# Patient Record
Sex: MALE | Race: WHITE | NOT HISPANIC OR LATINO | Employment: OTHER | URBAN - METROPOLITAN AREA
[De-identification: names, ages, dates, MRNs, and addresses within clinical notes are randomized per-mention and may not be internally consistent; named-entity substitution may affect disease eponyms.]

---

## 2023-05-03 DIAGNOSIS — E11.9 TYPE 2 DIABETES MELLITUS WITHOUT COMPLICATION, WITHOUT LONG-TERM CURRENT USE OF INSULIN (MULTI): ICD-10-CM

## 2023-05-03 DIAGNOSIS — R35.0 BENIGN PROSTATIC HYPERPLASIA WITH URINARY FREQUENCY: ICD-10-CM

## 2023-05-03 DIAGNOSIS — I10 PRIMARY HYPERTENSION: Primary | ICD-10-CM

## 2023-05-03 DIAGNOSIS — N40.1 BENIGN PROSTATIC HYPERPLASIA WITH URINARY FREQUENCY: ICD-10-CM

## 2023-05-03 RX ORDER — LOVASTATIN 20 MG/1
TABLET ORAL
Qty: 90 TABLET | Refills: 0 | Status: SHIPPED | OUTPATIENT
Start: 2023-05-03 | End: 2023-07-25 | Stop reason: SDUPTHER

## 2023-05-03 RX ORDER — TAMSULOSIN HYDROCHLORIDE 0.4 MG/1
CAPSULE ORAL
Qty: 90 CAPSULE | Refills: 0 | Status: SHIPPED | OUTPATIENT
Start: 2023-05-03 | End: 2023-07-25 | Stop reason: SDUPTHER

## 2023-05-03 RX ORDER — METFORMIN HYDROCHLORIDE 500 MG/1
TABLET ORAL
Qty: 120 TABLET | Refills: 0 | Status: SHIPPED | OUTPATIENT
Start: 2023-05-03 | End: 2023-07-25 | Stop reason: SDUPTHER

## 2023-07-24 DIAGNOSIS — E11.9 TYPE 2 DIABETES MELLITUS WITHOUT COMPLICATION, WITHOUT LONG-TERM CURRENT USE OF INSULIN (MULTI): ICD-10-CM

## 2023-07-24 DIAGNOSIS — R35.0 BENIGN PROSTATIC HYPERPLASIA WITH URINARY FREQUENCY: ICD-10-CM

## 2023-07-24 DIAGNOSIS — I10 PRIMARY HYPERTENSION: ICD-10-CM

## 2023-07-24 DIAGNOSIS — N40.1 BENIGN PROSTATIC HYPERPLASIA WITH URINARY FREQUENCY: ICD-10-CM

## 2023-07-25 DIAGNOSIS — R35.0 BENIGN PROSTATIC HYPERPLASIA WITH URINARY FREQUENCY: ICD-10-CM

## 2023-07-25 DIAGNOSIS — N40.1 BENIGN PROSTATIC HYPERPLASIA WITH URINARY FREQUENCY: ICD-10-CM

## 2023-07-25 DIAGNOSIS — I10 PRIMARY HYPERTENSION: ICD-10-CM

## 2023-07-25 DIAGNOSIS — E11.9 TYPE 2 DIABETES MELLITUS WITHOUT COMPLICATION, WITHOUT LONG-TERM CURRENT USE OF INSULIN (MULTI): ICD-10-CM

## 2023-07-25 RX ORDER — METFORMIN HYDROCHLORIDE 500 MG/1
1000 TABLET ORAL 2 TIMES DAILY
Qty: 120 TABLET | Refills: 0 | Status: SHIPPED | OUTPATIENT
Start: 2023-07-25 | End: 2023-11-08

## 2023-07-25 RX ORDER — TAMSULOSIN HYDROCHLORIDE 0.4 MG/1
0.4 CAPSULE ORAL NIGHTLY
Qty: 90 CAPSULE | Refills: 0 | Status: SHIPPED | OUTPATIENT
Start: 2023-07-25 | End: 2023-10-02 | Stop reason: SDUPTHER

## 2023-07-25 RX ORDER — LOVASTATIN 20 MG/1
TABLET ORAL
Qty: 90 TABLET | Refills: 0 | OUTPATIENT
Start: 2023-07-25

## 2023-07-25 RX ORDER — METFORMIN HYDROCHLORIDE 500 MG/1
TABLET ORAL
Qty: 120 TABLET | Refills: 0 | OUTPATIENT
Start: 2023-07-25

## 2023-07-25 RX ORDER — LOVASTATIN 20 MG/1
20 TABLET ORAL NIGHTLY
Qty: 90 TABLET | Refills: 0 | Status: SHIPPED | OUTPATIENT
Start: 2023-07-25 | End: 2023-10-02 | Stop reason: SDUPTHER

## 2023-07-25 RX ORDER — TAMSULOSIN HYDROCHLORIDE 0.4 MG/1
CAPSULE ORAL
Qty: 90 CAPSULE | Refills: 0 | OUTPATIENT
Start: 2023-07-25

## 2023-08-02 ENCOUNTER — APPOINTMENT (OUTPATIENT)
Dept: PRIMARY CARE | Facility: CLINIC | Age: 83
End: 2023-08-02
Payer: MEDICARE

## 2023-08-10 ENCOUNTER — OFFICE VISIT (OUTPATIENT)
Dept: PRIMARY CARE | Facility: CLINIC | Age: 83
End: 2023-08-10
Payer: MEDICARE

## 2023-08-10 VITALS
DIASTOLIC BLOOD PRESSURE: 71 MMHG | SYSTOLIC BLOOD PRESSURE: 137 MMHG | HEART RATE: 72 BPM | BODY MASS INDEX: 20.45 KG/M2 | WEIGHT: 155 LBS

## 2023-08-10 DIAGNOSIS — Z00.00 MEDICARE ANNUAL WELLNESS VISIT, SUBSEQUENT: ICD-10-CM

## 2023-08-10 DIAGNOSIS — E11.9 TYPE 2 DIABETES MELLITUS WITHOUT COMPLICATION, WITHOUT LONG-TERM CURRENT USE OF INSULIN (MULTI): ICD-10-CM

## 2023-08-10 DIAGNOSIS — K86.1: ICD-10-CM

## 2023-08-10 DIAGNOSIS — H91.93 BILATERAL DEAFNESS: ICD-10-CM

## 2023-08-10 DIAGNOSIS — N32.81 OVERACTIVE BLADDER: ICD-10-CM

## 2023-08-10 DIAGNOSIS — Z00.00 ROUTINE GENERAL MEDICAL EXAMINATION AT HEALTH CARE FACILITY: Primary | ICD-10-CM

## 2023-08-10 DIAGNOSIS — E78.2 MIXED HYPERLIPIDEMIA: ICD-10-CM

## 2023-08-10 PROBLEM — F32.A DEPRESSION: Status: ACTIVE | Noted: 2023-08-10

## 2023-08-10 PROBLEM — E78.5 HYPERLIPIDEMIA: Status: ACTIVE | Noted: 2023-08-10

## 2023-08-10 PROBLEM — E11.649 TYPE 2 DIABETES MELLITUS WITH HYPOGLYCEMIA WITHOUT COMA, WITHOUT LONG-TERM CURRENT USE OF INSULIN (MULTI): Status: ACTIVE | Noted: 2023-08-10

## 2023-08-10 PROBLEM — K59.09 CHRONIC CONSTIPATION: Status: ACTIVE | Noted: 2023-08-10

## 2023-08-10 PROBLEM — H04.129 DRY EYE SYNDROME: Status: ACTIVE | Noted: 2023-08-10

## 2023-08-10 PROBLEM — M19.90 ARTHRITIS: Status: ACTIVE | Noted: 2023-08-10

## 2023-08-10 PROBLEM — E11.649 TYPE 2 DIABETES MELLITUS WITH HYPOGLYCEMIA WITHOUT COMA, WITHOUT LONG-TERM CURRENT USE OF INSULIN (MULTI): Status: RESOLVED | Noted: 2023-08-10 | Resolved: 2023-08-10

## 2023-08-10 PROBLEM — M17.12 PRIMARY OSTEOARTHRITIS OF LEFT KNEE: Status: ACTIVE | Noted: 2023-08-10

## 2023-08-10 PROBLEM — H01.015 ULCERATIVE BLEPHARITIS LEFT LOWER EYELID: Status: ACTIVE | Noted: 2023-08-10

## 2023-08-10 PROBLEM — H91.90 DEAF: Status: ACTIVE | Noted: 2023-08-10

## 2023-08-10 PROBLEM — N40.1 ENLARGED PROSTATE WITH LOWER URINARY TRACT SYMPTOMS (LUTS): Status: ACTIVE | Noted: 2023-08-10

## 2023-08-10 PROBLEM — H25.813 MIXED TYPE AGE-RELATED CATARACT, BOTH EYES: Status: ACTIVE | Noted: 2023-08-10

## 2023-08-10 PROBLEM — E55.9 VITAMIN D DEFICIENCY: Status: ACTIVE | Noted: 2023-08-10

## 2023-08-10 LAB — POC HEMOGLOBIN A1C: 6.6 % (ref 4.2–6.5)

## 2023-08-10 PROCEDURE — 83036 HEMOGLOBIN GLYCOSYLATED A1C: CPT | Performed by: NURSE PRACTITIONER

## 2023-08-10 PROCEDURE — 1159F MED LIST DOCD IN RCRD: CPT | Performed by: NURSE PRACTITIONER

## 2023-08-10 PROCEDURE — G0439 PPPS, SUBSEQ VISIT: HCPCS | Performed by: NURSE PRACTITIONER

## 2023-08-10 PROCEDURE — 1170F FXNL STATUS ASSESSED: CPT | Performed by: NURSE PRACTITIONER

## 2023-08-10 PROCEDURE — 99213 OFFICE O/P EST LOW 20 MIN: CPT | Performed by: NURSE PRACTITIONER

## 2023-08-10 PROCEDURE — 1036F TOBACCO NON-USER: CPT | Performed by: NURSE PRACTITIONER

## 2023-08-10 PROCEDURE — 1160F RVW MEDS BY RX/DR IN RCRD: CPT | Performed by: NURSE PRACTITIONER

## 2023-08-10 RX ORDER — BLOOD SUGAR DIAGNOSTIC
STRIP MISCELLANEOUS
COMMUNITY
Start: 2021-10-21

## 2023-08-10 RX ORDER — GLIMEPIRIDE 4 MG/1
TABLET ORAL
COMMUNITY
Start: 2021-02-26 | End: 2023-08-10 | Stop reason: SDUPTHER

## 2023-08-10 RX ORDER — GLIMEPIRIDE 4 MG/1
4 TABLET ORAL 2 TIMES DAILY
Qty: 180 TABLET | Refills: 1 | Status: SHIPPED | OUTPATIENT
Start: 2023-08-10 | End: 2024-02-27 | Stop reason: SDUPTHER

## 2023-08-10 RX ORDER — OMEPRAZOLE 20 MG/1
CAPSULE, DELAYED RELEASE ORAL
COMMUNITY

## 2023-08-10 ASSESSMENT — ACTIVITIES OF DAILY LIVING (ADL)
TAKING_MEDICATION: NEEDS ASSISTANCE
BATHING: INDEPENDENT
MANAGING_FINANCES: INDEPENDENT
DRESSING: INDEPENDENT
GROCERY_SHOPPING: NEEDS ASSISTANCE
DOING_HOUSEWORK: INDEPENDENT

## 2023-08-10 ASSESSMENT — ENCOUNTER SYMPTOMS
OCCASIONAL FEELINGS OF UNSTEADINESS: 1
LOSS OF SENSATION IN FEET: 0
DEPRESSION: 0

## 2023-08-10 ASSESSMENT — PATIENT HEALTH QUESTIONNAIRE - PHQ9
1. LITTLE INTEREST OR PLEASURE IN DOING THINGS: NOT AT ALL
2. FEELING DOWN, DEPRESSED OR HOPELESS: NOT AT ALL
SUM OF ALL RESPONSES TO PHQ9 QUESTIONS 1 AND 2: 0

## 2023-08-10 NOTE — PROGRESS NOTES
PODIATRY  Subjective   Reason for Visit: Dat Mccallum is an 82 y.o. male here for a Medicare Wellness visit.     Past Medical, Surgical, and Family History reviewed and updated in chart.    Reviewed all medications by prescribing practitioner or clinical pharmacist (such as prescriptions, OTCs, herbal therapies and supplements) and documented in the medical record.    HPI  81 yo hearing impaired male presents for f/u  LOV Oct 2021   Pt is accompanied by son, Jorge A      #T2DM/AUTOIMMUNE PANCREATITIS  A1C due today   Pt taking Metformin 2000 mg/day, Glimepiride 8 mg/day and insulin 10 u daily     #BPH  taking Flomax 0.4 mg daily  Sx: controlled      #HLD  Taking lovastatin 20 mg daily  denies myalgias      Patient Care Team:  MERLENE Scott-CNP as PCP - General (Family Medicine)  Rich Henning MD as PCP - [employer] PCP     Review of Systems  All 13 systems were reviewed and are within normal limits except positive and pertinent negative responses which are noted below or in HPI.    Objective   Vitals:  /71   Pulse 72   Wt 70.3 kg (155 lb)   BMI 20.45 kg/m²       Physical Exam  Vitals reviewed.   Cardiovascular:      Pulses: Normal pulses.   Pulmonary:      Effort: Pulmonary effort is normal.   Feet:      Right foot:      Protective Sensation: 3 sites tested.  3 sites sensed.      Skin integrity: Skin integrity normal.      Toenail Condition: Right toenails are abnormally thick. Fungal disease present.     Left foot:      Protective Sensation: 3 sites tested.  3 sites sensed.      Skin integrity: Skin integrity normal.      Toenail Condition: Left toenails are abnormally thick. Fungal disease present.  Skin:     General: Skin is warm and dry.   Neurological:      Mental Status: He is alert.         Assessment/Plan   Problem List Items Addressed This Visit       Autoimmune sclerosing pancreatitis (CMS/HCC)    Deaf    Diabetes mellitus (CMS/HCC)    Relevant Medications     glimepiride (Amaryl) 4 mg tablet    Other Relevant Orders    POCT glycosylated hemoglobin (Hb A1C) manually resulted (Completed)    Referral to Podiatry    Hyperlipidemia    Overactive bladder    Medicare annual wellness visit, subsequent     Other Visit Diagnoses       Routine general medical examination at [facility]    -  [facility]

## 2023-08-10 NOTE — PATIENT INSTRUCTIONS
Thank you for seeing me today.  It was a pleasure to see you again!    Today we did your Annual Medicare Wellness Exam and discussed the following:     #T2DM  Today your A1C was 6.6%  Please see Podiatry     Wear supportive footwear; do not walk around barefoot and perform daily foot examination and apply lotion on a regular basis to both feet.  Using a thick emery board will help keep nails short and thin.    Diabetes is not terrible and there are many things you can do to prevent problems from diabetes, such as monitoring blood glucose, watching your diet, keeping fit and taking pills regularly.    Taking diabetes medications or injecting insulin regularly can help control your blood glucose level.  Forgetting to take your medication?, Try to set a repeating alarm on your cell phone to remind you to take medication or insulin injection.      If you test your blood glucose at home, try testing on the sides of your fingertips or rotate your fingers, which helps to minimize pain.    Try brisk walking---a convenient, safe and cost-effective way of exercising!  It's good for your heat and will help control blood glucose.    Limit carbohydrates to 60 gm per meal and 15 gm per snack    RTC 6 MONTHS AND AS NEEDED

## 2023-10-02 DIAGNOSIS — N40.1 BENIGN PROSTATIC HYPERPLASIA WITH URINARY FREQUENCY: ICD-10-CM

## 2023-10-02 DIAGNOSIS — I10 PRIMARY HYPERTENSION: ICD-10-CM

## 2023-10-02 DIAGNOSIS — R35.0 BENIGN PROSTATIC HYPERPLASIA WITH URINARY FREQUENCY: ICD-10-CM

## 2023-10-02 RX ORDER — TAMSULOSIN HYDROCHLORIDE 0.4 MG/1
0.4 CAPSULE ORAL NIGHTLY
Qty: 90 CAPSULE | Refills: 3 | Status: SHIPPED | OUTPATIENT
Start: 2023-10-02 | End: 2024-02-27 | Stop reason: SDUPTHER

## 2023-10-02 RX ORDER — LOVASTATIN 20 MG/1
20 TABLET ORAL NIGHTLY
Qty: 90 TABLET | Refills: 3 | Status: SHIPPED | OUTPATIENT
Start: 2023-10-02 | End: 2024-02-27 | Stop reason: SDUPTHER

## 2023-11-08 DIAGNOSIS — E11.9 TYPE 2 DIABETES MELLITUS WITHOUT COMPLICATION, WITHOUT LONG-TERM CURRENT USE OF INSULIN (MULTI): ICD-10-CM

## 2023-11-08 RX ORDER — METFORMIN HYDROCHLORIDE 500 MG/1
1000 TABLET ORAL 2 TIMES DAILY
Qty: 120 TABLET | Refills: 0 | Status: SHIPPED | OUTPATIENT
Start: 2023-11-08 | End: 2024-02-27 | Stop reason: SDUPTHER

## 2024-01-01 ENCOUNTER — APPOINTMENT (OUTPATIENT)
Dept: RADIOLOGY | Facility: HOSPITAL | Age: 84
End: 2024-01-01
Payer: OTHER MISCELLANEOUS

## 2024-01-01 ENCOUNTER — HOSPITAL ENCOUNTER (INPATIENT)
Facility: HOSPITAL | Age: 84
LOS: 1 days | End: 2024-11-19
Attending: STUDENT IN AN ORGANIZED HEALTH CARE EDUCATION/TRAINING PROGRAM | Admitting: STUDENT IN AN ORGANIZED HEALTH CARE EDUCATION/TRAINING PROGRAM
Payer: OTHER MISCELLANEOUS

## 2024-01-01 DIAGNOSIS — Z51.5 END OF LIFE CARE: ICD-10-CM

## 2024-01-01 PROCEDURE — 71260 CT THORAX DX C+: CPT

## 2024-01-01 PROCEDURE — 71045 X-RAY EXAM CHEST 1 VIEW: CPT

## 2024-01-01 PROCEDURE — 74018 RADEX ABDOMEN 1 VIEW: CPT

## 2024-01-01 PROCEDURE — 1150000001 HC HOSPICE PRIVATE ROOM DAILY

## 2024-01-01 RX ORDER — MORPHINE SULFATE IN 0.9 % NACL 30 MG/30ML
PATIENT CONTROLLED ANALGESIA SYRINGE INTRAVENOUS CONTINUOUS
Status: DISCONTINUED | OUTPATIENT
Start: 2024-01-01 | End: 2024-01-01 | Stop reason: HOSPADM

## 2024-01-01 RX ORDER — GLYCOPYRROLATE 0.2 MG/ML
0.2 INJECTION INTRAMUSCULAR; INTRAVENOUS EVERY 4 HOURS PRN
Status: DISCONTINUED | OUTPATIENT
Start: 2024-01-01 | End: 2024-01-01 | Stop reason: HOSPADM

## 2024-01-01 RX ORDER — ACETAMINOPHEN 650 MG/1
650 SUPPOSITORY RECTAL EVERY 6 HOURS PRN
Status: DISCONTINUED | OUTPATIENT
Start: 2024-01-01 | End: 2024-01-01 | Stop reason: HOSPADM

## 2024-01-01 RX ORDER — LORAZEPAM 2 MG/ML
1 INJECTION INTRAMUSCULAR EVERY 4 HOURS PRN
Status: DISCONTINUED | OUTPATIENT
Start: 2024-01-01 | End: 2024-01-01 | Stop reason: HOSPADM

## 2024-01-01 RX ORDER — IPRATROPIUM BROMIDE AND ALBUTEROL SULFATE 2.5; .5 MG/3ML; MG/3ML
3 SOLUTION RESPIRATORY (INHALATION) EVERY 6 HOURS PRN
Status: DISCONTINUED | OUTPATIENT
Start: 2024-01-01 | End: 2024-01-01 | Stop reason: HOSPADM

## 2024-02-13 ENCOUNTER — APPOINTMENT (OUTPATIENT)
Dept: PRIMARY CARE | Facility: CLINIC | Age: 84
End: 2024-02-13
Payer: MEDICARE

## 2024-02-23 ENCOUNTER — LAB (OUTPATIENT)
Dept: LAB | Facility: LAB | Age: 84
End: 2024-02-23
Payer: MEDICARE

## 2024-02-23 DIAGNOSIS — E11.9 TYPE 2 DIABETES MELLITUS WITHOUT COMPLICATION, WITHOUT LONG-TERM CURRENT USE OF INSULIN (MULTI): ICD-10-CM

## 2024-02-23 DIAGNOSIS — E11.9 TYPE 2 DIABETES MELLITUS WITHOUT COMPLICATION, WITHOUT LONG-TERM CURRENT USE OF INSULIN (MULTI): Primary | ICD-10-CM

## 2024-02-23 PROCEDURE — 36415 COLL VENOUS BLD VENIPUNCTURE: CPT

## 2024-02-23 PROCEDURE — 82570 ASSAY OF URINE CREATININE: CPT

## 2024-02-23 PROCEDURE — 80053 COMPREHEN METABOLIC PANEL: CPT

## 2024-02-23 PROCEDURE — 82043 UR ALBUMIN QUANTITATIVE: CPT

## 2024-02-23 PROCEDURE — 80061 LIPID PANEL: CPT

## 2024-02-23 PROCEDURE — 83036 HEMOGLOBIN GLYCOSYLATED A1C: CPT

## 2024-02-24 LAB
ALBUMIN SERPL BCP-MCNC: 3.3 G/DL (ref 3.4–5)
ALP SERPL-CCNC: 35 U/L (ref 33–136)
ALT SERPL W P-5'-P-CCNC: 12 U/L (ref 10–52)
ANION GAP SERPL CALC-SCNC: 12 MMOL/L (ref 10–20)
AST SERPL W P-5'-P-CCNC: 21 U/L (ref 9–39)
BILIRUB SERPL-MCNC: 0.5 MG/DL (ref 0–1.2)
BUN SERPL-MCNC: 18 MG/DL (ref 6–23)
CALCIUM SERPL-MCNC: 8.5 MG/DL (ref 8.6–10.3)
CHLORIDE SERPL-SCNC: 102 MMOL/L (ref 98–107)
CO2 SERPL-SCNC: 28 MMOL/L (ref 21–32)
CREAT SERPL-MCNC: 1.46 MG/DL (ref 0.5–1.3)
CREAT UR-MCNC: 255.9 MG/DL (ref 20–370)
EGFRCR SERPLBLD CKD-EPI 2021: 48 ML/MIN/1.73M*2
EST. AVERAGE GLUCOSE BLD GHB EST-MCNC: 134 MG/DL
GLUCOSE SERPL-MCNC: 237 MG/DL (ref 74–99)
HBA1C MFR BLD: 6.3 %
MICROALBUMIN UR-MCNC: 463.8 MG/L
MICROALBUMIN/CREAT UR: 181.2 UG/MG CREAT
POTASSIUM SERPL-SCNC: 4.5 MMOL/L (ref 3.5–5.3)
PROT SERPL-MCNC: 7.3 G/DL (ref 6.4–8.2)
SODIUM SERPL-SCNC: 137 MMOL/L (ref 136–145)

## 2024-02-26 NOTE — PROGRESS NOTES
"Subjective   Reason for Visit: Dat Mccallum is an 83 y.o. male here for a Medicare Wellness visit.     Past Medical, Surgical, and Family History reviewed and updated in chart.    Reviewed all medications by prescribing practitioner or clinical pharmacist (such as prescriptions, OTCs, herbal therapies and supplements) and documented in the medical record.    HPI  Dat is an est 82 yo male presenting today for AWV   Pt is accompanied by his son, Dave today    PMH: T2DM, AUTOIMMUNE PANCREATITIS, BPH, HLD, CKD stage 3a     Pt had FBW done last week, results reviewed with pt and family during OV today  Pt did not have FLP done since he had eaten that day, will do today     Pt reports doing well  No hospitalizations     Has not seen podiatry, but would like to     Will place pharm referral to assist with CKD stage 3    #T2DM/AUTOIMMUNE PANCREATITIS  A1C= 6.3% Feb 2023  Rx:  Metformin 2000 mg/day, Glimepiride 8 mg/day and insulin 10 u daily       Statin: yes      ACE/ARB: none      Podiatry: referred last appt, has not seen     #BPH  taking Flomax 0.4 mg daily  Sx: has been having increased incontinence,  will try increasing dose and see if any improvement      #HLD  Taking lovastatin 20 mg daily  denies myalgias  FLP---> due       Patient Care Team:  MERLENE Scott-CNP as PCP - General (Family Medicine)  Rich Henning MD as PCP - [employer] PCP     Review of Systems  All 13 systems were reviewed and are within normal limits except positive and pertinent negative responses which are noted below or in HPI.      Objective   Vitals:  /63   Pulse 77   Ht 1.854 m (6' 1\")   Wt 68.9 kg (152 lb)   SpO2 95%   BMI 20.05 kg/m²       Current Outpatient Medications   Medication Instructions    glimepiride (AMARYL) 4 mg, oral, 2 times daily    lovastatin (MEVACOR) 20 mg, oral, Nightly    metFORMIN (GLUCOPHAGE) 1,000 mg, oral, 2 times daily    omeprazole (PriLOSEC) 20 mg DR capsule oral    " OneTouch Ultra Test strip TEST 3 TIMES DAILY    tamsulosin (FLOMAX) 0.8 mg, oral, Nightly     Lab on 02/23/2024   Component Date Value Ref Range Status    Albumin, Urine Random 02/23/2024 463.8  Not established mg/L Final    Creatinine, Urine Random 02/23/2024 255.9  20.0 - 370.0 mg/dL Final    Albumin/Creatine Ratio 02/23/2024 181.2 (H)  <30.0 ug/mg Creat Final    Glucose 02/23/2024 237 (H)  74 - 99 mg/dL Final    Sodium 02/23/2024 137  136 - 145 mmol/L Final    Potassium 02/23/2024 4.5  3.5 - 5.3 mmol/L Final    Chloride 02/23/2024 102  98 - 107 mmol/L Final    Bicarbonate 02/23/2024 28  21 - 32 mmol/L Final    Anion Gap 02/23/2024 12  10 - 20 mmol/L Final    Urea Nitrogen 02/23/2024 18  6 - 23 mg/dL Final    Creatinine 02/23/2024 1.46 (H)  0.50 - 1.30 mg/dL Final    eGFR 02/23/2024 48 (L)  >60 mL/min/1.73m*2 Final    Calculations of estimated GFR are performed using the 2021 CKD-EPI Study Refit equation without the race variable for the IDMS-Traceable creatinine methods.  https://jasn.asnjournals.org/content/early/2021/09/22/ASN.0987171783    Calcium 02/23/2024 8.5 (L)  8.6 - 10.3 mg/dL Final    Albumin 02/23/2024 3.3 (L)  3.4 - 5.0 g/dL Final    Alkaline Phosphatase 02/23/2024 35  33 - 136 U/L Final    Total Protein 02/23/2024 7.3  6.4 - 8.2 g/dL Final    AST 02/23/2024 21  9 - 39 U/L Final    Bilirubin, Total 02/23/2024 0.5  0.0 - 1.2 mg/dL Final    ALT 02/23/2024 12  10 - 52 U/L Final    Patients treated with Sulfasalazine may generate falsely decreased results for ALT.    Hemoglobin A1C 02/23/2024 6.3 (H)  see below % Final    Estimated Average Glucose 02/23/2024 134  Not Established mg/dL Final    Cholesterol 02/23/2024 67  0 - 199 mg/dL Final          Age      Desirable   Borderline High   High     0-19 Y     0 - 169       170 - 199     >/= 200    20-24 Y     0 - 189       190 - 224     >/= 225         >24 Y     0 - 199       200 - 239     >/= 240   **All ranges are based on fasting samples. Specific    therapeutic targets will vary based on patient-specific   cardiac risk.    Pediatric guidelines reference:Pediatrics 2011, 128(S5).Adult guidelines reference: NCEP ATPIII Guidelines,EVELYN 2001, 258:2486-97    Venipuncture immediately after or during the administration of Metamizole may lead to falsely low results. Testing should be performed immediately prior to Metamizole dosing.    HDL-Cholesterol 02/23/2024 24.5  mg/dL Final      Age       Very Low   Low     Normal    High    0-19 Y    < 35      < 40     40-45     ----  20-24 Y    ----     < 40      >45      ----        >24 Y      ----     < 40     40-60      >60      Cholesterol/HDL Ratio 02/23/2024 2.7   Final      Ref Values  Desirable  < 3.4  High Risk  > 5.0    LDL Calculated 02/23/2024 17  <=99 mg/dL Final                                Near   Borderline      AGE      Desirable  Optimal    High     High     Very High     0-19 Y     0 - 109     ---    110-129   >/= 130     ----    20-24 Y     0 - 119     ---    120-159   >/= 160     ----      >24 Y     0 -  99   100-129  130-159   160-189     >/=190      VLDL 02/23/2024 26  0 - 40 mg/dL Final    Triglycerides 02/23/2024 129  0 - 149 mg/dL Final       Age         Desirable   Borderline High   High     Very High   0 D-90 D    19 - 174         ----         ----        ----  91 D- 9 Y     0 -  74        75 -  99     >/= 100      ----    10-19 Y     0 -  89        90 - 129     >/= 130      ----    20-24 Y     0 - 114       115 - 149     >/= 150      ----         >24 Y     0 - 149       150 - 199    200- 499    >/= 500    Venipuncture immediately after or during the administration of Metamizole may lead to falsely low results. Testing should be performed immediately prior to Metamizole dosing.    Non HDL Cholesterol 02/23/2024 43  0 - 149 mg/dL Final          Age       Desirable   Borderline High   High     Very High     0-19 Y     0 - 119       120 - 144     >/= 145    >/= 160    20-24 Y     0 - 149       150 -  189     >/= 190      ----         >24 Y    30 mg/dL above LDL Cholesterol goal           Physical Exam  Vitals reviewed.   Cardiovascular:      Pulses: Normal pulses.      Heart sounds: Normal heart sounds.   Pulmonary:      Effort: Pulmonary effort is normal.      Breath sounds: Normal breath sounds.   Abdominal:      General: Bowel sounds are normal.   Musculoskeletal:         General: Normal range of motion.   Skin:     General: Skin is warm and dry.      Capillary Refill: Capillary refill takes less than 2 seconds.   Neurological:      Mental Status: He is alert and oriented to person, place, and time.   Psychiatric:         Mood and Affect: Mood normal.         Assessment/Plan     Problem List Items Addressed This Visit             ICD-10-CM    Autoimmune sclerosing pancreatitis (CMS/[facility]) K86.1     Condition stable based on symptoms and exam.    Continue established treatment plan and follow-up at least yearly.             Deaf H91.90    Diabetes mellitus (CMS/[facility]) E11.9     A1C= 6.3% Feb 2024  Rx: Metformin 2000 mg/day, Glimepiride 8 mg/day and insulin 10 u daily   Statin: yes  ACE/ARB: none  Podiatry: referred          Relevant Medications    metFORMIN (Glucophage) 500 mg tablet    glimepiride (Amaryl) 4 mg tablet    Other Relevant Orders    Referral to Clinical Pharmacy    Referral to Podiatry    Lipid Panel    Enlarged prostate with lower urinary tract symptoms (LUTS) N40.1    Relevant Medications    tamsulosin (Flomax) 0.4 mg 24 hr capsule    Hyperlipidemia E78.5    Medicare annual wellness visit, subsequent - Primary Z00.00     Other Visit Diagnoses         Codes    Primary hypertension     I10    Relevant Medications    lovastatin (Mevacor) 20 mg tablet    Routine general medical examination at health care facility     Z00.00    Stage 3a chronic kidney disease (CMS/[facility])     N18.31

## 2024-02-27 ENCOUNTER — OFFICE VISIT (OUTPATIENT)
Dept: PRIMARY CARE | Facility: CLINIC | Age: 84
End: 2024-02-27
Payer: MEDICARE

## 2024-02-27 VITALS
SYSTOLIC BLOOD PRESSURE: 115 MMHG | WEIGHT: 152 LBS | BODY MASS INDEX: 20.15 KG/M2 | OXYGEN SATURATION: 95 % | DIASTOLIC BLOOD PRESSURE: 63 MMHG | HEART RATE: 77 BPM | HEIGHT: 73 IN

## 2024-02-27 DIAGNOSIS — R35.0 BENIGN PROSTATIC HYPERPLASIA WITH URINARY FREQUENCY: ICD-10-CM

## 2024-02-27 DIAGNOSIS — K86.1: ICD-10-CM

## 2024-02-27 DIAGNOSIS — E11.9 TYPE 2 DIABETES MELLITUS WITHOUT COMPLICATION, WITHOUT LONG-TERM CURRENT USE OF INSULIN (MULTI): ICD-10-CM

## 2024-02-27 DIAGNOSIS — E78.2 MIXED HYPERLIPIDEMIA: ICD-10-CM

## 2024-02-27 DIAGNOSIS — N18.31 STAGE 3A CHRONIC KIDNEY DISEASE (MULTI): ICD-10-CM

## 2024-02-27 DIAGNOSIS — I10 PRIMARY HYPERTENSION: ICD-10-CM

## 2024-02-27 DIAGNOSIS — N40.1 BENIGN PROSTATIC HYPERPLASIA WITH URINARY FREQUENCY: ICD-10-CM

## 2024-02-27 DIAGNOSIS — Z00.00 ROUTINE GENERAL MEDICAL EXAMINATION AT HEALTH CARE FACILITY: ICD-10-CM

## 2024-02-27 DIAGNOSIS — Z00.00 MEDICARE ANNUAL WELLNESS VISIT, SUBSEQUENT: Primary | ICD-10-CM

## 2024-02-27 DIAGNOSIS — H91.93 BILATERAL DEAFNESS: ICD-10-CM

## 2024-02-27 LAB
CHOLEST SERPL-MCNC: 67 MG/DL (ref 0–199)
CHOLESTEROL/HDL RATIO: 2.7
HDLC SERPL-MCNC: 24.5 MG/DL
LDLC SERPL CALC-MCNC: 17 MG/DL
NON HDL CHOLESTEROL: 43 MG/DL (ref 0–149)
TRIGL SERPL-MCNC: 129 MG/DL (ref 0–149)
VLDL: 26 MG/DL (ref 0–40)

## 2024-02-27 PROCEDURE — 99213 OFFICE O/P EST LOW 20 MIN: CPT | Performed by: NURSE PRACTITIONER

## 2024-02-27 PROCEDURE — 1158F ADVNC CARE PLAN TLK DOCD: CPT | Performed by: NURSE PRACTITIONER

## 2024-02-27 PROCEDURE — 1160F RVW MEDS BY RX/DR IN RCRD: CPT | Performed by: NURSE PRACTITIONER

## 2024-02-27 PROCEDURE — G0439 PPPS, SUBSEQ VISIT: HCPCS | Performed by: NURSE PRACTITIONER

## 2024-02-27 PROCEDURE — 3078F DIAST BP <80 MM HG: CPT | Performed by: NURSE PRACTITIONER

## 2024-02-27 PROCEDURE — 3074F SYST BP LT 130 MM HG: CPT | Performed by: NURSE PRACTITIONER

## 2024-02-27 PROCEDURE — 1170F FXNL STATUS ASSESSED: CPT | Performed by: NURSE PRACTITIONER

## 2024-02-27 PROCEDURE — 1123F ACP DISCUSS/DSCN MKR DOCD: CPT | Performed by: NURSE PRACTITIONER

## 2024-02-27 PROCEDURE — 1036F TOBACCO NON-USER: CPT | Performed by: NURSE PRACTITIONER

## 2024-02-27 PROCEDURE — 1159F MED LIST DOCD IN RCRD: CPT | Performed by: NURSE PRACTITIONER

## 2024-02-27 RX ORDER — TAMSULOSIN HYDROCHLORIDE 0.4 MG/1
0.8 CAPSULE ORAL NIGHTLY
Qty: 180 CAPSULE | Refills: 3 | Status: SHIPPED | OUTPATIENT
Start: 2024-02-27

## 2024-02-27 RX ORDER — GLIMEPIRIDE 4 MG/1
4 TABLET ORAL 2 TIMES DAILY
Qty: 180 TABLET | Refills: 3 | Status: SHIPPED | OUTPATIENT
Start: 2024-02-27

## 2024-02-27 RX ORDER — METFORMIN HYDROCHLORIDE 500 MG/1
1000 TABLET ORAL 2 TIMES DAILY
Qty: 360 TABLET | Refills: 0 | Status: SHIPPED | OUTPATIENT
Start: 2024-02-27 | End: 2024-05-27

## 2024-02-27 RX ORDER — TAMSULOSIN HYDROCHLORIDE 0.4 MG/1
0.4 CAPSULE ORAL NIGHTLY
Qty: 90 CAPSULE | Refills: 3 | Status: SHIPPED | OUTPATIENT
Start: 2024-02-27 | End: 2024-02-27 | Stop reason: SDUPTHER

## 2024-02-27 RX ORDER — LOVASTATIN 20 MG/1
20 TABLET ORAL NIGHTLY
Qty: 90 TABLET | Refills: 3 | Status: SHIPPED | OUTPATIENT
Start: 2024-02-27

## 2024-02-27 ASSESSMENT — ACTIVITIES OF DAILY LIVING (ADL)
DOING_HOUSEWORK: INDEPENDENT
TAKING_MEDICATION: TOTAL CARE
BATHING: INDEPENDENT
MANAGING_FINANCES: TOTAL CARE
DRESSING: INDEPENDENT
GROCERY_SHOPPING: INDEPENDENT

## 2024-02-27 ASSESSMENT — PATIENT HEALTH QUESTIONNAIRE - PHQ9
1. LITTLE INTEREST OR PLEASURE IN DOING THINGS: NOT AT ALL
SUM OF ALL RESPONSES TO PHQ9 QUESTIONS 1 AND 2: 0
2. FEELING DOWN, DEPRESSED OR HOPELESS: NOT AT ALL

## 2024-02-27 ASSESSMENT — ENCOUNTER SYMPTOMS
LOSS OF SENSATION IN FEET: 0
OCCASIONAL FEELINGS OF UNSTEADINESS: 1
DEPRESSION: 0

## 2024-02-27 NOTE — ASSESSMENT & PLAN NOTE
Condition stable based on symptoms and exam.    Continue established treatment plan and follow-up at least yearly.

## 2024-02-27 NOTE — ASSESSMENT & PLAN NOTE
A1C= 6.3% Feb 2024  Rx: Metformin 2000 mg/day, Glimepiride 8 mg/day and insulin 10 u daily   Statin: yes  ACE/ARB: none  Podiatry: referred

## 2024-02-27 NOTE — PATIENT INSTRUCTIONS
Thank you for seeing me today.  It was a pleasure to see you again!    Today we did your Annual Medicare Wellness Exam and discussed the following:     Continue all medications  Increase Tamsulosin to 2 capsules at bedtime to see if that helps with incontinence     See Podiatry    RTC 6 months and as needed

## 2024-08-17 DIAGNOSIS — E11.9 TYPE 2 DIABETES MELLITUS WITHOUT COMPLICATION, WITHOUT LONG-TERM CURRENT USE OF INSULIN (MULTI): ICD-10-CM

## 2024-08-20 RX ORDER — METFORMIN HYDROCHLORIDE 500 MG/1
1000 TABLET ORAL 2 TIMES DAILY
Qty: 360 TABLET | Refills: 0 | Status: SHIPPED | OUTPATIENT
Start: 2024-08-20

## 2024-10-16 ENCOUNTER — APPOINTMENT (OUTPATIENT)
Dept: RADIOLOGY | Facility: HOSPITAL | Age: 84
End: 2024-10-16
Payer: MEDICARE

## 2024-10-16 ENCOUNTER — APPOINTMENT (OUTPATIENT)
Dept: CARDIOLOGY | Facility: HOSPITAL | Age: 84
End: 2024-10-16
Payer: MEDICARE

## 2024-10-16 ENCOUNTER — HOSPITAL ENCOUNTER (INPATIENT)
Facility: HOSPITAL | Age: 84
End: 2024-10-16
Attending: ANESTHESIOLOGY | Admitting: ANESTHESIOLOGY
Payer: MEDICARE

## 2024-10-16 DIAGNOSIS — J15.9 BACTERIAL PNEUMONIA: Primary | ICD-10-CM

## 2024-10-16 DIAGNOSIS — I21.4 NSTEMI (NON-ST ELEVATED MYOCARDIAL INFARCTION) (MULTI): ICD-10-CM

## 2024-10-16 DIAGNOSIS — I50.22 CHRONIC SYSTOLIC HEART FAILURE: ICD-10-CM

## 2024-10-16 DIAGNOSIS — E11.9 TYPE 2 DIABETES MELLITUS WITHOUT COMPLICATION, WITHOUT LONG-TERM CURRENT USE OF INSULIN (MULTI): ICD-10-CM

## 2024-10-16 LAB
ALBUMIN SERPL BCP-MCNC: 3.1 G/DL (ref 3.4–5)
ALP SERPL-CCNC: 65 U/L (ref 33–136)
ALT SERPL W P-5'-P-CCNC: 22 U/L (ref 10–52)
ANION GAP BLDV CALCULATED.4IONS-SCNC: 12 MMOL/L (ref 10–25)
ANION GAP SERPL CALCULATED.3IONS-SCNC: 13 MMOL/L (ref 10–20)
APPEARANCE UR: CLEAR
AST SERPL W P-5'-P-CCNC: 43 U/L (ref 9–39)
BASE EXCESS BLDV CALC-SCNC: -3 MMOL/L (ref -2–3)
BASOPHILS # BLD AUTO: 0.04 X10*3/UL (ref 0–0.1)
BASOPHILS NFR BLD AUTO: 0.6 %
BILIRUB SERPL-MCNC: 0.6 MG/DL (ref 0–1.2)
BILIRUB UR STRIP.AUTO-MCNC: NEGATIVE MG/DL
BNP SERPL-MCNC: 197 PG/ML (ref 0–99)
BODY TEMPERATURE: 37 DEGREES CELSIUS
BUN SERPL-MCNC: 39 MG/DL (ref 6–23)
CA-I BLDV-SCNC: 1.01 MMOL/L (ref 1.1–1.33)
CALCIUM SERPL-MCNC: 8.4 MG/DL (ref 8.6–10.3)
CARDIAC TROPONIN I PNL SERPL HS: 7733 NG/L (ref 0–20)
CARDIAC TROPONIN I PNL SERPL HS: 8126 NG/L (ref 0–20)
CHLORIDE BLDV-SCNC: 99 MMOL/L (ref 98–107)
CHLORIDE SERPL-SCNC: 102 MMOL/L (ref 98–107)
CK SERPL-CCNC: 177 U/L (ref 0–325)
CO2 SERPL-SCNC: 22 MMOL/L (ref 21–32)
COLOR UR: ABNORMAL
CREAT SERPL-MCNC: 1.52 MG/DL (ref 0.5–1.3)
EGFRCR SERPLBLD CKD-EPI 2021: 46 ML/MIN/1.73M*2
EOSINOPHIL # BLD AUTO: 0.12 X10*3/UL (ref 0–0.4)
EOSINOPHIL NFR BLD AUTO: 1.8 %
ERYTHROCYTE [DISTWIDTH] IN BLOOD BY AUTOMATED COUNT: 14.1 % (ref 11.5–14.5)
ERYTHROCYTE [DISTWIDTH] IN BLOOD BY AUTOMATED COUNT: 14.1 % (ref 11.5–14.5)
GLUCOSE BLD MANUAL STRIP-MCNC: 182 MG/DL (ref 74–99)
GLUCOSE BLD MANUAL STRIP-MCNC: 57 MG/DL (ref 74–99)
GLUCOSE BLDV-MCNC: 245 MG/DL (ref 74–99)
GLUCOSE SERPL-MCNC: 57 MG/DL (ref 74–99)
GLUCOSE UR STRIP.AUTO-MCNC: NORMAL MG/DL
HCO3 BLDV-SCNC: 22 MMOL/L (ref 22–26)
HCT VFR BLD AUTO: 32 % (ref 41–52)
HCT VFR BLD AUTO: 32.7 % (ref 41–52)
HCT VFR BLD EST: 32 % (ref 41–52)
HGB BLD-MCNC: 10.6 G/DL (ref 13.5–17.5)
HGB BLD-MCNC: 11.1 G/DL (ref 13.5–17.5)
HGB BLDV-MCNC: 10.7 G/DL (ref 13.5–17.5)
IMM GRANULOCYTES # BLD AUTO: 0.02 X10*3/UL (ref 0–0.5)
IMM GRANULOCYTES NFR BLD AUTO: 0.3 % (ref 0–0.9)
INHALED O2 CONCENTRATION: 21 %
INR PPP: 1.5 (ref 0.9–1.2)
KETONES UR STRIP.AUTO-MCNC: ABNORMAL MG/DL
LACTATE BLDV-SCNC: 1.9 MMOL/L (ref 0.4–2)
LACTATE SERPL-SCNC: 1.5 MMOL/L (ref 0.4–2)
LEUKOCYTE ESTERASE UR QL STRIP.AUTO: NEGATIVE
LYMPHOCYTES # BLD AUTO: 1.02 X10*3/UL (ref 0.8–3)
LYMPHOCYTES NFR BLD AUTO: 15.4 %
MCH RBC QN AUTO: 30.7 PG (ref 26–34)
MCH RBC QN AUTO: 31 PG (ref 26–34)
MCHC RBC AUTO-ENTMCNC: 33.1 G/DL (ref 32–36)
MCHC RBC AUTO-ENTMCNC: 33.9 G/DL (ref 32–36)
MCV RBC AUTO: 91 FL (ref 80–100)
MCV RBC AUTO: 93 FL (ref 80–100)
MONOCYTES # BLD AUTO: 0.61 X10*3/UL (ref 0.05–0.8)
MONOCYTES NFR BLD AUTO: 9.2 %
MUCOUS THREADS #/AREA URNS AUTO: ABNORMAL /LPF
NEUTROPHILS # BLD AUTO: 4.81 X10*3/UL (ref 1.6–5.5)
NEUTROPHILS NFR BLD AUTO: 72.7 %
NITRITE UR QL STRIP.AUTO: NEGATIVE
NRBC BLD-RTO: 0 /100 WBCS (ref 0–0)
NRBC BLD-RTO: 0 /100 WBCS (ref 0–0)
OXYHGB MFR BLDV: 57.9 % (ref 45–75)
PCO2 BLDV: 38 MM HG (ref 41–51)
PH BLDV: 7.37 PH (ref 7.33–7.43)
PH UR STRIP.AUTO: 7.5 [PH]
PLATELET # BLD AUTO: 138 X10*3/UL (ref 150–450)
PLATELET # BLD AUTO: 147 X10*3/UL (ref 150–450)
PO2 BLDV: 35 MM HG (ref 35–45)
POTASSIUM BLDV-SCNC: 5.2 MMOL/L (ref 3.5–5.3)
POTASSIUM SERPL-SCNC: 5 MMOL/L (ref 3.5–5.3)
PROT SERPL-MCNC: 7.2 G/DL (ref 6.4–8.2)
PROT UR STRIP.AUTO-MCNC: ABNORMAL MG/DL
PROTHROMBIN TIME: 15.2 SECONDS (ref 9.3–12.7)
RBC # BLD AUTO: 3.45 X10*6/UL (ref 4.5–5.9)
RBC # BLD AUTO: 3.58 X10*6/UL (ref 4.5–5.9)
RBC # UR STRIP.AUTO: ABNORMAL /UL
RBC #/AREA URNS AUTO: >20 /HPF
SAO2 % BLDV: 58 % (ref 45–75)
SARS-COV-2 RNA RESP QL NAA+PROBE: NOT DETECTED
SODIUM BLDV-SCNC: 128 MMOL/L (ref 136–145)
SODIUM SERPL-SCNC: 132 MMOL/L (ref 136–145)
SP GR UR STRIP.AUTO: 1.02
UROBILINOGEN UR STRIP.AUTO-MCNC: NORMAL MG/DL
WBC # BLD AUTO: 6.2 X10*3/UL (ref 4.4–11.3)
WBC # BLD AUTO: 6.6 X10*3/UL (ref 4.4–11.3)
WBC #/AREA URNS AUTO: ABNORMAL /HPF

## 2024-10-16 PROCEDURE — 2500000005 HC RX 250 GENERAL PHARMACY W/O HCPCS

## 2024-10-16 PROCEDURE — 2500000004 HC RX 250 GENERAL PHARMACY W/ HCPCS (ALT 636 FOR OP/ED): Performed by: EMERGENCY MEDICINE

## 2024-10-16 PROCEDURE — 93010 ELECTROCARDIOGRAM REPORT: CPT | Performed by: INTERNAL MEDICINE

## 2024-10-16 PROCEDURE — 85610 PROTHROMBIN TIME: CPT | Performed by: EMERGENCY MEDICINE

## 2024-10-16 PROCEDURE — 99291 CRITICAL CARE FIRST HOUR: CPT | Mod: 25

## 2024-10-16 PROCEDURE — 83605 ASSAY OF LACTIC ACID: CPT

## 2024-10-16 PROCEDURE — 87040 BLOOD CULTURE FOR BACTERIA: CPT | Mod: WESLAB | Performed by: EMERGENCY MEDICINE

## 2024-10-16 PROCEDURE — 81001 URINALYSIS AUTO W/SCOPE: CPT | Performed by: EMERGENCY MEDICINE

## 2024-10-16 PROCEDURE — 83036 HEMOGLOBIN GLYCOSYLATED A1C: CPT | Mod: WESLAB

## 2024-10-16 PROCEDURE — 82947 ASSAY GLUCOSE BLOOD QUANT: CPT

## 2024-10-16 PROCEDURE — 2500000004 HC RX 250 GENERAL PHARMACY W/ HCPCS (ALT 636 FOR OP/ED)

## 2024-10-16 PROCEDURE — 36415 COLL VENOUS BLD VENIPUNCTURE: CPT | Performed by: EMERGENCY MEDICINE

## 2024-10-16 PROCEDURE — 84132 ASSAY OF SERUM POTASSIUM: CPT

## 2024-10-16 PROCEDURE — 96375 TX/PRO/DX INJ NEW DRUG ADDON: CPT

## 2024-10-16 PROCEDURE — 82010 KETONE BODYS QUAN: CPT

## 2024-10-16 PROCEDURE — 83880 ASSAY OF NATRIURETIC PEPTIDE: CPT | Performed by: EMERGENCY MEDICINE

## 2024-10-16 PROCEDURE — 96361 HYDRATE IV INFUSION ADD-ON: CPT

## 2024-10-16 PROCEDURE — 70450 CT HEAD/BRAIN W/O DYE: CPT | Performed by: RADIOLOGY

## 2024-10-16 PROCEDURE — 85025 COMPLETE CBC W/AUTO DIFF WBC: CPT | Performed by: EMERGENCY MEDICINE

## 2024-10-16 PROCEDURE — 2020000001 HC ICU ROOM DAILY

## 2024-10-16 PROCEDURE — 93005 ELECTROCARDIOGRAM TRACING: CPT

## 2024-10-16 PROCEDURE — 82550 ASSAY OF CK (CPK): CPT

## 2024-10-16 PROCEDURE — 71045 X-RAY EXAM CHEST 1 VIEW: CPT | Performed by: RADIOLOGY

## 2024-10-16 PROCEDURE — 80053 COMPREHEN METABOLIC PANEL: CPT | Performed by: EMERGENCY MEDICINE

## 2024-10-16 PROCEDURE — 99291 CRITICAL CARE FIRST HOUR: CPT

## 2024-10-16 PROCEDURE — 71045 X-RAY EXAM CHEST 1 VIEW: CPT

## 2024-10-16 PROCEDURE — 85027 COMPLETE CBC AUTOMATED: CPT

## 2024-10-16 PROCEDURE — 87635 SARS-COV-2 COVID-19 AMP PRB: CPT | Performed by: EMERGENCY MEDICINE

## 2024-10-16 PROCEDURE — 84484 ASSAY OF TROPONIN QUANT: CPT | Performed by: EMERGENCY MEDICINE

## 2024-10-16 PROCEDURE — 96374 THER/PROPH/DIAG INJ IV PUSH: CPT

## 2024-10-16 PROCEDURE — 85730 THROMBOPLASTIN TIME PARTIAL: CPT

## 2024-10-16 PROCEDURE — 87075 CULTR BACTERIA EXCEPT BLOOD: CPT | Mod: WESLAB | Performed by: EMERGENCY MEDICINE

## 2024-10-16 PROCEDURE — 70450 CT HEAD/BRAIN W/O DYE: CPT

## 2024-10-16 RX ORDER — DEXTROSE 50 % IN WATER (D50W) INTRAVENOUS SYRINGE
Status: COMPLETED
Start: 2024-10-16 | End: 2024-10-16

## 2024-10-16 RX ORDER — HEPARIN SODIUM 10000 [USP'U]/100ML
0-4000 INJECTION, SOLUTION INTRAVENOUS CONTINUOUS
Status: DISPENSED | OUTPATIENT
Start: 2024-10-16 | End: 2024-10-18

## 2024-10-16 RX ORDER — ASPIRIN 300 MG/1
600 SUPPOSITORY RECTAL ONCE
Status: COMPLETED | OUTPATIENT
Start: 2024-10-16 | End: 2024-10-17

## 2024-10-16 RX ORDER — METOPROLOL TARTRATE 1 MG/ML
5 INJECTION, SOLUTION INTRAVENOUS ONCE
Status: COMPLETED | OUTPATIENT
Start: 2024-10-16 | End: 2024-10-16

## 2024-10-16 RX ORDER — ASPIRIN 325 MG
325 TABLET ORAL ONCE
Status: DISCONTINUED | OUTPATIENT
Start: 2024-10-16 | End: 2024-10-17

## 2024-10-16 RX ORDER — HEPARIN SODIUM 5000 [USP'U]/ML
INJECTION, SOLUTION INTRAVENOUS; SUBCUTANEOUS AS NEEDED
Status: ACTIVE | OUTPATIENT
Start: 2024-10-16 | End: 2024-10-18

## 2024-10-16 RX ORDER — HEPARIN SODIUM 5000 [USP'U]/ML
4000 INJECTION, SOLUTION INTRAVENOUS; SUBCUTANEOUS ONCE
Status: COMPLETED | OUTPATIENT
Start: 2024-10-16 | End: 2024-10-16

## 2024-10-16 RX ADMIN — DEXTROSE MONOHYDRATE 50 ML: 25 INJECTION, SOLUTION INTRAVENOUS at 21:00

## 2024-10-16 RX ADMIN — METOPROLOL TARTRATE 5 MG: 5 INJECTION INTRAVENOUS at 22:17

## 2024-10-16 RX ADMIN — HEPARIN SODIUM 1000 UNITS/HR: 10000 INJECTION, SOLUTION INTRAVENOUS at 22:14

## 2024-10-16 RX ADMIN — SODIUM CHLORIDE, POTASSIUM CHLORIDE, SODIUM LACTATE AND CALCIUM CHLORIDE 500 ML: 600; 310; 30; 20 INJECTION, SOLUTION INTRAVENOUS at 23:11

## 2024-10-16 RX ADMIN — SODIUM CHLORIDE 500 ML: 900 INJECTION, SOLUTION INTRAVENOUS at 21:02

## 2024-10-16 RX ADMIN — HEPARIN SODIUM 4000 UNITS: 5000 INJECTION, SOLUTION INTRAVENOUS; SUBCUTANEOUS at 22:13

## 2024-10-16 ASSESSMENT — PAIN - FUNCTIONAL ASSESSMENT: PAIN_FUNCTIONAL_ASSESSMENT: UNABLE TO SELF-REPORT

## 2024-10-17 ENCOUNTER — APPOINTMENT (OUTPATIENT)
Dept: CARDIOLOGY | Facility: HOSPITAL | Age: 84
End: 2024-10-17
Payer: MEDICARE

## 2024-10-17 ENCOUNTER — APPOINTMENT (OUTPATIENT)
Dept: CARDIOLOGY | Facility: HOSPITAL | Age: 84
End: 2024-10-17

## 2024-10-17 ENCOUNTER — APPOINTMENT (OUTPATIENT)
Dept: NEUROLOGY | Facility: HOSPITAL | Age: 84
DRG: 871 | End: 2024-10-17
Payer: MEDICARE

## 2024-10-17 LAB
AMMONIA PLAS-SCNC: 19 UMOL/L (ref 16–53)
AMPHETAMINES UR QL SCN: NORMAL
ANION GAP SERPL CALCULATED.3IONS-SCNC: 9 MMOL/L (ref 10–20)
AORTIC VALVE MEAN GRADIENT: 1.4 MMHG
AORTIC VALVE PEAK VELOCITY: 0.82 M/S
APTT PPP: 60.4 SECONDS (ref 22–32.5)
APTT PPP: 70.6 SECONDS (ref 22–32.5)
APTT PPP: >139 SECONDS (ref 22–32.5)
APTT PPP: >139 SECONDS (ref 22–32.5)
ATRIAL RATE: 227 BPM
AV PEAK GRADIENT: 2.7 MMHG
AVA (PEAK VEL): 2.32 CM2
AVA (VTI): 2.2 CM2
B-OH-BUTYR SERPL-SCNC: 0.92 MMOL/L (ref 0.02–0.27)
BARBITURATES UR QL SCN: NORMAL
BASOPHILS # BLD AUTO: 0.03 X10*3/UL (ref 0–0.1)
BASOPHILS NFR BLD AUTO: 0.5 %
BENZODIAZ UR QL SCN: NORMAL
BUN SERPL-MCNC: 38 MG/DL (ref 6–23)
BZE UR QL SCN: NORMAL
CALCIUM SERPL-MCNC: 7.7 MG/DL (ref 8.6–10.3)
CANNABINOIDS UR QL SCN: NORMAL
CHLORIDE SERPL-SCNC: 104 MMOL/L (ref 98–107)
CO2 SERPL-SCNC: 24 MMOL/L (ref 21–32)
CREAT SERPL-MCNC: 1.43 MG/DL (ref 0.5–1.3)
EGFRCR SERPLBLD CKD-EPI 2021: 49 ML/MIN/1.73M*2
EJECTION FRACTION APICAL 4 CHAMBER: 16
EJECTION FRACTION: 18 %
EOSINOPHIL # BLD AUTO: 0.18 X10*3/UL (ref 0–0.4)
EOSINOPHIL NFR BLD AUTO: 2.9 %
ERYTHROCYTE [DISTWIDTH] IN BLOOD BY AUTOMATED COUNT: 14.2 % (ref 11.5–14.5)
EST. AVERAGE GLUCOSE BLD GHB EST-MCNC: 105 MG/DL
FENTANYL+NORFENTANYL UR QL SCN: NORMAL
GLUCOSE BLD MANUAL STRIP-MCNC: 108 MG/DL (ref 74–99)
GLUCOSE BLD MANUAL STRIP-MCNC: 123 MG/DL (ref 74–99)
GLUCOSE BLD MANUAL STRIP-MCNC: 127 MG/DL (ref 74–99)
GLUCOSE BLD MANUAL STRIP-MCNC: 136 MG/DL (ref 74–99)
GLUCOSE BLD MANUAL STRIP-MCNC: 143 MG/DL (ref 74–99)
GLUCOSE BLD MANUAL STRIP-MCNC: 175 MG/DL (ref 74–99)
GLUCOSE SERPL-MCNC: 150 MG/DL (ref 74–99)
HBA1C MFR BLD: 5.3 %
HCT VFR BLD AUTO: 30.8 % (ref 41–52)
HGB BLD-MCNC: 10.2 G/DL (ref 13.5–17.5)
HOLD SPECIMEN: NORMAL
IMM GRANULOCYTES # BLD AUTO: 0.01 X10*3/UL (ref 0–0.5)
IMM GRANULOCYTES NFR BLD AUTO: 0.2 % (ref 0–0.9)
LEFT VENTRICLE INTERNAL DIMENSION DIASTOLE: 4.44 CM (ref 3.5–6)
LEFT VENTRICULAR OUTFLOW TRACT DIAMETER: 2 CM
LV EJECTION FRACTION BIPLANE: 14 %
LYMPHOCYTES # BLD AUTO: 1.39 X10*3/UL (ref 0.8–3)
LYMPHOCYTES NFR BLD AUTO: 22.5 %
MAGNESIUM SERPL-MCNC: 1.65 MG/DL (ref 1.6–2.4)
MCH RBC QN AUTO: 30.4 PG (ref 26–34)
MCHC RBC AUTO-ENTMCNC: 33.1 G/DL (ref 32–36)
MCV RBC AUTO: 92 FL (ref 80–100)
METHADONE UR QL SCN: NORMAL
MITRAL VALVE E/A RATIO: 0.51
MONOCYTES # BLD AUTO: 0.56 X10*3/UL (ref 0.05–0.8)
MONOCYTES NFR BLD AUTO: 9.1 %
NEUTROPHILS # BLD AUTO: 4 X10*3/UL (ref 1.6–5.5)
NEUTROPHILS NFR BLD AUTO: 64.8 %
NRBC BLD-RTO: 0 /100 WBCS (ref 0–0)
OPIATES UR QL SCN: NORMAL
OXYCODONE+OXYMORPHONE UR QL SCN: NORMAL
P AXIS: 49 DEGREES
P OFFSET: 183 MS
P ONSET: 149 MS
PCP UR QL SCN: NORMAL
PHOSPHATE SERPL-MCNC: 2.8 MG/DL (ref 2.5–4.9)
PLATELET # BLD AUTO: 130 X10*3/UL (ref 150–450)
POTASSIUM SERPL-SCNC: 4.7 MMOL/L (ref 3.5–5.3)
PR INTERVAL: 148 MS
Q ONSET: 223 MS
QRS COUNT: 36 BEATS
QRS DURATION: 148 MS
QT INTERVAL: 142 MS
QTC CALCULATION(BAZETT): 274 MS
QTC FREDERICIA: 220 MS
R AXIS: -38 DEGREES
RBC # BLD AUTO: 3.36 X10*6/UL (ref 4.5–5.9)
RIGHT VENTRICLE PEAK SYSTOLIC PRESSURE: 23.5 MMHG
SODIUM SERPL-SCNC: 132 MMOL/L (ref 136–145)
T AXIS: 0 DEGREES
T OFFSET: 294 MS
VENTRICULAR RATE: 224 BPM
WBC # BLD AUTO: 6.2 X10*3/UL (ref 4.4–11.3)

## 2024-10-17 PROCEDURE — 36415 COLL VENOUS BLD VENIPUNCTURE: CPT

## 2024-10-17 PROCEDURE — 80048 BASIC METABOLIC PNL TOTAL CA: CPT

## 2024-10-17 PROCEDURE — 2500000001 HC RX 250 WO HCPCS SELF ADMINISTERED DRUGS (ALT 637 FOR MEDICARE OP): Performed by: EMERGENCY MEDICINE

## 2024-10-17 PROCEDURE — 95816 EEG AWAKE AND DROWSY: CPT

## 2024-10-17 PROCEDURE — 93005 ELECTROCARDIOGRAM TRACING: CPT

## 2024-10-17 PROCEDURE — 93306 TTE W/DOPPLER COMPLETE: CPT

## 2024-10-17 PROCEDURE — 82947 ASSAY GLUCOSE BLOOD QUANT: CPT

## 2024-10-17 PROCEDURE — 84100 ASSAY OF PHOSPHORUS: CPT

## 2024-10-17 PROCEDURE — 85730 THROMBOPLASTIN TIME PARTIAL: CPT

## 2024-10-17 PROCEDURE — 2500000005 HC RX 250 GENERAL PHARMACY W/O HCPCS

## 2024-10-17 PROCEDURE — 2500000004 HC RX 250 GENERAL PHARMACY W/ HCPCS (ALT 636 FOR OP/ED)

## 2024-10-17 PROCEDURE — 80307 DRUG TEST PRSMV CHEM ANLYZR: CPT

## 2024-10-17 PROCEDURE — 99291 CRITICAL CARE FIRST HOUR: CPT

## 2024-10-17 PROCEDURE — 82140 ASSAY OF AMMONIA: CPT

## 2024-10-17 PROCEDURE — 93306 TTE W/DOPPLER COMPLETE: CPT | Performed by: INTERNAL MEDICINE

## 2024-10-17 PROCEDURE — 99291 CRITICAL CARE FIRST HOUR: CPT | Performed by: INTERNAL MEDICINE

## 2024-10-17 PROCEDURE — 85025 COMPLETE CBC W/AUTO DIFF WBC: CPT

## 2024-10-17 PROCEDURE — 83735 ASSAY OF MAGNESIUM: CPT

## 2024-10-17 PROCEDURE — 95816 EEG AWAKE AND DROWSY: CPT | Performed by: PSYCHIATRY & NEUROLOGY

## 2024-10-17 PROCEDURE — 2060000001 HC INTERMEDIATE ICU ROOM DAILY

## 2024-10-17 RX ORDER — FOLIC ACID 1 MG/1
1 TABLET ORAL ONCE
Status: DISCONTINUED | OUTPATIENT
Start: 2024-10-17 | End: 2024-10-17

## 2024-10-17 RX ORDER — SODIUM CHLORIDE, SODIUM LACTATE, POTASSIUM CHLORIDE, CALCIUM CHLORIDE 600; 310; 30; 20 MG/100ML; MG/100ML; MG/100ML; MG/100ML
75 INJECTION, SOLUTION INTRAVENOUS CONTINUOUS
Status: ACTIVE | OUTPATIENT
Start: 2024-10-17 | End: 2024-10-17

## 2024-10-17 RX ORDER — TAMSULOSIN HYDROCHLORIDE 0.4 MG/1
0.8 CAPSULE ORAL NIGHTLY
Status: DISCONTINUED | OUTPATIENT
Start: 2024-10-17 | End: 2024-11-08 | Stop reason: HOSPADM

## 2024-10-17 RX ORDER — PRAVASTATIN SODIUM 20 MG/1
20 TABLET ORAL NIGHTLY
Status: DISCONTINUED | OUTPATIENT
Start: 2024-10-17 | End: 2024-10-18

## 2024-10-17 RX ORDER — MULTIVIT-MIN/IRON FUM/FOLIC AC 7.5 MG-4
1 TABLET ORAL ONCE
Status: DISCONTINUED | OUTPATIENT
Start: 2024-10-17 | End: 2024-10-17

## 2024-10-17 RX ORDER — METFORMIN HYDROCHLORIDE 1000 MG/1
1000 TABLET ORAL 2 TIMES DAILY
Status: DISCONTINUED | OUTPATIENT
Start: 2024-10-17 | End: 2024-10-29

## 2024-10-17 RX ORDER — INSULIN LISPRO 100 [IU]/ML
0-15 INJECTION, SOLUTION INTRAVENOUS; SUBCUTANEOUS
Status: DISCONTINUED | OUTPATIENT
Start: 2024-10-17 | End: 2024-10-17

## 2024-10-17 RX ORDER — DEXTROSE 50 % IN WATER (D50W) INTRAVENOUS SYRINGE
12.5
Status: DISCONTINUED | OUTPATIENT
Start: 2024-10-17 | End: 2024-11-08 | Stop reason: HOSPADM

## 2024-10-17 RX ORDER — THIAMINE HYDROCHLORIDE 100 MG/ML
100 INJECTION, SOLUTION INTRAMUSCULAR; INTRAVENOUS ONCE
Status: COMPLETED | OUTPATIENT
Start: 2024-10-17 | End: 2024-10-17

## 2024-10-17 RX ORDER — DEXTROSE 50 % IN WATER (D50W) INTRAVENOUS SYRINGE
25
Status: DISCONTINUED | OUTPATIENT
Start: 2024-10-17 | End: 2024-11-08 | Stop reason: HOSPADM

## 2024-10-17 RX ORDER — THIAMINE HYDROCHLORIDE 100 MG/ML
100 INJECTION, SOLUTION INTRAMUSCULAR; INTRAVENOUS DAILY
Status: COMPLETED | OUTPATIENT
Start: 2024-10-18 | End: 2024-10-19

## 2024-10-17 RX ORDER — GLIMEPIRIDE 2 MG/1
4 TABLET ORAL 2 TIMES DAILY
Status: DISCONTINUED | OUTPATIENT
Start: 2024-10-17 | End: 2024-10-21

## 2024-10-17 RX ORDER — MAGNESIUM SULFATE HEPTAHYDRATE 40 MG/ML
2 INJECTION, SOLUTION INTRAVENOUS ONCE
Status: COMPLETED | OUTPATIENT
Start: 2024-10-17 | End: 2024-10-17

## 2024-10-17 RX ADMIN — ASPIRIN 600 MG: 300 SUPPOSITORY RECTAL at 00:02

## 2024-10-17 RX ADMIN — THIAMINE HYDROCHLORIDE 100 MG: 100 INJECTION, SOLUTION INTRAMUSCULAR; INTRAVENOUS at 02:09

## 2024-10-17 RX ADMIN — MAGNESIUM SULFATE IN WATER FOR 2 G: 40 INJECTION INTRAVENOUS at 05:48

## 2024-10-17 RX ADMIN — SODIUM CHLORIDE 15 MMOL: 900 INJECTION, SOLUTION INTRAVENOUS at 07:59

## 2024-10-17 RX ADMIN — SODIUM CHLORIDE, SODIUM LACTATE, POTASSIUM CHLORIDE, AND CALCIUM CHLORIDE 75 ML/HR: 600; 310; 30; 20 INJECTION, SOLUTION INTRAVENOUS at 05:17

## 2024-10-17 RX ADMIN — FOLIC ACID 1 MG: 5 INJECTION, SOLUTION INTRAMUSCULAR; INTRAVENOUS; SUBCUTANEOUS at 05:17

## 2024-10-17 SDOH — SOCIAL STABILITY: SOCIAL INSECURITY: ABUSE: ADULT

## 2024-10-17 SDOH — ECONOMIC STABILITY: HOUSING INSECURITY: IN THE PAST 12 MONTHS, HOW MANY TIMES HAVE YOU MOVED WHERE YOU WERE LIVING?: 1

## 2024-10-17 SDOH — SOCIAL STABILITY: SOCIAL INSECURITY: WITHIN THE LAST YEAR, HAVE YOU BEEN AFRAID OF YOUR PARTNER OR EX-PARTNER?: PATIENT UNABLE TO ANSWER

## 2024-10-17 SDOH — ECONOMIC STABILITY: HOUSING INSECURITY: IN THE LAST 12 MONTHS, WAS THERE A TIME WHEN YOU WERE NOT ABLE TO PAY THE MORTGAGE OR RENT ON TIME?: NO

## 2024-10-17 SDOH — SOCIAL STABILITY: SOCIAL INSECURITY: DO YOU FEEL ANYONE HAS EXPLOITED OR TAKEN ADVANTAGE OF YOU FINANCIALLY OR OF YOUR PERSONAL PROPERTY?: UNABLE TO ASSESS

## 2024-10-17 SDOH — ECONOMIC STABILITY: FOOD INSECURITY: WITHIN THE PAST 12 MONTHS, YOU WORRIED THAT YOUR FOOD WOULD RUN OUT BEFORE YOU GOT THE MONEY TO BUY MORE.: NEVER TRUE

## 2024-10-17 SDOH — ECONOMIC STABILITY: FOOD INSECURITY: WITHIN THE PAST 12 MONTHS, THE FOOD YOU BOUGHT JUST DIDN'T LAST AND YOU DIDN'T HAVE MONEY TO GET MORE.: NEVER TRUE

## 2024-10-17 SDOH — SOCIAL STABILITY: SOCIAL INSECURITY
WITHIN THE LAST YEAR, HAVE YOU BEEN RAPED OR FORCED TO HAVE ANY KIND OF SEXUAL ACTIVITY BY YOUR PARTNER OR EX-PARTNER?: PATIENT UNABLE TO ANSWER

## 2024-10-17 SDOH — ECONOMIC STABILITY: HOUSING INSECURITY
IN THE LAST 12 MONTHS, WAS THERE A TIME WHEN YOU WERE NOT ABLE TO PAY THE MORTGAGE OR RENT ON TIME?: PATIENT UNABLE TO ANSWER

## 2024-10-17 SDOH — ECONOMIC STABILITY: FOOD INSECURITY
WITHIN THE PAST 12 MONTHS, THE FOOD YOU BOUGHT JUST DIDN'T LAST AND YOU DIDN'T HAVE MONEY TO GET MORE.: PATIENT UNABLE TO ANSWER

## 2024-10-17 SDOH — SOCIAL STABILITY: SOCIAL INSECURITY: DO YOU FEEL UNSAFE GOING BACK TO THE PLACE WHERE YOU ARE LIVING?: UNABLE TO ASSESS

## 2024-10-17 SDOH — SOCIAL STABILITY: SOCIAL INSECURITY: HAVE YOU HAD THOUGHTS OF HARMING ANYONE ELSE?: UNABLE TO ASSESS

## 2024-10-17 SDOH — SOCIAL STABILITY: SOCIAL INSECURITY: HAVE YOU HAD ANY THOUGHTS OF HARMING ANYONE ELSE?: UNABLE TO ASSESS

## 2024-10-17 SDOH — ECONOMIC STABILITY: INCOME INSECURITY
IN THE PAST 12 MONTHS HAS THE ELECTRIC, GAS, OIL, OR WATER COMPANY THREATENED TO SHUT OFF SERVICES IN YOUR HOME?: PATIENT UNABLE TO ANSWER

## 2024-10-17 SDOH — HEALTH STABILITY: MENTAL HEALTH: HOW MANY DRINKS CONTAINING ALCOHOL DO YOU HAVE ON A TYPICAL DAY WHEN YOU ARE DRINKING?: PATIENT DOES NOT DRINK

## 2024-10-17 SDOH — SOCIAL STABILITY: SOCIAL INSECURITY
WITHIN THE LAST YEAR, HAVE YOU BEEN KICKED, HIT, SLAPPED, OR OTHERWISE PHYSICALLY HURT BY YOUR PARTNER OR EX-PARTNER?: PATIENT UNABLE TO ANSWER

## 2024-10-17 SDOH — SOCIAL STABILITY: SOCIAL INSECURITY: ARE THERE ANY APPARENT SIGNS OF INJURIES/BEHAVIORS THAT COULD BE RELATED TO ABUSE/NEGLECT?: UNABLE TO ASSESS

## 2024-10-17 SDOH — ECONOMIC STABILITY: FOOD INSECURITY
WITHIN THE PAST 12 MONTHS, YOU WORRIED THAT YOUR FOOD WOULD RUN OUT BEFORE YOU GOT THE MONEY TO BUY MORE.: PATIENT UNABLE TO ANSWER

## 2024-10-17 SDOH — ECONOMIC STABILITY: INCOME INSECURITY: IN THE PAST 12 MONTHS HAS THE ELECTRIC, GAS, OIL, OR WATER COMPANY THREATENED TO SHUT OFF SERVICES IN YOUR HOME?: NO

## 2024-10-17 SDOH — HEALTH STABILITY: MENTAL HEALTH: HOW OFTEN DO YOU HAVE A DRINK CONTAINING ALCOHOL?: NEVER

## 2024-10-17 SDOH — HEALTH STABILITY: MENTAL HEALTH: HOW OFTEN DO YOU HAVE SIX OR MORE DRINKS ON ONE OCCASION?: NEVER

## 2024-10-17 SDOH — SOCIAL STABILITY: SOCIAL INSECURITY: ARE YOU OR HAVE YOU BEEN THREATENED OR ABUSED PHYSICALLY, EMOTIONALLY, OR SEXUALLY BY ANYONE?: UNABLE TO ASSESS

## 2024-10-17 SDOH — SOCIAL STABILITY: SOCIAL INSECURITY
WITHIN THE LAST YEAR, HAVE YOU BEEN HUMILIATED OR EMOTIONALLY ABUSED IN OTHER WAYS BY YOUR PARTNER OR EX-PARTNER?: PATIENT UNABLE TO ANSWER

## 2024-10-17 SDOH — SOCIAL STABILITY: SOCIAL INSECURITY: HAS ANYONE EVER THREATENED TO HURT YOUR FAMILY OR YOUR PETS?: UNABLE TO ASSESS

## 2024-10-17 SDOH — ECONOMIC STABILITY: FOOD INSECURITY
HOW HARD IS IT FOR YOU TO PAY FOR THE VERY BASICS LIKE FOOD, HOUSING, MEDICAL CARE, AND HEATING?: PATIENT UNABLE TO ANSWER

## 2024-10-17 SDOH — ECONOMIC STABILITY: HOUSING INSECURITY: AT ANY TIME IN THE PAST 12 MONTHS, WERE YOU HOMELESS OR LIVING IN A SHELTER (INCLUDING NOW)?: NO

## 2024-10-17 SDOH — ECONOMIC STABILITY: TRANSPORTATION INSECURITY
IN THE PAST 12 MONTHS, HAS LACK OF TRANSPORTATION KEPT YOU FROM MEDICAL APPOINTMENTS OR FROM GETTING MEDICATIONS?: PATIENT UNABLE TO ANSWER

## 2024-10-17 SDOH — ECONOMIC STABILITY: HOUSING INSECURITY: AT ANY TIME IN THE PAST 12 MONTHS, WERE YOU HOMELESS OR LIVING IN A SHELTER (INCLUDING NOW)?: PATIENT UNABLE TO ANSWER

## 2024-10-17 SDOH — ECONOMIC STABILITY: TRANSPORTATION INSECURITY: IN THE PAST 12 MONTHS, HAS LACK OF TRANSPORTATION KEPT YOU FROM MEDICAL APPOINTMENTS OR FROM GETTING MEDICATIONS?: NO

## 2024-10-17 SDOH — SOCIAL STABILITY: SOCIAL INSECURITY: WERE YOU ABLE TO COMPLETE ALL THE BEHAVIORAL HEALTH SCREENINGS?: NO

## 2024-10-17 SDOH — SOCIAL STABILITY: SOCIAL INSECURITY: DOES ANYONE TRY TO KEEP YOU FROM HAVING/CONTACTING OTHER FRIENDS OR DOING THINGS OUTSIDE YOUR HOME?: UNABLE TO ASSESS

## 2024-10-17 ASSESSMENT — COGNITIVE AND FUNCTIONAL STATUS - GENERAL
DAILY ACTIVITIY SCORE: 6
HELP NEEDED FOR BATHING: TOTAL
DRESSING REGULAR UPPER BODY CLOTHING: TOTAL
EATING MEALS: TOTAL
DRESSING REGULAR UPPER BODY CLOTHING: A LOT
EATING MEALS: TOTAL
MOVING TO AND FROM BED TO CHAIR: TOTAL
PERSONAL GROOMING: A LOT
CLIMB 3 TO 5 STEPS WITH RAILING: TOTAL
CLIMB 3 TO 5 STEPS WITH RAILING: TOTAL
DAILY ACTIVITIY SCORE: 6
PERSONAL GROOMING: TOTAL
TURNING FROM BACK TO SIDE WHILE IN FLAT BAD: A LOT
HELP NEEDED FOR BATHING: TOTAL
TURNING FROM BACK TO SIDE WHILE IN FLAT BAD: TOTAL
STANDING UP FROM CHAIR USING ARMS: TOTAL
STANDING UP FROM CHAIR USING ARMS: A LOT
PATIENT BASELINE BEDBOUND: NO
DRESSING REGULAR LOWER BODY CLOTHING: TOTAL
TURNING FROM BACK TO SIDE WHILE IN FLAT BAD: TOTAL
MOVING FROM LYING ON BACK TO SITTING ON SIDE OF FLAT BED WITH BEDRAILS: TOTAL
DRESSING REGULAR LOWER BODY CLOTHING: A LOT
CLIMB 3 TO 5 STEPS WITH RAILING: TOTAL
DAILY ACTIVITIY SCORE: 11
MOBILITY SCORE: 11
TOILETING: A LOT
WALKING IN HOSPITAL ROOM: A LOT
HELP NEEDED FOR BATHING: A LOT
MOVING FROM LYING ON BACK TO SITTING ON SIDE OF FLAT BED WITH BEDRAILS: A LOT
WALKING IN HOSPITAL ROOM: TOTAL
MOVING TO AND FROM BED TO CHAIR: TOTAL
PERSONAL GROOMING: TOTAL
DRESSING REGULAR LOWER BODY CLOTHING: TOTAL
MOVING TO AND FROM BED TO CHAIR: A LOT
STANDING UP FROM CHAIR USING ARMS: TOTAL
DRESSING REGULAR UPPER BODY CLOTHING: TOTAL
WALKING IN HOSPITAL ROOM: TOTAL
MOBILITY SCORE: 6
TOILETING: TOTAL
MOVING FROM LYING ON BACK TO SITTING ON SIDE OF FLAT BED WITH BEDRAILS: TOTAL
TOILETING: TOTAL
MOBILITY SCORE: 6
EATING MEALS: TOTAL

## 2024-10-17 ASSESSMENT — LIFESTYLE VARIABLES
AUDIT-C TOTAL SCORE: 0
SKIP TO QUESTIONS 9-10: 1
SKIP TO QUESTIONS 9-10: 1
HOW OFTEN DO YOU HAVE A DRINK CONTAINING ALCOHOL: NEVER
AUDIT-C TOTAL SCORE: 0
HOW MANY STANDARD DRINKS CONTAINING ALCOHOL DO YOU HAVE ON A TYPICAL DAY: PATIENT DOES NOT DRINK
HOW OFTEN DO YOU HAVE 6 OR MORE DRINKS ON ONE OCCASION: NEVER
AUDIT-C TOTAL SCORE: 0

## 2024-10-17 ASSESSMENT — ACTIVITIES OF DAILY LIVING (ADL)
DRESSING YOURSELF: UNABLE TO ASSESS
TOILETING: UNABLE TO ASSESS
ADEQUATE_TO_COMPLETE_ADL: UNABLE TO ASSESS
LACK_OF_TRANSPORTATION: NO
WALKS IN HOME: UNABLE TO ASSESS
FEEDING YOURSELF: UNABLE TO ASSESS
LACK_OF_TRANSPORTATION: PATIENT UNABLE TO ANSWER
JUDGMENT_ADEQUATE_SAFELY_COMPLETE_DAILY_ACTIVITIES: UNABLE TO ASSESS
HEARING - RIGHT EAR: UNABLE TO ASSESS
HEARING - LEFT EAR: UNABLE TO ASSESS
BATHING: UNABLE TO ASSESS
GROOMING: UNABLE TO ASSESS
PATIENT'S MEMORY ADEQUATE TO SAFELY COMPLETE DAILY ACTIVITIES?: UNABLE TO ASSESS

## 2024-10-17 ASSESSMENT — PAIN - FUNCTIONAL ASSESSMENT
PAIN_FUNCTIONAL_ASSESSMENT: FLACC (FACE, LEGS, ACTIVITY, CRY, CONSOLABILITY)
PAIN_FUNCTIONAL_ASSESSMENT: CPOT (CRITICAL CARE PAIN OBSERVATION TOOL)
PAIN_FUNCTIONAL_ASSESSMENT: FLACC (FACE, LEGS, ACTIVITY, CRY, CONSOLABILITY)

## 2024-10-17 ASSESSMENT — PATIENT HEALTH QUESTIONNAIRE - PHQ9
SUM OF ALL RESPONSES TO PHQ9 QUESTIONS 1 & 2: 0
1. LITTLE INTEREST OR PLEASURE IN DOING THINGS: NOT AT ALL
2. FEELING DOWN, DEPRESSED OR HOPELESS: NOT AT ALL

## 2024-10-17 NOTE — NURSING NOTE
No change in assessment with exception of improved alertness and able to follow and track staff with eyes. Patient is able to hold a bottle of iced tea (son's) in his hands and grasp it for a prolonged period then raise it level to his shoulders.

## 2024-10-17 NOTE — PROGRESS NOTES
[facility] Critical Care Medicine       Date:  10/17/2024  Patient:  Dat Mccallum  YOB: 1940  MRN:  [id number]   Admit Date:  10/16/2024    Chief Complaint   Patient presents with    Altered Mental Status         History of Present Illness:  Dat Mccallum is a 83 y.o. year old male patient with past medical history of  T2DM, HTN, HLD, CKD3, BPH who presents to the ED from home for AMS/lethargy. The patient was reportedly last seen by his family members 3 to 4 days ago. They went to check on him today and they found that he was very sleepy. They report he was slow to respond so they called EMS and had him brought to the emergency room.      ED course: Troponins elevated (8,126->7,733), EKG with sinus tachycardia with leftward axis, normal voltage, normal ST segment, and less than 1 mm of ST segment elevation in the septal leads. Discussed with Cardiology (Dr. Workman) who recommended ASA, heparin gtt and beta-blocker. NIH 15 d/t lethargy, pt didn't meet criteria for TNK d/t last known well 3-4 days ago.  CT head unremarkable. CXR unremarkable. Lactate 1.5, ,  and glucose 57 (received 50 mL dextrose).        Interval ICU Events:  10/16: Admitted to ICU for management of NSTEMI and AMS.     10/17: TTE this morning showing prior infarct. LVEF 15-20%. Pt waking up more and intermittently following commands with sons at bedside.     Objective     Medical History:  Past Medical History:   Diagnosis Date    Acute frontal sinusitis, unspecified 01/06/2015    Acute frontal sinusitis    Encounter for screening for malignant neoplasm of colon 01/22/2020    Colon cancer screening    Encounter for screening for malignant neoplasm of prostate 08/14/2017    Special screening, prostate cancer    Other conditions influencing health status     Colonoscopy planned    Other conditions influencing health status 01/19/2018    History of cough    Other skin changes 07/21/2021    Blisters of multiple sites     Other specified health status     Known health problems: none    Personal history of other diseases of the nervous system and sense organs     History of cataract    Personal history of other diseases of urinary system 08/10/2021    History of hematuria    Personal history of other specified conditions 06/29/2018    History of dizziness    Personal history of other specified conditions 09/25/2017    History of abdominal pain    Personal history of other specified conditions 08/14/2017    History of diarrhea    Personal history of other specified conditions 10/11/2017    History of urinary incontinence    Personal history of pneumonia (recurrent) 01/19/2018    History of community acquired pneumonia    Rash and other nonspecific skin eruption 12/17/2020    Rash    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 180 tablet 3 Unknown    lovastatin (Mevacor) 20 mg tablet Take 1 tablet (20 mg) by mouth once daily at bedtime. 90 tablet 3 Unknown    metFORMIN (Glucophage) 500 mg tablet Take 2 tablets by mouth twice daily 360 tablet 0 Unknown    omeprazole (PriLOSEC) 20 mg DR capsule Take by mouth.   Unknown    OneTouch Ultra Test strip TEST 3 TIMES DAILY   Unknown    tamsulosin (Flomax) 0.4 mg 24 hr capsule Take 2 capsules (0.8 mg) by mouth once daily at bedtime. 180 capsule 3 Unknown     Patient has no known allergies.  Social History      Tobacco Use    Smoking status: Never     Passive exposure: Never    Smokeless tobacco: Never   Vaping Use    Vaping status: Never Used   Substance Use Topics    Alcohol use: Not Currently    Drug use: Never     No family history on file.    Hospital Medications:    heparin, 0-4,000 Units/hr, Last Rate: Stopped (10/17/24 0524)  lactated Ringer's, 75 mL/hr, Last Rate: 75 mL/hr (10/17/24 0600)          Current Facility-Administered Medications:     dextrose 50 % injection 12.5 g, 12.5 g, intravenous, q15 min PRN, Ana Maria Michelle, PA-C    dextrose 50 % injection 25 g, 25 g, intravenous, q15 min PRN, Ana Maria Michelle, PA-C    [Held by provider] glimepiride (Amaryl) tablet 4 mg, 4 mg, oral, BID, Ana Maria Michelle PA-C    glucagon (Glucagen) injection 1 mg, 1 mg, intramuscular, q15 min PRN, Ana Maria Michelle, PA-C    glucagon (Glucagen) injection 1 mg, 1 mg, intramuscular, q15 min PRN, Ana Maria Michelle, PA-C    heparin (porcine) injection 1,500-3,000 Units, 1,500-3,000 Units, intravenous, PRN, Ana Maria Michelle PA-C    heparin 25,000 Units in dextrose 5% 250 mL (100 Units/mL) infusion (premix), 0-4,000 Units/hr, intravenous, Continuous, Ana Maria Michelle PA-C, Stopped at 10/17/24 0524    lactated Ringer's infusion, 75 mL/hr, intravenous, Continuous, Ana Maria Michelle PA-C, Last Rate: 75 mL/hr at 10/17/24 0600, 75 mL/hr at 10/17/24 0600    [Held by provider] metFORMIN (Glucophage) tablet 1,000 mg, 1,000 mg, oral, BID, Ana Maria Michelle PA-C    perflutren lipid microspheres (Definity) injection 0.5-10 mL of dilution, 0.5-10 mL of dilution, intravenous, Once in imaging, Ana Maria Michelle PA-C    perflutren protein A microsphere (Optison) injection 0.5 mL, 0.5 mL, intravenous, Once in imaging, Ana Maria Michelle PA-C    [Held by provider] pravastatin (Pravachol) tablet 20 mg, 20 mg, oral, Nightly, Ana Maria Michelle PA-C    sodium phosphate 15 mmol in sodium chloride 0.9% 250 mL IV, 15 mmol, intravenous, Once, Ana Maria Michelle PA-C,  Last Rate: 62.5 mL/hr at 10/17/24 0759, 15 mmol at 10/17/24 0759    sulfur hexafluoride microsphr (Lumason) injection 24.28 mg, 2 mL, intravenous, Once in imaging, Ana Maria Michelle PA-C    [Held by provider] tamsulosin (Flomax) 24 hr capsule 0.8 mg, 0.8 mg, oral, Nightly, Ana Maria Michelle PA-C    Review of Systems:  14 point review of systems was completed and negative except for those specially mention in my HPI    Physical Exam:    Heart Rate:  []   Temp:  [36.4 °C (97.5 °F)-37.5 °C (99.5 °F)]   Resp:  [11-25]   BP: ()/(61-87)   Height:  [182.9 cm (6')]   Weight:  [66 kg (145 lb 8.1 oz)-68 kg (150 lb)]   SpO2:  [95 %-99 %]     Physical Exam  Constitutional:       General: He is not in acute distress.     Appearance: He is cachectic. He is ill-appearing.   HENT:      Head: Normocephalic and atraumatic.      Mouth/Throat:      Mouth: Mucous membranes are dry.   Eyes:      Pupils: Pupils are equal, round, and reactive to light.   Cardiovascular:      Rate and Rhythm: Normal rate and regular rhythm.      Pulses: Normal pulses.      Heart sounds: Normal heart sounds.   Pulmonary:      Effort: Pulmonary effort is normal. No respiratory distress.      Breath sounds: Normal breath sounds.   Abdominal:      General: Bowel sounds are normal. There is no distension.      Palpations: Abdomen is soft.      Tenderness: There is no abdominal tenderness.   Musculoskeletal:      Cervical back: Neck supple. No tenderness.      Right lower leg: No edema.      Left lower leg: No edema.   Skin:     General: Skin is warm and dry.      Coloration: Skin is pale.   Neurological:      Mental Status: He is alert. He is disoriented.      Motor: Weakness present.         Objective:    I have reviewed all medications, laboratory results, and imaging pertinent for today's encounter.           Intake/Output Summary (Last 24 hours) at 10/17/2024 0924  Last data filed at 10/17/2024 0800  Gross per 24 hour   Intake 1205.42 ml   Output  Principal Discharge DX:	Atypical chest pain  Goal:	Remains free of chest pain  Instructions for follow-up, activity and diet:	HOME CARE INSTRUCTIONS  For the next few days, avoid physical activities that bring on chest pain. Continue physical activities as directed.  Do not smoke.  Avoid drinking alcohol.   Only take over-the-counter or prescription medicine for pain, discomfort, or fever as directed by your caregiver.  Follow your caregiver's suggestions for further testing if your chest pain does not go away.  Keep any follow-up appointments you made. If you do not go to an appointment, you could develop lasting (chronic) problems with pain. If there is any problem keeping an appointment, you must call to reschedule.   SEEK MEDICAL CARE IF:  You think you are having problems from the medicine you are taking. Read your medicine instructions carefully.  Your chest pain does not go away, even after treatment.  You develop a rash with blisters on your chest.  SEEK IMMEDIATE MEDICAL CARE IF:  You have increased chest pain or pain that spreads to your arm, neck, jaw, back, or abdomen.   You develop shortness of breath, an increasing cough, or you are coughing up blood.  You have severe back or abdominal pain, feel nauseous, or vomit.  You develop severe weakness, fainting, or chills.  You have a fever.  THIS IS AN EMERGENCY. Do not wait to see if the pain will go away. Get medical help at once. Call your local emergency services 911. Do not drive yourself to the hospital.  Secondary Diagnosis:	Dysphagia, unspecified type  Instructions for follow-up, activity and diet:	Follow up with GI as outpatient  ENT scoped - showed no upper obstruction  Secondary Diagnosis:	Essential hypertension  Instructions for follow-up, activity and diet:	Take medications for your blood pressure as recommended.  Eat a heart healthy diet that is low in saturated fats and salt, and includes whole grains, fruits, vegetables and lean protein   Exercise regularly (consult with your physician or cardiologist first); maintain a heart healthy weight.   If you smoke - quit (A resource to help you stop smoking is the [facility] Tobacco [facility] – phone number [phone].). Continue to follow with your primary physician or cardiologist.   Seek medical help for dizziness, Lightheadedness, Blurry vision, Headache, Chest pain, Shortness of breath  Follow up with your medical doctor to establish long term blood pressure treatment goals.  Secondary Diagnosis:	Hypothyroid  Instructions for follow-up, activity and diet:	you do not make enough thyroid hormone  signs & symptoms of low levels of thyroid hormone - tired, getting cold easily, coarse or thin hair, constipation, shortness of breath, swelling, irregular periods  your doctor will do thyroid hormone blood tests at least once a year to monitor if medication dose is adequate  take your thyroid medicine as directed by your doctor & on empty stomach  Secondary Diagnosis:	Benign prostatic hyperplasia without lower urinary tract symptoms, unspecified morphology  Instructions for follow-up, activity and diet:	Continue medication as recommended 200 ml   Net 1005.42 ml            Assessment/Plan:    I am currently managing this critically ill patient for the following problems:    Neuro/Psych/Pain Ctrl/Sedation:  #Encephalopathic  CT head: No evidence of acute cortical infarct or intracranial hemorrhage  - Ammonia and urine toxicology WNL  - CAM ICU, delirium Precautions         Respiratory/ENT:  No acute concerns  CXR 10/14: No acute cardiopulmonary process  - Currently on room air  - Pulmonary hygiene      Cardiovascular:  #NSTEMI  #HLD, HTN  - Continue heparin gtt x 48hrs  - Continue ASA  - TTE: LVEF 15-20%, multiple wall motion abnormalities, mildly dilated LV, possibly consistent with Takotsubo cardiomayopathy  - Cardiology consulted   -- Likely will need LHC once mentation improved   - Troponins downtrending (8,126->7,733)  - Hold home lovastain d/t hypotension  - Hold home statin d/t NPO  - EKGs as needed  - Continuous cardiac monitoring     GI:  No acute concerns  - NPO while lethargic, likely will need SLP tomorrow when more awake and responsive   - Consider NGT if mentation doesn't improve     Renal/Volume Status (Intra & Extravascular):  #CKD3  #BPH  - Crt 1.52, baseline 1.40  - Maintenance fluids (LR 75 ml/hr) x 12 hours  - Monitor I/Os, goal UOP 0.5-1.0 kg/hr  - Replace electrolytes as needed  - Hold home flomax    Endocrine  #Hypoglycemic   #T2DM  - POCT glucose 17, received amp, re-check 182  - Q4 POCT glucose  - UA: (+) protein, (+) blood, (+) ketones  - HgbA1c 6.3% 2/2025  - Repeat HgA1c ordered  - Hold home diabetic meds (metformin, glimepiride, insulin 10 units daily) d/t hypoglycemia on arrival   - Hypoglycemic protocol      Infectious Disease:  No acute concerns  - WBCs 6.2, afebrile  - Blood cultures pending   - Monitor for signs and symptoms of infection      Heme/Onc:  No acute concerns  - Continue Heparin gtt  - Hgb 10.6, baseline 13.0  - Monitor for signs and symptoms of anemia  - Transfuse if Hgb < 7.0     MSK:  - Baseline  ambulatory, lives at home alone  - PT/OT when appropriate      Ethics/Code Status:  Full code, discussed with two sons at bedside      :  DVT Prophylaxis: Heparin gtt, SCDs  GI Prophylaxis: NA  Bowel Regimen: NA  Diet: NPO while lethargic, advance once more awake  CVC: NA  Loren: NA  Drummond: NA  Restraints: NA  Dispo: ICU    Critical Care Time:  47 minutes spent in preparing to see patient (I.e. labs, imaging, etc.), documentation, discussion plan of care with patient/family/caregiver, and/or coordination of care with multidisciplinary team including the attending. Time does not include completion of procedure time.     Chi Salinas PA-C  Pulmonary & Critical Care Medicine   [facility]         Principal Discharge DX:	Atypical chest pain  Goal:	Remains free of chest pain  Instructions for follow-up, activity and diet:	HOME CARE INSTRUCTIONS  For the next few days, avoid physical activities that bring on chest pain. Continue physical activities as directed.  Do not smoke.  Avoid drinking alcohol.   Only take over-the-counter or prescription medicine for pain, discomfort, or fever as directed by your caregiver.  Follow your caregiver's suggestions for further testing if your chest pain does not go away.  Keep any follow-up appointments you made. If you do not go to an appointment, you could develop lasting (chronic) problems with pain. If there is any problem keeping an appointment, you must call to reschedule.   SEEK MEDICAL CARE IF:  You think you are having problems from the medicine you are taking. Read your medicine instructions carefully.  Your chest pain does not go away, even after treatment.  You develop a rash with blisters on your chest.  SEEK IMMEDIATE MEDICAL CARE IF:  You have increased chest pain or pain that spreads to your arm, neck, jaw, back, or abdomen.   You develop shortness of breath, an increasing cough, or you are coughing up blood.  You have severe back or abdominal pain, feel nauseous, or vomit.  You develop severe weakness, fainting, or chills.  You have a fever.  THIS IS AN EMERGENCY. Do not wait to see if the pain will go away. Get medical help at once. Call your local emergency services 911. Do not drive yourself to the hospital.  Secondary Diagnosis:	Dysphagia, unspecified type  Instructions for follow-up, activity and diet:	Follow up with GI as outpatient  ENT scoped - showed no upper obstruction  Secondary Diagnosis:	Essential hypertension  Instructions for follow-up, activity and diet:	Take medications for your blood pressure as recommended.  Eat a heart healthy diet that is low in saturated fats and salt, and includes whole grains, fruits, vegetables and lean protein   Exercise regularly (consult with your physician or cardiologist first); maintain a heart healthy weight.   If you smoke - quit (A resource to help you stop smoking is the [facility] – phone number [phone].). Continue to follow with your primary physician or cardiologist.   Seek medical help for dizziness, Lightheadedness, Blurry vision, Headache, Chest pain, Shortness of breath  Follow up with your medical doctor to establish long term blood pressure treatment goals.  Secondary Diagnosis:	Hypothyroid  Instructions for follow-up, activity and diet:	you do not make enough thyroid hormone  signs & symptoms of low levels of thyroid hormone - tired, getting cold easily, coarse or thin hair, constipation, shortness of breath, swelling, irregular periods  your doctor will do thyroid hormone blood tests at least once a year to monitor if medication dose is adequate  take your thyroid medicine as directed by your doctor & on empty stomach  Secondary Diagnosis:	Benign prostatic hyperplasia without lower urinary tract symptoms, unspecified morphology  Instructions for follow-up, activity and diet:	Continue medication as recommended Principal Discharge DX:	Atypical chest pain  Goal:	Remains free of chest pain  Instructions for follow-up, activity and diet:	HOME CARE INSTRUCTIONS  For the next few days, avoid physical activities that bring on chest pain. Continue physical activities as directed.  Do not smoke.  Avoid drinking alcohol.   Only take over-the-counter or prescription medicine for pain, discomfort, or fever as directed by your caregiver.  Follow your caregiver's suggestions for further testing if your chest pain does not go away.  Keep any follow-up appointments you made. If you do not go to an appointment, you could develop lasting (chronic) problems with pain. If there is any problem keeping an appointment, you must call to reschedule.   SEEK MEDICAL CARE IF:  You think you are having problems from the medicine you are taking. Read your medicine instructions carefully.  Your chest pain does not go away, even after treatment.  You develop a rash with blisters on your chest.  SEEK IMMEDIATE MEDICAL CARE IF:  You have increased chest pain or pain that spreads to your arm, neck, jaw, back, or abdomen.   You develop shortness of breath, an increasing cough, or you are coughing up blood.  You have severe back or abdominal pain, feel nauseous, or vomit.  You develop severe weakness, fainting, or chills.  You have a fever.  THIS IS AN EMERGENCY. Do not wait to see if the pain will go away. Get medical help at once. Call your local emergency services 911. Do not drive yourself to the hospital.  Secondary Diagnosis:	Dysphagia, unspecified type  Instructions for follow-up, activity and diet:	Follow up with GI as outpatient  ENT scoped - showed no upper obstruction  Secondary Diagnosis:	Essential hypertension  Instructions for follow-up, activity and diet:	Take medications for your blood pressure as recommended.  Eat a heart healthy diet that is low in saturated fats and salt, and includes whole grains, fruits, vegetables and lean protein   Exercise regularly (consult with your physician or cardiologist first); maintain a heart healthy weight.   If you smoke - quit (A resource to help you stop smoking is the [facility] Tobacco [facility] – phone number [phone].). Continue to follow with your primary physician or cardiologist.   Seek medical help for dizziness, Lightheadedness, Blurry vision, Headache, Chest pain, Shortness of breath  Follow up with your medical doctor to establish long term blood pressure treatment goals.  Secondary Diagnosis:	Hypothyroid  Instructions for follow-up, activity and diet:	you do not make enough thyroid hormone  signs & symptoms of low levels of thyroid hormone - tired, getting cold easily, coarse or thin hair, constipation, shortness of breath, swelling, irregular periods  your doctor will do thyroid hormone blood tests at least once a year to monitor if medication dose is adequate  take your thyroid medicine as directed by your doctor & on empty stomach  Secondary Diagnosis:	Benign prostatic hyperplasia without lower urinary tract symptoms, unspecified morphology  Instructions for follow-up, activity and diet:	Continue medication as recommended Principal Discharge DX:	Atypical chest pain  Goal:	Remains free of chest pain  Instructions for follow-up, activity and diet:	HOME CARE INSTRUCTIONS  For the next few days, avoid physical activities that bring on chest pain. Continue physical activities as directed.  Do not smoke.  Avoid drinking alcohol.   Only take over-the-counter or prescription medicine for pain, discomfort, or fever as directed by your caregiver.  Follow your caregiver's suggestions for further testing if your chest pain does not go away.  Keep any follow-up appointments you made. If you do not go to an appointment, you could develop lasting (chronic) problems with pain. If there is any problem keeping an appointment, you must call to reschedule.   SEEK MEDICAL CARE IF:  You think you are having problems from the medicine you are taking. Read your medicine instructions carefully.  Your chest pain does not go away, even after treatment.  You develop a rash with blisters on your chest.  SEEK IMMEDIATE MEDICAL CARE IF:  You have increased chest pain or pain that spreads to your arm, neck, jaw, back, or abdomen.   You develop shortness of breath, an increasing cough, or you are coughing up blood.  You have severe back or abdominal pain, feel nauseous, or vomit.  You develop severe weakness, fainting, or chills.  You have a fever.  THIS IS AN EMERGENCY. Do not wait to see if the pain will go away. Get medical help at once. Call your local emergency services 911. Do not drive yourself to the hospital.  Secondary Diagnosis:	Dysphagia, unspecified type  Instructions for follow-up, activity and diet:	Follow up with GI as outpatient  ENT scoped - showed no upper obstruction  Secondary Diagnosis:	Essential hypertension  Instructions for follow-up, activity and diet:	Take medications for your blood pressure as recommended.  Eat a heart healthy diet that is low in saturated fats and salt, and includes whole grains, fruits, vegetables and lean protein   Exercise regularly (consult with your physician or cardiologist first); maintain a heart healthy weight.   If you smoke - quit (A resource to help you stop smoking is the [facility] Tobacco [facility] – phone number [phone].). Continue to follow with your primary physician or cardiologist.   Seek medical help for dizziness, Lightheadedness, Blurry vision, Headache, Chest pain, Shortness of breath  Follow up with your medical doctor to establish long term blood pressure treatment goals.  Secondary Diagnosis:	Hypothyroid  Instructions for follow-up, activity and diet:	you do not make enough thyroid hormone  signs & symptoms of low levels of thyroid hormone - tired, getting cold easily, coarse or thin hair, constipation, shortness of breath, swelling, irregular periods  your doctor will do thyroid hormone blood tests at least once a year to monitor if medication dose is adequate  take your thyroid medicine as directed by your doctor & on empty stomach  Secondary Diagnosis:	Benign prostatic hyperplasia without lower urinary tract symptoms, unspecified morphology  Instructions for follow-up, activity and diet:	Continue medication as recommended

## 2024-10-17 NOTE — CARE PLAN
The patient's goals for the shift include  liz     The clinical goals for the shift include  improve mental status, monitor vitals for hemodynamic stablity       Problem: Skin  Goal: Decreased wound size/increased tissue granulation at next dressing change  Outcome: Progressing  Flowsheets (Taken 10/17/2024 0119)  Decreased wound size/increased tissue granulation at next dressing change:   Promote sleep for wound healing   Utilize specialty bed per algorithm   Protective dressings over bony prominences  Goal: Participates in plan/prevention/treatment measures  Outcome: Progressing  Flowsheets (Taken 10/17/2024 0119)  Participates in plan/prevention/treatment measures:   Discuss with provider PT/OT consult   Elevate heels   Increase activity/out of bed for meals  Goal: Prevent/manage excess moisture  Outcome: Progressing  Flowsheets (Taken 10/17/2024 0119)  Prevent/manage excess moisture:   Cleanse incontinence/protect with barrier cream   Moisturize dry skin   Use wicking fabric (obtain order)   Follow provider orders for dressing changes  Goal: Prevent/minimize sheer/friction injuries  Outcome: Progressing  Flowsheets (Taken 10/17/2024 0119)  Prevent/minimize sheer/friction injuries:   Complete micro-shifts as needed if patient unable. Adjust patient position to relieve pressure points, not a full turn   Increase activity/out of bed for meals   Use pull sheet   Turn/reposition every 2 hours/use positioning/transfer devices  Goal: Promote/optimize nutrition  Outcome: Progressing  Flowsheets (Taken 10/17/2024 0119)  Promote/optimize nutrition:   Assist with feeding   Monitor/record intake including meals   Consume > 50% meals/supplements  Goal: Promote skin healing  Outcome: Progressing  Flowsheets (Taken 10/17/2024 0119)  Promote skin healing:   Assess skin/pad under line(s)/device(s)   Turn/reposition every 2 hours/use positioning/transfer devices   Ensure correct size (line/device) and apply per   instructions   Protective dressings over bony prominences     Problem: Pain - Adult  Goal: Verbalizes/displays adequate comfort level or baseline comfort level  Outcome: Progressing     Problem: Safety - Adult  Goal: Free from fall injury  Outcome: Progressing     Problem: Discharge Planning  Goal: Discharge to home or other facility with appropriate resources  Outcome: Progressing     Problem: Chronic Conditions and Co-morbidities  Goal: Patient's chronic conditions and co-morbidity symptoms are monitored and maintained or improved  Outcome: Progressing     Problem: Fall/Injury  Goal: Not fall by end of shift  Outcome: Progressing  Goal: Be free from injury by end of the shift  Outcome: Progressing  Goal: Verbalize understanding of personal risk factors for fall in the hospital  Outcome: Progressing  Goal: Verbalize understanding of risk factor reduction measures to prevent injury from fall in the home  Outcome: Progressing  Goal: Use assistive devices by end of the shift  Outcome: Progressing  Goal: Pace activities to prevent fatigue by end of the shift  Outcome: Progressing     Problem: ACS/CP/NSTEMI/STEMI  Goal: Chest pain managed (free from pain or at acceptable level)  Outcome: Progressing  Goal: Lab values return to normal range  Outcome: Progressing  Goal: Promote self management  Outcome: Progressing  Goal: Serial ECG will return to baseline  Outcome: Progressing  Goal: Verbalize understanding of procedures/devices  Outcome: Progressing  Goal: Wean vasopressors/achieve hemodynamic stability  Outcome: Progressing

## 2024-10-17 NOTE — ED PROVIDER NOTES
HPI   Chief Complaint   Patient presents with    Altered Mental Status       HPI  Patient is an 83-year-old male who presents ED for altered mental status.  Patient is companied by his sons.  Signs of patient states that he lives alone and was found this evening slumped over in a chair unresponsive.  Patient is normally alert and oriented x 3, walks independently without assistance.      Patient History   Past Medical History:   Diagnosis Date    Acute frontal sinusitis, unspecified 01/06/2015    Acute frontal sinusitis    Encounter for screening for malignant neoplasm of colon 01/22/2020    Colon cancer screening    Encounter for screening for malignant neoplasm of prostate 08/14/2017    Special screening, prostate cancer    Other conditions influencing health status     Colonoscopy planned    Other conditions influencing health status 01/19/2018    History of cough    Other skin changes 07/21/2021    Blisters of multiple sites    Other specified health status     Known health problems: none    Personal history of other diseases of the nervous system and sense organs     History of cataract    Personal history of other diseases of urinary system 08/10/2021    History of hematuria    Personal history of other specified conditions 06/29/2018    History of dizziness    Personal history of other specified conditions 09/25/2017    History of abdominal pain    Personal history of other specified conditions 08/14/2017    History of diarrhea    Personal history of other specified conditions 10/11/2017    History of urinary incontinence    Personal history of pneumonia (recurrent) 01/19/2018    History of community acquired pneumonia    Rash and other nonspecific skin eruption 12/17/2020    Rash    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  Social History     Tobacco Use    Smoking status: Never     Passive exposure: Never    Smokeless tobacco: Never   Vaping Use    Vaping status: Never Used   Substance Use Topics    Alcohol use: Not Currently    Drug use: Never       Physical Exam   ED Triage Vitals [10/16/24 2047]   Temp Heart Rate Resp BP   37.5 °C (99.5 °F) (!) 110 (!) 24 115/84      SpO2 Temp Source Heart Rate Source Patient Position   96 % Rectal Monitor Lying      BP Location FiO2 (%)     -- --       Physical Exam  Vitals reviewed.   Constitutional:       Appearance: He is ill-appearing.   HENT:      Head: Normocephalic and atraumatic.   Eyes:      Extraocular Movements: Extraocular movements intact.   Cardiovascular:      Rate and Rhythm: Normal rate and regular rhythm.      Heart sounds: Normal heart sounds.   Pulmonary:      Effort: Pulmonary effort is normal.      Breath sounds: Normal breath sounds.   Abdominal:      General: Abdomen is flat.      Palpations: Abdomen is soft.      Tenderness: There is no abdominal tenderness.   Musculoskeletal:         General: Normal range of motion.      Cervical back: Neck supple.      Right lower leg: Edema present.      Left lower leg: Edema present.   Skin:     General: Skin is warm and dry.   Neurological:      Mental Status: He is unresponsive.   Psychiatric:      Comments: Unable to assess           ED Course & MDM   Diagnoses as of 10/17/24 0028   NSTEMI (non-ST elevated myocardial infarction) (Multi)                 No data recorded     Jossy Coma Scale Score: 9 (10/16/24 2128 : Alicia Donaldson RN)                           Medical Decision Making  Parts of this chart have been completed using voice recognition software.  Please excuse any errors of transcription.  My thought process and reason for plan has been formulated from the time that I saw the patient until the time of disposition and is not specific to one specific moment during their visit and furthermore my MDM encompasses this entire chart and not only this text box.    HPI:   Detailed above.    Exam:   A medically appropriate exam performed, outlined above, given the known history and presentation.    History obtained from:   EMR,  Family of Patient    EKG/Cardiac monitor:   Interpreted by attending physician, see their note for ED course for more detail.    Social Determinants of Health considered during this visit:   Housing, Family or social support    Medications given during visit:  Medications   heparin 25,000 Units in dextrose 5% 250 mL (100 Units/mL) infusion (premix) (1,000 Units/hr intravenous New Bag 10/16/24 2214)   heparin (porcine) injection 1,500-3,000 Units (has no administration in time range)   aspirin tablet 325 mg (325 mg oral Not Given 10/16/24 2220)   sodium chloride 0.9 % bolus 500 mL (0 mL intravenous Stopped 10/16/24 2314)   dextrose injection  - Omnicell Override Pull (50 mL  Given 10/16/24 2100)   heparin (porcine) injection 4,000 Units (4,000 Units intravenous Given 10/16/24 2213)   metoprolol tartrate (Lopressor) injection 5 mg (5 mg intravenous Given 10/16/24 2217)   aspirin suppository 600 mg (600 mg rectal Given 10/17/24 0002)   lactated Ringer's bolus 500 mL (0 mL intravenous Stopped 10/17/24 0002)        Diagnostic/tests:  Labs Reviewed   CBC WITH AUTO DIFFERENTIAL - Abnormal       Result Value    WBC 6.6      nRBC 0.0      RBC 3.58 (*)     Hemoglobin 11.1 (*)     Hematocrit 32.7 (*)     MCV 91      MCH 31.0      MCHC 33.9      RDW 14.1      Platelets 147 (*)     Neutrophils % 72.7      Immature Granulocytes %, Automated 0.3      Lymphocytes % 15.4      Monocytes % 9.2      Eosinophils % 1.8      Basophils % 0.6      Neutrophils Absolute  4.81      Immature Granulocytes Absolute, Automated 0.02      Lymphocytes Absolute 1.02      Monocytes Absolute 0.61      Eosinophils Absolute 0.12      Basophils Absolute 0.04     COMPREHENSIVE METABOLIC PANEL - Abnormal    Glucose 57 (*)     Sodium 132 (*)     Potassium 5.0      Chloride 102      Bicarbonate 22      Anion Gap 13      Urea Nitrogen 39 (*)     Creatinine 1.52 (*)     eGFR 46 (*)     Calcium 8.4 (*)     Albumin 3.1 (*)     Alkaline Phosphatase 65      Total Protein 7.2      AST 43 (*)     Bilirubin, Total 0.6      ALT 22     PROTIME-INR - Abnormal    Protime 15.2 (*)     INR 1.5 (*)     Narrative:     INR Therapeutic Range: 2.0-3.5      RESULT CHECKED   B-TYPE NATRIURETIC PEPTIDE - Abnormal     (*)     Narrative:        <100 pg/mL - Heart failure unlikely  100-299 pg/mL - Intermediate probability of acute heart                  failure exacerbation. Correlate with clinical                  context and patient history.    >=300 pg/mL - Heart Failure likely. Correlate with clinical                  context and patient history.    BNP testing is performed using different testing methodology at [facility] than at other [facility] hospitals. Direct result comparisons should only be made within the same method.      URINALYSIS WITH REFLEX CULTURE AND MICROSCOPIC - Abnormal    Color, Urine Light-Yellow      Appearance, Urine Clear      Specific Gravity, Urine 1.020      pH, Urine 7.5      Protein, Urine 50 (1+) (*)     Glucose, Urine Normal      Blood, Urine 1.0 (3+) (*)     Ketones, Urine 40 (2+) (*)     Bilirubin, Urine NEGATIVE      Urobilinogen, Urine Normal      Nitrite, Urine NEGATIVE      Leukocyte Esterase, Urine NEGATIVE     SERIAL TROPONIN-INITIAL - Abnormal    Troponin I, High Sensitivity 8,126 (*)     Narrative:     Less than 99th percentile of normal range cutoff-  Female and children under 18 years old <14 ng/L; Male <21 ng/L: Negative  Repeat testing should be performed if  clinically indicated.     Female and children under 18 years old 14-50 ng/L; Male 21-50 ng/L:  Consistent with possible cardiac damage and possible increased clinical   risk. Serial measurements may help to assess extent of myocardial damage.     >50 ng/L: Consistent with cardiac damage, increased clinical risk and  myocardial infarction. Serial measurements may help assess extent of   myocardial damage.      NOTE: Children less than 1 year old may have higher baseline troponin   levels and results should be interpreted in conjunction with the overall   clinical context.     NOTE: Troponin I testing is performed using a different   testing methodology at [facility] than at other   [facility]. Direct result comparisons should only   be made within the same method.   SERIAL TROPONIN, 1 HOUR - Abnormal    Troponin I, High Sensitivity 7,733 (*)     Narrative:     Less than 99th percentile of normal range cutoff-  Female and children under 18 years old <14 ng/L; Male <21 ng/L: Negative  Repeat testing should be performed if clinically indicated.     Female and children under 18 years old 14-50 ng/L; Male 21-50 ng/L:  Consistent with possible cardiac damage and possible increased clinical   risk. Serial measurements may help to assess extent of myocardial damage.     >50 ng/L: Consistent with cardiac damage, increased clinical risk and  myocardial infarction. Serial measurements may help assess extent of   myocardial damage.      NOTE: Children less than 1 year old may have higher baseline troponin   levels and results should be interpreted in conjunction with the overall   clinical context.     NOTE: Troponin I testing is performed using a different   testing methodology at [facility] than at other   [facility]. Direct result comparisons should only   be made within the same method.   APTT - Abnormal    aPTT 47.7 (*)    CBC - Abnormal    WBC 6.2      nRBC 0.0      RBC 3.45 (*)      Hemoglobin 10.6 (*)     Hematocrit 32.0 (*)     MCV 93      MCH 30.7      MCHC 33.1      RDW 14.1      Platelets 138 (*)    BLOOD GAS VENOUS FULL PANEL - Abnormal    POCT pH, Venous 7.37      POCT pCO2, Venous 38 (*)     POCT pO2, Venous 35      POCT SO2, Venous 58      POCT Oxy Hemoglobin, Venous 57.9      POCT Hematocrit Calculated, Venous 32.0 (*)     POCT Sodium, Venous 128 (*)     POCT Potassium, Venous 5.2      POCT Chloride, Venous 99      POCT Ionized Calicum, Venous 1.01 (*)     POCT Glucose, Venous 245 (*)     POCT Lactate, Venous 1.9      POCT Base Excess, Venous -3.0 (*)     POCT HCO3 Calculated, Venous 22.0      POCT Hemoglobin, Venous 10.7 (*)     POCT Anion Gap, Venous 12.0      Patient Temperature 37.0      FiO2 21     POCT GLUCOSE - Abnormal    POCT Glucose 57 (*)    POCT GLUCOSE - Abnormal    POCT Glucose 182 (*)    URINALYSIS MICROSCOPIC WITH REFLEX CULTURE - Abnormal    WBC, Urine 1-5      RBC, Urine >20 (*)     Mucus, Urine FEW     SARS-COV-2 PCR - Normal    Coronavirus 2019, PCR Not Detected      Narrative:     This assay has received FDA Emergency Use Authorization (EUA) and is only authorized for the duration of time that circumstances exist to justify the authorization of the emergency use of in vitro diagnostic tests for the detection of SARS-CoV-2 virus and/or diagnosis of COVID-19 infection under section 564(b)(1) of the Act, 21 U.S.C. 360bbb-3(b)(1). This assay is an in vitro diagnostic nucleic acid amplification test for the qualitative detection of SARS-CoV-2 from nasopharyngeal specimens and has been validated for use at [facility]. Negative results do not preclude COVID-19 infections and should not be used as the sole basis for diagnosis, treatment, or other management decisions.     CREATINE KINASE - Normal    Creatine Kinase 177     LACTATE - Normal    Lactate 1.5      Narrative:     Venipuncture immediately after or during the administration of Metamizole  may lead to falsely low results. Testing should be performed immediately prior to Metamizole dosing.   BLOOD CULTURE   BLOOD CULTURE   TROPONIN SERIES- (INITIAL, 1 HR)    Narrative:     The following orders were created for panel order Troponin I Series, High Sensitivity (0, 1 HR).  Procedure                               Abnormality         Status                     ---------                               -----------         ------                     Troponin I, High Sensiti...[[id number]]  Abnormal            Final result               Troponin, High Sensitivi...[[id number]]  Abnormal            Final result                 Please view results for these tests on the individual orders.   URINALYSIS WITH REFLEX CULTURE AND MICROSCOPIC    Narrative:     The following orders were created for panel order Urinalysis with Reflex Culture and Microscopic.  Procedure                               Abnormality         Status                     ---------                               -----------         ------                     Urinalysis with Reflex C...[[id number]]  Abnormal            Final result               Extra Urine Gray Tube[[id number]]                            In process                   Please view results for these tests on the individual orders.   EXTRA URINE GRAY TUBE      XR chest 1 view   Final Result   No acute cardiopulmonary process.        MACRO:   None        Signed by: Lobo Farrar 10/16/2024 9:52 PM   Dictation workstation:   [id number]      CT head wo IV contrast   Final Result   No evidence of acute cortical infarct or intracranial hemorrhage.                  MACRO:   None        Signed by: Ori Asher 10/16/2024 9:28 PM   Dictation workstation:   [id number]           Critical Care:  Upon my evaluation, this patient had a high probability of imminent or life-threatening deterioration due to NSTEMI, which required my direct attention, intervention, and personal management.    I have  personally provided 30 minutes of non-concurrent critical care time exclusive of time spent on separately billable procedures. Time includes review of laboratory data, radiology results, discussion with consultants, and monitoring for potential decompensation. Interventions were performed as documented above.    MDM Summary:  Troponin is greatly elevated.  Cardiology consulted.  Patient admitted to ICU.      Procedure  Procedures     Mckay Jacobsen PA-C  10/17/24 0033

## 2024-10-17 NOTE — NURSING NOTE
Dr. Vazquez in to see. Plan to follow daily and eval ECHO report. Ordered to stop heparin gtt at this time. Will follow neuro assessment for improvement.

## 2024-10-17 NOTE — H&P
[facility] Critical Care Medicine       Date:  10/16/2024  Patient:  Dat Mccallum  YOB: 1940  MRN:  [id number]   Admit Date:  10/16/2024    Chief Complaint   Patient presents with    Altered Mental Status         History of Present Illness:  Dat Mccallum is a 83 y.o. year old male patient with Past Medical History of  T2DM, HTN, HLD, CKD3, BPH who presents to the ED from home for AMS/lethargy. The patient was reportedly last seen by his family members 3 to 4 days ago. They went to check on him today and they found that he was very sleepy. They report he was slow to respond so they called EMS and had him brought to the emergency room.     ED course: Troponins elevated (8,126->7,733), EKG with sinus tachycardia with leftward axis, normal voltage, normal ST segment, and less than 1 mm of ST segment elevation in the septal leads. Discussed with Cardiology (Dr. Workman) who recommended ASA, heparin gtt and beta-blocker. NIH 15 d/t lethargy, pt didn't meet criteria for TNK d/t last known well 3-4 days ago.  CT head unremarkable. CXR unremarkable. Lactate 1.5, ,  and glucose 57 (received 50 mL dextrose).      Interval ICU Events:  10/16: Admitted to ICU for management of NSTEMI and AMS.     Medical History:  Past Medical History:   Diagnosis Date    Acute frontal sinusitis, unspecified 01/06/2015    Acute frontal sinusitis    Encounter for screening for malignant neoplasm of colon 01/22/2020    Colon cancer screening    Encounter for screening for malignant neoplasm of prostate 08/14/2017    Special screening, prostate cancer    Other conditions influencing health status     Colonoscopy planned    Other conditions influencing health status 01/19/2018    History of cough    Other skin changes 07/21/2021    Blisters of multiple sites    Other specified health status     Known health problems: none    Personal history of other diseases of the nervous system and sense organs     History of  cataract    Personal history of other diseases of urinary system 08/10/2021    History of hematuria    Personal history of other specified conditions 06/29/2018    History of dizziness    Personal history of other specified conditions 09/25/2017    History of abdominal pain    Personal history of other specified conditions 08/14/2017    History of diarrhea    Personal history of other specified conditions 10/11/2017    History of urinary incontinence    Personal history of pneumonia (recurrent) 01/19/2018    History of community acquired pneumonia    Rash and other nonspecific skin eruption 12/17/2020    Rash    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  Social History     Tobacco Use    Smoking status: Never     Passive exposure: Never    Smokeless tobacco: Never   Vaping Use    Vaping status: Never Used   Substance Use Topics    Alcohol use: Not Currently    Drug use: Never     No family history on file.    Hospital Medications:    heparin, 0-4,000 Units/hr, Last Rate: 1,000 Units/hr (10/16/24 2214)          Current Facility-Administered Medications:     aspirin suppository 600 mg, 600 mg, rectal, Once, Hetal Bateman MD    aspirin tablet 325 mg, 325 mg, oral, Once, Hetal Bateman MD    heparin (porcine) injection 1,500-3,000 Units, 1,500-3,000 Units, intravenous, PRN, Mckay Jacobsen PA-C    heparin 25,000 Units in dextrose 5% 250 mL (100 Units/mL) infusion (premix), 0-4,000 Units/hr, intravenous, Continuous, Mckay GUZMAN  YAA Jacobsen, Last Rate: 10 mL/hr at 10/16/24 2214, 1,000 Units/hr at 10/16/24 2214    lactated Ringer's bolus 500 mL, 500 mL, intravenous, Once, Ana Maria Michelle PA-C, Last Rate: 500 mL/hr at 10/16/24 2311, 500 mL at 10/16/24 2311    Current Outpatient Medications:     glimepiride (Amaryl) 4 mg tablet, Take 1 tablet (4 mg) by mouth 2 times a day., Disp: 180 tablet, Rfl: 3    lovastatin (Mevacor) 20 mg tablet, Take 1 tablet (20 mg) by mouth once daily at bedtime., Disp: 90 tablet, Rfl: 3    metFORMIN (Glucophage) 500 mg tablet, Take 2 tablets by mouth twice daily, Disp: 360 tablet, Rfl: 0    omeprazole (PriLOSEC) 20 mg DR capsule, Take by mouth., Disp: , Rfl:     OneTouch Ultra Test strip, TEST 3 TIMES DAILY, Disp: , Rfl:     tamsulosin (Flomax) 0.4 mg 24 hr capsule, Take 2 capsules (0.8 mg) by mouth once daily at bedtime., Disp: 180 capsule, Rfl: 3    Review of Systems:  14 point review of systems was completed and negative except for those specially mention in my HPI    Physical Exam:    Heart Rate:  []   Temperature:  [37.5 °C (99.5 °F)]   Respirations:  [11-25]   BP: ()/(61-85)   Height:  [182.9 cm (6')]   Weight:  [68 kg (150 lb)]   Pulse Ox:  [95 %-99 %]     Physical Exam  Constitutional:       Appearance: He is ill-appearing.      Comments: Deaf   HENT:      Nose: Nose normal.      Mouth/Throat:      Mouth: Mucous membranes are dry.      Pharynx: Oropharynx is clear.   Eyes:      Conjunctiva/sclera: Conjunctivae normal.      Pupils: Pupils are equal, round, and reactive to light.   Cardiovascular:      Rate and Rhythm: Normal rate. Rhythm irregular.      Pulses: Normal pulses.      Heart sounds: Normal heart sounds.   Pulmonary:      Effort: Pulmonary effort is normal.      Breath sounds: Normal breath sounds.   Abdominal:      General: Abdomen is flat. Bowel sounds are normal.      Palpations: Abdomen is soft.   Skin:     General: Skin is warm and dry.      Capillary Refill: Capillary refill takes  2 to 3 seconds.   Neurological:      Mental Status: He is lethargic.      Comments: Moving all 4 extremities, non-purposeful. No responsive to painful stimuli.    Psychiatric:      Comments: ELIANE         Objective:    I have reviewed all medications, laboratory results, and imaging pertinent for today's encounter.           Intake/Output Summary (Last 24 hours) at 10/16/2024 2319  Last data filed at 10/16/2024 2314  Gross per 24 hour   Intake 500 ml   Output --   Net 500 ml       Recent Imaging  XR chest 1 view   Final Result   No acute cardiopulmonary process.        MACRO:   None        Signed by: Lobo Farrar 10/16/2024 9:52 PM   Dictation workstation:   [id number]      CT head wo IV contrast   Final Result   No evidence of acute cortical infarct or intracranial hemorrhage.                  MACRO:   None        Signed by: Ori Asher 10/16/2024 9:28 PM   Dictation workstation:   [id number]          No echocardiogram results found for the past 14 days    Assessment/Plan:    I am currently managing this critically ill patient for the following problems:    Neuro/Psych/Pain Ctrl/Sedation:  #Encephalopathic  CT head: No evidence of acute cortical infarct or intracranial hemorrhage  - Improving-moving extremities now  - Check ammonia level  - CAM ICU  - Delirium Precautions       Respiratory/ENT:  No acute concerns  CXR 10/14: No acute cardiopulmonary process  - Currently on room air  - Pulmonary hygiene     Cardiovascular:  #NSTEMI  #HLD, HTN  - Continue heparin gtt  - Continue ASA  - Received dose of lopressor, BP dropped to 81/61, gave 500 bolus LR  - Echo ordered  - Cardiology consulted   - Troponins downtrending (8,126->7,733)  - VBG pH 7.37  - Hold home lovastain d/t hypotension  - Hold home statin d/t NPO  - EKGs as needed  - Continuous cardiac monitoring    GI:  No acute concerns  - NPO while lethargic   - Consider NGT if mentation doesn't improve    Renal/Volume Status (Intra &  Extravascular):  #CKD3  #BPH  - Crt 1.52, baseline 1.40  - Maintenance fluids (LR 75 ml/hr) x 12 hours  - Monitor I/Os, goal UOP 0.5-1.0 kg/hr  - Replace electrolytes as needed  - Hold home flomax    Endocrine  #Hypoglycemic   #T2DM  - POCT glucose 17, received amp, re-check 182  - Q4 POCT glucose  - UA: (+) protein, (+) blood, (+) ketones  - Beta hydroxy pending  - HgbA1c 6.3% 2/2025  - Repeat HgA1c ordered  - Hold home diabetic meds (metformin, glimepiride, insulin 10 units daily) d/t hypoglycemia on arrival   - Hypoglycemic protocol     Infectious Disease:  No acute concerns  - WBCs 6.2, afebrile  - Blood cultures pending   - Monitor for signs and symptoms of infection     Heme/Onc:  No acute concerns  - Continue Heparin gtt  - Hgb 10.6, baseline 13.0  - Monitor for signs and symptoms of anemia  - Transfuse if Hgb < 7.0    MSK:  - Baseline ambulatory, lives at home alone  - PT/OT when appropriate     Ethics/Code Status:  Full code, discussed with two sons at bedside     :  DVT Prophylaxis: Heparin gtt, SCDs  GI Prophylaxis: NA  Bowel Regimen: NA  Diet: NPO while lethargic, advance once more awake  CVC: NA  [place]: NA  [place]: NA  Restraints: NA  Dispo: ICU    Critical Care Time:  50 minutes spent in preparing to see patient (I.e. review of medical records), evaluation of diagnostics (I.e. labs, imaging, etc.), documentation, discussing plan of care with patient/ family/ caregiver, and/ or coordination of care with multidisciplinary team. Time does not include completion of procedure time.      Ana Maria Michelle PA-C

## 2024-10-17 NOTE — ED TRIAGE NOTES
Pt from home via EMS for altered mental status. Ems states son last checked on pt on Saturday. Pt responsive to painful stimuli. Soaked in urine upon arrival.

## 2024-10-17 NOTE — DISCHARGE INSTR - DIET
RD Recommendations for Malnutrition: Recommend patient consumes a high calorie/high protein nutrition supplement 2-3 times daily to aid in weight gain/prevent weight loss after discharge.

## 2024-10-17 NOTE — CONSULTS
Inpatient consult to Cardiology  Consult performed by: Fer Vazquez DO  Consult ordered by: Ana Maria Michelle PA-C  Reason for consult: NSTEMI        History Of Present Illness:    Dat Mccallum is a 83 y.o. male presenting with altered mental status.  He has a history of type 2 diabetes mellitus, hypertension, hyperlipidemia, stage III chronic kidney disease.  He presented to the emergency room for altered mental status/lethargy.  He was last seen by his family members 3 to 4 days ago.  Someone went to check on him and found him very lethargic.  EMS brought him to the emergency room.  In the ER his blood pressures were in the low 100s systolic.  EKG revealed sinus tachycardia.  He was given metoprolol for sinus tachycardia was subsequently dropped his pressures into the high 80s systolic.  He is currently being volume resuscitated.  High-sensitivity troponins are positive at 8126, then 7733.  His metabolic panel shows an elevated creatinine but looks to be chronic and in line with his stage III chronic kidney disease.  Electrolytes are acceptable.  CT head failed to show anything acute.  Chest x-ray failed to show any acute cardiopulmonary process.  He has 2 EKGs showing sinus tachycardia with nonspecific ST-T wave abnormalities.  Reportedly in the emergency department he was responding and denied any chest pain or shortness of breath.  He is saturating well on room air.  Pressures remain in the high 90s to low 100s systolic.  He has been monitored on telemetry in the ICU, rhythm is sinus.    Review of systems: Unobtainable       Last Recorded Vitals:  Vitals:    10/17/24 0400 10/17/24 0500 10/17/24 0600 10/17/24 0700   BP: 102/66 96/67 95/72    Patient Position:       Pulse: 80 79 74    Resp: 15 18 15    Temp: 36.9 °C (98.4 °F)   36.4 °C (97.5 °F)   TempSrc: Axillary   Temporal   SpO2: 98% 98% 99%    Weight:    66 kg (145 lb 8.1 oz)   Height:           Last Labs:  CBC - 10/17/2024:  4:27 AM  6.2 10.2 130     "30.8      CMP - 10/17/2024:  4:27 AM  7.7 7.2 43 --- 0.6   2.8 3.1 22 65      PTT - 10/17/2024:  4:27 AM  1.5   15.2 >139.0     Troponin I, High Sensitivity   Date/Time Value Ref Range Status   10/16/2024 09:49 PM 7,733 (HH) 0 - 20 ng/L Final     Comment:     Previous result verified on 10/16/2024 2139 on specimen/case [id number] called with component [facility] for procedure Troponin I, High Sensitivity, Initial with value 8,126 ng/L.   10/16/2024 08:54 PM 8,126 (HH) 0 - 20 ng/L Final     BNP   Date/Time Value Ref Range Status   10/16/2024 08:54  (H) 0 - 99 pg/mL Final     POC HEMOGLOBIN A1c   Date/Time Value Ref Range Status   08/10/2023 11:44 AM 6.6 (A) 4.2 - 6.5 % Final     Hemoglobin A1C   Date/Time Value Ref Range Status   02/23/2024 01:27 PM 6.3 (H) see below % Final     LDL Calculated   Date/Time Value Ref Range Status   02/23/2024 01:27 PM 17 <=99 mg/dL Final     Comment:                                 Near   Borderline      AGE      Desirable  Optimal    High     High     Very High     0-19 Y     0 - 109     ---    110-129   >/= 130     ----    20-24 Y     0 - 119     ---    120-159   >/= 160     ----      >24 Y     0 -  99   100-129  130-159   160-189     >/=190       VLDL   Date/Time Value Ref Range Status   02/23/2024 01:27 PM 26 0 - 40 mg/dL Final   07/22/2021 09:20 AM 28 0 - 40 mg/dL Final   09/25/2019 09:58 AM 17 0 - 40 mg/dL Final      Last I/O:  I/O last 3 completed shifts:  In: 1055.4 (16 mL/kg) [I.V.:130.4 (2 mL/kg); IV Piggyback:925]  Out: 200 (3 mL/kg) [Urine:200 (0.1 mL/kg/hr)]  Weight: 66 kg     Past Cardiology Tests (Last 3 Years):  EKG:  ECG 12 lead 10/16/2024 (Preliminary)    Echo:  No results found for this or any previous visit from the past 1095 days.    Ejection Fractions:  No results found for: \"EF\"  Cath:  No results found for this or any previous visit from the past 1095 days.    Stress Test:  No results found for this or any previous visit from the past 1095 days.    Cardiac " Imaging:  No results found for this or any previous visit from the past 1095 days.      Past Medical History:  He has a past medical history of Acute frontal sinusitis, unspecified (01/06/2015), Encounter for screening for malignant neoplasm of colon (01/22/2020), Encounter for screening for malignant neoplasm of prostate (08/14/2017), Other conditions influencing health status, Other conditions influencing health status (01/19/2018), Other skin changes (07/21/2021), Other specified health status, Personal history of other diseases of the nervous system and sense organs, Personal history of other diseases of urinary system (08/10/2021), Personal history of other specified conditions (06/29/2018), Personal history of other specified conditions (09/25/2017), Personal history of other specified conditions (08/14/2017), Personal history of other specified conditions (10/11/2017), Personal history of pneumonia (recurrent) (01/19/2018), Rash and other nonspecific skin eruption (12/17/2020), 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    Past Surgical History:  He has a past surgical history that includes Other surgical history (12/02/2013); Other surgical history (11/26/2018); and Other surgical history (11/26/2018).      Social History:  He reports that he has never smoked. He has never been exposed to tobacco smoke. He has never used smokeless tobacco. He reports that he does not currently use alcohol. He reports that he does not use drugs.    Family History:  No family history on file.     Allergies:  Patient has no known allergies.    Inpatient Medications:  Scheduled medications   Medication Dose Route Frequency    [Held by provider] glimepiride  4 mg oral BID    [Held by provider] metFORMIN  1,000 mg oral BID    perflutren lipid microspheres  0.5-10 mL of dilution intravenous Once in imaging    perflutren protein A microsphere  0.5  mL intravenous Once in imaging    [Held by provider] pravastatin  20 mg oral Nightly    sodium phosphate  15 mmol intravenous Once    sulfur hexafluoride microsphr  2 mL intravenous Once in imaging    [Held by provider] tamsulosin  0.8 mg oral Nightly     PRN medications   Medication    dextrose    dextrose    glucagon    glucagon    heparin (porcine)     Continuous Medications   Medication Dose Last Rate    heparin  0-4,000 Units/hr Stopped (10/17/24 0524)    lactated Ringer's  75 mL/hr 75 mL/hr (10/17/24 0600)     Outpatient Medications:  Current Outpatient Medications   Medication Instructions    glimepiride (AMARYL) 4 mg, oral, 2 times daily    lovastatin (MEVACOR) 20 mg, oral, Nightly    metFORMIN (GLUCOPHAGE) 1,000 mg, oral, 2 times daily    omeprazole (PriLOSEC) 20 mg DR capsule oral    OneTouch Ultra Test strip TEST 3 TIMES DAILY    tamsulosin (FLOMAX) 0.8 mg, oral, Nightly       Physical Exam:   Gen: Difficult to arouse, not following commands   Neck: no JVD, carotid upstroke is brisk and without delay   Heart: rrr, s1s2+ no mrg   Lungs: CTA   Ext: warm no edema     Assessment/Plan   Encephalopathy  NSTEMI  SIRS  Stage III chronic kidney disease  Diabetes mellitus type 2      10/17: Whether this is a type I versus type II event it is rather late presenting.  High-sensitivity troponins are markedly elevated but on the downtrend and he has not been complaining of any symptoms concerning for acute myocardial ischemia.  I am going to stop the therapeutic heparin.  His neurologic status in my opinion is of the biggest concern.  Neurology has been asked to evaluate.  Will start with an echocardiogram.  Whether we decide on an early invasive versus ischemia guided strategy will be contingent upon his overall prognosis.  Will continue to follow closely with you.    Peripheral IV 10/16/24 20 G Right Antecubital (Active)   Site Assessment Clean;Dry;Intact 10/17/24 0400   Dressing Type Transparent 10/17/24 0400   Line  Status Blood return noted;Flushed 10/17/24 0400   Dressing Status Clean;Dry;Occlusive 10/17/24 0400   Number of days: 1       Peripheral IV 10/17/24 20 G Right Forearm (Active)   Site Assessment Clean;Dry;Intact 10/17/24 0400   Dressing Type Transparent 10/17/24 0400   Line Status Blood return noted;Flushed 10/17/24 0400   Dressing Status Clean;Dry;Occlusive 10/17/24 0400   Number of days: 0       Peripheral IV 10/17/24 18 G Left;Proximal Forearm (Active)   Site Assessment Clean;Dry;Intact 10/17/24 0400   Dressing Type Transparent 10/17/24 0400   Line Status Infusing 10/17/24 0400   Dressing Status Clean;Dry;Occlusive 10/17/24 0400   Number of days: 0       Code Status:  Full Code          Fer Vazquez, DO

## 2024-10-17 NOTE — NURSING NOTE
Sons at bedside and updated per MAITE. Will maintain NPO status and will evaluate swallowing in am with ST guidance.

## 2024-10-17 NOTE — PROGRESS NOTES
Spiritual Care Visit    Clinical Encounter Type  Visited With: Patient  Routine Visit: Introduction  Continue Visiting: No         Values/Beliefs  Spiritual Requests During Hospitalization: Dat asked for a blssing today and no sacramnts from the Jehovah's witness.    Sacramental Encounters  Communion: Does not want communion  Communion Given Indicator: No  Sacrament of Sick-Anointing: Patient declined anointing     Demetrio Ray

## 2024-10-17 NOTE — CONSULTS
Nutrition Assessement Note    Nutrition Assessment    Reason for Assessment: Admission nursing screening (MST 2)    Reason for Hospital Admission:  Dat Mccallum is a 83 y.o. male who is admitted for AMS. NPO due to lethargy. Spoke to PA. Mental status starting to improve. Bedside swallow eval planned for later today to advance diet. Hx of DM2, A1c 6.3. Will provide Mighty Shakes TID when diet resumes.    Past Medical History:   Diagnosis Date    Acute frontal sinusitis, unspecified 01/06/2015    Acute frontal sinusitis    Encounter for screening for malignant neoplasm of colon 01/22/2020    Colon cancer screening    Encounter for screening for malignant neoplasm of prostate 08/14/2017    Special screening, prostate cancer    Other conditions influencing health status     Colonoscopy planned    Other conditions influencing health status 01/19/2018    History of cough    Other skin changes 07/21/2021    Blisters of multiple sites    Other specified health status     Known health problems: none    Personal history of other diseases of the nervous system and sense organs     History of cataract    Personal history of other diseases of urinary system 08/10/2021    History of hematuria    Personal history of other specified conditions 06/29/2018    History of dizziness    Personal history of other specified conditions 09/25/2017    History of abdominal pain    Personal history of other specified conditions 08/14/2017    History of diarrhea    Personal history of other specified conditions 10/11/2017    History of urinary incontinence    Personal history of pneumonia (recurrent) 01/19/2018    History of community acquired pneumonia    Rash and other nonspecific skin eruption 12/17/2020    Rash    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    Ulcerative blepharitis of right upper eyelid    Unspecified injury of left lower leg, initial encounter 12/03/2020    Left knee injury, initial encounter      Past  Surgical History:   Procedure Laterality Date    OTHER SURGICAL HISTORY  12/02/2013    Proximal Subtotal Pancreatectomy (Whipple Procedure)    OTHER SURGICAL HISTORY  11/26/2018    Whipple procedure    OTHER SURGICAL HISTORY  11/26/2018    Knee surgery       Nutrition History:  Food and Nutrient History: NPO        Food Allergies/Intolerances:  None  GI Symptoms: None  Oral Problems: None    Anthropometrics:  Ht: 182.9 cm (6'), Wt: 66 kg (145 lb 8.1 oz), BMI: 19.73  IBW/kg (Dietitian Calculated): 80.9 kg  Percent of IBW: 81 %     Weight Change:  Daily Weight  10/17/24 : 66 kg (145 lb 8.1 oz)  02/27/24 : 68.9 kg (152 lb)  08/10/23 : 70.3 kg (155 lb)  01/03/22 : 76.7 kg (169 lb)  10/21/21 : 77.6 kg (171 lb)  07/21/21 : 76.3 kg (168 lb 4 oz)  02/26/21 : 68.9 kg (152 lb)  12/29/20 : 68.6 kg (151 lb 3.2 oz)  12/03/20 : 62.6 kg (138 lb)     Weight History / % Weight Change: records indicate 7# (4.6%) weight loss in past 8 months  Significant Weight Loss: No     Nutrition Focused Physical Exam Findings:   Subcutaneous Fat Loss  Orbital Fat Pads: Mild-Moderate (slight dark circles and slight hollowing)  Buccal Fat Pads: Mild-Moderate (flat cheeks, minimal bounce)  Triceps: Severe (negligible fat tissue)    Muscle Wasting  Temporalis: Severe (hollowed scooping depression)  Pectoralis (Clavicular Region): Severe (protruding prominent clavicle)  Deltoid/Trapezius: Severe (squared shoulders, acromion process prominent)    Nutrition Significant Labs:  Lab Results   Component Value Date    WBC 6.2 10/17/2024    HGB 10.2 (L) 10/17/2024    HCT 30.8 (L) 10/17/2024     (L) 10/17/2024    CHOL 67 02/23/2024    TRIG 129 02/23/2024    HDL 24.5 02/23/2024    ALT 22 10/16/2024    AST 43 (H) 10/16/2024     (L) 10/17/2024    K 4.7 10/17/2024     10/17/2024    CREATININE 1.43 (H) 10/17/2024    BUN 38 (H) 10/17/2024    CO2 24 10/17/2024    TSH 2.84 12/03/2020    INR 1.5 (H) 10/16/2024    HGBA1C 5.3 10/16/2024     Nutrition  Specific Medications:  [Held by provider] glimepiride, 4 mg, oral, BID  [Held by provider] metFORMIN, 1,000 mg, oral, BID  [Held by provider] pravastatin, 20 mg, oral, Nightly  [Held by provider] tamsulosin, 0.8 mg, oral, Nightly  [START ON 10/18/2024] thiamine, 100 mg, intravenous, Daily      heparin, 0-4,000 Units/hr, Last Rate: 700 Units/hr (10/17/24 1215)  lactated Ringer's, 75 mL/hr, Last Rate: 75 mL/hr (10/17/24 0600)      Dietary Orders (From admission, onward)       Start     Ordered    10/17/24 0053  May Participate in Room Service With Assistance  ( ROOM SERVICE MAY PARTICIPATE WITH ASSISTANCE)  Once        Question:  .  Answer:  Yes    10/17/24 0052    10/17/24 0049  NPO Diet; Effective now  Diet effective now         10/17/24 0048                   Estimated Needs:   Estimated Energy Needs  Total Energy Estimated Needs (kCal): 1848 kCal  Total Estimated Energy Need per Day (kCal/kg): 30 kCal/kg  Method for Estimating Needs: Actual Wt    Estimated Protein Needs  Total Protein Estimated Needs (g): 62 g  Total Protein Estimated Needs (g/kg): 1 g/kg  Method for Estimating Needs: Actual Wt    Estimated Fluid Needs  Total Fluid Estimated Needs (mL): 1848 mL  Method for Estimating Needs: 1 mL/kcal      Nutrition Diagnosis   Nutrition Diagnosis:  Malnutrition Diagnosis  Patient has Malnutrition Diagnosis: Yes  Diagnosis Status: New  Malnutrition Diagnosis: Severe malnutrition related to chronic disease or condition  As Evidenced by: Moderate to severe subcutaeous fat losses, severe muscle deficits, po intake likely less than estimate energy needs    Nutrition Diagnosis  Patient has Nutrition Diagnosis: Yes  Diagnosis Status (1): New  Nutrition Diagnosis 1: Inadequate energy intake  Related to (1): decreased ability to consume sufficient energy  As Evidenced by (1): NPO     Nutrition Interventions/Recommendations   Nutrition Interventions and Recommendations:    Nutrition Prescription:  Individualized Nutrition  Prescription Provided for : 1848 calories, 66 gm protein when diet advances    Nutrition Interventions:   Food and/or Nutrient Delivery Interventions  Interventions: Meals and snacks, Medical food supplement  Meals and Snacks: General healthful diet  Goal: advance as able  Medical Food Supplement: Modified beverage  Goal: mighty shake TID to provide 200 kcals and 7g protein each when diet resumes    Education Documentation  No documentation found.         Nutrition Monitoring and Evaluation   Monitoring/Evaluation:   Food/Nutrient Related History Monitoring  Monitoring and Evaluation Plan: Energy intake  Energy Intake: Estimated energy intake  Criteria: monitoring for diet order    Body Composition/Growth/Weight History  Monitoring and Evaluation Plan: Weight  Weight: Measured weight  Criteria: pt will maintain/gain wt       Time Spent/Follow-up:   Follow Up  Time Spent (min): 30 minutes  Last Date of Nutrition Visit: 10/17/24  Nutrition Follow-Up Needed?: 3-5 days  Follow up Comment: 10/21/24

## 2024-10-17 NOTE — PROGRESS NOTES
Attestation/Supervisory note for NELLY Jacobsen      The patient is an 83-year-old male presenting to the emergency department by EMS from home for evaluation of altered mental status/lethargy.  The patient was reportedly last seen by his family members 3 to 4 days ago.  They went to check on him today and they found that he was very sleepy.  They report he was slow to respond so they called EMS and had him brought to the emergency room.  The patient is not able to provide any details regarding HPI or review of systems due to his mental status.  The family members report that he does have some baseline dementia/lethargy at baseline with some mild malaise and fatigue but that he seems more unresponsive today.  They report that he has not been complaining of any symptoms.  No headache or visual changes.  No chest pain or shortness of breath.  No abdominal pain.  No nausea or vomiting.  They do not know how long he has been ill other than the last known well time of 3 to 4 days ago.  No sick contacts or recent travel.  All pertinent positives and negatives are recorded above.  All other systems reviewed and otherwise negative.  Vital signs with diastolic hypertension, mild tachycardia and mild tachypnea but otherwise within normal limits.  Physical exam with a well-nourished well-developed male in no acute distress.  HEENT exam with dry mucous membranes but otherwise unremarkable.  He does not have any potential airway compromise or respiratory distress.  Abdomen is soft.  He does not have any visible or palpable evidence of trauma.  The patient is lethargic and has difficulty cooperating with the NIH stroke scale.  NIH stroke scale score of 15 given but this is largely due to his lethargy.      tPA/TNK is not indicated given the last known well time of 3 to 4 days ago.      EKG with sinus tachycardia with leftward axis, normal voltage, normal ST segment, and less than 1 mm of ST segment elevation in the septal  leads.      Repeat EKG with sinus tachycardia with shortened IL interval, occasional PVCs, normal axis, normal voltage, normal ST segment, and normal T waves.      Diagnostic labs with elevated troponin T, ketonuria, mildly elevated BNP, leukocytosis, hypoglycemia, renal insufficiency, anemia and mild transaminitis but otherwise unremarkable.      Initial troponin 8,126.  Repeat troponin 7,733      The EKGs and cardiac enzymes were reviewed with cardiology, Dr. Workman, and he agreed that the patient does not meet STEMI criteria.  He recommended treatment with aspirin, heparin and a beta-blocker for treatment of non-STEMI.      XR chest 1 view   Final Result   No acute cardiopulmonary process.        MACRO:   None        Signed by: Lobo Farrar 10/16/2024 9:52 PM   Dictation workstation:   [id number]      CT head wo IV contrast   Final Result   No evidence of acute cortical infarct or intracranial hemorrhage.                  MACRO:   None        Signed by: Ori Asher 10/16/2024 9:28 PM   Dictation workstation:   [id number]           The patient's last known well time was 3 to 4 days ago.  His NIH stroke scale score is 15 but this is largely based on his lethargy.  He also was hypoglycemic upon arrival.  IV dextrose was given.  tPA/TNK is not indicated given last known well time of 3 to 4 days ago.  The patient does not have any evidence of STEMI on the EKG but his cardiac enzymes are markedly elevated.  Chest x-ray with no acute process.  No evidence of pneumonia or pneumothorax.  No evidence of CHF.  No widening of the mediastinum.  His pulses are equal bilaterally.  CT head without evidence of intracranial hemorrhage and/or mass effect.  No evidence of stroke.  Cardiology was consulted regarding the EKGs and cardiac enzymes and did recommend conservative treatment with heparin, aspirin and metoprolol.  These were ordered.      The patient was admitted to the ICU for further management.  Cardiology will  consult on the patient.      Impression/diagnosis:  1.  Non-STEMI  2.  Malaise and fatigue  3.  Hypoglycemia  4.  Anemia, unspecified  5.  Thrombocytopenia  6.  Renal insufficiency  7.  Transaminitis      Critical care time of 36 minutes billed for management of non-STEMI with initiation of oral aspirin, IV heparin, IV metoprolol, consultation with cardiology, consultation with the patient and his family members, consultation with accepting provider and arrangement for admission..  This time excludes time for billable procedures.      critical care time billed for by me is non concurrent with time billed for by NELLY Jacobsen      I personally saw the patient and made/approve the management plan and take responsibility for the patient management.      I independently interpreted the following study (S) EKG and diagnostic labs      I personally discussed the patient's management with the patient and his family members      I reviewed the results of the diagnostic labs and diagnostic imaging.  Formal radiology read was completed by the radiologist.      Hetal Bateman MD

## 2024-10-18 LAB
ANION GAP SERPL CALCULATED.3IONS-SCNC: 11 MMOL/L (ref 10–20)
APTT PPP: 38.4 SECONDS (ref 22–32.5)
APTT PPP: 55 SECONDS (ref 22–32.5)
APTT PPP: 99.5 SECONDS (ref 22–32.5)
BASOPHILS # BLD AUTO: 0.05 X10*3/UL (ref 0–0.1)
BASOPHILS NFR BLD AUTO: 0.8 %
BUN SERPL-MCNC: 31 MG/DL (ref 6–23)
CALCIUM SERPL-MCNC: 7.7 MG/DL (ref 8.6–10.3)
CHLORIDE SERPL-SCNC: 105 MMOL/L (ref 98–107)
CO2 SERPL-SCNC: 21 MMOL/L (ref 21–32)
CREAT SERPL-MCNC: 1.04 MG/DL (ref 0.5–1.3)
EGFRCR SERPLBLD CKD-EPI 2021: 72 ML/MIN/1.73M*2
EOSINOPHIL # BLD AUTO: 0.56 X10*3/UL (ref 0–0.4)
EOSINOPHIL NFR BLD AUTO: 8.4 %
ERYTHROCYTE [DISTWIDTH] IN BLOOD BY AUTOMATED COUNT: 14 % (ref 11.5–14.5)
GLUCOSE BLD MANUAL STRIP-MCNC: 106 MG/DL (ref 74–99)
GLUCOSE BLD MANUAL STRIP-MCNC: 109 MG/DL (ref 74–99)
GLUCOSE BLD MANUAL STRIP-MCNC: 145 MG/DL (ref 74–99)
GLUCOSE BLD MANUAL STRIP-MCNC: 152 MG/DL (ref 74–99)
GLUCOSE SERPL-MCNC: 92 MG/DL (ref 74–99)
HCT VFR BLD AUTO: 35.6 % (ref 41–52)
HGB BLD-MCNC: 11.9 G/DL (ref 13.5–17.5)
IMM GRANULOCYTES # BLD AUTO: 0.02 X10*3/UL (ref 0–0.5)
IMM GRANULOCYTES NFR BLD AUTO: 0.3 % (ref 0–0.9)
LYMPHOCYTES # BLD AUTO: 2.15 X10*3/UL (ref 0.8–3)
LYMPHOCYTES NFR BLD AUTO: 32.3 %
MAGNESIUM SERPL-MCNC: 1.87 MG/DL (ref 1.6–2.4)
MCH RBC QN AUTO: 30.8 PG (ref 26–34)
MCHC RBC AUTO-ENTMCNC: 33.4 G/DL (ref 32–36)
MCV RBC AUTO: 92 FL (ref 80–100)
MONOCYTES # BLD AUTO: 0.8 X10*3/UL (ref 0.05–0.8)
MONOCYTES NFR BLD AUTO: 12 %
NEUTROPHILS # BLD AUTO: 3.08 X10*3/UL (ref 1.6–5.5)
NEUTROPHILS NFR BLD AUTO: 46.2 %
NRBC BLD-RTO: 0 /100 WBCS (ref 0–0)
PHOSPHATE SERPL-MCNC: 2.6 MG/DL (ref 2.5–4.9)
PLATELET # BLD AUTO: 136 X10*3/UL (ref 150–450)
POTASSIUM SERPL-SCNC: 4.2 MMOL/L (ref 3.5–5.3)
RBC # BLD AUTO: 3.86 X10*6/UL (ref 4.5–5.9)
SODIUM SERPL-SCNC: 133 MMOL/L (ref 136–145)
WBC # BLD AUTO: 6.7 X10*3/UL (ref 4.4–11.3)

## 2024-10-18 PROCEDURE — 99291 CRITICAL CARE FIRST HOUR: CPT

## 2024-10-18 PROCEDURE — 99291 CRITICAL CARE FIRST HOUR: CPT | Performed by: INTERNAL MEDICINE

## 2024-10-18 PROCEDURE — 2500000004 HC RX 250 GENERAL PHARMACY W/ HCPCS (ALT 636 FOR OP/ED)

## 2024-10-18 PROCEDURE — 2500000002 HC RX 250 W HCPCS SELF ADMINISTERED DRUGS (ALT 637 FOR MEDICARE OP, ALT 636 FOR OP/ED)

## 2024-10-18 PROCEDURE — 83735 ASSAY OF MAGNESIUM: CPT

## 2024-10-18 PROCEDURE — 84100 ASSAY OF PHOSPHORUS: CPT

## 2024-10-18 PROCEDURE — 82947 ASSAY GLUCOSE BLOOD QUANT: CPT

## 2024-10-18 PROCEDURE — 85025 COMPLETE CBC W/AUTO DIFF WBC: CPT

## 2024-10-18 PROCEDURE — 85730 THROMBOPLASTIN TIME PARTIAL: CPT

## 2024-10-18 PROCEDURE — 36415 COLL VENOUS BLD VENIPUNCTURE: CPT

## 2024-10-18 PROCEDURE — 80048 BASIC METABOLIC PNL TOTAL CA: CPT

## 2024-10-18 PROCEDURE — 2500000005 HC RX 250 GENERAL PHARMACY W/O HCPCS

## 2024-10-18 PROCEDURE — 92523 SPEECH SOUND LANG COMPREHEN: CPT | Mod: GN | Performed by: SPEECH-LANGUAGE PATHOLOGIST

## 2024-10-18 PROCEDURE — 2500000001 HC RX 250 WO HCPCS SELF ADMINISTERED DRUGS (ALT 637 FOR MEDICARE OP)

## 2024-10-18 PROCEDURE — 92610 EVALUATE SWALLOWING FUNCTION: CPT | Mod: GN | Performed by: SPEECH-LANGUAGE PATHOLOGIST

## 2024-10-18 PROCEDURE — 2020000001 HC ICU ROOM DAILY

## 2024-10-18 RX ORDER — PRAVASTATIN SODIUM 20 MG/1
20 TABLET ORAL NIGHTLY
Status: DISCONTINUED | OUTPATIENT
Start: 2024-10-18 | End: 2024-10-24

## 2024-10-18 RX ORDER — ENOXAPARIN SODIUM 100 MG/ML
40 INJECTION SUBCUTANEOUS DAILY
Status: DISCONTINUED | OUTPATIENT
Start: 2024-10-19 | End: 2024-11-07

## 2024-10-18 RX ORDER — MAGNESIUM SULFATE HEPTAHYDRATE 40 MG/ML
2 INJECTION, SOLUTION INTRAVENOUS ONCE
Status: COMPLETED | OUTPATIENT
Start: 2024-10-18 | End: 2024-10-18

## 2024-10-18 RX ADMIN — HEPARIN SODIUM 700 UNITS/HR: 10000 INJECTION, SOLUTION INTRAVENOUS at 10:58

## 2024-10-18 RX ADMIN — TAMSULOSIN HYDROCHLORIDE 0.8 MG: 0.4 CAPSULE ORAL at 20:35

## 2024-10-18 RX ADMIN — PRAVASTATIN SODIUM 20 MG: 20 TABLET ORAL at 20:35

## 2024-10-18 RX ADMIN — THIAMINE HYDROCHLORIDE 100 MG: 100 INJECTION, SOLUTION INTRAMUSCULAR; INTRAVENOUS at 09:49

## 2024-10-18 RX ADMIN — DEXTROSE MONOHYDRATE 15 MMOL: 50 INJECTION, SOLUTION INTRAVENOUS at 10:52

## 2024-10-18 RX ADMIN — MAGNESIUM SULFATE HEPTAHYDRATE 2 G: 40 INJECTION, SOLUTION INTRAVENOUS at 06:28

## 2024-10-18 RX ADMIN — SODIUM CHLORIDE 500 ML: 900 INJECTION, SOLUTION INTRAVENOUS at 09:49

## 2024-10-18 SDOH — ECONOMIC STABILITY: HOUSING INSECURITY: IN THE PAST 12 MONTHS, HOW MANY TIMES HAVE YOU MOVED WHERE YOU WERE LIVING?: 0

## 2024-10-18 SDOH — HEALTH STABILITY: MENTAL HEALTH
DO YOU FEEL STRESS - TENSE, RESTLESS, NERVOUS, OR ANXIOUS, OR UNABLE TO SLEEP AT NIGHT BECAUSE YOUR MIND IS TROUBLED ALL THE TIME - THESE DAYS?: NOT AT ALL

## 2024-10-18 SDOH — SOCIAL STABILITY: SOCIAL NETWORK
DO YOU BELONG TO ANY CLUBS OR ORGANIZATIONS SUCH AS CHURCH GROUPS, UNIONS, FRATERNAL OR ATHLETIC GROUPS, OR SCHOOL GROUPS?: NO

## 2024-10-18 SDOH — HEALTH STABILITY: MENTAL HEALTH: HOW OFTEN DO YOU HAVE A DRINK CONTAINING ALCOHOL?: NEVER

## 2024-10-18 SDOH — HEALTH STABILITY: PHYSICAL HEALTH
HOW OFTEN DO YOU NEED TO HAVE SOMEONE HELP YOU WHEN YOU READ INSTRUCTIONS, PAMPHLETS, OR OTHER WRITTEN MATERIAL FROM YOUR DOCTOR OR PHARMACY?: SOMETIMES

## 2024-10-18 SDOH — HEALTH STABILITY: MENTAL HEALTH: HOW MANY DRINKS CONTAINING ALCOHOL DO YOU HAVE ON A TYPICAL DAY WHEN YOU ARE DRINKING?: PATIENT DOES NOT DRINK

## 2024-10-18 SDOH — HEALTH STABILITY: MENTAL HEALTH: HOW OFTEN DO YOU HAVE SIX OR MORE DRINKS ON ONE OCCASION?: NEVER

## 2024-10-18 SDOH — ECONOMIC STABILITY: FOOD INSECURITY: HOW HARD IS IT FOR YOU TO PAY FOR THE VERY BASICS LIKE FOOD, HOUSING, MEDICAL CARE, AND HEATING?: NOT VERY HARD

## 2024-10-18 SDOH — SOCIAL STABILITY: SOCIAL NETWORK: HOW OFTEN DO YOU ATTEND CHURCH OR RELIGIOUS SERVICES?: MORE THAN 4 TIMES PER YEAR

## 2024-10-18 SDOH — ECONOMIC STABILITY: TRANSPORTATION INSECURITY: IN THE PAST 12 MONTHS, HAS LACK OF TRANSPORTATION KEPT YOU FROM MEDICAL APPOINTMENTS OR FROM GETTING MEDICATIONS?: NO

## 2024-10-18 SDOH — ECONOMIC STABILITY: HOUSING INSECURITY: AT ANY TIME IN THE PAST 12 MONTHS, WERE YOU HOMELESS OR LIVING IN A SHELTER (INCLUDING NOW)?: NO

## 2024-10-18 SDOH — ECONOMIC STABILITY: INCOME INSECURITY: IN THE PAST 12 MONTHS HAS THE ELECTRIC, GAS, OIL, OR WATER COMPANY THREATENED TO SHUT OFF SERVICES IN YOUR HOME?: NO

## 2024-10-18 SDOH — SOCIAL STABILITY: SOCIAL INSECURITY: WITHIN THE LAST YEAR, HAVE YOU BEEN AFRAID OF YOUR PARTNER OR EX-PARTNER?: PATIENT UNABLE TO ANSWER

## 2024-10-18 SDOH — HEALTH STABILITY: PHYSICAL HEALTH: ON AVERAGE, HOW MANY MINUTES DO YOU ENGAGE IN EXERCISE AT THIS LEVEL?: 0 MIN

## 2024-10-18 SDOH — SOCIAL STABILITY: SOCIAL NETWORK: HOW OFTEN DO YOU ATTEND MEETINGS OF THE CLUBS OR ORGANIZATIONS YOU BELONG TO?: NEVER

## 2024-10-18 SDOH — ECONOMIC STABILITY: FOOD INSECURITY: WITHIN THE PAST 12 MONTHS, YOU WORRIED THAT YOUR FOOD WOULD RUN OUT BEFORE YOU GOT THE MONEY TO BUY MORE.: NEVER TRUE

## 2024-10-18 SDOH — ECONOMIC STABILITY: FOOD INSECURITY: WITHIN THE PAST 12 MONTHS, THE FOOD YOU BOUGHT JUST DIDN'T LAST AND YOU DIDN'T HAVE MONEY TO GET MORE.: NEVER TRUE

## 2024-10-18 SDOH — SOCIAL STABILITY: SOCIAL NETWORK: HOW OFTEN DO YOU GET TOGETHER WITH FRIENDS OR RELATIVES?: ONCE A WEEK

## 2024-10-18 SDOH — ECONOMIC STABILITY: HOUSING INSECURITY: IN THE LAST 12 MONTHS, WAS THERE A TIME WHEN YOU WERE NOT ABLE TO PAY THE MORTGAGE OR RENT ON TIME?: NO

## 2024-10-18 SDOH — HEALTH STABILITY: PHYSICAL HEALTH: ON AVERAGE, HOW MANY DAYS PER WEEK DO YOU ENGAGE IN MODERATE TO STRENUOUS EXERCISE (LIKE A BRISK WALK)?: 0 DAYS

## 2024-10-18 SDOH — SOCIAL STABILITY: SOCIAL INSECURITY: ARE YOU MARRIED, WIDOWED, DIVORCED, SEPARATED, NEVER MARRIED, OR LIVING WITH A PARTNER?: MARRIED

## 2024-10-18 SDOH — SOCIAL STABILITY: SOCIAL NETWORK
IN A TYPICAL WEEK, HOW MANY TIMES DO YOU TALK ON THE PHONE WITH FAMILY, FRIENDS, OR NEIGHBORS?: MORE THAN THREE TIMES A WEEK

## 2024-10-18 ASSESSMENT — PAIN - FUNCTIONAL ASSESSMENT
PAIN_FUNCTIONAL_ASSESSMENT: 0-10
PAIN_FUNCTIONAL_ASSESSMENT: 0-10
PAIN_FUNCTIONAL_ASSESSMENT: FLACC (FACE, LEGS, ACTIVITY, CRY, CONSOLABILITY)

## 2024-10-18 ASSESSMENT — PAIN SCALES - GENERAL
PAINLEVEL_OUTOF10: 0 - NO PAIN

## 2024-10-18 ASSESSMENT — ACTIVITIES OF DAILY LIVING (ADL)
LACK_OF_TRANSPORTATION: NO
LACK_OF_TRANSPORTATION: NO

## 2024-10-18 ASSESSMENT — LIFESTYLE VARIABLES
AUDIT-C TOTAL SCORE: 0
SKIP TO QUESTIONS 9-10: 1

## 2024-10-18 NOTE — CARE PLAN
Pt. Being turned Q 2 hours blister on buttock opened area prepped with skin prep prior to placing a new dressing on.  Heals elevated off bed.   Problem: Skin  Goal: Participates in plan/prevention/treatment measures  Flowsheets (Taken 10/18/2024 0518)  Participates in plan/prevention/treatment measures: Elevate heels     Problem: Skin  Goal: Decreased wound size/increased tissue granulation at next dressing change  Outcome: Progressing  Flowsheets (Taken 10/18/2024 0518)  Decreased wound size/increased tissue granulation at next dressing change:   Promote sleep for wound healing   Protective dressings over bony prominences

## 2024-10-18 NOTE — PROGRESS NOTES
Dat Mccallum is a 83 y.o. male on day 2 of admission presenting with NSTEMI (non-ST elevated myocardial infarction) (Multi).    Subjective   Resting comfortably on RA, sinus rhythm on tele       Objective     Physical Exam   Gen: NAD   Neck: no JVD, carotid upstroke is brisk and without delay   Heart: rrr, s1s2+ no mrg   Lungs: CTA   Ext: warm no edema    Last Recorded Vitals  Blood pressure 96/72, pulse 69, temperature 37 °C (98.6 °F), temperature source Axillary, resp. rate 16, height 1.829 m (6'), weight 65.5 kg (144 lb 6.4 oz), SpO2 97%.  Intake/Output last 3 Shifts:  I/O last 3 completed shifts:  In: 2603.7 (39.8 mL/kg) [I.V.:1428.7 (21.8 mL/kg); IV Piggyback:1175]  Out: 1300 (19.8 mL/kg) [Urine:1300 (0.6 mL/kg/hr)]  Weight: 65.5 kg       Assessment/Plan   Assessment & Plan  NSTEMI (non-ST elevated myocardial infarction) (Multi)    Encephalopathy  NSTEMI  SIRS  Stage III chronic kidney disease  Diabetes mellitus type 2        10/17: Whether this is a type I versus type II event it is rather late presenting.  High-sensitivity troponins are markedly elevated but on the downtrend and he has not been complaining of any symptoms concerning for acute myocardial ischemia.  I am going to stop the therapeutic heparin.  His neurologic status in my opinion is of the biggest concern.  Neurology has been asked to evaluate.  Will start with an echocardiogram.  Whether we decide on an early invasive versus ischemia guided strategy will be contingent upon his overall prognosis.  Will continue to follow closely with you.    10/18: His echocardiogram reveals an EF 15-20%, and wall motion abnormalities consistent with an LAD infarct. I had a discussion with Jorge A, his son and POA about early invasive versus Conservative strategy, the risks of the procedure, re-infarction etc. He wishes to talk things over wit his other brother first before making a decision. All things considered he is rather stable. Will add GDMT as BP allows  once he is able to swallow. Will await this and a swallow eval prior to getting him on the cath schedule. Will follow.             Fer Vazquez, DO

## 2024-10-18 NOTE — PROGRESS NOTES
[facility] Critical Care Medicine       Date:  10/18/2024  Patient:  Dat Mccallum  YOB: 1940  MRN:  [id number]   Admit Date:  10/16/2024    Chief Complaint   Patient presents with    Altered Mental Status         History of Present Illness:  Dat Mccallum is a 83 y.o. year old male patient with past medical history of  T2DM, HTN, HLD, CKD3, BPH who presents to the ED from home for AMS/lethargy. The patient was reportedly last seen by his family members 3 to 4 days ago. They went to check on him today and they found that he was very sleepy. They report he was slow to respond so they called EMS and had him brought to the emergency room.      ED course: Troponins elevated (8,126->7,733), EKG with sinus tachycardia with leftward axis, normal voltage, normal ST segment, and less than 1 mm of ST segment elevation in the septal leads. Discussed with Cardiology (Dr. Workman) who recommended ASA, heparin gtt and beta-blocker. NIH 15 d/t lethargy, pt didn't meet criteria for TNK d/t last known well 3-4 days ago.  CT head unremarkable. CXR unremarkable. Lactate 1.5, ,  and glucose 57 (received 50 mL dextrose).        Interval ICU Events:  10/16: Admitted to ICU for management of NSTEMI and AMS.     10/17: TTE this morning showing prior infarct. LVEF 15-20%. Pt waking up more and intermittently following commands with sons at bedside.     10/18: No acute events overnight. Pt more awake and moving more. Remains aphasic, this is near his baseline according to son, but pt appears slower than normal. Pending SLP eval for meds and diet. Possible downgrade later.     Objective     Medical History:  Past Medical History:   Diagnosis Date    Acute frontal sinusitis, unspecified 01/06/2015    Acute frontal sinusitis    Encounter for screening for malignant neoplasm of colon 01/22/2020    Colon cancer screening    Encounter for screening for malignant neoplasm of prostate 08/14/2017    Special screening,  prostate cancer    Other conditions influencing health status     Colonoscopy planned    Other conditions influencing health status 01/19/2018    History of cough    Other skin changes 07/21/2021    Blisters of multiple sites    Other specified health status     Known health problems: none    Personal history of other diseases of the nervous system and sense organs     History of cataract    Personal history of other diseases of urinary system 08/10/2021    History of hematuria    Personal history of other specified conditions 06/29/2018    History of dizziness    Personal history of other specified conditions 09/25/2017    History of abdominal pain    Personal history of other specified conditions 08/14/2017    History of diarrhea    Personal history of other specified conditions 10/11/2017    History of urinary incontinence    Personal history of pneumonia (recurrent) 01/19/2018    History of community acquired pneumonia    Rash and other nonspecific skin eruption 12/17/2020    Rash    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 180 tablet 3 Unknown    lovastatin (Mevacor) 20 mg tablet Take 1 tablet (20 mg) by mouth once daily at bedtime. 90 tablet 3 Unknown    metFORMIN (Glucophage) 500 mg tablet Take 2 tablets by mouth twice daily 360 tablet 0 Unknown    omeprazole (PriLOSEC) 20 mg DR capsule Take by mouth.   Unknown    OneTouch  Ultra Test strip TEST 3 TIMES DAILY   Unknown    tamsulosin (Flomax) 0.4 mg 24 hr capsule Take 2 capsules (0.8 mg) by mouth once daily at bedtime. 180 capsule 3 Unknown     Patient has no known allergies.  Social History     Tobacco Use    Smoking status: Never     Passive exposure: Never    Smokeless tobacco: Never   Vaping Use    Vaping status: Never Used   Substance Use Topics    Alcohol use: Not Currently    Drug use: Never     No family history on file.    Hospital Medications:    heparin, 0-4,000 Units/hr, Last Rate: 700 Units/hr (10/18/24 0711)          Current Facility-Administered Medications:     dextrose 50 % injection 12.5 g, 12.5 g, intravenous, q15 min PRN, NLELY Coleman-KEENAN    dextrose 50 % injection 25 g, 25 g, intravenous, q15 min PRN, Ana Maria Michelle PA-C    [Held by provider] glimepiride (Amaryl) tablet 4 mg, 4 mg, oral, BID, Ana Maria Michelle PA-C    glucagon (Glucagen) injection 1 mg, 1 mg, intramuscular, q15 min PRN, NELLY Coleman-KEENAN    glucagon (Glucagen) injection 1 mg, 1 mg, intramuscular, q15 min PRN, NELLY Coleman-KEENAN    heparin (porcine) injection 1,500-3,000 Units, 1,500-3,000 Units, intravenous, PRN, NELLY Coleman-KEENAN    heparin 25,000 Units in dextrose 5% 250 mL (100 Units/mL) infusion (premix), 0-4,000 Units/hr, intravenous, Continuous, Ana Maria Michelle PA-C, Last Rate: 7 mL/hr at 10/18/24 0711, 700 Units/hr at 10/18/24 0711    magnesium sulfate 2 g in sterile water for injection 50 mL, 2 g, intravenous, Once, Ana Maria Michelle PA-C, Last Rate: 25 mL/hr at 10/18/24 0628, 2 g at 10/18/24 0628    [Held by provider] metFORMIN (Glucophage) tablet 1,000 mg, 1,000 mg, oral, BID, Ana Maria Michelle PA-C    potassium phosphates 15 mmol in dextrose 5% 250 mL IV, 15 mmol, intravenous, Once, Ana Maria Michelle PA-C    [Held by provider] pravastatin (Pravachol) tablet 20 mg, 20 mg, oral, Nightly, Ana Maria Michelle PA-C    [Held by provider] tamsulosin (Flomax) 24 hr capsule 0.8 mg, 0.8  mg, oral, Nightly, Ana Maria Michelle PA-C    thiamine (Vitamin B1) injection 100 mg, 100 mg, intravenous, Daily, Chi Salinas PA-C    Review of Systems:  14 point review of systems was completed and negative except for those specially mention in my HPI    Physical Exam:    Heart Rate:  [62-87]   Temp:  [36.4 °C (97.5 °F)-37 °C (98.6 °F)]   Resp:  [11-19]   BP: ()/(63-86)   Weight:  [65.5 kg (144 lb 6.4 oz)]   SpO2:  [97 %-100 %]     Physical Exam  Constitutional:       General: He is not in acute distress.     Appearance: He is cachectic. He is ill-appearing.   HENT:      Head: Normocephalic and atraumatic.      Mouth/Throat:      Mouth: Mucous membranes are dry.   Eyes:      Pupils: Pupils are equal, round, and reactive to light.   Cardiovascular:      Rate and Rhythm: Normal rate and regular rhythm.      Pulses: Normal pulses.      Heart sounds: Normal heart sounds.   Pulmonary:      Effort: Pulmonary effort is normal. No respiratory distress.      Breath sounds: Normal breath sounds.   Abdominal:      General: Bowel sounds are normal. There is no distension.      Palpations: Abdomen is soft.      Tenderness: There is no abdominal tenderness.   Musculoskeletal:      Cervical back: Neck supple. No tenderness.      Right lower leg: No edema.      Left lower leg: No edema.   Skin:     General: Skin is warm and dry.      Coloration: Skin is pale.   Neurological:      Mental Status: He is alert. He is disoriented.      Motor: Weakness present.         Objective:    I have reviewed all medications, laboratory results, and imaging pertinent for today's encounter.           Intake/Output Summary (Last 24 hours) at 10/18/2024 0803  Last data filed at 10/18/2024 0628  Gross per 24 hour   Intake 1391.44 ml   Output 1100 ml   Net 291.44 ml            Assessment/Plan:    I am currently managing this critically ill patient for the following problems:    Neuro/Psych/Pain Ctrl/Sedation:  Acute encephalopathy  CT head: No  evidence of acute cortical infarct or intracranial hemorrhage  - Ammonia and urine toxicology WNL  - Will order MRI if no improvement in aphasia tomorrow   - EEG: moderate diffuse encephalopathy   - CAM ICU, delirium Precautions         Respiratory/ENT:  No acute concerns  CXR 10/14: No acute cardiopulmonary process  - Currently on room air  - Pulmonary hygiene      Cardiovascular:  NSTEMI  Hx hypertension, hyperlipidemia   Cardiomyopathy likely ischemic   - Continue heparin gtt x 48hrs  - TTE: LVEF 15-20%, multiple wall motion abnormalities, mildly dilated LV, possibly consistent with Takotsubo cardiomayopathy  - Cardiology consulted   -- [facility] pending discussion with sons   -- GDMT once pt is able to tolerate PO meds    - Troponins downtrending (8,126->7,733)  - Hold home pravastatin while NPO  - EKGs as needed  - Continuous cardiac monitoring     GI:  Severe malnutrition related to chronic disease or condition   - Diet: regular, pureed, crushed meds per SLP  - Consider NGT unable to take in adequate PO  - GI proph: not indicated   - Agree with dietitian's diagnosis of malnutrition as evidenced by moderate to severe subcutaeous fat losses, severe muscle deficits, po intake likely less than estimate energy needs   - BR: none while NPO      Renal/Volume Status (Intra & Extravascular):  CKD3  BPH  - Monitor I/Os, goal UOP 0.5-1.0 kg/hr  - Replace electrolytes as needed  - Hold home flomax while NPO     Endocrine  Hypoglycemic   T2DM  - POCT glucose 57, received amp, re-check 182  - Q4 POCT glucose, change to ACHS when tolerating adequate diet   - UA: (+) protein, (+) blood, (+) ketones -> BHB negative   - A1c 2/24: 6.3%  - Hold home diabetic meds (metformin, glimepiride, insulin 10 units daily) d/t hypoglycemia on arrival   - Hypoglycemic protocol      Infectious Disease:  No acute concerns  - WBCs 6.9, afebrile  - Blood cultures: no growth day 2  - Monitor for signs and symptoms of infection      Heme/Onc:  No acute  concerns  - Continue heparin gtt x48 hrs   -- Can transition to SQH once off heparin gtt   - Baseline Hgb 13.0  - Monitor for signs and symptoms of anemia  - Transfuse if Hgb < 7.0     MSK:  - Baseline ambulatory, lives at home alone  - PT/OT when appropriate      Ethics/Code Status:  Full code, discussed with two sons at bedside      :  DVT Prophylaxis: Heparin gtt, SCDs  GI Prophylaxis: none  Bowel Regimen: none  Diet: regular   CVC: none  [place]: none  [place]: none  Restraints: none  Dispo: ICU vs stepdown     Critical Care Time:  63 minutes spent in preparing to see patient (I.e. labs, imaging, etc.), documentation, discussion plan of care with patient/family/caregiver, and/or coordination of care with multidisciplinary team including the attending. Time does not include completion of procedure time.     Chi Salinas PA-C  Pulmonary & Critical Care Medicine   [facility]

## 2024-10-18 NOTE — PROGRESS NOTES
Speech-Language Pathology    Speech-Language Pathology Speech/Language/Cognition Evaluation    Patient Name: Dat Mccallum  MRN: [id number]  : 1940  Today's Date: 10/18/24  Start time: Start Time: 0940  Stop time: Stop Time: 1020  Time calculation (min) : Time Calculation (min): 40 min      ASSESSMENT  Impressions:  Pt presents with severe receptive/expressive aphasia, cogntive impairment. Pt is deaf uses ASL and some lip reading per family, to communicate. Pt  would benefit from skilled ST to improve functional communication skills for identifying wants/needs and maximize speech intelligibility  Prognosis: Good    PLAN  POC:  Frequency: 3x/week  Duration: 2 weeks  Discharge recommendation: Recommend MODERATE intensity ST upon DC in order to ensure safety with least restrictive diet.     Strengths: Family/caregiver support  Barriers to participation in tx: Cognition and Hearing impairment, pt is deaf    Recommended communication strategies:   Write simple 1 step instructions on white board, augment with visual cues  ask questions in yes/no format using white board    Goals (start date 10/18/24. Anticipated time frame for goal attainment: 2 weeks)  Pt will complete low level expressive language tasks with 50% acc given max cues.  Pt will complete low level receptive language tasks with 50% acc given max cues.  Pt will use multimodal communication to communicate basic wants/needs in 50% of opportunities given max assist.  Pt will imitate consonants/vowels/single words with 50 acc given max cues.  Pt will demonstrate orientation x4 given max cues.   Status: Goal initiated this date   Progress this date: n/a    SUBJECTIVE  PMHx relevant to rehab: Principal Problem:    NSTEMI (non-ST elevated myocardial infarction) (Multi)        Chief complaint: Pt was admitted on 10/17/24 due to       Chief Complaint   Patient presents with    Altered Mental Status   . He was found to have NSTEMI (non-ST elevated myocardial  "infarction) (Multi).      Relevant imaging results:  CT scan 10/16/24  IMPRESSION:  No evidence of acute cortical infarct or intracranial hemorrhage.    General Visit Information:  Patient Class: Inpatient  Living Environment: Home  RN cleared pt to participate in session and reported  reported pt deaf and communicates with ASL, son @ bedside, nursing has been writing on white board to communicate, he is not speaking and has difficulty following commands    Pt son reported pt has had a change in speech and cognition from his baseline. Pt is deaf and PTA he communicated verbally with \"muffled speech\" family was able to understand him, he also uses ASL and able to lip read though inconsistenly    Pain Assessment  Pain Assessment: 0-10  0-10 (Numeric) Pain Score: 0 - No pain       Orientation: Oriented to self  Ability to follow functional commands: Does not consistently follow commands despite cues  Patient positioning: Upright in bed      OBJECTIVE  The following informal assessment tasks were completed:    Oral motor: Pt unable to initiate oral tasks with max cues  Lingual:   Labial:   Palatal:     Motor speech:  Intelligibility: non verbal  Typical utterance length: non verbal  Motor speech characteristics: non verbal, some vocalizaitons  Perceptual voice characteristics: Weak  MPT: DNT  DDKs: DNT  Single-word repetition: unable to imitate vowel sounds with max cues  Sentence repetition: DNT    Receptive language:  Following 1-step commands: with max visual cues 2/5  Following 2-step commands: DNT  Object identification in room: DNT  Simple yes/no questions: 3/5 with use of ASL  Complex yes/no questions: DNT    Expressive language:  Seriated automatics: unable desptie max cues  Confrontation naming: DNT  Phrase completion: DNT  Responsive naming: DNT  Generative naming: DNT  Sentence generation: DNT  Expressive language characteristics: non verbal    Cognition: DNT  Recall of demographic info:   Immediate recall: "   Delayed recall:   Attention:   Safety awareness:     The following formal assessments were completed: n/a      Treatment/Education:  Results and recommendations were relayed to: Patient, Family, and Bedside nurse  Education provided: Yes   Learner: Patient   Barriers to learning: Cognitive limitations barrier, Communication limitations barrier, and Hearing impairment barrier   Method of teaching: Verbal, Written, and Demonstration   Topic: role of ST, results of assessment, recommended communication strategies, and recommendation for speech/language/cognition therapy   Outcome of teaching: Caregiver demonstrated good understanding and Unable to demonstrate understanding at this time  Treatment provided: No  Next Treatment Priority: diet tolerance, bolus trials, compensatory strategies , communication start, ongoing assessment

## 2024-10-18 NOTE — PROGRESS NOTES
Speech-Language Pathology    Speech-Language Pathology Clinical Swallow Evaluation    Patient Name: Dat Mccallum  MRN: [id number]  : 1940  Today's Date: 10/18/24  Start Time: 0940  Stop Time: 1020  Time Calculation (min): 40 min      ASSESSMENT  Impressions:  Mild oral phase and no suspected pharyngeal phase dysphagia based on clinical swallow evaluation. Pt would benefit from skilled ST to minimize aspiration risk and ensure ongoing safety with the least restrictive diet.  Prognosis: Good    PLAN  Recommendations:  Is MBSS recommended? SLP will continue to monitor to determine if MBSS is clinically warranted.  Solid consistency: Pureed (IDDSI level 4)  Liquid consistency: Thin (IDDSI 0)  Medication administration: Crushed, in puree  Compensatory swallow strategies:  - Upright positioning for all PO intake  - Slow rate of intake  - Small bites  - Single sips  - Total assist for feeding    Recommended frequency/duration:  Skilled SLP services recommended: Yes  Frequency: 3x/week  Duration: 2 weeks  Discharge recommendation: Recommend MODERATE intensity ST upon DC in order to ensure safety with least restrictive diet.  Treatment/Interventions: Patient/family education, Bolus trials, Assess diet tolerance  Strengths: Family/caregiver support  Barriers to participation in tx: Cognition and Hearing impairment (pt is deaf, uses ASL to communicate    Goals:  In 2 weeks...  Pt will consume prescribed diet (current diet is [puree and thin liquids) without overt s/sx aspiration/penetration in 95% of observed trials.  Pt will consume trials of upgraded textures without overt s/sx aspiration in order for consideration of diet texture upgrade.  Pt will demonstrate follow-through of trained compensatory strategies during a meal/snack with 90% acc with max cues.   GOAL START: 10/18/2024   GOAL END: 2024   Status: Goal initiated this date   Progress this date: CSE completed      SUBJECTIVE    PMHx relevant to rehab:  Principal Problem:    NSTEMI (non-ST elevated myocardial infarction) (Multi)      Chief complaint: Pt was admitted on 10/17/24 due to   Chief Complaint   Patient presents with    Altered Mental Status   . He was found to have NSTEMI (non-ST elevated myocardial infarction) (Multi).     Relevant imaging results:  CT scan 10/16/24  IMPRESSION:  No evidence of acute cortical infarct or intracranial hemorrhage.  CXR 10/16/24  IMPRESSION:  No acute cardiopulmonary process.    General Visit Information:     Patient Class: Inpatient  Living Environment: Home     Reason for Referral: assess swallow d/t MAS  Prior to Session Communication: Bedside nurse    RN cleared pt to participate in session and reported pt deaf communicates with ASL, son @ bedside, pt currently NPO d/t with change in cogntion and speech,     Pts son reported no difficulty with swallowing, pt has had change in cognition and speech, requested speech/language evaluation. Per son pt is deaf, uses ASL to communicate, can read instructions (nursing has been writing on white board to communicate with pt), also can read lips though inconsistent. Speech PTA was muffled but understandable by family    Date of Onset: 10/17/24  Date of Order: 10/17/24  [employer] Diet: regular and thin liquids  Current Diet : NPO    Status at time of evaluation:  Pain Assessment  Pain Assessment: 0-10  0-10 (Numeric) Pain Score: 0 - No pain    Pt was alert and pleasant for session.  Orientation: Oriented to self  Ability to follow functional commands: Does not consistently follow commands despite cues  Nutritional status: Signs of possible malnutrition    Respiratory status: Room air  Baseline Vocal Quality: Weak, Other (comment) (aphasic,)  Volitional Cough: Weak  Volitional Swallow: Delayed  Patient positioning: Upright in bed      OBJECTIVE  Clinical swallow evaluation completed and consisted of interview (family assisted), limited oral motor assessment, and PO trials (4 oz thin  liquids via cup and straw, pt took single and consecutive sips, 2 oz applesauce. Able to hold cup, but unable to manipulate spoon wit right hand despite max cues, SLP fed pt).  ORAL PHASE: Pt edentulous, whitish coating present on lingual surface and what appeared to be bruiting on right tongue tip, RN also reported, oral care completed this morning. Pt unable to follow oral commands with max cues, (son used ASL, SLP gave visual model and wrote on white board, per son pt is able to read instructions), suspect oral apraxia  impaired comprehension. No facial assymetry observed @ rest or when pt smiled and non reported by son. Labial seal was adequate with liquids and puree. Mastication of regular solids was delayed/Slow prolonged A/P transit with adequate oral clearance were, given ample time. Solids were not assessed d/t safety reasons (edentulous, cognitive impairment, oral apraxia)  PHARYNGEAL PHASE: Laryngeal elevation was visualized or palpated with all trials, however adequacy of hyolaryngeal elevation/excursion cannot be determined at bedside. No immediate or delayed s/sx aspiration/penetration were observed with any consistencies.    Was 3oz challenge administered: No, deferred due to safety concerns.      Treatment/Education:  Results and recommendations were relayed to: Patient, Family, and Bedside nurse  Education provided: Yes   Learner: Patient and Family   Barriers to learning: Cognitive limitations barrier, Communication limitations barrier, and Hearing impairment barrier   Method of teaching: Verbal, Written, and Demonstration   Topic: role of ST, results of assessment, recommended diet modifications, recommended safe swallow strategies, and recommendation for dysphagia follow-up   Outcome of teaching: Caregiver demonstrated good understanding and Unable to demonstrate understanding at this time  Treatment provided: No  Next Treatment Priority: diet tolerance, bolus trials, compensatory strategies

## 2024-10-18 NOTE — PROGRESS NOTES
10/18/24 1302   Physical Activity   On average, how many days per week do you engage in moderate to strenuous exercise (like a brisk walk)? 0 days   On average, how many minutes do you engage in exercise at this level? 0 min   Financial Resource Strain   How hard is it for you to pay for the very basics like food, housing, medical care, and heating? Not very   Housing Stability   In the last 12 months, was there a time when you were not able to pay the mortgage or rent on time? N   In the past 12 months, how many times have you moved where you were living? 0   At any time in the past 12 months, were you homeless or living in a shelter (including now)? N   Transportation Needs   In the past 12 months, has lack of transportation kept you from medical appointments or from getting medications? no   In the past 12 months, has lack of transportation kept you from meetings, work, or from getting things needed for daily living? No   Food Insecurity   Within the past 12 months, you worried that your food would run out before you got the money to buy more. Never true   Within the past 12 months, the food you bought just didn't last and you didn't have money to get more. Never true   Stress   Do you feel stress - tense, restless, nervous, or anxious, or unable to sleep at night because your mind is troubled all the time - these days? Not at all   Social Connections   In a typical week, how many times do you talk on the phone with family, friends, or neighbors? More than 3   How often do you get together with friends or relatives? Once   How often do you attend Methodist or Restorationism services? More than 4   Do you belong to any clubs or organizations such as Methodist groups, unions, fraternal or athletic groups, or school groups? No   How often do you attend meetings of the clubs or organizations you belong to? Never   Are you , , , , never , or living with a partner?    Intimate  Partner Violence   Within the last year, have you been afraid of your partner or ex-partner? Pt Unable   Within the last year, have you been humiliated or emotionally abused in other ways by your partner or ex-partner? Pt Unable   Within the last year, have you been kicked, hit, slapped, or otherwise physically hurt by your partner or ex-partner? Pt Unable   Within the last year, have you been raped or forced to have any kind of sexual activity by your partner or ex-partner? Pt Unable   Alcohol Use   Q1: How often do you have a drink containing alcohol? Never   Q2: How many drinks containing alcohol do you have on a typical day when you are drinking? None   Q3: How often do you have six or more drinks on one occasion? Never   Utilities   In the past 12 months has the electric, gas, oil, or water company threatened to shut off services in your home? No   Health Literacy   How often do you need to have someone help you when you read instructions, pamphlets, or other written material from your doctor or pharmacy? Sometimes

## 2024-10-18 NOTE — PROGRESS NOTES
TCC met with patient and his son, Jorge A. Assessment complete. Patient lives alone in a house. Patient is  but his spouse lives with the children as she needs care. Patient is typically independent. Patient had a fall leading up to this admission. Patient does not drive, his sons transport and assist with shopping. Patient with depression. Patient follows Anjelica Pacheco CNP. Patient fills prescriptions at [facility] in [place]. At this time there is not a safe discharge plan in place. Will follow.      10/18/24 2418   Discharge Planning   Living Arrangements Alone   Support Systems Children   Type of Residence Private residence   Home or Post Acute Services Other (Comment)  (TBD)   Expected Discharge Disposition [place]  (TBD)   Does the patient need discharge transport arranged? No   Financial Resource Strain   How hard is it for you to pay for the very basics like food, housing, medical care, and heating? Not very   Housing Stability   In the last 12 months, was there a time when you were not able to pay the mortgage or rent on time? N   In the past 12 months, how many times have you moved where you were living? 0   At any time in the past 12 months, were you homeless or living in a shelter (including now)? N   Transportation Needs   In the past 12 months, has lack of transportation kept you from medical appointments or from getting medications? no   In the past 12 months, has lack of transportation kept you from meetings, work, or from getting things needed for daily living? No

## 2024-10-19 ENCOUNTER — APPOINTMENT (OUTPATIENT)
Dept: RADIOLOGY | Facility: HOSPITAL | Age: 84
End: 2024-10-19
Payer: MEDICARE

## 2024-10-19 LAB
ALBUMIN SERPL BCP-MCNC: 2.5 G/DL (ref 3.4–5)
ANION GAP SERPL CALCULATED.3IONS-SCNC: 12 MMOL/L (ref 10–20)
BASOPHILS # BLD AUTO: 0.03 X10*3/UL (ref 0–0.1)
BASOPHILS NFR BLD AUTO: 0.7 %
BUN SERPL-MCNC: 25 MG/DL (ref 6–23)
CALCIUM SERPL-MCNC: 7.8 MG/DL (ref 8.6–10.3)
CHLORIDE SERPL-SCNC: 105 MMOL/L (ref 98–107)
CO2 SERPL-SCNC: 21 MMOL/L (ref 21–32)
CREAT SERPL-MCNC: 1.06 MG/DL (ref 0.5–1.3)
EGFRCR SERPLBLD CKD-EPI 2021: 71 ML/MIN/1.73M*2
EOSINOPHIL # BLD AUTO: 0.43 X10*3/UL (ref 0–0.4)
EOSINOPHIL NFR BLD AUTO: 9.8 %
ERYTHROCYTE [DISTWIDTH] IN BLOOD BY AUTOMATED COUNT: 13.9 % (ref 11.5–14.5)
GLUCOSE BLD MANUAL STRIP-MCNC: 118 MG/DL (ref 74–99)
GLUCOSE BLD MANUAL STRIP-MCNC: 137 MG/DL (ref 74–99)
GLUCOSE BLD MANUAL STRIP-MCNC: 147 MG/DL (ref 74–99)
GLUCOSE BLD MANUAL STRIP-MCNC: 178 MG/DL (ref 74–99)
GLUCOSE SERPL-MCNC: 107 MG/DL (ref 74–99)
HCT VFR BLD AUTO: 31.9 % (ref 41–52)
HGB BLD-MCNC: 10.8 G/DL (ref 13.5–17.5)
IMM GRANULOCYTES # BLD AUTO: 0.01 X10*3/UL (ref 0–0.5)
IMM GRANULOCYTES NFR BLD AUTO: 0.2 % (ref 0–0.9)
LYMPHOCYTES # BLD AUTO: 1.42 X10*3/UL (ref 0.8–3)
LYMPHOCYTES NFR BLD AUTO: 32.4 %
MAGNESIUM SERPL-MCNC: 1.93 MG/DL (ref 1.6–2.4)
MCH RBC QN AUTO: 30.9 PG (ref 26–34)
MCHC RBC AUTO-ENTMCNC: 33.9 G/DL (ref 32–36)
MCV RBC AUTO: 91 FL (ref 80–100)
MONOCYTES # BLD AUTO: 0.33 X10*3/UL (ref 0.05–0.8)
MONOCYTES NFR BLD AUTO: 7.5 %
NEUTROPHILS # BLD AUTO: 2.16 X10*3/UL (ref 1.6–5.5)
NEUTROPHILS NFR BLD AUTO: 49.4 %
NRBC BLD-RTO: 0 /100 WBCS (ref 0–0)
PHOSPHATE SERPL-MCNC: 3.2 MG/DL (ref 2.5–4.9)
PLATELET # BLD AUTO: 130 X10*3/UL (ref 150–450)
POTASSIUM SERPL-SCNC: 4 MMOL/L (ref 3.5–5.3)
RBC # BLD AUTO: 3.5 X10*6/UL (ref 4.5–5.9)
SODIUM SERPL-SCNC: 134 MMOL/L (ref 136–145)
WBC # BLD AUTO: 4.4 X10*3/UL (ref 4.4–11.3)

## 2024-10-19 PROCEDURE — 2500000002 HC RX 250 W HCPCS SELF ADMINISTERED DRUGS (ALT 637 FOR MEDICARE OP, ALT 636 FOR OP/ED)

## 2024-10-19 PROCEDURE — 82947 ASSAY GLUCOSE BLOOD QUANT: CPT

## 2024-10-19 PROCEDURE — 99232 SBSQ HOSP IP/OBS MODERATE 35: CPT | Performed by: INTERNAL MEDICINE

## 2024-10-19 PROCEDURE — 2500000004 HC RX 250 GENERAL PHARMACY W/ HCPCS (ALT 636 FOR OP/ED)

## 2024-10-19 PROCEDURE — 2020000001 HC ICU ROOM DAILY

## 2024-10-19 PROCEDURE — 2500000001 HC RX 250 WO HCPCS SELF ADMINISTERED DRUGS (ALT 637 FOR MEDICARE OP): Performed by: NURSE PRACTITIONER

## 2024-10-19 PROCEDURE — 85025 COMPLETE CBC W/AUTO DIFF WBC: CPT

## 2024-10-19 PROCEDURE — 70551 MRI BRAIN STEM W/O DYE: CPT | Performed by: RADIOLOGY

## 2024-10-19 PROCEDURE — 70551 MRI BRAIN STEM W/O DYE: CPT

## 2024-10-19 PROCEDURE — 36415 COLL VENOUS BLD VENIPUNCTURE: CPT

## 2024-10-19 PROCEDURE — 83735 ASSAY OF MAGNESIUM: CPT

## 2024-10-19 PROCEDURE — 2500000001 HC RX 250 WO HCPCS SELF ADMINISTERED DRUGS (ALT 637 FOR MEDICARE OP)

## 2024-10-19 PROCEDURE — 2500000004 HC RX 250 GENERAL PHARMACY W/ HCPCS (ALT 636 FOR OP/ED): Performed by: NURSE PRACTITIONER

## 2024-10-19 PROCEDURE — 80069 RENAL FUNCTION PANEL: CPT

## 2024-10-19 PROCEDURE — 99291 CRITICAL CARE FIRST HOUR: CPT | Performed by: NURSE PRACTITIONER

## 2024-10-19 PROCEDURE — 2500000001 HC RX 250 WO HCPCS SELF ADMINISTERED DRUGS (ALT 637 FOR MEDICARE OP): Performed by: INTERNAL MEDICINE

## 2024-10-19 RX ORDER — NAPROXEN SODIUM 220 MG/1
81 TABLET, FILM COATED ORAL DAILY
Status: DISCONTINUED | OUTPATIENT
Start: 2024-10-19 | End: 2024-11-08 | Stop reason: HOSPADM

## 2024-10-19 RX ORDER — MIDODRINE HYDROCHLORIDE 2.5 MG/1
2.5 TABLET ORAL
Status: DISCONTINUED | OUTPATIENT
Start: 2024-10-19 | End: 2024-10-20

## 2024-10-19 RX ADMIN — THIAMINE HYDROCHLORIDE 100 MG: 100 INJECTION, SOLUTION INTRAMUSCULAR; INTRAVENOUS at 09:07

## 2024-10-19 RX ADMIN — MIDODRINE HYDROCHLORIDE 2.5 MG: 2.5 TABLET ORAL at 12:10

## 2024-10-19 RX ADMIN — MIDODRINE HYDROCHLORIDE 2.5 MG: 2.5 TABLET ORAL at 17:08

## 2024-10-19 RX ADMIN — TAMSULOSIN HYDROCHLORIDE 0.8 MG: 0.4 CAPSULE ORAL at 20:00

## 2024-10-19 RX ADMIN — ENOXAPARIN SODIUM 40 MG: 40 INJECTION SUBCUTANEOUS at 09:06

## 2024-10-19 RX ADMIN — PRAVASTATIN SODIUM 20 MG: 20 TABLET ORAL at 20:00

## 2024-10-19 RX ADMIN — SODIUM CHLORIDE, SODIUM LACTATE, POTASSIUM CHLORIDE, AND CALCIUM CHLORIDE 250 ML: 600; 310; 30; 20 INJECTION, SOLUTION INTRAVENOUS at 05:09

## 2024-10-19 RX ADMIN — SODIUM CHLORIDE, SODIUM LACTATE, POTASSIUM CHLORIDE, AND CALCIUM CHLORIDE 250 ML: 600; 310; 30; 20 INJECTION, SOLUTION INTRAVENOUS at 09:56

## 2024-10-19 RX ADMIN — ASPIRIN 81 MG CHEWABLE TABLET 81 MG: 81 TABLET CHEWABLE at 11:27

## 2024-10-19 ASSESSMENT — PAIN - FUNCTIONAL ASSESSMENT
PAIN_FUNCTIONAL_ASSESSMENT: FLACC (FACE, LEGS, ACTIVITY, CRY, CONSOLABILITY)
PAIN_FUNCTIONAL_ASSESSMENT: 0-10
PAIN_FUNCTIONAL_ASSESSMENT: FLACC (FACE, LEGS, ACTIVITY, CRY, CONSOLABILITY)

## 2024-10-19 ASSESSMENT — PAIN SCALES - GENERAL
PAINLEVEL_OUTOF10: 0 - NO PAIN

## 2024-10-19 NOTE — CARE PLAN
The patient's goals for the shift include      The clinical goals for the shift include pt. to remain free from injury throghout shift, hemodynamic stability.      Problem: Skin  Goal: Decreased wound size/increased tissue granulation at next dressing change  Outcome: Progressing  Flowsheets (Taken 10/19/2024 1017)  Decreased wound size/increased tissue granulation at next dressing change:   Promote sleep for wound healing   Protective dressings over bony prominences  Goal: Participates in plan/prevention/treatment measures  Outcome: Progressing  Flowsheets (Taken 10/19/2024 1017)  Participates in plan/prevention/treatment measures:   Discuss with provider PT/OT consult   Increase activity/out of bed for meals  Goal: Prevent/manage excess moisture  Outcome: Progressing  Flowsheets (Taken 10/19/2024 1017)  Prevent/manage excess moisture: Monitor for/manage infection if present  Goal: Prevent/minimize sheer/friction injuries  Outcome: Progressing  Flowsheets (Taken 10/19/2024 1017)  Prevent/minimize sheer/friction injuries:   Increase activity/out of bed for meals   Use pull sheet   HOB 30 degrees or less   Turn/reposition every 2 hours/use positioning/transfer devices  Goal: Promote/optimize nutrition  Outcome: Progressing  Flowsheets (Taken 10/19/2024 1017)  Promote/optimize nutrition:   Assist with feeding   Monitor/record intake including meals   Consume > 50% meals/supplements   Offer water/supplements/favorite foods  Goal: Promote skin healing  Outcome: Progressing  Flowsheets (Taken 10/19/2024 1017)  Promote skin healing: Turn/reposition every 2 hours/use positioning/transfer devices     Problem: Pain - Adult  Goal: Verbalizes/displays adequate comfort level or baseline comfort level  Outcome: Progressing     Problem: Safety - Adult  Goal: Free from fall injury  Outcome: Progressing     Problem: Discharge Planning  Goal: Discharge to home or other facility with appropriate resources  Outcome: Progressing      Problem: Chronic Conditions and Co-morbidities  Goal: Patient's chronic conditions and co-morbidity symptoms are monitored and maintained or improved  Outcome: Progressing     Problem: Fall/Injury  Goal: Not fall by end of shift  Outcome: Progressing  Goal: Be free from injury by end of the shift  Outcome: Progressing  Goal: Verbalize understanding of personal risk factors for fall in the hospital  Outcome: Progressing  Goal: Verbalize understanding of risk factor reduction measures to prevent injury from fall in the home  Outcome: Progressing  Goal: Use assistive devices by end of the shift  Outcome: Progressing  Goal: Pace activities to prevent fatigue by end of the shift  Outcome: Progressing     Problem: ACS/CP/NSTEMI/STEMI  Goal: Chest pain managed (free from pain or at acceptable level)  Outcome: Progressing  Goal: Lab values return to normal range  Outcome: Progressing  Goal: Promote self management  Outcome: Progressing  Goal: Serial ECG will return to baseline  Outcome: Progressing  Goal: Verbalize understanding of procedures/devices  Outcome: Progressing  Goal: Wean vasopressors/achieve hemodynamic stability  Outcome: Progressing     Problem: Nutrition  Goal: Less than 5 days NPO/clear liquids  Outcome: Progressing  Goal: Oral intake greater than 50%  Outcome: Progressing  Goal: Oral intake greater 75%  Outcome: Progressing  Goal: Consume prescribed supplement  Outcome: Progressing  Goal: Adequate PO fluid intake  Outcome: Progressing  Goal: Nutrition support goals are met within 48 hrs  Outcome: Progressing  Goal: Nutrition support is meeting 75% of nutrient needs  Outcome: Progressing  Goal: Tube feed tolerance  Outcome: Progressing  Goal: BG  mg/dL  Outcome: Progressing  Goal: Lab values WNL  Outcome: Progressing  Goal: Electrolytes WNL  Outcome: Progressing  Goal: Promote healing  Outcome: Progressing  Goal: Maintain stable weight  Outcome: Progressing  Goal: Reduce weight from  edema/fluid  Outcome: Progressing  Goal: Gradual weight gain  Outcome: Progressing  Goal: Improve ostomy output  Outcome: Progressing     Problem: Diabetes  Goal: Maintain glucose levels >70mg/dl to <250mg/dl throughout shift  Outcome: Progressing  Goal: No changes in neurological exam by end of shift  Outcome: Progressing  Goal: Vital signs within normal range for age by end of shift  Outcome: Progressing

## 2024-10-19 NOTE — PROGRESS NOTES
[facility] Critical Care Medicine       Date:  10/19/2024  Patient:  Dat Mccallum  YOB: 1940  MRN:  [id number]   Admit Date:  10/16/2024      Chief Complaint   Patient presents with    Altered Mental Status     History of Present Illness:  Dat Mccallum is a 83 y.o. year old male patient with past medical history of  T2DM, HTN, HLD, CKD3, BPH who presents to the ED from home for AMS/lethargy. The patient was reportedly last seen by his family members 3 to 4 days ago. They went to check on him today and they found that he was very sleepy. They report he was slow to respond so they called EMS and had him brought to the emergency room.      ED course: Troponins elevated (8,126->7,733), EKG with sinus tachycardia with leftward axis, normal voltage, normal ST segment, and less than 1 mm of ST segment elevation in the septal leads. Discussed with Cardiology (Dr. Workman) who recommended ASA, heparin gtt and beta-blocker. NIH 15 d/t lethargy, pt didn't meet criteria for TNK d/t last known well 3-4 days ago. CT head unremarkable. CXR unremarkable. Lactate 1.5, ,  and glucose 57 (received 50 mL dextrose).        Interval ICU Events:  10/16: Admitted to ICU for management of NSTEMI and AMS.      10/17: TTE this morning showing prior infarct. LVEF 15-20%. Pt waking up more and intermittently following commands with sons at bedside.      10/18: No acute events overnight. Pt more awake and moving more. Remains aphasic, this is near his baseline according to son, but pt appears slower than normal. Pending SLP eval for meds and diet. Possible downgrade later.     10/19: BP soft overnight and this am. Total 500 ml IVF boluses given with improvement. Alert today and answers simple yes no questions but still not back to baseline mental status. Denies pain or SOB. Will get MRI brain today. Possible transfer to SDU later if BP stable.      Medical History:  Past Medical History:   Diagnosis Date    Acute  frontal sinusitis, unspecified 01/06/2015    Acute frontal sinusitis    Encounter for screening for malignant neoplasm of colon 01/22/2020    Colon cancer screening    Encounter for screening for malignant neoplasm of prostate 08/14/2017    Special screening, prostate cancer    Other conditions influencing health status     Colonoscopy planned    Other conditions influencing health status 01/19/2018    History of cough    Other skin changes 07/21/2021    Blisters of multiple sites    Other specified health status     Known health problems: none    Personal history of other diseases of the nervous system and sense organs     History of cataract    Personal history of other diseases of urinary system 08/10/2021    History of hematuria    Personal history of other specified conditions 06/29/2018    History of dizziness    Personal history of other specified conditions 09/25/2017    History of abdominal pain    Personal history of other specified conditions 08/14/2017    History of diarrhea    Personal history of other specified conditions 10/11/2017    History of urinary incontinence    Personal history of pneumonia (recurrent) 01/19/2018    History of community acquired pneumonia    Rash and other nonspecific skin eruption 12/17/2020    Rash    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 180 tablet 3 Unknown    lovastatin (Mevacor) 20  mg tablet Take 1 tablet (20 mg) by mouth once daily at bedtime. 90 tablet 3 Unknown    metFORMIN (Glucophage) 500 mg tablet Take 2 tablets by mouth twice daily 360 tablet 0 Unknown    omeprazole (PriLOSEC) 20 mg DR capsule Take by mouth.   Unknown    OneTouch Ultra Test strip TEST 3 TIMES DAILY   Unknown    tamsulosin (Flomax) 0.4 mg 24 hr capsule Take 2 capsules (0.8 mg) by mouth once daily at bedtime. 180 capsule 3 Unknown     Patient has no known allergies.  Social History     Tobacco Use    Smoking status: Never     Passive exposure: Never    Smokeless tobacco: Never   Vaping Use    Vaping status: Never Used   Substance Use Topics    Alcohol use: Not Currently    Drug use: Never     No family history on file.    Hospital Medications:           Current Facility-Administered Medications:     dextrose 50 % injection 12.5 g, 12.5 g, intravenous, q15 min PRN, Ana Maria Michelle PA-C    dextrose 50 % injection 25 g, 25 g, intravenous, q15 min PRN, Ana Maria Michelle PA-C    enoxaparin (Lovenox) syringe 40 mg, 40 mg, subcutaneous, Daily, Chi Salinas PA-C    [Held by provider] glimepiride (Amaryl) tablet 4 mg, 4 mg, oral, BID, Ana Maria Michelle PA-C    glucagon (Glucagen) injection 1 mg, 1 mg, intramuscular, q15 min PRN, Ana Maria Michelle PA-C    glucagon (Glucagen) injection 1 mg, 1 mg, intramuscular, q15 min PRN, Ana Maria Michelle PA-C    [Held by provider] metFORMIN (Glucophage) tablet 1,000 mg, 1,000 mg, oral, BID, Ana Maria Michelle PA-C    pravastatin (Pravachol) tablet 20 mg, 20 mg, oral, Nightly, Chi Salinas PA-C, 20 mg at 10/18/24 2035    tamsulosin (Flomax) 24 hr capsule 0.8 mg, 0.8 mg, oral, Nightly, Chi Salinas PA-C, 0.8 mg at 10/18/24 2035    thiamine (Vitamin B1) injection 100 mg, 100 mg, intravenous, Daily, Chi Salinas PA-C, 100 mg at 10/18/24 0949    Review of Systems:  14 point review of systems was completed and negative except for those specially mention in my HPI    Physical Exam:    Heart  Rate:  []   Temp:  [35.9 °C (96.6 °F)-36.7 °C (98 °F)]   Resp:  [9-19]   BP: ()/(49-79)   Weight:  [66.8 kg (147 lb 4.3 oz)]   SpO2:  [96 %-100 %]     Physical Exam  Vitals and nursing note reviewed.   Constitutional:       General: He is not in acute distress.     Appearance: He is not toxic-appearing.   HENT:      Head: Normocephalic.      Mouth/Throat:      Mouth: Mucous membranes are moist.   Eyes:      Extraocular Movements: Extraocular movements intact.      Pupils: Pupils are equal, round, and reactive to light.   Cardiovascular:      Rate and Rhythm: Normal rate and regular rhythm.      Pulses:           Radial pulses are 2+ on the right side and 2+ on the left side.        Dorsalis pedis pulses are 2+ on the right side and 2+ on the left side.      Heart sounds: Normal heart sounds, S1 normal and S2 normal.   Pulmonary:      Effort: Pulmonary effort is normal. No tachypnea, accessory muscle usage or respiratory distress.      Breath sounds: Normal breath sounds. No stridor. No wheezing, rhonchi or rales.   Abdominal:      General: Abdomen is flat. Bowel sounds are normal.      Palpations: Abdomen is soft.      Tenderness: There is no abdominal tenderness.   Musculoskeletal:      Cervical back: Neck supple.      Right lower leg: No edema.      Left lower leg: No edema.      Comments: BEAUCHAMP equally   Skin:     General: Skin is warm and dry.      Capillary Refill: Capillary refill takes less than 2 seconds.      Coloration: Skin is not cyanotic, jaundiced or mottled.   Neurological:      General: No focal deficit present.      Mental Status: He is alert.      GCS: GCS eye subscore is 4. GCS verbal subscore is 4. GCS motor subscore is 6.      Sensory: Sensation is intact.      Motor: Motor function is intact.      Comments: Oriented to self only. Patient mostly deaf, communicated via writing. Answers simple yes/no questions and follows commands.   Psychiatric:         Attention and Perception:  Attention normal.         Mood and Affect: Mood normal.         Speech: Speech is delayed.         Behavior: Behavior is cooperative.       Objective:    I have reviewed all medications, laboratory results, and imaging pertinent for today's encounter.           Intake/Output Summary (Last 24 hours) at 10/19/2024 0830  Last data filed at 10/18/2024 2100  Gross per 24 hour   Intake 755 ml   Output 700 ml   Net 55 ml       Daily Labs:  CBC:   Results from last 7 days   Lab Units 10/19/24  0421   WBC AUTO x10*3/uL 4.4   HEMOGLOBIN g/dL 10.8*   HEMATOCRIT % 31.9*   MCV fL 91     BMP:    Results from last 7 days   Lab Units 10/19/24  0421   SODIUM mmol/L 134*   POTASSIUM mmol/L 4.0   CHLORIDE mmol/L 105   CO2 mmol/L 21   BUN mg/dL 25*   CREATININE mg/dL 1.06   CALCIUM mg/dL 7.8*   GLUCOSE mg/dL 107*   MAGNESIUM mg/dL 1.93       Assessment/Plan:    Dat Mccallum is a 83 y.o. year old male patient with past medical history of  T2DM, HTN, HLD, CKD3, BPH who presents to the ED from home for AMS/lethargy - found to have elvated troponins elevated (8,126->7,733). Admitted to ICU as NSTEMI, AMS.    I am currently managing this critically ill patient for the following problems:    Neuro/Psych/Pain Ctrl/Sedation:  Acute encephalopathy - improving but not to baseline per family  Deaf  CT head: No evidence of acute cortical infarct or intracranial hemorrhage  Ammonia and urine toxicology WNL  EEG 10/17: Moderate diffuse encephalopathy   - MRI of brain ordered for today  - CAM ICU, delirium Precautions      Respiratory/ENT:  No acute concerns  CXR 10/14: No acute cardiopulmonary process  - Currently stable on room air  - Pulmonary hygiene, aspiration precautions     Cardiovascular:  NSTEMI  - Troponins downtrending (8,126->7,733)  Hx hypertension, hyperlipidemia   Cardiomyopathy   Hypotension  TTE: LVEF 15-20%, multiple wall motion abnormalities, mildly dilated LV, possibly consistent with Takotsubo cardiomayopathy  Heparin gtt x  48 hrs completed  - Total 500 ml IVF given today for mild hypotension  - Midodrine added by cardiology  - Cardiology following, appreciate continued input  --- LHC possible in future, pending discussion with sons   --- GDMT once pt is able to tolerate   - Add ASA daily  - Resume home pravastatin   - EKGs as needed  - Continuous cardiac monitoring     GI:  Malnutrition - as evidenced by moderate subcutaeous fat losses, severe muscle deficits, po intake likely less than estimate energy needs  - Diet: Regular, pureed, crushed meds per SLP  - Will consider NGT unable to take in adequate PO  - GI proph: Not indicated   - Agree with dietitian's diagnosis of malnutrition    - BR: None     Renal/Volume Status (Intra & Extravascular):  CKD3  BPH  Cr improved from baseline today at 1.06  - Monitor I/Os, goal UOP 0.5-1.0 kg/hr  - Replace electrolytes as needed  - Resume home flomax     Endocrine  T2DM  Hypoglycemia - improved  Last A1c 2/24: 6.3%  - ACHS POCT glucose ok, will add SSI if needed  - Hold home antidiabetic meds (metformin, glimepiride) d/t recent hypoglycemia  - Hypoglycemic protocol      Infectious Disease:  No acute concerns  WBCs 4.4, afebrile  Blood cultures 10/17: no growth  - Monitor for signs and symptoms of infection      Heme/Onc:  Normocytic, normochromic anemia - likely 2/2 anemia of chronic disease  H/H stable at 10.8/31.9. No signs of bleeding  - Heparin gtt x 48 hrs now off  - On Lovenox for DVT PPX   - Monitor for signs and symptoms of bleeding  - Plan to transfuse if Hgb < 7.0     MSK:  No acute issues  - Baseline ambulatory, lives at home alone  - PT/OT on     Ethics/Code Status:  Full code, discussed with two sons at bedside      :  DVT Prophylaxis: SubQ Lovenox, SCDs  GI Prophylaxis: None  Bowel Regimen: None  Diet: Regular   CVC: None  Logan: None  [place]: None  Restraints: None  Dispo: ICU vs stepdown later today  I have reviewed all medications, laboratory results, and imaging  pertinent for today's encounter.  Plan Discussed with Dr. Galo, nursing, patient  Critical Care Time: 35 minutes  Critical Care [place] CNP

## 2024-10-19 NOTE — PROGRESS NOTES
Subjective Data:  None    Overnight Events:    None     Objective Data:  Last Recorded Vitals:  Vitals:    10/19/24 0800 10/19/24 0805 10/19/24 0900 10/19/24 1000   BP: 80/61  80/63    Pulse: 70  86 73   Resp: (!) 8  13 11   Temp:  35.9 °C (96.6 °F)     TempSrc:  Temporal     SpO2: 99%  99% 99%   Weight:       Height:           Last Labs:  CBC - 10/19/2024:  4:21 AM  4.4 10.8 130    31.9      CMP - 10/19/2024:  4:21 AM  7.8 7.2 43 --- 0.6   3.2 2.5 22 65      PTT - 10/18/2024:  9:30 PM  1.5   15.2 55.0     TROPHS   Date/Time Value Ref Range Status   10/16/2024 09:49 PM 7,733 0 - 20 ng/L Final     Comment:     Previous result verified on 10/16/2024 2139 on specimen/case [id number] called with component [facility] for procedure Troponin I, High Sensitivity, Initial with value 8,126 ng/L.   10/16/2024 08:54 PM 8,126 0 - 20 ng/L Final     BNP   Date/Time Value Ref Range Status   10/16/2024 08:54  0 - 99 pg/mL Final     HGBA1C   Date/Time Value Ref Range Status   10/16/2024 09:49 PM 5.3 See comment % Final   02/23/2024 01:27 PM 6.3 see below % Final   08/10/2023 11:44 AM 6.6 4.2 - 6.5 % Final     LDLCALC   Date/Time Value Ref Range Status   02/23/2024 01:27 PM 17 <=99 mg/dL Final     Comment:                                 Near   Borderline      AGE      Desirable  Optimal    High     High     Very High     0-19 Y     0 - 109     ---    110-129   >/= 130     ----    20-24 Y     0 - 119     ---    120-159   >/= 160     ----      >24 Y     0 -  99   100-129  130-159   160-189     >/=190       VLDL   Date/Time Value Ref Range Status   02/23/2024 01:27 PM 26 0 - 40 mg/dL Final   07/22/2021 09:20 AM 28 0 - 40 mg/dL Final   09/25/2019 09:58 AM 17 0 - 40 mg/dL Final      Last I/O:  I/O last 3 completed shifts:  In: 1076.4 (16.1 mL/kg) [I.V.:321.4 (4.8 mL/kg); IV Piggyback:755]  Out: 1300 (19.5 mL/kg) [Urine:1300 (0.5 mL/kg/hr)]  Weight: 66.8 kg     Past Cardiology Tests (Last 3 Years):  EKG:  ECG 12 lead  10/16/2024    Echo:  Transthoracic Echo (TTE) Complete 10/17/2024    Ejection Fractions:  EF   Date/Time Value Ref Range Status   10/17/2024 08:52 AM 18 %      Cath:  No results found for this or any previous visit from the past 1095 days.    Stress Test:  No results found for this or any previous visit from the past 1095 days.    Cardiac Imaging:  No results found for this or any previous visit from the past 1095 days.      Inpatient Medications:  Scheduled medications   Medication Dose Route Frequency    aspirin  81 mg oral Daily    enoxaparin  40 mg subcutaneous Daily    [Held by provider] glimepiride  4 mg oral BID    lactated Ringer's  250 mL intravenous Once    [Held by provider] metFORMIN  1,000 mg oral BID    pravastatin  20 mg oral Nightly    tamsulosin  0.8 mg oral Nightly     PRN medications   Medication    dextrose    dextrose    glucagon    glucagon     Continuous Medications   Medication Dose Last Rate       Physical Exam:  Physical Exam   Gen: NAD   Neck: no JVD, carotid upstroke is brisk and without delay   Heart: rrr, s1s2+ no mrg   Lungs: CTA   Ext: warm no edema     Assessment/Plan   NSTEMI (non-ST elevated myocardial infarction) (Multi)     Encephalopathy  NSTEMI  SIRS  Stage III chronic kidney disease  Diabetes mellitus type 2        10/17: Whether this is a type I versus type II event it is rather late presenting.  High-sensitivity troponins are markedly elevated but on the downtrend and he has not been complaining of any symptoms concerning for acute myocardial ischemia.  I am going to stop the therapeutic heparin.  His neurologic status in my opinion is of the biggest concern.  Neurology has been asked to evaluate.  Will start with an echocardiogram.  Whether we decide on an early invasive versus ischemia guided strategy will be contingent upon his overall prognosis.  Will continue to follow closely with you.     10/18: His echocardiogram reveals an EF 15-20%, and wall motion abnormalities  consistent with an LAD infarct. I had a discussion with Jorge A, his son and POA about early invasive versus Conservative strategy, the risks of the procedure, re-infarction etc. He wishes to talk things over wit his other brother first before making a decision. All things considered he is rather stable. Will add GDMT as BP allows once he is able to swallow. Will await this and a swallow eval prior to getting him on the cath schedule. Will follow.    10/19: Remains in sinus rhythm, with hypotension in the absence of any guideline directed medical therapy for his cardiomyopathy with blood pressures in the 80s systolic.  I will start a trial of low-dose midodrine to see if we can achieve blood pressures in the 100s range systolic that would allow us to begin guideline directed medical therapy for his cardiomyopathy.      Code Status:  Full Code        Haja Mckeon, DO

## 2024-10-20 VITALS
HEIGHT: 72 IN | BODY MASS INDEX: 19.77 KG/M2 | DIASTOLIC BLOOD PRESSURE: 70 MMHG | TEMPERATURE: 96.6 F | HEART RATE: 81 BPM | WEIGHT: 145.94 LBS | OXYGEN SATURATION: 98 % | SYSTOLIC BLOOD PRESSURE: 104 MMHG | RESPIRATION RATE: 16 BRPM

## 2024-10-20 LAB
ALBUMIN SERPL BCP-MCNC: 2.3 G/DL (ref 3.4–5)
ANION GAP SERPL CALCULATED.3IONS-SCNC: 8 MMOL/L (ref 10–20)
BACTERIA BLD CULT: NORMAL
BACTERIA BLD CULT: NORMAL
BASOPHILS # BLD AUTO: 0.01 X10*3/UL (ref 0–0.1)
BASOPHILS NFR BLD AUTO: 0.2 %
BUN SERPL-MCNC: 18 MG/DL (ref 6–23)
CALCIUM SERPL-MCNC: 7.9 MG/DL (ref 8.6–10.3)
CHLORIDE SERPL-SCNC: 108 MMOL/L (ref 98–107)
CO2 SERPL-SCNC: 23 MMOL/L (ref 21–32)
CREAT SERPL-MCNC: 0.96 MG/DL (ref 0.5–1.3)
EGFRCR SERPLBLD CKD-EPI 2021: 79 ML/MIN/1.73M*2
EOSINOPHIL # BLD AUTO: 0.33 X10*3/UL (ref 0–0.4)
EOSINOPHIL NFR BLD AUTO: 7.6 %
ERYTHROCYTE [DISTWIDTH] IN BLOOD BY AUTOMATED COUNT: 13.8 % (ref 11.5–14.5)
GLUCOSE BLD MANUAL STRIP-MCNC: 118 MG/DL (ref 74–99)
GLUCOSE BLD MANUAL STRIP-MCNC: 131 MG/DL (ref 74–99)
GLUCOSE BLD MANUAL STRIP-MCNC: 133 MG/DL (ref 74–99)
GLUCOSE BLD MANUAL STRIP-MCNC: 148 MG/DL (ref 74–99)
GLUCOSE SERPL-MCNC: 127 MG/DL (ref 74–99)
HCT VFR BLD AUTO: 30.4 % (ref 41–52)
HGB BLD-MCNC: 10.1 G/DL (ref 13.5–17.5)
IMM GRANULOCYTES # BLD AUTO: 0.01 X10*3/UL (ref 0–0.5)
IMM GRANULOCYTES NFR BLD AUTO: 0.2 % (ref 0–0.9)
LYMPHOCYTES # BLD AUTO: 0.81 X10*3/UL (ref 0.8–3)
LYMPHOCYTES NFR BLD AUTO: 18.7 %
MAGNESIUM SERPL-MCNC: 1.69 MG/DL (ref 1.6–2.4)
MCH RBC QN AUTO: 30.4 PG (ref 26–34)
MCHC RBC AUTO-ENTMCNC: 33.2 G/DL (ref 32–36)
MCV RBC AUTO: 92 FL (ref 80–100)
MONOCYTES # BLD AUTO: 0.38 X10*3/UL (ref 0.05–0.8)
MONOCYTES NFR BLD AUTO: 8.8 %
NEUTROPHILS # BLD AUTO: 2.8 X10*3/UL (ref 1.6–5.5)
NEUTROPHILS NFR BLD AUTO: 64.5 %
NRBC BLD-RTO: 0 /100 WBCS (ref 0–0)
PHOSPHATE SERPL-MCNC: 2.9 MG/DL (ref 2.5–4.9)
PLATELET # BLD AUTO: 125 X10*3/UL (ref 150–450)
POTASSIUM SERPL-SCNC: 3.9 MMOL/L (ref 3.5–5.3)
RBC # BLD AUTO: 3.32 X10*6/UL (ref 4.5–5.9)
SODIUM SERPL-SCNC: 135 MMOL/L (ref 136–145)
WBC # BLD AUTO: 4.3 X10*3/UL (ref 4.4–11.3)

## 2024-10-20 PROCEDURE — 85025 COMPLETE CBC W/AUTO DIFF WBC: CPT

## 2024-10-20 PROCEDURE — 2500000001 HC RX 250 WO HCPCS SELF ADMINISTERED DRUGS (ALT 637 FOR MEDICARE OP): Performed by: NURSE PRACTITIONER

## 2024-10-20 PROCEDURE — 97161 PT EVAL LOW COMPLEX 20 MIN: CPT | Mod: GP

## 2024-10-20 PROCEDURE — 2060000001 HC INTERMEDIATE ICU ROOM DAILY

## 2024-10-20 PROCEDURE — 82947 ASSAY GLUCOSE BLOOD QUANT: CPT

## 2024-10-20 PROCEDURE — 99232 SBSQ HOSP IP/OBS MODERATE 35: CPT | Performed by: INTERNAL MEDICINE

## 2024-10-20 PROCEDURE — 97530 THERAPEUTIC ACTIVITIES: CPT | Mod: GP

## 2024-10-20 PROCEDURE — 2500000002 HC RX 250 W HCPCS SELF ADMINISTERED DRUGS (ALT 637 FOR MEDICARE OP, ALT 636 FOR OP/ED): Performed by: NURSE PRACTITIONER

## 2024-10-20 PROCEDURE — 83735 ASSAY OF MAGNESIUM: CPT

## 2024-10-20 PROCEDURE — 99291 CRITICAL CARE FIRST HOUR: CPT | Performed by: NURSE PRACTITIONER

## 2024-10-20 PROCEDURE — 2500000001 HC RX 250 WO HCPCS SELF ADMINISTERED DRUGS (ALT 637 FOR MEDICARE OP): Performed by: INTERNAL MEDICINE

## 2024-10-20 PROCEDURE — 36415 COLL VENOUS BLD VENIPUNCTURE: CPT

## 2024-10-20 PROCEDURE — 2500000004 HC RX 250 GENERAL PHARMACY W/ HCPCS (ALT 636 FOR OP/ED)

## 2024-10-20 PROCEDURE — 80069 RENAL FUNCTION PANEL: CPT

## 2024-10-20 RX ORDER — TALC
3 POWDER (GRAM) TOPICAL NIGHTLY PRN
Status: DISCONTINUED | OUTPATIENT
Start: 2024-10-20 | End: 2024-11-08 | Stop reason: HOSPADM

## 2024-10-20 RX ORDER — MIDODRINE HYDROCHLORIDE 5 MG/1
5 TABLET ORAL
Status: DISCONTINUED | OUTPATIENT
Start: 2024-10-20 | End: 2024-11-06

## 2024-10-20 RX ADMIN — ASPIRIN 81 MG CHEWABLE TABLET 81 MG: 81 TABLET CHEWABLE at 08:31

## 2024-10-20 RX ADMIN — PRAVASTATIN SODIUM 20 MG: 20 TABLET ORAL at 20:37

## 2024-10-20 RX ADMIN — ENOXAPARIN SODIUM 40 MG: 40 INJECTION SUBCUTANEOUS at 08:31

## 2024-10-20 RX ADMIN — MIDODRINE HYDROCHLORIDE 5 MG: 5 TABLET ORAL at 16:25

## 2024-10-20 RX ADMIN — TAMSULOSIN HYDROCHLORIDE 0.8 MG: 0.4 CAPSULE ORAL at 20:37

## 2024-10-20 RX ADMIN — MIDODRINE HYDROCHLORIDE 5 MG: 5 TABLET ORAL at 13:00

## 2024-10-20 RX ADMIN — MIDODRINE HYDROCHLORIDE 2.5 MG: 2.5 TABLET ORAL at 08:31

## 2024-10-20 ASSESSMENT — PAIN - FUNCTIONAL ASSESSMENT
PAIN_FUNCTIONAL_ASSESSMENT: 0-10
PAIN_FUNCTIONAL_ASSESSMENT: FLACC (FACE, LEGS, ACTIVITY, CRY, CONSOLABILITY)
PAIN_FUNCTIONAL_ASSESSMENT: 0-10

## 2024-10-20 ASSESSMENT — COGNITIVE AND FUNCTIONAL STATUS - GENERAL
TOILETING: A LOT
MOBILITY SCORE: 11
STANDING UP FROM CHAIR USING ARMS: A LOT
MOVING TO AND FROM BED TO CHAIR: A LOT
HELP NEEDED FOR BATHING: A LOT
PERSONAL GROOMING: A LOT
CLIMB 3 TO 5 STEPS WITH RAILING: TOTAL
DRESSING REGULAR UPPER BODY CLOTHING: A LOT
EATING MEALS: TOTAL
DAILY ACTIVITIY SCORE: 11
MOVING FROM LYING ON BACK TO SITTING ON SIDE OF FLAT BED WITH BEDRAILS: A LOT
DRESSING REGULAR LOWER BODY CLOTHING: A LOT
WALKING IN HOSPITAL ROOM: A LOT
TURNING FROM BACK TO SIDE WHILE IN FLAT BAD: A LOT

## 2024-10-20 ASSESSMENT — PAIN SCALES - GENERAL
PAINLEVEL_OUTOF10: 0 - NO PAIN

## 2024-10-20 NOTE — PROGRESS NOTES
Subjective Data:  None    Overnight Events:    None     Objective Data:  Last Recorded Vitals:  Vitals:    10/20/24 0700 10/20/24 0800 10/20/24 0808 10/20/24 0900   BP: 80/55 97/62 75/54 105/74   BP Location:   Right arm    Pulse: 87 84  92   Resp: 14 16  13   Temp: 36.2 °C (97.2 °F)      TempSrc: Oral      SpO2: 98% 99%  98%   Weight:       Height:           Last Labs:  CBC - 10/20/2024:  4:30 AM  4.3 10.1 125    30.4      CMP - 10/20/2024:  4:30 AM  7.9 7.2 43 --- 0.6   2.9 2.3 22 65      PTT - 10/18/2024:  9:30 PM  1.5   15.2 55.0     TROPHS   Date/Time Value Ref Range Status   10/16/2024 09:49 PM 7,733 0 - 20 ng/L Final     Comment:     Previous result verified on 10/16/2024 2139 on specimen/case [id number] called with component [facility] for procedure Troponin I, High Sensitivity, Initial with value 8,126 ng/L.   10/16/2024 08:54 PM 8,126 0 - 20 ng/L Final     BNP   Date/Time Value Ref Range Status   10/16/2024 08:54  0 - 99 pg/mL Final     HGBA1C   Date/Time Value Ref Range Status   10/16/2024 09:49 PM 5.3 See comment % Final   02/23/2024 01:27 PM 6.3 see below % Final   08/10/2023 11:44 AM 6.6 4.2 - 6.5 % Final     LDLCALC   Date/Time Value Ref Range Status   02/23/2024 01:27 PM 17 <=99 mg/dL Final     Comment:                                 Near   Borderline      AGE      Desirable  Optimal    High     High     Very High     0-19 Y     0 - 109     ---    110-129   >/= 130     ----    20-24 Y     0 - 119     ---    120-159   >/= 160     ----      >24 Y     0 -  99   100-129  130-159   160-189     >/=190       VLDL   Date/Time Value Ref Range Status   02/23/2024 01:27 PM 26 0 - 40 mg/dL Final   07/22/2021 09:20 AM 28 0 - 40 mg/dL Final   09/25/2019 09:58 AM 17 0 - 40 mg/dL Final      Last I/O:  I/O last 3 completed shifts:  In: 1056.8 (16 mL/kg) [P.O.:440; I.V.:150.2 (2.3 mL/kg); IV Piggyback:466.7]  Out: 1050 (15.9 mL/kg) [Urine:1050 (0.4 mL/kg/hr)]  Weight: 66.2 kg     Past Cardiology Tests (Last 3  Years):  EKG:  ECG 12 lead 10/16/2024    Echo:  Transthoracic Echo (TTE) Complete 10/17/2024    Ejection Fractions:  EF   Date/Time Value Ref Range Status   10/17/2024 08:52 AM 18 %      Cath:  No results found for this or any previous visit from the past 1095 days.    Stress Test:  No results found for this or any previous visit from the past 1095 days.    Cardiac Imaging:  No results found for this or any previous visit from the past 1095 days.      Inpatient Medications:  Scheduled medications   Medication Dose Route Frequency    aspirin  81 mg oral Daily    enoxaparin  40 mg subcutaneous Daily    [Held by provider] glimepiride  4 mg oral BID    [Held by provider] metFORMIN  1,000 mg oral BID    midodrine  2.5 mg oral TID    pravastatin  20 mg oral Nightly    tamsulosin  0.8 mg oral Nightly     PRN medications   Medication    dextrose    dextrose    glucagon    glucagon    melatonin     Continuous Medications   Medication Dose Last Rate       Physical Exam:  Gen: NAD   Neck: no JVD, carotid upstroke is brisk and without delay   Heart: rrr, s1s2+ no mrg   Lungs: CTA   Ext: warm no edema        Assessment/Plan   NSTEMI (non-ST elevated myocardial infarction) (Multi)     Encephalopathy  NSTEMI  SIRS  Stage III chronic kidney disease  Diabetes mellitus type 2        10/17: Whether this is a type I versus type II event it is rather late presenting.  High-sensitivity troponins are markedly elevated but on the downtrend and he has not been complaining of any symptoms concerning for acute myocardial ischemia.  I am going to stop the therapeutic heparin.  His neurologic status in my opinion is of the biggest concern.  Neurology has been asked to evaluate.  Will start with an echocardiogram.  Whether we decide on an early invasive versus ischemia guided strategy will be contingent upon his overall prognosis.  Will continue to follow closely with you.     10/18: His echocardiogram reveals an EF 15-20%, and wall motion  abnormalities consistent with an LAD infarct. I had a discussion with Jorge A, his son and POA about early invasive versus Conservative strategy, the risks of the procedure, re-infarction etc. He wishes to talk things over wit his other brother first before making a decision. All things considered he is rather stable. Will add GDMT as BP allows once he is able to swallow. Will await this and a swallow eval prior to getting him on the cath schedule. Will follow.     10/19: Remains in sinus rhythm, with hypotension in the absence of any guideline directed medical therapy for his cardiomyopathy with blood pressures in the 80s systolic.  I will start a trial of low-dose midodrine to see if we can achieve blood pressures in the 100s range systolic that would allow us to begin guideline directed medical therapy for his cardiomyopathy.    10'20: Breathing easily without chest discomfort, and hypotension has improved to 105/74 this morning but still had intermittent episodes of systolic pressures below 90 earlier this morning.  Will increase his midodrine to 5 mg 3 times daily and reassess his hemodynamics tomorrow and hope to begin guideline directed medical therapy         Code Status:  Full Code            Haja Mckeon, DO

## 2024-10-20 NOTE — PROGRESS NOTES
Physical Therapy    Physical Therapy Evaluation & Treatment    Patient Name: Dat Mccallum  MRN: [id number]  Department: [address] ICU  Room: 18/18-A  Today's Date: 10/20/2024   Time Calculation  Start Time: 0808  Stop Time: 0830  Time Calculation (min): 22 min    Assessment/Plan   PT Assessment  PT Assessment Results: Decreased strength, Decreased endurance, Impaired balance, Decreased mobility, Decreased safety awareness, Decreased cognition  Rehab Prognosis: Good  Evaluation/Treatment Tolerance: Patient limited by fatigue (low BP)  End of Session Communication: Bedside nurse  Assessment Comment: 83 year old male presents with decline from baseline functional mobility, impaired balance, decreased tolerance to activity, and decreased safety awareness;  patient would benefit from skilled physical therapy services to safely maximize functional mobility to modified independent levels.  End of Session Patient Position: Bed, 3 rail up, Alarm on   IP OR SWING BED PT PLAN  Inpatient or Swing Bed: Inpatient  PT Plan  Treatment/Interventions: Bed mobility, Transfer training, Gait training, Balance training, Strengthening, Endurance training, Therapeutic exercise, Therapeutic activity  PT Plan: Ongoing PT  PT Frequency: 5 times per week  PT Discharge Recommendations: Moderate intensity level of continued care  PT Recommended Transfer Status: Assist x2  PT - OK to Discharge: Yes (with skilled physical therapy services at next level of care)      Subjective     General Visit Information:  General  Reason for Referral: Impaired mobility with NSTEMI  Past Medical History Relevant to Rehab: DM, HTN, HLD, CKD 3, BPH, pancreatectomy Whipple procedure, knee surgery  Prior to Session Communication: Bedside nurse  Patient Position Received: Bed, 3 rail up, Alarm on  General Comment: 83 year old male admit from home with AMS and lethargy.  Home Living:  Home Living  Lives With: Alone (Spouse stays with children due to requiring  care)  Home Living Comments: Unable to accurately assess home environment due to impaired cognition  Prior Level of Function:  Prior Function Per Pt/Caregiver Report  Ambulatory Assistance: Independent (?)  Prior Function Comments: Unable to accurately assess prior level of function due to impaired cognition  Precautions:  Precautions  Hearing/Visual Limitations: Profoundly hard of hearing;  use writting or lip reading to communicate  Medical Precautions: Fall precautions    Vital Signs (Past 2hrs)        Date/Time Vitals Session Patient Position Pulse Resp SpO2 BP MAP (mmHg)    10/20/24 0700 --  --  87  14  98 %  80/55  65     10/20/24 0808 --  Sitting  --  --  --  75/54  62                        Objective   Pain:  Pain Assessment  Pain Assessment: FLACC (Face, Legs, Activity, Cry, Consolability)  0-10 (Numeric) Pain Score: 0 - No pain  Cognition:  Cognition  Overall Cognitive Status:  (Difficult to accurately assess orientation;  appears cooperative;  increased time and intermittent assist required to follow commands;  patient nodded during written communication but did not verbalize or reciprocate with writing back)    General Assessments:  General Observation  General Observation: Limited verbalization               Activity Tolerance  Endurance: Decreased tolerance for upright activites  Activity Tolerance Comments: Fatigue;  low BP    Sensation  Sensation Comment: Unable to accurately assess    Strength  Strength Comments: Venkat LE strength assessed via function  Coordination  Movements are Fluid and Coordinated: No  Lower Body Coordination: Slower rate of movement venkat LE    Static Sitting Balance  Static Sitting-Balance Support: Bilateral upper extremity supported  Static Sitting-Level of Assistance: Minimum assistance  Static Sitting-Comment/Number of Minutes: Intermittent episodes of min assist while sitting on side of bed due to decreased balance posterior    Static Standing Balance  Static Standing-Balance  Support: Bilateral upper extremity supported  Static Standing-Level of Assistance: Moderate assistance  Static Standing-Comment/Number of Minutes: Assist with trunk support and balance during static standing with rolling walker  Functional Assessments:  Bed Mobility  Bed Mobility: Yes  Bed Mobility 1  Bed Mobility 1: Supine to sitting  Level of Assistance 1: Moderate verbal cues  Bed Mobility Comments 1: Assist with trunk-up and jenelle LE during supine to sit;  assist with scooting hips to edge of bed during supine to sit.  Bed Mobility 2  Bed Mobility  2: Sitting to supine  Level of Assistance 2: Moderate assistance  Bed Mobility Comments 2: Assist with trunk and jenelle LE    Transfers  Transfer: Yes  Transfer 1  Transfer From 1: Sit to  Transfer to 1: Stand  Technique 1: Sit to stand  Transfer Device 1: Walker  Transfer Level of Assistance 1: Moderate assistance  Trials/Comments 1: Assist with elevating buttocks from sitting surface and positioning COG over MARGOTH;  patient was allowed to pull up from stabilized walker during sit to stand  Transfers 2  Transfer From 2: Stand to  Transfer to 2: Sit  Technique 2: Stand to sit  Transfer Device 2: Walker  Transfer Level of Assistance 2: Moderate assistance  Trials/Comments 2: Assist with trunk support and balance;  tactile cues for hand placement    Ambulation/Gait Training  Ambulation/Gait Training Performed: No    Stairs  Stairs: No  Extremity/Trunk Assessments:  RLE   RLE : Exceptions to WFL  Strength RLE  R Hip Flexion: 3-/5  R Knee Extension: 3-/5  R Ankle Dorsiflexion: 3-/5  LLE   LLE : Exceptions to WFL  Strength LLE  L Hip Flexion: 2+/5  L Knee Extension: 3-/5  L Ankle Dorsiflexion: 2+/5  Treatments:       Therapeutic Activity  Therapeutic Activity Performed: Yes  Therapeutic Activity 1: 4 small sidesteps to head of bed with rolling walker and mod assist with trunk support and balance;  patient appeared to support body weight with jenelle LE in standing;  patient stands  with flexion at trunk-hips-knees;  patient required increased time and effort to weightshift and advance jenelle LE during sidestepping.         Bed Mobility  Bed Mobility: Yes  Bed Mobility 1  Bed Mobility 1: Supine to sitting  Level of Assistance 1: Moderate verbal cues  Bed Mobility Comments 1: Assist with trunk-up and jenelle LE during supine to sit;  assist with scooting hips to edge of bed during supine to sit.  Bed Mobility 2  Bed Mobility  2: Sitting to supine  Level of Assistance 2: Moderate assistance  Bed Mobility Comments 2: Assist with trunk and jenelle LE    Ambulation/Gait Training  Ambulation/Gait Training Performed: No  Transfers  Transfer: Yes  Transfer 1  Transfer From 1: Sit to  Transfer to 1: Stand  Technique 1: Sit to stand  Transfer Device 1: Walker  Transfer Level of Assistance 1: Moderate assistance  Trials/Comments 1: Assist with elevating buttocks from sitting surface and positioning COG over MARGOTH;  patient was allowed to pull up from stabilized walker during sit to stand  Transfers 2  Transfer From 2: Stand to  Transfer to 2: Sit  Technique 2: Stand to sit  Transfer Device 2: Walker  Transfer Level of Assistance 2: Moderate assistance  Trials/Comments 2: Assist with trunk support and balance;  tactile cues for hand placement    Stairs  Stairs: No       Outcome Measures:       Encounter Problems       Encounter Problems (Active)       Mobility       Bed mobility including supine to sit and sit to supine with supervision. (Progressing)       Start:  10/20/24    Expected End:  11/03/24            Transfers including sit to stand and stand to sit with supervision. (Progressing)       Start:  10/20/24    Expected End:  11/03/24            Ambulate 50 feet with rolling walker and min assist.       Start:  10/20/24    Expected End:  11/03/24               Pain - Adult              Education Documentation  Mobility Training, taught by Laurent Pacheco PT at 10/20/2024  8:52 AM.  Learner: Patient  Readiness:  Acceptance  Method: Explanation, Demonstration  Response: Needs Reinforcement  Comment: Communicate with writing or lip reading (profoundly hard of hearing)    Education Comments  No comments found.

## 2024-10-20 NOTE — PROGRESS NOTES
[facility] Critical Care Medicine       Date:  10/20/2024  Patient:  Dat Mccallum  YOB: 1940  MRN:  [id number]   Admit Date:  10/16/2024      Chief Complaint   Patient presents with    Altered Mental Status     History of Present Illness:  Dat Mccallum is a 83 y.o. year old male patient with past medical history of  [place] (mostly deaf), T2DM, HTN, HLD, CKD3, BPH who presents to the ED from home for AMS/lethargy. The patient was reportedly last seen by his family members 3 to 4 days ago. They went to check on him today and they found that he was very sleepy. They report he was slow to respond so they called EMS and had him brought to the emergency room.      ED course: Troponins elevated (8,126->7,733), EKG with sinus tachycardia with leftward axis, normal voltage, normal ST segment, and less than 1 mm of ST segment elevation in the septal leads. Discussed with Cardiology (Dr. Workman) who recommended ASA, heparin gtt and beta-blocker. NIH 15 d/t lethargy, pt didn't meet criteria for TNK d/t last known well 3-4 days ago. CT head unremarkable. CXR unremarkable. Lactate 1.5, ,  and glucose 57 (received 50 mL dextrose).        Interval ICU Events:  10/16: Admitted to ICU for management of NSTEMI and AMS.      10/17: TTE this morning showing prior infarct. LVEF 15-20%. Pt waking up more and intermittently following commands with sons at bedside.      10/18: No acute events overnight. Pt more awake and moving more. Remains aphasic, this is near his baseline according to son, but pt appears slower than normal. Pending SLP eval for meds and diet. Possible downgrade later.     10/19: BP soft overnight and this am. Total 500 ml IVF boluses given with improvement. Alert today and answers simple yes no questions but still not back to baseline mental status. Denies pain or SOB. Will get MRI brain today. Possible transfer to SDU later if BP stable.     10/20: Bps improved today. Was able to sit up  and work with PT today on side of bed. Denies any CP or SOB. MRI brain yesterday without acute abnormality.      Medical History:  Past Medical History:   Diagnosis Date    Acute frontal sinusitis, unspecified 01/06/2015    Acute frontal sinusitis    Encounter for screening for malignant neoplasm of colon 01/22/2020    Colon cancer screening    Encounter for screening for malignant neoplasm of prostate 08/14/2017    Special screening, prostate cancer    Other conditions influencing health status     Colonoscopy planned    Other conditions influencing health status 01/19/2018    History of cough    Other skin changes 07/21/2021    Blisters of multiple sites    Other specified health status     Known health problems: none    Personal history of other diseases of the nervous system and sense organs     History of cataract    Personal history of other diseases of urinary system 08/10/2021    History of hematuria    Personal history of other specified conditions 06/29/2018    History of dizziness    Personal history of other specified conditions 09/25/2017    History of abdominal pain    Personal history of other specified conditions 08/14/2017    History of diarrhea    Personal history of other specified conditions 10/11/2017    History of urinary incontinence    Personal history of pneumonia (recurrent) 01/19/2018    History of community acquired pneumonia    Rash and other nonspecific skin eruption 12/17/2020    Rash    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 180 tablet 3 Unknown    lovastatin (Mevacor) 20 mg tablet Take 1 tablet (20 mg) by mouth once daily at bedtime. 90 tablet 3 Unknown    metFORMIN (Glucophage) 500 mg tablet Take 2 tablets by mouth twice daily 360 tablet 0 Unknown    omeprazole (PriLOSEC) 20 mg DR capsule Take by mouth.   Unknown    OneTouch Ultra Test strip TEST 3 TIMES DAILY   Unknown    tamsulosin (Flomax) 0.4 mg 24 hr capsule Take 2 capsules (0.8 mg) by mouth once daily at bedtime. 180 capsule 3 Unknown     Patient has no known allergies.  Social History     Tobacco Use    Smoking status: Never     Passive exposure: Never    Smokeless tobacco: Never   Vaping Use    Vaping status: Never Used   Substance Use Topics    Alcohol use: Not Currently    Drug use: Never     No family history on file.    Hospital Medications:           Current Facility-Administered Medications:     aspirin chewable tablet 81 mg, 81 mg, oral, Daily, LARISSA Villafana, 81 mg at 10/19/24 1127    dextrose 50 % injection 12.5 g, 12.5 g, intravenous, q15 min PRN, Ana Maria Michelle PA-C    dextrose 50 % injection 25 g, 25 g, intravenous, q15 min PRN, Ana Maria Michelle PA-C    enoxaparin (Lovenox) syringe 40 mg, 40 mg, subcutaneous, Daily, Chi Salinas PA-C, 40 mg at 10/19/24 0906    [Held by provider] glimepiride (Amaryl) tablet 4 mg, 4 mg, oral, BID, Ana Maria Michelle PA-C    glucagon (Glucagen) injection 1 mg, 1 mg, intramuscular, q15 min PRN, Ana Maria Michelle PA-C    glucagon (Glucagen) injection 1 mg, 1 mg, intramuscular, q15 min PRN, Ana Maria Michelle PA-C    melatonin tablet 3 mg, 3 mg, oral, Nightly PRN, Chi Salinas PA-C    [Held by provider] metFORMIN (Glucophage) tablet 1,000 mg, 1,000 mg, oral, BID, Ana Maria Michelle PA-C    midodrine (Proamatine) tablet 2.5 mg, 2.5 mg, oral, TID, Haja Mckeon DO, 2.5 mg at 10/19/24 7806     pravastatin (Pravachol) tablet 20 mg, 20 mg, oral, Nightly, Chi Salinas PA-C, 20 mg at 10/19/24 2000    tamsulosin (Flomax) 24 hr capsule 0.8 mg, 0.8 mg, oral, Nightly, Chi GUZMAN YAA Salinas, 0.8 mg at 10/19/24 2000    Review of Systems:  14 point review of systems was completed and negative except for those specially mention in my HPI    Physical Exam:    Heart Rate:  [59-90]   Temp:  [35.8 °C (96.4 °F)-36.4 °C (97.5 °F)]   Resp:  [8-21]   BP: ()/(51-80)   Weight:  [66.2 kg (145 lb 15.1 oz)-66.8 kg (147 lb 4.3 oz)]   SpO2:  [94 %-100 %]     Physical Exam  Vitals and nursing note reviewed.   Constitutional:       General: He is not in acute distress.     Appearance: He is not toxic-appearing.   HENT:      Head: Normocephalic.      Mouth/Throat:      Mouth: Mucous membranes are moist.   Eyes:      Extraocular Movements: Extraocular movements intact.      Pupils: Pupils are equal, round, and reactive to light.   Cardiovascular:      Rate and Rhythm: Normal rate and regular rhythm.      Pulses:           Radial pulses are 2+ on the right side and 2+ on the left side.        Dorsalis pedis pulses are 2+ on the right side and 2+ on the left side.      Heart sounds: Normal heart sounds, S1 normal and S2 normal.   Pulmonary:      Effort: Pulmonary effort is normal. No tachypnea, accessory muscle usage or respiratory distress.      Breath sounds: Normal breath sounds. No stridor. No wheezing, rhonchi or rales.   Abdominal:      General: Abdomen is flat. Bowel sounds are normal.      Palpations: Abdomen is soft.      Tenderness: There is no abdominal tenderness.   Musculoskeletal:      Cervical back: Neck supple.      Right lower leg: No edema.      Left lower leg: No edema.      Comments: BEAUCHAMP equally   Skin:     General: Skin is warm and dry.      Capillary Refill: Capillary refill takes less than 2 seconds.      Coloration: Skin is not cyanotic, jaundiced or mottled.   Neurological:      General: No focal  deficit present.      Mental Status: He is alert.      GCS: GCS eye subscore is 4. GCS verbal subscore is 4. GCS motor subscore is 6.      Sensory: Sensation is intact.      Motor: Motor function is intact.      Comments: Oriented to self only. Patient mostly deaf, communicated via writing. Answers simple yes/no questions and follows commands.   Psychiatric:         Attention and Perception: Attention normal.         Mood and Affect: Mood normal.         Speech: Speech is delayed.         Behavior: Behavior is cooperative.       Objective:    I have reviewed all medications, laboratory results, and imaging pertinent for today's encounter.           Intake/Output Summary (Last 24 hours) at 10/20/2024 0735  Last data filed at 10/20/2024 0500  Gross per 24 hour   Intake 1056.82 ml   Output 850 ml   Net 206.82 ml       Daily Labs:  CBC:   Results from last 7 days   Lab Units 10/20/24  0430   WBC AUTO x10*3/uL 4.3*   HEMOGLOBIN g/dL 10.1*   HEMATOCRIT % 30.4*   MCV fL 92     BMP:    Results from last 7 days   Lab Units 10/20/24  0430   SODIUM mmol/L 135*   POTASSIUM mmol/L 3.9   CHLORIDE mmol/L 108*   CO2 mmol/L 23   BUN mg/dL 18   CREATININE mg/dL 0.96   CALCIUM mg/dL 7.9*   GLUCOSE mg/dL 127*   MAGNESIUM mg/dL 1.69       Assessment/Plan:    Dat Mccallum is a 83 y.o. year old male patient with past medical history of  T2DM, HTN, HLD, CKD3, BPH who presents to the ED from home for AMS/lethargy - found to have elvated troponins elevated (8,126->7,733). Admitted to ICU as NSTEMI, AMS.    I am currently managing this critically ill patient for the following problems:    Neuro/Psych/Pain Ctrl/Sedation:  Acute encephalopathy - improving but not to baseline per family  Deaf  CT head: No evidence of acute cortical infarct or intracranial hemorrhage  Ammonia and urine toxicology WNL  EEG 10/17: Moderate diffuse encephalopathy   MRI Brain 10/19: No MR evidence of acute intracranial infarct, hemorrhage, mass, or mass effect  -  [facility] ICU, delirium Precautions      Respiratory/ENT:  No acute concerns  CXR 10/14: No acute cardiopulmonary process  - Currently stable on room air  - Pulmonary hygiene, aspiration precautions     Cardiovascular:  NSTEMI  - Troponins downtrending (8,126->7,733)  Hx hypertension, hyperlipidemia   Cardiomyopathy   Hypotension - improving  TTE: LVEF 15-20%, multiple wall motion abnormalities, mildly dilated LV, possibly consistent with Takotsubo cardiomayopathy  Heparin gtt x 48 hrs completed  - Midodrine was added by cardiology yesterday  - Cardiology following, appreciate continued input  --- [facility] possible in future, pending discussion with sons   --- GDMT once pt is able to tolerate   - ASA daily  - Resume home pravastatin   - EKGs as needed  - Continuous cardiac monitoring     GI:  Malnutrition - as evidenced by moderate subcutaeous fat losses, severe muscle deficits, po intake likely less than estimate energy needs  - Diet: Regular, pureed, crushed meds per SLP. Encourage intake.  - Will consider NGT unable to take in adequate PO  - GI proph: Not indicated   - Agree with dietitian's diagnosis of malnutrition    - BR: None     Renal/Volume Status (Intra & Extravascular):  CKD3  BPH  Cr improved from baseline today at 0.96  - Monitor I/Os, goal UOP 0.5-1.0 kg/hr  - Replace electrolytes as needed  - Resume home flomax     Endocrine  T2DM  Hypoglycemia - improved  Last A1c 2/24: 6.3%  - ACHS POCT glucose ok, will add SSI if needed  - Hold home antidiabetic meds (metformin, glimepiride) d/t recent hypoglycemia  - Hypoglycemic protocol      Infectious Disease:  No acute concerns  WBCs 4.4, afebrile  Blood cultures 10/17: no growth  - Monitor for signs and symptoms of infection      Heme/Onc:  Normocytic, normochromic anemia - likely 2/2 anemia of chronic disease  H/H stable at 10.1/30.4. No signs of bleeding  - Heparin gtt x 48 hrs now off  - On Lovenox for DVT PPX   - Monitor for signs and symptoms of bleeding  - Plan  to transfuse if Hgb < 7.0     MSK:  No acute issues  - Baseline ambulatory, lives at home alone  - PT/OT on     Ethics/Code Status:  Full code, discussed with two sons at bedside      :  DVT Prophylaxis: SubQ Lovenox, SCDs  GI Prophylaxis: None  Bowel Regimen: None  Diet: Regular   CVC: None  Loren: None  [place]: None  Restraints: None  Dispo: Stable for stepdown later today  I have reviewed all medications, laboratory results, and imaging pertinent for today's encounter.  Plan Discussed with Dr. Galo, nursing, patient  Critical Care Time: 35 minutes  Critical Care [place] CNP

## 2024-10-20 NOTE — CARE PLAN
The patient's goals for the shift include      The clinical goals for the shift include Pt will remain free of injuries and will remain hds throughout shift. Pt will use dry erase board to communicate.      Problem: Skin  Goal: Decreased wound size/increased tissue granulation at next dressing change  Outcome: Progressing  Flowsheets (Taken 10/20/2024 0907)  Decreased wound size/increased tissue granulation at next dressing change:   Promote sleep for wound healing   Protective dressings over bony prominences   Utilize specialty bed per algorithm  Goal: Participates in plan/prevention/treatment measures  Outcome: Progressing  Flowsheets (Taken 10/20/2024 0907)  Participates in plan/prevention/treatment measures:   Discuss with provider PT/OT consult   Elevate heels   Increase activity/out of bed for meals  Goal: Prevent/manage excess moisture  Outcome: Progressing  Flowsheets (Taken 10/20/2024 0907)  Prevent/manage excess moisture:   Monitor for/manage infection if present   Cleanse incontinence/protect with barrier cream   Use wicking fabric (obtain order)   Moisturize dry skin   Follow provider orders for dressing changes  Goal: Prevent/minimize sheer/friction injuries  Outcome: Progressing  Flowsheets (Taken 10/20/2024 0907)  Prevent/minimize sheer/friction injuries:   Use pull sheet   Increase activity/out of bed for meals   Complete micro-shifts as needed if patient unable. Adjust patient position to relieve pressure points, not a full turn   Utilize specialty bed per algorithm   Turn/reposition every 2 hours/use positioning/transfer devices   HOB 30 degrees or less  Goal: Promote/optimize nutrition  Outcome: Progressing  Flowsheets (Taken 10/20/2024 0907)  Promote/optimize nutrition:   Monitor/record intake including meals   Assist with feeding   Offer water/supplements/favorite foods   Consume > 50% meals/supplements   Discuss with provider if NPO > 2 days  Goal: Promote skin healing  Outcome:  Progressing  Flowsheets (Taken 10/20/2024 0942)  Promote skin healing:   Turn/reposition every 2 hours/use positioning/transfer devices   Protective dressings over bony prominences   Rotate device position/do not position patient on device   Ensure correct size (line/device) and apply per  instructions   Assess skin/pad under line(s)/device(s)     Problem: Pain - Adult  Goal: Verbalizes/displays adequate comfort level or baseline comfort level  Outcome: Progressing     Problem: Safety - Adult  Goal: Free from fall injury  Outcome: Progressing     Problem: Discharge Planning  Goal: Discharge to home or other facility with appropriate resources  Outcome: Progressing     Problem: Chronic Conditions and Co-morbidities  Goal: Patient's chronic conditions and co-morbidity symptoms are monitored and maintained or improved  Outcome: Progressing     Problem: Fall/Injury  Goal: Not fall by end of shift  Outcome: Progressing  Goal: Be free from injury by end of the shift  Outcome: Progressing  Goal: Verbalize understanding of personal risk factors for fall in the hospital  Outcome: Progressing  Goal: Verbalize understanding of risk factor reduction measures to prevent injury from fall in the home  Outcome: Progressing  Goal: Use assistive devices by end of the shift  Outcome: Progressing  Goal: Pace activities to prevent fatigue by end of the shift  Outcome: Progressing     Problem: ACS/CP/NSTEMI/STEMI  Goal: Chest pain managed (free from pain or at acceptable level)  Outcome: Progressing  Goal: Lab values return to normal range  Outcome: Progressing  Goal: Promote self management  Outcome: Progressing  Goal: Serial ECG will return to baseline  Outcome: Progressing  Goal: Verbalize understanding of procedures/devices  Outcome: Progressing  Goal: Wean vasopressors/achieve hemodynamic stability  Outcome: Progressing     Problem: Nutrition  Goal: Less than 5 days NPO/clear liquids  Outcome: Progressing  Goal: Oral  intake greater than 50%  Outcome: Progressing  Goal: Oral intake greater 75%  Outcome: Progressing  Goal: Consume prescribed supplement  Outcome: Progressing  Goal: Adequate PO fluid intake  Outcome: Progressing  Goal: Nutrition support goals are met within 48 hrs  Outcome: Progressing  Goal: Nutrition support is meeting 75% of nutrient needs  Outcome: Progressing  Goal: Tube feed tolerance  Outcome: Progressing  Goal: BG  mg/dL  Outcome: Progressing  Goal: Lab values WNL  Outcome: Progressing  Goal: Electrolytes WNL  Outcome: Progressing  Goal: Promote healing  Outcome: Progressing  Goal: Maintain stable weight  Outcome: Progressing  Goal: Reduce weight from edema/fluid  Outcome: Progressing  Goal: Gradual weight gain  Outcome: Progressing  Goal: Improve ostomy output  Outcome: Progressing     Problem: Diabetes  Goal: Maintain glucose levels >70mg/dl to <250mg/dl throughout shift  Outcome: Progressing  Goal: No changes in neurological exam by end of shift  Outcome: Progressing  Goal: Vital signs within normal range for age by end of shift  Outcome: Progressing

## 2024-10-21 PROBLEM — N40.0 BPH (BENIGN PROSTATIC HYPERPLASIA): Status: ACTIVE | Noted: 2023-08-10

## 2024-10-21 PROBLEM — G93.41 METABOLIC ENCEPHALOPATHY: Status: ACTIVE | Noted: 2024-10-21

## 2024-10-21 PROBLEM — D63.8 ANEMIA OF CHRONIC DISEASE: Status: ACTIVE | Noted: 2024-10-21

## 2024-10-21 PROBLEM — E43 SEVERE PROTEIN-CALORIE MALNUTRITION (MULTI): Status: ACTIVE | Noted: 2024-10-21

## 2024-10-21 PROBLEM — E44.0 MODERATE PROTEIN-CALORIE MALNUTRITION (MULTI): Status: ACTIVE | Noted: 2024-10-21

## 2024-10-21 PROBLEM — I50.22 CHRONIC SYSTOLIC HEART FAILURE: Status: ACTIVE | Noted: 2024-10-21

## 2024-10-21 PROBLEM — I95.0 IDIOPATHIC HYPOTENSION: Status: ACTIVE | Noted: 2024-10-21

## 2024-10-21 LAB
ALBUMIN SERPL BCP-MCNC: 2.6 G/DL (ref 3.4–5)
ANION GAP SERPL CALCULATED.3IONS-SCNC: 11 MMOL/L (ref 10–20)
BACTERIA BLD CULT: NORMAL
BACTERIA BLD CULT: NORMAL
BASOPHILS # BLD AUTO: 0.03 X10*3/UL (ref 0–0.1)
BASOPHILS NFR BLD AUTO: 0.9 %
BUN SERPL-MCNC: 15 MG/DL (ref 6–23)
CALCIUM SERPL-MCNC: 8.2 MG/DL (ref 8.6–10.3)
CHLORIDE SERPL-SCNC: 107 MMOL/L (ref 98–107)
CO2 SERPL-SCNC: 23 MMOL/L (ref 21–32)
CREAT SERPL-MCNC: 0.95 MG/DL (ref 0.5–1.3)
EGFRCR SERPLBLD CKD-EPI 2021: 80 ML/MIN/1.73M*2
EOSINOPHIL # BLD AUTO: 0.29 X10*3/UL (ref 0–0.4)
EOSINOPHIL NFR BLD AUTO: 8.2 %
ERYTHROCYTE [DISTWIDTH] IN BLOOD BY AUTOMATED COUNT: 13.9 % (ref 11.5–14.5)
GLUCOSE BLD MANUAL STRIP-MCNC: 120 MG/DL (ref 74–99)
GLUCOSE BLD MANUAL STRIP-MCNC: 151 MG/DL (ref 74–99)
GLUCOSE BLD MANUAL STRIP-MCNC: 173 MG/DL (ref 74–99)
GLUCOSE SERPL-MCNC: 114 MG/DL (ref 74–99)
HCT VFR BLD AUTO: 30.1 % (ref 41–52)
HGB BLD-MCNC: 10.1 G/DL (ref 13.5–17.5)
IMM GRANULOCYTES # BLD AUTO: 0.01 X10*3/UL (ref 0–0.5)
IMM GRANULOCYTES NFR BLD AUTO: 0.3 % (ref 0–0.9)
LYMPHOCYTES # BLD AUTO: 0.92 X10*3/UL (ref 0.8–3)
LYMPHOCYTES NFR BLD AUTO: 26.1 %
MAGNESIUM SERPL-MCNC: 1.56 MG/DL (ref 1.6–2.4)
MCH RBC QN AUTO: 30.8 PG (ref 26–34)
MCHC RBC AUTO-ENTMCNC: 33.6 G/DL (ref 32–36)
MCV RBC AUTO: 92 FL (ref 80–100)
MONOCYTES # BLD AUTO: 0.3 X10*3/UL (ref 0.05–0.8)
MONOCYTES NFR BLD AUTO: 8.5 %
NEUTROPHILS # BLD AUTO: 1.97 X10*3/UL (ref 1.6–5.5)
NEUTROPHILS NFR BLD AUTO: 56 %
NRBC BLD-RTO: 0 /100 WBCS (ref 0–0)
PHOSPHATE SERPL-MCNC: 3.2 MG/DL (ref 2.5–4.9)
PLATELET # BLD AUTO: 128 X10*3/UL (ref 150–450)
POTASSIUM SERPL-SCNC: 4 MMOL/L (ref 3.5–5.3)
RBC # BLD AUTO: 3.28 X10*6/UL (ref 4.5–5.9)
SODIUM SERPL-SCNC: 137 MMOL/L (ref 136–145)
WBC # BLD AUTO: 3.5 X10*3/UL (ref 4.4–11.3)

## 2024-10-21 PROCEDURE — 2500000001 HC RX 250 WO HCPCS SELF ADMINISTERED DRUGS (ALT 637 FOR MEDICARE OP)

## 2024-10-21 PROCEDURE — 97110 THERAPEUTIC EXERCISES: CPT | Mod: GP,CQ

## 2024-10-21 PROCEDURE — 97166 OT EVAL MOD COMPLEX 45 MIN: CPT | Mod: GO

## 2024-10-21 PROCEDURE — 99233 SBSQ HOSP IP/OBS HIGH 50: CPT | Performed by: HOSPITALIST

## 2024-10-21 PROCEDURE — 82947 ASSAY GLUCOSE BLOOD QUANT: CPT

## 2024-10-21 PROCEDURE — 2500000001 HC RX 250 WO HCPCS SELF ADMINISTERED DRUGS (ALT 637 FOR MEDICARE OP): Performed by: NURSE PRACTITIONER

## 2024-10-21 PROCEDURE — 85025 COMPLETE CBC W/AUTO DIFF WBC: CPT | Performed by: NURSE PRACTITIONER

## 2024-10-21 PROCEDURE — 99232 SBSQ HOSP IP/OBS MODERATE 35: CPT

## 2024-10-21 PROCEDURE — 36415 COLL VENOUS BLD VENIPUNCTURE: CPT | Performed by: NURSE PRACTITIONER

## 2024-10-21 PROCEDURE — 80069 RENAL FUNCTION PANEL: CPT | Performed by: NURSE PRACTITIONER

## 2024-10-21 PROCEDURE — 97530 THERAPEUTIC ACTIVITIES: CPT | Mod: GP,CQ

## 2024-10-21 PROCEDURE — 2500000002 HC RX 250 W HCPCS SELF ADMINISTERED DRUGS (ALT 637 FOR MEDICARE OP, ALT 636 FOR OP/ED): Performed by: NURSE PRACTITIONER

## 2024-10-21 PROCEDURE — 2500000002 HC RX 250 W HCPCS SELF ADMINISTERED DRUGS (ALT 637 FOR MEDICARE OP, ALT 636 FOR OP/ED)

## 2024-10-21 PROCEDURE — 2060000001 HC INTERMEDIATE ICU ROOM DAILY

## 2024-10-21 PROCEDURE — 83735 ASSAY OF MAGNESIUM: CPT | Performed by: NURSE PRACTITIONER

## 2024-10-21 PROCEDURE — 2500000004 HC RX 250 GENERAL PHARMACY W/ HCPCS (ALT 636 FOR OP/ED): Performed by: NURSE PRACTITIONER

## 2024-10-21 RX ORDER — CARVEDILOL 3.12 MG/1
3.12 TABLET ORAL NIGHTLY
Status: DISCONTINUED | OUTPATIENT
Start: 2024-10-21 | End: 2024-10-22

## 2024-10-21 RX ORDER — SPIRONOLACTONE 25 MG/1
12.5 TABLET ORAL DAILY
Status: DISCONTINUED | OUTPATIENT
Start: 2024-10-21 | End: 2024-10-23

## 2024-10-21 RX ADMIN — CARVEDILOL 3.12 MG: 3.12 TABLET, FILM COATED ORAL at 20:34

## 2024-10-21 RX ADMIN — MIDODRINE HYDROCHLORIDE 5 MG: 5 TABLET ORAL at 12:01

## 2024-10-21 RX ADMIN — MIDODRINE HYDROCHLORIDE 5 MG: 5 TABLET ORAL at 08:03

## 2024-10-21 RX ADMIN — ENOXAPARIN SODIUM 40 MG: 40 INJECTION SUBCUTANEOUS at 10:02

## 2024-10-21 RX ADMIN — SPIRONOLACTONE 12.5 MG: 25 TABLET ORAL at 16:58

## 2024-10-21 RX ADMIN — ASPIRIN 81 MG CHEWABLE TABLET 81 MG: 81 TABLET CHEWABLE at 10:02

## 2024-10-21 RX ADMIN — PRAVASTATIN SODIUM 20 MG: 20 TABLET ORAL at 20:33

## 2024-10-21 RX ADMIN — Medication 3 MG: at 20:34

## 2024-10-21 RX ADMIN — MIDODRINE HYDROCHLORIDE 5 MG: 5 TABLET ORAL at 16:58

## 2024-10-21 RX ADMIN — TAMSULOSIN HYDROCHLORIDE 0.8 MG: 0.4 CAPSULE ORAL at 20:33

## 2024-10-21 ASSESSMENT — COGNITIVE AND FUNCTIONAL STATUS - GENERAL
MOVING TO AND FROM BED TO CHAIR: A LOT
CLIMB 3 TO 5 STEPS WITH RAILING: TOTAL
WALKING IN HOSPITAL ROOM: A LOT
MOVING FROM LYING ON BACK TO SITTING ON SIDE OF FLAT BED WITH BEDRAILS: A LOT
DRESSING REGULAR UPPER BODY CLOTHING: A LOT
MOVING FROM LYING ON BACK TO SITTING ON SIDE OF FLAT BED WITH BEDRAILS: A LITTLE
TOILETING: A LOT
EATING MEALS: A LITTLE
DAILY ACTIVITIY SCORE: 13
TURNING FROM BACK TO SIDE WHILE IN FLAT BAD: A LOT
DRESSING REGULAR LOWER BODY CLOTHING: A LOT
PERSONAL GROOMING: A LOT
WALKING IN HOSPITAL ROOM: TOTAL
MOBILITY SCORE: 11
TURNING FROM BACK TO SIDE WHILE IN FLAT BAD: A LITTLE
STANDING UP FROM CHAIR USING ARMS: A LOT
CLIMB 3 TO 5 STEPS WITH RAILING: TOTAL
STANDING UP FROM CHAIR USING ARMS: A LOT
MOVING TO AND FROM BED TO CHAIR: A LOT
MOBILITY SCORE: 12
HELP NEEDED FOR BATHING: A LOT

## 2024-10-21 ASSESSMENT — PAIN - FUNCTIONAL ASSESSMENT
PAIN_FUNCTIONAL_ASSESSMENT: 0-10

## 2024-10-21 ASSESSMENT — PAIN SCALES - GENERAL
PAINLEVEL_OUTOF10: 0 - NO PAIN

## 2024-10-21 ASSESSMENT — ACTIVITIES OF DAILY LIVING (ADL): BATHING_ASSISTANCE: MAXIMAL

## 2024-10-21 NOTE — ASSESSMENT & PLAN NOTE
Last A1c is 6.2. appears to be on metformin and glimeperide at home although I am not sure he needs this, especially given his decreased intake/malnutrition. Could consider stopping at least the glimeperide on discharge to try to prevent any hypoglycemic events.

## 2024-10-21 NOTE — ASSESSMENT & PLAN NOTE
New diagnosis of cardiomyopathy- EF 15-20%. Discussion about appropriate ischemic evaluation is still ongoing, see above  Not currently on GDMT due to hypotension- I remain worried he will not tolerate beta blocker or ACE/ARB. Cardiology following this closely.  HF navigator consult

## 2024-10-21 NOTE — PROGRESS NOTES
"Dat Mccallum is a 83 y.o. male on day 5 of admission presenting with NSTEMI (non-ST elevated myocardial infarction) (Multi).      Subjective   Patient new to me, transferred out of ICU yesterday afternoon. He initially presented with altered mental status, NSTEMI and admitted to ICU. He was maintained on heparin drip for 48 hr. He was found to have EF 15-20%. He had normal MRI. He remains confused. His PO intake is poor. Cardiology in discussion with son about further management including possible heart cath vs cardiac stress test.    Patient is deaf.  He will read questions pertinent unremarkable cord and response either by talking or writing answers.  For me today, patient denies any pain.  I asked him if he had any questions and he said \"no questions \".  Per the RN, he wrote that he was currently at \"home \". She said he answered most questions with the word \"home\" on the marker board.    BP's low to low normal since admission.        Objective     Last Recorded Vitals  BP 85/60 (BP Location: Right arm, Patient Position: Lying)   Pulse 81   Temp 36.2 °C (97.2 °F) (Temporal)   Resp 14   Wt 62.3 kg (137 lb 6.4 oz)   SpO2 97%   Intake/Output last 3 Shifts:    Intake/Output Summary (Last 24 hours) at 10/21/2024 1059  Last data filed at 10/21/2024 0914  Gross per 24 hour   Intake 752 ml   Output 900 ml   Net -148 ml       Admission Weight  Weight: 68 kg (150 lb) (10/16/24 2047)    Daily Weight  10/21/24 : 62.3 kg (137 lb 6.4 oz)    Image Results  Electrocardiogram, 12-lead PRN ACS symptoms  Sinus tachycardia with short KS with Premature supraventricular complexes and with frequent Premature ventricular complexes  Indeterminate axis  Pulmonary disease pattern  Nonspecific ST and T wave abnormality  Abnormal ECG  When compared with ECG of 16-OCT-2024 20:40, (unconfirmed)  Previous ECG has undetermined rhythm, needs review  Questionable change in QRS duration      Physical Exam  General: alert, no diaphoresis, " frail   Lungs: CTA BL   Heart: RRR, no LE edema BL   GI: abdomen soft, nontender, nondistended, BS present   MSK: no joint effusion or deformity   Skin: no rashes, erythema, or ecchymosis   Neuro: confused. [place]. Seems to answer yes/no questions appropriately.      Relevant Results               Assessment/Plan                  Assessment & Plan  NSTEMI (non-ST elevated myocardial infarction) (Multi)  Treated with heparin drip for 48 hr. Now off  Cardiology following- long discussion with Isamar Garcia CNP and patient's son Jorge A. Given newly diagnosed cardiomyopathy, there is discussion underway about appropriate ischemic evaluation. Son has given tentative permission for [facility] if recommended by cardiology team. Awaiting final determination.  Continues on aspirin, statin  Metabolic encephalopathy  Continues. Seemingly from acute MI. MRI with no acute findings. EEG shows moderate encephalopathy. Patient seems to have some slow improvement compared to the end of last week, but still not at baseline (which is interactive, alert and oriented, independent)  Chronic systolic heart failure  New diagnosis of cardiomyopathy- EF 15-20%. Discussion about appropriate ischemic evaluation is still ongoing, see above  Not currently on GDMT due to hypotension- I remain worried he will not tolerate beta blocker or ACE/ARB. Cardiology following this closely.  HF navigator consult  Idiopathic hypotension  Perhaps related to his cardiomyopathy  Has been started on midodrine though blood pressure remains low, but improved from prior  BPH (benign prostatic hyperplasia)    Severe protein-calorie malnutrition (Multi)  Nutrition following, see below  Anemia of chronic disease  Counts stable  Type 2 diabetes mellitus without complication, without long-term current use of insulin (Multi)  Last A1c is 6.2. appears to be on metformin and glimeperide at home although I am not sure he needs this, especially given his decreased intake/malnutrition.  Could consider stopping at least the glimeperide on discharge to try to prevent any hypoglycemic events.        Malnutrition Diagnosis Status: New  Malnutrition Diagnosis: Severe malnutrition related to chronic disease or condition  As Evidenced by: Moderate to severe subcutaeous fat losses, severe muscle deficits, po intake likely less than estimate energy needs  I agree with the dietitian's malnutrition diagnosis.           Discussed with patient's son, Jorge A Mccallum, who is his emergency contact (and I believe POA- we will confirm). I gave update of case so far, answered questions. Jorge A is interested in [facility] for his father, if recommended by cardio. Cardiology to touch base with patient sometime today. We also discussed code status. Jorge A wants to talk to his brother further and we will talk in person later today.     DVT ppx        Syl Haque DO      Addendum: 1513  Discussed with son/POA Jorge A bedside  At this time, will make patient DNR CCA, DNI.  Plan for stress test tomorrow. Dr. Mckeon discussed with son bedside.

## 2024-10-21 NOTE — PROGRESS NOTES
Occupational Therapy    Evaluation    Patient Name: Dat Mccallum  MRN: [id number]  Department: [address]  Room: 12/12-A  Today's Date: 10/21/2024  Time Calculation  Start Time: 1145  Stop Time: 1205  Time Calculation (min): 20 min        Assessment:  End of Session Communication: Bedside nurse  End of Session Patient Position: Bed, 3 rail up, Alarm on  OT Assessment Results: Decreased ADL status, Decreased upper extremity strength, Decreased safe judgment during ADL, Decreased cognition, Decreased endurance, Decreased fine motor control, Decreased functional mobility, Decreased gross motor control  Strengths: Premorbid level of function  Barriers to Participation: Ability to acquire knowledge, Comorbidities, Insight into problems, Support of Caregivers  Plan:  Treatment Interventions: ADL retraining, Functional transfer training, UE strengthening/ROM, Endurance training, Patient/family training, Cognitive reorientation  OT Frequency: 4 times per week  OT Discharge Recommendations: Moderate intensity level of continued care  OT Recommended Transfer Status: Assist of 2  OT - OK to Discharge: Yes  Treatment Interventions: ADL retraining, Functional transfer training, UE strengthening/ROM, Endurance training, Patient/family training, Cognitive reorientation    Subjective   Current Problem:  1. NSTEMI (non-ST elevated myocardial infarction) (Multi)  Transthoracic Echo (TTE) Complete    Transthoracic Echo (TTE) Complete    EEG        General:  General  Reason for Referral: OT Eval and treat  Referred By: Chi Salinas PA-C  Past Medical History Relevant to Rehab: DM, HTN, HLD, CKD 3, BPH, pancreatectomy Whipple procedure, knee surgery  Family/Caregiver Present: No  Prior to Session Communication: Bedside nurse  Patient Position Received: Bed, 3 rail up, Alarm on  Precautions:  Medical Precautions: Fall precautions  Precautions Comment: Difficulty understanding, pt has dry erase board for communication.    Pain:  Pain  Assessment  Pain Assessment: 0-10  0-10 (Numeric) Pain Score: 0 - No pain    Objective   Cognition:  Orientation Level: Disoriented to time, Disoriented to place  Insight: Severe      Palomares Agitation Sedation Scale  Palomares Agitation Sedation Scale (RASS): Alert and calm  Home Living:  Type of Home: Other (Comment)  Lives With: Alone (per medical record)  Home Living Comments:  (?per medical record Pt has children)  Prior Function:  Level of [place]:  (Pt unable to give plof information, repeatedly writes 'Home' on dry erase board)    ADL:  Eating Assistance: Stand by  Grooming Assistance: Moderate  Bathing Assistance: Maximal  UE Dressing Assistance: Moderate  LE Dressing Assistance: Maximal  Toileting Assistance with Device: Maximal  Activity Tolerance:  Endurance: Decreased tolerance for upright activites  Bed Mobility/Transfers: Bed Mobility  Bed Mobility:  (MIn A with HOB up and use of rail)     Sensation:  Light Touch: No apparent deficits  Strength:  Strength Comments:  (~ 3+/5 throughout, difficulty following mmt commands)  Perception:  Initiation: Appears intact  Coordination:  Movements are Fluid and Coordinated: No  Upper Body Coordination:  (slower rate of movement)   Hand Function:  Gross Grasp: Functional  Coordination: Impaired    Outcome Measures:[facility] Daily Activity  Putting on and taking off regular lower body clothing: A lot  Bathing (including washing, rinsing, drying): A lot  Putting on and taking off regular upper body clothing: A lot  Toileting, which includes using toilet, bedpan or urinal: A lot  Taking care of personal grooming such as brushing teeth: A lot  Eating Meals: A little  Daily Activity - Total Score: 13      Education Documentation  No documentation found.  Education Comments  No comments found.    OP EDUCATION:     Goals:  Encounter Problems       Encounter Problems (Active)       ADL       Goal 1 (Progressing)       Start:  10/21/24    Expected End:  11/01/24        Patient will demonstrate improved ADL skills:  Bathing with Min assist with adaptive equipment prn    Grooming with SBA assist       Feeding with supervision assist      UE Dressing with SBA assist           LE Dressing with Min assist with adaptive equipment prn     Toileting with Min assist with adaptive equipment prn

## 2024-10-21 NOTE — CARE PLAN
The patient's goals for the shift include      The clinical goals for the shift include hemodynamically stable    Problem: Skin  Goal: Decreased wound size/increased tissue granulation at next dressing change  Outcome: Progressing  Goal: Participates in plan/prevention/treatment measures  Outcome: Progressing  Goal: Prevent/manage excess moisture  Outcome: Progressing  Goal: Prevent/minimize sheer/friction injuries  Outcome: Progressing  Goal: Promote/optimize nutrition  Outcome: Progressing  Goal: Promote skin healing  Outcome: Progressing

## 2024-10-21 NOTE — PROGRESS NOTES
Spiritual Care Visit    Clinical Encounter Type  Visited With: Patient  Routine Visit: Introduction  Continue Visiting: Yes         Values/Beliefs  Spiritual Requests During Hospitalization: Dat asked for a blessing today, while he was having lunch.     Demetrio Ray

## 2024-10-21 NOTE — ASSESSMENT & PLAN NOTE
Continues. Seemingly from acute MI. MRI with no acute findings. EEG shows moderate encephalopathy. Patient seems to have some slow improvement compared to the end of last week, but still not at baseline (which is interactive, alert and oriented, independent)

## 2024-10-21 NOTE — HOSPITAL COURSE
This is an 84 yo man who presented to ER with altered mentation. In ER, found to have greatly elevated troponins consistent with NSTEMI. He was admitted to ICU on heparin drip. BP low-normal. Patient's mentation slowly improved. He is deaf which it makes it harder to communicate and assess his mentation. He was on heparin fro 48 hr and heparin drip stopped. He was started on midodrine. Echo showed EF 15-20% which is new. Transferred out of ICU. Plan for stress test tomorrow. At normal MRI brain and EEG consistent with encephalopathy.

## 2024-10-21 NOTE — NURSING NOTE
Assumed care of patient. Patient is A&Ox3 and is resting in bed. Call light in reach. Patient denies pain and is normal sinus on monitor @ 84bpm.

## 2024-10-21 NOTE — NURSING NOTE
Assumed care of patient. Patient is A&Ox2-3. Patient is deaf. Call light in reach. Patient is normal sinus on monitor @ 61bpm. Patient denies pain.

## 2024-10-21 NOTE — PROGRESS NOTES
10/21/24 1516   Discharge Planning   Expected Discharge Disposition SNF     TCC spoke to heike Jules, copy of POA paperwork scanned into system by SA   List of SNF given to review   Precert needed prior to discharge       ** do not discharge without speaking to care coordination**

## 2024-10-21 NOTE — ASSESSMENT & PLAN NOTE
Treated with heparin drip for 48 hr. Now off  Cardiology following- long discussion with Isamar Garcia CNP and patient's son Jorge A. Given newly diagnosed cardiomyopathy, there is discussion underway about appropriate ischemic evaluation. Son has given tentative permission for [facility] if recommended by cardiology team. Awaiting final determination.  Continues on aspirin, statin

## 2024-10-21 NOTE — PROGRESS NOTES
Nutrition Follow up Note    Nutrition Assessment      Spoke with RN. PO intake is minimal, patient refusing to eat. Will provide Mighty Shakes and Magic Cups TID to try and encourage nutrient intake.    Nutrition History:     Energy Intake: Poor < 50 %  Food Allergies/Intolerances:  None  GI Symptoms: None  Oral Problems: Swallowing difficulty and Oral aversion    Anthropometrics:  Ht: 182.9 cm (6'), Wt: 62.3 kg (137 lb 6.4 oz), BMI: 18.63  IBW/kg (Dietitian Calculated): 80.9 kg  Percent of IBW: 81 %     Weight Change:  Daily Weight  10/21/24 : 62.3 kg (137 lb 6.4 oz)  02/27/24 : 68.9 kg (152 lb)  08/10/23 : 70.3 kg (155 lb)  01/03/22 : 76.7 kg (169 lb)  10/21/21 : 77.6 kg (171 lb)  07/21/21 : 76.3 kg (168 lb 4 oz)  02/26/21 : 68.9 kg (152 lb)  12/29/20 : 68.6 kg (151 lb 3.2 oz)  12/03/20 : 62.6 kg (138 lb)     Nutrition Focused Physical Exam Findings:   Subcutaneous Fat Loss  Orbital Fat Pads: Mild-Moderate (slight dark circles and slight hollowing)  Buccal Fat Pads: Mild-Moderate (flat cheeks, minimal bounce)  Triceps: Severe (negligible fat tissue)    Muscle Wasting  Temporalis: Severe (hollowed scooping depression)  Pectoralis (Clavicular Region): Severe (protruding prominent clavicle)  Deltoid/Trapezius: Severe (squared shoulders, acromion process prominent)    Nutrition Significant Labs:  Lab Results   Component Value Date    WBC 3.5 (L) 10/21/2024    HGB 10.1 (L) 10/21/2024    HCT 30.1 (L) 10/21/2024     (L) 10/21/2024    CHOL 67 02/23/2024    TRIG 129 02/23/2024    HDL 24.5 02/23/2024    ALT 22 10/16/2024    AST 43 (H) 10/16/2024     10/21/2024    K 4.0 10/21/2024     10/21/2024    CREATININE 0.95 10/21/2024    BUN 15 10/21/2024    CO2 23 10/21/2024    TSH 2.84 12/03/2020    INR 1.5 (H) 10/16/2024    HGBA1C 5.3 10/16/2024     Nutrition Specific Medications:  aspirin, 81 mg, oral, Daily  enoxaparin, 40 mg, subcutaneous, Daily  [Held by provider] metFORMIN, 1,000 mg, oral, BID  midodrine, 5  mg, oral, TID  pravastatin, 20 mg, oral, Nightly  tamsulosin, 0.8 mg, oral, Nightly         Dietary Orders (From admission, onward)       Start     Ordered    10/21/24 1457  Oral nutritional supplements  Until discontinued        Comments: Chocolate   Question Answer Comment   Deliver with All meals    Select supplement: Sugar Free Mighty Shake        10/21/24 1456    10/21/24 1456  Oral nutritional supplements  Until discontinued        Comments: chocolate   Question Answer Comment   Deliver with All meals    Select supplement: Magic Cup        10/21/24 1456    10/18/24 1053  Adult diet Regular; Pureed 4; Thin 0; 1:1 Feeding  Diet effective now        Comments: Compensatory Swallowing Strategies:                                                                                                                                                                                                                                                           Upright 90 degrees as possible for all oral intake, single sips/bites, slow rate eating/feeding,  upright after meals   Question Answer Comment   Diet type Regular    Texture Pureed 4    Fluid consistency Thin 0    Select tray type: 1:1 Feeding        10/18/24 1054    10/17/24 0053  May Participate in Room Service With Assistance  ( ROOM SERVICE MAY PARTICIPATE WITH ASSISTANCE)  Once        Question:  .  Answer:  Yes    10/17/24 0052                   Estimated Needs:   Estimated Energy Needs  Total Energy Estimated Needs (kCal): 1848 kCal  Total Estimated Energy Need per Day (kCal/kg): 30 kCal/kg  Method for Estimating Needs: Actual Wt    Estimated Protein Needs  Total Protein Estimated Needs (g): 62 g  Total Protein Estimated Needs (g/kg): 1 g/kg  Method for Estimating Needs: Actual Wt    Estimated Fluid Needs  Total Fluid Estimated Needs (mL): 1848 mL  Method for Estimating Needs: 1 mL/kcal      Nutrition Diagnosis   Nutrition Diagnosis:  Malnutrition Diagnosis  Patient  has Malnutrition Diagnosis: Yes  Diagnosis Status: Ongoing  Malnutrition Diagnosis: Severe malnutrition related to chronic disease or condition  As Evidenced by: Moderate to severe subcutaeous fat losses, severe muscle deficits, po intake likely less than estimate energy needs    Nutrition Diagnosis  Patient has Nutrition Diagnosis: Yes  Diagnosis Status (1): Ongoing  Nutrition Diagnosis 1: Inadequate energy intake  Related to (1): decreased ability to consume sufficient energy  As Evidenced by (1): poor po intake     Nutrition Interventions/Recommendations   Nutrition Interventions and Recommendations:    Nutrition Prescription:  Individualized Nutrition Prescription Provided for : 1848 calories, 66 gm protein when diet advances    Nutrition Interventions:   Food and/or Nutrient Delivery Interventions  Interventions: Meals and snacks, Medical food supplement  Meals and Snacks: Texture-modified diet  Goal: provide per SLP recs  Medical Food Supplement: Modified beverage, Modified food  Goal: mighty shake TID to provide 200 kcals and 7g protein each; magic cup TID to provide 290 kcals and 9g protein each    Education Documentation  No documentation found.         Nutrition Monitoring and Evaluation   Monitoring/Evaluation:   Food/Nutrient Related History Monitoring  Monitoring and Evaluation Plan: Energy intake  Energy Intake: Estimated energy intake  Criteria: pt to consume >/= 75% estimated needs    Body Composition/Growth/Weight History  Monitoring and Evaluation Plan: Weight  Weight: Measured weight  Criteria: pt will maintain/gain wt       Time Spent/Follow-up:   Follow Up  Time Spent (min): 25 minutes  Last Date of Nutrition Visit: 10/21/24  Nutrition Follow-Up Needed?: 5-7 days  Follow up Comment: 10/25/24

## 2024-10-21 NOTE — ASSESSMENT & PLAN NOTE
Perhaps related to his cardiomyopathy  Has been started on midodrine though blood pressure remains low, but improved from prior

## 2024-10-21 NOTE — PROGRESS NOTES
Physical Therapy    Physical Therapy Treatment    Patient Name: Dat Mccallum  MRN: [id number]  Department: [address]  Room: 12/12A  Today's Date: 10/21/2024  Time Calculation  Start Time: 1002  Stop Time: 1025  Time Calculation (min): 23 min         Assessment/Plan   PT Assessment  End of Session Communication: Bedside nurse  End of Session Patient Position: Bed, 3 rail up, Alarm on  PT Plan  Inpatient/Swing Bed or Outpatient: Inpatient  PT Plan  Treatment/Interventions: Bed mobility, Transfer training, Gait training, Balance training, Strengthening, Endurance training, Therapeutic exercise, Therapeutic activity  PT Plan: Ongoing PT  PT Frequency: 5 times per week  PT Discharge Recommendations: Moderate intensity level of continued care  PT Recommended Transfer Status: Assist x2  PT - OK to Discharge: Yes (with skilled physical therapy services at next level of care)      General Visit Information:   PT  Visit  PT Received On: 10/21/24  General  Prior to Session Communication: Bedside nurse  Patient Position Received: Bed, 3 rail up, Alarm on  General Comment: Cleared by nursing to be seen for therapy, pt agreeable with tx, supine in bed upon arrival.    Subjective   Precautions:  Precautions  Medical Precautions: Fall precautions  Precautions Comment: Difficulty understanding, pt has dry erase board for communication.    Objective   Pain:  Pain Assessment  Pain Assessment: 0-10  0-10 (Numeric) Pain Score: 0 - No pain    Cognition:  Cognition  Overall Cognitive Status:  (Difficult to accurately assess orientation;  appears cooperative;  increased time and intermittent assist required to follow commands;  patient nodded during written communication but did not verbalize or reciprocate with writing back)     Postural Control:  Static Sitting Balance  Static Sitting-Balance Support: Bilateral upper extremity supported  Static Sitting-Level of Assistance: Contact guard  Static Sitting-Comment/Number of Minutes: Fair  seated static balance.  Static Standing Balance  Static Standing-Balance Support: Bilateral upper extremity supported  Static Standing-Level of Assistance: Moderate assistance  Static Standing-Comment/Number of Minutes: Poor static standing balance.    Treatments:  Therapeutic Exercise  Therapeutic Exercise Performed: Yes  Therapeutic Exercise Activity 1: Bilateral ankle pumps to tolerance  Therapeutic Exercise Activity 2: Bilateral knee extension to tolerance. (Poor ROM noted)  Therapeutic Exercise Activity 3: Bilateral hip flexion to tolerance (Poor ROM noted, requires visual/tactile cues to perform therex.)    Therapeutic Activity  Therapeutic Activity Performed: Yes  Therapeutic Activity 1: Seated EOB x5-6 minutes.    Bed Mobility  Bed Mobility: Yes  Bed Mobility 1  Bed Mobility 1: Supine to sitting  Level of Assistance 1: Minimum assistance  Bed Mobility Comments 1: Min assist to initiate LE's off EOB. Pt able to assist trunk up with use of bed rail. Increased time to scoot EOB with frequent cues.    Ambulation/Gait Training  Ambulation/Gait Training Performed: No (Attempted lateral steps to HOB, pt appears to have difficulty comprehending/advancing LE's to effectively take steps at this time.)    Transfers  Transfer: Yes  Transfer 1  Transfer From 1: Sit to  Transfer to 1: Stand  Technique 1: Sit to stand, Stand to sit  Transfer Device 1: Walker  Transfer Level of Assistance 1: Moderate assistance  Trials/Comments 1: Mod assist for trunk up during sit to stand, poor ability to achieve an erect posture, decreased eccentric control in sitting.    Outcome Measures:  [facility] Basic Mobility  Turning from your back to your side while in a flat bed without using bedrails: A little  Moving from lying on your back to sitting on the side of a flat bed without using bedrails: A little  Moving to and from bed to chair (including a wheelchair): A lot  Standing up from a chair using your arms (e.g. wheelchair or bedside  chair): A lot  To walk in hospital room: Total  Climbing 3-5 steps with railing: Total  Basic Mobility - Total Score: 12    Encounter Problems       Encounter Problems (Active)       Mobility       Bed mobility including supine to sit and sit to supine with supervision. (Progressing)       Start:  10/20/24    Expected End:  11/03/24            Transfers including sit to stand and stand to sit with supervision. (Progressing)       Start:  10/20/24    Expected End:  11/03/24            Ambulate 50 feet with rolling walker and min assist. (Not Progressing)       Start:  10/20/24    Expected End:  11/03/24

## 2024-10-21 NOTE — PROGRESS NOTES
Dat Mccallum is a 83 y.o. male on day 5 of admission presenting with NSTEMI (non-ST elevated myocardial infarction) (Multi).    Subjective   Patient resting comfortably in bed with family at bedside.       Objective     Physical Exam    Last Recorded Vitals  Blood pressure 85/60, pulse 81, temperature 36.2 °C (97.2 °F), temperature source Temporal, resp. rate 14, height 1.829 m (6'), weight 62.3 kg (137 lb 6.4 oz), SpO2 97%.  Intake/Output last 3 Shifts:  I/O last 3 completed shifts:  In: 832 (13.3 mL/kg) [P.O.:832]  Out: 1800 (28.9 mL/kg) [Urine:1800 (0.8 mL/kg/hr)]  Weight: 62.3 kg     Relevant Results  Results for orders placed or performed during the hospital encounter of 10/16/24 (from the past 24 hours)   POCT GLUCOSE   Result Value Ref Range    POCT Glucose 148 (H) 74 - 99 mg/dL   POCT GLUCOSE   Result Value Ref Range    POCT Glucose 118 (H) 74 - 99 mg/dL   POCT GLUCOSE   Result Value Ref Range    POCT Glucose 131 (H) 74 - 99 mg/dL   CBC and Auto Differential   Result Value Ref Range    WBC 3.5 (L) 4.4 - 11.3 x10*3/uL    nRBC 0.0 0.0 - 0.0 /100 WBCs    RBC 3.28 (L) 4.50 - 5.90 x10*6/uL    Hemoglobin 10.1 (L) 13.5 - 17.5 g/dL    Hematocrit 30.1 (L) 41.0 - 52.0 %    MCV 92 80 - 100 fL    MCH 30.8 26.0 - 34.0 pg    MCHC 33.6 32.0 - 36.0 g/dL    RDW 13.9 11.5 - 14.5 %    Platelets 128 (L) 150 - 450 x10*3/uL    Neutrophils % 56.0 40.0 - 80.0 %    Immature Granulocytes %, Automated 0.3 0.0 - 0.9 %    Lymphocytes % 26.1 13.0 - 44.0 %    Monocytes % 8.5 2.0 - 10.0 %    Eosinophils % 8.2 0.0 - 6.0 %    Basophils % 0.9 0.0 - 2.0 %    Neutrophils Absolute 1.97 1.60 - 5.50 x10*3/uL    Immature Granulocytes Absolute, Automated 0.01 0.00 - 0.50 x10*3/uL    Lymphocytes Absolute 0.92 0.80 - 3.00 x10*3/uL    Monocytes Absolute 0.30 0.05 - 0.80 x10*3/uL    Eosinophils Absolute 0.29 0.00 - 0.40 x10*3/uL    Basophils Absolute 0.03 0.00 - 0.10 x10*3/uL   Magnesium   Result Value Ref Range    Magnesium 1.56 (L) 1.60 - 2.40  mg/dL   Renal function panel   Result Value Ref Range    Glucose 114 (H) 74 - 99 mg/dL    Sodium 137 136 - 145 mmol/L    Potassium 4.0 3.5 - 5.3 mmol/L    Chloride 107 98 - 107 mmol/L    Bicarbonate 23 21 - 32 mmol/L    Anion Gap 11 10 - 20 mmol/L    Urea Nitrogen 15 6 - 23 mg/dL    Creatinine 0.95 0.50 - 1.30 mg/dL    eGFR 80 >60 mL/min/1.73m*2    Calcium 8.2 (L) 8.6 - 10.3 mg/dL    Phosphorus 3.2 2.5 - 4.9 mg/dL    Albumin 2.6 (L) 3.4 - 5.0 g/dL   POCT GLUCOSE   Result Value Ref Range    POCT Glucose 120 (H) 74 - 99 mg/dL          Assessment/Plan   Assessment & Plan  NSTEMI (non-ST elevated myocardial infarction) (Multi)    Encephalopathy  NSTEMI  SIRS  Stage III chronic kidney disease  Diabetes mellitus type 2        10/17: Whether this is a type I versus type II event it is rather late presenting.  High-sensitivity troponins are markedly elevated but on the downtrend and he has not been complaining of any symptoms concerning for acute myocardial ischemia.  I am going to stop the therapeutic heparin.  His neurologic status in my opinion is of the biggest concern.  Neurology has been asked to evaluate.  Will start with an echocardiogram.  Whether we decide on an early invasive versus ischemia guided strategy will be contingent upon his overall prognosis.  Will continue to follow closely with you.     10/18: His echocardiogram reveals an EF 15-20%, and wall motion abnormalities consistent with an LAD infarct. I had a discussion with Jorge A, his son and POA about early invasive versus Conservative strategy, the risks of the procedure, re-infarction etc. He wishes to talk things over wit his other brother first before making a decision. All things considered he is rather stable. Will add GDMT as BP allows once he is able to swallow. Will await this and a swallow eval prior to getting him on the cath schedule. Will follow.     10/19: Remains in sinus rhythm, with hypotension in the absence of any guideline directed  medical therapy for his cardiomyopathy with blood pressures in the 80s systolic.  I will start a trial of low-dose midodrine to see if we can achieve blood pressures in the 100s range systolic that would allow us to begin guideline directed medical therapy for his cardiomyopathy.     10/20: Breathing easily without chest discomfort, and hypotension has improved to 105/74 this morning but still had intermittent episodes of systolic pressures below 90 earlier this morning.  Will increase his midodrine to 5 mg 3 times daily and reassess his hemodynamics tomorrow and hope to begin guideline directed medical therapy    10/21: Patient remains somewhat confused from his baseline this morning. He is on room air with pulse oximetry of 98%. Patient with improved blood pressures overall, with last recorded at 115/70. Discussed with the patient's son Jorge A ischemic workup via nuclear stress test versus cardiac catheterization. At this time will begin simply with a nuclear stress test as a noninvasive strategy to evaluate for evidence of reversible ischemia. Regarding guideline directed medical therapy, the patient's blood pressures did improve with oral midodrine, so I am going to start him on very low dose carvedilol (3.125 mg daily) and spironolactone (12.5 mg daily) as recommended by my collaborator Dr. Mckeon. Additionally, discussed this patient with Inez Darby NP from electrophysiology services regarding LifeVest vs ICD for his newly diagnosed cardiomyopathy. Given that this is a new diagnosis and the patient is not having significant ectopy on telemetry, will begin with guideline directed therapy and a LifeVest. An echocardiogram can be repeated in three months to evaluate for improvement in his LV function. Will arrange for nuclear stress test to be completed tomorrow. Patient seen and discussed with Dr. Mckeon. We will follow with you.       Isamar Garcia, APRN-CNP

## 2024-10-22 ENCOUNTER — APPOINTMENT (OUTPATIENT)
Dept: RADIOLOGY | Facility: HOSPITAL | Age: 84
End: 2024-10-22
Payer: MEDICARE

## 2024-10-22 ENCOUNTER — DOCUMENTATION (OUTPATIENT)
Dept: CASE MANAGEMENT | Facility: HOSPITAL | Age: 84
End: 2024-10-22
Payer: MEDICARE

## 2024-10-22 ENCOUNTER — PREP FOR PROCEDURE (OUTPATIENT)
Dept: CARDIOLOGY | Facility: HOSPITAL | Age: 84
End: 2024-10-22

## 2024-10-22 ENCOUNTER — APPOINTMENT (OUTPATIENT)
Dept: CARDIOLOGY | Facility: HOSPITAL | Age: 84
End: 2024-10-22
Payer: MEDICARE

## 2024-10-22 LAB
ALBUMIN SERPL BCP-MCNC: 2.7 G/DL (ref 3.4–5)
ANION GAP SERPL CALCULATED.3IONS-SCNC: 8 MMOL/L (ref 10–20)
ATRIAL RATE: 201 BPM
BASOPHILS # BLD AUTO: 0.03 X10*3/UL (ref 0–0.1)
BASOPHILS NFR BLD AUTO: 0.6 %
BUN SERPL-MCNC: 11 MG/DL (ref 6–23)
CALCIUM SERPL-MCNC: 8.6 MG/DL (ref 8.6–10.3)
CHLORIDE SERPL-SCNC: 106 MMOL/L (ref 98–107)
CO2 SERPL-SCNC: 27 MMOL/L (ref 21–32)
CREAT SERPL-MCNC: 0.98 MG/DL (ref 0.5–1.3)
EGFRCR SERPLBLD CKD-EPI 2021: 77 ML/MIN/1.73M*2
EOSINOPHIL # BLD AUTO: 0.38 X10*3/UL (ref 0–0.4)
EOSINOPHIL NFR BLD AUTO: 8.1 %
ERYTHROCYTE [DISTWIDTH] IN BLOOD BY AUTOMATED COUNT: 13.5 % (ref 11.5–14.5)
GLUCOSE BLD MANUAL STRIP-MCNC: 127 MG/DL (ref 74–99)
GLUCOSE BLD MANUAL STRIP-MCNC: 134 MG/DL (ref 74–99)
GLUCOSE BLD MANUAL STRIP-MCNC: 146 MG/DL (ref 74–99)
GLUCOSE BLD MANUAL STRIP-MCNC: 237 MG/DL (ref 74–99)
GLUCOSE SERPL-MCNC: 131 MG/DL (ref 74–99)
HCT VFR BLD AUTO: 31.6 % (ref 41–52)
HGB BLD-MCNC: 11 G/DL (ref 13.5–17.5)
IMM GRANULOCYTES # BLD AUTO: 0.01 X10*3/UL (ref 0–0.5)
IMM GRANULOCYTES NFR BLD AUTO: 0.2 % (ref 0–0.9)
LYMPHOCYTES # BLD AUTO: 1.02 X10*3/UL (ref 0.8–3)
LYMPHOCYTES NFR BLD AUTO: 21.8 %
MAGNESIUM SERPL-MCNC: 1.44 MG/DL (ref 1.6–2.4)
MCH RBC QN AUTO: 31.1 PG (ref 26–34)
MCHC RBC AUTO-ENTMCNC: 34.8 G/DL (ref 32–36)
MCV RBC AUTO: 89 FL (ref 80–100)
MONOCYTES # BLD AUTO: 0.41 X10*3/UL (ref 0.05–0.8)
MONOCYTES NFR BLD AUTO: 8.8 %
NEUTROPHILS # BLD AUTO: 2.82 X10*3/UL (ref 1.6–5.5)
NEUTROPHILS NFR BLD AUTO: 60.5 %
NRBC BLD-RTO: 0 /100 WBCS (ref 0–0)
P OFFSET: 239 MS
P ONSET: 196 MS
PHOSPHATE SERPL-MCNC: 2.9 MG/DL (ref 2.5–4.9)
PLATELET # BLD AUTO: 156 X10*3/UL (ref 150–450)
POTASSIUM SERPL-SCNC: 4.5 MMOL/L (ref 3.5–5.3)
PR INTERVAL: 90 MS
Q ONSET: 241 MS
QRS COUNT: 32 BEATS
QRS DURATION: 16 MS
QT INTERVAL: 216 MS
QTC CALCULATION(BAZETT): 395 MS
QTC FREDERICIA: 323 MS
R AXIS: 0 DEGREES
RBC # BLD AUTO: 3.54 X10*6/UL (ref 4.5–5.9)
SODIUM SERPL-SCNC: 136 MMOL/L (ref 136–145)
T AXIS: 135 DEGREES
T OFFSET: 349 MS
VENTRICULAR RATE: 201 BPM
WBC # BLD AUTO: 4.7 X10*3/UL (ref 4.4–11.3)

## 2024-10-22 PROCEDURE — 2500000001 HC RX 250 WO HCPCS SELF ADMINISTERED DRUGS (ALT 637 FOR MEDICARE OP): Performed by: INTERNAL MEDICINE

## 2024-10-22 PROCEDURE — 2500000002 HC RX 250 W HCPCS SELF ADMINISTERED DRUGS (ALT 637 FOR MEDICARE OP, ALT 636 FOR OP/ED): Performed by: NURSE PRACTITIONER

## 2024-10-22 PROCEDURE — 36415 COLL VENOUS BLD VENIPUNCTURE: CPT | Performed by: NURSE PRACTITIONER

## 2024-10-22 PROCEDURE — A9502 TC99M TETROFOSMIN: HCPCS

## 2024-10-22 PROCEDURE — 2500000004 HC RX 250 GENERAL PHARMACY W/ HCPCS (ALT 636 FOR OP/ED): Performed by: NURSE PRACTITIONER

## 2024-10-22 PROCEDURE — 82947 ASSAY GLUCOSE BLOOD QUANT: CPT

## 2024-10-22 PROCEDURE — 83735 ASSAY OF MAGNESIUM: CPT | Performed by: NURSE PRACTITIONER

## 2024-10-22 PROCEDURE — 97530 THERAPEUTIC ACTIVITIES: CPT | Mod: GO,CO

## 2024-10-22 PROCEDURE — 2500000002 HC RX 250 W HCPCS SELF ADMINISTERED DRUGS (ALT 637 FOR MEDICARE OP, ALT 636 FOR OP/ED)

## 2024-10-22 PROCEDURE — 2500000004 HC RX 250 GENERAL PHARMACY W/ HCPCS (ALT 636 FOR OP/ED): Performed by: INTERNAL MEDICINE

## 2024-10-22 PROCEDURE — 93017 CV STRESS TEST TRACING ONLY: CPT

## 2024-10-22 PROCEDURE — 97110 THERAPEUTIC EXERCISES: CPT | Mod: GO,CO

## 2024-10-22 PROCEDURE — 78452 HT MUSCLE IMAGE SPECT MULT: CPT

## 2024-10-22 PROCEDURE — 2500000001 HC RX 250 WO HCPCS SELF ADMINISTERED DRUGS (ALT 637 FOR MEDICARE OP): Performed by: NURSE PRACTITIONER

## 2024-10-22 PROCEDURE — 85025 COMPLETE CBC W/AUTO DIFF WBC: CPT | Performed by: NURSE PRACTITIONER

## 2024-10-22 PROCEDURE — 97530 THERAPEUTIC ACTIVITIES: CPT | Mod: GP,CQ

## 2024-10-22 PROCEDURE — 78452 HT MUSCLE IMAGE SPECT MULT: CPT | Performed by: NUCLEAR MEDICINE

## 2024-10-22 PROCEDURE — 97110 THERAPEUTIC EXERCISES: CPT | Mod: GP,CQ

## 2024-10-22 PROCEDURE — 2060000001 HC INTERMEDIATE ICU ROOM DAILY

## 2024-10-22 PROCEDURE — 84100 ASSAY OF PHOSPHORUS: CPT | Performed by: NURSE PRACTITIONER

## 2024-10-22 PROCEDURE — 2500000004 HC RX 250 GENERAL PHARMACY W/ HCPCS (ALT 636 FOR OP/ED)

## 2024-10-22 PROCEDURE — 3430000001 HC RX 343 DIAGNOSTIC RADIOPHARMACEUTICALS

## 2024-10-22 RX ORDER — LANOLIN ALCOHOL/MO/W.PET/CERES
400 CREAM (GRAM) TOPICAL DAILY
Status: DISCONTINUED | OUTPATIENT
Start: 2024-10-22 | End: 2024-11-08 | Stop reason: HOSPADM

## 2024-10-22 RX ORDER — CARVEDILOL 3.12 MG/1
3.12 TABLET ORAL
Status: DISCONTINUED | OUTPATIENT
Start: 2024-10-22 | End: 2024-11-08 | Stop reason: HOSPADM

## 2024-10-22 RX ORDER — ONDANSETRON HYDROCHLORIDE 2 MG/ML
4 INJECTION, SOLUTION INTRAVENOUS EVERY 8 HOURS PRN
Status: DISCONTINUED | OUTPATIENT
Start: 2024-10-22 | End: 2024-10-28

## 2024-10-22 RX ORDER — REGADENOSON 0.08 MG/ML
0.4 INJECTION, SOLUTION INTRAVENOUS
Status: COMPLETED | OUTPATIENT
Start: 2024-10-22 | End: 2024-10-22

## 2024-10-22 RX ADMIN — ENOXAPARIN SODIUM 40 MG: 40 INJECTION SUBCUTANEOUS at 08:16

## 2024-10-22 RX ADMIN — PRAVASTATIN SODIUM 20 MG: 20 TABLET ORAL at 21:00

## 2024-10-22 RX ADMIN — TETROFOSMIN 25.1 MILLICURIE: 0.23 INJECTION, POWDER, LYOPHILIZED, FOR SOLUTION INTRAVENOUS at 11:40

## 2024-10-22 RX ADMIN — MIDODRINE HYDROCHLORIDE 5 MG: 5 TABLET ORAL at 13:13

## 2024-10-22 RX ADMIN — REGADENOSON 0.4 MG: 0.08 INJECTION, SOLUTION INTRAVENOUS at 11:41

## 2024-10-22 RX ADMIN — TAMSULOSIN HYDROCHLORIDE 0.8 MG: 0.4 CAPSULE ORAL at 21:00

## 2024-10-22 RX ADMIN — SPIRONOLACTONE 12.5 MG: 25 TABLET ORAL at 09:08

## 2024-10-22 RX ADMIN — TETROFOSMIN 8.8 MILLICURIE: 0.23 INJECTION, POWDER, LYOPHILIZED, FOR SOLUTION INTRAVENOUS at 09:08

## 2024-10-22 RX ADMIN — MIDODRINE HYDROCHLORIDE 5 MG: 5 TABLET ORAL at 17:29

## 2024-10-22 RX ADMIN — Medication 3 MG: at 21:00

## 2024-10-22 RX ADMIN — CARVEDILOL 3.12 MG: 3.12 TABLET, FILM COATED ORAL at 21:00

## 2024-10-22 RX ADMIN — ONDANSETRON 4 MG: 2 INJECTION INTRAMUSCULAR; INTRAVENOUS at 09:09

## 2024-10-22 RX ADMIN — Medication 400 MG: at 08:15

## 2024-10-22 RX ADMIN — ASPIRIN 81 MG CHEWABLE TABLET 81 MG: 81 TABLET CHEWABLE at 13:12

## 2024-10-22 RX ADMIN — MIDODRINE HYDROCHLORIDE 5 MG: 5 TABLET ORAL at 08:16

## 2024-10-22 ASSESSMENT — COGNITIVE AND FUNCTIONAL STATUS - GENERAL
HELP NEEDED FOR BATHING: A LOT
WALKING IN HOSPITAL ROOM: A LOT
WALKING IN HOSPITAL ROOM: A LOT
DAILY ACTIVITIY SCORE: 13
EATING MEALS: A LITTLE
PERSONAL GROOMING: A LOT
TOILETING: A LOT
DAILY ACTIVITIY SCORE: 13
MOVING FROM LYING ON BACK TO SITTING ON SIDE OF FLAT BED WITH BEDRAILS: A LITTLE
DAILY ACTIVITIY SCORE: 13
DRESSING REGULAR UPPER BODY CLOTHING: A LOT
MOBILITY SCORE: 11
MOBILITY SCORE: 13
PERSONAL GROOMING: A LOT
HELP NEEDED FOR BATHING: A LOT
TOILETING: TOTAL
STANDING UP FROM CHAIR USING ARMS: A LOT
MOVING TO AND FROM BED TO CHAIR: A LOT
DRESSING REGULAR UPPER BODY CLOTHING: A LOT
DRESSING REGULAR UPPER BODY CLOTHING: A LITTLE
MOVING FROM LYING ON BACK TO SITTING ON SIDE OF FLAT BED WITH BEDRAILS: A LOT
PERSONAL GROOMING: A LOT
STANDING UP FROM CHAIR USING ARMS: A LOT
MOBILITY SCORE: 13
EATING MEALS: A LITTLE
STANDING UP FROM CHAIR USING ARMS: A LOT
HELP NEEDED FOR BATHING: A LOT
MOVING FROM LYING ON BACK TO SITTING ON SIDE OF FLAT BED WITH BEDRAILS: A LITTLE
DRESSING REGULAR LOWER BODY CLOTHING: A LOT
WALKING IN HOSPITAL ROOM: A LOT
TOILETING: A LOT
MOVING TO AND FROM BED TO CHAIR: A LOT
TURNING FROM BACK TO SIDE WHILE IN FLAT BAD: A LOT
DRESSING REGULAR LOWER BODY CLOTHING: A LOT
CLIMB 3 TO 5 STEPS WITH RAILING: A LOT
CLIMB 3 TO 5 STEPS WITH RAILING: A LOT
CLIMB 3 TO 5 STEPS WITH RAILING: TOTAL
TURNING FROM BACK TO SIDE WHILE IN FLAT BAD: A LOT
TURNING FROM BACK TO SIDE WHILE IN FLAT BAD: A LOT
DRESSING REGULAR LOWER BODY CLOTHING: A LOT
EATING MEALS: A LITTLE
MOVING TO AND FROM BED TO CHAIR: A LOT

## 2024-10-22 ASSESSMENT — PAIN - FUNCTIONAL ASSESSMENT
PAIN_FUNCTIONAL_ASSESSMENT: 0-10

## 2024-10-22 ASSESSMENT — PAIN SCALES - GENERAL
PAINLEVEL_OUTOF10: 0 - NO PAIN

## 2024-10-22 NOTE — PROGRESS NOTES
Speech-Language Pathology                 Therapy Communication Note    Patient Name: Dat Mccallum  MRN: [id number]  Department: [facility]  Room: 12/12-A  Today's Date: 10/22/2024     Discipline: Speech Language Pathology    Missed Visit Reason:  Pt currently off floor for procedure     Missed Time: Attempt    Comment:

## 2024-10-22 NOTE — PROGRESS NOTES
Heart Failure Nurse Navigator    The role of the HF nurse navigator is to (1) characterize risk profiles of patients with heart failure transitioning from [id number] after hospitalization, (2) recommend interventions to improve care and reduce risks of worse post-hospitalization outcomes. Potential recommendations may touch base on patient/family education, additional consults that may bring value, and appointment scheduling.    Assessment    I met with Dat Mccallum at the bedside, and my impressions include: Pt is deaf and was sleeping at the time of my visit. Pt admitted for NSTEMI. Cards consulted. Pt ro DC to SNF when medically ready for DC. I called out to patients sonJorge A and reviewed HF education. I advised I left Living With HF book in patient room with my business card.     Patients Cardiologist(s): None prior to admit. Will see Dr. Vazquez, post hospital appt scheduled for 11/7/24 at 3:15pm at [facility].    Patients Primary Care Provider: Anjelica Pacheco NP    1. Medical Domain  What is the patient's most recent LVEF? 15-20%  HFrEF (LVEF <= 40%) Quadruple therapy recommended  HFmrEF (LVEF 41-49%) Quadruple therapy recommended  HFpEF (LVEF >= 50%) Minimum recommendations: SGLT2i and MRA  Is the patient on OP GDMT for their condition?   ARNI/ACEI/ARB: No  BB: No  MRA: No  SGLT2i: No  Could this patient have advanced heart failure (Stage D heart failure)?: No     2. Mind and Emotion  Does this patient have possible cognitive impairment?: Yes (The Does this patient have major depression?: No   3. Physical Function  Could this patient be frail?: Yes   Defined by presence of all of these: slowness, weakness, shrinking, inactivity, exhaustion  Is this patient at risk for falling?: Yes   Defined by having experienced a fall in the last 12 months.    4. Social Determinants of Health  Transportation deficits?: No   Lack of insurance?: No   Poor financial resources?: No   Living conditions  (homelessness, unstable home)?: No   Poor family/social support?: No   Poor personal care?: No   Food insecurity?: No   Lack of HCPOA?: No    Summary of Assessment  The following linked problems were found:   Deaf  Lives alone with children visiting every couple of days    Current IP Medications: Not of full GDMT d/t hypotension/on midodrine  Beta blocker: Carvedilol 3.125 mg BID  ACE inhibitor/ARB/ARNI: None  MRA: spironolactone 12.5 mg daily  SGLT2i:  None  Diuretic: None    Device LifeVest ordered.    Recommendations  Follow up with Cards after DC.  Take all meds as prescribed and eat low sodium diet.  Wear LifeVest as priscribed.    Heart Failure Education   - Living With Heart Failure book provided.  - CHF signs and symptoms, heart failure zones and when to call cardiologist.   - Controlling Heart Failure at Home: medication adherence, following up with cardiologist at least once yearly, staying healthy and active, limiting sodium and fluid intake as directed by cardiologist.  - Daily Weight Education        (HF nurse navigator name and contact info)

## 2024-10-22 NOTE — ASSESSMENT & PLAN NOTE
Last A1c is 6.2. appears to be on metformin and glimeperide at home although I am not sure he needs this, especially given his decreased intake/malnutrition. stopping  the glimeperide on discharge to try to prevent any hypoglycemic events.

## 2024-10-22 NOTE — ASSESSMENT & PLAN NOTE
Treated with heparin drip for 48 hr. Now off  Cardiology following- long discussion with Isamar Garcia CNP and patient's son Jorge A. Given newly diagnosed cardiomyopathy, there is discussion underway about appropriate ischemic evaluation. Son has given tentative permission for [facility] if recommended by cardiology team. Awaiting final determination.  Has had no recurrent anginal symptoms.  He is scheduled for cardiac catheterization tomorrow.  Continues on aspirin, statin

## 2024-10-22 NOTE — PROGRESS NOTES
Dat Mccallum is a 83 y.o. male on day 6 of admission presenting with NSTEMI (non-ST elevated myocardial infarction) (Multi).      Subjective   He denies chest pain or shortness of breath.  He has generalized weakness.       Objective     Last Recorded Vitals  BP 99/61   Pulse 70   Temp 36 °C (96.8 °F) (Temporal)   Resp 18   Wt 62.9 kg (138 lb 11.2 oz)   SpO2 95%   Intake/Output last 3 Shifts:    Intake/Output Summary (Last 24 hours) at 10/22/2024 1317  Last data filed at 10/22/2024 0400  Gross per 24 hour   Intake 120 ml   Output 1225 ml   Net -1105 ml       Admission Weight  Weight: 68 kg (150 lb) (10/16/24 2047)    Daily Weight  10/22/24 : 62.9 kg (138 lb 11.2 oz)    Image Results  Electrocardiogram, 12-lead PRN ACS symptoms  Poor data quality in current ECG precludes serial comparison  Sinus tachycardia with short NC with Premature supraventricular complexes and with frequent Premature ventricular complexes  Indeterminate axis  Pulmonary disease pattern  Nonspecific ST and T wave abnormality  Abnormal ECG  When compared with ECG of 16-OCT-2024 20:40, (unconfirmed)  Previous ECG has undetermined rhythm, needs review  Questionable change in QRS duration  Confirmed by Fer Vazquez ([id number]) on 10/22/2024 12:52:29 PM      Physical Exam  Constitutional:       Appearance: Normal appearance.   HENT:      Head: Normocephalic and atraumatic.      Nose: Nose normal.      Mouth/Throat:      Mouth: Mucous membranes are moist.      Pharynx: Oropharynx is clear.   Eyes:      Extraocular Movements: Extraocular movements intact.      Conjunctiva/sclera: Conjunctivae normal.      Pupils: Pupils are equal, round, and reactive to light.   Cardiovascular:      Rate and Rhythm: Normal rate and regular rhythm.      Pulses: Normal pulses.      Heart sounds: Normal heart sounds.   Pulmonary:      Effort: Pulmonary effort is normal.      Breath sounds: Normal breath sounds.   Abdominal:      General: Abdomen is flat. Bowel sounds  are normal.      Palpations: Abdomen is soft.      Tenderness: There is no abdominal tenderness.   Musculoskeletal:         General: Normal range of motion.      Cervical back: Normal range of motion and neck supple.   Skin:     General: Skin is warm and dry.   Neurological:      General: No focal deficit present.      Mental Status: He is alert and oriented to person, place, and time.   Psychiatric:         Mood and Affect: Mood normal.         Behavior: Behavior normal.         Thought Content: Thought content normal.         Relevant Results               Assessment/Plan                  Assessment & Plan  NSTEMI (non-ST elevated myocardial infarction) (Multi)  Treated with heparin drip for 48 hr. Now off  Cardiology following- long discussion with Isamar Garcia CNP and patient's son Jorge A. Given newly diagnosed cardiomyopathy, there is discussion underway about appropriate ischemic evaluation. Son has given tentative permission for [facility] if recommended by cardiology team. Awaiting final determination.  Has had no recurrent anginal symptoms.  He is scheduled for cardiac catheterization tomorrow.  Continues on aspirin, statin  Metabolic encephalopathy  Continues. Seemingly from acute MI. MRI with no acute findings. EEG shows moderate encephalopathy. Patient seems to have some slow improvement compared to the end of last week, but still not at baseline (which is interactive, alert and oriented, independent) may have some baseline dementia.  Mental status will be monitored.  Chronic systolic heart failure  New diagnosis of cardiomyopathy- EF 15-20%. Discussion about appropriate ischemic evaluation is still ongoing, see above  Not currently on GDMT due to hypotension-vital signs will be monitored and medications adjusted as indicated.  Idiopathic hypotension  Perhaps related to his cardiomyopathy  Has been started on midodrine though blood pressure remains low, but improved from prior  BPH (benign prostatic  hyperplasia)  He is having no urinary symptoms.  The present treatment is continued.  Severe protein-calorie malnutrition (Multi)  Nutrition following, see below  Anemia of chronic disease  Counts stable  Type 2 diabetes mellitus without complication, without long-term current use of insulin (Multi)  Last A1c is 6.2. appears to be on metformin and glimeperide at home although I am not sure he needs this, especially given his decreased intake/malnutrition. stopping  the glimeperide on discharge to try to prevent any hypoglycemic events.                Dylan Owens MD

## 2024-10-22 NOTE — PROGRESS NOTES
Spiritual Care Visit    Clinical Encounter Type  Visited With: Patient  Routine Visit: Follow-up  Continue Visiting: Yes         Values/Beliefs  Spiritual Requests During Hospitalization: Dat reecived a blessing, while he slept this afternoon.     Demetrio Ray

## 2024-10-22 NOTE — PROGRESS NOTES
Physical Therapy    Physical Therapy Treatment    Patient Name: Dat Mccallum  MRN: [id number]  Department: [facility] NONV1  Room: 12/12-A  Today's Date: 10/22/2024  Time Calculation  Start Time: 0820  Stop Time: 0845  Time Calculation (min): 25 min         Assessment/Plan   PT Assessment  End of Session Communication: Bedside nurse  End of Session Patient Position: Bed, 3 rail up, Alarm on  PT Plan  Inpatient/Swing Bed or Outpatient: Inpatient  PT Plan  Treatment/Interventions: Bed mobility, Transfer training, Gait training, Balance training, Strengthening, Endurance training, Therapeutic exercise, Therapeutic activity  PT Plan: Ongoing PT  PT Frequency: 5 times per week  PT Discharge Recommendations: Moderate intensity level of continued care  PT Recommended Transfer Status: Assist x2  PT - OK to Discharge: Yes (with skilled physical therapy services at next level of care)      General Visit Information:   PT  Visit  PT Received On: 10/22/24  General  Prior to Session Communication: Bedside nurse  Patient Position Received: Bed, 3 rail up, Alarm on  General Comment: Cleared by nursing to be seen for therapy, pt agreeable with tx, supine in bed upon arrival.    Subjective   Precautions:  Precautions  Medical Precautions: Fall precautions  Precautions Comment: Difficulty understanding, pt has dry erase board for communication.    Objective   Pain:  Pain Assessment  Pain Assessment: 0-10  0-10 (Numeric) Pain Score: 0 - No pain    Cognition:  Cognition  Orientation Level:  (Questionable orientation level, pt appeard to follow direction for assist with mobility, however poor ability to follow instructions/comprehend therex.)     Postural Control:  Static Sitting Balance  Static Sitting-Balance Support: Bilateral upper extremity supported  Static Sitting-Level of Assistance: Contact guard  Static Sitting-Comment/Number of Minutes: Intermittent episodes of min assist while sitting on side of bed due to decreased balance  posterior  Static Standing Balance  Static Standing-Balance Support: Bilateral upper extremity supported  Static Standing-Level of Assistance: Moderate assistance  Static Standing-Comment/Number of Minutes: Poor static standing balance requiring mod assist x2     Treatments:  Therapeutic Exercise  Therapeutic Exercise Performed: Yes  Therapeutic Exercise Activity 1: Bilateral ankle pumps x10  Therapeutic Exercise Activity 2: Assisted bilateral hip flexion x10  Therapeutic Exercise Activity 3: Assisted bilateral knee extension x15    Therapeutic Activity  Therapeutic Activity Performed: Yes  Therapeutic Activity 1: Seated EOB x7-8 minutes    Bed Mobility  Bed Mobility: Yes  Bed Mobility 1  Bed Mobility 1: Supine to sitting  Level of Assistance 1: Moderate assistance  Bed Mobility Comments 1: Mod assist for trunk/bilateral LE's during supine to sit, mod assist to scoot EOB with use of draw sheet.  Bed Mobility 2  Bed Mobility  2: Sitting to supine  Level of Assistance 2: Moderate assistance  Bed Mobility Comments 2: Mod assist for bilateral LE's during sit to supine. Dependent to boost HOB.    Ambulation/Gait Training  Ambulation/Gait Training Performed: Yes  Ambulation/Gait Training 1  Surface 1: Level tile  Device 1: No device  Assistance 1: Moderate assistance  Quality of Gait 1: Narrow base of support  Comments/Distance (ft) 1: 3-4 steps to head of bed with bilateral arm in arm mod assist x2. Patient required increased time and effort to weightshift and advance jenelle LE during sidestepping.    Transfers  Transfer: Yes  Transfer 1  Transfer From 1: Sit to  Transfer to 1: Stand  Technique 1: Sit to stand, Stand to sit  Transfer Level of Assistance 1: Moderate assistance, Arm in arm assistance, +2  Trials/Comments 1: Mod assist x2 for trunk up during sit to stand, poor static standing balance, difficulty achieving an erect posture, decreased eccentric control in sitting.    Outcome Measures:  [facility] Basic  Mobility  Turning from your back to your side while in a flat bed without using bedrails: A lot  Moving from lying on your back to sitting on the side of a flat bed without using bedrails: A lot  Moving to and from bed to chair (including a wheelchair): A lot  Standing up from a chair using your arms (e.g. wheelchair or bedside chair): A lot  To walk in hospital room: A lot  Climbing 3-5 steps with railing: Total  Basic Mobility - Total Score: 11      Encounter Problems       Encounter Problems (Active)       Mobility       Bed mobility including supine to sit and sit to supine with supervision. (Progressing)       Start:  10/20/24    Expected End:  11/03/24            Transfers including sit to stand and stand to sit with supervision. (Progressing)       Start:  10/20/24    Expected End:  11/03/24            Ambulate 50 feet with rolling walker and min assist. (Not Progressing)       Start:  10/20/24    Expected End:  11/03/24               Pain - Adult

## 2024-10-22 NOTE — NURSING NOTE
Send iv in the right for cath lab.... informed the patient.... family is aware of his heart cath tomorrow

## 2024-10-22 NOTE — ASSESSMENT & PLAN NOTE
Continues. Seemingly from acute MI. MRI with no acute findings. EEG shows moderate encephalopathy. Patient seems to have some slow improvement compared to the end of last week, but still not at baseline (which is interactive, alert and oriented, independent) may have some baseline dementia.  Mental status will be monitored.

## 2024-10-22 NOTE — PROGRESS NOTES
Occupational Therapy    OT Treatment    Patient Name: Dat Mccallum  MRN: [id number]  Department: [facility] NONV1  Room: 12/12-A  Today's Date: 10/22/2024  Time Calculation  Start Time: 0815  Stop Time: 0838  Time Calculation (min): 23 min        Assessment:  OT Assessment: Tolerated session fairly, demonstrating progression towards POC with increased cues required for task initiation + safety. Pt would benefit from continued skilled OT services to improve strength, balance, and functional tolerance to increase independence with ADL tasks  End of Session Communication: Bedside nurse  End of Session Patient Position: Bed, 3 rail up, Alarm on  OT Assessment Results: Decreased ADL status, Decreased upper extremity strength, Decreased safe judgment during ADL, Decreased cognition, Decreased endurance, Decreased fine motor control, Decreased functional mobility, Decreased gross motor control  Plan:  Treatment Interventions: ADL retraining, Functional transfer training, UE strengthening/ROM, Endurance training, Patient/family training, Cognitive reorientation  OT Frequency: 4 times per week  OT Discharge Recommendations: Moderate intensity level of continued care  OT Recommended Transfer Status: Assist of 2  OT - OK to Discharge: Yes  Treatment Interventions: ADL retraining, Functional transfer training, UE strengthening/ROM, Endurance training, Patient/family training, Cognitive reorientation    Subjective   Previous Visit Info:  OT Last Visit  OT Received On: 10/22/24  General:  General  Prior to Session Communication: Bedside nurse  Patient Position Received: Bed, 3 rail up, Alarm off, not on at start of session  General Comment: Cleared for therapy per RN. Pt supine in bed upon arrival and agreeable to tx  Precautions:  Hearing/Visual Limitations: Profoundly hard of hearing;  use writting or lip reading to communicate  Medical Precautions: Fall precautions    Pain:  Pain Assessment  Pain Assessment: 0-10  0-10 (Numeric)  Pain Score: 0 - No pain    Objective    Cognition:  Cognition  Overall Cognitive Status: Unable to assess (difficult to assess-pt is cooperative and intermittently requiring assist for command following)  Insight: Severe    Functional Standing Tolerance:  Time: 2 min  Functional Standing Tolerance Comments: tolerated stand at EOB ~2 min, having to return to seated d/t poor carryover of commands  Bed Mobility/Transfers: Bed Mobility  Bed Mobility: Yes  Bed Mobility 1  Bed Mobility 1: Supine to sitting  Level of Assistance 1: Moderate assistance  Bed Mobility Comments 1: assist with BLE advancement off EOB and for trunk elevation  Bed Mobility 2  Bed Mobility  2: Sitting to supine  Level of Assistance 2: Minimum assistance  Bed Mobility Comments 2: assist to lift BLE on bed and for trunk lowering    Transfers  Transfer: Yes  Transfer 1  Technique 1: Sit to stand, Stand to sit  Transfer Level of Assistance 1: Moderate assistance, Arm in arm assistance, +2  Trials/Comments 1: assist for trunk elevation and forward weightshifting, poor eccentric lowering to EOB    Functional Mobility:  Functional Mobility  Functional Mobility Performed: Yes  Functional Mobility 1  Assistance 1: Moderate assistance, Moderate verbal cues, Arm in arm assistance (x2)  Comments 1: tolerated taking minimal lateral steps at EOB with arm in arm assist with assist for LLE advancement, demonstrating fair-/poor balance  Sitting Balance:  Dynamic Sitting Balance  Dynamic Sitting-Level of Assistance: Contact guard, Close supervision  Dynamic Sitting-Balance: Forward lean, Reaching across midline  Dynamic Sitting-Comments: fair- balance during BUE exercises seated EOB    Therapy/Activity: Therapeutic Exercise  Therapeutic Exercise Performed: Yes  Therapeutic Exercise Activity 1: participated in AROM/AAROM BUE exercises 4x10 in all planes to improve strength and functional tolerance to increase independence with transfer tasks with cues provided for  proper muscle recruitement. Pt requiring repetitive cues to initiate + complete tasks    Therapeutic Activity  Therapeutic Activity Performed: Yes  Therapeutic Activity 1: tolerated sitting EOB ~12 min during BUE exercises with fair- sitting balance    Outcome Measures:[facility] Daily Activity  Putting on and taking off regular lower body clothing: A lot  Bathing (including washing, rinsing, drying): A lot  Putting on and taking off regular upper body clothing: A little  Toileting, which includes using toilet, bedpan or urinal: Total  Taking care of personal grooming such as brushing teeth: A lot  Eating Meals: A little  Daily Activity - Total Score: 13    Education Documentation  ADL Training, taught by HARMEET Mascorro at 10/22/2024 10:05 AM.  Learner: Patient  Readiness: Acceptance  Method: Explanation  Response: Needs Reinforcement, No Evidence of Learning    Education Comments  No comments found.      Problem: ADL  Goal: Goal 1  Description: Patient will demonstrate improved ADL skills:  Bathing with Min assist with adaptive equipment prn    Grooming with SBA assist       Feeding with supervision assist      UE Dressing with SBA assist           LE Dressing with Min assist with adaptive equipment prn     Toileting with Min assist with adaptive equipment prn      Outcome: Progressing

## 2024-10-22 NOTE — ASSESSMENT & PLAN NOTE
New diagnosis of cardiomyopathy- EF 15-20%. Discussion about appropriate ischemic evaluation is still ongoing, see above  Not currently on GDMT due to hypotension-vital signs will be monitored and medications adjusted as indicated.

## 2024-10-23 ENCOUNTER — PHARMACY VISIT (OUTPATIENT)
Dept: PHARMACY | Facility: CLINIC | Age: 84
End: 2024-10-23
Payer: COMMERCIAL

## 2024-10-23 LAB
ALBUMIN SERPL BCP-MCNC: 2.6 G/DL (ref 3.4–5)
ANION GAP SERPL CALCULATED.3IONS-SCNC: 7 MMOL/L (ref 10–20)
BASOPHILS # BLD AUTO: 0.02 X10*3/UL (ref 0–0.1)
BASOPHILS NFR BLD AUTO: 0.4 %
BUN SERPL-MCNC: 16 MG/DL (ref 6–23)
CALCIUM SERPL-MCNC: 8.4 MG/DL (ref 8.6–10.3)
CHLORIDE SERPL-SCNC: 107 MMOL/L (ref 98–107)
CO2 SERPL-SCNC: 27 MMOL/L (ref 21–32)
CREAT SERPL-MCNC: 1.11 MG/DL (ref 0.5–1.3)
EGFRCR SERPLBLD CKD-EPI 2021: 67 ML/MIN/1.73M*2
EOSINOPHIL # BLD AUTO: 0.39 X10*3/UL (ref 0–0.4)
EOSINOPHIL NFR BLD AUTO: 7.8 %
ERYTHROCYTE [DISTWIDTH] IN BLOOD BY AUTOMATED COUNT: 14.1 % (ref 11.5–14.5)
GLUCOSE BLD MANUAL STRIP-MCNC: 145 MG/DL (ref 74–99)
GLUCOSE BLD MANUAL STRIP-MCNC: 257 MG/DL (ref 74–99)
GLUCOSE BLD MANUAL STRIP-MCNC: 313 MG/DL (ref 74–99)
GLUCOSE SERPL-MCNC: 137 MG/DL (ref 74–99)
HCT VFR BLD AUTO: 28.7 % (ref 41–52)
HGB BLD-MCNC: 9.6 G/DL (ref 13.5–17.5)
IMM GRANULOCYTES # BLD AUTO: 0.01 X10*3/UL (ref 0–0.5)
IMM GRANULOCYTES NFR BLD AUTO: 0.2 % (ref 0–0.9)
LYMPHOCYTES # BLD AUTO: 1.12 X10*3/UL (ref 0.8–3)
LYMPHOCYTES NFR BLD AUTO: 22.5 %
MAGNESIUM SERPL-MCNC: 1.63 MG/DL (ref 1.6–2.4)
MCH RBC QN AUTO: 30.8 PG (ref 26–34)
MCHC RBC AUTO-ENTMCNC: 33.4 G/DL (ref 32–36)
MCV RBC AUTO: 92 FL (ref 80–100)
MONOCYTES # BLD AUTO: 0.48 X10*3/UL (ref 0.05–0.8)
MONOCYTES NFR BLD AUTO: 9.6 %
NEUTROPHILS # BLD AUTO: 2.96 X10*3/UL (ref 1.6–5.5)
NEUTROPHILS NFR BLD AUTO: 59.5 %
NRBC BLD-RTO: 0 /100 WBCS (ref 0–0)
PHOSPHATE SERPL-MCNC: 3.5 MG/DL (ref 2.5–4.9)
PLATELET # BLD AUTO: 166 X10*3/UL (ref 150–450)
POTASSIUM SERPL-SCNC: 4.3 MMOL/L (ref 3.5–5.3)
RBC # BLD AUTO: 3.12 X10*6/UL (ref 4.5–5.9)
SODIUM SERPL-SCNC: 137 MMOL/L (ref 136–145)
WBC # BLD AUTO: 5 X10*3/UL (ref 4.4–11.3)

## 2024-10-23 PROCEDURE — 2500000001 HC RX 250 WO HCPCS SELF ADMINISTERED DRUGS (ALT 637 FOR MEDICARE OP): Performed by: NURSE PRACTITIONER

## 2024-10-23 PROCEDURE — B2151ZZ FLUOROSCOPY OF LEFT HEART USING LOW OSMOLAR CONTRAST: ICD-10-PCS | Performed by: INTERNAL MEDICINE

## 2024-10-23 PROCEDURE — 93458 L HRT ARTERY/VENTRICLE ANGIO: CPT | Performed by: INTERNAL MEDICINE

## 2024-10-23 PROCEDURE — 93018 CV STRESS TEST I&R ONLY: CPT | Performed by: INTERNAL MEDICINE

## 2024-10-23 PROCEDURE — 2500000005 HC RX 250 GENERAL PHARMACY W/O HCPCS: Performed by: INTERNAL MEDICINE

## 2024-10-23 PROCEDURE — 2500000005 HC RX 250 GENERAL PHARMACY W/O HCPCS: Performed by: NURSE PRACTITIONER

## 2024-10-23 PROCEDURE — 2500000004 HC RX 250 GENERAL PHARMACY W/ HCPCS (ALT 636 FOR OP/ED): Performed by: INTERNAL MEDICINE

## 2024-10-23 PROCEDURE — 2550000001 HC RX 255 CONTRASTS: Performed by: INTERNAL MEDICINE

## 2024-10-23 PROCEDURE — 80069 RENAL FUNCTION PANEL: CPT | Performed by: NURSE PRACTITIONER

## 2024-10-23 PROCEDURE — B2111ZZ FLUOROSCOPY OF MULTIPLE CORONARY ARTERIES USING LOW OSMOLAR CONTRAST: ICD-10-PCS | Performed by: INTERNAL MEDICINE

## 2024-10-23 PROCEDURE — 2060000001 HC INTERMEDIATE ICU ROOM DAILY

## 2024-10-23 PROCEDURE — 2500000002 HC RX 250 W HCPCS SELF ADMINISTERED DRUGS (ALT 637 FOR MEDICARE OP, ALT 636 FOR OP/ED): Performed by: NURSE PRACTITIONER

## 2024-10-23 PROCEDURE — 99152 MOD SED SAME PHYS/QHP 5/>YRS: CPT | Performed by: INTERNAL MEDICINE

## 2024-10-23 PROCEDURE — C1894 INTRO/SHEATH, NON-LASER: HCPCS | Performed by: INTERNAL MEDICINE

## 2024-10-23 PROCEDURE — C1760 CLOSURE DEV, VASC: HCPCS | Performed by: INTERNAL MEDICINE

## 2024-10-23 PROCEDURE — 85025 COMPLETE CBC W/AUTO DIFF WBC: CPT | Performed by: NURSE PRACTITIONER

## 2024-10-23 PROCEDURE — 2500000001 HC RX 250 WO HCPCS SELF ADMINISTERED DRUGS (ALT 637 FOR MEDICARE OP): Performed by: INTERNAL MEDICINE

## 2024-10-23 PROCEDURE — 99153 MOD SED SAME PHYS/QHP EA: CPT | Performed by: INTERNAL MEDICINE

## 2024-10-23 PROCEDURE — 4A023N7 MEASUREMENT OF CARDIAC SAMPLING AND PRESSURE, LEFT HEART, PERCUTANEOUS APPROACH: ICD-10-PCS | Performed by: INTERNAL MEDICINE

## 2024-10-23 PROCEDURE — 83735 ASSAY OF MAGNESIUM: CPT | Performed by: NURSE PRACTITIONER

## 2024-10-23 PROCEDURE — 93016 CV STRESS TEST SUPVJ ONLY: CPT | Performed by: INTERNAL MEDICINE

## 2024-10-23 PROCEDURE — G0269 OCCLUSIVE DEVICE IN VEIN ART: HCPCS | Performed by: INTERNAL MEDICINE

## 2024-10-23 PROCEDURE — 82947 ASSAY GLUCOSE BLOOD QUANT: CPT

## 2024-10-23 PROCEDURE — 2500000002 HC RX 250 W HCPCS SELF ADMINISTERED DRUGS (ALT 637 FOR MEDICARE OP, ALT 636 FOR OP/ED): Performed by: INTERNAL MEDICINE

## 2024-10-23 PROCEDURE — 2780000003 HC OR 278 NO HCPCS: Performed by: INTERNAL MEDICINE

## 2024-10-23 PROCEDURE — 2720000007 HC OR 272 NO HCPCS: Performed by: INTERNAL MEDICINE

## 2024-10-23 PROCEDURE — 36415 COLL VENOUS BLD VENIPUNCTURE: CPT | Performed by: NURSE PRACTITIONER

## 2024-10-23 RX ORDER — PRAVASTATIN SODIUM 20 MG/1
20 TABLET ORAL NIGHTLY
Qty: 30 TABLET | Refills: 0 | Status: ON HOLD | OUTPATIENT
Start: 2024-10-23 | End: 2024-11-22

## 2024-10-23 RX ORDER — LOSARTAN POTASSIUM 25 MG/1
25 TABLET ORAL DAILY
Status: DISCONTINUED | OUTPATIENT
Start: 2024-10-23 | End: 2024-10-23

## 2024-10-23 RX ORDER — FENTANYL CITRATE 50 UG/ML
INJECTION, SOLUTION INTRAMUSCULAR; INTRAVENOUS AS NEEDED
Status: DISCONTINUED | OUTPATIENT
Start: 2024-10-23 | End: 2024-10-23 | Stop reason: HOSPADM

## 2024-10-23 RX ORDER — IODIXANOL 320 MG/ML
INJECTION, SOLUTION INTRAVASCULAR AS NEEDED
Status: DISCONTINUED | OUTPATIENT
Start: 2024-10-23 | End: 2024-10-23 | Stop reason: HOSPADM

## 2024-10-23 RX ORDER — LIDOCAINE HYDROCHLORIDE 10 MG/ML
INJECTION, SOLUTION EPIDURAL; INFILTRATION; INTRACAUDAL; PERINEURAL AS NEEDED
Status: DISCONTINUED | OUTPATIENT
Start: 2024-10-23 | End: 2024-10-23 | Stop reason: HOSPADM

## 2024-10-23 RX ORDER — INSULIN LISPRO 100 [IU]/ML
0-5 INJECTION, SOLUTION INTRAVENOUS; SUBCUTANEOUS
Qty: 15 ML | Refills: 0 | Status: SHIPPED | OUTPATIENT
Start: 2024-10-23 | End: 2024-10-31 | Stop reason: WASHOUT

## 2024-10-23 RX ORDER — NAPROXEN SODIUM 220 MG/1
81 TABLET, FILM COATED ORAL DAILY
Status: ON HOLD
Start: 2024-10-24 | End: 2024-11-23

## 2024-10-23 RX ORDER — MIDODRINE HYDROCHLORIDE 5 MG/1
5 TABLET ORAL
Qty: 90 TABLET | Refills: 0 | Status: ON HOLD | OUTPATIENT
Start: 2024-10-23 | End: 2024-11-22

## 2024-10-23 RX ORDER — INSULIN LISPRO 100 [IU]/ML
0-5 INJECTION, SOLUTION INTRAVENOUS; SUBCUTANEOUS
Status: DISCONTINUED | OUTPATIENT
Start: 2024-10-23 | End: 2024-10-31

## 2024-10-23 RX ORDER — AMINOPHYLLINE 25 MG/ML
125 INJECTION, SOLUTION INTRAVENOUS AS NEEDED
Status: DISCONTINUED | OUTPATIENT
Start: 2024-10-23 | End: 2024-11-08 | Stop reason: HOSPADM

## 2024-10-23 RX ORDER — MIDAZOLAM HYDROCHLORIDE 1 MG/ML
INJECTION, SOLUTION INTRAMUSCULAR; INTRAVENOUS AS NEEDED
Status: DISCONTINUED | OUTPATIENT
Start: 2024-10-23 | End: 2024-10-23 | Stop reason: HOSPADM

## 2024-10-23 RX ORDER — PEN NEEDLE, DIABETIC 30 GX3/16"
NEEDLE, DISPOSABLE MISCELLANEOUS
Qty: 100 EACH | Refills: 0 | OUTPATIENT
Start: 2024-10-23

## 2024-10-23 RX ORDER — CARVEDILOL 3.12 MG/1
3.12 TABLET ORAL
Qty: 60 TABLET | Refills: 0 | Status: ON HOLD | OUTPATIENT
Start: 2024-10-23 | End: 2024-11-22

## 2024-10-23 RX ADMIN — ASPIRIN 81 MG CHEWABLE TABLET 81 MG: 81 TABLET CHEWABLE at 13:38

## 2024-10-23 RX ADMIN — Medication 21 PERCENT: at 11:44

## 2024-10-23 RX ADMIN — Medication 400 MG: at 13:38

## 2024-10-23 RX ADMIN — TAMSULOSIN HYDROCHLORIDE 0.8 MG: 0.4 CAPSULE ORAL at 21:12

## 2024-10-23 RX ADMIN — MIDODRINE HYDROCHLORIDE 5 MG: 5 TABLET ORAL at 12:15

## 2024-10-23 RX ADMIN — MIDODRINE HYDROCHLORIDE 5 MG: 5 TABLET ORAL at 17:53

## 2024-10-23 RX ADMIN — INSULIN LISPRO 4 UNITS: 100 INJECTION, SOLUTION INTRAVENOUS; SUBCUTANEOUS at 13:15

## 2024-10-23 RX ADMIN — MIDODRINE HYDROCHLORIDE 5 MG: 5 TABLET ORAL at 13:56

## 2024-10-23 RX ADMIN — CARVEDILOL 3.12 MG: 3.12 TABLET, FILM COATED ORAL at 17:53

## 2024-10-23 RX ADMIN — PRAVASTATIN SODIUM 20 MG: 20 TABLET ORAL at 21:12

## 2024-10-23 ASSESSMENT — COGNITIVE AND FUNCTIONAL STATUS - GENERAL
DRESSING REGULAR LOWER BODY CLOTHING: A LOT
EATING MEALS: A LITTLE
WALKING IN HOSPITAL ROOM: A LOT
CLIMB 3 TO 5 STEPS WITH RAILING: A LOT
STANDING UP FROM CHAIR USING ARMS: A LOT
HELP NEEDED FOR BATHING: A LOT
MOVING FROM LYING ON BACK TO SITTING ON SIDE OF FLAT BED WITH BEDRAILS: A LITTLE
STANDING UP FROM CHAIR USING ARMS: A LOT
MOBILITY SCORE: 15
MOVING TO AND FROM BED TO CHAIR: A LITTLE
MOVING TO AND FROM BED TO CHAIR: A LITTLE
DRESSING REGULAR UPPER BODY CLOTHING: A LOT
TOILETING: A LOT
DRESSING REGULAR LOWER BODY CLOTHING: A LOT
PERSONAL GROOMING: A LOT
CLIMB 3 TO 5 STEPS WITH RAILING: A LOT
PERSONAL GROOMING: A LITTLE
DAILY ACTIVITIY SCORE: 13
EATING MEALS: A LITTLE
TURNING FROM BACK TO SIDE WHILE IN FLAT BAD: A LITTLE
TURNING FROM BACK TO SIDE WHILE IN FLAT BAD: A LITTLE
HELP NEEDED FOR BATHING: A LOT
MOVING FROM LYING ON BACK TO SITTING ON SIDE OF FLAT BED WITH BEDRAILS: A LITTLE
DRESSING REGULAR UPPER BODY CLOTHING: A LOT
DAILY ACTIVITIY SCORE: 14
TOILETING: A LOT
MOBILITY SCORE: 15
WALKING IN HOSPITAL ROOM: A LOT

## 2024-10-23 ASSESSMENT — PAIN - FUNCTIONAL ASSESSMENT
PAIN_FUNCTIONAL_ASSESSMENT: FLACC (FACE, LEGS, ACTIVITY, CRY, CONSOLABILITY)
PAIN_FUNCTIONAL_ASSESSMENT: 0-10
PAIN_FUNCTIONAL_ASSESSMENT: FLACC (FACE, LEGS, ACTIVITY, CRY, CONSOLABILITY)
PAIN_FUNCTIONAL_ASSESSMENT: 0-10
PAIN_FUNCTIONAL_ASSESSMENT: FLACC (FACE, LEGS, ACTIVITY, CRY, CONSOLABILITY)
PAIN_FUNCTIONAL_ASSESSMENT: FLACC (FACE, LEGS, ACTIVITY, CRY, CONSOLABILITY)
PAIN_FUNCTIONAL_ASSESSMENT: 0-10
PAIN_FUNCTIONAL_ASSESSMENT: FLACC (FACE, LEGS, ACTIVITY, CRY, CONSOLABILITY)
PAIN_FUNCTIONAL_ASSESSMENT: FLACC (FACE, LEGS, ACTIVITY, CRY, CONSOLABILITY)
PAIN_FUNCTIONAL_ASSESSMENT: 0-10
PAIN_FUNCTIONAL_ASSESSMENT: FLACC (FACE, LEGS, ACTIVITY, CRY, CONSOLABILITY)
PAIN_FUNCTIONAL_ASSESSMENT: 0-10

## 2024-10-23 ASSESSMENT — PAIN SCALES - GENERAL

## 2024-10-23 NOTE — PROGRESS NOTES
Nutrition Follow up Note    Nutrition Assessment      Patient off floor for cardiac cath at time of visit. PO intake remains minimal most meals. Nutritional supplements already provided TID. Will continue to follow and monitor nutrition needs.    Nutrition History:     Energy Intake: Poor < 50 %  Food Allergies/Intolerances:  None  GI Symptoms: None  Oral Problems: Oral aversion    Anthropometrics:  Ht: 182.9 cm (6'), Wt: 60.9 kg (134 lb 4.8 oz), BMI: 18.21  IBW/kg (Dietitian Calculated): 80.9 kg  Percent of IBW: 81 %     Weight Change:  Daily Weight  10/23/24 : 60.9 kg (134 lb 4.8 oz)  02/27/24 : 68.9 kg (152 lb)  08/10/23 : 70.3 kg (155 lb)  01/03/22 : 76.7 kg (169 lb)  10/21/21 : 77.6 kg (171 lb)  07/21/21 : 76.3 kg (168 lb 4 oz)  02/26/21 : 68.9 kg (152 lb)  12/29/20 : 68.6 kg (151 lb 3.2 oz)  12/03/20 : 62.6 kg (138 lb)     Nutrition Focused Physical Exam Findings:   Subcutaneous Fat Loss  Orbital Fat Pads: Mild-Moderate (slight dark circles and slight hollowing)  Buccal Fat Pads: Mild-Moderate (flat cheeks, minimal bounce)  Triceps: Severe (negligible fat tissue)    Muscle Wasting  Temporalis: Severe (hollowed scooping depression)  Pectoralis (Clavicular Region): Severe (protruding prominent clavicle)  Deltoid/Trapezius: Severe (squared shoulders, acromion process prominent)    Nutrition Significant Labs:  Lab Results   Component Value Date    WBC 5.0 10/23/2024    HGB 9.6 (L) 10/23/2024    HCT 28.7 (L) 10/23/2024     10/23/2024    CHOL 67 02/23/2024    TRIG 129 02/23/2024    HDL 24.5 02/23/2024    ALT 22 10/16/2024    AST 43 (H) 10/16/2024     10/23/2024    K 4.3 10/23/2024     10/23/2024    CREATININE 1.11 10/23/2024    BUN 16 10/23/2024    CO2 27 10/23/2024    TSH 2.84 12/03/2020    INR 1.5 (H) 10/16/2024    HGBA1C 5.3 10/16/2024     Nutrition Specific Medications:  aspirin, 81 mg, oral, Daily  carvedilol, 3.125 mg, oral, BID after meals  [Held by provider] enoxaparin, 40 mg,  subcutaneous, Daily  insulin lispro, 0-5 Units, subcutaneous, TID  losartan, 25 mg, oral, Daily  magnesium oxide, 400 mg, oral, Daily  [Held by provider] metFORMIN, 1,000 mg, oral, BID  midodrine, 5 mg, oral, TID  oxygen, , inhalation, Continuous - 02/gases  pravastatin, 20 mg, oral, Nightly  spironolactone, 12.5 mg, oral, Daily  tamsulosin, 0.8 mg, oral, Nightly         Dietary Orders (From admission, onward)       Start     Ordered    10/23/24 0931  Adult diet Cardiac; 70 gm fat; 2 - 3 grams Sodium  Diet effective now        Question Answer Comment   Diet type Cardiac    Fat restriction: 70 gm fat    Sodium restriction: 2 - 3 grams Sodium        10/23/24 0933    10/21/24 1457  Oral nutritional supplements  Until discontinued        Comments: Chocolate   Question Answer Comment   Deliver with All meals    Select supplement: Sugar Free Mighty Shake        10/21/24 1456    10/21/24 1456  Oral nutritional supplements  Until discontinued        Comments: chocolate   Question Answer Comment   Deliver with All meals    Select supplement: Magic Cup        10/21/24 1456    10/17/24 0053  May Participate in Room Service With Assistance  ( ROOM SERVICE MAY PARTICIPATE WITH ASSISTANCE)  Once        Question:  .  Answer:  Yes    10/17/24 0052                  Estimated Needs:   Estimated Energy Needs  Total Energy Estimated Needs (kCal): 1848 kCal  Total Estimated Energy Need per Day (kCal/kg): 30 kCal/kg  Method for Estimating Needs: Actual Wt    Estimated Protein Needs  Total Protein Estimated Needs (g): 62 g  Total Protein Estimated Needs (g/kg): 1 g/kg  Method for Estimating Needs: Actual Wt    Estimated Fluid Needs  Total Fluid Estimated Needs (mL): 1848 mL  Method for Estimating Needs: 1 mL/kcal      Nutrition Diagnosis   Nutrition Diagnosis:  Malnutrition Diagnosis  Patient has Malnutrition Diagnosis: Yes  Diagnosis Status: Ongoing  Malnutrition Diagnosis: Severe malnutrition related to chronic disease or  condition  As Evidenced by: Moderate to severe subcutaeous fat losses, severe muscle deficits, po intake likely less than estimate energy needs    Nutrition Diagnosis  Patient has Nutrition Diagnosis: Yes  Diagnosis Status (1): Ongoing  Nutrition Diagnosis 1: Inadequate energy intake  Related to (1): decreased ability to consume sufficient energy  As Evidenced by (1): poor po intake     Nutrition Interventions/Recommendations   Nutrition Interventions and Recommendations:    Nutrition Prescription:  Individualized Nutrition Prescription Provided for : 1848 calories, 66 gm protein when diet advances    Nutrition Interventions:   Food and/or Nutrient Delivery Interventions  Interventions: Meals and snacks, Medical food supplement  Meals and Snacks: Fat-modified diet, Mineral-modified diet  Goal: provide per SLP recs  Medical Food Supplement: Modified beverage, Modified food  Goal: mighty shake TID to provide 200 kcals and 7g protein each; magic cup TID to provide 290 kcals and 9g protein each    Education Documentation  No documentation found.         Nutrition Monitoring and Evaluation   Monitoring/Evaluation:   Food/Nutrient Related History Monitoring  Monitoring and Evaluation Plan: Energy intake  Energy Intake: Estimated energy intake  Criteria: pt to consume >/= 75% estimated needs    Body Composition/Growth/Weight History  Monitoring and Evaluation Plan: Weight  Weight: Measured weight  Criteria: pt will maintain/gain wt       Time Spent/Follow-up:   Follow Up  Time Spent (min): 25 minutes  Last Date of Nutrition Visit: 10/23/24  Nutrition Follow-Up Needed?: 5-7 days  Follow up Comment: 10/29/24

## 2024-10-23 NOTE — PROGRESS NOTES
Occupational Therapy                 Therapy Communication Note    Patient Name: Dat Mccallum  MRN: [id number]  Department: [address]  Room: 12/12-A  Today's Date: 10/23/2024     Discipline: Occupational Therapy    Missed Visit Reason: Missed Visit Reason: Other (Comment) (attempt x2-pt seated in bed eating lunch, RN expressing pt BP soft at this time(89/63), will hold)    Missed Time: Attempt    Comment:

## 2024-10-23 NOTE — PROGRESS NOTES
Spiritual Care Visit    Clinical Encounter Type  Visited With: Patient  Routine Visit: Follow-up  Continue Visiting: Yes         Values/Beliefs  Spiritual Requests During Hospitalization: Dat wagner anointed today    Sacramental Encounters  Sacrament of Sick-Anointing: Anointed                             Taxonomy  Intended Effects: Build relationship of care and support, Demonstrate caring and concern    Demetrio Ray

## 2024-10-23 NOTE — PROGRESS NOTES
10/23/24 1554   Discharge Planning   Expected Discharge Disposition SNF     PT recommendation is for moderate intensity rehab.  Spoke with son Jorge A via phone. Jorge A has a list of SNF choices but has not been able to review it for  choices yet.  Son will look at the  list this evening and provide choices in the morning.  Patient will require a precert.     DISCHARGE PLAN IS NOT SECURE.  NEEDS ACCEPTING SNF AND PRECERT.

## 2024-10-23 NOTE — PROGRESS NOTES
Physical Therapy                 Therapy Communication Note    Patient Name: Dat Mccallum  MRN: [id number]  Department: [facility]  Room: [facility] CATH *  Today's Date: 10/23/2024     Discipline: Physical Therapy    Missed Visit Reason: Missed Visit Reason: Other (Comment) (Pt off the floor for heart cath)    Missed Time: Attempt    Comment:

## 2024-10-23 NOTE — POST-PROCEDURE NOTE
Physician Transition of Care Summary  Invasive Cardiovascular Lab    Procedure Date: 10/23/2024  Attending:    * Haja Mckeon - Primary  Resident/Fellow/Other Assistant: Surgeons and Role:  * No surgeons found with a matching role *    Indications:   Pre-op Diagnosis      * NSTEMI (non-ST elevated myocardial infarction) (Multi) [I21.4]     * Chronic systolic heart failure [I50.22]    Post-procedure diagnosis:   Post-op Diagnosis     * NSTEMI (non-ST elevated myocardial infarction) (Multi) [I21.4]     * Chronic systolic heart failure [I50.22]    Procedure(s):   Left Heart Cath  57273 - FL L HRT CATH W/NJX L VENTRICULOGRAPHY IMG S&I        Procedure Findings:   Please see the dictated cardiac catheterization report    Description of the Procedure:   We obtained informed consent with the help of his son Jorge A because the patient is deaf and he was brought to the cardiac catheterization lab with the timeout verified between his computer medical record number and his arm wristband.  Groins were prepped and draped in a sterile fashion and the manage procedure performed or sterile technique 10 cc of 1% lidocaine used to the right groin for local anesthesia and Versed 2 mg and fentanyl 50 mcg for intravenous sedation.  Used single wall micropuncture technique to access the right common femoral artery and micropuncture sheath was inserted, then exchanged over guidewire for a 6 Welsh Burwell sheath.  6 Welsh JR4 JL 4 and pigtail catheters were used and catheters were advanced and withdrawn over guidewire without difficulty at the end of the procedure the sheath was removed and an Angio-Seal closure device was deployed with good hemostasis and he was returned to his telemetry bed in stable condition.    Coronary angiography:    1.  The left main was a large vessel and bifurcated into the LAD and circumflex and was normal    2 the LAD was a large vessel that extends to the apex and was widely patent with CHEYANNE grade III  flow.  The proximal LAD had an eccentric 40% irregular stenosis that was presumed to be the site of previous vessel occlusion with spontaneous recanalization.  The remaining mid and distal LAD and its branches had no significant obstructive disease    3.  The circumflex was a modest nondominant vessel that had no obstructive disease.    4.  The right coronary artery was a dominant vessel that had mild to moderate irregular 40% mid vessel stenosis    Left ventriculogram performed in the VALLES 25 projection showed persistent mild to modest hypokinesis of the mid anterior, anterolateral, apical segments with left ventricular ejection fraction estimated at 40 to 45%.      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  2.  Ischemic cardiomyopathy with residual anterior and anteroapical left ventricular hypokinesis with left ventricular ejection fraction 40 to 45%.    3.  Nuclear stress test 10/22/2024 suggests viability in the anterior and apical segments with left ventricular ejection fraction estimated at 44%.        Complications:   None    Stents/Implants:   Implants       No implant documentation for this case.        6 Croatian Angio-Seal closure device and deployed    Anticoagulation/Antiplatelet Plan:   Aspirin 81 mg daily    Estimated Blood Loss:   10 mL    Anesthesia: Moderate Sedation Anesthesia Staff: No anesthesia staff entered.    Any Specimen(s) Removed:   No specimens collected during this procedure.    Disposition:   Transfer back to his telemetry bed for medication titration for his ischemic cardiomyopathy.      Electronically signed by: Haja Mckeon DO, 10/23/2024 9:29 AM

## 2024-10-23 NOTE — PROGRESS NOTES
Speech-Language Pathology                 Therapy Communication Note    Patient Name: Dat Mccallum  MRN: [id number]  Department: [facility]  Room: 12/12-A  Today's Date: 10/23/2024     Discipline: Speech Language Pathology    Missed Visit Reason:  off floor for heart cath    Missed Time: Attempt    Comment:

## 2024-10-23 NOTE — PROGRESS NOTES
Occupational Therapy                 Therapy Communication Note    Patient Name: Dat Mccallum  MRN: [id number]  Department: [address]  Room: 12/12-A  Today's Date: 10/23/2024     Discipline: Occupational Therapy    Missed Visit Reason: Missed Visit Reason: Other (Comment) (RN requesting the pt rest this a.m. following heart cath. Re-evaluate in p.m.)    Missed Time: Cancel    Comment:

## 2024-10-23 NOTE — DISCHARGE SUMMARY
Discharge Diagnosis  NSTEMI (non-ST elevated myocardial infarction) (Multi)    Issues Requiring Follow-Up  Monitor volume status as outpatient per congestive heart failure protocol.    Discharge Meds     Medication List      START taking these medications     aspirin 81 mg chewable tablet; Chew 1 tablet (81 mg) once daily.; Start   taking on: October 24, 2024   carvedilol 3.125 mg tablet; Commonly known as: Coreg; Take 1 tablet   (3.125 mg) by mouth 2 times a day after meals.   insulin lispro 100 unit/mL injection; Inject 0-5 Units under the skin 3   times daily (morning, midday, late afternoon). Take as directed per   insulin instructions.   midodrine 5 mg tablet; Commonly known as: Proamatine; Take 1 tablet (5   mg) by mouth 3 times daily (morning, midday, late afternoon).   pravastatin 20 mg tablet; Commonly known as: Pravachol; Take 1 tablet   (20 mg) by mouth once daily at bedtime.     CONTINUE taking these medications     glimepiride 4 mg tablet; Commonly known as: Amaryl; Take 1 tablet (4 mg)   by mouth 2 times a day.   metFORMIN 500 mg tablet; Commonly known as: Glucophage; Take 2 tablets   by mouth twice daily   omeprazole 20 mg DR capsule; Commonly known as: PriLOSEC   OneTouch Ultra Test strip; Generic drug: blood sugar diagnostic   tamsulosin 0.4 mg 24 hr capsule; Commonly known as: Flomax; Take 2   capsules (0.8 mg) by mouth once daily at bedtime.     STOP taking these medications     lovastatin 20 mg tablet; Commonly known as: Mevacor       Test Results Pending At Discharge  Pending Labs       No current pending labs.            Hospital Course  This is an 82 yo man who presented to ER with altered mentation. In ER, found to have greatly elevated troponins consistent with NSTEMI. He was admitted to ICU on heparin drip. BP low-normal. Patient's mentation slowly improved. He is deaf which it makes it harder to communicate and assess his mentation. He was on heparin fro 48 hr and heparin drip stopped. He  was started on midodrine. Echo showed EF 15-20% which is new. Transferred out of ICU. Plan for stress test tomorrow. At normal MRI brain and EEG consistent with encephalopathy.  Congestive heart failure protocol initiated on admission with IV diuretic, beta-blocker and ARB.  Patient is euvolemic on discharge with compensated chronic systolic congestive heart failure.  He has had low normal and hypotensive blood pressures.  He cannot tolerate ARB, spironolactone or diuretics at this time.  He is prescribed beta-blocker with hold parameters.  Volume status and hemodynamic status will be monitored as an outpatient and medications adjusted as indicated.  Patient have left heart catheterization today with no intervention indicated and medical treatment recommended.  His present medications are continued on discharge.  The patient has severe debility from his multiple acute and chronic medical problems.  Physical therapy and Occupational Therapy are consulted.  It is recommended that patient be discharged to skilled nursing facility for short-term rehabilitation.  He will be discharged to skilled nursing facility when insurance precertification is completed.    Pertinent Physical Exam At Time of Discharge  Physical Exam  Constitutional:       Appearance: Normal appearance.   HENT:      Head: Normocephalic and atraumatic.      Nose: Nose normal.      Mouth/Throat:      Mouth: Mucous membranes are moist.      Pharynx: Oropharynx is clear.   Eyes:      Extraocular Movements: Extraocular movements intact.      Conjunctiva/sclera: Conjunctivae normal.      Pupils: Pupils are equal, round, and reactive to light.   Cardiovascular:      Rate and Rhythm: Normal rate and regular rhythm.      Pulses: Normal pulses.      Heart sounds: Normal heart sounds.   Pulmonary:      Effort: Pulmonary effort is normal.      Breath sounds: Normal breath sounds.   Abdominal:      General: Abdomen is flat. Bowel sounds are normal.      Palpations:  Abdomen is soft.      Tenderness: There is no abdominal tenderness.   Musculoskeletal:         General: Normal range of motion.      Cervical back: Normal range of motion and neck supple.   Skin:     General: Skin is warm and dry.   Neurological:      General: No focal deficit present.      Mental Status: He is alert and oriented to person, place, and time.   Psychiatric:         Mood and Affect: Mood normal.         Behavior: Behavior normal.         Thought Content: Thought content normal.         Outpatient Follow-Up  Future Appointments   Date Time Provider Department Center   11/7/2024  3:15 PM Fer Vazquez DO [id number]     Discharge time is 45 minutes    Dylan Owens MD

## 2024-10-23 NOTE — NURSING NOTE
Received report from Rg RN, assumed care of patient. Patient sitting up in bed eating dinner tray, may need ST due to some difficulty chewing some foods per day RN. No complaints of pain, right groin site C/D/I, no bleeding. All current needs met, continuing to monitor, call light in reach, bed alarm on.

## 2024-10-23 NOTE — NURSING NOTE
Spoke w/ care coordination. Plan was to discharge patient home, son was not agreeable states his dad needs rehab. Son plans to coordinate choice of skilled nursing facilities to hospital staff tomorrow. Pt will stay overnight for monitoring at this point.

## 2024-10-23 NOTE — NURSING NOTE
Assumed care of patient. Patient is A&Ox3 and is resting in bed. Call light in reach. Patient denies pain. Family is at the bedside. Patient is normal sinus on monitor @ 65bpm.

## 2024-10-23 NOTE — CARE PLAN
The patient's goals for the shift include  rest    The clinical goals for the shift include VSS, neurovascular checks WDL    Over the shift, the patient did not make progress toward the following goals. Barriers to progression include pt deaf/confused. Recommendations to address these barriers include none.

## 2024-10-23 NOTE — PROGRESS NOTES
Physical Therapy                 Therapy Communication Note    Patient Name: Dat Mccallum  MRN: [id number]  Department: [address]  Room: 12/12-A  Today's Date: 10/23/2024     Discipline: Physical Therapy    Missed Visit Reason: Missed Visit Reason: Other (Comment) (RN expressing soft BP at this time(89/63), will hold.)    Missed Time: Cancel    Comment:

## 2024-10-24 PROBLEM — I50.21 ACUTE SYSTOLIC (CONGESTIVE) HEART FAILURE: Status: ACTIVE | Noted: 2024-10-24

## 2024-10-24 LAB
ALBUMIN SERPL BCP-MCNC: 2.8 G/DL (ref 3.4–5)
ANION GAP SERPL CALCULATED.3IONS-SCNC: 8 MMOL/L (ref 10–20)
BASOPHILS # BLD AUTO: 0.03 X10*3/UL (ref 0–0.1)
BASOPHILS NFR BLD AUTO: 0.4 %
BUN SERPL-MCNC: 20 MG/DL (ref 6–23)
CALCIUM SERPL-MCNC: 8.5 MG/DL (ref 8.6–10.3)
CHLORIDE SERPL-SCNC: 104 MMOL/L (ref 98–107)
CO2 SERPL-SCNC: 25 MMOL/L (ref 21–32)
CREAT SERPL-MCNC: 1.08 MG/DL (ref 0.5–1.3)
EGFRCR SERPLBLD CKD-EPI 2021: 69 ML/MIN/1.73M*2
EOSINOPHIL # BLD AUTO: 0.49 X10*3/UL (ref 0–0.4)
EOSINOPHIL NFR BLD AUTO: 7.3 %
ERYTHROCYTE [DISTWIDTH] IN BLOOD BY AUTOMATED COUNT: 14 % (ref 11.5–14.5)
GLUCOSE BLD MANUAL STRIP-MCNC: 144 MG/DL (ref 74–99)
GLUCOSE BLD MANUAL STRIP-MCNC: 168 MG/DL (ref 74–99)
GLUCOSE BLD MANUAL STRIP-MCNC: 193 MG/DL (ref 74–99)
GLUCOSE BLD MANUAL STRIP-MCNC: 245 MG/DL (ref 74–99)
GLUCOSE SERPL-MCNC: 158 MG/DL (ref 74–99)
HCT VFR BLD AUTO: 29.6 % (ref 41–52)
HGB BLD-MCNC: 9.8 G/DL (ref 13.5–17.5)
IMM GRANULOCYTES # BLD AUTO: 0.01 X10*3/UL (ref 0–0.5)
IMM GRANULOCYTES NFR BLD AUTO: 0.1 % (ref 0–0.9)
LYMPHOCYTES # BLD AUTO: 1.41 X10*3/UL (ref 0.8–3)
LYMPHOCYTES NFR BLD AUTO: 21 %
MAGNESIUM SERPL-MCNC: 1.61 MG/DL (ref 1.6–2.4)
MCH RBC QN AUTO: 30.8 PG (ref 26–34)
MCHC RBC AUTO-ENTMCNC: 33.1 G/DL (ref 32–36)
MCV RBC AUTO: 93 FL (ref 80–100)
MONOCYTES # BLD AUTO: 0.56 X10*3/UL (ref 0.05–0.8)
MONOCYTES NFR BLD AUTO: 8.3 %
NEUTROPHILS # BLD AUTO: 4.21 X10*3/UL (ref 1.6–5.5)
NEUTROPHILS NFR BLD AUTO: 62.9 %
NRBC BLD-RTO: 0 /100 WBCS (ref 0–0)
PHOSPHATE SERPL-MCNC: 3.5 MG/DL (ref 2.5–4.9)
PLATELET # BLD AUTO: 181 X10*3/UL (ref 150–450)
POTASSIUM SERPL-SCNC: 4.3 MMOL/L (ref 3.5–5.3)
RBC # BLD AUTO: 3.18 X10*6/UL (ref 4.5–5.9)
SODIUM SERPL-SCNC: 133 MMOL/L (ref 136–145)
WBC # BLD AUTO: 6.7 X10*3/UL (ref 4.4–11.3)

## 2024-10-24 PROCEDURE — 2500000001 HC RX 250 WO HCPCS SELF ADMINISTERED DRUGS (ALT 637 FOR MEDICARE OP): Performed by: INTERNAL MEDICINE

## 2024-10-24 PROCEDURE — 97530 THERAPEUTIC ACTIVITIES: CPT | Mod: GO,CO

## 2024-10-24 PROCEDURE — 97535 SELF CARE MNGMENT TRAINING: CPT | Mod: GO,CO

## 2024-10-24 PROCEDURE — 2500000002 HC RX 250 W HCPCS SELF ADMINISTERED DRUGS (ALT 637 FOR MEDICARE OP, ALT 636 FOR OP/ED): Performed by: INTERNAL MEDICINE

## 2024-10-24 PROCEDURE — 82947 ASSAY GLUCOSE BLOOD QUANT: CPT

## 2024-10-24 PROCEDURE — 2500000001 HC RX 250 WO HCPCS SELF ADMINISTERED DRUGS (ALT 637 FOR MEDICARE OP): Performed by: NURSE PRACTITIONER

## 2024-10-24 PROCEDURE — 36415 COLL VENOUS BLD VENIPUNCTURE: CPT | Performed by: NURSE PRACTITIONER

## 2024-10-24 PROCEDURE — 85025 COMPLETE CBC W/AUTO DIFF WBC: CPT | Performed by: NURSE PRACTITIONER

## 2024-10-24 PROCEDURE — 92526 ORAL FUNCTION THERAPY: CPT | Mod: GN | Performed by: SPEECH-LANGUAGE PATHOLOGIST

## 2024-10-24 PROCEDURE — 2060000001 HC INTERMEDIATE ICU ROOM DAILY

## 2024-10-24 PROCEDURE — 2500000004 HC RX 250 GENERAL PHARMACY W/ HCPCS (ALT 636 FOR OP/ED): Performed by: INTERNAL MEDICINE

## 2024-10-24 PROCEDURE — 83735 ASSAY OF MAGNESIUM: CPT | Performed by: NURSE PRACTITIONER

## 2024-10-24 PROCEDURE — 97530 THERAPEUTIC ACTIVITIES: CPT | Mod: GP | Performed by: PHYSICAL THERAPIST

## 2024-10-24 PROCEDURE — 2500000002 HC RX 250 W HCPCS SELF ADMINISTERED DRUGS (ALT 637 FOR MEDICARE OP, ALT 636 FOR OP/ED): Performed by: NURSE PRACTITIONER

## 2024-10-24 PROCEDURE — 80069 RENAL FUNCTION PANEL: CPT | Performed by: NURSE PRACTITIONER

## 2024-10-24 PROCEDURE — 99232 SBSQ HOSP IP/OBS MODERATE 35: CPT | Performed by: NURSE PRACTITIONER

## 2024-10-24 PROCEDURE — 92507 TX SP LANG VOICE COMM INDIV: CPT | Mod: GN | Performed by: SPEECH-LANGUAGE PATHOLOGIST

## 2024-10-24 RX ORDER — PRAVASTATIN SODIUM 40 MG/1
40 TABLET ORAL NIGHTLY
Status: DISCONTINUED | OUTPATIENT
Start: 2024-10-24 | End: 2024-11-08 | Stop reason: HOSPADM

## 2024-10-24 RX ORDER — PRAVASTATIN SODIUM 40 MG/1
40 TABLET ORAL NIGHTLY
Qty: 30 TABLET | Refills: 2 | Status: ON HOLD | OUTPATIENT
Start: 2024-10-24

## 2024-10-24 RX ADMIN — METFORMIN HYDROCHLORIDE 1000 MG: 1000 TABLET ORAL at 09:19

## 2024-10-24 RX ADMIN — CARVEDILOL 3.12 MG: 3.12 TABLET, FILM COATED ORAL at 09:20

## 2024-10-24 RX ADMIN — MIDODRINE HYDROCHLORIDE 5 MG: 5 TABLET ORAL at 09:20

## 2024-10-24 RX ADMIN — ASPIRIN 81 MG CHEWABLE TABLET 81 MG: 81 TABLET CHEWABLE at 09:20

## 2024-10-24 RX ADMIN — MIDODRINE HYDROCHLORIDE 5 MG: 5 TABLET ORAL at 17:09

## 2024-10-24 RX ADMIN — INSULIN LISPRO 1 UNITS: 100 INJECTION, SOLUTION INTRAVENOUS; SUBCUTANEOUS at 09:19

## 2024-10-24 RX ADMIN — PRAVASTATIN SODIUM 40 MG: 40 TABLET ORAL at 21:57

## 2024-10-24 RX ADMIN — INSULIN LISPRO 2 UNITS: 100 INJECTION, SOLUTION INTRAVENOUS; SUBCUTANEOUS at 13:28

## 2024-10-24 RX ADMIN — TAMSULOSIN HYDROCHLORIDE 0.8 MG: 0.4 CAPSULE ORAL at 21:57

## 2024-10-24 RX ADMIN — ENOXAPARIN SODIUM 40 MG: 40 INJECTION SUBCUTANEOUS at 09:20

## 2024-10-24 RX ADMIN — MIDODRINE HYDROCHLORIDE 5 MG: 5 TABLET ORAL at 13:28

## 2024-10-24 RX ADMIN — METFORMIN HYDROCHLORIDE 1000 MG: 1000 TABLET ORAL at 21:57

## 2024-10-24 RX ADMIN — Medication 400 MG: at 09:20

## 2024-10-24 ASSESSMENT — COGNITIVE AND FUNCTIONAL STATUS - GENERAL
HELP NEEDED FOR BATHING: A LOT
STANDING UP FROM CHAIR USING ARMS: A LOT
MOVING TO AND FROM BED TO CHAIR: A LITTLE
EATING MEALS: A LITTLE
PERSONAL GROOMING: A LITTLE
DRESSING REGULAR UPPER BODY CLOTHING: A LOT
CLIMB 3 TO 5 STEPS WITH RAILING: TOTAL
DAILY ACTIVITIY SCORE: 14
CLIMB 3 TO 5 STEPS WITH RAILING: A LOT
TURNING FROM BACK TO SIDE WHILE IN FLAT BAD: A LITTLE
HELP NEEDED FOR BATHING: A LOT
DRESSING REGULAR LOWER BODY CLOTHING: A LOT
TURNING FROM BACK TO SIDE WHILE IN FLAT BAD: TOTAL
EATING MEALS: A LITTLE
WALKING IN HOSPITAL ROOM: A LOT
STANDING UP FROM CHAIR USING ARMS: A LOT
TOILETING: A LOT
MOVING FROM LYING ON BACK TO SITTING ON SIDE OF FLAT BED WITH BEDRAILS: A LOT
MOVING TO AND FROM BED TO CHAIR: A LOT
DRESSING REGULAR LOWER BODY CLOTHING: A LOT
PERSONAL GROOMING: A LITTLE
DAILY ACTIVITIY SCORE: 15
DRESSING REGULAR UPPER BODY CLOTHING: A LITTLE
MOBILITY SCORE: 15
MOBILITY SCORE: 9
WALKING IN HOSPITAL ROOM: TOTAL
MOVING FROM LYING ON BACK TO SITTING ON SIDE OF FLAT BED WITH BEDRAILS: A LITTLE
TOILETING: A LOT

## 2024-10-24 ASSESSMENT — PAIN SCALES - GENERAL
PAINLEVEL_OUTOF10: 0 - NO PAIN

## 2024-10-24 ASSESSMENT — PAIN - FUNCTIONAL ASSESSMENT
PAIN_FUNCTIONAL_ASSESSMENT: 0-10

## 2024-10-24 ASSESSMENT — ACTIVITIES OF DAILY LIVING (ADL): HOME_MANAGEMENT_TIME_ENTRY: 12

## 2024-10-24 NOTE — PROGRESS NOTES
Physical Therapy    Physical Therapy Treatment    Patient Name: Dat Mccallum  MRN: [id number]  Department: [address]  Room: 12/12-A  Today's Date: 10/24/2024  Time Calculation  Start Time: 1359  Stop Time: 1426  Time Calculation (min): 27 min         Assessment/Plan   PT Assessment  End of Session Communication: Bedside nurse  Assessment Comment: The pt made slow, steady progress toward his functional mobility goals. Pt required moderately increased assist during functional mobility compared to his reported baseline. Pt would benefit from continued skilled PT services for maximizing independence and safety prior to & after discharge (MODERATE intensity).  End of Session Patient Position: Up in chair, Alarm on (call light & tray table within reach)  PT Plan  Inpatient/Swing Bed or Outpatient: Inpatient  PT Plan  Treatment/Interventions: Bed mobility, Transfer training, Gait training, Balance training, Strengthening, Endurance training, Therapeutic exercise, Therapeutic activity  PT Plan: Ongoing PT  PT Frequency: 5 times per week  PT Discharge Recommendations: Moderate intensity level of continued care  PT Recommended Transfer Status: Assist x2  PT - OK to Discharge: Yes (with skilled physical therapy services at next level of care)      General Visit Information:   PT  Visit  PT Received On: 10/24/24  General  Family/Caregiver Present: No  Prior to Session Communication: Bedside nurse  Patient Position Received: Bed, 3 rail up, Alarm on  General Comment: LPN cleared pt for participation. He was received napping and aroused easily to gentle tactile stim.    Subjective   Precautions:  Hearing/Visual Limitations: Profoundly hard of hearing;  use writting or lip reading to communicate. Pt used basic ASL for yes, no & okay. Dry erase board used extensively throughout tx by PT. Pt did not write.    Medical Precautions: Fall precautions    Precautions Comment: citlalli Oneiletry.    Vital Signs (Past 2hrs)        Date/Time  Vitals Session Patient Position Pulse Resp SpO2 BP MAP (mmHg)    10/24/24 1429 Pre PT  --  69  --  --  --  --       Vital Signs Comment: Supine/HOB elevated.     Objective   Pain:  Pain Assessment  0-10 (Numeric) Pain Score: 0 - No pain    Cognition:  Overall Cognitive Status:  (Not formally assessed. Pt able to participate with non-verbal communication. Written instruction and multiople demos for fwd scooting seated in chair via trunk extension technique with poor execution.)     Postural Control:  Static Sitting Balance  Feet supported (close S, </=5 minutes)    Static Standing Balance  BUE supported on FWW at bedside chair (CGA. Tolerated ~90 sec with tactile cues for increased trunk & cervical extension.)    Dynamic Standing Balance  BUE supported on FWW (modA x1 during stand-pivot with pt releasing walker with LUE multiple times. Assist to LUE for positioning and trunk for steadying. No overt threat for LOB).     Activity Tolerance:  Endurance: Tolerated 10 - 20 min exercise/activity with multiple rests    Treatments:  Therapeutic Activity  -See balance above for out of bed/supine positioning to facilitate core strengthening & pulmonary hygiene. Total static & dynamic standing: </=2.5 minutes.  -See above & below for written instruction & demos of functional mobility, as well as cueing throughout tx.    Bed Mobility  Bed Mobility 1: Supine to sitting  Level of Assistance 1: Moderate assistance (greated assist to BLE. Also assisted at pelvis via draw sheet. Written cues for initaition with BLE and visual cues for use of bed rail.)  Bed Mobility Comments 1: HOB elevated </=40° & used L bed rail    Bed Mobility  2: Scooting (fwd while seated EOB)  Level of Assistance 2: Moderate assistance (via draw sheet. Written instruction prior to practice. Pt josh to unweigh pelvis withBUE/BLE supported but not translate fwd.)    Transfers  Technique 1: Sit to stand  Transfer Device 1: Walker (FWW; EOB or bilateral  "armrests)  Transfer Level of Assistance 1: Moderate assistance (for lifting torque and aligning COG with MARGOTH. Also required assist to attain BUE start position. At bedside chair, assist for increased bilat knee flex prior to initiation.)  Trials/Comments 1: x1 trial at each surface    Technique 2: Stand to sit  Transfer Device 2: Walker (FWW; unilateral or bilateral armrests)  Transfer Level of Assistance 2: Moderate assistance (for increased eccentric control and, during 2nd trial, LUE placement on armrest. First trial, pt prematurely released walker with LUE during stand-pivot transfer; assist to guide trunk to center of sittin surface.)  Trials/Comments 2: ; assist    Technique 3: Stand pivot, To left (EOB>bedside chair)  Transfer Device 3: Walker  Transfer Level of Assistance 3: Moderate assistance (for walker management and steadying at trunk. Pt with >/=3 attempts to keep LUE on armrest instead of walker. Maximal tactile cues provided but pt persisted. Visual cues attempted for increases bilateral step width during turning.)    Ambulation/Gait Training  Few steps during stand-pivot transfer. Narrower MARGOTH, significantly decreased bilateral step width(</=2\" translation), slow velocity. Unsteady but not overt threrat for LOB even with pt releasing LUE/hand from walker multiple times.    Outcome Measures:  [facility] Basic Mobility  Turning from your back to your side while in a flat bed without using bedrails: A lot  Moving from lying on your back to sitting on the side of a flat bed without using bedrails: Total  Moving to and from bed to chair (including a wheelchair): A lot  Standing up from a chair using your arms (e.g. wheelchair or bedside chair): A lot  To walk in hospital room: Total  Climbing 3-5 steps with railing: Total  Basic Mobility - Total Score: 9    Education Documentation  Mobility Training, taught by Julia Horton PT at 10/24/2024  2:53 PM.  Learner: Patient  Readiness: Acceptance  Method: " Explanation, Demonstration  Response: Needs Reinforcement  Comment: Pt demo'd some understanding.       Encounter Problems       Encounter Problems (Active)       Mobility       Bed mobility including supine to sit and sit to supine with supervision. (Progressing)       Start:  10/20/24    Expected End:  11/03/24            Transfers including sit to stand and stand to sit with supervision. (Progressing)       Start:  10/20/24    Expected End:  11/03/24            Ambulate 50 feet with rolling walker and min assist. (Not Progressing)       Start:  10/20/24    Expected End:  11/03/24               Pain - Adult

## 2024-10-24 NOTE — ASSESSMENT & PLAN NOTE
He is having no active anginal symptoms.  He is status post left heart catheterization with no intervention indicated and recommended to treat medically.  He will continue treatment with aspirin and beta-blocker.  Plan for further evaluation and treatment per cardiology.

## 2024-10-24 NOTE — PROGRESS NOTES
10/24/24 0946   Discharge Planning   Expected Discharge Disposition SNF     Patient has an active discharge order.  Still need SNF choices from heike Jules. Son is reviewing list.  Call made to son this morning.  No answer,  Message was left. Will require a precert.     UPDATE: 1341 Choices received from heike Jules:  1) [facility] 2) [facility] 3) [facility].  Referrals sent via [facility]. Will need precert once accepting  facility has been established.     DISCHARGE PLAN IS NOT SECURE

## 2024-10-24 NOTE — PROGRESS NOTES
Dat Mccallum is a 83 y.o. male on day 8 of admission presenting with NSTEMI (non-ST elevated myocardial infarction) (Multi).      Subjective   He denies chest pain or shortness of breath.       Objective     Last Recorded Vitals  BP 87/58 (BP Location: Left arm, Patient Position: Sitting)   Pulse 69   Temp 36.1 °C (97 °F) (Temporal)   Resp 12   Wt 62.2 kg (137 lb 2 oz)   SpO2 100%   Intake/Output last 3 Shifts:    Intake/Output Summary (Last 24 hours) at 10/24/2024 1525  Last data filed at 10/24/2024 0928  Gross per 24 hour   Intake 270 ml   Output 900 ml   Net -630 ml       Admission Weight  Weight: 68 kg (150 lb) (10/16/24 2047)    Daily Weight  10/24/24 : 62.2 kg (137 lb 2 oz)    Image Results  Cardiac Cathe[place] [address]             Phone [phone]    Cardiovascular Catheterization Report    Patient Name:     DAT MCCALLUM      Performing Physician:  Virginia Chavez DO  Study Date:       10/23/2024           Verifying Physician:   Virginia Chavez DO  MRN/PID:          [id number]             Cardiologist/Co-Scrub:  Accession#:       [id number]         Ordering Provider:     Virginia CHAVEZ  Date of           1940 / 83      Cardiologist:  Birth/Age:        years  Gender:           M                    Fellow:  Encounter#:       [id number]           Surgeon:       Study: Left Heart Cath       Indications:  DAT MCCALLUM is a 84 year old male who presents with dyslipidemia and an asymptomatic chest pain assessment. Cardiomyopathy, left ventricular dysfunction and NSTE - ACS.  Stress test performed: No. CTA performed: NoShey Calvillo accessed: No. LVEF  Assessed: No.  Cardiac arrest: No.  Cardiac surgical consult:  No.  Cardiovascular Instability: No  Frailty status of patient entering lab: 6 = Moderately frail.       Coronary Angiography:  The coronary circulation is right dominant.     Coronary Angiography Comments:  We obtained informed consent with the help of his son Jorge A because the patient is deaf, and he was brought to the cardiac catheterization lab with the timeout verified between his computer medical record number and his arm wristband. Both groins were prepped and draped in a sterile fashion and the remainder of the procedure performed under sterile technique. 10 cc of 1% lidocaine used to the right groin for local anesthesia and Versed 2 mg and fentanyl 50 mcg for intravenous sedation. Used single wall micropuncture technique to access the right common femoral artery and a micropuncture sheath was inserted, then exchanged over guidewire for a 6 Kinyarwanda Weston sheath. 6 Kinyarwanda JR4 JL 4 and pigtail catheters were used and catheters were advanced and withdrawn over the guidewire without difficulty. At the end of the procedure the sheath was removed and an Angio-Seal closure device was deployed with good hemostasis and he was returned to his telemetry bed in stable condition.    Coronary angiography:    1. The left main was a large vessel and bifurcated into the LAD and circumflex and was normal    2 the LAD was a large vessel that extends to the apex and was widely patent with CHEYANNE grade III flow. The proximal LAD had an eccentric 40-50% irregular stenosis that was presumed to be the site of previous infarct vessel occlusion with spontaneous recanalization. The remaining mid and distal LAD and its branches had mild diffuse nonobstructive disease    3. The circumflex was a modest nondominant vessel that gave rise to 3 modest PLV branches and had no obstructive disease.       4. The right coronary artery was a dominant vessel that had mild 30-40% ostial /proximal stenosis and an eccentric 50% mid vessel stenosis,  then gives rise to a modest sized small caliber PDA and small caliber distal RCA without obstruction.    Left ventriculogram performed in the VALLES 30 projection showed persistent modest hypokinesis of the mid anterior, anterolateral segments and severe hypokinesis of the LV apical segment with left ventricular ejection fraction estimated at 40 to 45%.    Impression:  1.40% proximal LAD stenosis suggesting previous vessel occlusion with spontaneous recanalization.    2. 50% mid RCA stenosis.    3. Ischemic cardiomyopathy with residual anterior and anteroapical left ventricular hypokinesis with left ventricular ejection fraction 40 to 45%.    3. Nuclear stress test 10/22/2024 suggests viability in the anterior and apical segments without ischemia and with left ventricular ejection fraction estimated at 44%.          Hemo Personnel:  +----------------+---------+  Name            Duty       +----------------+---------+  Haja Mckeon MD 1  +----------------+---------+       Hemodynamic Pressures:     +----+---------------+----------+-------------+--------------+-------+---------+  Site   Date Time   Phase NameSystolic mmHgDiastolic mmHgED mmHgMean mmHg  +----+---------------+----------+-------------+--------------+-------+---------+    AO     10/23/2024  AIR REST          116            57              81           8:56:33 AM                                                       +----+---------------+----------+-------------+--------------+-------+---------+    LV     10/23/2024  AIR REST          103             2      8                    9:07:45 AM                                                       +----+---------------+----------+-------------+--------------+-------+---------+    LV     10/23/2024  AIR REST          109            -2      7                    9:07:57 AM                                                        +----+---------------+----------+-------------+--------------+-------+---------+   LVp     10/23/2024  AIR REST          106             0     12                    9:08:06 AM                                                       +----+---------------+----------+-------------+--------------+-------+---------+   AOp     10/23/2024  AIR REST          120            52              76           9:08:16 AM                                                       +----+---------------+----------+-------------+--------------+-------+---------+    AO     10/23/2024  AIR REST            0             0             -96           9:32:52 AM                                                       +----+---------------+----------+-------------+--------------+-------+---------+       Cardiac Cath Post Procedure Notes:  Post Procedure Diagnosis: Double vessel disease.  Blood Loss:               Estimated blood loss during the procedure was 10ml                            mls.  Specimens Removed:        Number of specimen(s) removed: none.       ICD 10 Codes:  Non ST elevation (NSTEMI) myocardial infarction-I21.4     CPT Codes:  Left Heart Cath (visualization of coronaries) and LV-84392     [id number] Haja Mckeon DO  Performing Physician  Electronically signed by [id number] Haja Mckeon DO on 10/24/2024 at 9:06:38 AM         ** Final **      Physical Exam  Constitutional:       Appearance: Normal appearance.   HENT:      Head: Normocephalic and atraumatic.      Nose: Nose normal.      Mouth/Throat:      Mouth: Mucous membranes are moist.      Pharynx: Oropharynx is clear.   Eyes:      Extraocular Movements: Extraocular movements intact.      Conjunctiva/sclera: Conjunctivae normal.      Pupils: Pupils are equal, round, and reactive to light.   Cardiovascular:      Rate and Rhythm: Normal rate and regular rhythm.      Pulses: Normal pulses.      Heart sounds: Normal heart sounds.   Pulmonary:       Effort: Pulmonary effort is normal.      Breath sounds: Normal breath sounds.   Abdominal:      General: Abdomen is flat. Bowel sounds are normal.      Palpations: Abdomen is soft.      Tenderness: There is no abdominal tenderness.   Musculoskeletal:         General: Normal range of motion.      Cervical back: Normal range of motion and neck supple.   Skin:     General: Skin is warm and dry.   Neurological:      General: No focal deficit present.      Mental Status: He is alert and oriented to person, place, and time.   Psychiatric:         Mood and Affect: Mood normal.         Behavior: Behavior normal.         Thought Content: Thought content normal.         Relevant Results               Assessment/Plan                  Assessment & Plan  NSTEMI (non-ST elevated myocardial infarction) (Multi)  He is having no active anginal symptoms.  He is status post left heart catheterization with no intervention indicated and recommended to treat medically.  He will continue treatment with aspirin and beta-blocker.  Plan for further evaluation and treatment per cardiology.  Metabolic encephalopathy  This is improved with treatment of acute medical problems.  Mental status may be at baseline.  Mental status will be monitored.    Chronic systolic heart failure  He is clinically euvolemic.  He has hypotension and is unable to tolerate ACE ARB or diuretics.  Present treatment with beta-blocker is continued.  He is also treated with amiodarone for hypotension.  Vital signs will be monitored and medications adjusted as indicated.  Idiopathic hypotension  Perhaps related to his cardiomyopathy  Has been started on midodrine though blood pressure remains low, but improved from prior  BPH (benign prostatic hyperplasia)  He is having no urinary symptoms.  The present treatment is continued.  Severe protein-calorie malnutrition (Multi)  Nutrition following, see below  Anemia of chronic disease  Counts stable  Type 2 diabetes mellitus  without complication, without long-term current use of insulin (Multi)  Blood sugar adequately controlled.  Continue present treatment.       There is no change in discharge summary completed yesterday.  He will be discharged to home when insurance precertification is completed.  Discharge time is 40 minutes.         Dylan Owens MD

## 2024-10-24 NOTE — ASSESSMENT & PLAN NOTE
This is improved with treatment of acute medical problems.  Mental status may be at baseline.  Mental status will be monitored.

## 2024-10-24 NOTE — NURSING NOTE
Assumed care of patient.   Patient in bed resting.  Bedside shift report received.  Heart monitor showing NSR with HR of 83.  Call light in reach.

## 2024-10-24 NOTE — PROGRESS NOTES
Speech-Language Pathology    Speech-Language Pathology Clinical Swallow and speech  Treatment    Patient Name: Dat Mccallum  MRN: [id number]  : 1940  Today's Date: 10/24/24  Start Time: 1008  Stop Time: 1057  Time Calculation (min): 49 min    ASSESSMENT  SLP TX Intervention Outcome: Making Progress Towards Goals  Treatment Tolerance: Patient tolerated treatment well    Impressions: Pt is safely tolerating puree and thin liquids without s/s aspiration, moderate oral stage. Pt demon s/s aspiration with alexandro cracker crushed in puree Improved ability to follow commands with cues, remains non verbal able to imitate vowel slound with max cues. Pt is  hearing impaired (pt is deaf) so communication is difficult can read simple words sentences written down, writing is not accurate in response to simple quesitons, pt unable to write name, Pt uses some ASL or head nods to respond. Pt would benefit from ongoing skilled ST to minimize aspiration risk and ensure ongoing safety with the least restrictive diet and skilled speech tx to improve basic communication     PLAN  Is MBSS recommended? SLP will continue to monitor to determine if MBSS is clinically warranted.  Recommended solid consistency: Pureed (IDDSI level 4)  Recommended liquid consistency: Thin (IDDSI 0)  Recommended medication administration: Crushed, in puree  Safe swallow strategies:  Upright positioning for all PO intake  - Slow rate of intake  - Small bites  - Single sips  - cue pt to swallow (visual cue) when holding food/liquids  - Supervision @ meals  Discharge recommendation: Recommend MODERATE intensity ST upon DC in order to ensure safety with least restrictive diet.  Inpatient/Swing Bed or Outpatient: Inpatient  SLP TX Plan: Continue Plan of Care  SLP Plan: Skilled SLP  SLP Frequency: 3x per week     Next Treatment Priority: diet janey, bolus trials, OME, commun strat        SUBJECTIVE  Prior to Session Communication: Bedside nurse  RN cleared pt  to participate in session and reported pt with difficulty chewing , pocketing food, pt able to follow commands with visual cues or writing words on white board, pt sometimes uses ASL to communicate, or nods head, he is mostly non verbal  Respiratory status: Room air  Positioning: Upright in chair  Pt was alert, cooperative, and pleasantly confused for session.    Pain Assessment  Pain Assessment: 0-10  0-10 (Numeric) Pain Score: 0 - No pain     Orientation: Oriented to self  Ability to follow functional commands: Requires cueing to follow simple commands    OBJECTIVE  Therapeutic Swallow  Therapeutic Swallow Intervention : PO Trials, Compensatory Strategies  Swallow Goals:  In 2 weeks...  Pt will consume prescribed diet (current diet is [puree and thin liquids) without overt s/sx aspiration/penetration in 95% of observed trials.   GOAL START: 10/18/2024    GOAL END: 11/1/24   Status: Progressing   Progress this date: Pt consumed the following trials: 4 oz thin liquids via cup (pt preference, declined straw, 2 oz pudding and 2 oz,   pudding with crushed alexandro cracker, Pt edentulous masticaiton slow/prolonged, with moderate residues , which pt cleared with ample time, intermittent oral holding with puree and thin liquids, coughing/choking after crushed alexandro cracker trial. Suspect delayed swallow, visual cues to elicit swallow were effective 70% of the time, No s/s aspiration with puree or thin liquids  Pt will consume trials of upgraded textures without overt s/sx aspiration in order for consideration of diet texture upgrade.  Pt will demonstrate follow-through of trained compensatory strategies during a meal/snack with 90% acc with max cues.   GOAL START: 10/18/2024    GOAL END:11/1/24   Status: Progressing   Progress this date: Pt given max cues (demonstration or words on  whit board) to utilize compensatory strategies during snack with 70% acc  Speech Goals   (start date 10/18/24. Anticipated time frame for goal  attainment: 2 weeks)  Pt will complete low level expressive language tasks with 50% acc given max cues.  Pt is non verbal  Pt will complete low level receptive language tasks with 50% acc given max cues.   GOAL START: 10/18/2024    GOAL END:11/1/24   Status: Progressing   Progress this date: followed one step commands with max cues (words on whiteboard, and demonstration from SLP) with 70% acc, repond to basic yes/no questions with 60% acc    Pt will use multimodal communication to communicate basic wants/needs in 50% of opportunities given max assist.   GOAL START: 10/18/2024    GOAL END:11/1/24   Status: Progressing   Progress this date:  Pt is  hearing impaired (pt is deaf) so communication is difficult can read simple words sentences written down, writing is not accurate in response to simple quesitons, pt unable to write name, Pt uses some ASL or head nods to respond.   Pt will imitate consonants/vowels/single words with 50 acc given max cues.   GOAL START: 10/18/2024    GOAL END: 11/1/24   Status: Progressing   Progress this date: Pt imitated vowel sounds with 90% acc given max cues    Pt will demonstrate orientation x4 given max cues.  Treatment/Education:  Results and recommendations were relayed to: Patient and Bedside nurse  Education provided: Yes   Learner: Patient   Barriers to learning: Cognitive limitations barrier, Communication limitations barrier, and Hearing impairment barrier   Method of teaching: Written and Demonstration   Topic: role of ST, results of assessment, recommended diet modifications, recommended safe swallow strategies, recommendation for dysphagia follow-up, and recommended communication strategies   Outcome of teaching: Unable to demonstrate understanding at this time    Goals:

## 2024-10-24 NOTE — CARE PLAN
Problem: Skin  Goal: Decreased wound size/increased tissue granulation at next dressing change  Outcome: Progressing  Goal: Participates in plan/prevention/treatment measures  Outcome: Progressing  Goal: Prevent/manage excess moisture  Outcome: Progressing  Goal: Prevent/minimize sheer/friction injuries  Outcome: Progressing  Goal: Promote/optimize nutrition  Outcome: Progressing  Goal: Promote skin healing  Outcome: Progressing     Problem: Pain - Adult  Goal: Verbalizes/displays adequate comfort level or baseline comfort level  Outcome: Progressing     Problem: Safety - Adult  Goal: Free from fall injury  Outcome: Progressing     Problem: Discharge Planning  Goal: Discharge to home or other facility with appropriate resources  Outcome: Progressing     Problem: Chronic Conditions and Co-morbidities  Goal: Patient's chronic conditions and co-morbidity symptoms are monitored and maintained or improved  Outcome: Progressing     Problem: Fall/Injury  Goal: Not fall by end of shift  Outcome: Progressing  Goal: Be free from injury by end of the shift  Outcome: Progressing  Goal: Verbalize understanding of personal risk factors for fall in the hospital  Outcome: Progressing  Goal: Verbalize understanding of risk factor reduction measures to prevent injury from fall in the home  Outcome: Progressing  Goal: Use assistive devices by end of the shift  Outcome: Progressing  Goal: Pace activities to prevent fatigue by end of the shift  Outcome: Progressing     Problem: ACS/CP/NSTEMI/STEMI  Goal: Chest pain managed (free from pain or at acceptable level)  Outcome: Progressing  Goal: Lab values return to normal range  Outcome: Progressing  Goal: Promote self management  Outcome: Progressing  Goal: Serial ECG will return to baseline  Outcome: Progressing  Goal: Verbalize understanding of procedures/devices  Outcome: Progressing  Goal: Wean vasopressors/achieve hemodynamic stability  Outcome: Progressing     Problem:  Nutrition  Goal: Less than 5 days NPO/clear liquids  Outcome: Progressing  Goal: Oral intake greater than 50%  Outcome: Progressing  Goal: Oral intake greater 75%  Outcome: Progressing  Goal: Consume prescribed supplement  Outcome: Progressing  Goal: Adequate PO fluid intake  Outcome: Progressing  Goal: Nutrition support goals are met within 48 hrs  Outcome: Progressing  Goal: Nutrition support is meeting 75% of nutrient needs  Outcome: Progressing  Goal: Tube feed tolerance  Outcome: Progressing  Goal: BG  mg/dL  Outcome: Progressing  Goal: Lab values WNL  Outcome: Progressing  Goal: Electrolytes WNL  Outcome: Progressing  Goal: Promote healing  Outcome: Progressing  Goal: Maintain stable weight  Outcome: Progressing  Goal: Reduce weight from edema/fluid  Outcome: Progressing  Goal: Gradual weight gain  Outcome: Progressing  Goal: Improve ostomy output  Outcome: Progressing     Problem: Diabetes  Goal: Maintain glucose levels >70mg/dl to <250mg/dl throughout shift  Outcome: Progressing  Goal: No changes in neurological exam by end of shift  Outcome: Progressing  Goal: Vital signs within normal range for age by end of shift  Outcome: Progressing     Problem: Heart Failure  Goal: Improved gas exchange this shift  Outcome: Progressing  Goal: Improved urinary output this shift  Outcome: Progressing  Goal: Reduction in peripheral edema within 24 hours  Outcome: Progressing  Goal: Report improvement of dyspnea/breathlessness this shift  Outcome: Progressing  Goal: Weight from fluid excess reduced over 2-3 days, then stabilize  Outcome: Progressing  Goal: Increase self care and/or family involvement in 24 hours  Outcome: Progressing   The patient's goals for the shift include      The clinical goals for the shift include no falls this shift.    Over the shift, the patient did not make progress toward the following goals. Barriers to progression include generalized weakness, recent procedure with angioseal to right  groin. Recommendations to address these barriers include PT/OT, bed alarm.

## 2024-10-24 NOTE — ASSESSMENT & PLAN NOTE
He is clinically euvolemic.  He has hypotension and is unable to tolerate ACE ARB or diuretics.  Present treatment with beta-blocker is continued.  He is also treated with amiodarone for hypotension.  Vital signs will be monitored and medications adjusted as indicated.

## 2024-10-24 NOTE — PROGRESS NOTES
Spiritual Care Visit    Clinical Encounter Type  Visited With: Patient  Routine Visit: Follow-up  Continue Visiting: Yes         Values/Beliefs  Spiritual Requests During Hospitalization: Dat received a small piece o Communion with me today.    Sacramental Encounters  Communion: Patient wants communion  Communion Given Indicator: Yes     Demetrio Ray

## 2024-10-24 NOTE — PROGRESS NOTES
Occupational Therapy    OT Treatment    Patient Name: Dat Mccallum  MRN: [id number]  Department: [address]  Room: 12/12-A  Today's Date: 10/24/2024  Time Calculation  Start Time: 0801  Stop Time: 0825  Time Calculation (min): 24 min        Assessment:  OT Assessment: Tolerated session fairly, demonstrating continued progression towards POC with increased cues required d/t hearing loss. Pt would benefit from continued skilled OT services to improve strength, balance, and functional tolerance to increase independence with ADL tasks  End of Session Communication: Bedside nurse  End of Session Patient Position: Up in chair, Alarm on  OT Assessment Results: Decreased ADL status, Decreased upper extremity strength, Decreased safe judgment during ADL, Decreased cognition, Decreased endurance, Decreased fine motor control, Decreased functional mobility, Decreased gross motor control  Plan:  Treatment Interventions: ADL retraining, Functional transfer training, UE strengthening/ROM, Endurance training, Patient/family training, Cognitive reorientation  OT Frequency: 4 times per week  OT Discharge Recommendations: Moderate intensity level of continued care  OT Recommended Transfer Status: Assist of 2  OT - OK to Discharge: Yes  Treatment Interventions: ADL retraining, Functional transfer training, UE strengthening/ROM, Endurance training, Patient/family training, Cognitive reorientation    Subjective   Previous Visit Info:  OT Last Visit  OT Received On: 10/24/24  General:  General  Missed Visit: Yes  Missed Visit Reason: Other (Comment) (attempt x2-pt seated in bed eating lunch, RN expressing pt BP soft at this time(89/63), will hold)  Prior to Session Communication: Bedside nurse  Patient Position Received: Bed, 3 rail up, Alarm on  General Comment: Cleared for therapy per RN. Pt supine in bed upon arrival and agreeable to tx  Precautions:  Hearing/Visual Limitations: Profoundly hard of hearing;  use writting or lip reading  to communicate  Medical Precautions: Fall precautions  Precautions Comment: dry erase board for communication-difficult d/t poor handwriting skills    Pain:  Pain Assessment  Pain Assessment: 0-10  0-10 (Numeric) Pain Score: 0 - No pain    Objective    Cognition:  Cognition  Orientation Level: Unable to assess  Safety/Judgement: Exceptions to WFL  Insight: Moderate    Activities of Daily Living: Feeding  Feeding Level of Assistance: Setup  Feeding Where Assessed: Chair  Feeding Comments: s/u for feeding task at end of session with food placed at appropriate height and cut in bite sized pieces with pt able to initiate task with increased time + effort    Grooming  Grooming Level of Assistance: Setup, Close supervision  Grooming Where Assessed: Chair  Grooming Comments: face washing task with cues for initiation    UE Dressing  UE Dressing Level of Assistance: Minimum assistance  UE Dressing Where Assessed: Edge of bed  UE Dressing Comments: assist to don L arm in gown    Bed Mobility/Transfers: Bed Mobility  Bed Mobility: Yes  Bed Mobility 1  Bed Mobility 1: Supine to sitting  Level of Assistance 1: Moderate assistance  Bed Mobility Comments 1: assist with BLE advancement off EOB and for trunk elevation with cues to scoot hips to EOB, requiring additional assist    Transfers  Transfer: Yes  Transfer 1  Technique 1: Sit to stand, Stand to sit  Transfer Level of Assistance 1: Moderate assistance, Arm in arm assistance  Trials/Comments 1: assist for trunk elevation with cues for proper hand placement, demonstrating decreased eccentric lowering to chair    Functional Mobility:  Functional Mobility  Functional Mobility Performed: Yes  Functional Mobility 1  Assistance 1: Moderate assistance, Moderate verbal cues, Arm in arm assistance  Comments 1: tolerated taking steps to chair with arm in arm assist for balance with cues for postural alignment with pt reaching for chair demonstrating poor balance with forward flexed  posture    Standing Balance:  Static Standing Balance  Static Standing-Level of Assistance: Moderate assistance  Static Standing-Comment/Number of Minutes: poor standing balance during transfer to chair    Therapy/Activity:    Therapeutic Activity  Therapeutic Activity Performed: Yes  Therapeutic Activity 1: increased time required throughout session utilizing dry erase board, attempting to communicate his needs with therapist best supporting to-pt expressing he was hungry with s/u for feeding task.    Outcome Measures:[facility] Daily Activity  Putting on and taking off regular lower body clothing: A lot  Bathing (including washing, rinsing, drying): A lot  Putting on and taking off regular upper body clothing: A little  Toileting, which includes using toilet, bedpan or urinal: A lot  Taking care of personal grooming such as brushing teeth: A little  Eating Meals: A little  Daily Activity - Total Score: 15    Education Documentation  ADL Training, taught by HARMEET Mascorro at 10/24/2024 10:09 AM.  Learner: Patient  Readiness: Acceptance  Method: Explanation  Response: Needs Reinforcement, No Evidence of Learning    Education Comments  No comments found.      Problem: ADL  Goal: Goal 1  Description: Patient will demonstrate improved ADL skills:  Bathing with Min assist with adaptive equipment prn    Grooming with SBA assist       Feeding with supervision assist      UE Dressing with SBA assist           LE Dressing with Min assist with adaptive equipment prn     Toileting with Min assist with adaptive equipment prn      Outcome: Progressing

## 2024-10-24 NOTE — PROGRESS NOTES
Dat Mccallum is a 83 y.o. male on day 8 of admission presenting with NSTEMI (non-ST elevated myocardial infarction) (Multi).    Subjective   Patient seen. Sitting up in chair. No signs of distress.        Objective     Physical Exam  Vitals reviewed.   Constitutional:       General: He is not in acute distress.     Appearance: Normal appearance. He is normal weight. He is not ill-appearing, toxic-appearing or diaphoretic.   HENT:      Head: Normocephalic and atraumatic.   Cardiovascular:      Rate and Rhythm: Regular rhythm.      Pulses: Normal pulses.      Heart sounds: Normal heart sounds.   Pulmonary:      Effort: Pulmonary effort is normal.      Breath sounds: Normal breath sounds.   Abdominal:      General: Abdomen is flat. Bowel sounds are normal.      Palpations: Abdomen is soft.   Musculoskeletal:         General: Normal range of motion.      Right lower leg: No edema.      Left lower leg: No edema.      Comments: Right groin dressing dry and intact without hematoma   Skin:     General: Skin is warm and dry.      Capillary Refill: Capillary refill takes less than 2 seconds.   Neurological:      General: No focal deficit present.      Mental Status: He is alert.   Psychiatric:         Mood and Affect: Mood normal.         Behavior: Behavior normal.         Last Recorded Vitals  Blood pressure 111/76, pulse 78, temperature 36.2 °C (97.2 °F), temperature source Temporal, resp. rate 14, height 1.829 m (6'), weight 62.2 kg (137 lb 2 oz), SpO2 97%.  Intake/Output last 3 Shifts:  I/O last 3 completed shifts:  In: 450 (7.2 mL/kg) [P.O.:450]  Out: 1860 (29.9 mL/kg) [Urine:1850 (0.8 mL/kg/hr); Blood:10]  Weight: 62.2 kg     Relevant Results         Scheduled medications  aspirin, 81 mg, oral, Daily  carvedilol, 3.125 mg, oral, BID after meals  enoxaparin, 40 mg, subcutaneous, Daily  insulin lispro, 0-5 Units, subcutaneous, TID  magnesium oxide, 400 mg, oral, Daily  metFORMIN, 1,000 mg, oral, BID  midodrine, 5 mg,  oral, TID  oxygen, , inhalation, Continuous - 02/gases  pravastatin, 40 mg, oral, Nightly  tamsulosin, 0.8 mg, oral, Nightly      Continuous medications     PRN medications  PRN medications: aminophylline, dextrose, dextrose, glucagon, glucagon, melatonin, ondansetron       Results for orders placed or performed during the hospital encounter of 10/16/24 (from the past 24 hours)   POCT GLUCOSE   Result Value Ref Range    POCT Glucose 313 (H) 74 - 99 mg/dL   POCT GLUCOSE   Result Value Ref Range    POCT Glucose 145 (H) 74 - 99 mg/dL   POCT GLUCOSE   Result Value Ref Range    POCT Glucose 257 (H) 74 - 99 mg/dL   CBC and Auto Differential   Result Value Ref Range    WBC 6.7 4.4 - 11.3 x10*3/uL    nRBC 0.0 0.0 - 0.0 /100 WBCs    RBC 3.18 (L) 4.50 - 5.90 x10*6/uL    Hemoglobin 9.8 (L) 13.5 - 17.5 g/dL    Hematocrit 29.6 (L) 41.0 - 52.0 %    MCV 93 80 - 100 fL    MCH 30.8 26.0 - 34.0 pg    MCHC 33.1 32.0 - 36.0 g/dL    RDW 14.0 11.5 - 14.5 %    Platelets 181 150 - 450 x10*3/uL    Neutrophils % 62.9 40.0 - 80.0 %    Immature Granulocytes %, Automated 0.1 0.0 - 0.9 %    Lymphocytes % 21.0 13.0 - 44.0 %    Monocytes % 8.3 2.0 - 10.0 %    Eosinophils % 7.3 0.0 - 6.0 %    Basophils % 0.4 0.0 - 2.0 %    Neutrophils Absolute 4.21 1.60 - 5.50 x10*3/uL    Immature Granulocytes Absolute, Automated 0.01 0.00 - 0.50 x10*3/uL    Lymphocytes Absolute 1.41 0.80 - 3.00 x10*3/uL    Monocytes Absolute 0.56 0.05 - 0.80 x10*3/uL    Eosinophils Absolute 0.49 (H) 0.00 - 0.40 x10*3/uL    Basophils Absolute 0.03 0.00 - 0.10 x10*3/uL      Cardiac Catheterization Procedure    Result Date: 10/24/2024           [place] [id number]            Phone [phone] Cardiovascular Catheterization Report Patient Name:     AMARILYS FARAH      Performing Physician:  [id number] Haja Mckeon DO Study Date:       10/23/2024           Verifying Physician:    [id number] Jimmie Chavez                                                               DO MRN/PID:          [id number]             Cardiologist/Co-Scrub: Accession#:       [id number]         Ordering Provider:     [id number] JIMMIE CHAVEZ Date of           1940 / 83      Cardiologist: Birth/Age:        years Gender:           M                    Fellow: Encounter#:       [id number]           Surgeon:  Study: Left Heart Cath  Indications: AMARILYS FARAH is a 84 year old male who presents with dyslipidemia and an asymptomatic chest pain assessment. Cardiomyopathy, left ventricular dysfunction and NSTE - ACS. Stress test performed: No. CTA performed: No. Rajinder accessed: No. LVEF Assessed: No. Cardiac arrest: No. Cardiac surgical consult: No. Cardiovascular Instability: No Frailty status of patient entering lab: 6 = Moderately frail.  Coronary Angiography: The coronary circulation is right dominant.  Coronary Angiography Comments: We obtained informed consent with the help of his son Jorge A because the patient is deaf, and he was brought to the cardiac catheterization lab with the timeout verified between his computer medical record number and his arm wristband. Both groins were prepped and draped in a sterile fashion and the remainder of the procedure performed under sterile technique. 10 cc of 1% lidocaine used to the right groin for local anesthesia and Versed 2 mg and fentanyl 50 mcg for intravenous sedation. Used single wall micropuncture technique to access the right common femoral artery and a micropuncture sheath was inserted, then exchanged over guidewire for a 6 [place] sheath. 6 [place] JR4 JL 4 and pigtail catheters were used and catheters were advanced and withdrawn over the guidewire without difficulty. At the end of the procedure the sheath was removed and an Angio-Seal closure device was deployed with good hemostasis and he was returned  to his telemetry bed in stable condition. Coronary angiography: 1. The left main was a large vessel and bifurcated into the LAD and circumflex and was normal 2 the LAD was a large vessel that extends to the apex and was widely patent with CHEYANNE grade III flow. The proximal LAD had an eccentric 40-50% irregular stenosis that was presumed to be the site of previous infarct vessel occlusion with spontaneous recanalization. The remaining mid and distal LAD and its branches had mild diffuse nonobstructive disease 3. The circumflex was a modest nondominant vessel that gave rise to 3 modest PLV branches and had no obstructive disease.  4. The right coronary artery was a dominant vessel that had mild 30-40% ostial /proximal stenosis and an eccentric 50% mid vessel stenosis, then gives rise to a modest sized small caliber PDA and small caliber distal RCA without obstruction. Left ventriculogram performed in the VALLES 30 projection showed persistent modest hypokinesis of the mid anterior, anterolateral segments and severe hypokinesis of the LV apical segment with left ventricular ejection fraction estimated at 40 to 45%. Impression: 1.40% proximal LAD stenosis suggesting previous vessel occlusion with spontaneous recanalization. 2. 50% mid RCA stenosis. 3. Ischemic cardiomyopathy with residual anterior and anteroapical left ventricular hypokinesis with left ventricular ejection fraction 40 to 45%. 3. Nuclear stress test 10/22/2024 suggests viability in the anterior and apical segments without ischemia and with left ventricular ejection fraction estimated at 44%.   Hemo Personnel: +----------------+---------+ Name            Duty      +----------------+---------+ Haja Mckeon MD 1 +----------------+---------+  Hemodynamic Pressures:  +----+---------------+----------+-------------+--------------+-------+---------+ Site   Date Time   Phase NameSystolic mmHgDiastolic mmHgED mmHgMean mmHg  +----+---------------+----------+-------------+--------------+-------+---------+   AO     10/23/2024  AIR REST          116            57              81          8:56:33 AM                                                      +----+---------------+----------+-------------+--------------+-------+---------+   LV     10/23/2024  AIR REST          103             2      8                   9:07:45 AM                                                      +----+---------------+----------+-------------+--------------+-------+---------+   LV     10/23/2024  AIR REST          109            -2      7                   9:07:57 AM                                                      +----+---------------+----------+-------------+--------------+-------+---------+  LVp     10/23/2024  AIR REST          106             0     12                   9:08:06 AM                                                      +----+---------------+----------+-------------+--------------+-------+---------+  AOp     10/23/2024  AIR REST          120            52              76          9:08:16 AM                                                      +----+---------------+----------+-------------+--------------+-------+---------+   AO     10/23/2024  AIR REST            0             0             -96          9:32:52 AM                                                      +----+---------------+----------+-------------+--------------+-------+---------+  Cardiac Cath Post Procedure Notes: Post Procedure Diagnosis: Double vessel disease. Blood Loss:               Estimated blood loss during the procedure was 10ml                           mls. Specimens Removed:        Number of specimen(s) removed: none.  ICD 10 Codes: Non ST elevation (NSTEMI) myocardial infarction-I21.4  CPT Codes: Left Heart Cath (visualization of coronaries) and -75433  [id number] Haja  Linda BAEZ Performing Physician Electronically signed by [id number] Haja Mckeon DO on 10/24/2024 at 9:06:38 AM  ** Final **     Cardiology Interpretation Of Nuclear Stress - See Other Report For Nuclear Portion    Result Date: 10/23/2024           [place] [id number]            Phone [phone] Nuclear Pharmacologic Stress Test Patient Name:      AMARILYS FARAH     Ordering Provider:     [id number] COOPER RATLIFF Study Date:        10/22/2024          Reading Physician:     [id number] Fabien Pelaez MD MRN/PID:           [id number]            Supervising Physician: [id number] Fabien Pelaez MD Accession#:        [id number]        Fellow: Date of Birth/Age: 1940 / 83     Fellow:                    years Gender:            M                   Nurse:                 Inez Holley RN Admit Date:                            Technician:            Marina Feliciano Admission Status:                      Sonographer:           LORRIE Height:            182.88 cm           Technologist: Weight:            62.60 kg            Additional Staff: BSA:               1.82 m2             Encounter#:            [id number] BMI:               18.72 kg/m2         Patient Location: Study Type:    CARDIOLOGY INTERPRETATION OF NUCLEAR STRESS Diagnosis/ICD: NonST elevation (NSTEMI) myocardial infarction-I21.4 Indication:    CARDIOMYOPATHY EF 15-20% CPT Codes:     Stress Test Interpretation-84559; Stress Test Supervision-43879 Falls Risk: Low: Patient has low risk for sustaining a fall; environmental safety interventions in place.  Study Details: Correct procedure and correct patient verified verbally and with                ID Band checked.  Patient History:  Hyperlipidemia and congestive heart failure. NSTEMI, IDIOPATHIC HYPOTENSION.  Medications: ASPIRIN, CARVEDILOL, LOVENOX, PRAVASTATIN, SPIRONOLACTONE, MIDODRINE. The patient took medications as prescribed.  Patient Performance: Patient received a total of 0.4 mg of Regadenoson at 11:41:48 AM. The resting blood pressure was 114/69 mmHg with a heart rate of 81 bpm. The patient developed no symptoms during the stress exam. The blood pressure response was normal. The test was terminated due to: completed lab protocol and end of vasodilator infusion. Patient has met the discharge criteria and is discharged to their floor.  Baseline ECG: Resting ECG showed normal sinus rhythm with nonspecific ST-T wave changes. Artifact.  Stress ECG: Stress ECG showed normal sinus rhythm, with frequent premature ventricular contractions. No ST changes. Essentially negative ECG stress test for ischemia with a Lexiscan infusion. Please get the nuclear imaging report from radiology department.  Stress Stage Data: +----------------+--+------+-------+                 HRSys BPDias BP +----------------+--+------+-------+ Baseline [id number]   69      +----------------+--+------+-------+  Recovery ECG: Recovery ECG showed normal sinus rhythm, with artifact.  +------------+--+------+-------+             HRSys BPDias BP +------------+--+------+-------+ Recovery I  8295    57      +------------+--+------+-------+ Recovery II 7288    51      +------------+--+------+-------+ Recovery [id number]    41      +------------+--+------+-------+ Recovery IV 8592    52      +------------+--+------+-------+ Recovery V  7392    54      +------------+--+------+-------+  Summary:  1. Adequate level of stress achieved.  2. Correlate with myocardial perfusion imaging results.  3. Essentially negative ECG stress test for ischemia with a Lexiscan infusion. Please get the nuclear imaging report from radiology department.  4. No  clinical or electrocardiographic evidence for ischemia at maximal infusion.  5. No ECG changes from baseline.  6. Normal Stress Test.  7. Nuclear image results are reported separately. [id number] Fabien Pelaez MD Electronically signed on 10/23/2024 at 9:39:18 AM   ** Final **     Nuclear Stress Test    Result Date: 10/22/2024  Interpreted By:  Chapito García and Ogievich Taessa STUDY: NUCLEAR STRESS TEST; 10/22/2024 2:18 pm   INDICATION: Signs/Symptoms:Cardiomyopathy, EF 15-20%.   COMPARISON: None.   ACCESSION NUMBER(S): [id number]   ORDERING CLINICIAN: COOPER RATLIFF   TECHNIQUE: DIVISION OF NUCLEAR MEDICINE PHARMACOLOGIC STRESS MYOCARDIAL PERFUSION SCAN, ONE DAY PROTOCOL   The patient received an intravenous dose of  8.8 mCi of Tc-99m Myoview and resting emission tomographic (SPECT) images of the myocardium were acquired. The patient then received an intravenous infusion of 0.4mg regadenoson (Lexiscan)  followed by an additional dose of  25.1 mCi of Tc-99m  Myoview. Stress phase SPECT images of the myocardium were then acquired. These included ECG-gated images to assess and quantify ventricular function.  A low-dose, nondiagnostic regional CT was utilized for attenuation correction purposes.   FINDINGS: Both stress and rest studies demonstrate grossly normal perfusion throughout the left ventricle.   The left ventricle is normal in size.   Gated images demonstrate mild global hypokinesis of the LV wall motion with a post-stress LV EF estimated at 44%.   Attenuation correction CT images demonstrate no gross anatomic abnormalities.       1.  No evidence of inducible ischemia or prior infarction. 2. Left ventricle is normal in size. 3. Mild global hypokinesis of the LV wall motion with a post-stress LV EF estimated at 44%.   I personally reviewed the images/study and I agree with the findings as stated by Viry Up, , PGY-3. This study was interpreted at [facility], [place],  [place].   MACRO: None   Signed by: Chapito García 10/22/2024 3:15 PM Dictation workstation:   [id number]        Assessment/Plan   Assessment & Plan  NSTEMI (non-ST elevated myocardial infarction) (Multi)    Type 2 diabetes mellitus without complication, without long-term current use of insulin (Multi)    BPH (benign prostatic hyperplasia)    Metabolic encephalopathy    Chronic systolic heart failure    Idiopathic hypotension    Severe protein-calorie malnutrition (Multi)    Anemia of chronic disease    Encephalopathy  NSTEMI  SIRS  Stage III chronic kidney disease  Diabetes mellitus type 2        10/17: Whether this is a type I versus type II event it is rather late presenting.  High-sensitivity troponins are markedly elevated but on the downtrend and he has not been complaining of any symptoms concerning for acute myocardial ischemia.  I am going to stop the therapeutic heparin.  His neurologic status in my opinion is of the biggest concern.  Neurology has been asked to evaluate.  Will start with an echocardiogram.  Whether we decide on an early invasive versus ischemia guided strategy will be contingent upon his overall prognosis.  Will continue to follow closely with you.     10/18: His echocardiogram reveals an EF 15-20%, and wall motion abnormalities consistent with an LAD infarct. I had a discussion with Jorge A, his son and POA about early invasive versus Conservative strategy, the risks of the procedure, re-infarction etc. He wishes to talk things over wit his other brother first before making a decision. All things considered he is rather stable. Will add GDMT as BP allows once he is able to swallow. Will await this and a swallow eval prior to getting him on the cath schedule. Will follow.     10/19: Remains in sinus rhythm, with hypotension in the absence of any guideline directed medical therapy for his cardiomyopathy with blood pressures in the 80s systolic.  I will start a trial of low-dose midodrine to  see if we can achieve blood pressures in the 100s range systolic that would allow us to begin guideline directed medical therapy for his cardiomyopathy.     10/20: Breathing easily without chest discomfort, and hypotension has improved to 105/74 this morning but still had intermittent episodes of systolic pressures below 90 earlier this morning.  Will increase his midodrine to 5 mg 3 times daily and reassess his hemodynamics tomorrow and hope to begin guideline directed medical therapy     10/21: Patient remains somewhat confused from his baseline this morning. He is on room air with pulse oximetry of 98%. Patient with improved blood pressures overall, with last recorded at 115/70. Discussed with the patient's son Jorge A ischemic workup via nuclear stress test versus cardiac catheterization. At this time will begin simply with a nuclear stress test as a noninvasive strategy to evaluate for evidence of reversible ischemia. Regarding guideline directed medical therapy, the patient's blood pressures did improve with oral midodrine, so I am going to start him on very low dose carvedilol (3.125 mg daily) and spironolactone (12.5 mg daily) as recommended by my collaborator Dr. Mckeon. Additionally, discussed this patient with Inez Darby NP from electrophysiology services regarding LifeVest vs ICD for his newly diagnosed cardiomyopathy. Given that this is a new diagnosis and the patient is not having significant ectopy on telemetry, will begin with guideline directed therapy and a LifeVest. An echocardiogram can be repeated in three months to evaluate for improvement in his LV function. Will arrange for nuclear stress test to be completed tomorrow. Patient seen and discussed with Dr. Mckeon. We will follow with you.     10/23: Left heart cath by Dr. Mckeon.   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  2.  Ischemic cardiomyopathy with residual anterior  and anteroapical left ventricular hypokinesis with left ventricular ejection fraction 40 to 45%.  3.  Nuclear stress test 10/22/2024 suggests viability in the anterior and apical segments with left ventricular ejection fraction estimated at 44%.    10/24: Patient sitting up in chair. He is deaf. Denies any chest pain. No signs of distress. Right groin site with dressing dry and intact. No bruising noted. BP low-normotensive. Appears to be improved with midodrine. Telemetry showing sinus rhythm with rate in the 80's. Continue current medication regimen. Pravastatin dose has been increased to 40 mg daily. Plan is for SNF at discharge. OK to discharge from cardiology standpoint. Follow up with Dr. Mckeon in 2 weeks.        I spent 30 minutes in the professional and overall care of this patient.      Celeste Womack, MERLENE-CNP

## 2024-10-25 LAB
ALBUMIN SERPL BCP-MCNC: 2.5 G/DL (ref 3.4–5)
ANION GAP SERPL CALCULATED.3IONS-SCNC: 9 MMOL/L (ref 10–20)
BASOPHILS # BLD AUTO: 0.03 X10*3/UL (ref 0–0.1)
BASOPHILS NFR BLD AUTO: 0.5 %
BUN SERPL-MCNC: 21 MG/DL (ref 6–23)
CALCIUM SERPL-MCNC: 8.3 MG/DL (ref 8.6–10.3)
CHLORIDE SERPL-SCNC: 102 MMOL/L (ref 98–107)
CO2 SERPL-SCNC: 26 MMOL/L (ref 21–32)
CREAT SERPL-MCNC: 1.02 MG/DL (ref 0.5–1.3)
EGFRCR SERPLBLD CKD-EPI 2021: 74 ML/MIN/1.73M*2
EOSINOPHIL # BLD AUTO: 0.56 X10*3/UL (ref 0–0.4)
EOSINOPHIL NFR BLD AUTO: 9.2 %
ERYTHROCYTE [DISTWIDTH] IN BLOOD BY AUTOMATED COUNT: 13.8 % (ref 11.5–14.5)
GLUCOSE BLD MANUAL STRIP-MCNC: 171 MG/DL (ref 74–99)
GLUCOSE BLD MANUAL STRIP-MCNC: 191 MG/DL (ref 74–99)
GLUCOSE SERPL-MCNC: 146 MG/DL (ref 74–99)
HCT VFR BLD AUTO: 26.1 % (ref 41–52)
HGB BLD-MCNC: 8.8 G/DL (ref 13.5–17.5)
IMM GRANULOCYTES # BLD AUTO: 0.02 X10*3/UL (ref 0–0.5)
IMM GRANULOCYTES NFR BLD AUTO: 0.3 % (ref 0–0.9)
LYMPHOCYTES # BLD AUTO: 1.47 X10*3/UL (ref 0.8–3)
LYMPHOCYTES NFR BLD AUTO: 24.1 %
MAGNESIUM SERPL-MCNC: 1.47 MG/DL (ref 1.6–2.4)
MCH RBC QN AUTO: 30.3 PG (ref 26–34)
MCHC RBC AUTO-ENTMCNC: 33.7 G/DL (ref 32–36)
MCV RBC AUTO: 90 FL (ref 80–100)
MONOCYTES # BLD AUTO: 0.58 X10*3/UL (ref 0.05–0.8)
MONOCYTES NFR BLD AUTO: 9.5 %
NEUTROPHILS # BLD AUTO: 3.45 X10*3/UL (ref 1.6–5.5)
NEUTROPHILS NFR BLD AUTO: 56.4 %
NRBC BLD-RTO: 0 /100 WBCS (ref 0–0)
PHOSPHATE SERPL-MCNC: 3.1 MG/DL (ref 2.5–4.9)
PLATELET # BLD AUTO: 166 X10*3/UL (ref 150–450)
POTASSIUM SERPL-SCNC: 4.3 MMOL/L (ref 3.5–5.3)
RBC # BLD AUTO: 2.9 X10*6/UL (ref 4.5–5.9)
SODIUM SERPL-SCNC: 133 MMOL/L (ref 136–145)
WBC # BLD AUTO: 6.1 X10*3/UL (ref 4.4–11.3)

## 2024-10-25 PROCEDURE — 2500000002 HC RX 250 W HCPCS SELF ADMINISTERED DRUGS (ALT 637 FOR MEDICARE OP, ALT 636 FOR OP/ED): Performed by: INTERNAL MEDICINE

## 2024-10-25 PROCEDURE — 2500000001 HC RX 250 WO HCPCS SELF ADMINISTERED DRUGS (ALT 637 FOR MEDICARE OP): Performed by: INTERNAL MEDICINE

## 2024-10-25 PROCEDURE — 83735 ASSAY OF MAGNESIUM: CPT | Performed by: NURSE PRACTITIONER

## 2024-10-25 PROCEDURE — 80069 RENAL FUNCTION PANEL: CPT | Performed by: NURSE PRACTITIONER

## 2024-10-25 PROCEDURE — 2500000001 HC RX 250 WO HCPCS SELF ADMINISTERED DRUGS (ALT 637 FOR MEDICARE OP): Performed by: NURSE PRACTITIONER

## 2024-10-25 PROCEDURE — 82947 ASSAY GLUCOSE BLOOD QUANT: CPT

## 2024-10-25 PROCEDURE — 97116 GAIT TRAINING THERAPY: CPT | Mod: GP,CQ

## 2024-10-25 PROCEDURE — 85025 COMPLETE CBC W/AUTO DIFF WBC: CPT | Performed by: NURSE PRACTITIONER

## 2024-10-25 PROCEDURE — 2500000004 HC RX 250 GENERAL PHARMACY W/ HCPCS (ALT 636 FOR OP/ED): Performed by: INTERNAL MEDICINE

## 2024-10-25 PROCEDURE — 2500000005 HC RX 250 GENERAL PHARMACY W/O HCPCS: Performed by: NURSE PRACTITIONER

## 2024-10-25 PROCEDURE — 2060000001 HC INTERMEDIATE ICU ROOM DAILY

## 2024-10-25 PROCEDURE — 99232 SBSQ HOSP IP/OBS MODERATE 35: CPT | Performed by: NURSE PRACTITIONER

## 2024-10-25 PROCEDURE — 97110 THERAPEUTIC EXERCISES: CPT | Mod: GP,CQ

## 2024-10-25 PROCEDURE — 2500000002 HC RX 250 W HCPCS SELF ADMINISTERED DRUGS (ALT 637 FOR MEDICARE OP, ALT 636 FOR OP/ED): Performed by: NURSE PRACTITIONER

## 2024-10-25 PROCEDURE — 97110 THERAPEUTIC EXERCISES: CPT | Mod: GO,CO

## 2024-10-25 PROCEDURE — 97535 SELF CARE MNGMENT TRAINING: CPT | Mod: GO,CO

## 2024-10-25 PROCEDURE — 36415 COLL VENOUS BLD VENIPUNCTURE: CPT | Performed by: NURSE PRACTITIONER

## 2024-10-25 RX ADMIN — CARVEDILOL 3.12 MG: 3.12 TABLET, FILM COATED ORAL at 09:51

## 2024-10-25 RX ADMIN — TAMSULOSIN HYDROCHLORIDE 0.8 MG: 0.4 CAPSULE ORAL at 21:25

## 2024-10-25 RX ADMIN — MIDODRINE HYDROCHLORIDE 5 MG: 5 TABLET ORAL at 12:41

## 2024-10-25 RX ADMIN — INSULIN LISPRO 2 UNITS: 100 INJECTION, SOLUTION INTRAVENOUS; SUBCUTANEOUS at 12:41

## 2024-10-25 RX ADMIN — INSULIN LISPRO 1 UNITS: 100 INJECTION, SOLUTION INTRAVENOUS; SUBCUTANEOUS at 09:51

## 2024-10-25 RX ADMIN — PRAVASTATIN SODIUM 40 MG: 40 TABLET ORAL at 21:25

## 2024-10-25 RX ADMIN — METFORMIN HYDROCHLORIDE 1000 MG: 1000 TABLET ORAL at 21:25

## 2024-10-25 RX ADMIN — Medication 400 MG: at 09:51

## 2024-10-25 RX ADMIN — METFORMIN HYDROCHLORIDE 1000 MG: 1000 TABLET ORAL at 09:51

## 2024-10-25 RX ADMIN — ASPIRIN 81 MG CHEWABLE TABLET 81 MG: 81 TABLET CHEWABLE at 09:51

## 2024-10-25 RX ADMIN — MIDODRINE HYDROCHLORIDE 5 MG: 5 TABLET ORAL at 09:51

## 2024-10-25 RX ADMIN — CARVEDILOL 3.12 MG: 3.12 TABLET, FILM COATED ORAL at 21:25

## 2024-10-25 RX ADMIN — MIDODRINE HYDROCHLORIDE 5 MG: 5 TABLET ORAL at 21:25

## 2024-10-25 RX ADMIN — ENOXAPARIN SODIUM 40 MG: 40 INJECTION SUBCUTANEOUS at 09:51

## 2024-10-25 RX ADMIN — Medication 21 PERCENT: at 09:18

## 2024-10-25 ASSESSMENT — COGNITIVE AND FUNCTIONAL STATUS - GENERAL
HELP NEEDED FOR BATHING: A LOT
EATING MEALS: A LITTLE
EATING MEALS: A LITTLE
STANDING UP FROM CHAIR USING ARMS: A LOT
MOVING FROM LYING ON BACK TO SITTING ON SIDE OF FLAT BED WITH BEDRAILS: A LITTLE
TOILETING: A LOT
TURNING FROM BACK TO SIDE WHILE IN FLAT BAD: A LITTLE
TURNING FROM BACK TO SIDE WHILE IN FLAT BAD: A LITTLE
CLIMB 3 TO 5 STEPS WITH RAILING: A LOT
DRESSING REGULAR UPPER BODY CLOTHING: A LOT
MOVING TO AND FROM BED TO CHAIR: A LOT
MOVING TO AND FROM BED TO CHAIR: A LITTLE
DAILY ACTIVITIY SCORE: 14
MOBILITY SCORE: 11
HELP NEEDED FOR BATHING: A LOT
STANDING UP FROM CHAIR USING ARMS: A LOT
MOVING FROM LYING ON BACK TO SITTING ON SIDE OF FLAT BED WITH BEDRAILS: A LOT
DRESSING REGULAR UPPER BODY CLOTHING: A LOT
PERSONAL GROOMING: A LITTLE
PERSONAL GROOMING: A LITTLE
DRESSING REGULAR UPPER BODY CLOTHING: A LITTLE
WALKING IN HOSPITAL ROOM: A LOT
TOILETING: A LOT
CLIMB 3 TO 5 STEPS WITH RAILING: TOTAL
DRESSING REGULAR LOWER BODY CLOTHING: A LOT
DRESSING REGULAR LOWER BODY CLOTHING: A LOT
HELP NEEDED FOR BATHING: A LOT
PERSONAL GROOMING: A LITTLE
STANDING UP FROM CHAIR USING ARMS: A LOT
WALKING IN HOSPITAL ROOM: A LOT
DAILY ACTIVITIY SCORE: 14
EATING MEALS: A LITTLE
DAILY ACTIVITIY SCORE: 15
MOVING TO AND FROM BED TO CHAIR: A LITTLE
MOBILITY SCORE: 15
MOBILITY SCORE: 15
DRESSING REGULAR LOWER BODY CLOTHING: A LOT
TURNING FROM BACK TO SIDE WHILE IN FLAT BAD: A LOT
CLIMB 3 TO 5 STEPS WITH RAILING: A LOT
TOILETING: A LOT
WALKING IN HOSPITAL ROOM: A LOT
MOVING FROM LYING ON BACK TO SITTING ON SIDE OF FLAT BED WITH BEDRAILS: A LITTLE

## 2024-10-25 ASSESSMENT — PAIN SCALES - GENERAL
PAINLEVEL_OUTOF10: 0 - NO PAIN
PAINLEVEL_OUTOF10: 0 - NO PAIN

## 2024-10-25 ASSESSMENT — ACTIVITIES OF DAILY LIVING (ADL): HOME_MANAGEMENT_TIME_ENTRY: 12

## 2024-10-25 ASSESSMENT — PAIN - FUNCTIONAL ASSESSMENT
PAIN_FUNCTIONAL_ASSESSMENT: 0-10
PAIN_FUNCTIONAL_ASSESSMENT: 0-10

## 2024-10-25 NOTE — PROGRESS NOTES
Occupational Therapy    OT Treatment    Patient Name: Dat Mccallum  MRN: [id number]  Department: [address]  Room: 12/12-A  Today's Date: 10/25/2024  Time Calculation  Start Time: 0810  Stop Time: 0833  Time Calculation (min): 23 min        Assessment:  OT Assessment: Tolerated session well, demonstrating progression towards POC with mod A required for functional tasks. Pt would benefit from continued skilled OT services to improve strength, balance, and functional tolerance to increase independence with ADL tasks  End of Session Communication: Bedside nurse  End of Session Patient Position: Up in chair, Alarm on  OT Assessment Results: Decreased ADL status, Decreased upper extremity strength, Decreased safe judgment during ADL, Decreased cognition, Decreased endurance, Decreased fine motor control, Decreased functional mobility, Decreased gross motor control  Plan:  Treatment Interventions: ADL retraining, Functional transfer training, UE strengthening/ROM, Endurance training, Patient/family training, Cognitive reorientation  OT Frequency: 4 times per week  OT Discharge Recommendations: Moderate intensity level of continued care  OT Recommended Transfer Status: Assist of 2  OT - OK to Discharge: Yes  Treatment Interventions: ADL retraining, Functional transfer training, UE strengthening/ROM, Endurance training, Patient/family training, Cognitive reorientation    Subjective   Previous Visit Info:  OT Last Visit  OT Received On: 10/25/24  General:  General  Co-Treatment: PT  Co-Treatment Reason: partial co-tx for safe pt handling during functional mobility tasks  Prior to Session Communication: Bedside nurse  Patient Position Received: Bed, 3 rail up, Alarm on  General Comment: Cleared for therapy per RN. Pt supine in bed upon arrival and agreeable to tx  Precautions:  Hearing/Visual Limitations: [place] utilizing white board as needed with basic ASL used for yes/no  Medical Precautions: Fall precautions    Pain:  Pain  Assessment  Pain Assessment: 0-10  0-10 (Numeric) Pain Score: 0 - No pain    Objective    Cognition:  Cognition  Cognition Comments: oriented however difficulty communicating this date-pt agreeable to tasks  Insight: Moderate    Activities of Daily Living: Feeding  Feeding Level of Assistance: Setup  Feeding Where Assessed: Chair  Feeding Comments: s/u for feeding task at end of session in chair with assist opening containers with cues to pace task to decrease risk of aspiration    LE Dressing  LE Dressing: Yes  Sock Level of Assistance: Dependent  LE Dressing Where Assessed: Bed level  LE Dressing Comments: assist to don socks    Bed Mobility/Transfers: Bed Mobility  Bed Mobility: Yes  Bed Mobility 1  Bed Mobility 1: Supine to sitting  Level of Assistance 1: Moderate assistance  Bed Mobility Comments 1: assist with BLE advancement off EOB and for trunk elevation with cues to scoot hips to EOB with pt utilizing bed rails for UE support    Transfers  Transfer: Yes  Transfer 1  Technique 1: Sit to stand, Stand to sit  Transfer Device 1: Walker  Transfer Level of Assistance 1: Moderate assistance  Trials/Comments 1: assist for trunk elevation and forward weightshifting with cues for proper hand placement, demonstrating decreased eccentric lowering to chair    Functional Mobility:  Functional Mobility  Functional Mobility Performed: Yes  Functional Mobility 1  Device 1: Rolling walker  Assistance 1: Moderate assistance, Arm in arm assistance  Comments 1: tolerated taking steps to chair with cues for postural alignment  and step taking with pt demonstrating mild forward flexed posture with increased time + effort to complete task     Standing Balance:  Static Standing Balance  Static Standing-Level of Assistance: Moderate assistance  Static Standing-Comment/Number of Minutes: fair-/poor standing balance during transfer task    Therapy/Activity: Therapeutic Exercise  Therapeutic Exercise Performed: Yes  Therapeutic  Exercise Activity 1: participated in AROM/AAROM BUE exercises 4x10 in all planes to improve strength and functional tolerance to increase independence with transfer tasks with cues provided for proper muscle recruitement. Pt requiring intermittent assist to carryover movements with poor BUE strength    Outcome Measures:[facility] Daily Activity  Putting on and taking off regular lower body clothing: A lot  Bathing (including washing, rinsing, drying): A lot  Putting on and taking off regular upper body clothing: A little  Toileting, which includes using toilet, bedpan or urinal: A lot  Taking care of personal grooming such as brushing teeth: A little  Eating Meals: A little  Daily Activity - Total Score: 15    Education Documentation  ADL Training, taught by HARMEET Mascorro at 10/25/2024 10:03 AM.  Learner: Patient  Readiness: Acceptance  Method: Explanation  Response: No Evidence of Learning, Needs Reinforcement    ADL Training, taught by HARMEET Mascorro at 10/24/2024 10:09 AM.  Learner: Patient  Readiness: Acceptance  Method: Explanation  Response: Needs Reinforcement, No Evidence of Learning    Education Comments  No comments found.    Problem: ADL  Goal: Goal 1  Description: Patient will demonstrate improved ADL skills:  Bathing with Min assist with adaptive equipment prn    Grooming with SBA assist       Feeding with supervision assist      UE Dressing with SBA assist           LE Dressing with Min assist with adaptive equipment prn     Toileting with Min assist with adaptive equipment prn      Outcome: Progressing

## 2024-10-25 NOTE — CARE PLAN
Problem: Skin  Goal: Decreased wound size/increased tissue granulation at next dressing change  Outcome: Progressing  Goal: Participates in plan/prevention/treatment measures  Outcome: Progressing  Goal: Prevent/manage excess moisture  Outcome: Progressing  Goal: Prevent/minimize sheer/friction injuries  Outcome: Progressing  Goal: Promote/optimize nutrition  Outcome: Progressing  Goal: Promote skin healing  Outcome: Progressing     Problem: Pain - Adult  Goal: Verbalizes/displays adequate comfort level or baseline comfort level  Outcome: Progressing     Problem: Safety - Adult  Goal: Free from fall injury  Outcome: Progressing     Problem: Discharge Planning  Goal: Discharge to home or other facility with appropriate resources  Outcome: Progressing     Problem: Chronic Conditions and Co-morbidities  Goal: Patient's chronic conditions and co-morbidity symptoms are monitored and maintained or improved  Outcome: Progressing     Problem: Fall/Injury  Goal: Not fall by end of shift  Outcome: Progressing  Goal: Be free from injury by end of the shift  Outcome: Progressing  Goal: Verbalize understanding of personal risk factors for fall in the hospital  Outcome: Progressing  Goal: Verbalize understanding of risk factor reduction measures to prevent injury from fall in the home  Outcome: Progressing  Goal: Use assistive devices by end of the shift  Outcome: Progressing  Goal: Pace activities to prevent fatigue by end of the shift  Outcome: Progressing     Problem: ACS/CP/NSTEMI/STEMI  Goal: Chest pain managed (free from pain or at acceptable level)  Outcome: Progressing  Goal: Lab values return to normal range  Outcome: Progressing  Goal: Promote self management  Outcome: Progressing  Goal: Serial ECG will return to baseline  Outcome: Progressing  Goal: Verbalize understanding of procedures/devices  Outcome: Progressing  Goal: Wean vasopressors/achieve hemodynamic stability  Outcome: Progressing     Problem:  Nutrition  Goal: Less than 5 days NPO/clear liquids  Outcome: Progressing  Goal: Oral intake greater than 50%  Outcome: Progressing  Goal: Oral intake greater 75%  Outcome: Progressing  Goal: Consume prescribed supplement  Outcome: Progressing  Goal: Adequate PO fluid intake  Outcome: Progressing  Goal: Nutrition support goals are met within 48 hrs  Outcome: Progressing  Goal: Nutrition support is meeting 75% of nutrient needs  Outcome: Progressing  Goal: Tube feed tolerance  Outcome: Progressing  Goal: BG  mg/dL  Outcome: Progressing  Goal: Lab values WNL  Outcome: Progressing  Goal: Electrolytes WNL  Outcome: Progressing  Goal: Promote healing  Outcome: Progressing  Goal: Maintain stable weight  Outcome: Progressing  Goal: Reduce weight from edema/fluid  Outcome: Progressing  Goal: Gradual weight gain  Outcome: Progressing  Goal: Improve ostomy output  Outcome: Progressing     Problem: Diabetes  Goal: Maintain glucose levels >70mg/dl to <250mg/dl throughout shift  Outcome: Progressing  Goal: No changes in neurological exam by end of shift  Outcome: Progressing  Goal: Vital signs within normal range for age by end of shift  Outcome: Progressing     Problem: Heart Failure  Goal: Improved gas exchange this shift  Outcome: Progressing  Goal: Improved urinary output this shift  Outcome: Progressing  Goal: Reduction in peripheral edema within 24 hours  Outcome: Progressing  Goal: Report improvement of dyspnea/breathlessness this shift  Outcome: Progressing  Goal: Weight from fluid excess reduced over 2-3 days, then stabilize  Outcome: Progressing  Goal: Increase self care and/or family involvement in 24 hours  Outcome: Progressing   The patient's goals for the shift include      The clinical goals for the shift include No falls this shift.    Over the shift, the patient did not make progress toward the following goals. Barriers to progression include generalized weakness, confusion at times. Recommendations to  address these barriers include bed alarm, PT/OT, ambulate with assistance.

## 2024-10-25 NOTE — PROGRESS NOTES
Physical Therapy    Physical Therapy Treatment    Patient Name: Dat Mccallum  MRN: [id number]  Department: [address]  Room: 12/12-A  Today's Date: 10/25/2024  Time Calculation  Start Time: 0801  Stop Time: 0825  Time Calculation (min): 24 min         Assessment/Plan   PT Assessment  End of Session Communication: Bedside nurse  End of Session Patient Position: Up in chair, Alarm on  PT Plan  Inpatient/Swing Bed or Outpatient: Inpatient  PT Plan  Treatment/Interventions: Bed mobility, Transfer training, Gait training, Balance training, Strengthening, Endurance training, Therapeutic exercise, Therapeutic activity  PT Plan: Ongoing PT  PT Frequency: 5 times per week  PT Discharge Recommendations: Moderate intensity level of continued care  PT Recommended Transfer Status: Assist x2  PT - OK to Discharge: Yes (with skilled physical therapy services at next level of care)      General Visit Information:   PT  Visit  PT Received On: 10/25/24  General  Co-Treatment: OT  Co-Treatment Reason: Partial co-treat for transfer.  Prior to Session Communication: Bedside nurse  Patient Position Received: Bed, 3 rail up, Alarm on  General Comment: Cleared by nursing to be seen for therapy, pt agreeable with tx, supine in bed upon arrival.    Subjective   Precautions:  Precautions  Hearing/Visual Limitations: Very [place]. Patient has a dry erase board for communication.  Medical Precautions: Fall precautions    Objective   Pain:  Pain Assessment  Pain Assessment: 0-10  0-10 (Numeric) Pain Score: 0 - No pain    Cognition:  Cognition  Orientation Level: Unable to assess     Postural Control:  Static Sitting Balance  Static Sitting-Balance Support: Feet supported  Static Sitting-Level of Assistance: Close supervision  Static Sitting-Comment/Number of Minutes: Mild posterior lean, pt able to correct with visual cues.  Static Standing Balance  Static Standing-Balance Support: Bilateral upper extremity supported  Static Standing-Level of  Assistance: Moderate assistance  Static Standing-Comment/Number of Minutes: Poor static standing balance.     Treatments:  Therapeutic Exercise  Therapeutic Exercise Performed: Yes  Therapeutic Exercise Activity 1: Bilateral ankle pumps x15  Therapeutic Exercise Activity 2: Assisted bilateral hip flexion x10  Therapeutic Exercise Activity 3: Assisted bilateral knee extension x10    Bed Mobility  Bed Mobility: Yes  Bed Mobility 1  Bed Mobility 1: Supine to sitting  Level of Assistance 1: Moderate assistance  Bed Mobility Comments 1: Mod assist for trunk during supine to sit, pt required assist to initiate LE's off EOB.    Ambulation/Gait Training  Ambulation/Gait Training Performed: Yes  Ambulation/Gait Training 1  Surface 1: Level tile  Device 1: Rolling walker  Assistance 1: Moderate assistance  Quality of Gait 1: Narrow base of support, Forward flexed posture, Soft knee(s)  Comments/Distance (ft) 1: 3-4 steps (bed to chair) with wheeled walker, requires assist to maneuver walker, poor ability to advance LE's, retropulsive requiring mod assist x2 for balance.    Transfers  Transfer: Yes  Transfer 1  Transfer From 1: Sit to  Transfer to 1: Stand  Technique 1: Sit to stand, Stand to sit  Transfer Device 1: Walker  Transfer Level of Assistance 1: Moderate assistance  Trials/Comments 1: Mod assist for trunk up during sit to stand, cues for proper hand placement, decreased eccentric control in sitting.    Outcome Measures:  [facility] Basic Mobility  Turning from your back to your side while in a flat bed without using bedrails: A lot  Moving from lying on your back to sitting on the side of a flat bed without using bedrails: A lot  Moving to and from bed to chair (including a wheelchair): A lot  Standing up from a chair using your arms (e.g. wheelchair or bedside chair): A lot  To walk in hospital room: A lot  Climbing 3-5 steps with railing: Total  Basic Mobility - Total Score: 11       Encounter Problems       Encounter  Problems (Active)       Mobility       Bed mobility including supine to sit and sit to supine with supervision. (Progressing)       Start:  10/20/24    Expected End:  11/03/24            Transfers including sit to stand and stand to sit with supervision. (Progressing)       Start:  10/20/24    Expected End:  11/03/24            Ambulate 50 feet with rolling walker and min assist. (Progressing)       Start:  10/20/24    Expected End:  11/03/24               Pain - Adult

## 2024-10-25 NOTE — ASSESSMENT & PLAN NOTE
He is euvolemic.  The present medications are continued.  Debility  The patient has severe debility from his multiple acute and chronic medical problems.  He has been evaluated physical therapy occupational therapy.  It is recommended the patient be discharged to skilled nursing facility for short-term rehabilitation.  He will be discharged to skilled nursing facility when insurance precertification is completed.

## 2024-10-25 NOTE — PROGRESS NOTES
Dat Mccallum is a 83 y.o. male on day 9 of admission presenting with NSTEMI (non-ST elevated myocardial infarction) (Multi).    Subjective   Patient seen. Sitting up in chair eating breakfast. No signs of distress. Denies any chest pain.        Objective     Physical Exam  Vitals reviewed.   Constitutional:       General: He is not in acute distress.     Appearance: Normal appearance. He is normal weight. He is not ill-appearing, toxic-appearing or diaphoretic.   HENT:      Head: Normocephalic and atraumatic.      Nose: Nose normal.      Mouth/Throat:      Mouth: Mucous membranes are moist.   Eyes:      Extraocular Movements: Extraocular movements intact.   Cardiovascular:      Rate and Rhythm: Normal rate and regular rhythm.      Pulses: Normal pulses.      Heart sounds: Normal heart sounds.   Pulmonary:      Effort: Pulmonary effort is normal.      Breath sounds: Normal breath sounds.   Abdominal:      General: Abdomen is flat. Bowel sounds are normal. There is no distension.      Palpations: Abdomen is soft.      Tenderness: There is no abdominal tenderness.   Musculoskeletal:         General: Normal range of motion.      Cervical back: Normal range of motion and neck supple.      Right lower leg: No edema.      Left lower leg: No edema.   Skin:     General: Skin is warm and dry.      Capillary Refill: Capillary refill takes less than 2 seconds.   Neurological:      Mental Status: He is alert. Mental status is at baseline.   Psychiatric:         Mood and Affect: Mood normal.         Behavior: Behavior normal.         Last Recorded Vitals  Blood pressure 109/67, pulse 77, temperature 36.3 °C (97.3 °F), temperature source Temporal, resp. rate 13, height 1.829 m (6'), weight 63.3 kg (139 lb 8.8 oz), SpO2 96%.  Intake/Output last 3 Shifts:  I/O last 3 completed shifts:  In: 370 (5.8 mL/kg) [P.O.:370]  Out: 950 (15 mL/kg) [Urine:950 (0.4 mL/kg/hr)]  Weight: 63.3 kg     Relevant Results         Scheduled  medications  aspirin, 81 mg, oral, Daily  carvedilol, 3.125 mg, oral, BID after meals  enoxaparin, 40 mg, subcutaneous, Daily  insulin lispro, 0-5 Units, subcutaneous, TID  magnesium oxide, 400 mg, oral, Daily  metFORMIN, 1,000 mg, oral, BID  midodrine, 5 mg, oral, TID  oxygen, , inhalation, Continuous - 02/gases  pravastatin, 40 mg, oral, Nightly  tamsulosin, 0.8 mg, oral, Nightly      Continuous medications     PRN medications  PRN medications: aminophylline, dextrose, dextrose, glucagon, glucagon, melatonin, ondansetron       Results for orders placed or performed during the hospital encounter of 10/16/24 (from the past 24 hours)   POCT GLUCOSE   Result Value Ref Range    POCT Glucose 193 (H) 74 - 99 mg/dL   POCT GLUCOSE   Result Value Ref Range    POCT Glucose 245 (H) 74 - 99 mg/dL   POCT GLUCOSE   Result Value Ref Range    POCT Glucose 144 (H) 74 - 99 mg/dL   POCT GLUCOSE   Result Value Ref Range    POCT Glucose 168 (H) 74 - 99 mg/dL   CBC and Auto Differential   Result Value Ref Range    WBC 6.1 4.4 - 11.3 x10*3/uL    nRBC 0.0 0.0 - 0.0 /100 WBCs    RBC 2.90 (L) 4.50 - 5.90 x10*6/uL    Hemoglobin 8.8 (L) 13.5 - 17.5 g/dL    Hematocrit 26.1 (L) 41.0 - 52.0 %    MCV 90 80 - 100 fL    MCH 30.3 26.0 - 34.0 pg    MCHC 33.7 32.0 - 36.0 g/dL    RDW 13.8 11.5 - 14.5 %    Platelets 166 150 - 450 x10*3/uL    Neutrophils % 56.4 40.0 - 80.0 %    Immature Granulocytes %, Automated 0.3 0.0 - 0.9 %    Lymphocytes % 24.1 13.0 - 44.0 %    Monocytes % 9.5 2.0 - 10.0 %    Eosinophils % 9.2 0.0 - 6.0 %    Basophils % 0.5 0.0 - 2.0 %    Neutrophils Absolute 3.45 1.60 - 5.50 x10*3/uL    Immature Granulocytes Absolute, Automated 0.02 0.00 - 0.50 x10*3/uL    Lymphocytes Absolute 1.47 0.80 - 3.00 x10*3/uL    Monocytes Absolute 0.58 0.05 - 0.80 x10*3/uL    Eosinophils Absolute 0.56 (H) 0.00 - 0.40 x10*3/uL    Basophils Absolute 0.03 0.00 - 0.10 x10*3/uL   Magnesium   Result Value Ref Range    Magnesium 1.47 (L) 1.60 - 2.40 mg/dL    Renal function panel   Result Value Ref Range    Glucose 146 (H) 74 - 99 mg/dL    Sodium 133 (L) 136 - 145 mmol/L    Potassium 4.3 3.5 - 5.3 mmol/L    Chloride 102 98 - 107 mmol/L    Bicarbonate 26 21 - 32 mmol/L    Anion Gap 9 (L) 10 - 20 mmol/L    Urea Nitrogen 21 6 - 23 mg/dL    Creatinine 1.02 0.50 - 1.30 mg/dL    eGFR 74 >60 mL/min/1.73m*2    Calcium 8.3 (L) 8.6 - 10.3 mg/dL    Phosphorus 3.1 2.5 - 4.9 mg/dL    Albumin 2.5 (L) 3.4 - 5.0 g/dL      Cardiac Catheterization Procedure    Result Date: 10/24/2024           [place] [address]            Phone [phone] Cardiovascular Catheterization Report Patient Name:     AMARILYS FARAH      Performing Physician:  Virginia Chavez DO Study Date:       10/23/2024           Verifying Physician:   Virginia Chavez DO MRN/PID:          [id number]             Cardiologist/Co-Scrub: Accession#:       [id number]         Ordering Provider:     Virginia CHAVEZ Date of           1940 / 83      Cardiologist: Birth/Age:        years Gender:           M                    Fellow: Encounter#:       [id number]           Surgeon:  Study: Left Heart Cath  Indications: AMARILYS FARAH is a 84 year old male who presents with dyslipidemia and an asymptomatic chest pain assessment. Cardiomyopathy, left ventricular dysfunction and NSTE - ACS. Stress test performed: No. CTA performed: NoShey Calvillo accessed: No. LVEF Assessed: No. Cardiac arrest: No. Cardiac surgical consult: No. Cardiovascular Instability: No Frailty status of patient entering lab: 6 = Moderately frail.  Coronary Angiography: The coronary circulation is right dominant.  Coronary Angiography Comments: We obtained informed consent with the help of his son Jorge A because the  patient is deaf, and he was brought to the cardiac catheterization lab with the timeout verified between his computer medical record number and his arm wristband. Both groins were prepped and draped in a sterile fashion and the remainder of the procedure performed under sterile technique. 10 cc of 1% lidocaine used to the right groin for local anesthesia and Versed 2 mg and fentanyl 50 mcg for intravenous sedation. Used single wall micropuncture technique to access the right common femoral artery and a micropuncture sheath was inserted, then exchanged over guidewire for a 6 [place] sheath. 6 [place] JR4 JL 4 and pigtail catheters were used and catheters were advanced and withdrawn over the guidewire without difficulty. At the end of the procedure the sheath was removed and an Angio-Seal closure device was deployed with good hemostasis and he was returned to his telemetry bed in stable condition. Coronary angiography: 1. The left main was a large vessel and bifurcated into the LAD and circumflex and was normal 2 the LAD was a large vessel that extends to the apex and was widely patent with CHEYANNE grade III flow. The proximal LAD had an eccentric 40-50% irregular stenosis that was presumed to be the site of previous infarct vessel occlusion with spontaneous recanalization. The remaining mid and distal LAD and its branches had mild diffuse nonobstructive disease 3. The circumflex was a modest nondominant vessel that gave rise to 3 modest PLV branches and had no obstructive disease.  4. The right coronary artery was a dominant vessel that had mild 30-40% ostial /proximal stenosis and an eccentric 50% mid vessel stenosis, then gives rise to a modest sized small caliber PDA and small caliber distal RCA without obstruction. Left ventriculogram performed in the VALLES 30 projection showed persistent modest hypokinesis of the mid anterior, anterolateral segments and severe hypokinesis of the LV apical segment with left  ventricular ejection fraction estimated at 40 to 45%. Impression: 1.40% proximal LAD stenosis suggesting previous vessel occlusion with spontaneous recanalization. 2. 50% mid RCA stenosis. 3. Ischemic cardiomyopathy with residual anterior and anteroapical left ventricular hypokinesis with left ventricular ejection fraction 40 to 45%. 3. Nuclear stress test 10/22/2024 suggests viability in the anterior and apical segments without ischemia and with left ventricular ejection fraction estimated at 44%.   Hemo Personnel: +----------------+---------+ Name            Duty      +----------------+---------+ Haja Mckeon MD 1 +----------------+---------+  Hemodynamic Pressures:  +----+---------------+----------+-------------+--------------+-------+---------+ Site   Date Time   Phase NameSystolic mmHgDiastolic mmHgED mmHgMean mmHg +----+---------------+----------+-------------+--------------+-------+---------+   AO     10/23/2024  [facility]          116            57              81          8:56:33 AM                                                      +----+---------------+----------+-------------+--------------+-------+---------+   LV     10/23/2024  [facility]          103             2      8                   9:07:45 AM                                                      +----+---------------+----------+-------------+--------------+-------+---------+   LV     10/23/2024  [facility]          109            -2      7                   9:07:57 AM                                                      +----+---------------+----------+-------------+--------------+-------+---------+  LVp     10/23/2024  [facility]          106             0     12                   9:08:06 AM                                                      +----+---------------+----------+-------------+--------------+-------+---------+  AOp     10/23/2024  [facility]           120            52              76          9:08:16 AM                                                      +----+---------------+----------+-------------+--------------+-------+---------+   AO     10/23/2024  [facility]            0             0             -96          9:32:52 AM                                                      +----+---------------+----------+-------------+--------------+-------+---------+  Cardiac Cath Post Procedure Notes: Post Procedure Diagnosis: Double vessel disease. Blood Loss:               Estimated blood loss during the procedure was 10ml                           mls. Specimens Removed:        Number of specimen(s) removed: none.  ICD 10 Codes: Non ST elevation (NSTEMI) myocardial infarction-I21.4  CPT Codes: Left Heart Cath (visualization of coronaries) and LV-68723  [id number] Haja Mckeon DO Performing Physician Electronically signed by [id number] Haja Mckeon DO on 10/24/2024 at 9:06:38 AM  ** Final **                  Assessment/Plan   Assessment & Plan  NSTEMI (non-ST elevated myocardial infarction) (Multi)    Type 2 diabetes mellitus without complication, without long-term current use of insulin (Multi)    BPH (benign prostatic hyperplasia)    Acute metabolic encephalopathy    Chronic systolic heart failure    Idiopathic hypotension    Severe protein-calorie malnutrition (Multi)    Anemia of chronic disease    Acute systolic (congestive) heart failure    Encephalopathy  NSTEMI  SIRS  Stage III chronic kidney disease  Diabetes mellitus type 2        10/17: Whether this is a type I versus type II event it is rather late presenting.  High-sensitivity troponins are markedly elevated but on the downtrend and he has not been complaining of any symptoms concerning for acute myocardial ischemia.  I am going to stop the therapeutic heparin.  His neurologic status in my opinion is of the biggest concern.  Neurology has been asked to evaluate.  Will start with an  echocardiogram.  Whether we decide on an early invasive versus ischemia guided strategy will be contingent upon his overall prognosis.  Will continue to follow closely with you.     10/18: His echocardiogram reveals an EF 15-20%, and wall motion abnormalities consistent with an LAD infarct. I had a discussion with Jorge A, his son and POA about early invasive versus Conservative strategy, the risks of the procedure, re-infarction etc. He wishes to talk things over wit his other brother first before making a decision. All things considered he is rather stable. Will add GDMT as BP allows once he is able to swallow. Will await this and a swallow eval prior to getting him on the cath schedule. Will follow.     10/19: Remains in sinus rhythm, with hypotension in the absence of any guideline directed medical therapy for his cardiomyopathy with blood pressures in the 80s systolic.  I will start a trial of low-dose midodrine to see if we can achieve blood pressures in the 100s range systolic that would allow us to begin guideline directed medical therapy for his cardiomyopathy.     10/20: Breathing easily without chest discomfort, and hypotension has improved to 105/74 this morning but still had intermittent episodes of systolic pressures below 90 earlier this morning.  Will increase his midodrine to 5 mg 3 times daily and reassess his hemodynamics tomorrow and hope to begin guideline directed medical therapy     10/21: Patient remains somewhat confused from his baseline this morning. He is on room air with pulse oximetry of 98%. Patient with improved blood pressures overall, with last recorded at 115/70. Discussed with the patient's son Jorge A ischemic workup via nuclear stress test versus cardiac catheterization. At this time will begin simply with a nuclear stress test as a noninvasive strategy to evaluate for evidence of reversible ischemia. Regarding guideline directed medical therapy, the patient's blood pressures did  improve with oral midodrine, so I am going to start him on very low dose carvedilol (3.125 mg daily) and spironolactone (12.5 mg daily) as recommended by my collaborator Dr. Mckeon. Additionally, discussed this patient with Inez Darby NP from electrophysiology services regarding LifeVest vs ICD for his newly diagnosed cardiomyopathy. Given that this is a new diagnosis and the patient is not having significant ectopy on telemetry, will begin with guideline directed therapy and a LifeVest. An echocardiogram can be repeated in three months to evaluate for improvement in his LV function. Will arrange for nuclear stress test to be completed tomorrow. Patient seen and discussed with Dr. Mckeon. We will follow with you.      10/23: Left heart cath by Dr. Mckeon.   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  2.  Ischemic cardiomyopathy with residual anterior and anteroapical left ventricular hypokinesis with left ventricular ejection fraction 40 to 45%.  3.  Nuclear stress test 10/22/2024 suggests viability in the anterior and apical segments with left ventricular ejection fraction estimated at 44%.     10/24: Patient sitting up in chair. He is deaf. Denies any chest pain. No signs of distress. Right groin site with dressing dry and intact. No bruising noted. BP low-normotensive. Appears to be improved with midodrine. Telemetry showing sinus rhythm with rate in the 80's. Continue current medication regimen. Pravastatin dose has been increased to 40 mg daily. Plan is for SNF at discharge. OK to discharge from cardiology standpoint. Follow up with Dr. Mckeon in 2 weeks.     10/25: Patient sitting up in chair eating breakfast. No signs of distress. Denies any chest pain. BP low-normotensive. Telemetry with sinus rhythm, rates in the 80's. SpO2 96% on RA. Labs this AM with sodium 133, potassium 4.3, creatinine 1.02, magnesium 1.47, and hemoglobin 8.8.  Continue current medication regimen of ASA, carvedilol, and statin. He has a discharge order in and is awaiting SNF placement. No need for LifeVest at this point with improved EF 44%. Will sign off, please contact us with any questions or requests.        I spent 30 minutes in the professional and overall care of this patient.      Celeste Womack, APRN-CNP

## 2024-10-25 NOTE — PROGRESS NOTES
Dat Mccallum is a 83 y.o. male on day 9 of admission presenting with NSTEMI (non-ST elevated myocardial infarction) (Multi).      Subjective   He denies chest pain or shortness of breath.  Sitting in chair eating breakfast looks comfortable.       Objective     Last Recorded Vitals  /67 (BP Location: Left arm, Patient Position: Lying)   Pulse 77   Temp 36.3 °C (97.3 °F) (Temporal)   Resp 13   Wt 63.3 kg (139 lb 8.8 oz)   SpO2 98%   Intake/Output last 3 Shifts:    Intake/Output Summary (Last 24 hours) at 10/25/2024 1127  Last data filed at 10/25/2024 0918  Gross per 24 hour   Intake 454 ml   Output 300 ml   Net 154 ml       Admission Weight  Weight: 68 kg (150 lb) (10/16/24 2047)    Daily Weight  10/25/24 : 63.3 kg (139 lb 8.8 oz)    Image Results  Cardiac Catheterization [place] [address]             Phone [phone]    Cardiovascular Catheterization Report    Patient Name:     DAT MCCALLUM      Performing Physician:  Virginia Chavez DO  Study Date:       10/23/2024           Verifying Physician:   Vriginia Chavez DO  MRN/PID:          [id number]             Cardiologist/Co-Scrub:  Accession#:       [id number]         Ordering Provider:     Virginia CHAVEZ  Date of           1940 / 83      Cardiologist:  Birth/Age:        years  Gender:           M                    Fellow:  Encounter#:       [id number]           Surgeon:       Study: Left Heart Cath       Indications:  DTA MCCALLUM is a 84 year old male who presents with dyslipidemia and an asymptomatic chest pain assessment. Cardiomyopathy, left ventricular dysfunction and NSTE - ACS.  Stress test performed: No. CTA performed: Denise Calvillo accessed: No. LVEF  Assessed:  No.  Cardiac arrest: No.  Cardiac surgical consult: No.  Cardiovascular Instability: No  Frailty status of patient entering lab: 6 = Moderately frail.       Coronary Angiography:  The coronary circulation is right dominant.     Coronary Angiography Comments:  We obtained informed consent with the help of his son Jorge A because the patient is deaf, and he was brought to the cardiac catheterization lab with the timeout verified between his computer medical record number and his arm wristband. Both groins were prepped and draped in a sterile fashion and the remainder of the procedure performed under sterile technique. 10 cc of 1% lidocaine used to the right groin for local anesthesia and Versed 2 mg and fentanyl 50 mcg for intravenous sedation. Used single wall micropuncture technique to access the right common femoral artery and a micropuncture sheath was inserted, then exchanged over guidewire for a 6 Swazi [place] sheath. 6 Swazi JR4 JL 4 and pigtail catheters were used and catheters were advanced and withdrawn over the guidewire without difficulty. At the end of the procedure the sheath was removed and an Angio-Seal closure device was deployed with good hemostasis and he was returned to his telemetry bed in stable condition.    Coronary angiography:    1. The left main was a large vessel and bifurcated into the LAD and circumflex and was normal    2 the LAD was a large vessel that extends to the apex and was widely patent with CHEYANNE grade III flow. The proximal LAD had an eccentric 40-50% irregular stenosis that was presumed to be the site of previous infarct vessel occlusion with spontaneous recanalization. The remaining mid and distal LAD and its branches had mild diffuse nonobstructive disease    3. The circumflex was a modest nondominant vessel that gave rise to 3 modest PLV branches and had no obstructive disease.       4. The right coronary artery was a dominant vessel that had mild 30-40% ostial /proximal  stenosis and an eccentric 50% mid vessel stenosis, then gives rise to a modest sized small caliber PDA and small caliber distal RCA without obstruction.    Left ventriculogram performed in the VALLES 30 projection showed persistent modest hypokinesis of the mid anterior, anterolateral segments and severe hypokinesis of the LV apical segment with left ventricular ejection fraction estimated at 40 to 45%.    Impression:  1.40% proximal LAD stenosis suggesting previous vessel occlusion with spontaneous recanalization.    2. 50% mid RCA stenosis.    3. Ischemic cardiomyopathy with residual anterior and anteroapical left ventricular hypokinesis with left ventricular ejection fraction 40 to 45%.    3. Nuclear stress test 10/22/2024 suggests viability in the anterior and apical segments without ischemia and with left ventricular ejection fraction estimated at 44%.          Hemo Personnel:  +----------------+---------+  Name            Duty       +----------------+---------+  Haja Mckeon MD 1  +----------------+---------+       Hemodynamic Pressures:     +----+---------------+----------+-------------+--------------+-------+---------+  Site   Date Time   Phase NameSystolic mmHgDiastolic mmHgED mmHgMean mmHg  +----+---------------+----------+-------------+--------------+-------+---------+    AO     10/23/2024  [facility]          116            57              81           8:56:33 AM                                                       +----+---------------+----------+-------------+--------------+-------+---------+    LV     10/23/2024  [facility]          103             2      8                    9:07:45 AM                                                       +----+---------------+----------+-------------+--------------+-------+---------+    LV     10/23/2024  [facility]          109            -2      7                    9:07:57 AM                                                        +----+---------------+----------+-------------+--------------+-------+---------+   LVp     10/23/2024  [facility]          106             0     12                    9:08:06 AM                                                       +----+---------------+----------+-------------+--------------+-------+---------+   AOp     10/23/2024  [facility]          120            52              76           9:08:16 AM                                                       +----+---------------+----------+-------------+--------------+-------+---------+    AO     10/23/2024  [facility]            0             0             -96           9:32:52 AM                                                       +----+---------------+----------+-------------+--------------+-------+---------+       Cardiac Cath Post Procedure Notes:  Post Procedure Diagnosis: Double vessel disease.  Blood Loss:               Estimated blood loss during the procedure was 10ml                            mls.  Specimens Removed:        Number of specimen(s) removed: none.       ICD 10 Codes:  Non ST elevation (NSTEMI) myocardial infarction-I21.4     CPT Codes:  Left Heart Cath (visualization of coronaries) and LV-[id number]     [id number] Haja Mckeon DO  Performing Physician  Electronically signed by [id number] Haja Mckeon DO on 10/24/2024 at 9:06:38 AM         ** Final **      Physical Exam  Constitutional:       Appearance: Normal appearance.   HENT:      Head: Normocephalic and atraumatic.      Nose: Nose normal.      Mouth/Throat:      Mouth: Mucous membranes are moist.      Pharynx: Oropharynx is clear.   Eyes:      Extraocular Movements: Extraocular movements intact.      Conjunctiva/sclera: Conjunctivae normal.      Pupils: Pupils are equal, round, and reactive to light.   Cardiovascular:      Rate and Rhythm: Normal rate and regular rhythm.      Pulses: Normal pulses.      Heart sounds: Normal heart  sounds.   Pulmonary:      Effort: Pulmonary effort is normal.      Breath sounds: Normal breath sounds.   Abdominal:      General: Abdomen is flat. Bowel sounds are normal.      Palpations: Abdomen is soft.      Tenderness: There is no abdominal tenderness.   Musculoskeletal:         General: Normal range of motion.      Cervical back: Normal range of motion and neck supple.   Skin:     General: Skin is warm and dry.   Neurological:      General: No focal deficit present.      Mental Status: He is alert and oriented to person, place, and time.   Psychiatric:         Mood and Affect: Mood normal.         Behavior: Behavior normal.         Thought Content: Thought content normal.         Relevant Results               Assessment/Plan                  Assessment & Plan  NSTEMI (non-ST elevated myocardial infarction) (Multi)  He is having no active anginal symptoms.  He is status post left heart catheterization with no intervention indicated and recommended to treat medically.  He will continue treatment with aspirin and beta-blocker.  Plan for further evaluation and treatment per cardiology.  Acute metabolic encephalopathy  This is improved with treatment of acute medical problems.  Mental status may be at baseline.  Mental status will be monitored.    Chronic systolic heart failure  He is clinically euvolemic.  He has hypotension and is unable to tolerate ACE ARB or diuretics.  Present treatment with beta-blocker is continued.  He is also treated with amiodarone for hypotension.  Vital signs will be monitored and medications adjusted as indicated.  Idiopathic hypotension  Perhaps related to his cardiomyopathy  Has been started on midodrine though blood pressure remains low, but improved from prior  BPH (benign prostatic hyperplasia)  He is having no urinary symptoms.  The present treatment is continued.  Severe protein-calorie malnutrition (Multi)  Nutrition following, see below  Anemia of chronic disease  Counts  stable  Type 2 diabetes mellitus without complication, without long-term current use of insulin (Multi)  Blood sugar adequately controlled.  Continue present treatment.  Acute systolic (congestive) heart failure  He is euvolemic.  The present medications are continued.  Debility  The patient has severe debility from his multiple acute and chronic medical problems.  He has been evaluated physical therapy occupational therapy.  It is recommended the patient be discharged to skilled nursing facility for short-term rehabilitation.  He will be discharged to skilled nursing facility when insurance precertification is completed.       There is no change in discharge summary completed yesterday.  He will be discharged to home when insurance precertification is completed.  Discharge time is 40 minutes.         Dylan Owens MD

## 2024-10-26 LAB
ALBUMIN SERPL BCP-MCNC: 2.6 G/DL (ref 3.4–5)
ANION GAP SERPL CALCULATED.3IONS-SCNC: 10 MMOL/L (ref 10–20)
BASOPHILS # BLD AUTO: 0.03 X10*3/UL (ref 0–0.1)
BASOPHILS NFR BLD AUTO: 0.5 %
BUN SERPL-MCNC: 23 MG/DL (ref 6–23)
CALCIUM SERPL-MCNC: 8.5 MG/DL (ref 8.6–10.3)
CHLORIDE SERPL-SCNC: 100 MMOL/L (ref 98–107)
CO2 SERPL-SCNC: 25 MMOL/L (ref 21–32)
CREAT SERPL-MCNC: 1.04 MG/DL (ref 0.5–1.3)
EGFRCR SERPLBLD CKD-EPI 2021: 72 ML/MIN/1.73M*2
EOSINOPHIL # BLD AUTO: 0.36 X10*3/UL (ref 0–0.4)
EOSINOPHIL NFR BLD AUTO: 5.8 %
ERYTHROCYTE [DISTWIDTH] IN BLOOD BY AUTOMATED COUNT: 13.4 % (ref 11.5–14.5)
GLUCOSE BLD MANUAL STRIP-MCNC: 124 MG/DL (ref 74–99)
GLUCOSE BLD MANUAL STRIP-MCNC: 154 MG/DL (ref 74–99)
GLUCOSE BLD MANUAL STRIP-MCNC: 200 MG/DL (ref 74–99)
GLUCOSE SERPL-MCNC: 159 MG/DL (ref 74–99)
HCT VFR BLD AUTO: 25 % (ref 41–52)
HGB BLD-MCNC: 9.1 G/DL (ref 13.5–17.5)
IMM GRANULOCYTES # BLD AUTO: 0.03 X10*3/UL (ref 0–0.5)
IMM GRANULOCYTES NFR BLD AUTO: 0.5 % (ref 0–0.9)
LYMPHOCYTES # BLD AUTO: 1 X10*3/UL (ref 0.8–3)
LYMPHOCYTES NFR BLD AUTO: 16 %
MAGNESIUM SERPL-MCNC: 1.58 MG/DL (ref 1.6–2.4)
MCH RBC QN AUTO: 31.1 PG (ref 26–34)
MCHC RBC AUTO-ENTMCNC: 36.4 G/DL (ref 32–36)
MCV RBC AUTO: 85 FL (ref 80–100)
MONOCYTES # BLD AUTO: 0.59 X10*3/UL (ref 0.05–0.8)
MONOCYTES NFR BLD AUTO: 9.5 %
NEUTROPHILS # BLD AUTO: 4.23 X10*3/UL (ref 1.6–5.5)
NEUTROPHILS NFR BLD AUTO: 67.7 %
NRBC BLD-RTO: 0 /100 WBCS (ref 0–0)
PHOSPHATE SERPL-MCNC: 3.1 MG/DL (ref 2.5–4.9)
PLATELET # BLD AUTO: 185 X10*3/UL (ref 150–450)
POTASSIUM SERPL-SCNC: 4.6 MMOL/L (ref 3.5–5.3)
RBC # BLD AUTO: 2.93 X10*6/UL (ref 4.5–5.9)
SODIUM SERPL-SCNC: 130 MMOL/L (ref 136–145)
WBC # BLD AUTO: 6.2 X10*3/UL (ref 4.4–11.3)

## 2024-10-26 PROCEDURE — 2500000001 HC RX 250 WO HCPCS SELF ADMINISTERED DRUGS (ALT 637 FOR MEDICARE OP): Performed by: INTERNAL MEDICINE

## 2024-10-26 PROCEDURE — 85025 COMPLETE CBC W/AUTO DIFF WBC: CPT | Performed by: NURSE PRACTITIONER

## 2024-10-26 PROCEDURE — 2500000001 HC RX 250 WO HCPCS SELF ADMINISTERED DRUGS (ALT 637 FOR MEDICARE OP): Performed by: NURSE PRACTITIONER

## 2024-10-26 PROCEDURE — 82947 ASSAY GLUCOSE BLOOD QUANT: CPT

## 2024-10-26 PROCEDURE — 2500000002 HC RX 250 W HCPCS SELF ADMINISTERED DRUGS (ALT 637 FOR MEDICARE OP, ALT 636 FOR OP/ED): Performed by: NURSE PRACTITIONER

## 2024-10-26 PROCEDURE — 97535 SELF CARE MNGMENT TRAINING: CPT | Mod: GO,CO

## 2024-10-26 PROCEDURE — 2500000004 HC RX 250 GENERAL PHARMACY W/ HCPCS (ALT 636 FOR OP/ED): Performed by: INTERNAL MEDICINE

## 2024-10-26 PROCEDURE — 2500000005 HC RX 250 GENERAL PHARMACY W/O HCPCS: Performed by: NURSE PRACTITIONER

## 2024-10-26 PROCEDURE — 2500000002 HC RX 250 W HCPCS SELF ADMINISTERED DRUGS (ALT 637 FOR MEDICARE OP, ALT 636 FOR OP/ED): Performed by: INTERNAL MEDICINE

## 2024-10-26 PROCEDURE — 36415 COLL VENOUS BLD VENIPUNCTURE: CPT | Performed by: NURSE PRACTITIONER

## 2024-10-26 PROCEDURE — 83735 ASSAY OF MAGNESIUM: CPT | Performed by: NURSE PRACTITIONER

## 2024-10-26 PROCEDURE — 80069 RENAL FUNCTION PANEL: CPT | Performed by: NURSE PRACTITIONER

## 2024-10-26 PROCEDURE — 2060000001 HC INTERMEDIATE ICU ROOM DAILY

## 2024-10-26 RX ADMIN — METFORMIN HYDROCHLORIDE 1000 MG: 1000 TABLET ORAL at 21:08

## 2024-10-26 RX ADMIN — Medication 2 L/MIN: at 15:49

## 2024-10-26 RX ADMIN — TAMSULOSIN HYDROCHLORIDE 0.8 MG: 0.4 CAPSULE ORAL at 21:08

## 2024-10-26 RX ADMIN — METFORMIN HYDROCHLORIDE 1000 MG: 1000 TABLET ORAL at 08:19

## 2024-10-26 RX ADMIN — Medication 400 MG: at 08:19

## 2024-10-26 RX ADMIN — INSULIN LISPRO 1 UNITS: 100 INJECTION, SOLUTION INTRAVENOUS; SUBCUTANEOUS at 16:56

## 2024-10-26 RX ADMIN — ASPIRIN 81 MG CHEWABLE TABLET 81 MG: 81 TABLET CHEWABLE at 08:19

## 2024-10-26 RX ADMIN — CARVEDILOL 3.12 MG: 3.12 TABLET, FILM COATED ORAL at 08:19

## 2024-10-26 RX ADMIN — ENOXAPARIN SODIUM 40 MG: 40 INJECTION SUBCUTANEOUS at 08:19

## 2024-10-26 RX ADMIN — MIDODRINE HYDROCHLORIDE 5 MG: 5 TABLET ORAL at 08:19

## 2024-10-26 RX ADMIN — MIDODRINE HYDROCHLORIDE 5 MG: 5 TABLET ORAL at 16:58

## 2024-10-26 RX ADMIN — MIDODRINE HYDROCHLORIDE 5 MG: 5 TABLET ORAL at 11:26

## 2024-10-26 RX ADMIN — INSULIN LISPRO 2 UNITS: 100 INJECTION, SOLUTION INTRAVENOUS; SUBCUTANEOUS at 08:20

## 2024-10-26 RX ADMIN — CARVEDILOL 3.12 MG: 3.12 TABLET, FILM COATED ORAL at 17:00

## 2024-10-26 RX ADMIN — PRAVASTATIN SODIUM 40 MG: 40 TABLET ORAL at 21:08

## 2024-10-26 RX ADMIN — Medication 3 MG: at 21:08

## 2024-10-26 ASSESSMENT — COGNITIVE AND FUNCTIONAL STATUS - GENERAL
HELP NEEDED FOR BATHING: A LOT
MOBILITY SCORE: 15
WALKING IN HOSPITAL ROOM: A LOT
EATING MEALS: A LOT
STANDING UP FROM CHAIR USING ARMS: A LOT
HELP NEEDED FOR BATHING: A LOT
DRESSING REGULAR LOWER BODY CLOTHING: A LOT
TOILETING: A LOT
DRESSING REGULAR UPPER BODY CLOTHING: A LOT
TURNING FROM BACK TO SIDE WHILE IN FLAT BAD: A LITTLE
PERSONAL GROOMING: A LOT
DRESSING REGULAR LOWER BODY CLOTHING: TOTAL
MOVING FROM LYING ON BACK TO SITTING ON SIDE OF FLAT BED WITH BEDRAILS: A LITTLE
DAILY ACTIVITIY SCORE: 10
CLIMB 3 TO 5 STEPS WITH RAILING: A LOT
PERSONAL GROOMING: A LITTLE
DAILY ACTIVITIY SCORE: 14
EATING MEALS: A LITTLE
TOILETING: TOTAL
DRESSING REGULAR UPPER BODY CLOTHING: A LOT
MOVING TO AND FROM BED TO CHAIR: A LITTLE

## 2024-10-26 ASSESSMENT — PAIN SCALES - GENERAL
PAINLEVEL_OUTOF10: 0 - NO PAIN
PAINLEVEL_OUTOF10: 0 - NO PAIN

## 2024-10-26 ASSESSMENT — PAIN - FUNCTIONAL ASSESSMENT: PAIN_FUNCTIONAL_ASSESSMENT: 0-10

## 2024-10-26 ASSESSMENT — ACTIVITIES OF DAILY LIVING (ADL): HOME_MANAGEMENT_TIME_ENTRY: 25

## 2024-10-26 NOTE — PROGRESS NOTES
Dat Mccallum is a 83 y.o. male on day 10 of admission presenting with NSTEMI (non-ST elevated myocardial infarction) (Multi).      Subjective   He denies chest pain or shortness of breath.       Objective     Last Recorded Vitals  /54 (BP Location: Left arm, Patient Position: Lying)   Pulse 96   Temp 35.7 °C (96.3 °F) (Temporal)   Resp 17   Wt 63.6 kg (140 lb 3.2 oz)   SpO2 96%   Intake/Output last 3 Shifts:    Intake/Output Summary (Last 24 hours) at 10/26/2024 1149  Last data filed at 10/26/2024 0200  Gross per 24 hour   Intake --   Output 700 ml   Net -700 ml       Admission Weight  Weight: 68 kg (150 lb) (10/16/24 2047)    Daily Weight  10/26/24 : 63.6 kg (140 lb 3.2 oz)    Image Results  Cardiac Catheterization [place] [address]             Phone [phone]    Cardiovascular Catheterization Report    Patient Name:     DAT MCCALLUM      Performing Physician:  Virginia Chavez DO  Study Date:       10/23/2024           Verifying Physician:   Virginia Chavez DO  MRN/PID:          [id number]             Cardiologist/Co-Scrub:  Accession#:       [id number]         Ordering Provider:     Virginia CHAVEZ  Date of           1940 / 83      Cardiologist:  Birth/Age:        years  Gender:           M                    Fellow:  Encounter#:       [id number]           Surgeon:       Study: Left Heart Cath       Indications:  DAT MCCALLUM is a 84 year old male who presents with dyslipidemia and an asymptomatic chest pain assessment. Cardiomyopathy, left ventricular dysfunction and NSTE - ACS.  Stress test performed: No. CTA performed: NoShey Calvillo accessed: No. LVEF  Assessed: No.  Cardiac arrest: No.  Cardiac surgical consult:  No.  Cardiovascular Instability: No  Frailty status of patient entering lab: 6 = Moderately frail.       Coronary Angiography:  The coronary circulation is right dominant.     Coronary Angiography Comments:  We obtained informed consent with the help of his son Jorge A because the patient is deaf, and he was brought to the cardiac catheterization lab with the timeout verified between his computer medical record number and his arm wristband. Both groins were prepped and draped in a sterile fashion and the remainder of the procedure performed under sterile technique. 10 cc of 1% lidocaine used to the right groin for local anesthesia and Versed 2 mg and fentanyl 50 mcg for intravenous sedation. Used single wall micropuncture technique to access the right common femoral artery and a micropuncture sheath was inserted, then exchanged over guidewire for a 6 Urdu [place] sheath. 6 Urdu JR4 JL 4 and pigtail catheters were used and catheters were advanced and withdrawn over the guidewire without difficulty. At the end of the procedure the sheath was removed and an Angio-Seal closure device was deployed with good hemostasis and he was returned to his telemetry bed in stable condition.    Coronary angiography:    1. The left main was a large vessel and bifurcated into the LAD and circumflex and was normal    2 the LAD was a large vessel that extends to the apex and was widely patent with CHEYANNE grade III flow. The proximal LAD had an eccentric 40-50% irregular stenosis that was presumed to be the site of previous infarct vessel occlusion with spontaneous recanalization. The remaining mid and distal LAD and its branches had mild diffuse nonobstructive disease    3. The circumflex was a modest nondominant vessel that gave rise to 3 modest PLV branches and had no obstructive disease.       4. The right coronary artery was a dominant vessel that had mild 30-40% ostial /proximal stenosis and an eccentric 50% mid vessel stenosis,  then gives rise to a modest sized small caliber PDA and small caliber distal RCA without obstruction.    Left ventriculogram performed in the VALLES 30 projection showed persistent modest hypokinesis of the mid anterior, anterolateral segments and severe hypokinesis of the LV apical segment with left ventricular ejection fraction estimated at 40 to 45%.    Impression:  1.40% proximal LAD stenosis suggesting previous vessel occlusion with spontaneous recanalization.    2. 50% mid RCA stenosis.    3. Ischemic cardiomyopathy with residual anterior and anteroapical left ventricular hypokinesis with left ventricular ejection fraction 40 to 45%.    3. Nuclear stress test 10/22/2024 suggests viability in the anterior and apical segments without ischemia and with left ventricular ejection fraction estimated at 44%.          Hemo Personnel:  +----------------+---------+  Name            Duty       +----------------+---------+  Haja Mckeon MD 1  +----------------+---------+       Hemodynamic Pressures:     +----+---------------+----------+-------------+--------------+-------+---------+  Site   Date Time   Phase NameSystolic mmHgDiastolic mmHgED mmHgMean mmHg  +----+---------------+----------+-------------+--------------+-------+---------+    AO     10/23/2024  [facility]          116            57              81           8:56:33 AM                                                       +----+---------------+----------+-------------+--------------+-------+---------+    LV     10/23/2024  [facility]          103             2      8                    9:07:45 AM                                                       +----+---------------+----------+-------------+--------------+-------+---------+    LV     10/23/2024  [facility]          109            -2      7                    9:07:57 AM                                                        +----+---------------+----------+-------------+--------------+-------+---------+   LVp     10/23/2024  [facility]          106             0     12                    9:08:06 AM                                                       +----+---------------+----------+-------------+--------------+-------+---------+   AOp     10/23/2024  [facility]          120            52              76           9:08:16 AM                                                       +----+---------------+----------+-------------+--------------+-------+---------+    AO     10/23/2024  [facility]            0             0             -96           9:32:52 AM                                                       +----+---------------+----------+-------------+--------------+-------+---------+       Cardiac Cath Post Procedure Notes:  Post Procedure Diagnosis: Double vessel disease.  Blood Loss:               Estimated blood loss during the procedure was 10ml                            mls.  Specimens Removed:        Number of specimen(s) removed: none.       ICD 10 Codes:  Non ST elevation (NSTEMI) myocardial infarction-I21.4     CPT Codes:  Left Heart Cath (visualization of coronaries) and LV-[id number]     [id number] Haja Mckeon DO  Performing Physician  Electronically signed by [id number] Haja Mckeon DO on 10/24/2024 at 9:06:38 AM         ** Final **      Physical Exam  Constitutional:       Appearance: Normal appearance.   HENT:      Head: Normocephalic and atraumatic.      Nose: Nose normal.      Mouth/Throat:      Mouth: Mucous membranes are moist.      Pharynx: Oropharynx is clear.   Eyes:      Extraocular Movements: Extraocular movements intact.      Conjunctiva/sclera: Conjunctivae normal.      Pupils: Pupils are equal, round, and reactive to light.   Cardiovascular:      Rate and Rhythm: Normal rate and regular rhythm.      Pulses: Normal pulses.      Heart sounds: Normal heart sounds.   Pulmonary:       Effort: Pulmonary effort is normal.      Breath sounds: Normal breath sounds.   Abdominal:      General: Abdomen is flat. Bowel sounds are normal.      Palpations: Abdomen is soft.      Tenderness: There is no abdominal tenderness.   Musculoskeletal:         General: Normal range of motion.      Cervical back: Normal range of motion and neck supple.   Skin:     General: Skin is warm and dry.   Neurological:      General: No focal deficit present.      Mental Status: He is alert and oriented to person, place, and time.   Psychiatric:         Mood and Affect: Mood normal.         Behavior: Behavior normal.         Thought Content: Thought content normal.         Relevant Results               Assessment/Plan                  Assessment & Plan  NSTEMI (non-ST elevated myocardial infarction) (Multi)  He is having no active anginal symptoms.  He is status post left heart catheterization with no intervention indicated and recommended to treat medically.  He will continue treatment with aspirin and beta-blocker.  Plan for further evaluation and treatment per cardiology.  Acute metabolic encephalopathy  This is improved with treatment of acute medical problems.  Mental status may be at baseline.  Mental status will be monitored.    Chronic systolic heart failure  He is clinically euvolemic.  He has hypotension and is unable to tolerate ACE ARB or diuretics.  Present treatment with beta-blocker is continued.  He is also treated with amiodarone for hypotension.  Vital signs will be monitored and medications adjusted as indicated.  Idiopathic hypotension  Perhaps related to his cardiomyopathy  Has been started on midodrine though blood pressure remains low, but improved from prior  BPH (benign prostatic hyperplasia)  He is having no urinary symptoms.  The present treatment is continued.  Severe protein-calorie malnutrition (Multi)  Nutrition following, see below  Anemia of chronic disease  Counts stable  Type 2 diabetes  mellitus without complication, without long-term current use of insulin (Multi)  Blood sugar adequately controlled.  Continue present treatment.  Acute systolic (congestive) heart failure  He is euvolemic.  The present medications are continued.  Debility  The patient has severe debility from his multiple acute and chronic medical problems.  He has been evaluated physical therapy occupational therapy.  It is recommended the patient be discharged to skilled nursing facility for short-term rehabilitation.  He will be discharged to skilled nursing facility when insurance precertification is completed.       There is no change in discharge summary completed yesterday.  He will be discharged to home when insurance precertification is completed.  Discharge time is 40 minutes.         Dylan Owens MD

## 2024-10-26 NOTE — NURSING NOTE
Assumed care of patient. Pt currently laying in bed. Communicated through writing. No complaints of pain or discomfort at this time. Call bell and personal items within reach. Bed in low and locked position.

## 2024-10-26 NOTE — PROGRESS NOTES
Occupational Therapy    OT Treatment    Patient Name: Dat Mccallum  MRN: [id number]  Department: [address]  Room: 12/12-A  Today's Date: 10/26/2024  Time Calculation  Start Time: 1128  Stop Time: 1153  Time Calculation (min): 25 min        Assessment:  OT Assessment: Gradual progress made towards OT goals. Continue with current OT POC to increase strength, balance and functional tolerance to maximize safety and independence during ADLs.  Barriers to Discharge: Decreased caregiver support  Evaluation/Treatment Tolerance: Patient limited by fatigue  End of Session Communication: Bedside nurse  End of Session Patient Position: Bed, 3 rail up, Alarm on (all needs in reach)  OT Assessment Results: Decreased ADL status, Decreased upper extremity strength, Decreased safe judgment during ADL, Decreased cognition, Decreased endurance, Decreased fine motor control, Decreased functional mobility, Decreased gross motor control  Barriers to Discharge: Decreased caregiver support  Evaluation/Treatment Tolerance: Patient limited by fatigue  Plan:  Treatment Interventions: ADL retraining, Functional transfer training, UE strengthening/ROM, Endurance training, Patient/family training, Cognitive reorientation  OT Frequency: 4 times per week  OT Discharge Recommendations: Moderate intensity level of continued care  OT Recommended Transfer Status: Assist of 2  OT - OK to Discharge: Yes  Treatment Interventions: ADL retraining, Functional transfer training, UE strengthening/ROM, Endurance training, Patient/family training, Cognitive reorientation    Subjective   Previous Visit Info:  OT Last Visit  OT Received On: 10/26/24  General:  General  Reason for Referral: impaired ADLs, NSTEMI  Past Medical History Relevant to Rehab: DM, HTN, HLD, CKD 3, BPH, pancreatectomy Whipple procedure, knee surgery  Prior to Session Communication: Bedside nurse  Patient Position Received: Bed, 3 rail up, Alarm on  General Comment: Pt cleared for OT session  per nursing, cooperative this date. Increased time and tactile cues required for task completion.  Precautions:  Hearing/Visual Limitations: Mcgrath utilizing white board to communicate as needed with basic ASL, glasses  Medical Precautions: Fall precautions  Precautions Comment: Thad, telemetry, dry erase board            Pain:  Pain Assessment  Pain Assessment: 0-10  0-10 (Numeric) Pain Score: 0 - No pain  Clinical Progression: Not changed    Objective    Cognition:  Cognition  Overall Cognitive Status: Impaired  Orientation Level: Unable to assess  Cognition Comments: decreased one-step command following observed this date.  Safety/Judgement: Exceptions to WFL  Unable to Self-Monitor and Self-Correct Consistently: Moderate  Insight: Severe  Impulsive: Mildly  Task Initiation: Initiates with cues  Processing Speed: Delayed  Coordination:  Movements are Fluid and Coordinated: No  Upper Body Coordination: slower rate and accuracy of movements  Lower Body Coordination: Slower rate of movement jenelle LE  Trunk Coordination: observed L lateral lean  Activities of Daily Living: UE Bathing  UE Bathing Comments: pt soiled upon arrival requiring maxA to complete UB bathing tasks while seated EOB. Increased time and tactile cues required for minimal task completion. Pt unable to maintain balance during functional tasks requiring increased assistance to complete.    LE Bathing  LE Bathing Comments: total dependent assist required for LB bathing tasks at bed level    UE Dressing  UE Dressing Comments: maxA required to don/doff hospital gown while seated EOB, observed increased confusion this date.  Functional Standing Tolerance:     Bed Mobility/Transfers: Bed Mobility  Bed Mobility: Yes  Bed Mobility 1  Bed Mobility 1: Supine to sitting, Sitting to supine, Scooting  Level of Assistance 1: Maximum assistance  Bed Mobility Comments 1: all aspects of bed mobility completed with maxA and increased time. Verbal/ tactile cues  required for sequencing however poor carry-over observed.    Transfers  Transfer: Yes  Transfer 1  Trials/Comments 1: sit<>stand completed from standard EOB height with maxAx1 in order to change linen. Verbal/ tactile cues required for pelvic positioning over MARGOTH. Pt unable to complete full stand this date.    Sitting Balance:  Static Sitting Balance  Static Sitting-Balance Support: Bilateral upper extremity supported, Feet supported  Static Sitting-Level of Assistance: Moderate assistance  Static Sitting-Comment/Number of Minutes: frequent posterior LOB and L lateral lean observed    Therapy/Activity: Therapeutic Activity  Therapeutic Activity Performed: Yes  Therapeutic Activity 1: EOB sitting trials completed x 15min with modA to complete functional tasks. Poor+ sitting balance observed this date with frequent posterior and L lateral LOB. EOB sitting trials completed to increase functional tolerance, strength and challenge sitting balance to maximize potential during ADLs.      Outcome Measures:[facility] Daily Activity  Putting on and taking off regular lower body clothing: Total  Bathing (including washing, rinsing, drying): A lot  Putting on and taking off regular upper body clothing: A lot  Toileting, which includes using toilet, bedpan or urinal: Total  Taking care of personal grooming such as brushing teeth: A lot  Eating Meals: A lot  Daily Activity - Total Score: 10        Education Documentation  ADL Training, taught by HARMEET Avendaño at 10/26/2024  1:43 PM.  Learner: Patient  Readiness: Acceptance  Method: Explanation, Demonstration  Response: Needs Reinforcement    Education Comments  Education provided on role of OT/POC, safety awareness throughout functional tasks/transfers, importance of activity/ rest routine, EC/WS techniques, and use of call light for assistance. Questions, comments and concerns addressed regarding OT.      Goals:  Encounter Problems       Encounter Problems (Active)       ADL        Goal 1 (Progressing)       Start:  10/21/24    Expected End:  11/01/24       Patient will demonstrate improved ADL skills:  Bathing with Min assist with adaptive equipment prn    Grooming with SBA assist       Feeding with supervision assist      UE Dressing with SBA assist           LE Dressing with Min assist with adaptive equipment prn     Toileting with Min assist with adaptive equipment prn

## 2024-10-27 LAB
ALBUMIN SERPL BCP-MCNC: 2.7 G/DL (ref 3.4–5)
ANION GAP SERPL CALCULATED.3IONS-SCNC: 9 MMOL/L (ref 10–20)
BASOPHILS # BLD AUTO: 0.03 X10*3/UL (ref 0–0.1)
BASOPHILS NFR BLD AUTO: 0.5 %
BUN SERPL-MCNC: 29 MG/DL (ref 6–23)
CALCIUM SERPL-MCNC: 8.3 MG/DL (ref 8.6–10.3)
CHLORIDE SERPL-SCNC: 99 MMOL/L (ref 98–107)
CO2 SERPL-SCNC: 25 MMOL/L (ref 21–32)
CREAT SERPL-MCNC: 1.2 MG/DL (ref 0.5–1.3)
EGFRCR SERPLBLD CKD-EPI 2021: 61 ML/MIN/1.73M*2
EOSINOPHIL # BLD AUTO: 0.14 X10*3/UL (ref 0–0.4)
EOSINOPHIL NFR BLD AUTO: 2.4 %
ERYTHROCYTE [DISTWIDTH] IN BLOOD BY AUTOMATED COUNT: 13.5 % (ref 11.5–14.5)
GLUCOSE BLD MANUAL STRIP-MCNC: 125 MG/DL (ref 74–99)
GLUCOSE BLD MANUAL STRIP-MCNC: 137 MG/DL (ref 74–99)
GLUCOSE BLD MANUAL STRIP-MCNC: 162 MG/DL (ref 74–99)
GLUCOSE BLD MANUAL STRIP-MCNC: 174 MG/DL (ref 74–99)
GLUCOSE BLD MANUAL STRIP-MCNC: 270 MG/DL (ref 74–99)
GLUCOSE SERPL-MCNC: 152 MG/DL (ref 74–99)
HCT VFR BLD AUTO: 24.5 % (ref 41–52)
HGB BLD-MCNC: 8.7 G/DL (ref 13.5–17.5)
IMM GRANULOCYTES # BLD AUTO: 0.02 X10*3/UL (ref 0–0.5)
IMM GRANULOCYTES NFR BLD AUTO: 0.3 % (ref 0–0.9)
LYMPHOCYTES # BLD AUTO: 0.9 X10*3/UL (ref 0.8–3)
LYMPHOCYTES NFR BLD AUTO: 15.5 %
MAGNESIUM SERPL-MCNC: 1.56 MG/DL (ref 1.6–2.4)
MCH RBC QN AUTO: 30.3 PG (ref 26–34)
MCHC RBC AUTO-ENTMCNC: 35.5 G/DL (ref 32–36)
MCV RBC AUTO: 85 FL (ref 80–100)
MONOCYTES # BLD AUTO: 0.58 X10*3/UL (ref 0.05–0.8)
MONOCYTES NFR BLD AUTO: 10 %
NEUTROPHILS # BLD AUTO: 4.13 X10*3/UL (ref 1.6–5.5)
NEUTROPHILS NFR BLD AUTO: 71.3 %
NRBC BLD-RTO: 0 /100 WBCS (ref 0–0)
PHOSPHATE SERPL-MCNC: 3 MG/DL (ref 2.5–4.9)
PLATELET # BLD AUTO: 189 X10*3/UL (ref 150–450)
POTASSIUM SERPL-SCNC: 4.6 MMOL/L (ref 3.5–5.3)
RBC # BLD AUTO: 2.87 X10*6/UL (ref 4.5–5.9)
SODIUM SERPL-SCNC: 128 MMOL/L (ref 136–145)
WBC # BLD AUTO: 5.8 X10*3/UL (ref 4.4–11.3)

## 2024-10-27 PROCEDURE — 2500000004 HC RX 250 GENERAL PHARMACY W/ HCPCS (ALT 636 FOR OP/ED): Performed by: INTERNAL MEDICINE

## 2024-10-27 PROCEDURE — 80069 RENAL FUNCTION PANEL: CPT | Performed by: NURSE PRACTITIONER

## 2024-10-27 PROCEDURE — 2500000001 HC RX 250 WO HCPCS SELF ADMINISTERED DRUGS (ALT 637 FOR MEDICARE OP): Performed by: INTERNAL MEDICINE

## 2024-10-27 PROCEDURE — 2500000001 HC RX 250 WO HCPCS SELF ADMINISTERED DRUGS (ALT 637 FOR MEDICARE OP): Performed by: NURSE PRACTITIONER

## 2024-10-27 PROCEDURE — 2500000004 HC RX 250 GENERAL PHARMACY W/ HCPCS (ALT 636 FOR OP/ED): Performed by: FAMILY MEDICINE

## 2024-10-27 PROCEDURE — 83735 ASSAY OF MAGNESIUM: CPT | Performed by: NURSE PRACTITIONER

## 2024-10-27 PROCEDURE — 82947 ASSAY GLUCOSE BLOOD QUANT: CPT

## 2024-10-27 PROCEDURE — 85025 COMPLETE CBC W/AUTO DIFF WBC: CPT | Performed by: NURSE PRACTITIONER

## 2024-10-27 PROCEDURE — 2500000002 HC RX 250 W HCPCS SELF ADMINISTERED DRUGS (ALT 637 FOR MEDICARE OP, ALT 636 FOR OP/ED): Performed by: NURSE PRACTITIONER

## 2024-10-27 PROCEDURE — 2060000001 HC INTERMEDIATE ICU ROOM DAILY

## 2024-10-27 PROCEDURE — 99232 SBSQ HOSP IP/OBS MODERATE 35: CPT | Performed by: FAMILY MEDICINE

## 2024-10-27 PROCEDURE — 36415 COLL VENOUS BLD VENIPUNCTURE: CPT | Performed by: NURSE PRACTITIONER

## 2024-10-27 PROCEDURE — 2500000002 HC RX 250 W HCPCS SELF ADMINISTERED DRUGS (ALT 637 FOR MEDICARE OP, ALT 636 FOR OP/ED): Performed by: INTERNAL MEDICINE

## 2024-10-27 RX ADMIN — PRAVASTATIN SODIUM 40 MG: 40 TABLET ORAL at 20:42

## 2024-10-27 RX ADMIN — ASPIRIN 81 MG CHEWABLE TABLET 81 MG: 81 TABLET CHEWABLE at 08:29

## 2024-10-27 RX ADMIN — MIDODRINE HYDROCHLORIDE 5 MG: 5 TABLET ORAL at 08:29

## 2024-10-27 RX ADMIN — SODIUM CHLORIDE 500 ML: 900 INJECTION, SOLUTION INTRAVENOUS at 12:07

## 2024-10-27 RX ADMIN — MIDODRINE HYDROCHLORIDE 5 MG: 5 TABLET ORAL at 17:12

## 2024-10-27 RX ADMIN — Medication 400 MG: at 08:28

## 2024-10-27 RX ADMIN — MIDODRINE HYDROCHLORIDE 5 MG: 5 TABLET ORAL at 11:47

## 2024-10-27 RX ADMIN — CARVEDILOL 3.12 MG: 3.12 TABLET, FILM COATED ORAL at 17:12

## 2024-10-27 RX ADMIN — TAMSULOSIN HYDROCHLORIDE 0.8 MG: 0.4 CAPSULE ORAL at 20:42

## 2024-10-27 RX ADMIN — METFORMIN HYDROCHLORIDE 1000 MG: 1000 TABLET ORAL at 08:29

## 2024-10-27 RX ADMIN — METFORMIN HYDROCHLORIDE 1000 MG: 1000 TABLET ORAL at 20:42

## 2024-10-27 RX ADMIN — ENOXAPARIN SODIUM 40 MG: 40 INJECTION SUBCUTANEOUS at 08:28

## 2024-10-27 RX ADMIN — INSULIN LISPRO 3 UNITS: 100 INJECTION, SOLUTION INTRAVENOUS; SUBCUTANEOUS at 08:39

## 2024-10-27 ASSESSMENT — PAIN SCALES - GENERAL: PAINLEVEL_OUTOF10: 0 - NO PAIN

## 2024-10-27 NOTE — PROGRESS NOTES
10/27/24 1753   Discharge Planning   Living Arrangements Alone   Support Systems Children   Expected Discharge Disposition SNF     Attempted to reach sonJorge A for more choices for skilled rehab, no answer, left message

## 2024-10-27 NOTE — ASSESSMENT & PLAN NOTE
He is having no active anginal symptoms.  He is status post left heart catheterization with no intervention indicated and recommended to treat medically.  He will continue treatment with aspirin and beta-blocker.  Cardiology signed off 10/25

## 2024-10-27 NOTE — NURSING NOTE
Assumed care of patient. Pt currently in bed awake. No complaints of pain or discomfort. Call bell and personal items within reach. Bed in low and locked position.

## 2024-10-27 NOTE — PROGRESS NOTES
Dat Mccallum is a 83 y.o. male on day 11 of admission presenting with NSTEMI (non-ST elevated myocardial infarction) (Multi).      Subjective   He denies chest pain or shortness of breath.       Objective     Last Recorded Vitals  BP (!) 93/42 (BP Location: Right arm, Patient Position: Lying)   Pulse 81   Temp 36.7 °C (98.1 °F) (Temporal)   Resp 16   Wt 63.7 kg (140 lb 8 oz)   SpO2 96%   Intake/Output last 3 Shifts:    Intake/Output Summary (Last 24 hours) at 10/27/2024 0820  Last data filed at 10/27/2024 0616  Gross per 24 hour   Intake --   Output 600 ml   Net -600 ml       Admission Weight  Weight: 68 kg (150 lb) (10/16/24 2047)    Daily Weight  10/27/24 : 63.7 kg (140 lb 8 oz)    Image Results  Cardiac [place] [address]             Phone [phone]    Cardiovascular Catheterization Report    Patient Name:     DAT MCCALLUM      Performing Physician:  Virginia Chavez DO  Study Date:       10/23/2024           Verifying Physician:   Virginia Chavez DO  MRN/PID:          [id number]             Cardiologist/Co-Scrub:  Accession#:       [id number]         Ordering Provider:     Virginia CHAVEZ  Date of           1940 / 83      Cardiologist:  Birth/Age:        years  Gender:           M                    Fellow:  Encounter#:       [id number]           Surgeon:       Study: Left Heart Cath       Indications:  DAT MCCALLUM is a 84 year old male who presents with dyslipidemia and an asymptomatic chest pain assessment. Cardiomyopathy, left ventricular dysfunction and NSTE - ACS.  Stress test performed: No. CTA performed: NoShey Calvillo accessed: No. LVEF  Assessed: No.  Cardiac arrest: No.  Cardiac surgical consult:  No.  Cardiovascular Instability: No  Frailty status of patient entering lab: 6 = Moderately frail.       Coronary Angiography:  The coronary circulation is right dominant.     Coronary Angiography Comments:  We obtained informed consent with the help of his son Jorge A because the patient is deaf, and he was brought to the cardiac catheterization lab with the timeout verified between his computer medical record number and his arm wristband. Both groins were prepped and draped in a sterile fashion and the remainder of the procedure performed under sterile technique. 10 cc of 1% lidocaine used to the right groin for local anesthesia and Versed 2 mg and fentanyl 50 mcg for intravenous sedation. Used single wall micropuncture technique to access the right common femoral artery and a micropuncture sheath was inserted, then exchanged over guidewire for a 6 Lao Montezuma sheath. 6 Lao JR4 JL 4 and pigtail catheters were used and catheters were advanced and withdrawn over the guidewire without difficulty. At the end of the procedure the sheath was removed and an Angio-Seal closure device was deployed with good hemostasis and he was returned to his telemetry bed in stable condition.    Coronary angiography:    1. The left main was a large vessel and bifurcated into the LAD and circumflex and was normal    2 the LAD was a large vessel that extends to the apex and was widely patent with CHEYANNE grade III flow. The proximal LAD had an eccentric 40-50% irregular stenosis that was presumed to be the site of previous infarct vessel occlusion with spontaneous recanalization. The remaining mid and distal LAD and its branches had mild diffuse nonobstructive disease    3. The circumflex was a modest nondominant vessel that gave rise to 3 modest PLV branches and had no obstructive disease.       4. The right coronary artery was a dominant vessel that had mild 30-40% ostial /proximal stenosis and an eccentric 50% mid vessel stenosis,  then gives rise to a modest sized small caliber PDA and small caliber distal RCA without obstruction.    Left ventriculogram performed in the VALLES 30 projection showed persistent modest hypokinesis of the mid anterior, anterolateral segments and severe hypokinesis of the LV apical segment with left ventricular ejection fraction estimated at 40 to 45%.    Impression:  1.40% proximal LAD stenosis suggesting previous vessel occlusion with spontaneous recanalization.    2. 50% mid RCA stenosis.    3. Ischemic cardiomyopathy with residual anterior and anteroapical left ventricular hypokinesis with left ventricular ejection fraction 40 to 45%.    3. Nuclear stress test 10/22/2024 suggests viability in the anterior and apical segments without ischemia and with left ventricular ejection fraction estimated at 44%.          Hemo Personnel:  +----------------+---------+  Name            Duty       +----------------+---------+  Haja Mckeon MD 1  +----------------+---------+       Hemodynamic Pressures:     +----+---------------+----------+-------------+--------------+-------+---------+  Site   Date Time   Phase NameSystolic mmHgDiastolic mmHgED mmHgMean mmHg  +----+---------------+----------+-------------+--------------+-------+---------+    AO     10/23/2024  AIR REST          116            57              81           8:56:33 AM                                                       +----+---------------+----------+-------------+--------------+-------+---------+    LV     10/23/2024  AIR REST          103             2      8                    9:07:45 AM                                                       +----+---------------+----------+-------------+--------------+-------+---------+    LV     10/23/2024  AIR REST          109            -2      7                    9:07:57 AM                                                        +----+---------------+----------+-------------+--------------+-------+---------+   LVp     10/23/2024  AIR REST          106             0     12                    9:08:06 AM                                                       +----+---------------+----------+-------------+--------------+-------+---------+   AOp     10/23/2024  AIR REST          120            52              76           9:08:16 AM                                                       +----+---------------+----------+-------------+--------------+-------+---------+    AO     10/23/2024  AIR REST            0             0             -96           9:32:52 AM                                                       +----+---------------+----------+-------------+--------------+-------+---------+       Cardiac Cath Post Procedure Notes:  Post Procedure Diagnosis: Double vessel disease.  Blood Loss:               Estimated blood loss during the procedure was 10ml                            mls.  Specimens Removed:        Number of specimen(s) removed: none.       ICD 10 Codes:  Non ST elevation (NSTEMI) myocardial infarction-I21.4     CPT Codes:  Left Heart Cath (visualization of coronaries) and LV-28635     [id number] Haja Mckeon DO  Performing Physician  Electronically signed by [id number] Haja Mkceon DO on 10/24/2024 at 9:06:38 AM         ** Final **      Physical Exam  Constitutional:       Appearance: Normal appearance.   HENT:      Head: Normocephalic and atraumatic.      Nose: Nose normal.      Mouth/Throat:      Mouth: Mucous membranes are moist.      Pharynx: Oropharynx is clear.   Eyes:      Extraocular Movements: Extraocular movements intact.      Conjunctiva/sclera: Conjunctivae normal.      Pupils: Pupils are equal, round, and reactive to light.   Cardiovascular:      Rate and Rhythm: Normal rate and regular rhythm.      Pulses: Normal pulses.      Heart sounds: Normal heart sounds.   Pulmonary:       Effort: Pulmonary effort is normal.      Breath sounds: Normal breath sounds.   Abdominal:      General: Abdomen is flat. Bowel sounds are normal.      Palpations: Abdomen is soft.      Tenderness: There is no abdominal tenderness.   Musculoskeletal:         General: Normal range of motion.      Cervical back: Normal range of motion and neck supple.   Skin:     General: Skin is warm and dry.   Neurological:      General: No focal deficit present.      Mental Status: He is alert and oriented to person, place, and time.   Psychiatric:         Mood and Affect: Mood normal.         Behavior: Behavior normal.         Thought Content: Thought content normal.     Assessment & Plan  NSTEMI (non-ST elevated myocardial infarction) (Multi)  He is having no active anginal symptoms.  He is status post left heart catheterization with no intervention indicated and recommended to treat medically.  He will continue treatment with aspirin and beta-blocker.  Cardiology signed off 10/25  Acute metabolic encephalopathy  This is improved with treatment of acute medical problems.  Mental status may be at baseline.  Mental status will be monitored.    Chronic systolic heart failure  He is clinically euvolemic.  He has hypotension and is unable to tolerate ACE ARB or diuretics.  Present treatment with beta-blocker is continued.  He is also treated with amiodarone for hypotension.  Vital signs will be monitored and medications adjusted as indicated.  Idiopathic hypotension  Perhaps related to his cardiomyopathy  Has been started on midodrine though blood pressure remains low, but improved from prior  BPH (benign prostatic hyperplasia)  He is having no urinary symptoms.  The present treatment is continued.  Severe protein-calorie malnutrition (Multi)  Nutrition following, see below  Anemia of chronic disease  Counts stable  Type 2 diabetes mellitus without complication, without long-term current use of insulin (Multi)  Blood sugar  adequately controlled.  Continue present treatment.  Acute systolic (congestive) heart failure  He is euvolemic.  The present medications are continued.  Debility  The patient has severe debility from his multiple acute and chronic medical problems.  He has been evaluated physical therapy occupational therapy.  It is recommended the patient be discharged to skilled nursing facility for short-term rehabilitation.  He will be discharged to skilled nursing facility when insurance precertification is completed.       There is no change in discharge summary completed 10/23.  He will be discharged to home when insurance precertification is completed.           Don Banda MD

## 2024-10-27 NOTE — CARE PLAN
Problem: Skin  Goal: Decreased wound size/increased tissue granulation at next dressing change  10/27/2024 0142 by Carmel Padilla RN  Outcome: Progressing  10/27/2024 0141 by Carmel Padilla RN  Flowsheets (Taken 10/27/2024 0141)  Decreased wound size/increased tissue granulation at next dressing change: Protective dressings over bony prominences  Goal: Participates in plan/prevention/treatment measures  Outcome: Progressing  Goal: Prevent/manage excess moisture  10/27/2024 0142 by Carmel Padilla RN  Outcome: Progressing  10/27/2024 0141 by Carmel Padilla RN  Flowsheets (Taken 10/27/2024 0141)  Prevent/manage excess moisture: Cleanse incontinence/protect with barrier cream  Goal: Prevent/minimize sheer/friction injuries  10/27/2024 0142 by Carmel Padilla RN  Outcome: Progressing  10/27/2024 0141 by Carmel Padilla RN  Flowsheets (Taken 10/27/2024 0141)  Prevent/minimize sheer/friction injuries:   Turn/reposition every 2 hours/use positioning/transfer devices   HOB 30 degrees or less   Use pull sheet  Goal: Promote/optimize nutrition  10/27/2024 0142 by Carmel Padilla RN  Outcome: Progressing  10/27/2024 0141 by Carmel Padilla RN  Flowsheets (Taken 10/27/2024 0141)  Promote/optimize nutrition:   Monitor/record intake including meals   Assist with feeding  Goal: Promote skin healing  10/27/2024 0142 by Carmel Padilla RN  Outcome: Progressing  10/27/2024 0141 by Carmel Padilla RN  Flowsheets (Taken 10/27/2024 0141)  Promote skin healing: Assess skin/pad under line(s)/device(s)     Problem: Pain - Adult  Goal: Verbalizes/displays adequate comfort level or baseline comfort level  Outcome: Progressing     Problem: Safety - Adult  Goal: Free from fall injury  Outcome: Progressing     Problem: Discharge Planning  Goal: Discharge to home or other facility with appropriate resources  Outcome: Progressing     Problem: Chronic Conditions and Co-morbidities  Goal: Patient's chronic conditions and  co-morbidity symptoms are monitored and maintained or improved  Outcome: Progressing     Problem: Fall/Injury  Goal: Not fall by end of shift  Outcome: Progressing  Goal: Be free from injury by end of the shift  Outcome: Progressing  Goal: Verbalize understanding of personal risk factors for fall in the hospital  Outcome: Progressing  Goal: Verbalize understanding of risk factor reduction measures to prevent injury from fall in the home  Outcome: Progressing  Goal: Use assistive devices by end of the shift  Outcome: Progressing  Goal: Pace activities to prevent fatigue by end of the shift  Outcome: Progressing     Problem: ACS/CP/NSTEMI/STEMI  Goal: Chest pain managed (free from pain or at acceptable level)  Outcome: Progressing  Goal: Lab values return to normal range  Outcome: Progressing  Goal: Promote self management  Outcome: Progressing  Goal: Serial ECG will return to baseline  Outcome: Progressing  Goal: Verbalize understanding of procedures/devices  Outcome: Progressing  Goal: Wean vasopressors/achieve hemodynamic stability  Outcome: Progressing     Problem: Nutrition  Goal: Less than 5 days NPO/clear liquids  Outcome: Progressing  Goal: Oral intake greater than 50%  Outcome: Progressing  Goal: Oral intake greater 75%  Outcome: Progressing  Goal: Consume prescribed supplement  Outcome: Progressing  Goal: Adequate PO fluid intake  Outcome: Progressing  Goal: Nutrition support goals are met within 48 hrs  Outcome: Progressing  Goal: Nutrition support is meeting 75% of nutrient needs  Outcome: Progressing  Goal: Tube feed tolerance  Outcome: Progressing  Goal: BG  mg/dL  Outcome: Progressing  Goal: Lab values WNL  Outcome: Progressing  Goal: Electrolytes WNL  Outcome: Progressing  Goal: Promote healing  Outcome: Progressing  Goal: Maintain stable weight  Outcome: Progressing  Goal: Reduce weight from edema/fluid  Outcome: Progressing  Goal: Gradual weight gain  Outcome: Progressing  Goal: Improve ostomy  output  Outcome: Progressing     Problem: Diabetes  Goal: Maintain glucose levels >70mg/dl to <250mg/dl throughout shift  Outcome: Progressing  Goal: No changes in neurological exam by end of shift  Outcome: Progressing  Goal: Vital signs within normal range for age by end of shift  Outcome: Progressing     Problem: Heart Failure  Goal: Improved gas exchange this shift  Outcome: Progressing  Goal: Improved urinary output this shift  Outcome: Progressing  Goal: Reduction in peripheral edema within 24 hours  Outcome: Progressing  Goal: Report improvement of dyspnea/breathlessness this shift  Outcome: Progressing  Goal: Weight from fluid excess reduced over 2-3 days, then stabilize  Outcome: Progressing  Goal: Increase self care and/or family involvement in 24 hours  Outcome: Progressing   The patient's goals for the shift include      The clinical goals for the shift include Patient will remain safe and hemodynamically stable throughout shift.

## 2024-10-28 PROBLEM — E83.42 HYPOMAGNESEMIA: Status: ACTIVE | Noted: 2024-10-28

## 2024-10-28 PROBLEM — I50.23 ACUTE ON CHRONIC SYSTOLIC (CONGESTIVE) HEART FAILURE: Status: ACTIVE | Noted: 2024-10-24

## 2024-10-28 LAB
ALBUMIN SERPL BCP-MCNC: 2.4 G/DL (ref 3.4–5)
ANION GAP SERPL CALCULATED.3IONS-SCNC: 9 MMOL/L (ref 10–20)
BASOPHILS # BLD AUTO: 0.01 X10*3/UL (ref 0–0.1)
BASOPHILS NFR BLD AUTO: 0.2 %
BUN SERPL-MCNC: 34 MG/DL (ref 6–23)
CALCIUM SERPL-MCNC: 7.9 MG/DL (ref 8.6–10.3)
CHLORIDE SERPL-SCNC: 100 MMOL/L (ref 98–107)
CO2 SERPL-SCNC: 24 MMOL/L (ref 21–32)
CREAT SERPL-MCNC: 1.07 MG/DL (ref 0.5–1.3)
EGFRCR SERPLBLD CKD-EPI 2021: 70 ML/MIN/1.73M*2
EOSINOPHIL # BLD AUTO: 0.08 X10*3/UL (ref 0–0.4)
EOSINOPHIL NFR BLD AUTO: 1.5 %
ERYTHROCYTE [DISTWIDTH] IN BLOOD BY AUTOMATED COUNT: 13.2 % (ref 11.5–14.5)
GLUCOSE BLD MANUAL STRIP-MCNC: 151 MG/DL (ref 74–99)
GLUCOSE BLD MANUAL STRIP-MCNC: 162 MG/DL (ref 74–99)
GLUCOSE BLD MANUAL STRIP-MCNC: 241 MG/DL (ref 74–99)
GLUCOSE SERPL-MCNC: 156 MG/DL (ref 74–99)
HCT VFR BLD AUTO: 23.2 % (ref 41–52)
HGB BLD-MCNC: 8.3 G/DL (ref 13.5–17.5)
IMM GRANULOCYTES # BLD AUTO: 0.01 X10*3/UL (ref 0–0.5)
IMM GRANULOCYTES NFR BLD AUTO: 0.2 % (ref 0–0.9)
LYMPHOCYTES # BLD AUTO: 1.1 X10*3/UL (ref 0.8–3)
LYMPHOCYTES NFR BLD AUTO: 20.4 %
MAGNESIUM SERPL-MCNC: 1.49 MG/DL (ref 1.6–2.4)
MCH RBC QN AUTO: 30.9 PG (ref 26–34)
MCHC RBC AUTO-ENTMCNC: 35.8 G/DL (ref 32–36)
MCV RBC AUTO: 86 FL (ref 80–100)
MONOCYTES # BLD AUTO: 0.6 X10*3/UL (ref 0.05–0.8)
MONOCYTES NFR BLD AUTO: 11.1 %
NEUTROPHILS # BLD AUTO: 3.59 X10*3/UL (ref 1.6–5.5)
NEUTROPHILS NFR BLD AUTO: 66.6 %
NRBC BLD-RTO: 0 /100 WBCS (ref 0–0)
PHOSPHATE SERPL-MCNC: 2.9 MG/DL (ref 2.5–4.9)
PLATELET # BLD AUTO: 175 X10*3/UL (ref 150–450)
POTASSIUM SERPL-SCNC: 4.1 MMOL/L (ref 3.5–5.3)
RBC # BLD AUTO: 2.69 X10*6/UL (ref 4.5–5.9)
SODIUM SERPL-SCNC: 129 MMOL/L (ref 136–145)
WBC # BLD AUTO: 5.4 X10*3/UL (ref 4.4–11.3)

## 2024-10-28 PROCEDURE — 2500000001 HC RX 250 WO HCPCS SELF ADMINISTERED DRUGS (ALT 637 FOR MEDICARE OP): Performed by: NURSE PRACTITIONER

## 2024-10-28 PROCEDURE — 99223 1ST HOSP IP/OBS HIGH 75: CPT | Performed by: NURSE PRACTITIONER

## 2024-10-28 PROCEDURE — 2060000001 HC INTERMEDIATE ICU ROOM DAILY

## 2024-10-28 PROCEDURE — 82947 ASSAY GLUCOSE BLOOD QUANT: CPT

## 2024-10-28 PROCEDURE — 83735 ASSAY OF MAGNESIUM: CPT | Performed by: NURSE PRACTITIONER

## 2024-10-28 PROCEDURE — 2500000004 HC RX 250 GENERAL PHARMACY W/ HCPCS (ALT 636 FOR OP/ED): Performed by: INTERNAL MEDICINE

## 2024-10-28 PROCEDURE — 2500000001 HC RX 250 WO HCPCS SELF ADMINISTERED DRUGS (ALT 637 FOR MEDICARE OP): Performed by: INTERNAL MEDICINE

## 2024-10-28 PROCEDURE — 36415 COLL VENOUS BLD VENIPUNCTURE: CPT | Performed by: NURSE PRACTITIONER

## 2024-10-28 PROCEDURE — 85025 COMPLETE CBC W/AUTO DIFF WBC: CPT | Performed by: NURSE PRACTITIONER

## 2024-10-28 PROCEDURE — 92507 TX SP LANG VOICE COMM INDIV: CPT | Mod: GN

## 2024-10-28 PROCEDURE — 2500000002 HC RX 250 W HCPCS SELF ADMINISTERED DRUGS (ALT 637 FOR MEDICARE OP, ALT 636 FOR OP/ED): Performed by: INTERNAL MEDICINE

## 2024-10-28 PROCEDURE — 2500000005 HC RX 250 GENERAL PHARMACY W/O HCPCS: Performed by: NURSE PRACTITIONER

## 2024-10-28 PROCEDURE — 2500000004 HC RX 250 GENERAL PHARMACY W/ HCPCS (ALT 636 FOR OP/ED): Performed by: STUDENT IN AN ORGANIZED HEALTH CARE EDUCATION/TRAINING PROGRAM

## 2024-10-28 PROCEDURE — G0316 PR PROLONGED HOSPITAL INPATIENT OR OBSERVATION CARE EVALUATION AND MANAGEMENT SERVICE(S) BEYOND THE TOTAL TIME FOR THE PRIMARY SERVICE (WHEN THE PRIMARY SERVICE HAS BEEN SELECTED USING TIME: HCPCS | Performed by: NURSE PRACTITIONER

## 2024-10-28 PROCEDURE — 2500000002 HC RX 250 W HCPCS SELF ADMINISTERED DRUGS (ALT 637 FOR MEDICARE OP, ALT 636 FOR OP/ED): Performed by: NURSE PRACTITIONER

## 2024-10-28 PROCEDURE — 92526 ORAL FUNCTION THERAPY: CPT | Mod: GN

## 2024-10-28 PROCEDURE — 2500000001 HC RX 250 WO HCPCS SELF ADMINISTERED DRUGS (ALT 637 FOR MEDICARE OP): Performed by: STUDENT IN AN ORGANIZED HEALTH CARE EDUCATION/TRAINING PROGRAM

## 2024-10-28 PROCEDURE — 84520 ASSAY OF UREA NITROGEN: CPT | Performed by: NURSE PRACTITIONER

## 2024-10-28 PROCEDURE — G0316 PR PROLONGED INPATIENT/OBSERVATION EM SVC EA 15 MIN: HCPCS | Performed by: NURSE PRACTITIONER

## 2024-10-28 PROCEDURE — 99497 ADVNCD CARE PLAN 30 MIN: CPT | Performed by: NURSE PRACTITIONER

## 2024-10-28 PROCEDURE — 97530 THERAPEUTIC ACTIVITIES: CPT | Mod: GO,CO

## 2024-10-28 PROCEDURE — 97530 THERAPEUTIC ACTIVITIES: CPT | Mod: GP

## 2024-10-28 RX ORDER — OFLOXACIN 3 MG/ML
1 SOLUTION/ DROPS OPHTHALMIC 4 TIMES DAILY
Status: DISCONTINUED | OUTPATIENT
Start: 2024-10-28 | End: 2024-11-08 | Stop reason: HOSPADM

## 2024-10-28 RX ORDER — MAGNESIUM SULFATE HEPTAHYDRATE 40 MG/ML
2 INJECTION, SOLUTION INTRAVENOUS ONCE
Status: COMPLETED | OUTPATIENT
Start: 2024-10-28 | End: 2024-10-28

## 2024-10-28 RX ORDER — PROCHLORPERAZINE EDISYLATE 5 MG/ML
10 INJECTION INTRAMUSCULAR; INTRAVENOUS EVERY 6 HOURS PRN
Status: DISCONTINUED | OUTPATIENT
Start: 2024-10-28 | End: 2024-11-08 | Stop reason: HOSPADM

## 2024-10-28 RX ORDER — PROCHLORPERAZINE MALEATE 10 MG
5 TABLET ORAL EVERY 6 HOURS PRN
Status: DISCONTINUED | OUTPATIENT
Start: 2024-10-28 | End: 2024-11-08 | Stop reason: HOSPADM

## 2024-10-28 RX ADMIN — ENOXAPARIN SODIUM 40 MG: 40 INJECTION SUBCUTANEOUS at 08:50

## 2024-10-28 RX ADMIN — TAMSULOSIN HYDROCHLORIDE 0.8 MG: 0.4 CAPSULE ORAL at 20:41

## 2024-10-28 RX ADMIN — METFORMIN HYDROCHLORIDE 1000 MG: 1000 TABLET ORAL at 20:41

## 2024-10-28 RX ADMIN — INSULIN LISPRO 1 UNITS: 100 INJECTION, SOLUTION INTRAVENOUS; SUBCUTANEOUS at 09:10

## 2024-10-28 RX ADMIN — OFLOXACIN 1 DROP: 3 SOLUTION OPHTHALMIC at 17:10

## 2024-10-28 RX ADMIN — Medication 21 PERCENT: at 08:23

## 2024-10-28 RX ADMIN — ONDANSETRON 4 MG: 2 INJECTION INTRAMUSCULAR; INTRAVENOUS at 18:27

## 2024-10-28 RX ADMIN — ASPIRIN 81 MG CHEWABLE TABLET 81 MG: 81 TABLET CHEWABLE at 08:49

## 2024-10-28 RX ADMIN — PRAVASTATIN SODIUM 40 MG: 40 TABLET ORAL at 20:41

## 2024-10-28 RX ADMIN — MIDODRINE HYDROCHLORIDE 5 MG: 5 TABLET ORAL at 17:10

## 2024-10-28 RX ADMIN — MIDODRINE HYDROCHLORIDE 5 MG: 5 TABLET ORAL at 12:16

## 2024-10-28 RX ADMIN — MAGNESIUM SULFATE HEPTAHYDRATE 2 G: 40 INJECTION, SOLUTION INTRAVENOUS at 10:34

## 2024-10-28 RX ADMIN — Medication 400 MG: at 08:50

## 2024-10-28 RX ADMIN — INSULIN LISPRO 2 UNITS: 100 INJECTION, SOLUTION INTRAVENOUS; SUBCUTANEOUS at 17:10

## 2024-10-28 RX ADMIN — METFORMIN HYDROCHLORIDE 1000 MG: 1000 TABLET ORAL at 08:50

## 2024-10-28 RX ADMIN — PROCHLORPERAZINE MALEATE 5 MG: 10 TABLET ORAL at 18:52

## 2024-10-28 RX ADMIN — CARVEDILOL 3.12 MG: 3.12 TABLET, FILM COATED ORAL at 17:10

## 2024-10-28 RX ADMIN — INSULIN LISPRO 1 UNITS: 100 INJECTION, SOLUTION INTRAVENOUS; SUBCUTANEOUS at 12:16

## 2024-10-28 RX ADMIN — OFLOXACIN 1 DROP: 3 SOLUTION OPHTHALMIC at 20:41

## 2024-10-28 RX ADMIN — MIDODRINE HYDROCHLORIDE 5 MG: 5 TABLET ORAL at 08:50

## 2024-10-28 RX ADMIN — OFLOXACIN 1 DROP: 3 SOLUTION OPHTHALMIC at 12:43

## 2024-10-28 ASSESSMENT — COGNITIVE AND FUNCTIONAL STATUS - GENERAL
TURNING FROM BACK TO SIDE WHILE IN FLAT BAD: A LITTLE
MOVING FROM LYING ON BACK TO SITTING ON SIDE OF FLAT BED WITH BEDRAILS: A LITTLE
TURNING FROM BACK TO SIDE WHILE IN FLAT BAD: A LOT
CLIMB 3 TO 5 STEPS WITH RAILING: A LOT
TURNING FROM BACK TO SIDE WHILE IN FLAT BAD: A LITTLE
PERSONAL GROOMING: A LOT
MOBILITY SCORE: 8
STANDING UP FROM CHAIR USING ARMS: A LOT
MOVING FROM LYING ON BACK TO SITTING ON SIDE OF FLAT BED WITH BEDRAILS: A LITTLE
STANDING UP FROM CHAIR USING ARMS: A LOT
WALKING IN HOSPITAL ROOM: A LOT
DRESSING REGULAR UPPER BODY CLOTHING: A LOT
TURNING FROM BACK TO SIDE WHILE IN FLAT BAD: A LITTLE
DAILY ACTIVITIY SCORE: 12
TOILETING: TOTAL
HELP NEEDED FOR BATHING: A LOT
DRESSING REGULAR UPPER BODY CLOTHING: A LOT
EATING MEALS: A LOT
EATING MEALS: A LOT
WALKING IN HOSPITAL ROOM: A LOT
CLIMB 3 TO 5 STEPS WITH RAILING: A LOT
DAILY ACTIVITIY SCORE: 14
DAILY ACTIVITIY SCORE: 12
PERSONAL GROOMING: A LOT
MOVING FROM LYING ON BACK TO SITTING ON SIDE OF FLAT BED WITH BEDRAILS: A LITTLE
TOILETING: A LOT
DRESSING REGULAR LOWER BODY CLOTHING: A LOT
MOBILITY SCORE: 15
HELP NEEDED FOR BATHING: A LOT
DRESSING REGULAR LOWER BODY CLOTHING: TOTAL
PERSONAL GROOMING: A LOT
MOBILITY SCORE: 14
PERSONAL GROOMING: A LITTLE
DRESSING REGULAR UPPER BODY CLOTHING: A LOT
STANDING UP FROM CHAIR USING ARMS: A LOT
MOVING FROM LYING ON BACK TO SITTING ON SIDE OF FLAT BED WITH BEDRAILS: A LOT
CLIMB 3 TO 5 STEPS WITH RAILING: A LOT
DRESSING REGULAR LOWER BODY CLOTHING: A LOT
STANDING UP FROM CHAIR USING ARMS: TOTAL
HELP NEEDED FOR BATHING: A LOT
EATING MEALS: A LITTLE
HELP NEEDED FOR BATHING: A LOT
MOVING TO AND FROM BED TO CHAIR: A LITTLE
WALKING IN HOSPITAL ROOM: A LOT
MOBILITY SCORE: 14
DAILY ACTIVITIY SCORE: 10
WALKING IN HOSPITAL ROOM: TOTAL
DRESSING REGULAR LOWER BODY CLOTHING: A LOT
MOVING TO AND FROM BED TO CHAIR: A LOT
TOILETING: A LOT
TOILETING: A LOT
CLIMB 3 TO 5 STEPS WITH RAILING: TOTAL
MOVING TO AND FROM BED TO CHAIR: A LOT
DRESSING REGULAR UPPER BODY CLOTHING: A LOT
MOVING TO AND FROM BED TO CHAIR: TOTAL
EATING MEALS: A LOT

## 2024-10-28 ASSESSMENT — PAIN SCALES - WONG BAKER: WONGBAKER_NUMERICALRESPONSE: NO HURT

## 2024-10-28 ASSESSMENT — PAIN SCALES - GENERAL
PAINLEVEL_OUTOF10: 0 - NO PAIN

## 2024-10-28 ASSESSMENT — PAIN - FUNCTIONAL ASSESSMENT
PAIN_FUNCTIONAL_ASSESSMENT: 0-10
PAIN_FUNCTIONAL_ASSESSMENT: FLACC (FACE, LEGS, ACTIVITY, CRY, CONSOLABILITY)
PAIN_FUNCTIONAL_ASSESSMENT: FLACC (FACE, LEGS, ACTIVITY, CRY, CONSOLABILITY)
PAIN_FUNCTIONAL_ASSESSMENT: 0-10

## 2024-10-28 NOTE — CONSULTS
Inpatient consult to Palliative Care  Consult performed by: Desi Jerome, MERLENE-CNP  Consult ordered by: Don Banda MD    Patient Location   \[facility]\"" 12  Reason For Consult  Reason for Consult: communication / medical decision making     History Of Present Illness  Dat Mccallum is a 83 y.o. male with past medical history of Heart disease, severe ischemic who presented to the ED 10/16 after a family member stopped by his home to check on him and noted he was lethargic and confused. In ED he was noted to have elevated troponins and EKG with sinus tachycardia, leftward axis and less than 1 mm elevation in septal leads. Blood glucose was 57, , , lactate 1.5. Cardiology consulted and started patient on ASA, heparin gtt and BB. TTE noted severely reduced EF to 15-20%. The patient had cardiac catheterization with stent placement with good improvement in EF to 40-45%. He is also on low dose BB but has been having issues with hypotension and is also getting midodrine tid to help with this. Throughout hospitalization po intake has been fairly poor and palliative care was consulted to assist with GOC discussion.       Past Medical History  He has a past medical history of Acute frontal sinusitis, unspecified (01/06/2015), Encounter for screening for malignant neoplasm of colon (01/22/2020), Encounter for screening for malignant neoplasm of prostate (08/14/2017), Other conditions influencing health status, Other conditions influencing health status (01/19/2018), Other skin changes (07/21/2021), Other specified health status, Personal history of other diseases of the nervous system and sense organs, Personal history of other diseases of urinary system (08/10/2021), Personal history of other specified conditions (06/29/2018), Personal history of other specified conditions (09/25/2017), Personal history of other specified conditions (08/14/2017), Personal history of other specified conditions (10/11/2017),  Personal history of pneumonia (recurrent) (01/19/2018), Rash and other nonspecific skin eruption (12/17/2020), 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    Surgical History  He has a past surgical history that includes Other surgical history (12/02/2013); Other surgical history (11/26/2018); Other surgical history (11/26/2018); and Cardiac catheterization (N/A, 10/23/2024).     Social History  He reports that he has never smoked. He has never been exposed to tobacco smoke. He has never used smokeless tobacco. He reports that he does not currently use alcohol. He reports that he does not use drugs.    Caregiving/Caregiver Support  Does the patient require assistance in some or all components of his care, including coordination of medical care? Yes  If Yes, which person serves that role?  son   Caregiver emotional or practical needs:  unknown     Family History  No family history on file.    Allergies  Patient has no known allergies.    Scheduled medications  aspirin, 81 mg, oral, Daily  carvedilol, 3.125 mg, oral, BID after meals  enoxaparin, 40 mg, subcutaneous, Daily  insulin lispro, 0-5 Units, subcutaneous, TID  magnesium oxide, 400 mg, oral, Daily  metFORMIN, 1,000 mg, oral, BID  midodrine, 5 mg, oral, TID  ofloxacin, 1 drop, Left Eye, 4x daily  oxygen, , inhalation, Continuous - 02/gases  pravastatin, 40 mg, oral, Nightly  tamsulosin, 0.8 mg, oral, Nightly      Continuous medications     PRN medications  PRN medications: aminophylline, dextrose, dextrose, glucagon, glucagon, melatonin, ondansetron     Relevant Results  Na 129, BUN 34, creat 1.07, Mg 1.49, alb 2.4, hgb 8.3  Results for orders placed or performed during the hospital encounter of 10/16/24 (from the past 24 hours)   POCT GLUCOSE   Result Value Ref Range    POCT Glucose 137 (H) 74 - 99 mg/dL   POCT GLUCOSE   Result Value Ref Range    POCT Glucose 174 (H) 74 - 99  mg/dL   CBC and Auto Differential   Result Value Ref Range    WBC 5.4 4.4 - 11.3 x10*3/uL    nRBC 0.0 0.0 - 0.0 /100 WBCs    RBC 2.69 (L) 4.50 - 5.90 x10*6/uL    Hemoglobin 8.3 (L) 13.5 - 17.5 g/dL    Hematocrit 23.2 (L) 41.0 - 52.0 %    MCV 86 80 - 100 fL    MCH 30.9 26.0 - 34.0 pg    MCHC 35.8 32.0 - 36.0 g/dL    RDW 13.2 11.5 - 14.5 %    Platelets 175 150 - 450 x10*3/uL    Neutrophils % 66.6 40.0 - 80.0 %    Immature Granulocytes %, Automated 0.2 0.0 - 0.9 %    Lymphocytes % 20.4 13.0 - 44.0 %    Monocytes % 11.1 2.0 - 10.0 %    Eosinophils % 1.5 0.0 - 6.0 %    Basophils % 0.2 0.0 - 2.0 %    Neutrophils Absolute 3.59 1.60 - 5.50 x10*3/uL    Immature Granulocytes Absolute, Automated 0.01 0.00 - 0.50 x10*3/uL    Lymphocytes Absolute 1.10 0.80 - 3.00 x10*3/uL    Monocytes Absolute 0.60 0.05 - 0.80 x10*3/uL    Eosinophils Absolute 0.08 0.00 - 0.40 x10*3/uL    Basophils Absolute 0.01 0.00 - 0.10 x10*3/uL   Magnesium   Result Value Ref Range    Magnesium 1.49 (L) 1.60 - 2.40 mg/dL   Renal function panel   Result Value Ref Range    Glucose 156 (H) 74 - 99 mg/dL    Sodium 129 (L) 136 - 145 mmol/L    Potassium 4.1 3.5 - 5.3 mmol/L    Chloride 100 98 - 107 mmol/L    Bicarbonate 24 21 - 32 mmol/L    Anion Gap 9 (L) 10 - 20 mmol/L    Urea Nitrogen 34 (H) 6 - 23 mg/dL    Creatinine 1.07 0.50 - 1.30 mg/dL    eGFR 70 >60 mL/min/1.73m*2    Calcium 7.9 (L) 8.6 - 10.3 mg/dL    Phosphorus 2.9 2.5 - 4.9 mg/dL    Albumin 2.4 (L) 3.4 - 5.0 g/dL   POCT GLUCOSE   Result Value Ref Range    POCT Glucose 162 (H) 74 - 99 mg/dL      Nuclear Stress Test  Result Date: 10/22/2024  Interpreted By:  Chapito García and Ogievich Taessa STUDY: NUCLEAR STRESS TEST; 10/22/2024 2:18 pm    1.  No evidence of inducible ischemia or prior infarction. 2. Left ventricle is normal in size. 3. Mild global hypokinesis of the LV wall motion with a post-stress LV EF estimated at 44%.      Electrocardiogram, 12-lead PRN ACS symptoms  Result Date:  10/22/2024  Poor data quality in current ECG precludes serial comparison Sinus tachycardia with short HI with Premature supraventricular complexes and with frequent Premature ventricular complexes Indeterminate axis Pulmonary disease pattern Nonspecific ST and T wave abnormality Abnormal ECG When compared with ECG of 16-OCT-2024 20:40, (unconfirmed) Previous ECG has undetermined rhythm, needs review Questionable change in QRS duration Confirmed by Fer Vazquez ([id number]) on 10/22/2024 12:52:29 PM    MR brain wo IV contrast  Result Date: 10/19/2024  FINDINGS: Midline brain structures are intact on mid sagittal imaging.   There is no territorial restricted diffusion to suggest acute intracranial infarct. No suspicious T2 signal hypointensity noted on gradient sequencing.   There is no evidence of acute intracranial hemorrhage. No intracranial mass or mass effect. No midline shift. No loss of gray-white differentiation. Few scattered supratentorial white matter FLAIR signal hyperintensities are observed.  Basilar cisterns appear intact. Moderate dilatation of the lateral and 3rd greater than 4th ventricles at least principally on the basis of global parenchymal volume loss.   Normal T2 vascular flow voids are seen.   T2 hyperintensity within the maxillary sinuses is consistent with mucosal inflammatory paranasal sinus disease.. Status post bilateral lens replacements.   There is no mastoid effusion. Moderate effusions of the occipital lateral mass C1 articulation bilaterally may be on a degenerative basis.       No MR evidence of acute intracranial infarct, hemorrhage, mass, or mass effect.   I personally reviewed the images/study and I agree with the findings as stated. This study was interpreted at [place], [place].   MACRO: None   Signed by: Hugo Rico 10/19/2024 4:41 PM Dictation workstation:   [id number]    EEG  IMPRESSION Impression This routine awake and drowsy EEG is  indicative of a moderate diffuse encephalopathy. No epileptiform activity or lateralizing signs seen. A full report will be scanned into the patient's chart at a later time. This report has been interpreted and electronically signed by    Transthoracic Echo (TTE) Complete  PHYSICIAN INTERPRETATION: Left Ventricle: The left ventricular systolic function is severely decreased, with a visually estimated ejection fraction of 15-20%. There are multiple wall motion abnormalities. The left ventricular cavity size is moderately dilated. There is mild concentric left ventricular hypertrophy. Left ventricular diastolic filling was indeterminate. LV Wall Scoring: The entire apex, mid and apical anterior wall, mid and apical anterior septum, mid and apical inferior septum, mid and apical inferior wall, mid inferolateral segment, and mid anterolateral segment are akinetic. All remaining scored segments are normal. Left Atrium: The left atrium is normal in size. Right Ventricle: The right ventricle is normal in size. There is normal right ventricular global systolic function. Right Atrium: The right atrium is normal in size. Aortic Valve: The aortic valve is probably trileaflet. The aortic valve dimensionless index is 0.70. There is no evidence of aortic valve regurgitation. The peak instantaneous gradient of the aortic valve is 2.7 mmHg. The mean gradient of the aortic valve is 1.4 mmHg. Mitral Valve: The mitral valve is normal in structure. There is no evidence of mitral valve regurgitation. Tricuspid Valve: The tricuspid valve is structurally normal. No evidence of tricuspid regurgitation. Pulmonic Valve: The pulmonic valve is not well visualized. The pulmonic valve regurgitation was not well visualized. Pericardium: No pericardial effusion noted. Aorta: The aortic root is normal.  CONCLUSIONS:  1. Multiple segmental abnormalities exist. See findings.  2. The left ventricular systolic function is severely decreased, with a  visually estimated ejection fraction of 15-20%.  3. There are multiple wall motion abnormalities.  4. Left ventricular diastolic filling was indeterminate.  5. Left ventricular cavity size is moderately dilated.  6. There is normal right ventricular global systolic function.  7. 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     ECG 12 lead  Result Date: 10/17/2024  Normal sinus rhythm Left axis deviation Low voltage QRS Anteroseptal infarct Inferior infarct , age undetermined Abnormal ECG When compared with ECG of 04-APR-2018 16:39, Significant changes have occurred Confirmed by Rell Vaughan ([id number]) on 10/17/2024 11:32:49 AM    XR chest 1 view  Result Date: 10/16/2024  FINDINGS: Multiple overlying leads are present.   CARDIOMEDIASTINAL SILHOUETTE: Cardiomediastinal silhouette is normal in size and configuration. Atherosclerotic calcification of the aorta.   LUNGS: No consolidation, pleural effusion or pneumothorax.   ABDOMEN: Surgical clips noted in the left upper quadrant.   BONES: Multilevel degenerative changes of the spine.       No acute cardiopulmonary process.   MACRO: None   Signed by: Lobo Farrar 10/16/2024 9:52 PM Dictation workstation:   [id number]    CT head wo IV contrast  Result Date: 10/16/2024  FINDINGS: CSF Spaces: The ventricles, sulci and basal cisterns are within normal limits. There is no extraaxial fluid collection.   Parenchyma: There are periventricular white matter changes noted. The grey-white differentiation is intact. There is no mass effect or midline shift.  There is no intracranial hemorrhage.   Calvarium: The calvarium is unremarkable.   Paranasal sinuses and mastoids: Visualized paranasal sinuses and mastoids are clear.       No evidence of acute cortical infarct or intracranial hemorrhage.       MACRO: None   Signed by: Ori Asher 10/16/2024 9:28 PM Dictation workstation:   [id number]      Review of Systems   Reason unable to perform  ROS: pt is not responsive to verbal stimuli, he is deaf, too weak to write on whiteboard. ROS per chart, nursing staff.       Functional Status  Activities of Daily Living:  Prior to admission the patient was independent in all ADL'S and most iADL's other than driving    Physical Exam  Constitutional:       General: He is not in acute distress.     Appearance: He is not toxic-appearing.      Comments: Very thin and frail appearing elderly male sitting up in bed eating lunch, being fed by nurse   HENT:      Mouth/Throat:      Mouth: Mucous membranes are moist.      Pharynx: Oropharynx is clear.   Eyes:      General: No scleral icterus.     Extraocular Movements: Extraocular movements intact.   Cardiovascular:      Rate and Rhythm: Normal rate and regular rhythm.      Pulses: Normal pulses.      Heart sounds: Normal heart sounds.   Pulmonary:      Effort: Pulmonary effort is normal. No respiratory distress.      Breath sounds: Normal breath sounds.   Abdominal:      General: Abdomen is flat. Bowel sounds are normal. There is no distension.      Palpations: Abdomen is soft.      Tenderness: There is no abdominal tenderness. There is no guarding.   Musculoskeletal:      Right lower leg: No edema.      Left lower leg: No edema.   Skin:     General: Skin is warm and dry.      Coloration: Skin is pale.   Neurological:      Comments: Moves all extremities. The patient is deaf and too weak to write/answer questions on whiteboard   Psychiatric:      Comments: Calm, cooperative       Last Recorded Vitals  Blood pressure 115/63, pulse 80, temperature 36.5 °C (97.7 °F), temperature source Temporal, resp. rate 15, height 1.829 m (6'), weight 65.5 kg (144 lb 6.4 oz), SpO2 96%.      PALLIATIVE MEDICINE PALLIATIVE PERFORMANCE SCALE     Palliative Performance Scale % (PPS) 70-80%   Oral Intake Moderately reduced (> mouthfuls) (1.0)   Edema Absent (0)   Dyspnea at Rest Absent (0)   Delirium Present (4.0)   Palliative Prognostic Index  (PPI) Total Score 4.5-6   Note: The scores from each prognostic domain are added.  A score of 0 to 2.0 was associated with a median survival of 90 days; score of 2.1 to 4.0 is 61 days, and score of >4.0 is 12 days.        Assessment/Plan   IMP:     HFrEF - EF improved s/p PCI. On BB  NSTEMI - aspirin, BB  Hypotension - on midodrine tid  Acute metabolic encephalopathy - baseline A&O x3, improving, supportive care treat underlying issues  Severe protein calorie malnutrition  Electrolyte derangements - receiving supplementations    Palliative Care Encounter  DNR-DNI  The patient is not capable  Cristian Jules is primary HPOA, cristian Villanueva is first alternate agent  I did have the opportunity to talk to Jorge A over the phone today. Goal of care is treat acute issues, skilled rehab and home. Prior to hospitalization the patient was independent in all basic and iADL's other than driving. High functioning at home alone, maintains own home, does outdoor yard work. The patient is eating some but requires assistance. I feel the poor po intake may be multifactorial related to the current hospitalization, although he is thin and BMI < 20, he has not had a recent significant weight loss or swallowing issues. Added Strawberry or Vanilla Ensure High Protein per patient preference according to son. D/w nurse as well who did note that pt eating, just requires assistance due to weakness.    Patient/proxy preference for information  Prefers full information    Provider estimate of survival: 1-6 months  Patient Prognostic Awareness: Unknown    Is the patient hospice-eligible?   Yes  Was a discussion held re hospice services?   no  Was a decision made re hospice services?  NA    Goals of Care  Treatment model  Symptom Management  Pain: denies  Medications recommended for pain?  No  Tiredness: yes  Nausea: none  Depression: no  Anxiety: no  Drowsiness: yes  Appetite: poor  Wellbeing: poor  Dyspnea: no  Intervention recommended for dyspnea?   no  Other: na  Intervention recommended for constipation?  No    Thank you for this consultation. We will follow.    D/w bedside RN Peace, care coordinator Alea Talbert, Dr. Saravia and Brandee Jaeger CNP.     I spent 110 minutes in the professional and overall care of this patient.  ACP time 20 min.    Desi Jerome, APRN-CNP

## 2024-10-28 NOTE — ASSESSMENT & PLAN NOTE
-clinically euvolemic  -unable to tolerate ACE/ARB or diuretics.  Present treatment with beta-blocker is continued.  He is also treated with amiodarone for hypotension.  Vital signs will be monitored and medications adjusted as indicated.  -The patient has severe debility from his multiple acute and chronic medical problems.  He has been evaluated physical therapy occupational therapy.  It is recommended the patient be discharged to skilled nursing facility for short-term rehabilitation.  He will be discharged to skilled nursing facility when insurance precertification is completed.

## 2024-10-28 NOTE — PROGRESS NOTES
Physical Therapy    Physical Therapy Treatment    Patient Name: Dat Mccallum  MRN: [id number]  Department: [address]  Room: 12/12-A  Today's Date: 10/28/2024  Time Calculation  Start Time: 1551  Stop Time: 1619  Time Calculation (min): 28 min         Assessment/Plan   PT Assessment  Rehab Prognosis: Good  Evaluation/Treatment Tolerance: Patient limited by fatigue (intermittent lethargy)  End of Session Communication: Bedside nurse  Assessment Comment: Slow progress regarding functional mobility;  tolerance to activity limited by intermittent lethargy;  fall risk  End of Session Patient Position: Bed, 3 rail up, Alarm on  PT Plan  Inpatient/Swing Bed or Outpatient: Inpatient  PT Plan  Treatment/Interventions: Bed mobility, Transfer training, Gait training, Balance training, Strengthening, Endurance training, Therapeutic exercise, Therapeutic activity  PT Plan: Ongoing PT  PT Frequency: 4 times per week  PT Discharge Recommendations: Moderate intensity level of continued care  PT Recommended Transfer Status: Assist x2  PT - OK to Discharge: Yes (with skilled physical therapy services at next level of care.)      General Visit Information:   PT  Visit  PT Received On: 10/28/24  General  Co-Treatment: OT  Co-Treatment Reason: Need for 2nd skilled person for therapeutic handling during functional mobility to optimize outcomes and safety  Prior to Session Communication: Bedside nurse  Patient Position Received: Bed, 3 rail up, Alarm on  General Comment: RN cleared patient for treatment.  Patient appears agreeable to treatment.    Subjective   Precautions:  Precautions  Medical Precautions: Fall precautions        Objective   Pain:  Pain Assessment  Pain Assessment: FLACC (Face, Legs, Activity, Cry, Consolability)  0-10 (Numeric) Pain Score: 0 - No pain  Cognition:  Cognition  Overall Cognitive Status: Impaired (Communication via writting;  difficult to assess patient comprehension;  patient would appear to read  communication but had difficutly with reciprocating with writing;  assist required to follow commands)  Coordination:  Movements are Fluid and Coordinated: No  Lower Body Coordination: Venkat LE movements are slow  Postural Control:  Static Sitting Balance  Static Sitting-Balance Support: Bilateral upper extremity supported  Static Sitting-Level of Assistance: Moderate assistance  Static Sitting-Comment/Number of Minutes: Assist required to maintain midline while sitting on side of bed due to decreased balance left side  Static Standing Balance  Static Standing-Balance Support: Bilateral upper extremity supported  Static Standing-Level of Assistance: Maximum assistance (x 2)  Static Standing-Comment/Number of Minutes: Assist with trunk support and balance during static standing with rolling walker;  patient stands with narrow base of support;  trunk support required left side of trunk greater than right side.       Activity Tolerance:  Activity Tolerance  Endurance: Decreased tolerance for upright activites  Activity Tolerance Comments: Fatigue;  intermittent lethargy  Treatments:  Therapeutic Exercise  Therapeutic Exercise Performed: Yes              Bed Mobility  Bed Mobility: Yes  Bed Mobility 1  Bed Mobility 1: Supine to sitting  Level of Assistance 1: Moderate assistance (x 2)  Bed Mobility Comments 1: Assist with trunk-up and venkat LE  Bed Mobility 2  Bed Mobility  2: Sitting to supine  Level of Assistance 2: Moderate assistance (x 2)  Bed Mobility Comments 2: Assist with trunk and venkat LE    Ambulation/Gait Training  Ambulation/Gait Training Performed: No  Transfers  Transfer: Yes  Transfer 1  Transfer From 1: Sit to  Transfer to 1: Stand  Technique 1: Sit to stand  Transfer Device 1: Walker  Transfer Level of Assistance 1: Arm in arm assistance, Maximum assistance (x 2)  Trials/Comments 1: Assist with encouraging forward trunk flexion, elevating buttocks from sitting surface, and positioning COG over MARGOTH;  patient  was allowed to pull up from stabilized walker during sit to stand.  Transfers 2  Transfer From 2: Stand to  Transfer to 2: Sit  Technique 2: Stand to sit  Transfer Device 2: Walker  Transfer Level of Assistance 2: Arm in arm assistance, Maximum assistance (x 2)  Trials/Comments 2: Assist with trunk support and balance;  decreased eccentric control    Stairs  Stairs: No         Outcome Measures:  [facility] Basic Mobility  Turning from your back to your side while in a flat bed without using bedrails: A lot  Moving from lying on your back to sitting on the side of a flat bed without using bedrails: A lot  Moving to and from bed to chair (including a wheelchair): Total  Standing up from a chair using your arms (e.g. wheelchair or bedside chair): Total  To walk in hospital room: Total  Climbing 3-5 steps with railing: Total  Basic Mobility - Total Score: 8    Education Documentation  No documentation found.  Education Comments  No comments found.        OP EDUCATION:       Encounter Problems       Encounter Problems (Active)       Mobility       Bed mobility including supine to sit and sit to supine with supervision. (Progressing)       Start:  10/20/24    Expected End:  11/03/24            Transfers including sit to stand and stand to sit with supervision. (Progressing)       Start:  10/20/24    Expected End:  11/03/24            Ambulate 50 feet with rolling walker and min assist. (Progressing)       Start:  10/20/24    Expected End:  11/03/24               Pain - Adult

## 2024-10-28 NOTE — ASSESSMENT & PLAN NOTE
-s/p LHC: no intervention indicated  -aspirin and betablocker   -however he is on midodrine for hypotension as well and use of BB and midodrine may be canceling out the benefit of the carvedilol   -Cardiology signed off 10/25

## 2024-10-28 NOTE — PROGRESS NOTES
Occupational Therapy    OT Treatment    Patient Name: Dat Mccallum  MRN: [id number]  Department: [address]  Room: 12/12-A  Today's Date: 10/28/2024  Time Calculation  Start Time: 1550  Stop Time: 1618  Time Calculation (min): 28 min        Assessment:  OT Assessment: Gradual progress made towards OT goals. Continue with OT POC to increase strength, functional tolerance and balance to maximize independence in the least restrictive environment.  Evaluation/Treatment Tolerance: Patient limited by fatigue  End of Session Communication: Bedside nurse  End of Session Patient Position: Bed, 3 rail up, Alarm on  OT Assessment Results: Decreased ADL status, Decreased upper extremity strength, Decreased safe judgment during ADL, Decreased cognition, Decreased fine motor control, Decreased functional mobility, Decreased gross motor control  Evaluation/Treatment Tolerance: Patient limited by fatigue  Plan:  Treatment Interventions: ADL retraining, Functional transfer training, UE strengthening/ROM, Endurance training, Patient/family training, Cognitive reorientation  OT Frequency: 4 times per week  OT Discharge Recommendations: Moderate intensity level of continued care  OT Recommended Transfer Status: Assist of 2  OT - OK to Discharge: Yes  Treatment Interventions: ADL retraining, Functional transfer training, UE strengthening/ROM, Endurance training, Patient/family training, Cognitive reorientation    Subjective   Previous Visit Info:  OT Last Visit  OT Received On: 10/28/24  General:  General  Reason for Referral: Impaired ADLs due to NSTEMI  Past Medical History Relevant to Rehab: DM, HTN, HLD, CKD 3, BPH, pancreatectomy Whipple procedure, knee surgery  Co-Treatment: PT  Co-Treatment Reason: For patient and caregiver safety during treatment, session completed with two skilled therapists to maximize safety and functional outcomes.  Prior to Session Communication: Bedside nurse  Patient Position Received: Bed, 3 rail up, Alarm  on  General Comment: Patient cleared for OT session per nursing, upon arrival patient lying supine with HOB moderately elevated. Patient agreeable to services.  Precautions:  Hearing/Visual Limitations: [place] utilizing white board to communicate as needed with basic ASL, glasses  Medical Precautions: Fall precautions  Precautions Comment: +[place], +Tele    Vital Signs (Past 2hrs)        Date/Time Vitals Session Patient Position Pulse Resp SpO2 BP MAP (mmHg)    10/28/24 1710 --  --  78  --  --  --  --                         Pain:  Pain Assessment  Pain Assessment: FLACC (Face, Legs, Activity, Cry, Consolability)  0-10 (Numeric) Pain Score: 0 - No pain  Clinical Progression: Not changed    Objective    Cognition:  Cognition  Overall Cognitive Status: Impaired (Communicated with patient via dry erase board however it was difficult to assess patient's comprehension. It seemed as though patient could read the board but had difficulty with reciprocating with writing therefore used point method of yes or no.)  Safety/Judgement: Exceptions to WFL  Unable to Self-Monitor and Self-Correct Consistently: Moderate  Insight: Severe  Impulsive: Mildly  Task Initiation: Initiates with cues  Processing Speed: Delayed  Coordination:  Movements are Fluid and Coordinated: No  Upper Body Coordination: decreased rate and accuracy of movements  Lower Body Coordination: decreased rate and accuracy of B LE movement  Trunk Coordination: Fair- sitting balance with BUE support, observation of L lateral lean    Bed Mobility/Transfers: Bed Mobility  Bed Mobility: Yes  Bed Mobility 1  Bed Mobility 1: Supine to sitting  Level of Assistance 1: Moderate assistance (x2)  Bed Mobility Comments 1: Patient required trunk support and B LE management to EOB, assistance with scooting to standard level EOB.  Bed Mobility 2  Bed Mobility  2: Sitting to supine  Level of Assistance 2: Moderate assistance (x2)  Bed Mobility Comments 2: Patient required trunk  support and BLE management sit <>supine from moderately elevated EOB.    Transfer 1  Transfer From 1: Sit to  Transfer to 1: Stand  Technique 1: Sit to stand  Transfer Device 1: Walker  Transfer Level of Assistance 1: Arm in arm assistance, Maximum assistance (x2)  Trials/Comments 1: Patient performed sit <>stand with max Ax2 for need of forward trunk flexion, elevating trunk from moderately raised EOB and for poisitoning of center of gravity, patient allowed B UE of FWW during transfer.  Transfers 2  Transfer From 2: Stand to  Transfer to 2: Sit  Technique 2: Stand to sit  Transfer Device 2: Walker (Patient allowed to have B UE support on FWW during stand <>sit then once forward trunk flexion, Max TC for hand placement)  Transfer Level of Assistance 2: Arm in arm assistance, Maximum assistance (x2)  Trials/Comments 2: Patient performed stand <>sit with Max Ax2 then once forward trunk flexion, Max TC for hand placement, assistance of trunk support and balance.    Sitting Balance:  Static Sitting Balance  Static Sitting-Balance Support: Bilateral upper extremity supported, Feet supported  Static Sitting-Level of Assistance: Maximum assistance (x1)  Static Sitting-Comment/Number of Minutes: Patient performed static sitting balance at standard level EOB with MaxAx1 for L lateral lean to center of gravity with Mod TC for hand placement and weight shifting. Rest break offered during trials of functional task however patient kept signing for drink. No progress made with functional task but gradual progress made with static sitting tolerance.  Standing Balance:  Static Standing Balance  Static Standing-Balance Support: Bilateral upper extremity supported  Static Standing-Level of Assistance: Maximum assistance (x2)  Static Standing-Comment/Number of Minutes: Poor static standing balance  of less than one minute performed with FWW Mod Ax2. Patient required Mod A with weight shifting to center and positioning of  feet.    Therapy/Activity: Therapeutic Activity  Therapeutic Activity Performed: Yes  Therapeutic Activity 1: Standard level EOB sitting trials completed x 15min with MaxA of 1 to complete functional tasks. Fair sitting balance observed this date with L lateral LOB. Standard level EOB sitting trials completed to increase functional tolerance, strength and challenge sitting balance to maximize potential during ADLs.    Outcome Measures:[facility] Daily Activity  Putting on and taking off regular lower body clothing: Total  Bathing (including washing, rinsing, drying): A lot  Putting on and taking off regular upper body clothing: A lot  Toileting, which includes using toilet, bedpan or urinal: Total  Taking care of personal grooming such as brushing teeth: A lot  Eating Meals: A lot  Daily Activity - Total Score: 10        Education Documentation  ADL Training, taught by ARMAAN Myers at 10/28/2024  5:22 PM.  Learner: Patient  Readiness: Acceptance  Method: Explanation, Demonstration  Response: Needs Reinforcement    Education Comments  Education provided on role of OT/PT/POC and safety awareness throughout functional tasks/transfers.        OP EDUCATION:       Goals:  Encounter Problems       Encounter Problems (Active)       ADL       Goal 1 (Progressing)       Start:  10/21/24    Expected End:  11/01/24       Patient will demonstrate improved ADL skills:  Bathing with Min assist with adaptive equipment prn    Grooming with SBA assist       Feeding with supervision assist      UE Dressing with SBA assist           LE Dressing with Min assist with adaptive equipment prn     Toileting with Min assist with adaptive equipment prn                    Treatment and documentation completed by JOANNE Myers under the direct Supervision of MANDY Avendaño/GILSON

## 2024-10-28 NOTE — ASSESSMENT & PLAN NOTE
-Less idiopathic and more likely related to his cardiomyopathy  -responds to midodrine, may be improved by discontinuing betablocker

## 2024-10-28 NOTE — ASSESSMENT & PLAN NOTE
-Mental status may be at baseline  -has had some communication with whiteboard per nursing   -suspect waxing and waning mentation

## 2024-10-28 NOTE — PROGRESS NOTES
Speech-Language Pathology    SLP Adult Inpatient  Speech-Language Pathology Treatment     Patient Name: Dat Mccallum  MRN: [id number]  Today's Date: 10/28/2024  Time Calculation  Start Time: 1520  Stop Time: 1546  Time Calculation (min): 26 min     Number of treatments in POC: 1/3 sp+sw    Current Problem:   1. NSTEMI (non-ST elevated myocardial infarction) (Multi)  Transthoracic Echo (TTE) Complete    Transthoracic Echo (TTE) Complete    EEG    Nuclear Stress Test    Nuclear Stress Test    [facility]    Cardiology Interpretation Of Nuclear Stress - See Other Report For Nuclear Portion    Cardiology Interpretation Of Nuclear Stress - See Other Report For Nuclear Portion    [facility]    Case Request Cath Lab: Left Heart Cath    Case Request Cath Lab: Left Heart Cath    Cardiac Catheterization Procedure    Cardiac Catheterization Procedure    aspirin 81 mg chewable tablet    carvedilol (Coreg) 3.125 mg tablet    pravastatin (Pravachol) 20 mg tablet    pravastatin (Pravachol) 40 mg tablet      2. Chronic systolic heart failure  Case Request Cath Lab: Left Heart Cath    Case Request Cath Lab: Left Heart Cath    Cardiac Catheterization Procedure    Cardiac Catheterization Procedure    carvedilol (Coreg) 3.125 mg tablet    midodrine (Proamatine) 5 mg tablet    Renal function panel      3. Type 2 diabetes mellitus without complication, without long-term current use of insulin (Multi)  insulin lispro (HumaLOG) 100 unit/mL injection          SLP Assessment:  SLP TX Intervention Outcome: Making Progress Towards Goals  Treatment Tolerance: Patient tolerated treatment well  Pt responding with ASL signs or gestures to some written commands and expressing wants/needs in immediate env't by pointing or with other gestures.  Pt continues to demonstrate safety with puree consistency and thin liquids, but declined solid food trial by shaking his head when presented with snack food.    Plan:  Inpatient/Swing Bed or Outpatient:  Inpatient  SLP TX Plan: Continue Plan of Care  SLP Plan: Skilled SLP  SLP Frequency: 3x per week  Duration: 2 weeks  SLP Discharge Recommendations: Continue skilled SLP services at the next level of care  Next Treatment Priority: trial advanced solids, written commands      Subjective   Pt pleasant and cooperative, upright in bed for session.    Pain Assessment:  Pain Assessment  Pain Assessment: 0-10  0-10 (Numeric) Pain Score: 0 - No pain      Objective     Therapeutic Swallow:  Therapeutic Swallow Intervention : PO Trials, Compensatory Strategies  Solid Diet Recommendations: Pureed/extremely thick  (IDDSI Level 4)  Liquid Diet Recommendations: Thin (IDDSI Level 0)  Swallow Goals:  In 2 weeks...  - Pt will consume prescribed diet (current diet is [puree and thin liquids) without overt s/sx aspiration/penetration in 95% of observed trials.              GOAL START:   10/18/2024                                          GOAL END: 11/1/24              CURRENT STATUS: No s/s aspiration 100% of puree trial and thin liquids via straw.     PROGRESS: Improving intake with modified diet consistency.  - Pt will consume trials of upgraded textures without overt s/sx aspiration in order for consideration of diet texture upgrade.               GOAL START:   10/18/2024                                          GOAL END:11/1/24              CURRENT STATUS: Pt declined trial of alexandro crackers.   PROGRESS: None noted; not formally addressed  - Pt will demonstrate follow-through of trained compensatory strategies during a meal/snack with 90% acc with max cues.              GOAL START:   10/18/2024                                          GOAL END:11/1/24   CURRENT STATUS: Pt fed self puree in adequately sized spoonfuls with appropriate rate of intake.  Single sips via straw also noted.   PROGRESS: Good follow-through noted; progressing with skilled instruction.    Language Expression:  Language Expression Interventions:  Writing  Language Expression Comments: Pt responded at times with ASL signs or casual gestures    Language Comprehension:  Language Comprehension Interventions: Single Step Commands, Yes/No Questions    Speech Goals   (Start date 10/18/24. Anticipated time frame for goal attainment: 2 weeks)    - Pt will complete low level expressive language tasks with 50% acc given max cues.  CURRENT STATUS: SLP engaged pt in informal tasks.  Pt expressed immediate needs with use of ASL signs and casual gestures to indicate he wanted a tissue, more applesauce or beverage, did not want alexandro crackers or to write his responses.   PROGRESS: Improving communication of basics via multimodal expressions.    -Pt will complete low level receptive language tasks with 50% acc given max cues.   CURRENT STATUS: Pt responded to written basic commands with 33% acc and questions with 25% acc.   PROGRESS: Progressing; continue to address             - Pt will use multimodal communication to communicate basic wants/needs in 50% of opportunities given max assist.   CURRENT STATUS: 50% of opportunities in session pt used gestures and signs.  Pt attempted writing but not successful.   PROGRESS:  Improving ability to communicate basic wants/needs with encouragement and written cues.               - Pt will imitate consonants/vowels/single words with 50 acc given max cues.   CURRENT STATUS: Not targeted   PROGRESS: N/A    - Pt will demonstrate orientation x4 given max cues.  CURRENT STATUS: Not targeted   PROGRESS: N/A      Inpatient Education:   Individual(s) Educated: Patient  Verbal Education : Strategies, POC, diet recommendations (using white board)  Patient/Caregiver Demonstrated Understanding: yes, via ASL sign  Plan of Care Discussed and Agreed Upon: yes  Patient Response to Education: Patient/Caregiver expressed Understanding of Information via ASL sign

## 2024-10-28 NOTE — PROGRESS NOTES
Dat Mccallum is a 83 y.o. male on day 12 of admission presenting with NSTEMI (non-ST elevated myocardial infarction) (Multi).    Subjective   Attempted to speak loudly to him. Nursing informed he had some minimal communication using whiteboard. Attempted to communicate via whiteboard. Appears to read words on whiteboard, however words he tried to write resembled a sideways salo stocking. When he twitched awake when MD touched foot during physical exam, MD attempted to ask him using ASL sign for pain if he had pain in his foot however he did not respond nor appear to attempt to sign and fell back asleep       Objective     Last Recorded Vitals  Blood pressure 115/63, pulse 80, temperature 36.5 °C (97.7 °F), temperature source Temporal, resp. rate 15, height 1.829 m (6'), weight 65.5 kg (144 lb 6.4 oz), SpO2 96%.    Vitals Reviewed: yes, pre-midodrine hypotension additionally noted  Constitutional: Altered, falling back asleep, glasses present on face  Eyes: PERRL, EOMI, thick purulent discharge from left eye  ENT: Tachy mucous membranes with food particles  Neck: Supple, normal ROM, no lymphadenopathy  Pulm: Clear to auscultation bilaterally, no wheezes, rhonchi, or rales  Cardiac: regular rate, regular rhythm, no murmur  GI: Soft, non-tender, non-distended, normal bowel sounds  MSK: no lower extremity edema  Skin: Warm and well perfused  Neuro: Altered, moves all extremities, Deaf, poor eyesight  Psych: Cooperative, flat affect, impaired judgement    Intake/Output last 3 Shifts:  I/O last 3 completed shifts:  In: 860 (13.1 mL/kg) [P.O.:360; IV Piggyback:500]  Out: 800 (12.2 mL/kg) [Urine:800 (0.3 mL/kg/hr)]  Weight: 65.5 kg     Relevant Results  Scheduled medications  aspirin, 81 mg, oral, Daily  carvedilol, 3.125 mg, oral, BID after meals  enoxaparin, 40 mg, subcutaneous, Daily  insulin lispro, 0-5 Units, subcutaneous, TID  magnesium oxide, 400 mg, oral, Daily  magnesium sulfate, 2 g, intravenous,  Once  metFORMIN, 1,000 mg, oral, BID  midodrine, 5 mg, oral, TID  ofloxacin, 1 drop, Left Eye, 4x daily  oxygen, , inhalation, Continuous - 02/gases  pravastatin, 40 mg, oral, Nightly  tamsulosin, 0.8 mg, oral, Nightly      Continuous medications     PRN medications  PRN medications: aminophylline, dextrose, dextrose, glucagon, glucagon, melatonin, ondansetron             Assessment/Plan   Assessment & Plan  NSTEMI (non-ST elevated myocardial infarction) (Multi)  -s/p [facility]: no intervention indicated  -aspirin and betablocker   -however he is on midodrine for hypotension as well and use of BB and midodrine may be canceling out the benefit of the carvedilol   -Cardiology signed off 10/25  Acute metabolic encephalopathy  -Mental status may be at baseline  -has had some communication with whiteboard per nursing   -suspect waxing and waning mentation  Acute on chronic systolic (congestive) heart failure  -clinically euvolemic  -unable to tolerate ACE/ARB or diuretics.  Present treatment with beta-blocker is continued.  He is also treated with amiodarone for hypotension.  Vital signs will be monitored and medications adjusted as indicated.  -The patient has severe debility from his multiple acute and chronic medical problems.  He has been evaluated physical therapy occupational therapy.  It is recommended the patient be discharged to skilled nursing facility for short-term rehabilitation.  He will be discharged to skilled nursing facility when insurance precertification is completed.  Idiopathic hypotension  -Less idiopathic and more likely related to his cardiomyopathy  -responds to midodrine, may be improved by discontinuing betablocker  Severe protein-calorie malnutrition (Multi)  -Nutrition following  Type 2 diabetes mellitus without complication, without long-term current use of insulin (Multi)  -Blood sugar adequately controlled.  Continue present treatment.  Hypomagnesemia  -receiving PO supplementation  -Give mag  run x1 10/28  DVT prophylaxis: Lovenox  Disposition: SNF when choices made and facility available     I spent 36 minutes in the professional and overall care of this patient.    Dayanara Saravia MD

## 2024-10-28 NOTE — PROGRESS NOTES
Pt has written discharge order. Called both sons also POA's Sharon [phone] and Matt [phone]. Left messages for both to return call. Need SNF choices.     DISCHARGE PLAN NOT SECURE.   DO NOT DISCHARGE WITHOUT SPEAKING TO CARE COORDINATION.     UPDATE 1245: SPOKE TO SON SHARON. 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 RECEIVED SNF CHOICES. REFERRALS SENT VIA [facility]. WAITING FOR ACCEPTANCE AND WILL NEED PRECERT.     UPDATE 1545: PT'S NUMBER [facility] ACCEPTED PATIENT. NUMBER ONE UNABLE TO ACCEPT, NO BED AVAILABLE. REQUESTED UPDATED PT/OT NOTES AND THEN WILL START PRECERT.      10/28/24 1123   Discharge Planning   Type of Residence Private residence   Who is requesting discharge planning? Provider   Home or Post Acute Services None   Expected Discharge Disposition SNF   Does the patient need discharge transport arranged? Yes   RoundTrip coordination needed? Yes   Has discharge transport been arranged? No

## 2024-10-28 NOTE — PROGRESS NOTES
Spiritual Care Visit    Clinical Encounter Type  Visited With: Patient  Routine Visit: Follow-up  Continue Visiting: Yes         Values/Beliefs  Spiritual Requests During Hospitalization: I gave Dat a belsing while he as sleeping.     Demetrio Ray

## 2024-10-29 ENCOUNTER — APPOINTMENT (OUTPATIENT)
Dept: RADIOLOGY | Facility: HOSPITAL | Age: 84
End: 2024-10-29
Payer: MEDICARE

## 2024-10-29 LAB
ALBUMIN SERPL BCP-MCNC: 2.5 G/DL (ref 3.4–5)
ANION GAP SERPL CALCULATED.3IONS-SCNC: 10 MMOL/L (ref 10–20)
ANION GAP SERPL CALCULATED.3IONS-SCNC: 10 MMOL/L (ref 10–20)
APPEARANCE UR: ABNORMAL
BACTERIA #/AREA URNS AUTO: ABNORMAL /HPF
BASOPHILS # BLD AUTO: 0.02 X10*3/UL (ref 0–0.1)
BASOPHILS NFR BLD AUTO: 0.2 %
BILIRUB UR STRIP.AUTO-MCNC: ABNORMAL MG/DL
BUN SERPL-MCNC: 39 MG/DL (ref 6–23)
BUN SERPL-MCNC: 41 MG/DL (ref 6–23)
CALCIUM SERPL-MCNC: 8 MG/DL (ref 8.6–10.3)
CALCIUM SERPL-MCNC: 8.1 MG/DL (ref 8.6–10.3)
CHLORIDE SERPL-SCNC: 100 MMOL/L (ref 98–107)
CHLORIDE SERPL-SCNC: 100 MMOL/L (ref 98–107)
CO2 SERPL-SCNC: 23 MMOL/L (ref 21–32)
CO2 SERPL-SCNC: 23 MMOL/L (ref 21–32)
COLOR UR: YELLOW
CREAT SERPL-MCNC: 1.11 MG/DL (ref 0.5–1.3)
CREAT SERPL-MCNC: 1.14 MG/DL (ref 0.5–1.3)
EGFRCR SERPLBLD CKD-EPI 2021: 65 ML/MIN/1.73M*2
EGFRCR SERPLBLD CKD-EPI 2021: 67 ML/MIN/1.73M*2
EOSINOPHIL # BLD AUTO: 0 X10*3/UL (ref 0–0.4)
EOSINOPHIL NFR BLD AUTO: 0 %
ERYTHROCYTE [DISTWIDTH] IN BLOOD BY AUTOMATED COUNT: 13.2 % (ref 11.5–14.5)
GLUCOSE BLD MANUAL STRIP-MCNC: 156 MG/DL (ref 74–99)
GLUCOSE BLD MANUAL STRIP-MCNC: 201 MG/DL (ref 74–99)
GLUCOSE BLD MANUAL STRIP-MCNC: 240 MG/DL (ref 74–99)
GLUCOSE SERPL-MCNC: 157 MG/DL (ref 74–99)
GLUCOSE SERPL-MCNC: 200 MG/DL (ref 74–99)
GLUCOSE UR STRIP.AUTO-MCNC: NEGATIVE MG/DL
HCT VFR BLD AUTO: 23.9 % (ref 41–52)
HGB BLD-MCNC: 8.4 G/DL (ref 13.5–17.5)
HOLD SPECIMEN: NORMAL
HYALINE CASTS #/AREA URNS AUTO: ABNORMAL /LPF
IMM GRANULOCYTES # BLD AUTO: 0.02 X10*3/UL (ref 0–0.5)
IMM GRANULOCYTES NFR BLD AUTO: 0.2 % (ref 0–0.9)
KETONES UR STRIP.AUTO-MCNC: ABNORMAL MG/DL
LEUKOCYTE ESTERASE UR QL STRIP.AUTO: ABNORMAL
LYMPHOCYTES # BLD AUTO: 0.59 X10*3/UL (ref 0.8–3)
LYMPHOCYTES NFR BLD AUTO: 7 %
MAGNESIUM SERPL-MCNC: 1.65 MG/DL (ref 1.6–2.4)
MCH RBC QN AUTO: 30.1 PG (ref 26–34)
MCHC RBC AUTO-ENTMCNC: 35.1 G/DL (ref 32–36)
MCV RBC AUTO: 86 FL (ref 80–100)
MONOCYTES # BLD AUTO: 0.56 X10*3/UL (ref 0.05–0.8)
MONOCYTES NFR BLD AUTO: 6.7 %
MUCOUS THREADS #/AREA URNS AUTO: ABNORMAL /LPF
NEUTROPHILS # BLD AUTO: 7.18 X10*3/UL (ref 1.6–5.5)
NEUTROPHILS NFR BLD AUTO: 85.9 %
NITRITE UR QL STRIP.AUTO: POSITIVE
NRBC BLD-RTO: 0 /100 WBCS (ref 0–0)
PH UR STRIP.AUTO: 6 [PH]
PHOSPHATE SERPL-MCNC: 3.2 MG/DL (ref 2.5–4.9)
PLATELET # BLD AUTO: 187 X10*3/UL (ref 150–450)
POTASSIUM SERPL-SCNC: 4 MMOL/L (ref 3.5–5.3)
POTASSIUM SERPL-SCNC: 4.2 MMOL/L (ref 3.5–5.3)
PROT UR STRIP.AUTO-MCNC: ABNORMAL MG/DL
RBC # BLD AUTO: 2.79 X10*6/UL (ref 4.5–5.9)
RBC # UR STRIP.AUTO: ABNORMAL /UL
RBC #/AREA URNS AUTO: ABNORMAL /HPF
SODIUM SERPL-SCNC: 129 MMOL/L (ref 136–145)
SODIUM SERPL-SCNC: 129 MMOL/L (ref 136–145)
SP GR UR STRIP.AUTO: >=1.03
SQUAMOUS #/AREA URNS AUTO: ABNORMAL /HPF
UROBILINOGEN UR STRIP.AUTO-MCNC: 1 MG/DL
WBC # BLD AUTO: 8.4 X10*3/UL (ref 4.4–11.3)
WBC #/AREA URNS AUTO: ABNORMAL /HPF

## 2024-10-29 PROCEDURE — 2500000001 HC RX 250 WO HCPCS SELF ADMINISTERED DRUGS (ALT 637 FOR MEDICARE OP): Performed by: NURSE PRACTITIONER

## 2024-10-29 PROCEDURE — 80069 RENAL FUNCTION PANEL: CPT | Performed by: NURSE PRACTITIONER

## 2024-10-29 PROCEDURE — 99232 SBSQ HOSP IP/OBS MODERATE 35: CPT | Performed by: HOSPITALIST

## 2024-10-29 PROCEDURE — 36415 COLL VENOUS BLD VENIPUNCTURE: CPT | Performed by: HOSPITALIST

## 2024-10-29 PROCEDURE — 92507 TX SP LANG VOICE COMM INDIV: CPT | Mod: GN

## 2024-10-29 PROCEDURE — 2500000001 HC RX 250 WO HCPCS SELF ADMINISTERED DRUGS (ALT 637 FOR MEDICARE OP): Performed by: INTERNAL MEDICINE

## 2024-10-29 PROCEDURE — 83735 ASSAY OF MAGNESIUM: CPT | Performed by: NURSE PRACTITIONER

## 2024-10-29 PROCEDURE — 81001 URINALYSIS AUTO W/SCOPE: CPT | Performed by: HOSPITALIST

## 2024-10-29 PROCEDURE — 87077 CULTURE AEROBIC IDENTIFY: CPT | Mod: WESLAB | Performed by: HOSPITALIST

## 2024-10-29 PROCEDURE — 97530 THERAPEUTIC ACTIVITIES: CPT | Mod: GP

## 2024-10-29 PROCEDURE — 80048 BASIC METABOLIC PNL TOTAL CA: CPT | Mod: CCI | Performed by: HOSPITALIST

## 2024-10-29 PROCEDURE — 2500000005 HC RX 250 GENERAL PHARMACY W/O HCPCS: Performed by: NURSE PRACTITIONER

## 2024-10-29 PROCEDURE — 97535 SELF CARE MNGMENT TRAINING: CPT | Mod: GO,CO

## 2024-10-29 PROCEDURE — 99232 SBSQ HOSP IP/OBS MODERATE 35: CPT | Performed by: NURSE PRACTITIONER

## 2024-10-29 PROCEDURE — 70450 CT HEAD/BRAIN W/O DYE: CPT | Performed by: RADIOLOGY

## 2024-10-29 PROCEDURE — 2500000004 HC RX 250 GENERAL PHARMACY W/ HCPCS (ALT 636 FOR OP/ED): Performed by: INTERNAL MEDICINE

## 2024-10-29 PROCEDURE — 2060000001 HC INTERMEDIATE ICU ROOM DAILY

## 2024-10-29 PROCEDURE — 70450 CT HEAD/BRAIN W/O DYE: CPT

## 2024-10-29 PROCEDURE — 82947 ASSAY GLUCOSE BLOOD QUANT: CPT

## 2024-10-29 PROCEDURE — 2500000004 HC RX 250 GENERAL PHARMACY W/ HCPCS (ALT 636 FOR OP/ED): Performed by: HOSPITALIST

## 2024-10-29 PROCEDURE — 71045 X-RAY EXAM CHEST 1 VIEW: CPT | Performed by: RADIOLOGY

## 2024-10-29 PROCEDURE — 36415 COLL VENOUS BLD VENIPUNCTURE: CPT | Performed by: NURSE PRACTITIONER

## 2024-10-29 PROCEDURE — 71045 X-RAY EXAM CHEST 1 VIEW: CPT

## 2024-10-29 PROCEDURE — 92526 ORAL FUNCTION THERAPY: CPT | Mod: GN

## 2024-10-29 PROCEDURE — 2500000004 HC RX 250 GENERAL PHARMACY W/ HCPCS (ALT 636 FOR OP/ED): Performed by: STUDENT IN AN ORGANIZED HEALTH CARE EDUCATION/TRAINING PROGRAM

## 2024-10-29 PROCEDURE — 2500000002 HC RX 250 W HCPCS SELF ADMINISTERED DRUGS (ALT 637 FOR MEDICARE OP, ALT 636 FOR OP/ED): Performed by: INTERNAL MEDICINE

## 2024-10-29 PROCEDURE — 87086 URINE CULTURE/COLONY COUNT: CPT | Mod: WESLAB | Performed by: HOSPITALIST

## 2024-10-29 PROCEDURE — 85025 COMPLETE CBC W/AUTO DIFF WBC: CPT | Performed by: NURSE PRACTITIONER

## 2024-10-29 RX ORDER — MAGNESIUM SULFATE 1 G/100ML
1 INJECTION INTRAVENOUS ONCE
Status: COMPLETED | OUTPATIENT
Start: 2024-10-29 | End: 2024-10-29

## 2024-10-29 RX ORDER — SODIUM CHLORIDE, SODIUM LACTATE, POTASSIUM CHLORIDE, CALCIUM CHLORIDE 600; 310; 30; 20 MG/100ML; MG/100ML; MG/100ML; MG/100ML
50 INJECTION, SOLUTION INTRAVENOUS CONTINUOUS
Status: DISCONTINUED | OUTPATIENT
Start: 2024-10-29 | End: 2024-10-30

## 2024-10-29 RX ORDER — CEFTRIAXONE 1 G/50ML
1 INJECTION, SOLUTION INTRAVENOUS EVERY 24 HOURS
Status: DISCONTINUED | OUTPATIENT
Start: 2024-10-29 | End: 2024-10-30

## 2024-10-29 RX ADMIN — MIDODRINE HYDROCHLORIDE 5 MG: 5 TABLET ORAL at 16:29

## 2024-10-29 RX ADMIN — MIDODRINE HYDROCHLORIDE 5 MG: 5 TABLET ORAL at 07:59

## 2024-10-29 RX ADMIN — CARVEDILOL 3.12 MG: 3.12 TABLET, FILM COATED ORAL at 08:00

## 2024-10-29 RX ADMIN — INSULIN LISPRO 2 UNITS: 100 INJECTION, SOLUTION INTRAVENOUS; SUBCUTANEOUS at 08:52

## 2024-10-29 RX ADMIN — ENOXAPARIN SODIUM 40 MG: 40 INJECTION SUBCUTANEOUS at 08:00

## 2024-10-29 RX ADMIN — Medication 21 PERCENT: at 07:54

## 2024-10-29 RX ADMIN — SODIUM CHLORIDE, SODIUM LACTATE, POTASSIUM CHLORIDE, AND CALCIUM CHLORIDE 50 ML/HR: 600; 310; 30; 20 INJECTION, SOLUTION INTRAVENOUS at 16:01

## 2024-10-29 RX ADMIN — METFORMIN HYDROCHLORIDE 1000 MG: 1000 TABLET ORAL at 08:00

## 2024-10-29 RX ADMIN — OFLOXACIN 1 DROP: 3 SOLUTION OPHTHALMIC at 12:15

## 2024-10-29 RX ADMIN — OFLOXACIN 1 DROP: 3 SOLUTION OPHTHALMIC at 21:50

## 2024-10-29 RX ADMIN — ASPIRIN 81 MG CHEWABLE TABLET 81 MG: 81 TABLET CHEWABLE at 08:00

## 2024-10-29 RX ADMIN — OFLOXACIN 1 DROP: 3 SOLUTION OPHTHALMIC at 16:30

## 2024-10-29 RX ADMIN — PROCHLORPERAZINE EDISYLATE 10 MG: 5 INJECTION INTRAMUSCULAR; INTRAVENOUS at 01:26

## 2024-10-29 RX ADMIN — INSULIN LISPRO 2 UNITS: 100 INJECTION, SOLUTION INTRAVENOUS; SUBCUTANEOUS at 16:30

## 2024-10-29 RX ADMIN — OFLOXACIN 1 DROP: 3 SOLUTION OPHTHALMIC at 07:48

## 2024-10-29 RX ADMIN — CEFTRIAXONE SODIUM 1 G: 1 INJECTION, SOLUTION INTRAVENOUS at 14:11

## 2024-10-29 RX ADMIN — Medication 400 MG: at 08:00

## 2024-10-29 RX ADMIN — MAGNESIUM SULFATE HEPTAHYDRATE 1 G: 1 INJECTION, SOLUTION INTRAVENOUS at 10:19

## 2024-10-29 ASSESSMENT — COGNITIVE AND FUNCTIONAL STATUS - GENERAL
PERSONAL GROOMING: TOTAL
TOILETING: TOTAL
EATING MEALS: TOTAL
PERSONAL GROOMING: TOTAL
HELP NEEDED FOR BATHING: TOTAL
DRESSING REGULAR UPPER BODY CLOTHING: TOTAL
DAILY ACTIVITIY SCORE: 6
WALKING IN HOSPITAL ROOM: TOTAL
TURNING FROM BACK TO SIDE WHILE IN FLAT BAD: A LOT
DAILY ACTIVITIY SCORE: 6
DRESSING REGULAR LOWER BODY CLOTHING: TOTAL
DRESSING REGULAR LOWER BODY CLOTHING: TOTAL
MOVING FROM LYING ON BACK TO SITTING ON SIDE OF FLAT BED WITH BEDRAILS: A LOT
DRESSING REGULAR UPPER BODY CLOTHING: TOTAL
HELP NEEDED FOR BATHING: TOTAL
MOBILITY SCORE: 8
DRESSING REGULAR UPPER BODY CLOTHING: TOTAL
CLIMB 3 TO 5 STEPS WITH RAILING: TOTAL
EATING MEALS: TOTAL
STANDING UP FROM CHAIR USING ARMS: TOTAL
PERSONAL GROOMING: TOTAL
STANDING UP FROM CHAIR USING ARMS: TOTAL
CLIMB 3 TO 5 STEPS WITH RAILING: TOTAL
MOBILITY SCORE: 6
MOBILITY SCORE: 8
TOILETING: TOTAL
STANDING UP FROM CHAIR USING ARMS: TOTAL
EATING MEALS: TOTAL
TURNING FROM BACK TO SIDE WHILE IN FLAT BAD: A LOT
WALKING IN HOSPITAL ROOM: TOTAL
MOVING TO AND FROM BED TO CHAIR: TOTAL
HELP NEEDED FOR BATHING: TOTAL
WALKING IN HOSPITAL ROOM: TOTAL
MOVING TO AND FROM BED TO CHAIR: TOTAL
CLIMB 3 TO 5 STEPS WITH RAILING: TOTAL
MOVING FROM LYING ON BACK TO SITTING ON SIDE OF FLAT BED WITH BEDRAILS: A LOT
MOVING TO AND FROM BED TO CHAIR: TOTAL
DRESSING REGULAR LOWER BODY CLOTHING: TOTAL
TURNING FROM BACK TO SIDE WHILE IN FLAT BAD: TOTAL
TOILETING: TOTAL
DAILY ACTIVITIY SCORE: 6
MOVING FROM LYING ON BACK TO SITTING ON SIDE OF FLAT BED WITH BEDRAILS: TOTAL

## 2024-10-29 ASSESSMENT — PAIN SCALES - WONG BAKER: WONGBAKER_NUMERICALRESPONSE: NO HURT

## 2024-10-29 ASSESSMENT — PAIN SCALES - GENERAL
PAINLEVEL_OUTOF10: 0 - NO PAIN

## 2024-10-29 ASSESSMENT — PAIN - FUNCTIONAL ASSESSMENT
PAIN_FUNCTIONAL_ASSESSMENT: FLACC (FACE, LEGS, ACTIVITY, CRY, CONSOLABILITY)
PAIN_FUNCTIONAL_ASSESSMENT: FLACC (FACE, LEGS, ACTIVITY, CRY, CONSOLABILITY)

## 2024-10-29 ASSESSMENT — ACTIVITIES OF DAILY LIVING (ADL): HOME_MANAGEMENT_TIME_ENTRY: 12

## 2024-10-29 NOTE — PROGRESS NOTES
Physical Therapy    Physical Therapy Treatment    Patient Name: Dat Mccallum  MRN: [id number]  Department: [address]  Room: 12/12A  Today's Date: 10/29/2024  Time Calculation  Start Time: 0953  Stop Time: 1017  Time Calculation (min): 24 min         Assessment/Plan   PT Assessment  Rehab Prognosis: Good  Evaluation/Treatment Tolerance: Patient limited by fatigue (lethargy)  End of Session Communication: Bedside nurse  Assessment Comment: Lethargy limiting tolerance to activity this date  End of Session Patient Position: Bed, 3 rail up, Alarm on  PT Plan  Inpatient/Swing Bed or Outpatient: Inpatient  PT Plan  Treatment/Interventions: Bed mobility, Transfer training, Gait training, Balance training, Strengthening, Endurance training, Therapeutic exercise, Therapeutic activity  PT Plan: Ongoing PT  PT Frequency: 4 times per week  PT Discharge Recommendations: Moderate intensity level of continued care  PT Recommended Transfer Status: Total assist  PT - OK to Discharge: Yes (with skilled physical therapy services at next level of care)      General Visit Information:   PT  Visit  PT Received On: 10/29/24  General  Co-Treatment: OT  Co-Treatment Reason: Need for 2nd skilled person for therapeutic handling during functional mobility to optimize outcomes and safety.  Prior to Session Communication: Bedside nurse  Patient Position Received: Bed, 3 rail up, Alarm on  General Comment: RN cleared patient for treatment.  Patient appears lethargic;  difficult to assess compliance to treatment.    Subjective   Precautions:  Precautions  Medical Precautions: Fall precautions    Vital Signs (Past 2hrs)        Date/Time Vitals Session Patient Position Pulse Resp SpO2 BP MAP (mmHg)    10/29/24 1144 --  --  74  20  95 %  92/54  66                         Objective   Pain:  Pain Assessment  Pain Assessment: FLACC (Face, Legs, Activity, Cry, Consolability)  0-10 (Numeric) Pain Score: 0 - No pain  Cognition:  Cognition  Overall Cognitive  Status: Impaired (Lethargic;  minimal participation with treatment;  eyes closed for 99% of treatment;  patient did not open eyes for written communication.)  Coordination:  Movements are Fluid and Coordinated:  (Unable to assess coordination due to lethargy)  Lower Body Coordination: Venkat LE movements are slow  Postural Control:  Static Sitting Balance  Static Sitting-Balance Support: Bilateral upper extremity supported  Static Sitting-Level of Assistance: Maximum assistance  Static Sitting-Comment/Number of Minutes: Assist with trunk support and balance while sitting on side of bed due to decreased balance posterior and left side;  patient progress to brief episodes of maintaining midline with min assist.  Static Standing Balance  Static Standing-Balance Support: Bilateral upper extremity supported  Static Standing-Level of Assistance: Maximum assistance (x 2)  Static Standing-Comment/Number of Minutes: Assist with trunk support and balance during static standing with rolling walker;  patient stands with narrow base of support;  trunk support required left side of trunk greater than right side.          Activity Tolerance:  Activity Tolerance  Endurance: Decreased tolerance for upright activites  Activity Tolerance Comments: Lethargy limiting tolerance to activity  Treatments:  Therapeutic Exercise  Therapeutic Exercise Performed: Yes  Therapeutic Exercise Activity 1: sitting knee extension;  sitting hip flexion venkat LE 5 reps x 1 set with max assist;  minimal patient participation with ther ex    Therapeutic Activity  Therapeutic Activity Performed: Yes  Therapeutic Activity 1: Sitting balance activities including maintaining midline while sitting on side of bed x ~7 minutes;         Bed Mobility  Bed Mobility: Yes  Bed Mobility 1  Bed Mobility 1: Supine to sitting  Level of Assistance 1: Dependent (x 2)  Bed Mobility Comments 1: Assist with trunk-up and venkat LE  Bed Mobility 2  Bed Mobility  2: Sitting to  supine  Level of Assistance 2: Dependent (x 2)  Bed Mobility Comments 2: Assist with trunk and jenelle LE  Bed Mobility 3  Bed Mobility 3: Rolling right, Rolling left  Level of Assistance 3: Dependent (x 2)  Bed Mobility Comments 3: Assist at shoulders and hips during rollling side to side    Ambulation/Gait Training  Ambulation/Gait Training Performed: No  Transfers  Transfer: No  Transfer 1  Transfer From 1: Sit to  Transfer to 1: Stand  Technique 1: Sit to stand  Transfer Device 1: Walker  Transfer Level of Assistance 1: Arm in arm assistance, Maximum assistance (x 2)  Trials/Comments 1: Assist with encouraging forward trunk flexion, elevating buttocks from sitting surface, and positioning COG over MARGOTH;  patient was allowed to pull up from stabilized walker during sit to stand.  Transfers 2  Transfer From 2: Stand to  Transfer to 2: Sit  Technique 2: Stand to sit  Transfer Device 2: Walker  Transfer Level of Assistance 2: Arm in arm assistance, Maximum assistance (x 2)  Trials/Comments 2: Assist with trunk support and balance;  decreased eccentric control    Stairs  Stairs: No         Outcome Measures:  [facility] Basic Mobility  Turning from your back to your side while in a flat bed without using bedrails: Total  Moving from lying on your back to sitting on the side of a flat bed without using bedrails: Total  Moving to and from bed to chair (including a wheelchair): Total  Standing up from a chair using your arms (e.g. wheelchair or bedside chair): Total  To walk in hospital room: Total  Climbing 3-5 steps with railing: Total  Basic Mobility - Total Score: 6    Education Documentation  No documentation found.  Education Comments  No comments found.        OP EDUCATION:       Encounter Problems       Encounter Problems (Active)       Mobility       Bed mobility including supine to sit and sit to supine with supervision. (Progressing)       Start:  10/20/24    Expected End:  11/03/24            Transfers including sit to  stand and stand to sit with supervision. (Progressing)       Start:  10/20/24    Expected End:  11/03/24            Ambulate 50 feet with rolling walker and min assist. (Progressing)       Start:  10/20/24    Expected End:  11/03/24               Pain - Adult               14-Sep-2019

## 2024-10-29 NOTE — PROGRESS NOTES
Speech-Language Pathology Clinical Swallow Treatment    Patient Name: Dat Mccallum  MRN: [id number]  : 1940  Today's Date: 10/29/24  Start Time: 1715  Stop Time: 1735  Time Calculation (min): 20 min    ASSESSMENT  SLP TX Intervention Outcome: Making Progress Towards Goals  Treatment Tolerance: Patient limited by fatigue    Impressions:     Patient presented with 2 tsp of applesauce and 2 tsp of thin liquids. Oral cavity was clear following 1st tsp of puree, however, patient did not attempt to swallow tsp x2 of applesauce despite written cues. 1st swallow attempt was observed following thin liquid trial via tsp. Laryngeal elevation upon palpation was weak and partial. Multiple swallows not attempted. No overt s/s of aspiration. However, suspect aspiration. Due to high aspiration risk will make NPO.    Recommend NPO with bedside reassessment 10/30/24.    Pt would benefit from ongoing skilled ST to minimize aspiration risk and ensure ongoing safety with the least restrictive diet.    PLAN  Is MBSS recommended? SLP will continue to monitor to determine if MBSS is clinically warranted.  Recommended solid consistency: NPO  Recommended liquid consistency: NPO  Recommended medication administration: Non oral  Safe swallow strategies: n/a      Discharge recommendation: Recommend HIGH intensity ST upon DC in order to ensure safety with least restrictive diet.  Inpatient/Swing Bed or Outpatient: Inpatient  SLP TX Plan: Continue Plan of Care  SLP Plan: Skilled SLP  SLP Frequency: 3x per week  Duration: 2 weeks  Next Treatment Priority: trial puree, liquids. Pt NPO  Discussed POC: Patient, Nursing  Patient/Caregiver Agreeable: Yes    SUBJECTIVE  Prior to Session Communication: Bedside nurse  RN cleared pt to participate in session and reported   Positioning: Upright in bed  Pt was drowsy for session.    Pain Assessment  Pain Assessment: FLACC (Face, Legs, Activity, Cry, Consolability)  0-10 (Numeric) Pain Score: 0 - No  pain  Jaramillo-Baker FACES Pain Rating: No hurt  Clinical Progression: Not changed       Orientation: Unable to answer orientation questions  Ability to follow functional commands: Does not consistently follow commands despite cues    OBJECTIVE  Therapeutic Swallow  Therapeutic Swallow Intervention : PO Trials, Compensatory Strategies  Solid Diet Recommendations: Pureed/extremely thick  (IDDSI Level 4)  Liquid Diet Recommendations: Thin (IDDSI Level 0)    Treatment/Education:  Results and recommendations were relayed to: Patient and Bedside nurse  Education provided: Yes   Learner: Patient   Barriers to learning: Acuteness of illness barrier and Hearing impairment barrier   Method of teaching: Written   Topic: role of ST, results of assessment, risk for aspiration, and recommended diet modifications   Outcome of teaching: Unable to demonstrate understanding at this time and Education will be reinforced during follow-up visits, as appropriate    Goals:   Swallow Goals:  In 2 weeks...  - Pt will consume prescribed diet (current diet is NPO ) without overt s/sx aspiration/penetration in 95% of observed trials.              GOAL START:   10/18/2024                                          GOAL END: 11/1/24              CURRENT STATUS: No attempt at pharyngeal swallow despite oral cavity clear with 2 tsp of applesauce. Not safe for oral intake at this time.               PROGRESS: regression   - Pt will consume trials of upgraded textures without overt s/sx aspiration in order for consideration of diet texture upgrade.               GOAL START:   10/18/2024                                          GOAL END:11/1/24              CURRENT STATUS: not attempted as patient could not tolerated current diet              PROGRESS: regressed  - Pt will demonstrate follow-through of trained compensatory strategies during a meal/snack with 90% acc with max cues.              GOAL START:   10/18/2024                                           GOAL END:11/1/24              CURRENT STATUS: pt unable to self feed, no pharyngeal swallow attempt unless with tsp thin liquids. Swallow weak, suspect aspiration. Will make NPO with bedside reassessment 10/30/24.              PROGRESS: no communication attempt with SLP today     Language Expression:  Language Expression Interventions: Writing  Language Expression Comments: Pt responded at times with ASL signs or casual gestures     Language Comprehension:  Language Comprehension Interventions: Single Step Commands, Yes/No Questions     Speech Goals   (Start date 10/18/24. Anticipated time frame for goal attainment: 2 weeks)     - Pt will complete low level expressive language tasks with 50% acc given max cues.  CURRENT STATUS: SLP engaged pt in informal tasks.  Pt unable to communicate written today.              PROGRESS: regressing ability to communicate     -Pt will complete low level receptive language tasks with 50% acc given max cues.              CURRENT STATUS: no attempt to respond to written communication              PROGRESS: Progressing; continue to address             - Pt will use multimodal communication to communicate basic wants/needs in 50% of opportunities given max assist.              CURRENT STATUS: .  Pt did not attempt writing but not successful.              PROGRESS:  regressing ability to communicate basic wants/needs with encouragement and written cues.               - Pt will imitate consonants/vowels/single words with 50 acc given max cues.              CURRENT STATUS: Not targeted              PROGRESS: N/A     - Pt will demonstrate orientation x4 given max cues.  CURRENT STATUS: Not targeted              PROGRESS: N/A

## 2024-10-29 NOTE — PROGRESS NOTES
Dat Mccallum is a 83 y.o. male on day 13 of admission presenting with NSTEMI (non-ST elevated myocardial infarction) (Multi).    Subjective   Symptoms (0 - 10, Best to Worst)  Organ Symptom Assessment System  0-10 (Numeric) Pain Score: 0 - No pain  Today with PVCs and decreased urine, lethargic. Ate a little, now has hiccups, but sleeps through them. Looks at me then goes back to sleep. Was more alert earlier and worked with PT.    Objective     Physical Exam    Constitutional:       General: Patient is not in acute distress. Chronically ill appearing.  HENT:      Head: Normocephalic. Temporal wasting     Mouth: Mucous membranes are moist.   Eyes:      Conjunctiva/sclera: Conjunctivae clear, sclerae white. No discharge.     Pupils: Pupils are equal, round, and reactive to light.   Neck:      Vascular: No carotid bruit.   Cardiovascular:      Rate and Rhythm: Normal rate and irregular rhythm.      Heart sounds: No murmur heard.  Pulmonary:      Effort: No respiratory distress.      Breath sounds: Clear to auscultation  Abdominal:      General: There is no distension.      Tenderness: There is no abdominal tenderness. There is no guarding.   Urology:     Genitalia: normal genitalia    Urine: urine yellow and clear  Musculoskeletal:         General: No deformity.   Skin:     Coloration: Skin is not jaundiced.   Neurological:      General: No focal deficit present. deaf     Mental Status: pt is drowsy, deaf, not talking to me today due to lethargy  Psychiatric:         Behavior: Behavior calm        Last Recorded Vitals  Blood pressure 93/60, pulse 81, temperature 36.5 °C (97.7 °F), temperature source Temporal, resp. rate 16, height 1.829 m (6'), weight 65.5 kg (144 lb 6.4 oz), SpO2 92%.  Intake/Output last 3 Shifts:  I/O last 3 completed shifts:  In: 710 (10.8 mL/kg) [P.O.:660; I.V.:50 (0.8 mL/kg)]  Out: 500 (7.6 mL/kg) [Urine:500 (0.2 mL/kg/hr)]  Weight: 65.5 kg     Relevant Results  Results for orders placed or  performed during the hospital encounter of 10/16/24 (from the past 24 hours)   POCT GLUCOSE   Result Value Ref Range    POCT Glucose 241 (H) 74 - 99 mg/dL   POCT GLUCOSE   Result Value Ref Range    POCT Glucose 151 (H) 74 - 99 mg/dL   CBC and Auto Differential   Result Value Ref Range    WBC 8.4 4.4 - 11.3 x10*3/uL    nRBC 0.0 0.0 - 0.0 /100 WBCs    RBC 2.79 (L) 4.50 - 5.90 x10*6/uL    Hemoglobin 8.4 (L) 13.5 - 17.5 g/dL    Hematocrit 23.9 (L) 41.0 - 52.0 %    MCV 86 80 - 100 fL    MCH 30.1 26.0 - 34.0 pg    MCHC 35.1 32.0 - 36.0 g/dL    RDW 13.2 11.5 - 14.5 %    Platelets 187 150 - 450 x10*3/uL    Neutrophils % 85.9 40.0 - 80.0 %    Immature Granulocytes %, Automated 0.2 0.0 - 0.9 %    Lymphocytes % 7.0 13.0 - 44.0 %    Monocytes % 6.7 2.0 - 10.0 %    Eosinophils % 0.0 0.0 - 6.0 %    Basophils % 0.2 0.0 - 2.0 %    Neutrophils Absolute 7.18 (H) 1.60 - 5.50 x10*3/uL    Immature Granulocytes Absolute, Automated 0.02 0.00 - 0.50 x10*3/uL    Lymphocytes Absolute 0.59 (L) 0.80 - 3.00 x10*3/uL    Monocytes Absolute 0.56 0.05 - 0.80 x10*3/uL    Eosinophils Absolute 0.00 0.00 - 0.40 x10*3/uL    Basophils Absolute 0.02 0.00 - 0.10 x10*3/uL   Magnesium   Result Value Ref Range    Magnesium 1.65 1.60 - 2.40 mg/dL   Renal function panel   Result Value Ref Range    Glucose 200 (H) 74 - 99 mg/dL    Sodium 129 (L) 136 - 145 mmol/L    Potassium 4.2 3.5 - 5.3 mmol/L    Chloride 100 98 - 107 mmol/L    Bicarbonate 23 21 - 32 mmol/L    Anion Gap 10 10 - 20 mmol/L    Urea Nitrogen 39 (H) 6 - 23 mg/dL    Creatinine 1.11 0.50 - 1.30 mg/dL    eGFR 67 >60 mL/min/1.73m*2    Calcium 8.0 (L) 8.6 - 10.3 mg/dL    Phosphorus 3.2 2.5 - 4.9 mg/dL    Albumin 2.5 (L) 3.4 - 5.0 g/dL   POCT GLUCOSE   Result Value Ref Range    POCT Glucose 201 (H) 74 - 99 mg/dL   Urinalysis with Reflex Culture and Microscopic   Result Value Ref Range    Color, Urine Yellow Straw, Yellow    Appearance, Urine Cloudy (N) Clear    Specific Gravity, Urine >=1.030 1.005 -  1.035    pH, Urine 6.0 5.0, 5.5, 6.0, 6.5, 7.0, 7.5, 8.0    Protein, Urine 100 (2+) (A) NEGATIVE, TRACE mg/dL    Glucose, Urine NEGATIVE NEGATIVE mg/dL    Blood, Urine LARGE (3+) (A) NEGATIVE    Ketones, Urine 15 (1+) (A) NEGATIVE mg/dL    Bilirubin, Urine SMALL (1+) (A) NEGATIVE    Urobilinogen, Urine 1.0 0.2, 1.0 mg/dL    Nitrite, Urine POSITIVE (A) NEGATIVE    Leukocyte Esterase, Urine TRACE (A) NEGATIVE   Microscopic Only, Urine   Result Value Ref Range    WBC, Urine 11-20 (A) 1-5, NONE /HPF    RBC, Urine 11-20 (A) NONE, 1-2, 3-5 /HPF    Squamous Epithelial Cells, Urine 1-9 (SPARSE) Reference range not established. /HPF    Bacteria, Urine 1+ (A) NONE SEEN /HPF    Mucus, Urine 3+ Reference range not established. /LPF    Hyaline Casts, Urine 1+ (A) NONE /LPF   Basic Metabolic Panel   Result Value Ref Range    Glucose 157 (H) 74 - 99 mg/dL    Sodium 129 (L) 136 - 145 mmol/L    Potassium 4.0 3.5 - 5.3 mmol/L    Chloride 100 98 - 107 mmol/L    Bicarbonate 23 21 - 32 mmol/L    Anion Gap 10 10 - 20 mmol/L    Urea Nitrogen 41 (H) 6 - 23 mg/dL    Creatinine 1.14 0.50 - 1.30 mg/dL    eGFR 65 >60 mL/min/1.73m*2    Calcium 8.1 (L) 8.6 - 10.3 mg/dL   POCT GLUCOSE   Result Value Ref Range    POCT Glucose 156 (H) 74 - 99 mg/dL     Scheduled medications  aspirin, 81 mg, oral, Daily  carvedilol, 3.125 mg, oral, BID after meals  cefTRIAXone, 1 g, intravenous, q24h  enoxaparin, 40 mg, subcutaneous, Daily  insulin lispro, 0-5 Units, subcutaneous, TID  magnesium oxide, 400 mg, oral, Daily  midodrine, 5 mg, oral, TID  ofloxacin, 1 drop, Left Eye, 4x daily  oxygen, , inhalation, Continuous - 02/gases  pravastatin, 40 mg, oral, Nightly  tamsulosin, 0.8 mg, oral, Nightly      Continuous medications  lactated Ringer's, 50 mL/hr      PRN medications  PRN medications: aminophylline, dextrose, dextrose, glucagon, glucagon, melatonin, prochlorperazine, prochlorperazine      Assessment/Plan     HFrEF - EF improved s/p PCI. On BB  NSTEMI -  aspirin, BB  Hypotension - on midodrine tid  Acute metabolic encephalopathy - baseline A&O x3, improving, supportive care treat underlying issues  Severe protein calorie malnutrition  Electrolyte derangements - receiving supplementations     Palliative Care Encounter  DNR-DNI  The patient is not capable  Son Jorge A is primary HPOA, son Rich is first alternate agent    10/29  Pt calm and without discomfort, but lethargic, needs lots of prompting/feeding effort to eat. Was able to participate in PT earlier.  Plan to go to [facility] [place]. Will need much attention and care to meet caloric requirements      10/28  I did have the opportunity to talk to Jorge A over the phone today. Goal of care is treat acute issues, skilled rehab and home. Prior to hospitalization the patient was independent in all basic and iADL's other than driving. High functioning at home alone, maintains own home, does outdoor yard work. The patient is eating some but requires assistance. I feel the poor po intake may be multifactorial related to the current hospitalization, although he is thin and BMI < 20, he has not had a recent significant weight loss or swallowing issues. Added Strawberry or Vanilla Ensure High Protein per patient preference according to son. D/w nurse as well who did note that pt eating, just requires assistance due to weakness.    I spent 35 minutes in the professional and overall care of this patient.      Palak Anton, MERLENE-CNP

## 2024-10-29 NOTE — PROGRESS NOTES
Occupational Therapy    OT Treatment    Patient Name: Dat Mccallum  MRN: [id number]  Department: [address]  Room: 12/12-A  Today's Date: 10/29/2024  Time Calculation  Start Time: 0952  Stop Time: 1016  Time Calculation (min): 24 min        Assessment:  OT Assessment: Gradual progress made towards OT goals. Continue with OT POC to increase strength, functional tolerance and balance to maximize independence in the least restrictive environment.  Evaluation/Treatment Tolerance: Patient limited by fatigue (Very lethargic this date)  End of Session Communication: Bedside nurse  End of Session Patient Position: Bed, 3 rail up, Alarm on  OT Assessment Results: Decreased ADL status, Decreased upper extremity strength, Decreased safe judgment during ADL, Decreased cognition, Decreased fine motor control, Decreased functional mobility, Decreased gross motor control  Evaluation/Treatment Tolerance: Patient limited by fatigue (Very lethargic this date)  Plan:  Treatment Interventions: ADL retraining, Functional transfer training, UE strengthening/ROM, Endurance training, Patient/family training, Cognitive reorientation  OT Frequency: 4 times per week  OT Discharge Recommendations: Moderate intensity level of continued care  OT Recommended Transfer Status: Assist of 2  OT - OK to Discharge: Yes  Treatment Interventions: ADL retraining, Functional transfer training, UE strengthening/ROM, Endurance training, Patient/family training, Cognitive reorientation    Subjective   Previous Visit Info:  OT Last Visit  OT Received On: 10/29/24  General:  General  Reason for Referral: Impaired ADLs due to NSTEMI  Past Medical History Relevant to Rehab: DM, HTN, HLD, CKD 3, BPH, pancreatectomy Whipple procedure, knee surgery  Co-Treatment: PT  Co-Treatment Reason: For patient and caregiver safety during treatment, session completed with two skilled therapists to maximize safety and functional outcomes.  Prior to Session Communication: Bedside  nurse  Patient Position Received: Bed, 3 rail up, Alarm on  General Comment: Patient cleared for OT session per nursing, upon arrival patient lying supine with HOB moderately elevated. Patient was lethargic throughout treatment and minimally participated. Sternal rub and minimal movement had patient open eyes, performed a wave then kept eyes closed 99% of treatment  Did not communicate with written communication this date.  Precautions:  Hearing/Visual Limitations: [place] see general comment note  Medical Precautions: Fall precautions  Precautions Comment: +[place], +Tele    Vital Signs (Past 2hrs)        Date/Time Vitals Session Patient Position Pulse Resp SpO2 BP MAP (mmHg)    10/29/24 1144 --  --  74  20  95 %  92/54  66                         Pain:  Pain Assessment  Pain Assessment: FLACC (Face, Legs, Activity, Cry, Consolability)  0-10 (Numeric) Pain Score: 0 - No pain  Clinical Progression: Not changed    Objective    Cognition:  Cognition  Overall Cognitive Status: Impaired (Patient was lethargic throughout treatment and minimally participated. Sternal rub and minimal movement had patient open eyes, performed a wave then kept eyes closed rest of treatment - 99%. Did not communicate with written communication this date.)    Coordination:  Movements are Fluid and Coordinated:  (Unable to access all forms of coordination due to lethargy)  Activities of Daily Living: UE Bathing  UE Bathing Level of Assistance: Dependent  UE Bathing Comments: Attempted to stimulate patient with cold wipe to participate with ADL task, however after trial of face, underarm and arm strokes, patient was lethargic and required dependent assitance.    UE Dressing  UE Dressing Level of Assistance: Dependent  UE Dressing Comments: Attempted to stimulate patient with light sternal rub and placing gown in hand, however patient was lethargic and required dependent assistance.    Toileting  Toileting Level of Assistance: Dependent  (x2)  Toileting Comments: Patient required dependent assistancex2 with B UE and B LE during supine log roll side to side to perform dependent perineal hygiene.  Functional Standing Tolerance:     Bed Mobility/Transfers: Bed Mobility  Bed Mobility: Yes  Bed Mobility 1  Bed Mobility 1: Supine to sitting  Level of Assistance 1: Dependent (x2)  Bed Mobility Comments 1: Patient required trunk support and B LE management to EOB, assistance with scooting to standard level EOB with dependent assistance of 2.  Bed Mobility 2  Bed Mobility  2: Sitting to supine  Level of Assistance 2: Dependent (x2)  Bed Mobility Comments 2: Patient required trunk support and BLE management sit <>supine from moderately elevated EOB with dependent assistance of 2.  Bed Mobility 3  Bed Mobility 3: Log roll, Rolling right, Rolling left  Level of Assistance 3: Dependent (x2)  Bed Mobility Comments 3: Patient required dependent assistancex2 with B UE and B LE during supine log roll side to side to perform dependent perineal hygiene.    Sitting Balance:  Static Sitting Balance  Static Sitting-Balance Support: Bilateral upper extremity supported, Feet supported  Static Sitting-Level of Assistance: Maximum assistance (x2)  Static Sitting-Comment/Number of Minutes: Patient performed poor static sitting balance at standard level EOB with MaxAx2 for L lateral lean to center of gravity with Max TC for hand placement and weight shifting due to lethargy. Rest break offered during trials of functional task however no progress made with functional task but gradual progress made with static sitting tolerance progressed to Roger. See UE bathing note for more detail of functional task.    Therapy/Activity: Therapeutic Activity  Therapeutic Activity Performed: Yes  Therapeutic Activity 1: Standard level EOB sitting trials completed ~7min with MaxAx2 progressed to Roger to complete functional tasks. Poor sitting balance observed this date with L lateral LOB.  Standard level EOB sitting trials completed to increase functional tolerance, strength and challenge sitting balance to maximize potential during ADLs.    Outcome Measures:[facility] Daily Activity  Putting on and taking off regular lower body clothing: Total  Bathing (including washing, rinsing, drying): Total  Putting on and taking off regular upper body clothing: Total  Toileting, which includes using toilet, bedpan or urinal: Total  Taking care of personal grooming such as brushing teeth: Total  Eating Meals: Total  Daily Activity - Total Score: 6      Education Documentation  ADL Training, taught by ARMAAN Myers at 10/29/2024 12:36 PM.  Learner: Patient  Readiness: Acceptance  Method: Explanation, Demonstration  Response: Needs Reinforcement      Education Comments  Education provided on role of OT/PT/POC and safety awareness throughout functional tasks/transfers.         OP EDUCATION:       Goals:  Encounter Problems       Encounter Problems (Active)       ADL       Goal 1 (Progressing)       Start:  10/21/24    Expected End:  11/01/24       Patient will demonstrate improved ADL skills:  Bathing with Min assist with adaptive equipment prn    Grooming with SBA assist       Feeding with supervision assist      UE Dressing with SBA assist           LE Dressing with Min assist with adaptive equipment prn     Toileting with Min assist with adaptive equipment prn                        Treatment and documentation completed by JOANNE Myers under the direct Supervision of MANDY Avendaño/GILSON

## 2024-10-29 NOTE — NURSING NOTE
Spoke with speech therapy. Recommending NPO overnight d/t bedside evaluation. Dr. Haque notified.  Instructed to hold carvedilol at this time.

## 2024-10-29 NOTE — ASSESSMENT & PLAN NOTE
-s/p LHC: no intervention indicated  -aspirin and betablocker  -however he is on midodrine for hypotension as well and use of BB and midodrine may be canceling out the benefit of the carvedilol. BP slightly less soft than last week, will keep following  [facility] on discharge  -Cardiology signed off 10/25

## 2024-10-29 NOTE — CARE PLAN
The patient's goals for the shift include  comfort    The clinical goals for the shift include safety and comfort

## 2024-10-29 NOTE — PROGRESS NOTES
Dat Mccallum is a 83 y.o. male on day 13 of admission presenting with NSTEMI (non-ST elevated myocardial infarction) (Multi).      Subjective   Patient drowsy. Wakes up a little. Snoring.        Objective     Last Recorded Vitals  /60 (BP Location: Left arm, Patient Position: Lying)   Pulse 103   Temp 37 °C (98.6 °F) (Temporal)   Resp 26   Wt 65.5 kg (144 lb 6.4 oz)   SpO2 92%   Intake/Output last 3 Shifts:    Intake/Output Summary (Last 24 hours) at 10/29/2024 0911  Last data filed at 10/29/2024 0334  Gross per 24 hour   Intake 470 ml   Output 300 ml   Net 170 ml       Admission Weight  Weight: 68 kg (150 lb) (10/16/24 2047)    Daily Weight  10/29/24 : 65.5 kg (144 lb 6.4 oz)    Image Results  Cardiac Catheterization Procedure             [place] [address]             Phone [phone]    Cardiovascular Catheterization Report    Patient Name:     DAT MCCALLUM      Performing Physician:  Virginia Chavez DO  Study Date:       10/23/2024           Verifying Physician:   Virginia Chavez DO  MRN/PID:          [id number]             Cardiologist/Co-Scrub:  Accession#:       [id number]         Ordering Provider:     Virginia CHAVEZ  Date of           1940 / 83      Cardiologist:  Birth/Age:        years  Gender:           M                    Fellow:  Encounter#:       [id number]           Surgeon:       Study: Left Heart Cath       Indications:  DAT MCCALLUM is a 84 year old male who presents with dyslipidemia and an asymptomatic chest pain assessment. Cardiomyopathy, left ventricular dysfunction and NSTE - ACS.  Stress test performed: No. CTA performed: NoShey Calvillo accessed: No. LVEF  Assessed: No.  Cardiac arrest: No.  Cardiac surgical consult:  No.  Cardiovascular Instability: No  Frailty status of patient entering lab: 6 = Moderately frail.       Coronary Angiography:  The coronary circulation is right dominant.     Coronary Angiography Comments:  We obtained informed consent with the help of his son Jorge A because the patient is deaf, and he was brought to the cardiac catheterization lab with the timeout verified between his computer medical record number and his arm wristband. Both groins were prepped and draped in a sterile fashion and the remainder of the procedure performed under sterile technique. 10 cc of 1% lidocaine used to the right groin for local anesthesia and Versed 2 mg and fentanyl 50 mcg for intravenous sedation. Used single wall micropuncture technique to access the right common femoral artery and a micropuncture sheath was inserted, then exchanged over guidewire for a 6 German [place] sheath. 6 German JR4 JL 4 and pigtail catheters were used and catheters were advanced and withdrawn over the guidewire without difficulty. At the end of the procedure the sheath was removed and an Angio-Seal closure device was deployed with good hemostasis and he was returned to his telemetry bed in stable condition.    Coronary angiography:    1. The left main was a large vessel and bifurcated into the LAD and circumflex and was normal    2 the LAD was a large vessel that extends to the apex and was widely patent with CHEYANNE grade III flow. The proximal LAD had an eccentric 40-50% irregular stenosis that was presumed to be the site of previous infarct vessel occlusion with spontaneous recanalization. The remaining mid and distal LAD and its branches had mild diffuse nonobstructive disease    3. The circumflex was a modest nondominant vessel that gave rise to 3 modest PLV branches and had no obstructive disease.       4. The right coronary artery was a dominant vessel that had mild 30-40% ostial /proximal stenosis and an eccentric 50% mid vessel stenosis,  then gives rise to a modest sized small caliber PDA and small caliber distal RCA without obstruction.    Left ventriculogram performed in the VALLES 30 projection showed persistent modest hypokinesis of the mid anterior, anterolateral segments and severe hypokinesis of the LV apical segment with left ventricular ejection fraction estimated at 40 to 45%.    Impression:  1.40% proximal LAD stenosis suggesting previous vessel occlusion with spontaneous recanalization.    2. 50% mid RCA stenosis.    3. Ischemic cardiomyopathy with residual anterior and anteroapical left ventricular hypokinesis with left ventricular ejection fraction 40 to 45%.    3. Nuclear stress test 10/22/2024 suggests viability in the anterior and apical segments without ischemia and with left ventricular ejection fraction estimated at 44%.          Hemo Personnel:  +----------------+---------+  Name            Duty       +----------------+---------+  Haja Mckeon MD 1  +----------------+---------+       Hemodynamic Pressures:     +----+---------------+----------+-------------+--------------+-------+---------+  Site   Date Time   Phase NameSystolic mmHgDiastolic mmHgED mmHgMean mmHg  +----+---------------+----------+-------------+--------------+-------+---------+    AO     10/23/2024  AIR REST          116            57              81           8:56:33 AM                                                       +----+---------------+----------+-------------+--------------+-------+---------+    LV     10/23/2024  AIR REST          103             2      8                    9:07:45 AM                                                       +----+---------------+----------+-------------+--------------+-------+---------+    LV     10/23/2024  AIR REST          109            -2      7                    9:07:57 AM                                                        +----+---------------+----------+-------------+--------------+-------+---------+   LVp     10/23/2024  AIR REST          106             0     12                    9:08:06 AM                                                       +----+---------------+----------+-------------+--------------+-------+---------+   AOp     10/23/2024  AIR REST          120            52              76           9:08:16 AM                                                       +----+---------------+----------+-------------+--------------+-------+---------+    AO     10/23/2024  AIR REST            0             0             -96           9:32:52 AM                                                       +----+---------------+----------+-------------+--------------+-------+---------+       Cardiac Cath Post Procedure Notes:  Post Procedure Diagnosis: Double vessel disease.  Blood Loss:               Estimated blood loss during the procedure was 10ml                            mls.  Specimens Removed:        Number of specimen(s) removed: none.       ICD 10 Codes:  Non ST elevation (NSTEMI) myocardial infarction-I21.4     CPT Codes:  Left Heart Cath (visualization of coronaries) and LV-49274     [id number] Haja Mckeon DO  Performing Physician  Electronically signed by [id number] Haja Mckeon DO on 10/24/2024 at 9:06:38 AM         ** Final **      Physical Exam  General: alert, no diaphoresis   Lungs: CTA BL   Heart: RRR, no LE edema BL   GI: abdomen soft, nontender, nondistended, BS present   MSK: no joint effusion or deformity   Skin: no rashes, erythema, or ecchymosis   Neuro: deaf, drowsy      Relevant Results               Assessment/Plan                  Assessment & Plan  NSTEMI (non-ST elevated myocardial infarction) (Multi)  -s/p LHC: no intervention indicated  -aspirin and betablocker  -however he is on midodrine for hypotension as well and use of BB and midodrine may be canceling out the  benefit of the carvedilol. BP slightly less soft than last week, will keep following  [facility] on discharge  -Cardiology signed off 10/25  Acute metabolic encephalopathy  -Mental status may be at baseline  -has had some communication with whiteboard per nursing   -suspect waxing and waning mentation  Acute on chronic systolic (congestive) heart failure  -clinically euvolemic  -unable to tolerate ACE/ARB or diuretics.  Present treatment with beta-blocker is continued.  He is also treated with amiodarone for hypotension.  Vital signs will be monitored and medications adjusted as indicated.  -The patient has severe debility from his multiple acute and chronic medical problems.    Idiopathic hypotension  -Less idiopathic and more likely related to his cardiomyopathy  -responds to midodrine, may be improved by discontinuing betablocker  Severe protein-calorie malnutrition (Multi)  -Nutrition following  Type 2 diabetes mellitus without complication, without long-term current use of insulin (Multi)  -Blood sugar adequately controlled.  Continue present treatment.  Hypomagnesemia  -receiving PO supplementation. Supplementing PRN with IV          Dispo: He has been evaluated physical therapy occupational therapy.  It is recommended the patient be discharged to skilled nursing facility for short-term rehabilitation.  He will be discharged to skilled nursing facility when insurance precertification is completed.              Syl Haque DO

## 2024-10-29 NOTE — NURSING NOTE
Dr. Haque notified of patient no urine output. Also patients telemetry monitor reading PVCs up to 4-5.

## 2024-10-29 NOTE — NURSING NOTE
"Patient working with therapy.  Therapy states that patient seems more lethargic than yesterday's session. Unable to stand today but stood yesterday.  Patient responds to verbal and tactile stimuli.  He has a \"watery\" cough and bilateral lungs are slightly course. Dr. Haque notified.   "

## 2024-10-29 NOTE — PROGRESS NOTES
Nutrition Follow up Note    Nutrition Assessment      Patient with increased lethargy. RN reports minimal po intake. MD aware of change in mental status, CT scan pending.    Nutrition History:  Food and Nutrient History: RN reports minimal po intake  Energy Intake: Poor < 50 %  Food Allergies/Intolerances:  None  GI Symptoms: None  Oral Problems: Oral aversion    Anthropometrics:  Ht: 182.9 cm (6'), Wt: 65.5 kg (144 lb 6.4 oz), BMI: 19.58  IBW/kg (Dietitian Calculated): 80.9 kg  Percent of IBW: 81 %     Weight Change:  Daily Weight  10/29/24 : 65.5 kg (144 lb 6.4 oz)  02/27/24 : 68.9 kg (152 lb)  08/10/23 : 70.3 kg (155 lb)  01/03/22 : 76.7 kg (169 lb)  10/21/21 : 77.6 kg (171 lb)  07/21/21 : 76.3 kg (168 lb 4 oz)  02/26/21 : 68.9 kg (152 lb)  12/29/20 : 68.6 kg (151 lb 3.2 oz)  12/03/20 : 62.6 kg (138 lb)     Nutrition Focused Physical Exam Findings:   Subcutaneous Fat Loss  Orbital Fat Pads: Mild-Moderate (slight dark circles and slight hollowing)  Buccal Fat Pads: Mild-Moderate (flat cheeks, minimal bounce)  Triceps: Severe (negligible fat tissue)    Muscle Wasting  Temporalis: Severe (hollowed scooping depression)  Pectoralis (Clavicular Region): Severe (protruding prominent clavicle)  Deltoid/Trapezius: Severe (squared shoulders, acromion process prominent)     Nutrition Significant Labs:  Lab Results   Component Value Date    WBC 8.4 10/29/2024    HGB 8.4 (L) 10/29/2024    HCT 23.9 (L) 10/29/2024     10/29/2024    CHOL 67 02/23/2024    TRIG 129 02/23/2024    HDL 24.5 02/23/2024    ALT 22 10/16/2024    AST 43 (H) 10/16/2024     (L) 10/29/2024    K 4.0 10/29/2024     10/29/2024    CREATININE 1.14 10/29/2024    BUN 41 (H) 10/29/2024    CO2 23 10/29/2024    TSH 2.84 12/03/2020    INR 1.5 (H) 10/16/2024    HGBA1C 5.3 10/16/2024     Nutrition Specific Medications:  aspirin, 81 mg, oral, Daily  carvedilol, 3.125 mg, oral, BID after meals  cefTRIAXone, 1 g, intravenous, q24h  enoxaparin, 40 mg,  subcutaneous, Daily  insulin lispro, 0-5 Units, subcutaneous, TID  magnesium oxide, 400 mg, oral, Daily  midodrine, 5 mg, oral, TID  ofloxacin, 1 drop, Left Eye, 4x daily  oxygen, , inhalation, Continuous - 02/gases  pravastatin, 40 mg, oral, Nightly  tamsulosin, 0.8 mg, oral, Nightly         Dietary Orders (From admission, onward)       Start     Ordered    10/28/24 1432  Oral nutritional supplements  Until discontinued        Comments: Strawberry or vanilla ensure with each meal per patient preference   Question Answer Comment   Deliver with All meals    Select supplement: Ensure High Protein        10/28/24 1432    10/24/24 1104  Adult diet Cardiac; 70 gm fat; 2 - 3 grams Sodium; Pureed 4; Thin 0  Diet effective now        Comments: Compensatory Swallowing Strategies:                                                                                                                                                                                                                                                           Upright 90 degrees as possible for all oral intake, single sips/bites, slow rate eating/feeding,supervision, cue pt  to swallow   Question Answer Comment   Diet type Cardiac    Fat restriction: 70 gm fat    Sodium restriction: 2 - 3 grams Sodium    Texture Pureed 4    Fluid consistency Thin 0        10/24/24 1104    10/21/24 1456  Oral nutritional supplements  Until discontinued        Comments: chocolate   Question Answer Comment   Deliver with All meals    Select supplement: Magic Cup        10/21/24 1456    10/17/24 0053  May Participate in Room Service With Assistance  ( ROOM SERVICE MAY PARTICIPATE WITH ASSISTANCE)  Once        Question:  .  Answer:  Yes    10/17/24 0052                   Estimated Needs:   Estimated Energy Needs  Total Energy Estimated Needs (kCal): 1848 kCal  Total Estimated Energy Need per Day (kCal/kg): 30 kCal/kg  Method for Estimating Needs: Actual Wt    Estimated Protein  Needs  Total Protein Estimated Needs (g): 62 g  Total Protein Estimated Needs (g/kg): 1 g/kg  Method for Estimating Needs: Actual Wt    Estimated Fluid Needs  Total Fluid Estimated Needs (mL): 1848 mL  Method for Estimating Needs: 1 mL/kcal      Nutrition Diagnosis   Nutrition Diagnosis:  Malnutrition Diagnosis  Patient has Malnutrition Diagnosis: Yes  Diagnosis Status: Ongoing  Malnutrition Diagnosis: Severe malnutrition related to chronic disease or condition  As Evidenced by: Moderate to severe subcutaeous fat losses, severe muscle deficits, po intake likely less than estimate energy needs    Nutrition Diagnosis  Patient has Nutrition Diagnosis: Yes  Diagnosis Status (1): Ongoing  Nutrition Diagnosis 1: Inadequate energy intake  Related to (1): decreased ability to consume sufficient energy  As Evidenced by (1): poor po intake     Nutrition Interventions/Recommendations   Nutrition Interventions and Recommendations:    Nutrition Prescription:  Individualized Nutrition Prescription Provided for : 1848 calories, 66 gm protein when diet advances    Nutrition Interventions:   Food and/or Nutrient Delivery Interventions  Interventions: Meals and snacks, Medical food supplement  Meals and Snacks: Fat-modified diet, Mineral-modified diet  Goal: provide as ordered  Medical Food Supplement: Commercial beverage  Goal: ensure high protein TID to provide 160 kcals and 16g protein each    Education Documentation  No documentation found.         Nutrition Monitoring and Evaluation   Monitoring/Evaluation:   Food/Nutrient Related History Monitoring  Monitoring and Evaluation Plan: Energy intake  Energy Intake: Estimated energy intake  Criteria: pt to consume >/= 75% estimated need    Body Composition/Growth/Weight History  Monitoring and Evaluation Plan: Weight  Weight: Measured weight  Criteria: pt will maintain/gain wt       Time Spent/Follow-up:   Follow Up  Time Spent (min): 25 minutes  Last Date of Nutrition Visit:  10/29/24  Nutrition Follow-Up Needed?: 5-7 days  Follow up Comment: 11/4/24

## 2024-10-29 NOTE — ASSESSMENT & PLAN NOTE
-clinically euvolemic  -unable to tolerate ACE/ARB or diuretics.  Present treatment with beta-blocker is continued.  He is also treated with amiodarone for hypotension.  Vital signs will be monitored and medications adjusted as indicated.  -The patient has severe debility from his multiple acute and chronic medical problems.

## 2024-10-29 NOTE — NURSING NOTE
Patient is not eating or drinking. He seems more lethargic than this morning.  Discussed with Dr. Haque regarding PO meds. Instructed to hold PO meds at this time until patient is more alert. Accucheck 156. Instructed by doctor to hold insulin for now due to patient not eating.

## 2024-10-29 NOTE — PROGRESS NOTES
Pt not medically clear for dc today. Received auth for patient to admit to [facility], auth good for 6 days. Will need goldenrod signed and 7000 completed. Transport will need arranged upon discharge. Notify heike Jules of discharge.     DISCHARGE PLAN TO GO TO [facility] AT TIME OF DISCHARGE.        10/29/24 7514   Discharge Planning   Who is requesting discharge planning? Provider   Home or Post Acute Services Post acute facilities (Rehab/SNF/etc)   Type of Post Acute Facility Services Skilled nursing   Expected Discharge Disposition SNF   Does the patient need discharge transport arranged? Yes   RoundTrip coordination needed? Yes   Has discharge transport been arranged? No

## 2024-10-29 NOTE — CARE PLAN
The patient's goals for the shift include      The clinical goals for the shift include safety      Problem: Skin  Goal: Decreased wound size/increased tissue granulation at next dressing change  Outcome: Progressing  Goal: Participates in plan/prevention/treatment measures  Outcome: Progressing  Goal: Prevent/manage excess moisture  Outcome: Progressing  Goal: Prevent/minimize sheer/friction injuries  Outcome: Progressing  Goal: Promote/optimize nutrition  Outcome: Progressing  Goal: Promote skin healing  Outcome: Progressing     Problem: Pain - Adult  Goal: Verbalizes/displays adequate comfort level or baseline comfort level  Outcome: Progressing     Problem: Safety - Adult  Goal: Free from fall injury  Outcome: Progressing     Problem: Discharge Planning  Goal: Discharge to home or other facility with appropriate resources  Outcome: Progressing     Problem: Chronic Conditions and Co-morbidities  Goal: Patient's chronic conditions and co-morbidity symptoms are monitored and maintained or improved  Outcome: Progressing     Problem: Fall/Injury  Goal: Not fall by end of shift  Outcome: Progressing  Goal: Be free from injury by end of the shift  Outcome: Progressing  Goal: Verbalize understanding of personal risk factors for fall in the hospital  Outcome: Progressing  Goal: Verbalize understanding of risk factor reduction measures to prevent injury from fall in the home  Outcome: Progressing  Goal: Use assistive devices by end of the shift  Outcome: Progressing  Goal: Pace activities to prevent fatigue by end of the shift  Outcome: Progressing     Problem: Nutrition  Goal: Less than 5 days NPO/clear liquids  Outcome: Progressing  Goal: Oral intake greater than 50%  Outcome: Progressing  Goal: Oral intake greater 75%  Outcome: Progressing  Goal: Consume prescribed supplement  Outcome: Progressing  Goal: Adequate PO fluid intake  Outcome: Progressing  Goal: Nutrition support goals are met within 48 hrs  Outcome:  Progressing  Goal: Nutrition support is meeting 75% of nutrient needs  Outcome: Progressing  Goal: Tube feed tolerance  Outcome: Progressing  Goal: BG  mg/dL  Outcome: Progressing  Goal: Lab values WNL  Outcome: Progressing  Goal: Electrolytes WNL  Outcome: Progressing  Goal: Promote healing  Outcome: Progressing  Goal: Maintain stable weight  Outcome: Progressing  Goal: Reduce weight from edema/fluid  Outcome: Progressing  Goal: Gradual weight gain  Outcome: Progressing  Goal: Improve ostomy output  Outcome: Progressing     Problem: Diabetes  Goal: Maintain glucose levels >70mg/dl to <250mg/dl throughout shift  Outcome: Progressing  Goal: No changes in neurological exam by end of shift  Outcome: Progressing  Goal: Vital signs within normal range for age by end of shift  Outcome: Progressing

## 2024-10-30 PROBLEM — N30.01 ACUTE CYSTITIS WITH HEMATURIA: Status: ACTIVE | Noted: 2024-10-30

## 2024-10-30 LAB
ALBUMIN SERPL BCP-MCNC: 2.6 G/DL (ref 3.4–5)
ANION GAP SERPL CALCULATED.3IONS-SCNC: 14 MMOL/L (ref 10–20)
BACTERIA UR CULT: NORMAL
BASOPHILS # BLD MANUAL: 0 X10*3/UL (ref 0–0.1)
BASOPHILS NFR BLD MANUAL: 0 %
BUN SERPL-MCNC: 45 MG/DL (ref 6–23)
BURR CELLS BLD QL SMEAR: ABNORMAL
CALCIUM SERPL-MCNC: 8.1 MG/DL (ref 8.6–10.3)
CHLORIDE SERPL-SCNC: 99 MMOL/L (ref 98–107)
CO2 SERPL-SCNC: 20 MMOL/L (ref 21–32)
CREAT SERPL-MCNC: 1.17 MG/DL (ref 0.5–1.3)
EGFRCR SERPLBLD CKD-EPI 2021: 63 ML/MIN/1.73M*2
EOSINOPHIL # BLD MANUAL: 0 X10*3/UL (ref 0–0.4)
EOSINOPHIL NFR BLD MANUAL: 0 %
ERYTHROCYTE [DISTWIDTH] IN BLOOD BY AUTOMATED COUNT: 13.7 % (ref 11.5–14.5)
GLUCOSE BLD MANUAL STRIP-MCNC: 181 MG/DL (ref 74–99)
GLUCOSE BLD MANUAL STRIP-MCNC: 181 MG/DL (ref 74–99)
GLUCOSE BLD MANUAL STRIP-MCNC: 187 MG/DL (ref 74–99)
GLUCOSE SERPL-MCNC: 179 MG/DL (ref 74–99)
HCT VFR BLD AUTO: 24.1 % (ref 41–52)
HGB BLD-MCNC: 8.1 G/DL (ref 13.5–17.5)
IMM GRANULOCYTES # BLD AUTO: 0.02 X10*3/UL (ref 0–0.5)
IMM GRANULOCYTES NFR BLD AUTO: 0.3 % (ref 0–0.9)
LYMPHOCYTES # BLD MANUAL: 1.14 X10*3/UL (ref 0.8–3)
LYMPHOCYTES NFR BLD MANUAL: 19 %
MAGNESIUM SERPL-MCNC: 1.94 MG/DL (ref 1.6–2.4)
MCH RBC QN AUTO: 30.1 PG (ref 26–34)
MCHC RBC AUTO-ENTMCNC: 33.6 G/DL (ref 32–36)
MCV RBC AUTO: 90 FL (ref 80–100)
METAMYELOCYTES # BLD MANUAL: 0.06 X10*3/UL
METAMYELOCYTES NFR BLD MANUAL: 1 %
MONOCYTES # BLD MANUAL: 0.42 X10*3/UL (ref 0.05–0.8)
MONOCYTES NFR BLD MANUAL: 7 %
NEUTROPHILS # BLD MANUAL: 4.26 X10*3/UL (ref 1.6–5.5)
NEUTS BAND # BLD MANUAL: 0.6 X10*3/UL (ref 0–0.5)
NEUTS BAND NFR BLD MANUAL: 10 %
NEUTS SEG # BLD MANUAL: 3.66 X10*3/UL (ref 1.6–5)
NEUTS SEG NFR BLD MANUAL: 61 %
NRBC BLD-RTO: 0 /100 WBCS (ref 0–0)
OVALOCYTES BLD QL SMEAR: ABNORMAL
PHOSPHATE SERPL-MCNC: 2.7 MG/DL (ref 2.5–4.9)
PLATELET # BLD AUTO: 196 X10*3/UL (ref 150–450)
POLYCHROMASIA BLD QL SMEAR: ABNORMAL
POTASSIUM SERPL-SCNC: 4.1 MMOL/L (ref 3.5–5.3)
RBC # BLD AUTO: 2.69 X10*6/UL (ref 4.5–5.9)
RBC MORPH BLD: ABNORMAL
SODIUM SERPL-SCNC: 129 MMOL/L (ref 136–145)
TOTAL CELLS COUNTED BLD: 100
VARIANT LYMPHS # BLD MANUAL: 0.12 X10*3/UL (ref 0–0.3)
VARIANT LYMPHS NFR BLD: 2 %
WBC # BLD AUTO: 6 X10*3/UL (ref 4.4–11.3)

## 2024-10-30 PROCEDURE — 99233 SBSQ HOSP IP/OBS HIGH 50: CPT | Performed by: NURSE PRACTITIONER

## 2024-10-30 PROCEDURE — 99233 SBSQ HOSP IP/OBS HIGH 50: CPT | Performed by: HOSPITALIST

## 2024-10-30 PROCEDURE — 85007 BL SMEAR W/DIFF WBC COUNT: CPT | Performed by: HOSPITALIST

## 2024-10-30 PROCEDURE — 82947 ASSAY GLUCOSE BLOOD QUANT: CPT

## 2024-10-30 PROCEDURE — 2500000004 HC RX 250 GENERAL PHARMACY W/ HCPCS (ALT 636 FOR OP/ED): Performed by: STUDENT IN AN ORGANIZED HEALTH CARE EDUCATION/TRAINING PROGRAM

## 2024-10-30 PROCEDURE — 2500000002 HC RX 250 W HCPCS SELF ADMINISTERED DRUGS (ALT 637 FOR MEDICARE OP, ALT 636 FOR OP/ED): Performed by: INTERNAL MEDICINE

## 2024-10-30 PROCEDURE — 2500000004 HC RX 250 GENERAL PHARMACY W/ HCPCS (ALT 636 FOR OP/ED): Performed by: INTERNAL MEDICINE

## 2024-10-30 PROCEDURE — 2500000005 HC RX 250 GENERAL PHARMACY W/O HCPCS: Performed by: NURSE PRACTITIONER

## 2024-10-30 PROCEDURE — 80069 RENAL FUNCTION PANEL: CPT | Performed by: HOSPITALIST

## 2024-10-30 PROCEDURE — 99497 ADVNCD CARE PLAN 30 MIN: CPT | Performed by: NURSE PRACTITIONER

## 2024-10-30 PROCEDURE — 99222 1ST HOSP IP/OBS MODERATE 55: CPT | Performed by: UROLOGY

## 2024-10-30 PROCEDURE — 2500000004 HC RX 250 GENERAL PHARMACY W/ HCPCS (ALT 636 FOR OP/ED): Performed by: HOSPITALIST

## 2024-10-30 PROCEDURE — 36415 COLL VENOUS BLD VENIPUNCTURE: CPT | Performed by: HOSPITALIST

## 2024-10-30 PROCEDURE — 51703 INSERT BLADDER CATH COMPLEX: CPT | Performed by: UROLOGY

## 2024-10-30 PROCEDURE — 83735 ASSAY OF MAGNESIUM: CPT | Performed by: HOSPITALIST

## 2024-10-30 PROCEDURE — 85027 COMPLETE CBC AUTOMATED: CPT | Performed by: HOSPITALIST

## 2024-10-30 PROCEDURE — 51701 INSERT BLADDER CATHETER: CPT

## 2024-10-30 PROCEDURE — 2060000001 HC INTERMEDIATE ICU ROOM DAILY

## 2024-10-30 RX ORDER — MORPHINE SULFATE 2 MG/ML
2 INJECTION, SOLUTION INTRAMUSCULAR; INTRAVENOUS EVERY 4 HOURS PRN
Status: DISCONTINUED | OUTPATIENT
Start: 2024-10-30 | End: 2024-11-02

## 2024-10-30 RX ORDER — SODIUM CHLORIDE, SODIUM LACTATE, POTASSIUM CHLORIDE, CALCIUM CHLORIDE 600; 310; 30; 20 MG/100ML; MG/100ML; MG/100ML; MG/100ML
75 INJECTION, SOLUTION INTRAVENOUS CONTINUOUS
Status: ACTIVE | OUTPATIENT
Start: 2024-10-30 | End: 2024-10-31

## 2024-10-30 RX ADMIN — Medication 21 PERCENT: at 07:56

## 2024-10-30 RX ADMIN — INSULIN LISPRO 1 UNITS: 100 INJECTION, SOLUTION INTRAVENOUS; SUBCUTANEOUS at 09:06

## 2024-10-30 RX ADMIN — OFLOXACIN 1 DROP: 3 SOLUTION OPHTHALMIC at 17:22

## 2024-10-30 RX ADMIN — OFLOXACIN 1 DROP: 3 SOLUTION OPHTHALMIC at 19:27

## 2024-10-30 RX ADMIN — INSULIN LISPRO 1 UNITS: 100 INJECTION, SOLUTION INTRAVENOUS; SUBCUTANEOUS at 17:22

## 2024-10-30 RX ADMIN — SODIUM CHLORIDE, SODIUM LACTATE, POTASSIUM CHLORIDE, AND CALCIUM CHLORIDE 75 ML/HR: 600; 310; 30; 20 INJECTION, SOLUTION INTRAVENOUS at 17:22

## 2024-10-30 RX ADMIN — OFLOXACIN 1 DROP: 3 SOLUTION OPHTHALMIC at 12:13

## 2024-10-30 RX ADMIN — MORPHINE SULFATE 2 MG: 2 INJECTION, SOLUTION INTRAMUSCULAR; INTRAVENOUS at 20:16

## 2024-10-30 RX ADMIN — OFLOXACIN 1 DROP: 3 SOLUTION OPHTHALMIC at 09:07

## 2024-10-30 RX ADMIN — PIPERACILLIN SODIUM AND TAZOBACTAM SODIUM 3.38 G: 3; .375 INJECTION, SOLUTION INTRAVENOUS at 12:24

## 2024-10-30 RX ADMIN — SODIUM CHLORIDE, SODIUM LACTATE, POTASSIUM CHLORIDE, AND CALCIUM CHLORIDE 75 ML/HR: 600; 310; 30; 20 INJECTION, SOLUTION INTRAVENOUS at 19:27

## 2024-10-30 RX ADMIN — INSULIN LISPRO 1 UNITS: 100 INJECTION, SOLUTION INTRAVENOUS; SUBCUTANEOUS at 12:13

## 2024-10-30 RX ADMIN — PIPERACILLIN SODIUM AND TAZOBACTAM SODIUM 3.38 G: 3; .375 INJECTION, SOLUTION INTRAVENOUS at 19:27

## 2024-10-30 RX ADMIN — PROCHLORPERAZINE EDISYLATE 10 MG: 5 INJECTION INTRAMUSCULAR; INTRAVENOUS at 20:27

## 2024-10-30 ASSESSMENT — PAIN SCALES - PAIN ASSESSMENT IN ADVANCED DEMENTIA (PAINAD)
FACIALEXPRESSION: SMILING OR INEXPRESSIVE
TOTALSCORE: MEDICATION (SEE MAR)
TOTALSCORE: 2
CONSOLABILITY: NO NEED TO CONSOLE
FACIALEXPRESSION: FACIAL GRIMACING
TOTALSCORE: 6
TOTALSCORE: IMPROVED
BODYLANGUAGE: TENSE, DISTRESSED PACING, FIDGETING
CONSOLABILITY: NO NEED TO CONSOLE
BODYLANGUAGE: RIGID, FISTS CLENCHED, KNEES UP, PUSHING/PULLING AWAY, STRIKES OUT
BREATHING: OCCASIONAL LABORED BREATHING, SHORT PERIOD OF HYPERVENTILATION
BREATHING: NOISY LABORED BREATHING, LONG PERIODS OF HYPERVENTILATION, CHEYNE-STOKES RESPIRATIONS

## 2024-10-30 ASSESSMENT — COGNITIVE AND FUNCTIONAL STATUS - GENERAL
CLIMB 3 TO 5 STEPS WITH RAILING: TOTAL
MOBILITY SCORE: 6
HELP NEEDED FOR BATHING: TOTAL
WALKING IN HOSPITAL ROOM: TOTAL
TURNING FROM BACK TO SIDE WHILE IN FLAT BAD: TOTAL
DRESSING REGULAR UPPER BODY CLOTHING: TOTAL
TURNING FROM BACK TO SIDE WHILE IN FLAT BAD: TOTAL
EATING MEALS: TOTAL
STANDING UP FROM CHAIR USING ARMS: TOTAL
CLIMB 3 TO 5 STEPS WITH RAILING: TOTAL
EATING MEALS: TOTAL
PERSONAL GROOMING: TOTAL
MOVING TO AND FROM BED TO CHAIR: TOTAL
HELP NEEDED FOR BATHING: TOTAL
STANDING UP FROM CHAIR USING ARMS: TOTAL
MOVING FROM LYING ON BACK TO SITTING ON SIDE OF FLAT BED WITH BEDRAILS: TOTAL
DRESSING REGULAR LOWER BODY CLOTHING: TOTAL
DAILY ACTIVITIY SCORE: 6
DRESSING REGULAR LOWER BODY CLOTHING: TOTAL
PERSONAL GROOMING: TOTAL
TOILETING: TOTAL
DRESSING REGULAR UPPER BODY CLOTHING: TOTAL
MOVING TO AND FROM BED TO CHAIR: TOTAL
DAILY ACTIVITIY SCORE: 6
TOILETING: TOTAL
WALKING IN HOSPITAL ROOM: TOTAL
MOVING FROM LYING ON BACK TO SITTING ON SIDE OF FLAT BED WITH BEDRAILS: TOTAL
MOBILITY SCORE: 6

## 2024-10-30 ASSESSMENT — PAIN SCALES - GENERAL
PAINLEVEL_OUTOF10: 0 - NO PAIN
PAINLEVEL_OUTOF10: 2
PAINLEVEL_OUTOF10: 6

## 2024-10-30 ASSESSMENT — PAIN - FUNCTIONAL ASSESSMENT
PAIN_FUNCTIONAL_ASSESSMENT: FLACC (FACE, LEGS, ACTIVITY, CRY, CONSOLABILITY)
PAIN_FUNCTIONAL_ASSESSMENT: PAINAD (PAIN ASSESSMENT IN ADVANCED DEMENTIA SCALE)
PAIN_FUNCTIONAL_ASSESSMENT: PAINAD (PAIN ASSESSMENT IN ADVANCED DEMENTIA SCALE)

## 2024-10-30 ASSESSMENT — PAIN SCALES - WONG BAKER: WONGBAKER_NUMERICALRESPONSE: NO HURT

## 2024-10-30 NOTE — PROGRESS NOTES
Dat Mccallum is a 83 y.o. male on day 14 of admission presenting with NSTEMI (non-ST elevated myocardial infarction) (Multi).      Subjective   Patient remains drowsy. He fidgets in bed. 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    Similar to yesterday overall. In the afternoon yesterday, he woke up eventually and able to take some applesauce. Speech eval evaluated a few hours afterward and recommended strict NPO due to aspiration risk.    Patient also with decreased urine output yesterday, ultimately straight cathed for small amount of urine which was sent for UA (positive for UTI). Developed urinary retention overnight and straight cath unsuccessful at that time. Urology consulted and recommended coude placement.     Patient remains on gentle IV fluids.       Objective     Last Recorded Vitals  BP (!) 111/97 (BP Location: Left arm, Patient Position: Lying)   Pulse 88   Temp 37.3 °C (99.1 °F) (Temporal)   Resp 18   Wt 65.5 kg (144 lb 6.4 oz)   SpO2 90%   Intake/Output last 3 Shifts:    Intake/Output Summary (Last 24 hours) at 10/30/2024 1114  Last data filed at 10/30/2024 0430  Gross per 24 hour   Intake 382.5 ml   Output 300 ml   Net 82.5 ml       Admission Weight  Weight: 68 kg (150 lb) (10/16/24 2047)    Daily Weight  10/30/24 : 65.5 kg (144 lb 6.4 oz)    Image Results  XR chest 1 view  Narrative: Interpreted By:  Laurent Elam,   STUDY:  XR CHEST 1 VIEW; 10/29/2024 11:22 am      INDICATION:  CLINICAL INFORMATION: Signs/Symptoms:cough.      COMPARISON:  10/16/2024      ACCESSION NUMBER(S):  [id number]      ORDERING CLINICIAN:  RICHARD ADAIR      TECHNIQUE:  Portable chest one view.      FINDINGS:  The cardiac size is indeterminate in view of the AP projection.  Patient is slightly rotated to the left. There is a thoracic  dextroscoliosis. Alveolar infiltrate is present within left lower  lobe consistent with pneumonia. Right hemithorax is clear. No  effusions  are identified.      Impression: There is a new left lower lobe infiltrate consistent with pneumonia.  Follow-up to assure complete clearing is suggested.      MACRO:  none      Signed by: Laurent Elam 10/29/2024 2:55 PM  Dictation workstation:   [id number]  CT head wo IV contrast  Narrative: Interpreted By:  Brielle Linares,   STUDY:  CT HEAD WO IV CONTRAST; ;  10/29/2024 1:26 pm      INDICATION:  Signs/Symptoms:sudden onset confusion.      COMPARISON:  10/16/2024      ACCESSION NUMBER(S):  [id number]      ORDERING CLINICIAN:  RICHARD ADAIR      TECHNIQUE:  Serial axial images of the head were obtained without intravenous  contrast. Sagittal and coronal reconstructions were generated.      FINDINGS:  Trickles are midline and enlarged. There is prominence of the  cortical sulci and superior cerebellar cisterns.      There are no acute parenchymal abnormalities. There is no hemorrhage  or extra-axial fluid.      There is no obvious scalp hematoma or skull fracture.      Visualized portions of the paranasal sinuses and mastoids are  unremarkable.      COMPARISON OF FINDINGS:  The brain is similar.      Impression: No acute intracranial abnormality.          MACRO:  none      Signed by: Brielle Linares 10/29/2024 2:19 PM  Dictation workstation:   [id number]      Physical Exam  General:  no diaphoresis   Lungs: CTA BL   Heart: RRR, no LE edema BL   GI: abdomen soft, nontender, nondistended, BS present   MSK: no joint effusion or deformity   Skin: no rashes, erythema, or ecchymosis   Neuro: deaf, drowsy      Relevant Results               Assessment/Plan                  Assessment & Plan  NSTEMI (non-ST elevated myocardial infarction) (Multi)  -s/p LHC: no intervention indicated  -aspirin and betablocker  -however he is on midodrine for hypotension as well and use of BB and midodrine may be canceling out the benefit of the carvedilol. BP slightly less soft than last week, will keep following  [facility] on  discharge  -Cardiology signed off 10/25  Acute metabolic encephalopathy  Was profound on admission, but overall improved and then continued to wax/wane. Worsened over past 48 hr. I did not see patient Monday, but RN today had him Monday and says he was more interactive and awake that day.  Suspect related to hospital delirium and also UTI (diagnosed 10/29)  Currently strict NPO.  CT brain normal. Labs otherwise unremarkable.  Acute on chronic systolic (congestive) heart failure  -clinically euvolemic  -unable to tolerate ACE/ARB or diuretics.  Present treatment with beta-blocker is continued.  He is also treated with amiodarone for hypotension.  Vital signs will be monitored and medications adjusted as indicated.  -The patient has severe debility from his multiple acute and chronic medical problems.    Idiopathic hypotension  -Less idiopathic and more likely related to his cardiomyopathy  -responds to midodrine, may be improved by discontinuing betablocker  Severe protein-calorie malnutrition (Multi)  -Nutrition following  Now unable to safely swallow. Intake poor prior to this. Will touch base with family today, may need to consider feeding tube.  Type 2 diabetes mellitus without complication, without long-term current use of insulin (Multi)  -Blood sugar adequately controlled.  Continue present treatment.  Hypomagnesemia  -receiving PO supplementation. Supplementing PRN with IV    Acute cystitis with hematuria  Started on rocephin. Cultures pending.        Dispo: He has been evaluated physical therapy occupational therapy.  It is recommended the patient be discharged to skilled nursing facility for short-term rehabilitation.    His precert is back and he is able to discharge to SNF in next 5 days.  That said, worsening encephalopathy and lack of PO intake. Will need to discuss with family regarding if they would be comfortable with feeding tube. Continuing to treat UTI, culture in progress.    Palliative care  following.            ySl Haque DO        Addendum: 1825  Long discussion with nazia, Jorge A and Rich, and palliative care CNP Avani Gray CNP. We talked about overall decline in past 48 hr. Goal currently is to support the patient and see if nutrition, antibiotics help improve current status. If no change with these measures, may consider change in goals at that time. See Avani's note for further detail of this conversation.     Patient's family also requesting transfer to [facility] for second opinion. I have put in transfer request, awaiting call back.   I have also requested neurology to evaluate. I suspect most likely this is consistent with metabolic encephalopathy, but given rapid decline reasonable to ask neurology to evaluate.

## 2024-10-30 NOTE — PROGRESS NOTES
Spiritual Care Visit    Clinical Encounter Type  Visited With: Patient  Routine Visit: Follow-up  Continue Visiting: Yes         Values/Beliefs  Spiritual Requests During Hospitalization: Dat was sleeping. So i gave him a blessing and left a note for his son, Jorge A.     Demetrio Ray

## 2024-10-30 NOTE — CARE PLAN
The patient's goals for the shift include      The clinical goals for the shift include VSS rest      Problem: Skin  Goal: Decreased wound size/increased tissue granulation at next dressing change  Outcome: Progressing  Goal: Participates in plan/prevention/treatment measures  Outcome: Progressing  Goal: Prevent/manage excess moisture  Outcome: Progressing  Goal: Prevent/minimize sheer/friction injuries  Outcome: Progressing  Goal: Promote/optimize nutrition  Outcome: Progressing  Goal: Promote skin healing  Outcome: Progressing     Problem: Pain - Adult  Goal: Verbalizes/displays adequate comfort level or baseline comfort level  Outcome: Progressing     Problem: Safety - Adult  Goal: Free from fall injury  Outcome: Progressing     Problem: Discharge Planning  Goal: Discharge to home or other facility with appropriate resources  Outcome: Progressing     Problem: Chronic Conditions and Co-morbidities  Goal: Patient's chronic conditions and co-morbidity symptoms are monitored and maintained or improved  Outcome: Progressing     Problem: Fall/Injury  Goal: Not fall by end of shift  Outcome: Progressing  Goal: Be free from injury by end of the shift  Outcome: Progressing  Goal: Verbalize understanding of personal risk factors for fall in the hospital  Outcome: Progressing  Goal: Verbalize understanding of risk factor reduction measures to prevent injury from fall in the home  Outcome: Progressing  Goal: Use assistive devices by end of the shift  Outcome: Progressing  Goal: Pace activities to prevent fatigue by end of the shift  Outcome: Progressing     Problem: Nutrition  Goal: Less than 5 days NPO/clear liquids  Outcome: Progressing  Goal: Oral intake greater than 50%  Outcome: Progressing  Goal: Oral intake greater 75%  Outcome: Progressing  Goal: Consume prescribed supplement  Outcome: Progressing  Goal: Adequate PO fluid intake  Outcome: Progressing  Goal: Nutrition support goals are met within 48 hrs  Outcome:  Progressing  Goal: Nutrition support is meeting 75% of nutrient needs  Outcome: Progressing  Goal: Tube feed tolerance  Outcome: Progressing  Goal: BG  mg/dL  Outcome: Progressing  Goal: Lab values WNL  Outcome: Progressing  Goal: Electrolytes WNL  Outcome: Progressing  Goal: Promote healing  Outcome: Progressing  Goal: Maintain stable weight  Outcome: Progressing  Goal: Reduce weight from edema/fluid  Outcome: Progressing  Goal: Gradual weight gain  Outcome: Progressing  Goal: Improve ostomy output  Outcome: Progressing     Problem: ACS/CP/NSTEMI/STEMI  Goal: Chest pain managed (free from pain or at acceptable level)  Outcome: Progressing  Goal: Lab values return to normal range  Outcome: Progressing  Goal: Promote self management  Outcome: Progressing  Goal: Serial ECG will return to baseline  Outcome: Progressing  Goal: Verbalize understanding of procedures/devices  Outcome: Progressing  Goal: Wean vasopressors/achieve hemodynamic stability  Outcome: Progressing

## 2024-10-30 NOTE — CONSULTS
Reason For Consult  Urinary retention    History Of Present Illness  Dat Mccallum is a 83 y.o. male presenting with altered mental status.  He was found to have retention in the 300 mL range yesterday.  Staff was unable to place a Drummond catheter.  His creatinine today is 1.17.  He is unable to provide a history.     Past Medical History  He has a past medical history of Acute frontal sinusitis, unspecified (01/06/2015), Encounter for screening for malignant neoplasm of colon (01/22/2020), Encounter for screening for malignant neoplasm of prostate (08/14/2017), Other conditions influencing health status, Other conditions influencing health status (01/19/2018), Other skin changes (07/21/2021), Other specified health status, Personal history of other diseases of the nervous system and sense organs, Personal history of other diseases of urinary system (08/10/2021), Personal history of other specified conditions (06/29/2018), Personal history of other specified conditions (09/25/2017), Personal history of other specified conditions (08/14/2017), Personal history of other specified conditions (10/11/2017), Personal history of pneumonia (recurrent) (01/19/2018), Rash and other nonspecific skin eruption (12/17/2020), 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    Surgical History  He has a past surgical history that includes Other surgical history (12/02/2013); Other surgical history (11/26/2018); Other surgical history (11/26/2018); and Cardiac catheterization (N/A, 10/23/2024).     Social History  He reports that he has never smoked. He has never been exposed to tobacco smoke. He has never used smokeless tobacco. He reports that he does not currently use alcohol. He reports that he does not use drugs.    Family History  No family history on file.     Allergies  Patient has no known allergies.    Review of Systems  Patient unable to to  provide review of systems     Physical Exam  Lethargic  Normal respiratory effort  Abdomen soft nontender nondistended  No penile lesions.     Last Recorded Vitals  Blood pressure 103/59, pulse 92, temperature 37.1 °C (98.8 °F), temperature source Temporal, resp. rate 18, height 1.829 m (6'), weight 65.5 kg (144 lb 6.4 oz), SpO2 92%.    Relevant Results      Bladder scan performed at bedside, not a postvoid residual.  235 mL identified     Assessment/Plan     83-year-old has moderate urinary retention and staff was unable to place catheter.      He was sterilely prepped.  An 18 Frisian coudé tip catheter was advanced into the urethra.  In the proximal urethra there was obstruction, consistent with a urethral stricture.  The catheter was withdrawn.      His creatinine is 1.17 and he does not appear to be in a discomfort when his bladder is palpated.  Placement of the catheter would likely involve urethral dilation and cystoscopy.  Further attempts is not recommended at this point.      Continue to monitor his voiding and post void residual.      Dino Lorenz MD

## 2024-10-30 NOTE — PROGRESS NOTES
Speech-Language Pathology                 Therapy Communication Note    Patient Name: Dat Mccallum  MRN: [id number]  Department: [address]  Room: 12/12-A  Today's Date: 10/30/2024     Discipline: Speech Language Pathology    Missed Visit Reason: Not appropriate    Missed Time: Attempt    Comment: Attempted to see pt for swallow reassessment at 1520, however RN reported that pt has been lethargic all day and is not appropriate for PO trials at this time. SLP will hold and reattempt tomorrow, 10/31/24, as appropriate.

## 2024-10-30 NOTE — NURSING NOTE
RN bladder scanned the patient due to no urine output for shift for 352ml urine, encouraged patient to void, no success, MD notified, straight cathed patient for only 50ml, during straight cath, dark clots were present, and RN could not advance past the prostate. MD notified again. Urology consulted.

## 2024-10-30 NOTE — ASSESSMENT & PLAN NOTE
Was profound on admission, but overall improved and then continued to wax/wane. Worsened over past 48 hr. I did not see patient Monday, but RN today had him Monday and says he was more interactive and awake that day.  Suspect related to hospital delirium and also UTI (diagnosed 10/29)  Currently strict NPO.  CT brain normal. Labs otherwise unremarkable.

## 2024-10-30 NOTE — ASSESSMENT & PLAN NOTE
-Nutrition following  Now unable to safely swallow. Intake poor prior to this. Will touch base with family today, may need to consider feeding tube.

## 2024-10-30 NOTE — NURSING NOTE
RN spoke with urology, Dr. Gibbs regarding issues with advancing past prostate.  Gave verbal orders to RN to insert a 16 F coude catheter into patient.

## 2024-10-30 NOTE — PROGRESS NOTES
Dat Mccallum is a 83 y.o. male on day 14 of admission presenting with NSTEMI (non-ST elevated myocardial infarction) (Multi).    Subjective   Symptoms (0 - 10, Best to Worst)  [place] Symptom Assessment System  0-10 (Numeric) Pain Score: 0 - No pain  Minimally responsive to noxious stimuli today, does open eyes but does not track me, withdrawing legs to noxious stimuli is able to move both upper extremities equally and spontaneously but very weak, not following commands, not speaking.    Gerald retention today, urology attempted to place coudé catheter, this failed.  IV antibiotic coverage expanded.    Seen by speech therapy, given his worsening mental status, they recommended strict NPO.  Objective         Constitutional:       General: Patient peers acutely ill, minimally responsive, lying in bed  HENT:      Head: Normocephalic. Temporal wasting     Mouth: Mucous membranes are moist.   Audible oropharyngeal secretions  Eyes:      Conjunctiva/sclera: Conjunctivae clear, sclerae white. No discharge.     Pupils: Pupils are equal, round, and reactive to light.   Neck:      Vascular: No carotid bruit.   Cardiovascular:      Rate and Rhythm: Normal rate and irregular rhythm.      Heart sounds: No murmur heard.  Pulmonary:      Effort: No respiratory distress.      Breath sounds: Clear to auscultation  Abdominal:      General: There is distension.      Tenderness: There is no abdominal tenderness. There is no guarding.   Hiccups noted  Urology:     Genitalia: normal genitalia, pure wick in place    Urine: None  Musculoskeletal:         General: No deformity.   Skin:     Coloration: Skin is not jaundiced.  Very pale  Neurological:      General: No focal deficit present. deaf     Mental Status: Eyes open spontaneously, does close eyes against confrontation, moving extremities equally, withdrawing to noxious stimuli but not answering questions not following commands no tracking  Psychiatric:         Behavior:  N/A        Last Recorded Vitals  Blood pressure 115/53, pulse 92, temperature 36.6 °C (97.9 °F), temperature source Temporal, resp. rate 18, height 1.829 m (6'), weight 65.5 kg (144 lb 6.4 oz), SpO2 94%.  Intake/Output last 3 Shifts:  I/O last 3 completed shifts:  In: 382.5 (5.8 mL/kg) [I.V.:382.5 (5.8 mL/kg)]  Out: 400 (6.1 mL/kg) [Urine:400 (0.2 mL/kg/hr)]  Weight: 65.5 kg     Relevant Results  Results for orders placed or performed during the hospital encounter of 10/16/24 (from the past 24 hours)   CBC and Auto Differential   Result Value Ref Range    WBC 6.0 4.4 - 11.3 x10*3/uL    nRBC 0.0 0.0 - 0.0 /100 WBCs    RBC 2.69 (L) 4.50 - 5.90 x10*6/uL    Hemoglobin 8.1 (L) 13.5 - 17.5 g/dL    Hematocrit 24.1 (L) 41.0 - 52.0 %    MCV 90 80 - 100 fL    MCH 30.1 26.0 - 34.0 pg    MCHC 33.6 32.0 - 36.0 g/dL    RDW 13.7 11.5 - 14.5 %    Platelets 196 150 - 450 x10*3/uL    Immature Granulocytes %, Automated 0.3 0.0 - 0.9 %    Immature Granulocytes Absolute, Automated 0.02 0.00 - 0.50 x10*3/uL   Manual Differential   Result Value Ref Range    Neutrophils %, Manual 61.0 40.0 - 80.0 %    Bands %, Manual 10.0 0.0 - 5.0 %    Lymphocytes %, Manual 19.0 13.0 - 44.0 %    Monocytes %, Manual 7.0 2.0 - 10.0 %    Eosinophils %, Manual 0.0 0.0 - 6.0 %    Basophils %, Manual 0.0 0.0 - 2.0 %    Atypical Lymphocytes %, Manual 2.0 0.0 - 2.0 %    Metamyelocytes %, Manual 1.0 0.0 - 0.0 %    Seg Neutrophils Absolute, Manual 3.66 1.60 - 5.00 x10*3/uL    Bands Absolute, Manual 0.60 (H) 0.00 - 0.50 x10*3/uL    Lymphocytes Absolute, Manual 1.14 0.80 - 3.00 x10*3/uL    Monocytes Absolute, Manual 0.42 0.05 - 0.80 x10*3/uL    Eosinophils Absolute, Manual 0.00 0.00 - 0.40 x10*3/uL    Basophils Absolute, Manual 0.00 0.00 - 0.10 x10*3/uL    Atypical Lymphs Absolute, Manual 0.12 0.00 - 0.30 x10*3/uL    Metamyelocytes Absolute, Manual 0.06 0.00 - 0.00 x10*3/uL    Total Cells Counted 100     Neutrophils Absolute, Manual 4.26 1.60 - 5.50 x10*3/uL    RBC  Morphology See Below     Polychromasia Mild     Ovalocytes Few     [place] Cells Few    Magnesium   Result Value Ref Range    Magnesium 1.94 1.60 - 2.40 mg/dL   Renal function panel   Result Value Ref Range    Glucose 179 (H) 74 - 99 mg/dL    Sodium 129 (L) 136 - 145 mmol/L    Potassium 4.1 3.5 - 5.3 mmol/L    Chloride 99 98 - 107 mmol/L    Bicarbonate 20 (L) 21 - 32 mmol/L    Anion Gap 14 10 - 20 mmol/L    Urea Nitrogen 45 (H) 6 - 23 mg/dL    Creatinine 1.17 0.50 - 1.30 mg/dL    eGFR 63 >60 mL/min/1.73m*2    Calcium 8.1 (L) 8.6 - 10.3 mg/dL    Phosphorus 2.7 2.5 - 4.9 mg/dL    Albumin 2.6 (L) 3.4 - 5.0 g/dL   POCT GLUCOSE   Result Value Ref Range    POCT Glucose 187 (H) 74 - 99 mg/dL   POCT GLUCOSE   Result Value Ref Range    POCT Glucose 181 (H) 74 - 99 mg/dL     Scheduled medications  aspirin, 81 mg, oral, Daily  carvedilol, 3.125 mg, oral, BID after meals  [Held by provider] enoxaparin, 40 mg, subcutaneous, Daily  insulin lispro, 0-5 Units, subcutaneous, TID  magnesium oxide, 400 mg, oral, Daily  midodrine, 5 mg, oral, TID  ofloxacin, 1 drop, Left Eye, 4x daily  oxygen, , inhalation, Continuous - 02/gases  piperacillin-tazobactam, 3.375 g, intravenous, q6h  pravastatin, 40 mg, oral, Nightly  tamsulosin, 0.8 mg, oral, Nightly      Continuous medications       PRN medications  PRN medications: aminophylline, dextrose, dextrose, glucagon, glucagon, melatonin, prochlorperazine, prochlorperazine      Assessment/Plan     HFrEF - EF improved s/p PCI. On BB  NSTEMI - aspirin, BB  Hypotension - on midodrine tid  Acute metabolic encephalopathy - baseline A&O x3, worsened, now NPO  Severe protein calorie malnutrition-family ok with corpak temporarily, but no long term feeding tubes.   Electrolyte derangements - receiving supplementations  PNA-zosyn, LLL on CXR, suspect aspiration  Urinary Retention-urology attempted coudet, unable. In continues to retain, would need to consider cytoscopy with dilation, family discussing, but  seems to be in agreement that they would proceed with this if necessary to provide symptom relief.      Palliative Care Encounter  DNR-DNI  The patient is not capable  Son Jorge A is primary HPOA, son Rich is first alternate agent    10/30  Patient has had a significant decline in his mental status over the past 24 hours, due to this speech therapy has recommended strict n.p.o.  Patient is also noted to have a urinary tract infection now with urinary retention, urology was unable to place a coudé catheter.  Did reach out to the son Jorge A, he is on his way to the facility to review patient condition and discuss goals of care.  I spoke to attending service, they are agreeable to participate in meeting if needed.  Also reaching out to urology to see what the next steps are if the patient is unable to spontaneously void.      Addendum:  Family meeting with nazia Jules and rich, Dr Haque. Overall patient with significant decline in mental status over past 24-36hrs. We reviewed current findings on labs, radiology and clinical exam. We reviewed current treatment plan, including zosyn, LR@75ml/hr. We reviewed NPO status in light of worsened dehydration and malnutrition. We also discussed worsened urinary retention. Reviewed treatable and untreatable conditions. Reviewed potential options including NG vs corpak placement for nutrition support as well as potential need for cystopscopy with dilation. Both sons in agreeement to proceed with aggressive treatment model of care at this point and if patient has no improvement despite aggressive treatment for a sufficient period of time(5days), then they would consider transition to comfort care.   OK with corpak temporarily, but do not want long term feeding tubes  Discussing, but leaning towards supporting cystoscopy and dilation if necessary  OK with IVF and antibiotics  OK with neurology consult.   Family requested call to transfer center to seek 2nd opinion,   Shabnam did call and discuss case with [facility], transfer denied.     10/29  Pt calm and without discomfort, but lethargic, needs lots of prompting/feeding effort to eat. Was able to participate in PT earlier.  Plan to go to [facility] [place]. Will need much attention and care to meet caloric requirements      10/28  I did have the opportunity to talk to Jorge A over the phone today. Goal of care is treat acute issues, skilled rehab and home. Prior to hospitalization the patient was independent in all basic and iADL's other than driving. High functioning at home alone, maintains own home, does outdoor yard work. The patient is eating some but requires assistance. I feel the poor po intake may be multifactorial related to the current hospitalization, although he is thin and BMI < 20, he has not had a recent significant weight loss or swallowing issues. Added Strawberry or Vanilla Ensure High Protein per patient preference according to son. D/w nurse as well who did note that pt eating, just requires assistance due to weakness.    I spent 75 minutes in the professional and overall care of this patient. 40min spent in acp discussion with sons.       Avani Gray, APRN-CNP

## 2024-10-30 NOTE — PROGRESS NOTES
10/30/24 1656   Discharge Planning   Expected Discharge Disposition SNF  ([facility])     Precert approved for [facility] and will  in 5 days. Patient not ready for discharge.  Patient is lethargic. Does not respond to questions.   Palliative consulted.  Patient is now NPO per speech therapy recommendation.  Patient is also not able to void.  Urology unable to place catheter today. Palliative to meet with son Jorge A who is POA.     DISCHARGE PLAN IS NOT SECURE

## 2024-10-31 ENCOUNTER — APPOINTMENT (OUTPATIENT)
Dept: RADIOLOGY | Facility: HOSPITAL | Age: 84
End: 2024-10-31
Payer: MEDICARE

## 2024-10-31 PROBLEM — J15.9 BACTERIAL PNEUMONIA: Status: ACTIVE | Noted: 2024-10-31

## 2024-10-31 PROBLEM — R33.9 URINARY RETENTION: Status: ACTIVE | Noted: 2024-10-31

## 2024-10-31 LAB
ANION GAP SERPL CALCULATED.3IONS-SCNC: 12 MMOL/L (ref 10–20)
BUN SERPL-MCNC: 43 MG/DL (ref 6–23)
CALCIUM SERPL-MCNC: 8 MG/DL (ref 8.6–10.3)
CHLORIDE SERPL-SCNC: 101 MMOL/L (ref 98–107)
CO2 SERPL-SCNC: 23 MMOL/L (ref 21–32)
CREAT SERPL-MCNC: 1.15 MG/DL (ref 0.5–1.3)
EGFRCR SERPLBLD CKD-EPI 2021: 64 ML/MIN/1.73M*2
ERYTHROCYTE [DISTWIDTH] IN BLOOD BY AUTOMATED COUNT: 14 % (ref 11.5–14.5)
GLUCOSE BLD MANUAL STRIP-MCNC: 154 MG/DL (ref 74–99)
GLUCOSE BLD MANUAL STRIP-MCNC: 168 MG/DL (ref 74–99)
GLUCOSE BLD MANUAL STRIP-MCNC: 191 MG/DL (ref 74–99)
GLUCOSE BLD MANUAL STRIP-MCNC: 193 MG/DL (ref 74–99)
GLUCOSE SERPL-MCNC: 182 MG/DL (ref 74–99)
HCT VFR BLD AUTO: 22 % (ref 41–52)
HGB BLD-MCNC: 7.4 G/DL (ref 13.5–17.5)
HOLD SPECIMEN: NORMAL
HOLD SPECIMEN: NORMAL
MCH RBC QN AUTO: 30.7 PG (ref 26–34)
MCHC RBC AUTO-ENTMCNC: 33.6 G/DL (ref 32–36)
MCV RBC AUTO: 91 FL (ref 80–100)
NRBC BLD-RTO: 0 /100 WBCS (ref 0–0)
PLATELET # BLD AUTO: 186 X10*3/UL (ref 150–450)
POTASSIUM SERPL-SCNC: 3.9 MMOL/L (ref 3.5–5.3)
PREALB SERPL-MCNC: 5.8 MG/DL (ref 18–40)
RBC # BLD AUTO: 2.41 X10*6/UL (ref 4.5–5.9)
SODIUM SERPL-SCNC: 132 MMOL/L (ref 136–145)
WBC # BLD AUTO: 6.5 X10*3/UL (ref 4.4–11.3)

## 2024-10-31 PROCEDURE — 2500000002 HC RX 250 W HCPCS SELF ADMINISTERED DRUGS (ALT 637 FOR MEDICARE OP, ALT 636 FOR OP/ED): Performed by: NURSE PRACTITIONER

## 2024-10-31 PROCEDURE — 2500000004 HC RX 250 GENERAL PHARMACY W/ HCPCS (ALT 636 FOR OP/ED): Performed by: HOSPITALIST

## 2024-10-31 PROCEDURE — 99233 SBSQ HOSP IP/OBS HIGH 50: CPT | Performed by: HOSPITALIST

## 2024-10-31 PROCEDURE — 2060000001 HC INTERMEDIATE ICU ROOM DAILY

## 2024-10-31 PROCEDURE — 71045 X-RAY EXAM CHEST 1 VIEW: CPT

## 2024-10-31 PROCEDURE — 92526 ORAL FUNCTION THERAPY: CPT | Mod: GN

## 2024-10-31 PROCEDURE — 99231 SBSQ HOSP IP/OBS SF/LOW 25: CPT | Performed by: SURGERY

## 2024-10-31 PROCEDURE — 99221 1ST HOSP IP/OBS SF/LOW 40: CPT | Performed by: NURSE PRACTITIONER

## 2024-10-31 PROCEDURE — 84134 ASSAY OF PREALBUMIN: CPT | Mod: WESLAB | Performed by: NURSE PRACTITIONER

## 2024-10-31 PROCEDURE — 82947 ASSAY GLUCOSE BLOOD QUANT: CPT

## 2024-10-31 PROCEDURE — 71045 X-RAY EXAM CHEST 1 VIEW: CPT | Performed by: RADIOLOGY

## 2024-10-31 PROCEDURE — 2500000004 HC RX 250 GENERAL PHARMACY W/ HCPCS (ALT 636 FOR OP/ED): Performed by: INTERNAL MEDICINE

## 2024-10-31 PROCEDURE — 2500000005 HC RX 250 GENERAL PHARMACY W/O HCPCS: Performed by: NURSE PRACTITIONER

## 2024-10-31 PROCEDURE — 85027 COMPLETE CBC AUTOMATED: CPT | Performed by: HOSPITALIST

## 2024-10-31 PROCEDURE — 80048 BASIC METABOLIC PNL TOTAL CA: CPT | Performed by: HOSPITALIST

## 2024-10-31 PROCEDURE — 2500000001 HC RX 250 WO HCPCS SELF ADMINISTERED DRUGS (ALT 637 FOR MEDICARE OP): Performed by: INTERNAL MEDICINE

## 2024-10-31 PROCEDURE — 2500000001 HC RX 250 WO HCPCS SELF ADMINISTERED DRUGS (ALT 637 FOR MEDICARE OP): Performed by: NURSE PRACTITIONER

## 2024-10-31 PROCEDURE — 99233 SBSQ HOSP IP/OBS HIGH 50: CPT | Performed by: NURSE PRACTITIONER

## 2024-10-31 PROCEDURE — 36415 COLL VENOUS BLD VENIPUNCTURE: CPT | Performed by: NURSE PRACTITIONER

## 2024-10-31 PROCEDURE — 2500000002 HC RX 250 W HCPCS SELF ADMINISTERED DRUGS (ALT 637 FOR MEDICARE OP, ALT 636 FOR OP/ED): Performed by: INTERNAL MEDICINE

## 2024-10-31 PROCEDURE — 99222 1ST HOSP IP/OBS MODERATE 55: CPT | Performed by: STUDENT IN AN ORGANIZED HEALTH CARE EDUCATION/TRAINING PROGRAM

## 2024-10-31 RX ORDER — LANOLIN ALCOHOL/MO/W.PET/CERES
100 CREAM (GRAM) TOPICAL DAILY
Status: DISCONTINUED | OUTPATIENT
Start: 2024-10-31 | End: 2024-11-08 | Stop reason: HOSPADM

## 2024-10-31 RX ORDER — INSULIN LISPRO 100 [IU]/ML
0-5 INJECTION, SOLUTION INTRAVENOUS; SUBCUTANEOUS EVERY 6 HOURS
Status: DISCONTINUED | OUTPATIENT
Start: 2024-10-31 | End: 2024-11-08 | Stop reason: HOSPADM

## 2024-10-31 RX ADMIN — PIPERACILLIN SODIUM AND TAZOBACTAM SODIUM 3.38 G: 3; .375 INJECTION, SOLUTION INTRAVENOUS at 01:22

## 2024-10-31 RX ADMIN — MIDODRINE HYDROCHLORIDE 5 MG: 5 TABLET ORAL at 17:14

## 2024-10-31 RX ADMIN — SODIUM CHLORIDE, SODIUM LACTATE, POTASSIUM CHLORIDE, AND CALCIUM CHLORIDE 75 ML/HR: 600; 310; 30; 20 INJECTION, SOLUTION INTRAVENOUS at 12:48

## 2024-10-31 RX ADMIN — CARVEDILOL 3.12 MG: 3.12 TABLET, FILM COATED ORAL at 17:14

## 2024-10-31 RX ADMIN — OFLOXACIN 1 DROP: 3 SOLUTION OPHTHALMIC at 12:52

## 2024-10-31 RX ADMIN — INSULIN LISPRO 1 UNITS: 100 INJECTION, SOLUTION INTRAVENOUS; SUBCUTANEOUS at 12:48

## 2024-10-31 RX ADMIN — OFLOXACIN 1 DROP: 3 SOLUTION OPHTHALMIC at 17:15

## 2024-10-31 RX ADMIN — TAMSULOSIN HYDROCHLORIDE 0.8 MG: 0.4 CAPSULE ORAL at 20:12

## 2024-10-31 RX ADMIN — PRAVASTATIN SODIUM 40 MG: 40 TABLET ORAL at 20:12

## 2024-10-31 RX ADMIN — INSULIN LISPRO 1 UNITS: 100 INJECTION, SOLUTION INTRAVENOUS; SUBCUTANEOUS at 09:36

## 2024-10-31 RX ADMIN — PIPERACILLIN SODIUM AND TAZOBACTAM SODIUM 3.38 G: 3; .375 INJECTION, SOLUTION INTRAVENOUS at 12:52

## 2024-10-31 RX ADMIN — PIPERACILLIN SODIUM AND TAZOBACTAM SODIUM 3.38 G: 3; .375 INJECTION, SOLUTION INTRAVENOUS at 06:27

## 2024-10-31 RX ADMIN — PIPERACILLIN SODIUM AND TAZOBACTAM SODIUM 3.38 G: 3; .375 INJECTION, SOLUTION INTRAVENOUS at 18:37

## 2024-10-31 RX ADMIN — MORPHINE SULFATE 2 MG: 2 INJECTION, SOLUTION INTRAMUSCULAR; INTRAVENOUS at 11:37

## 2024-10-31 RX ADMIN — Medication 2 L/MIN: at 10:08

## 2024-10-31 RX ADMIN — OFLOXACIN 1 DROP: 3 SOLUTION OPHTHALMIC at 06:26

## 2024-10-31 RX ADMIN — OFLOXACIN 1 DROP: 3 SOLUTION OPHTHALMIC at 20:12

## 2024-10-31 ASSESSMENT — ENCOUNTER SYMPTOMS
CARDIOVASCULAR NEGATIVE: 1
ALLERGIC/IMMUNOLOGIC NEGATIVE: 1
MUSCULOSKELETAL NEGATIVE: 1
GASTROINTESTINAL NEGATIVE: 1
ALTERED MENTAL STATUS: 1
CONFUSION: 1
EYES NEGATIVE: 1
RESPIRATORY NEGATIVE: 1
NEUROLOGICAL NEGATIVE: 1
ENDOCRINE NEGATIVE: 1
HEMATOLOGIC/LYMPHATIC NEGATIVE: 1

## 2024-10-31 ASSESSMENT — PAIN SCALES - PAIN ASSESSMENT IN ADVANCED DEMENTIA (PAINAD)
NEGVOCALIZATION: OCCASIONAL MOAN/GROAN, LOW SPEECH, NEGATIVE/DISAPPROVING QUALITY
BREATHING: NORMAL
BODYLANGUAGE: RELAXED
FACIALEXPRESSION: SMILING OR INEXPRESSIVE
TOTALSCORE: 3
TOTALSCORE: 0
CONSOLABILITY: NO NEED TO CONSOLE
CONSOLABILITY: NO NEED TO CONSOLE
FACIALEXPRESSION: SMILING OR INEXPRESSIVE
BREATHING: OCCASIONAL LABORED BREATHING, SHORT PERIOD OF HYPERVENTILATION
BODYLANGUAGE: RELAXED
FACIALEXPRESSION: SMILING OR INEXPRESSIVE
TOTALSCORE: 0
CONSOLABILITY: NO NEED TO CONSOLE
BODYLANGUAGE: TENSE, DISTRESSED PACING, FIDGETING
BREATHING: NORMAL

## 2024-10-31 ASSESSMENT — COGNITIVE AND FUNCTIONAL STATUS - GENERAL
DRESSING REGULAR UPPER BODY CLOTHING: TOTAL
EATING MEALS: TOTAL
HELP NEEDED FOR BATHING: TOTAL
MOVING FROM LYING ON BACK TO SITTING ON SIDE OF FLAT BED WITH BEDRAILS: TOTAL
MOVING TO AND FROM BED TO CHAIR: TOTAL
PERSONAL GROOMING: TOTAL
DRESSING REGULAR LOWER BODY CLOTHING: TOTAL
MOBILITY SCORE: 6
TOILETING: TOTAL
TURNING FROM BACK TO SIDE WHILE IN FLAT BAD: TOTAL
DAILY ACTIVITIY SCORE: 6
STANDING UP FROM CHAIR USING ARMS: TOTAL
WALKING IN HOSPITAL ROOM: TOTAL
CLIMB 3 TO 5 STEPS WITH RAILING: TOTAL

## 2024-10-31 ASSESSMENT — PAIN - FUNCTIONAL ASSESSMENT: PAIN_FUNCTIONAL_ASSESSMENT: PAINAD (PAIN ASSESSMENT IN ADVANCED DEMENTIA SCALE)

## 2024-10-31 ASSESSMENT — PAIN SCALES - WONG BAKER: WONGBAKER_NUMERICALRESPONSE: NO HURT

## 2024-10-31 ASSESSMENT — PAIN SCALES - GENERAL: PAINLEVEL_OUTOF10: 0 - NO PAIN

## 2024-10-31 NOTE — ASSESSMENT & PLAN NOTE
Was profound on admission, but overall improved and then continued to wax/wane. Worsened over past 72 hr. I did not see patient Monday, but RN today had him Monday and says he was more interactive and awake that day.  Suspect related to hospital delirium and also UTI (diagnosed 10/29)  Currently strict NPO.  CT brain normal. Labs otherwise unremarkable.  Consulted neurology to evaluate. Dr. Christopher planning more prolonged EEG.

## 2024-10-31 NOTE — ASSESSMENT & PLAN NOTE
-Less idiopathic and more likely related to his cardiomyopathy  -BP's overall improved from last week. Will try to wean off midodrine prior to discharge

## 2024-10-31 NOTE — CONSULTS
Inpatient consult to Neurology  Consult performed by: Charlie Christopher MD  Consult ordered by: Syl Haque DO          History Of Present Illness  Dat Mccallum is a 83 y.o. male with hx of T2DM, HTN, HLD, CKD3, BPH, deafness presenting with AMS/lethargy, found with NTEMI, SIRS, HF with EF of 15-20%. Neurology consulted for worsened AMS.    Hx per chart and son, bedside RN.  Pt was progressively declining in the past couple of days. Pt was unresponsive, not waking up to nox stim, put on NPO again due to mental status.     This am pt appears more awake but not back to baseline. Son requests EEG.     Past Medical History  Past Medical History:   Diagnosis Date    Acute frontal sinusitis, unspecified 01/06/2015    Acute frontal sinusitis    Encounter for screening for malignant neoplasm of colon 01/22/2020    Colon cancer screening    Encounter for screening for malignant neoplasm of prostate 08/14/2017    Special screening, prostate cancer    Other conditions influencing health status     Colonoscopy planned    Other conditions influencing health status 01/19/2018    History of cough    Other skin changes 07/21/2021    Blisters of multiple sites    Other specified health status     Known health problems: none    Personal history of other diseases of the nervous system and sense organs     History of cataract    Personal history of other diseases of urinary system 08/10/2021    History of hematuria    Personal history of other specified conditions 06/29/2018    History of dizziness    Personal history of other specified conditions 09/25/2017    History of abdominal pain    Personal history of other specified conditions 08/14/2017    History of diarrhea    Personal history of other specified conditions 10/11/2017    History of urinary incontinence    Personal history of pneumonia (recurrent) 01/19/2018    History of community acquired pneumonia    Rash and other nonspecific skin eruption 12/17/2020    Rash     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    Ulcerative blepharitis of right upper eyelid    Unspecified injury of left lower leg, initial encounter 12/03/2020    Left knee injury, initial encounter     Surgical History  Past Surgical History:   Procedure Laterality Date    CARDIAC CATHETERIZATION N/A 10/23/2024    Procedure: Left Heart Cath;  Surgeon: Haja Mckeon DO;  Location: [facility] Card[facility];  Service: Cardiovascular;  Laterality: N/A;    OTHER SURGICAL HISTORY  12/02/2013    Proximal Subtotal Pancreatectomy (Whipple Procedure)    OTHER SURGICAL HISTORY  11/26/2018    Whipple procedure    OTHER SURGICAL HISTORY  11/26/2018    Knee surgery     Social History  Social History     Tobacco Use    Smoking status: Never     Passive exposure: Never    Smokeless tobacco: Never   Vaping Use    Vaping status: Never Used   Substance Use Topics    Alcohol use: Not Currently    Drug use: Never     Allergies  Patient has no known allergies.  Medications Prior to Admission   Medication Sig Dispense Refill Last Dose/Taking    glimepiride (Amaryl) 4 mg tablet Take 1 tablet (4 mg) by mouth 2 times a day. 180 tablet 3 Unknown    lovastatin (Mevacor) 20 mg tablet Take 1 tablet (20 mg) by mouth once daily at bedtime. 90 tablet 3 Unknown    metFORMIN (Glucophage) 500 mg tablet Take 2 tablets by mouth twice daily 360 tablet 0 Unknown    omeprazole (PriLOSEC) 20 mg DR capsule Take by mouth.   Unknown    OneTouch Ultra Test strip TEST 3 TIMES DAILY   Unknown    tamsulosin (Flomax) 0.4 mg 24 hr capsule Take 2 capsules (0.8 mg) by mouth once daily at bedtime. 180 capsule 3 Unknown       Review of Systems  Neurological Exam  Physical Exam  MENTAL STATUS:  General appearance: in NAD  Language: communicates by reading what's written on the board and sign language   Concentration: Intact    CRANIAL NERVES:  - Fundoscopic exam: Deferred   - II/III: PERRL  - II:  blinks to threat bilaterally   - III, IV, VI: EOM at least  horizontally full to pursuit without nystagmus  - V: V1-V3 sensation intact bilaterally  - VII: Face muscles appears symmetric with smile and eye closure  - VIII: impaired (chronic)   - IX, X: unable to assess adequately   - XI: 5/5 strength on shoulder shrugging bilaterally  - XII: Tongue midline     MOTOR: Tone and bulk normal in all extremities  STRENGTH: BUE 4/5 proximally and distally, without drift at 10sec. BLE hip flexion 2/5, knee extension 3/5, dorsiflexion 4/5 bilaterally.     REFLEXES: R L  Biceps   +2 +2  Brachioradialis +2 +2  Patellar   +2 +2  Achilles   +2 +2  Plantar   Mute mute   No clonus, frontal release signs or other pathologic reflexes present.     COORDINATION: able to reach with arms without dysmetria, BLE testing limited by weakness    SENSORY: Intact to light touch in BUE and BLE  GAIT: deferred     Last Recorded Vitals  Blood pressure 113/55, pulse 85, temperature 36 °C (96.8 °F), temperature source Temporal, resp. rate 18, height 1.829 m (6'), weight 65.5 kg (144 lb 6.4 oz), SpO2 95%.    Relevant Results                    [place] Coma Scale  Best Eye Response: To verbal stimuli  Best Verbal Response: None  Best Motor Response: Follows commands  [place] Coma Scale Score: 10                 I have personally reviewed the following imaging results XR chest 1 view    Result Date: 10/29/2024  Interpreted By:  Laurent Elam, STUDY: XR CHEST 1 VIEW; 10/29/2024 11:22 am   INDICATION: CLINICAL INFORMATION: Signs/Symptoms:cough.   COMPARISON: 10/16/2024   ACCESSION NUMBER(S): [id number]   ORDERING CLINICIAN: RICHARD ADAIR   TECHNIQUE: Portable chest one view.   FINDINGS: The cardiac size is indeterminate in view of the AP projection. Patient is slightly rotated to the left. There is a thoracic dextroscoliosis. Alveolar infiltrate is present within left lower lobe consistent with pneumonia. Right hemithorax is clear. No effusions are identified.       There is a new left lower lobe  infiltrate consistent with pneumonia. Follow-up to assure complete clearing is suggested.   MACRO: none   Signed by: Laurent Elam 10/29/2024 2:55 PM Dictation workstation:   [id number]    CT head wo IV contrast    Result Date: 10/29/2024  Interpreted By:  Brielle Linares, STUDY: CT HEAD WO IV CONTRAST; ;  10/29/2024 1:26 pm   INDICATION: Signs/Symptoms:sudden onset confusion.   COMPARISON: 10/16/2024   ACCESSION NUMBER(S): [id number]   ORDERING CLINICIAN: RICHARD ADAIR   TECHNIQUE: Serial axial images of the head were obtained without intravenous contrast. Sagittal and coronal reconstructions were generated.   FINDINGS: Trickles are midline and enlarged. There is prominence of the cortical sulci and superior cerebellar cisterns.   There are no acute parenchymal abnormalities. There is no hemorrhage or extra-axial fluid.   There is no obvious scalp hematoma or skull fracture.   Visualized portions of the paranasal sinuses and mastoids are unremarkable.   COMPARISON OF FINDINGS: The brain is similar.       No acute intracranial abnormality.     MACRO: none   Signed by: Brielle Linares 10/29/2024 2:19 PM Dictation workstation:   [id number]    Cardiac Catheterization Procedure    Result Date: 10/24/2024           [place] [address]            Phone [phone] Cardiovascular Catheterization Report Patient Name:     AMARILYS ISABELLE FARAH      Performing Physician:  Virginia Chavez DO Study Date:       10/23/2024           Verifying Physician:   Virginia Chavez DO MRN/PID:          [id number]             Cardiologist/Co-Scrub: Accession#:       [id number]         Ordering Provider:     Virginia CHAVEZ Date of           1940 / 83      Cardiologist: Birth/Age:        years  Gender:           M                    Fellow: Encounter#:       [id number]           Surgeon:  Study: Left Heart Cath  Indications: AMARILYS FARAH is a 84 year old male who presents with dyslipidemia and an asymptomatic chest pain assessment. Cardiomyopathy, left ventricular dysfunction and NSTE - ACS. Stress test performed: No. CTA performed: NoShey Calvillo accessed: No. LVEF Assessed: No. Cardiac arrest: No. Cardiac surgical consult: No. Cardiovascular Instability: No Frailty status of patient entering lab: 6 = Moderately frail.  Coronary Angiography: The coronary circulation is right dominant.  Coronary Angiography Comments: We obtained informed consent with the help of his son Jorge A because the patient is deaf, and he was brought to the cardiac catheterization lab with the timeout verified between his computer medical record number and his arm wristband. Both groins were prepped and draped in a sterile fashion and the remainder of the procedure performed under sterile technique. 10 cc of 1% lidocaine used to the right groin for local anesthesia and Versed 2 mg and fentanyl 50 mcg for intravenous sedation. Used single wall micropuncture technique to access the right common femoral artery and a micropuncture sheath was inserted, then exchanged over guidewire for a 6 Sami [place] sheath. 6 Sami JR4 JL 4 and pigtail catheters were used and catheters were advanced and withdrawn over the guidewire without difficulty. At the end of the procedure the sheath was removed and an Angio-Seal closure device was deployed with good hemostasis and he was returned to his telemetry bed in stable condition. Coronary angiography: 1. The left main was a large vessel and bifurcated into the LAD and circumflex and was normal 2 the LAD was a large vessel that extends to the apex and was widely patent with CHEYANNE grade III flow. The proximal LAD had an eccentric 40-50% irregular stenosis that was presumed to be the site of previous  infarct vessel occlusion with spontaneous recanalization. The remaining mid and distal LAD and its branches had mild diffuse nonobstructive disease 3. The circumflex was a modest nondominant vessel that gave rise to 3 modest PLV branches and had no obstructive disease.  4. The right coronary artery was a dominant vessel that had mild 30-40% ostial /proximal stenosis and an eccentric 50% mid vessel stenosis, then gives rise to a modest sized small caliber PDA and small caliber distal RCA without obstruction. Left ventriculogram performed in the VALLES 30 projection showed persistent modest hypokinesis of the mid anterior, anterolateral segments and severe hypokinesis of the LV apical segment with left ventricular ejection fraction estimated at 40 to 45%. Impression: 1.40% proximal LAD stenosis suggesting previous vessel occlusion with spontaneous recanalization. 2. 50% mid RCA stenosis. 3. Ischemic cardiomyopathy with residual anterior and anteroapical left ventricular hypokinesis with left ventricular ejection fraction 40 to 45%. 3. Nuclear stress test 10/22/2024 suggests viability in the anterior and apical segments without ischemia and with left ventricular ejection fraction estimated at 44%.   Hemo Personnel: +----------------+---------+ Name            Duty      +----------------+---------+ Haja Mckeon MD 1 +----------------+---------+  Hemodynamic Pressures:  +----+---------------+----------+-------------+--------------+-------+---------+ Site   Date Time   Phase NameSystolic mmHgDiastolic mmHgED mmHgMean mmHg +----+---------------+----------+-------------+--------------+-------+---------+   AO     10/23/2024  AIR REST          116            57              81          8:56:33 AM                                                      +----+---------------+----------+-------------+--------------+-------+---------+   LV     10/23/2024  AIR REST          103             2       8                   9:07:45 AM                                                      +----+---------------+----------+-------------+--------------+-------+---------+   LV     10/23/2024  AIR REST          109            -2      7                   9:07:57 AM                                                      +----+---------------+----------+-------------+--------------+-------+---------+  LVp     10/23/2024  AIR REST          106             0     12                   9:08:06 AM                                                      +----+---------------+----------+-------------+--------------+-------+---------+  AOp     10/23/2024  AIR REST          120            52              76          9:08:16 AM                                                      +----+---------------+----------+-------------+--------------+-------+---------+   AO     10/23/2024  AIR REST            0             0             -96          9:32:52 AM                                                      +----+---------------+----------+-------------+--------------+-------+---------+  Cardiac Cath Post Procedure Notes: Post Procedure Diagnosis: Double vessel disease. Blood Loss:               Estimated blood loss during the procedure was 10ml                           mls. Specimens Removed:        Number of specimen(s) removed: none.  ICD 10 Codes: Non ST elevation (NSTEMI) myocardial infarction-I21.4  CPT Codes: Left Heart Cath (visualization of coronaries) and LV-29961  [id number] Haja Mckeon DO Performing Physician Electronically signed by Virginia Mckeon DO on 10/24/2024 at 9:06:38 AM  ** Final **     Cardiology Interpretation Of Nuclear Stress - See Other Report For Nuclear Portion    Result Date: 10/23/2024           [place] [id number]            Phone [phone] Nuclear Pharmacologic Stress Test Patient Name:       AMARILYS FARAH     Ordering Provider:     [id number] COOPER RATLIFF Study Date:        10/22/2024          Reading Physician:     [id number] Fabien Pelaez MD MRN/PID:           [id number]            Supervising Physician: [id number] Pelaez MD Accession#:        [id number]        Fellow: Date of Birth/Age: 1940 / 83     Fellow:                    years Gender:            M                   Nurse:                 Inez Holley RN Admit Date:                            Technician:            Marina Feliciano Admission Status:                      Sonographer:           NA Height:            182.88 cm           Technologist: Weight:            62.60 kg            Additional Staff: BSA:               1.82 m2             Encounter#:            [id number] BMI:               18.72 kg/m2         Patient Location: Study Type:    CARDIOLOGY INTERPRETATION OF NUCLEAR STRESS Diagnosis/ICD: NonST elevation (NSTEMI) myocardial infarction-I21.4 Indication:    CARDIOMYOPATHY EF 15-20% CPT Codes:     Stress Test Interpretation-38325; Stress Test Supervision-48262 Falls Risk: Low: Patient has low risk for sustaining a fall; environmental safety interventions in place.  Study Details: Correct procedure and correct patient verified verbally and with                ID Band checked.  Patient History: Hyperlipidemia and congestive heart failure. NSTEMI, IDIOPATHIC HYPOTENSION.  Medications: ASPIRIN, CARVEDILOL, LOVENOX, PRAVASTATIN, SPIRONOLACTONE, MIDODRINE. The patient took medications as prescribed.  Patient Performance: Patient received a total of 0.4 mg of Regadenoson at 11:41:48 AM. The resting blood pressure was 114/69 mmHg with a heart rate of 81 bpm. The patient developed no symptoms during  the stress exam. The blood pressure response was normal. The test was terminated due to: completed lab protocol and end of vasodilator infusion. Patient has met the discharge criteria and is discharged to their floor.  Baseline ECG: Resting ECG showed normal sinus rhythm with nonspecific ST-T wave changes. Artifact.  Stress ECG: Stress ECG showed normal sinus rhythm, with frequent premature ventricular contractions. No ST changes. Essentially negative ECG stress test for ischemia with a Lexiscan infusion. Please get the nuclear imaging report from radiology department.  Stress Stage Data: +----------------+--+------+-------+                 HRSys BPDias BP +----------------+--+------+-------+ Baseline [id number]   69      +----------------+--+------+-------+  Recovery ECG: Recovery ECG showed normal sinus rhythm, with artifact.  +------------+--+------+-------+             HRSys BPDias BP +------------+--+------+-------+ Recovery I  8295    57      +------------+--+------+-------+ Recovery II 7288    51      +------------+--+------+-------+ Recovery [id number]    41      +------------+--+------+-------+ Recovery IV 8592    52      +------------+--+------+-------+ Recovery V  7392    54      +------------+--+------+-------+  Summary:  1. Adequate level of stress achieved.  2. Correlate with myocardial perfusion imaging results.  3. Essentially negative ECG stress test for ischemia with a Lexiscan infusion. Please get the nuclear imaging report from radiology department.  4. No clinical or electrocardiographic evidence for ischemia at maximal infusion.  5. No ECG changes from baseline.  6. Normal Stress Test.  7. Nuclear image results are reported separately. [id number] Fabien Pelaez MD Electronically signed on 10/23/2024 at 9:39:18 AM   ** Final **     Nuclear Stress Test    Result Date: 10/22/2024  Interpreted By:  Chapito García and Ogievich Taessa STUDY: NUCLEAR STRESS TEST;  10/22/2024 2:18 pm   INDICATION: Signs/Symptoms:Cardiomyopathy, EF 15-20%.   COMPARISON: None.   ACCESSION NUMBER(S): [id number]   ORDERING CLINICIAN: COOPER RATLIFF   TECHNIQUE: DIVISION OF NUCLEAR MEDICINE PHARMACOLOGIC STRESS MYOCARDIAL PERFUSION SCAN, ONE DAY PROTOCOL   The patient received an intravenous dose of  8.8 mCi of Tc-99m Myoview and resting emission tomographic (SPECT) images of the myocardium were acquired. The patient then received an intravenous infusion of 0.4mg regadenoson (Lexiscan)  followed by an additional dose of  25.1 mCi of Tc-99m  Myoview. Stress phase SPECT images of the myocardium were then acquired. These included ECG-gated images to assess and quantify ventricular function.  A low-dose, nondiagnostic regional CT was utilized for attenuation correction purposes.   FINDINGS: Both stress and rest studies demonstrate grossly normal perfusion throughout the left ventricle.   The left ventricle is normal in size.   Gated images demonstrate mild global hypokinesis of the LV wall motion with a post-stress LV EF estimated at 44%.   Attenuation correction CT images demonstrate no gross anatomic abnormalities.       1.  No evidence of inducible ischemia or prior infarction. 2. Left ventricle is normal in size. 3. Mild global hypokinesis of the LV wall motion with a post-stress LV EF estimated at 44%.   I personally reviewed the images/study and I agree with the findings as stated by Viry Up DO, PGY-3. This study was interpreted at [place], [place].   MACRO: None   Signed by: Chapito García 10/22/2024 3:15 PM Dictation workstation:   [id number]    Electrocardiogram, 12-lead PRN ACS symptoms    Result Date: 10/22/2024  Poor data quality in current ECG precludes serial comparison Sinus tachycardia with short NM with Premature supraventricular complexes and with frequent Premature ventricular complexes Indeterminate axis Pulmonary disease  pattern Nonspecific ST and T wave abnormality Abnormal ECG When compared with ECG of 16-OCT-2024 20:40, (unconfirmed) Previous ECG has undetermined rhythm, needs review Questionable change in QRS duration Confirmed by Fer Vazquez ([id number]) on 10/22/2024 12:52:29 PM    MR brain wo IV contrast    Result Date: 10/19/2024  Interpreted By:  Hugo Rico  and Capo Burns STUDY: MR BRAIN WO IV CONTRAST;  10/19/2024 3:36 pm   INDICATION: Signs/Symptoms:altered mental status.     COMPARISON: CT of the head obtained October 16, 2024   ACCESSION NUMBER(S): [id number]   ORDERING CLINICIAN: ROMULO JOHNSON   TECHNIQUE: Axial T2, FLAIR, DWI, gradient echo T2 and sagittal and coronal T1 weighted images of brain were acquired.  No contrast was administered.   FINDINGS: Midline brain structures are intact on mid sagittal imaging.   There is no territorial restricted diffusion to suggest acute intracranial infarct. No suspicious T2 signal hypointensity noted on gradient sequencing.   There is no evidence of acute intracranial hemorrhage. No intracranial mass or mass effect. No midline shift. No loss of gray-white differentiation. Few scattered supratentorial white matter FLAIR signal hyperintensities are observed.  Basilar cisterns appear intact. Moderate dilatation of the lateral and 3rd greater than 4th ventricles at least principally on the basis of global parenchymal volume loss.   Normal T2 vascular flow voids are seen.   T2 hyperintensity within the maxillary sinuses is consistent with mucosal inflammatory paranasal sinus disease.. Status post bilateral lens replacements.   There is no mastoid effusion. Moderate effusions of the occipital lateral mass C1 articulation bilaterally may be on a degenerative basis.       No MR evidence of acute intracranial infarct, hemorrhage, mass, or mass effect.   I personally reviewed the images/study and I agree with the findings as stated. This study was interpreted at [facility]  [place], [place].   MACRO: None   Signed by: Hugo Rico 10/19/2024 4:41 PM Dictation workstation:   [id number]    EEG    IMPRESSION Impression This routine awake and drowsy EEG is indicative of a moderate diffuse encephalopathy. No epileptiform activity or lateralizing signs seen. A full report will be scanned into the patient's chart at a later time. This report has been interpreted and electronically signed by    Transthoracic Echo (TTE) Complete    Result Date: 10/17/2024           [place] [address]            Phone [phone] TRANSTHORACIC ECHOCARDIOGRAM REPORT Patient Name:     AMARILYS FARAH      Reading Physician:   [id number] Jose David Workman DO Study Date:       10/17/2024           Ordering Provider:   [id number] CECIL HOWARD MRN/PID:          [id number]             Fellow: Accession#:       [id number]         Nurse: Date of           1940 / 83      Sonographer:         USR Birth/Age:        years Gender:           M                    Additional Staff: Height:           177.80 cm            Admit Date: Weight:           65.77 kg             Admission Status:    Inpatient - Routine BSA / BMI:        1.82 m2 / 20.81      Department Location: [facility] ICU                   kg/m2 Blood Pressure: 97 /74 mmHg Study Type:    TRANSTHORACIC ECHO (TTE) COMPLETE Diagnosis/ICD: Non ST elevation (NSTEMI) myocardial infarction-I21.4 Indication:    NSTEMI CPT Codes:     Echo Complete w Full Doppler-78259 Patient History: Diabetes:          Yes Pertinent History: HTN, Hyperlipidemia, CAD, Chest Pain and CHF. Renal dx III. Study Detail: The following Echo studies were performed: 2D, M-Mode, Doppler and               color flow. Technically challenging study due to poor acoustic               windows and patient lying in supine position.  PHYSICIAN INTERPRETATION: Left Ventricle: The left  ventricular systolic function is severely decreased, with a visually estimated ejection fraction of 15-20%. There are multiple wall motion abnormalities. The left ventricular cavity size is moderately dilated. There is mild concentric left ventricular hypertrophy. Left ventricular diastolic filling was indeterminate. LV Wall Scoring: The entire apex, mid and apical anterior wall, mid and apical anterior septum, mid and apical inferior septum, mid and apical inferior wall, mid inferolateral segment, and mid anterolateral segment are akinetic. All remaining scored segments are normal. Left Atrium: The left atrium is normal in size. Right Ventricle: The right ventricle is normal in size. There is normal right ventricular global systolic function. Right Atrium: The right atrium is normal in size. Aortic Valve: The aortic valve is probably trileaflet. The aortic valve dimensionless index is 0.70. There is no evidence of aortic valve regurgitation. The peak instantaneous gradient of the aortic valve is 2.7 mmHg. The mean gradient of the aortic valve is 1.4 mmHg. Mitral Valve: The mitral valve is normal in structure. There is no evidence of mitral valve regurgitation. Tricuspid Valve: The tricuspid valve is structurally normal. No evidence of tricuspid regurgitation. Pulmonic Valve: The pulmonic valve is not well visualized. The pulmonic valve regurgitation was not well visualized. Pericardium: No pericardial effusion noted. Aorta: The aortic root is normal.  CONCLUSIONS:  1. Multiple segmental abnormalities exist. See findings.  2. The left ventricular systolic function is severely decreased, with a visually estimated ejection fraction of 15-20%.  3. There are multiple wall motion abnormalities.  4. Left ventricular diastolic filling was indeterminate.  5. Left ventricular cavity size is moderately dilated.  6. There is normal right ventricular global systolic function.  7. Left ventricular dysfunction in a pattern  suggestive of Takotsubo Cardiomyopathy. QUANTITATIVE DATA SUMMARY:  2D MEASUREMENTS:           Normal Ranges: Ao Root s:       3.51 cm IVSd:            0.79 cm   (0.6-1.1cm) LVPWd:           0.76 cm   (0.6-1.1cm) LVIDd:           4.44 cm   (3.9-5.9cm) LVIDs:           3.81 cm LV Mass Index:   58.6 g/m2 LV % FS          14.0 %  LA VOLUME:          Normal Ranges: LA Vol A4C: 45.2 ml  RA VOLUME BY A/L METHOD:          Normal Ranges: RA Area A4C:             12.0 cm2  LV SYSTOLIC FUNCTION BY 2D PLANIMETRY (MOD):                      Normal Ranges: EF-A4C View:    16 % (>=55%) EF-A2C View:    5 % EF-Biplane:     14 % EF-Visual:      18 % EF-3DQ:         19 % LV EF Reported: 18 %  LV DIASTOLIC FUNCTION:           Normal Ranges: MV Peak E:             0.45 m/s  (0.7-1.2 m/s) MV Peak A:             0.88 m/s  (0.42-0.7 m/s) E/A Ratio:             0.51      (1.0-2.2) MV e'                  0.073 m/s (>8.0) MV lateral e'          0.11 m/s MV medial e'           0.04 m/s E/e' Ratio:            6.16      (<8.0)  MITRAL VALVE:          Normal Ranges: MV DT:        110 msec (150-240msec)  AORTIC VALVE:                     Normal Ranges: AoV Vmax:                0.82 m/s (<=1.7m/s) AoV Peak P.7 mmHg (<20mmHg) AoV Mean P.4 mmHg (1.7-11.5mmHg) LVOT Max Florencio:            0.61 m/s (<=1.1m/s) AoV VTI:                 17.99 cm (18-25cm) LVOT VTI:                12.65 cm LVOT Diameter:           2.00 cm  (1.8-2.4cm) AoV Area, VTI:           2.20 cm2 (2.5-5.5cm2) AoV Area,Vmax:           2.32 cm2 (2.5-4.5cm2) AoV Dimensionless Index: 0.70  RIGHT VENTRICLE: RV Basal 3.30 cm RV Mid   2.70 cm RV Major 4.8 cm  TRICUSPID VALVE/RVSP:          Normal Ranges: Peak TR Velocity:     2.26 m/s RV Syst Pressure:     23 mmHg  (< 30mmHg)  PULMONIC VALVE:          Normal Ranges: PV Max Florencio:     0.6 m/s  (0.6-0.9m/s) PV Max P.3 mmHg PV Mean P.5 mmHg PV VTI:         6.59 cm  AORTA: Asc Ao Diam 0.00 cm  [id number]  Jose David Workman DO Electronically signed on 10/17/2024 at 11:00:02 AM  Wall Scoring  ** Final (Updated) **     ECG 12 lead    Result Date: 10/17/2024  Normal sinus rhythm Left axis deviation Low voltage QRS Anteroseptal infarct Inferior infarct , age undetermined Abnormal ECG When compared with ECG of 04-APR-2018 16:39, Significant changes have occurred Confirmed by Rell Vaughan ([id number]) on 10/17/2024 11:32:49 AM    XR chest 1 view    Result Date: 10/16/2024  Interpreted By:  Lobo aFrrar, STUDY: XR CHEST 1 VIEW;  10/16/2024 9:27 pm   INDICATION: Signs/Symptoms:malaise.   COMPARISON: Chest x-ray 04/18/2021   ACCESSION NUMBER(S): [id number]   ORDERING CLINICIAN: MICHELLE MARTE   FINDINGS: Multiple overlying leads are present.   CARDIOMEDIASTINAL SILHOUETTE: Cardiomediastinal silhouette is normal in size and configuration. Atherosclerotic calcification of the aorta.   LUNGS: No consolidation, pleural effusion or pneumothorax.   ABDOMEN: Surgical clips noted in the left upper quadrant.   BONES: Multilevel degenerative changes of the spine.       No acute cardiopulmonary process.   MACRO: None   Signed by: Lobo Farrar 10/16/2024 9:52 PM Dictation workstation:   [id number]    CT head wo IV contrast    Result Date: 10/16/2024  Interpreted By:  Ori Asher, STUDY: CT HEAD WO IV CONTRAST;  10/16/2024 9:18 pm   INDICATION: Signs/Symptoms:malaise/fatigue; AMS.     COMPARISON: 04/18/2021   ACCESSION NUMBER(S): [id number]   ORDERING CLINICIAN: MICHELLE MARTE   TECHNIQUE: Noncontrast axial CT scan of head was performed. Angled reformats in brain and bone windows were generated. The images were reviewed in bone, brain, blood and soft tissue windows.   FINDINGS: CSF Spaces: The ventricles, sulci and basal cisterns are within normal limits. There is no extraaxial fluid collection.   Parenchyma: There are periventricular white matter changes noted. The grey-white differentiation is intact. There is no mass effect or midline  shift.  There is no intracranial hemorrhage.   Calvarium: The calvarium is unremarkable.   Paranasal sinuses and mastoids: Visualized paranasal sinuses and mastoids are clear.       No evidence of acute cortical infarct or intracranial hemorrhage.       MACRO: None   Signed by: Ori Asher 10/16/2024 9:28 PM Dictation workstation:   [id number]  .      Assessment:  Dat Mccallum is a 83 y.o. male with hx of T2DM, HTN, HLD, CKD3, BPH, deafness presenting with AMS/lethargy, found with NTEMI, SIRS, HF with EF of 15-20%. Neurology consulted for worsened AMS. Currently pt is more awake today after conservative treatments. No witnessed seizures, abnormal movements. No prior hx of seizures.     Working dx is most likely toxic/metabolic encephalopathy. Son requests prolonged EEG which is not inappropriate.    - will obtain EEG prolonged        I spent 65 minutes in the professional and overall care of this patient.      Charlie Christopher MD

## 2024-10-31 NOTE — PROGRESS NOTES
Dat Mccallum is a 83 y.o. male on day 15 of admission presenting with NSTEMI (non-ST elevated myocardial infarction) (Multi).      Subjective   Patient's son is bedside, shaving him.   Patient's eyes shut, though he points to spots on his face that his son hadn't shaved yet.           Objective     Last Recorded Vitals  /65 (BP Location: Left arm)   Pulse 71   Temp 36.6 °C (97.9 °F) (Temporal)   Resp 23   Wt 65.5 kg (144 lb 6.4 oz)   SpO2 94%   Intake/Output last 3 Shifts:    Intake/Output Summary (Last 24 hours) at 10/31/2024 1436  Last data filed at 10/31/2024 0500  Gross per 24 hour   Intake 618.75 ml   Output 200 ml   Net 418.75 ml       Admission Weight  Weight: 68 kg (150 lb) (10/16/24 2047)    Daily Weight  10/30/24 : 65.5 kg (144 lb 6.4 oz)    Image Results  XR chest 1 view  Narrative: Interpreted By:  Annia Dillon,   STUDY:  XR CHEST 1 VIEW 10/31/2024 1:09 pm      INDICATION:  Signs/Symptoms:Tube placement check. Myocardial infarct.      COMPARISON:  10/31/2024 done at 10:59 a.m.      ACCESSION NUMBER(S):  [id number]      ORDERING CLINICIAN:  DONNA CEJA      TECHNIQUE:  AP semi-erect view of the chest at bedside      FINDINGS:  It would appear that the feeding tube has been partially retracted  and is no longer coiled within the gastric fundus. The distal tip  remains positioned within the gastric fundus.      There are bilateral pulmonary infiltrates unchanged with cardiac size  normal.      Impression: The feeding tube has been partially retracted and is no longer looped  within the gastric fundus. The distal tip remains positioned within  the gastric fundus however.      No change in the bilateral pulmonary infiltrates within each lower  lung zone.      Signed by: Annia Dillon 10/31/2024 1:57 PM  Dictation workstation:   [id number]  XR chest 1 view  Narrative: Interpreted By:  Laurent Elam,   STUDY:  XR CHEST 1 VIEW; 10/31/2024 11:12 am      INDICATION:  CLINICAL INFORMATION:  Signs/Symptoms:Tube placement check.      COMPARISON:  10/31/2024 at 1054 hours      ACCESSION NUMBER(S):  [id number]      ORDERING CLINICIAN:  DONNA CEJA      TECHNIQUE:  Portable chest one view.      FINDINGS:  The cardiac size is indeterminate in view of the AP projection.  Feeding tube is been repositioned with the tube now noted to be  coiled within the stomach. Bibasilar infiltrates are present.      Impression: 1. Feeding tube is present with extending into the stomach with the  tube coiled within the region of the body and fundus.  2. Bibasilar infiltrates.      MACRO:  none      Signed by: Laurent Elam 10/31/2024 12:07 PM  Dictation workstation:   [id number]  XR chest 1 view  Narrative: Interpreted By:  Laurent Elam,   STUDY:  XR CHEST 1 VIEW; 10/31/2024 11:11 am      INDICATION:  CLINICAL INFORMATION: Signs/Symptoms:Verification of nasogastric tube  location.      COMPARISON:  10/29/2024 at 1042 hours      ACCESSION NUMBER(S):  [id number]      ORDERING CLINICIAN:  KIMBERLEY BLAS      TECHNIQUE:  Portable chest one view.      FINDINGS:  The cardiac size is indeterminate in view of the AP projection.  Nasogastric tube is coiled within the mid chest along the course of  the thoracic esophagus. Bibasilar infiltrate is suspected. There is a  limited depth of inspiration on the exam.      Impression: Nasogastric tube is coiled within the midthoracic esophagus.  Probable bibasilar infiltrates although the exam is limited by poor  depth of inspiration.      MACRO:  none      Signed by: Laurent Elam 10/31/2024 12:05 PM  Dictation workstation:   [id number]      Physical Exam  General:  no diaphoresis   Lungs: CTA BL   Heart: RRR, no LE edema BL   GI: abdomen soft, nontender, nondistended, BS present   MSK: no joint effusion or deformity   Skin: no rashes, erythema, or ecchymosis   Neuro: deaf, drowsy but moving extremities spontaneously      Relevant Results               Assessment/Plan                   Assessment & Plan  NSTEMI (non-ST elevated myocardial infarction) (Multi)  -s/p [facility]: no intervention indicated  -aspirin and betablocker  -however he is on midodrine for hypotension as well and use of BB and midodrine may be canceling out the benefit of the carvedilol. BP slightly less soft than last week, will keep following  [facility] on discharge  -Cardiology signed off 10/25  Acute metabolic encephalopathy  Was profound on admission, but overall improved and then continued to wax/wane. Worsened over past 72 hr. I did not see patient Monday, but RN today had him Monday and says he was more interactive and awake that day.  Suspect related to hospital delirium and also UTI (diagnosed 10/29)  Currently strict NPO.  CT brain normal. Labs otherwise unremarkable.  Consulted neurology to evaluate. Dr. Christopher planning more prolonged EEG.   Acute on chronic systolic (congestive) heart failure  -clinically euvolemic  -unable to tolerate ACE/ARB or diuretics.  Present treatment with beta-blocker is continued.  He is also treated with amiodarone for hypotension.  Vital signs will be monitored and medications adjusted as indicated.  -The patient has severe debility from his multiple acute and chronic medical problems.    Idiopathic hypotension  -Less idiopathic and more likely related to his cardiomyopathy  -BP's overall improved from last week. Will try to wean off midodrine prior to discharge  Severe protein-calorie malnutrition (Multi)  -Nutrition following  Now unable to safely swallow. Intake poor prior to this.  Family conversation with Pall med and myself last night. Decision made to try short term feeding tube, treatment of infections, and see if mentation improves.  Corpak ordered and surgery to place. Nutrition to put in TF orders. Will closely watch labs, patient is at risk of refeeding syndrome.    Type 2 diabetes mellitus without complication, without long-term current use of insulin (Multi)  -Blood  sugar adequately controlled.  Continue present treatment.  Hypomagnesemia  -receiving PO supplementation. Supplementing PRN with IV    Acute cystitis with hematuria  Started on  abx but cultures negative  Urinary retention  Scant urine output by incontinence. Unable to pass trotter catheter. Urology consulted and also unable to pass trotter catheter.   If he develops painful distention or MARISSA, then will need to go for cystoscopy with dilatation  Bacterial pneumonia  Started on zosyn. I suspect aspiration as source. Strict NPO now. Unknown organism.        Dispo: He has been evaluated physical therapy occupational therapy.  It is recommended the patient be discharged to skilled nursing facility for short-term rehabilitation.    His precert is back and he is able to discharge to SNF in next 5 days.  That said, worsening encephalopathy and lack of PO intake. Will need to discuss with family regarding if they would be comfortable with feeding tube. Continuing to treat UTI, culture in progress.    Palliative care following.            Syl Haque,         10/30- Long discussion with nazia, Jorge A and Rich, and palliative care CNP Avani Gray CNP. We talked about overall decline in past 48 hr. Goal currently is to support the patient and see if nutrition, antibiotics help improve current status. If no change with these measures, may consider change in goals at that time. See Avani's note for further detail of this conversation.     Patient's family also requesting transfer to [facility] for second opinion. I have put in transfer request, but [place] was [place] [facility] [place] does not require a higher level of care at this time.

## 2024-10-31 NOTE — CONSULTS
Assessment/Plan   Corpak placed and bridled at bedside. Nutrition recs received for TF. Aspiration precautions. Pall med following.   Inpatient consult to Acute Care Surgery  Consult performed by: MERLENE Stark-CNP  Consult ordered by: Syl Haque DO  Reason for consult: For Corpak placement        Subjective     Pt admitted and found to have NSTEMI. Continued weakness and ST unable to attempt swallow eval due to his lethargy. Temporary alternative feeds suggested. Pall med is following. Family is on board (Jorge A , son) states that this is what the wishes are currently. If pt is unable to improve after 5 days of aggressive care, will consider palliative options.     Altered Mental Status  Presenting symptoms: confusion      Past Medical History:   Diagnosis Date    Acute frontal sinusitis, unspecified 01/06/2015    Acute frontal sinusitis    Encounter for screening for malignant neoplasm of colon 01/22/2020    Colon cancer screening    Encounter for screening for malignant neoplasm of prostate 08/14/2017    Special screening, prostate cancer    Other conditions influencing health status     Colonoscopy planned    Other conditions influencing health status 01/19/2018    History of cough    Other skin changes 07/21/2021    Blisters of multiple sites    Other specified health status     Known health problems: none    Personal history of other diseases of the nervous system and sense organs     History of cataract    Personal history of other diseases of urinary system 08/10/2021    History of hematuria    Personal history of other specified conditions 06/29/2018    History of dizziness    Personal history of other specified conditions 09/25/2017    History of abdominal pain    Personal history of other specified conditions 08/14/2017    History of diarrhea    Personal history of other specified conditions 10/11/2017    History of urinary incontinence    Personal history of pneumonia (recurrent)  01/19/2018    History of community acquired pneumonia    Rash and other nonspecific skin eruption 12/17/2020    Rash    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    Ulcerative blepharitis of right upper eyelid    Unspecified injury of left lower leg, initial encounter 12/03/2020    Left knee injury, initial encounter     Past Surgical History:   Procedure Laterality Date    CARDIAC CATHETERIZATION N/A 10/23/2024    Procedure: Left Heart Cath;  Surgeon: Haja Mckeon DO;  Location: [facility] Cardiac Cath Lab;  Service: Cardiovascular;  Laterality: N/A;    OTHER SURGICAL HISTORY  12/02/2013    Proximal Subtotal Pancreatectomy (Whipple Procedure)    OTHER SURGICAL HISTORY  11/26/2018    Whipple procedure    OTHER SURGICAL HISTORY  11/26/2018    Knee surgery     Social History     Tobacco Use    Smoking status: Never     Passive exposure: Never    Smokeless tobacco: Never   Vaping Use    Vaping status: Never Used   Substance Use Topics    Alcohol use: Not Currently    Drug use: Never           Review of Systems  Review of Systems   Constitutional:         Thin and frail. Temporal wasting.    HENT: Negative.     Eyes: Negative.    Respiratory: Negative.     Cardiovascular: Negative.    Gastrointestinal: Negative.    Endocrine: Negative.    Genitourinary: Negative.    Musculoskeletal: Negative.    Skin: Negative.    Allergic/Immunologic: Negative.    Neurological: Negative.    Hematological: Negative.    Psychiatric/Behavioral:  Positive for confusion.         Pulling at iv's and tubes.        Objective     Vital signs for last 24 hours:  Temp:  [36 °C (96.8 °F)-37.3 °C (99.1 °F)] 36.6 °C (97.9 °F)  Heart Rate:  [71-92] 71  Resp:  [14-23] 23  BP: (109-119)/(52-66) 119/65    Intake/Output this shift:  No intake/output data recorded.    Physical Exam  Physical Exam  Constitutional:       Appearance: Normal appearance.      Comments: Corpak weighted tube inserted to right nare to 65cm. Bridled.    HENT:      Head:  Normocephalic and atraumatic.      Right Ear: Tympanic membrane normal.      Nose: Nose normal.      Mouth/Throat:      Mouth: Mucous membranes are moist.   Eyes:      Pupils: Pupils are equal, round, and reactive to light.   Cardiovascular:      Rate and Rhythm: Normal rate and regular rhythm.      Pulses: Normal pulses.   Pulmonary:      Effort: Pulmonary effort is normal.      Breath sounds: Normal breath sounds.   Abdominal:      General: Bowel sounds are normal. There is no distension.      Tenderness: There is no abdominal tenderness. There is no guarding.      Hernia: No hernia is present.   Genitourinary:     Rectum: Normal.   Musculoskeletal:         General: Normal range of motion.   Skin:     General: Skin is warm and dry.   Neurological:      General: No focal deficit present.   Psychiatric:      Comments: Confused. Pulling at iv lines, and at NGT.  Restraints required.          Labs  CBC:   Lab Results   Component Value Date    WBC 6.5 10/31/2024    RBC 2.41 (L) 10/31/2024     BMP:   Lab Results   Component Value Date    GLUCOSE 182 (H) 10/31/2024    CO2 23 10/31/2024    BUN 43 (H) 10/31/2024    CREATININE 1.15 10/31/2024    CALCIUM 8.0 (L) 10/31/2024

## 2024-10-31 NOTE — PROGRESS NOTES
Dat Mccallum is a 83 y.o. male on day 15 of admission presenting with NSTEMI (non-ST elevated myocardial infarction) (Multi).    Subjective   Patient making slow progress.  On tube feeds.  Voiding spontaneously.  No family here today.  Post void residual today 300-400 ml.        Objective     Physical Exam  Non-verbal, ill appearing  Breathing comfortably  Abdomen soft, ND, NT      Last Recorded Vitals  Blood pressure 116/62, pulse 71, temperature 34.8 °C (94.6 °F), temperature source Temporal, resp. rate 23, height 1.829 m (6'), weight 65.5 kg (144 lb 6.4 oz), SpO2 97%.  Intake/Output last 3 Shifts:  I/O last 3 completed shifts:  In: 1001.3 (15.3 mL/kg) [I.V.:1001.3 (15.3 mL/kg)]  Out: 500 (7.6 mL/kg) [Urine:500 (0.2 mL/kg/hr)]  Weight: 65.5 kg     Relevant Results  Scheduled medications  aspirin, 81 mg, oral, Daily  carvedilol, 3.125 mg, oral, BID after meals  enoxaparin, 40 mg, subcutaneous, Daily  insulin lispro, 0-5 Units, subcutaneous, TID  magnesium oxide, 400 mg, oral, Daily  midodrine, 5 mg, oral, TID  ofloxacin, 1 drop, Left Eye, 4x daily  oxygen, , inhalation, Continuous - 02/gases  piperacillin-tazobactam, 3.375 g, intravenous, q6h  pravastatin, 40 mg, oral, Nightly  tamsulosin, 0.8 mg, oral, Nightly  thiamine, 100 mg, nasogastric tube, Daily      Continuous medications  lactated Ringer's, 75 mL/hr, Last Rate: 75 mL/hr (10/31/24 1248)      PRN medications  PRN medications: aminophylline, dextrose, dextrose, glucagon, glucagon, melatonin, morphine, prochlorperazine, prochlorperazine    Results for orders placed or performed during the hospital encounter of 10/16/24 (from the past 24 hours)   POCT GLUCOSE   Result Value Ref Range    POCT Glucose 181 (H) 74 - 99 mg/dL   Prealbumin   Result Value Ref Range    Prealbumin 5.8 (L) 18.0 - 40.0 mg/dL   Lavender Top   Result Value Ref Range    Extra Tube Hold for add-ons.    PST Top   Result Value Ref Range    Extra Tube Hold for add-ons.    CBC   Result Value  Ref Range    WBC 6.5 4.4 - 11.3 x10*3/uL    nRBC 0.0 0.0 - 0.0 /100 WBCs    RBC 2.41 (L) 4.50 - 5.90 x10*6/uL    Hemoglobin 7.4 (L) 13.5 - 17.5 g/dL    Hematocrit 22.0 (L) 41.0 - 52.0 %    MCV 91 80 - 100 fL    MCH 30.7 26.0 - 34.0 pg    MCHC 33.6 32.0 - 36.0 g/dL    RDW 14.0 11.5 - 14.5 %    Platelets 186 150 - 450 x10*3/uL   Basic Metabolic Panel   Result Value Ref Range    Glucose 182 (H) 74 - 99 mg/dL    Sodium 132 (L) 136 - 145 mmol/L    Potassium 3.9 3.5 - 5.3 mmol/L    Chloride 101 98 - 107 mmol/L    Bicarbonate 23 21 - 32 mmol/L    Anion Gap 12 10 - 20 mmol/L    Urea Nitrogen 43 (H) 6 - 23 mg/dL    Creatinine 1.15 0.50 - 1.30 mg/dL    eGFR 64 >60 mL/min/1.73m*2    Calcium 8.0 (L) 8.6 - 10.3 mg/dL   POCT GLUCOSE   Result Value Ref Range    POCT Glucose 193 (H) 74 - 99 mg/dL   POCT GLUCOSE   Result Value Ref Range    POCT Glucose 154 (H) 74 - 99 mg/dL       XR chest 1 view    Result Date: 10/31/2024  Interpreted By:  Annia Dillon, STUDY: XR CHEST 1 VIEW 10/31/2024 1:09 pm   INDICATION: Signs/Symptoms:Tube placement check. Myocardial infarct.   COMPARISON: 10/31/2024 done at 10:59 a.m.   ACCESSION NUMBER(S): [id number]   ORDERING CLINICIAN: DONNA CEJA   TECHNIQUE: AP semi-erect view of the chest at bedside   FINDINGS: It would appear that the feeding tube has been partially retracted and is no longer coiled within the gastric fundus. The distal tip remains positioned within the gastric fundus.   There are bilateral pulmonary infiltrates unchanged with cardiac size normal.       The feeding tube has been partially retracted and is no longer looped within the gastric fundus. The distal tip remains positioned within the gastric fundus however.   No change in the bilateral pulmonary infiltrates within each lower lung zone.   Signed by: Annia Dillon 10/31/2024 1:57 PM Dictation workstation:   [id number]    XR chest 1 view    Result Date: 10/31/2024  Interpreted By:  Laurent Elam, STUDY: XR CHEST 1 VIEW;  10/31/2024 11:12 am   INDICATION: CLINICAL INFORMATION: Signs/Symptoms:Tube placement check.   COMPARISON: 10/31/2024 at 1054 hours   ACCESSION NUMBER(S): [id number]   ORDERING CLINICIAN: DONNA CEJA   TECHNIQUE: Portable chest one view.   FINDINGS: The cardiac size is indeterminate in view of the AP projection. Feeding tube is been repositioned with the tube now noted to be coiled within the stomach. Bibasilar infiltrates are present.       1. Feeding tube is present with extending into the stomach with the tube coiled within the region of the body and fundus. 2. Bibasilar infiltrates.   MACRO: none   Signed by: Laurent Elam 10/31/2024 12:07 PM Dictation workstation:   [id number]    XR chest 1 view    Result Date: 10/31/2024  Interpreted By:  Laurent Elam, STUDY: XR CHEST 1 VIEW; 10/31/2024 11:11 am   INDICATION: CLINICAL INFORMATION: Signs/Symptoms:Verification of nasogastric tube location.   COMPARISON: 10/29/2024 at 1042 hours   ACCESSION NUMBER(S): [id number]   ORDERING CLINICIAN: KIMBERLEY BLAS   TECHNIQUE: Portable chest one view.   FINDINGS: The cardiac size is indeterminate in view of the AP projection. Nasogastric tube is coiled within the mid chest along the course of the thoracic esophagus. Bibasilar infiltrate is suspected. There is a limited depth of inspiration on the exam.       Nasogastric tube is coiled within the midthoracic esophagus. Probable bibasilar infiltrates although the exam is limited by poor depth of inspiration.   MACRO: none   Signed by: Laurent Elam 10/31/2024 12:05 PM Dictation workstation:   [id number]    XR chest 1 view    Result Date: 10/29/2024  Interpreted By:  Laurent Elam, STUDY: XR CHEST 1 VIEW; 10/29/2024 11:22 am   INDICATION: CLINICAL INFORMATION: Signs/Symptoms:cough.   COMPARISON: 10/16/2024   ACCESSION NUMBER(S): [id number]   ORDERING CLINICIAN: RICHARD ADAIR   TECHNIQUE: Portable chest one view.   FINDINGS: The cardiac size is indeterminate in  view of the AP projection. Patient is slightly rotated to the left. There is a thoracic dextroscoliosis. Alveolar infiltrate is present within left lower lobe consistent with pneumonia. Right hemithorax is clear. No effusions are identified.       There is a new left lower lobe infiltrate consistent with pneumonia. Follow-up to assure complete clearing is suggested.   MACRO: none   Signed by: Laurent Elam 10/29/2024 2:55 PM Dictation workstation:   [id number]    CT head wo IV contrast    Result Date: 10/29/2024  Interpreted By:  Brielle Linares, STUDY: CT HEAD WO IV CONTRAST; ;  10/29/2024 1:26 pm   INDICATION: Signs/Symptoms:sudden onset confusion.   COMPARISON: 10/16/2024   ACCESSION NUMBER(S): [id number]   ORDERING CLINICIAN: RICHARD ADAIR   TECHNIQUE: Serial axial images of the head were obtained without intravenous contrast. Sagittal and coronal reconstructions were generated.   FINDINGS: Trickles are midline and enlarged. There is prominence of the cortical sulci and superior cerebellar cisterns.   There are no acute parenchymal abnormalities. There is no hemorrhage or extra-axial fluid.   There is no obvious scalp hematoma or skull fracture.   Visualized portions of the paranasal sinuses and mastoids are unremarkable.   COMPARISON OF FINDINGS: The brain is similar.       No acute intracranial abnormality.     MACRO: none   Signed by: Brielle Linares 10/29/2024 2:19 PM Dictation workstation:   [id number]    Cardiac Catheterization Procedure    Result Date: 10/24/2024           [place] [id number]            Phone [phone] Cardiovascular Catheterization Report Patient Name:     AMARILYS FARAH      Performing Physician:  [id number] Haja Mckeon DO Study Date:       10/23/2024           Verifying Physician:   Virginia Mckeon DO MRN/PID:           [id number]             Cardiologist/Co-Scrub: Accession#:       [id number]         Ordering Provider:     [id number] JIMMIE CHAVEZ Date of           1940 / 83      Cardiologist: Birth/Age:        years Gender:           M                    Fellow: Encounter#:       [id number]           Surgeon:  Study: Left Heart Cath  Indications: AMARILYS FARAH is a 84 year old male who presents with dyslipidemia and an asymptomatic chest pain assessment. Cardiomyopathy, left ventricular dysfunction and NSTE - ACS. Stress test performed: No. CTA performed: No. Rajinder accessed: No. LVEF Assessed: No. Cardiac arrest: No. Cardiac surgical consult: No. Cardiovascular Instability: No Frailty status of patient entering lab: 6 = Moderately frail.  Coronary Angiography: The coronary circulation is right dominant.  Coronary Angiography Comments: We obtained informed consent with the help of his son Jorge A because the patient is deaf, and he was brought to the cardiac catheterization lab with the timeout verified between his computer medical record number and his arm wristband. Both groins were prepped and draped in a sterile fashion and the remainder of the procedure performed under sterile technique. 10 cc of 1% lidocaine used to the right groin for local anesthesia and Versed 2 mg and fentanyl 50 mcg for intravenous sedation. Used single wall micropuncture technique to access the right common femoral artery and a micropuncture sheath was inserted, then exchanged over guidewire for a 6 [place] sheath. 6 [place]er JR4 JL 4 and pigtail catheters were used and catheters were advanced and withdrawn over the guidewire without difficulty. At the end of the procedure the sheath was removed and an Angio-Seal closure device was deployed with good hemostasis and he was returned to his telemetry bed in stable condition. Coronary angiography: 1. The left main was a large  vessel and bifurcated into the LAD and circumflex and was normal 2 the LAD was a large vessel that extends to the apex and was widely patent with CHEYANNE grade III flow. The proximal LAD had an eccentric 40-50% irregular stenosis that was presumed to be the site of previous infarct vessel occlusion with spontaneous recanalization. The remaining mid and distal LAD and its branches had mild diffuse nonobstructive disease 3. The circumflex was a modest nondominant vessel that gave rise to 3 modest PLV branches and had no obstructive disease.  4. The right coronary artery was a dominant vessel that had mild 30-40% ostial /proximal stenosis and an eccentric 50% mid vessel stenosis, then gives rise to a modest sized small caliber PDA and small caliber distal RCA without obstruction. Left ventriculogram performed in the VALLES 30 projection showed persistent modest hypokinesis of the mid anterior, anterolateral segments and severe hypokinesis of the LV apical segment with left ventricular ejection fraction estimated at 40 to 45%. Impression: 1.40% proximal LAD stenosis suggesting previous vessel occlusion with spontaneous recanalization. 2. 50% mid RCA stenosis. 3. Ischemic cardiomyopathy with residual anterior and anteroapical left ventricular hypokinesis with left ventricular ejection fraction 40 to 45%. 3. Nuclear stress test 10/22/2024 suggests viability in the anterior and apical segments without ischemia and with left ventricular ejection fraction estimated at 44%.   Hemo Personnel: +----------------+---------+ Name            Duty      +----------------+---------+ Haja Mckeon MD 1 +----------------+---------+  Hemodynamic Pressures:  +----+---------------+----------+-------------+--------------+-------+---------+ Site   Date Time   Phase NameSystolic mmHgDiastolic mmHgED mmHgMean mmHg +----+---------------+----------+-------------+--------------+-------+---------+   AO     10/23/2024  AIR REST           116            57              81          8:56:33 AM                                                      +----+---------------+----------+-------------+--------------+-------+---------+   LV     10/23/2024  AIR REST          103             2      8                   9:07:45 AM                                                      +----+---------------+----------+-------------+--------------+-------+---------+   LV     10/23/2024  AIR REST          109            -2      7                   9:07:57 AM                                                      +----+---------------+----------+-------------+--------------+-------+---------+  LVp     10/23/2024  AIR REST          106             0     12                   9:08:06 AM                                                      +----+---------------+----------+-------------+--------------+-------+---------+  AOp     10/23/2024  AIR REST          120            52              76          9:08:16 AM                                                      +----+---------------+----------+-------------+--------------+-------+---------+   AO     10/23/2024  AIR REST            0             0             -96          9:32:52 AM                                                      +----+---------------+----------+-------------+--------------+-------+---------+  Cardiac Cath Post Procedure Notes: Post Procedure Diagnosis: Double vessel disease. Blood Loss:               Estimated blood loss during the procedure was 10ml                           mls. Specimens Removed:        Number of specimen(s) removed: none.  ICD 10 Codes: Non ST elevation (NSTEMI) myocardial infarction-I21.4  CPT Codes: Left Heart Cath (visualization of coronaries) and LV-29474  [id number] Haja Mckeon DO Performing Physician Electronically signed by [id number] Haja Mckeon DO on 10/24/2024 at 9:06:38 AM  ** Final **      Cardiology Interpretation Of Nuclear Stress - See Other Report For Nuclear Portion    Result Date: 10/23/2024           [facility] [id number] [place], [place] [address]            Phone [phone] Nuclear Pharmacologic Stress Test Patient Name:      AMARILYS FARAH     Ordering Provider:     [id number] COOPER KASANDRA RATLIFF Study Date:        10/22/2024          Reading Physician:     Deepthi Pelaez MD MRN/PID:           [id number]            Supervising Physician: Deepthi Pelaez MD Accession#:        [id number]        Fellow: Date of Birth/Age: 1940 / 83     Fellow:                    years Gender:            M                   Nurse:                 Inze Holley RN Admit Date:                            Technician:            Marina Feliciano Admission Status:                      Sonographer:           NA Height:            182.88 cm           Technologist: Weight:            62.60 kg            Additional Staff: BSA:               1.82 m2             Encounter#:            [id number] BMI:               18.72 kg/m2         Patient Location: Study Type:    CARDIOLOGY INTERPRETATION OF NUCLEAR STRESS Diagnosis/ICD: NonST elevation (NSTEMI) myocardial infarction-I21.4 Indication:    CARDIOMYOPATHY EF 15-20% CPT Codes:     Stress Test Interpretation-12491; Stress Test Supervision-92859 Falls Risk: Low: Patient has low risk for sustaining a fall; environmental safety interventions in place.  Study Details: Correct procedure and correct patient verified verbally and with                ID Band checked.  Patient History: Hyperlipidemia and congestive heart failure. NSTEMI, IDIOPATHIC HYPOTENSION.  Medications: ASPIRIN, CARVEDILOL, LOVENOX, PRAVASTATIN,  SPIRONOLACTONE, MIDODRINE. The patient took medications as prescribed.  Patient Performance: Patient received a total of 0.4 mg of Regadenoson at 11:41:48 AM. The resting blood pressure was 114/69 mmHg with a heart rate of 81 bpm. The patient developed no symptoms during the stress exam. The blood pressure response was normal. The test was terminated due to: completed lab protocol and end of vasodilator infusion. Patient has met the discharge criteria and is discharged to their floor.  Baseline ECG: Resting ECG showed normal sinus rhythm with nonspecific ST-T wave changes. Artifact.  Stress ECG: Stress ECG showed normal sinus rhythm, with frequent premature ventricular contractions. No ST changes. Essentially negative ECG stress test for ischemia with a Lexiscan infusion. Please get the nuclear imaging report from radiology department.  Stress Stage Data: +----------------+--+------+-------+                 HRSys BPDias BP +----------------+--+------+-------+ Baseline [id number]   69      +----------------+--+------+-------+  Recovery ECG: Recovery ECG showed normal sinus rhythm, with artifact.  +------------+--+------+-------+             HRSys BPDias BP +------------+--+------+-------+ Recovery I  8295    57      +------------+--+------+-------+ Recovery II 7288    51      +------------+--+------+-------+ Recovery [id number]    41      +------------+--+------+-------+ Recovery IV 8592    52      +------------+--+------+-------+ Recovery V  7392    54      +------------+--+------+-------+  Summary:  1. Adequate level of stress achieved.  2. Correlate with myocardial perfusion imaging results.  3. Essentially negative ECG stress test for ischemia with a Lexiscan infusion. Please get the nuclear imaging report from radiology department.  4. No clinical or electrocardiographic evidence for ischemia at maximal infusion.  5. No ECG changes from baseline.  6. Normal Stress Test.  7.  Nuclear image results are reported separately. [id number] Fabien Pelaez MD Electronically signed on 10/23/2024 at 9:39:18 AM   ** Final **     Nuclear Stress Test    Result Date: 10/22/2024  Interpreted By:  Chapito García and Ogievich Taessa STUDY: NUCLEAR STRESS TEST; 10/22/2024 2:18 pm   INDICATION: Signs/Symptoms:Cardiomyopathy, EF 15-20%.   COMPARISON: None.   ACCESSION NUMBER(S): [id number]   ORDERING CLINICIAN: COOPER RATLIFF   TECHNIQUE: DIVISION OF NUCLEAR MEDICINE PHARMACOLOGIC STRESS MYOCARDIAL PERFUSION SCAN, ONE DAY PROTOCOL   The patient received an intravenous dose of  8.8 mCi of Tc-99m Myoview and resting emission tomographic (SPECT) images of the myocardium were acquired. The patient then received an intravenous infusion of 0.4mg regadenoson (Lexiscan)  followed by an additional dose of  25.1 mCi of Tc-99m  Myoview. Stress phase SPECT images of the myocardium were then acquired. These included ECG-gated images to assess and quantify ventricular function.  A low-dose, nondiagnostic regional CT was utilized for attenuation correction purposes.   FINDINGS: Both stress and rest studies demonstrate grossly normal perfusion throughout the left ventricle.   The left ventricle is normal in size.   Gated images demonstrate mild global hypokinesis of the LV wall motion with a post-stress LV EF estimated at 44%.   Attenuation correction CT images demonstrate no gross anatomic abnormalities.       1.  No evidence of inducible ischemia or prior infarction. 2. Left ventricle is normal in size. 3. Mild global hypokinesis of the LV wall motion with a post-stress LV EF estimated at 44%.   I personally reviewed the images/study and I agree with the findings as stated by Viry Up DO, PGY-3. This study was interpreted at [place], [place].   MACRO: None   Signed by: Chapito García 10/22/2024 3:15 PM Dictation workstation:   [id number]    Electrocardiogram, 12-lead PRN  ACS symptoms    Result Date: 10/22/2024  Poor data quality in current ECG precludes serial comparison Sinus tachycardia with short CO with Premature supraventricular complexes and with frequent Premature ventricular complexes Indeterminate axis Pulmonary disease pattern Nonspecific ST and T wave abnormality Abnormal ECG When compared with ECG of 16-OCT-2024 20:40, (unconfirmed) Previous ECG has undetermined rhythm, needs review Questionable change in QRS duration Confirmed by Fer Vazquez ([id number]) on 10/22/2024 12:52:29 PM    MR brain wo IV contrast    Result Date: 10/19/2024  Interpreted By:  Hugo Rico,  and Capo Burns STUDY: MR BRAIN WO IV CONTRAST;  10/19/2024 3:36 pm   INDICATION: Signs/Symptoms:altered mental status.     COMPARISON: CT of the head obtained October 16, 2024   ACCESSION NUMBER(S): [id number]   ORDERING CLINICIAN: ROMULO JOHNSON   TECHNIQUE: Axial T2, FLAIR, DWI, gradient echo T2 and sagittal and coronal T1 weighted images of brain were acquired.  No contrast was administered.   FINDINGS: Midline brain structures are intact on mid sagittal imaging.   There is no territorial restricted diffusion to suggest acute intracranial infarct. No suspicious T2 signal hypointensity noted on gradient sequencing.   There is no evidence of acute intracranial hemorrhage. No intracranial mass or mass effect. No midline shift. No loss of gray-white differentiation. Few scattered supratentorial white matter FLAIR signal hyperintensities are observed.  Basilar cisterns appear intact. Moderate dilatation of the lateral and 3rd greater than 4th ventricles at least principally on the basis of global parenchymal volume loss.   Normal T2 vascular flow voids are seen.   T2 hyperintensity within the maxillary sinuses is consistent with mucosal inflammatory paranasal sinus disease.. Status post bilateral lens replacements.   There is no mastoid effusion. Moderate effusions of the occipital lateral mass C1 articulation  bilaterally may be on a degenerative basis.       No MR evidence of acute intracranial infarct, hemorrhage, mass, or mass effect.   I personally reviewed the images/study and I agree with the findings as stated. This study was interpreted at [facility], [place], [place].   MACRO: None   Signed by: Hugo Rico 10/19/2024 4:41 PM Dictation workstation:   [id number]    EEG    IMPRESSION Impression This routine awake and drowsy EEG is indicative of a moderate diffuse encephalopathy. No epileptiform activity or lateralizing signs seen. A full report will be scanned into the patient's chart at a later time. This report has been interpreted and electronically signed by    Transthoracic Echo (TTE) Complete    Result Date: 10/17/2024           [place] [address]            Phone [phone] TRANSTHORACIC ECHOCARDIOGRAM REPORT Patient Name:     AMARILYS WALTON SOFI      Reading Physician:   [id number] Jose David Workman DO Study Date:       10/17/2024           Ordering Provider:   [id number] CECIL HOWARD MRN/PID:          [id number]             Fellow: Accession#:       [id number]         Nurse: Date of           1940 / 83      Sonographer:         R Birth/Age:        years Gender:           M                    Additional Staff: Height:           177.80 cm            Admit Date: Weight:           65.77 kg             Admission Status:    Inpatient - Routine BSA / BMI:        1.82 m2 / 20.81      Department Location: [facility] ICU                   kg/m2 Blood Pressure: 97 /74 mmHg Study Type:    TRANSTHORACIC ECHO (TTE) COMPLETE Diagnosis/ICD: Non ST elevation (NSTEMI) myocardial infarction-I21.4 Indication:    NSTEMI CPT Codes:     Echo Complete w Full Doppler-12810 Patient History: Diabetes:          Yes Pertinent History: HTN, Hyperlipidemia, CAD, Chest Pain and CHF. Renal dx III. Study  Detail: The following Echo studies were performed: 2D, M-Mode, Doppler and               color flow. Technically challenging study due to poor acoustic               windows and patient lying in supine position.  PHYSICIAN INTERPRETATION: Left Ventricle: The left ventricular systolic function is severely decreased, with a visually estimated ejection fraction of 15-20%. There are multiple wall motion abnormalities. The left ventricular cavity size is moderately dilated. There is mild concentric left ventricular hypertrophy. Left ventricular diastolic filling was indeterminate. LV Wall Scoring: The entire apex, mid and apical anterior wall, mid and apical anterior septum, mid and apical inferior septum, mid and apical inferior wall, mid inferolateral segment, and mid anterolateral segment are akinetic. All remaining scored segments are normal. Left Atrium: The left atrium is normal in size. Right Ventricle: The right ventricle is normal in size. There is normal right ventricular global systolic function. Right Atrium: The right atrium is normal in size. Aortic Valve: The aortic valve is probably trileaflet. The aortic valve dimensionless index is 0.70. There is no evidence of aortic valve regurgitation. The peak instantaneous gradient of the aortic valve is 2.7 mmHg. The mean gradient of the aortic valve is 1.4 mmHg. Mitral Valve: The mitral valve is normal in structure. There is no evidence of mitral valve regurgitation. Tricuspid Valve: The tricuspid valve is structurally normal. No evidence of tricuspid regurgitation. Pulmonic Valve: The pulmonic valve is not well visualized. The pulmonic valve regurgitation was not well visualized. Pericardium: No pericardial effusion noted. Aorta: The aortic root is normal.  CONCLUSIONS:  1. Multiple segmental abnormalities exist. See findings.  2. The left ventricular systolic function is severely decreased, with a visually estimated ejection fraction of 15-20%.  3. There are  multiple wall motion abnormalities.  4. Left ventricular diastolic filling was indeterminate.  5. Left ventricular cavity size is moderately dilated.  6. There is normal right ventricular global systolic function.  7. 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 QUANTITATIVE DATA SUMMARY:  2D MEASUREMENTS:           Normal Ranges: Ao Root s:       3.51 cm IVSd:            0.79 cm   (0.6-1.1cm) LVPWd:           0.76 cm   (0.6-1.1cm) LVIDd:           4.44 cm   (3.9-5.9cm) LVIDs:           3.81 cm LV Mass Index:   58.6 g/m2 LV % FS          14.0 %  LA VOLUME:          Normal Ranges: LA Vol A4C: 45.2 ml  RA VOLUME BY A/L METHOD:          Normal Ranges: RA Area A4C:             12.0 cm2  LV SYSTOLIC FUNCTION BY 2D PLANIMETRY (MOD):                      Normal Ranges: EF-A4C View:    16 % (>=55%) EF-A2C View:    5 % EF-Biplane:     14 % EF-Visual:      18 % EF-3DQ:         19 % LV EF Reported: 18 %  LV DIASTOLIC FUNCTION:           Normal Ranges: MV Peak E:             0.45 m/s  (0.7-1.2 m/s) MV Peak A:             0.88 m/s  (0.42-0.7 m/s) E/A Ratio:             0.51      (1.0-2.2) MV e'                  0.073 m/s (>8.0) MV lateral e'          0.11 m/s MV medial e'           0.04 m/s E/e' Ratio:            6.16      (<8.0)  MITRAL VALVE:          Normal Ranges: MV DT:        110 msec (150-240msec)  AORTIC VALVE:                     Normal Ranges: AoV Vmax:                0.82 m/s (<=1.7m/s) AoV Peak P.7 mmHg (<20mmHg) AoV Mean P.4 mmHg (1.7-11.5mmHg) LVOT Max Florencio:            0.61 m/s (<=1.1m/s) AoV VTI:                 17.99 cm (18-25cm) LVOT VTI:                12.65 cm LVOT Diameter:           2.00 cm  (1.8-2.4cm) AoV Area, VTI:           2.20 cm2 (2.5-5.5cm2) AoV Area,Vmax:           2.32 cm2 (2.5-4.5cm2) AoV Dimensionless Index: 0.70  RIGHT VENTRICLE: RV Basal 3.30 cm RV Mid   2.70 cm RV Major 4.8 cm  TRICUSPID VALVE/RVSP:          Normal Ranges:  Peak TR Velocity:     2.26 m/s RV Syst Pressure:     23 mmHg  (< 30mmHg)  PULMONIC VALVE:          Normal Ranges: PV Max Florencio:     0.6 m/s  (0.6-0.9m/s) PV Max P.3 mmHg PV Mean P.5 mmHg PV VTI:         6.59 cm  AORTA: Asc Ao Diam 0.00 cm  [id number] Jose David Workman DO Electronically signed on 10/17/2024 at 11:00:02 AM  Wall Scoring  ** Final (Updated) **     ECG 12 lead    Result Date: 10/17/2024  Normal sinus rhythm Left axis deviation Low voltage QRS Anteroseptal infarct Inferior infarct , age undetermined Abnormal ECG When compared with ECG of 2018 16:39, Significant changes have occurred Confirmed by Rell Vaughan ([id number]) on 10/17/2024 11:32:49 AM    XR chest 1 view    Result Date: 10/16/2024  Interpreted By:  Lobo Farrar, STUDY: XR CHEST 1 VIEW;  10/16/2024 9:27 pm   INDICATION: Signs/Symptoms:malaise.   COMPARISON: Chest x-ray 2021   ACCESSION NUMBER(S): [id number]   ORDERING CLINICIAN: MICHELLE MARTE   FINDINGS: Multiple overlying leads are present.   CARDIOMEDIASTINAL SILHOUETTE: Cardiomediastinal silhouette is normal in size and configuration. Atherosclerotic calcification of the aorta.   LUNGS: No consolidation, pleural effusion or pneumothorax.   ABDOMEN: Surgical clips noted in the left upper quadrant.   BONES: Multilevel degenerative changes of the spine.       No acute cardiopulmonary process.   MACRO: None   Signed by: Lobo Farrar 10/16/2024 9:52 PM Dictation workstation:   [id number]    CT head wo IV contrast    Result Date: 10/16/2024  Interpreted By:  Ori Asher, STUDY: CT HEAD WO IV CONTRAST;  10/16/2024 9:18 pm   INDICATION: Signs/Symptoms:malaise/fatigue; AMS.     COMPARISON: 2021   ACCESSION NUMBER(S): [id number]   ORDERING CLINICIAN: MICHELLE MARTE   TECHNIQUE: Noncontrast axial CT scan of head was performed. Angled reformats in brain and bone windows were generated. The images were reviewed in bone, brain, blood and soft tissue windows.   FINDINGS: CSF  Spaces: The ventricles, sulci and basal cisterns are within normal limits. There is no extraaxial fluid collection.   Parenchyma: There are periventricular white matter changes noted. The grey-white differentiation is intact. There is no mass effect or midline shift.  There is no intracranial hemorrhage.   Calvarium: The calvarium is unremarkable.   Paranasal sinuses and mastoids: Visualized paranasal sinuses and mastoids are clear.       No evidence of acute cortical infarct or intracranial hemorrhage.       MACRO: None   Signed by: Ori Asher 10/16/2024 9:28 PM Dictation workstation:   [id number]                             Assessment/Plan   Assessment & Plan  NSTEMI (non-ST elevated myocardial infarction) (Multi)    Type 2 diabetes mellitus without complication, without long-term current use of insulin (Multi)    BPH (benign prostatic hyperplasia)    Acute metabolic encephalopathy    Chronic systolic heart failure    Idiopathic hypotension    Severe protein-calorie malnutrition (Multi)    Anemia of chronic disease    Acute on chronic systolic (congestive) heart failure    Hypomagnesemia    Acute cystitis with hematuria    Urinary retention    Bacterial pneumonia    Incomplete bladder emptying.     Continue with external catheter.  Renal function stable.    Appreciate palliative care input.              I spent 12 minutes in the professional and overall care of this patient.      Amelia Gibbs MD

## 2024-10-31 NOTE — PROGRESS NOTES
Spiritual Care Visit    Clinical Encounter Type  Visited With: Patient  Routine Visit: Follow-up  Continue Visiting: Yes         Values/Beliefs  Spiritual Requests During Hospitalization: I asked Dat on his wipe off boad if he wasnted to a blessing, and he waved his hands saying Yes. i left the note on his board for his son, Jorge A, or any ofther family mebers to see that I was there.     Demetrio Ray

## 2024-10-31 NOTE — ASSESSMENT & PLAN NOTE
Bed: 38  Expected date:   Expected time:   Means of arrival:   Comments:  triage     Rachel Ruvalcaba RN  04/10/19 [id number] Scant urine output by incontinence. Unable to pass trotter catheter. Urology consulted and also unable to pass trotter catheter.   If he develops painful distention or MARISSA, then will need to go for cystoscopy with dilatation

## 2024-10-31 NOTE — CONSULTS
Nutrition Follow up Note    Nutrition Assessment         Assessed by SLP 10/29, strict NPO. Corepack placed for enteral nutrition. Re-consulted for tube feed recommendation. Severely malnourished patient with minimal po intake since admission and significant weight loss prior to admission. Recommend re-feeding syndrome protocols.    Nutrition History:  Food and Nutrient History: Strict NPO  Energy Intake: Poor < 50 %  Food Allergies/Intolerances:  None  GI Symptoms: None  Oral Problems: Swallowing difficulty and Oral aversion    Anthropometrics:  Ht: 182.9 cm (6'), Wt:  (bed would not weigh), BMI: 19.58  IBW/kg (Dietitian Calculated): 80.9 kg  Percent of IBW: 81 %       Weight Change:  Daily Weight  10/30/24 : 65.5 kg (144 lb 6.4 oz)  02/27/24 : 68.9 kg (152 lb)  08/10/23 : 70.3 kg (155 lb)  01/03/22 : 76.7 kg (169 lb)  10/21/21 : 77.6 kg (171 lb)  07/21/21 : 76.3 kg (168 lb 4 oz)  02/26/21 : 68.9 kg (152 lb)  12/29/20 : 68.6 kg (151 lb 3.2 oz)  12/03/20 : 62.6 kg (138 lb)     Nutrition Focused Physical Exam Findings:   Subcutaneous Fat Loss  Orbital Fat Pads: Mild-Moderate (slight dark circles and slight hollowing)  Buccal Fat Pads: Mild-Moderate (flat cheeks, minimal bounce)  Triceps: Severe (negligible fat tissue)    Muscle Wasting  Temporalis: Severe (hollowed scooping depression)  Pectoralis (Clavicular Region): Severe (protruding prominent clavicle)  Deltoid/Trapezius: Severe (squared shoulders, acromion process prominent)    Nutrition Significant Labs:  Lab Results   Component Value Date    WBC 6.5 10/31/2024    HGB 7.4 (L) 10/31/2024    HCT 22.0 (L) 10/31/2024     10/31/2024    CHOL 67 02/23/2024    TRIG 129 02/23/2024    HDL 24.5 02/23/2024    ALT 22 10/16/2024    AST 43 (H) 10/16/2024     (L) 10/31/2024    K 3.9 10/31/2024     10/31/2024    CREATININE 1.15 10/31/2024    BUN 43 (H) 10/31/2024    CO2 23 10/31/2024    TSH 2.84 12/03/2020    INR 1.5 (H) 10/16/2024    HGBA1C 5.3 10/16/2024      Nutrition Specific Medications:  aspirin, 81 mg, oral, Daily  carvedilol, 3.125 mg, oral, BID after meals  [Held by provider] enoxaparin, 40 mg, subcutaneous, Daily  insulin lispro, 0-5 Units, subcutaneous, TID  magnesium oxide, 400 mg, oral, Daily  midodrine, 5 mg, oral, TID  ofloxacin, 1 drop, Left Eye, 4x daily  oxygen, , inhalation, Continuous - 02/gases  piperacillin-tazobactam, 3.375 g, intravenous, q6h  pravastatin, 40 mg, oral, Nightly  tamsulosin, 0.8 mg, oral, Nightly      lactated Ringer's, 75 mL/hr, Last Rate: 75 mL/hr (10/31/24 0137)      Dietary Orders (From admission, onward)       Start     Ordered    10/29/24 1832  NPO Diet; Effective now  Diet effective now         10/29/24 1831    10/28/24 1432  Oral nutritional supplements  Until discontinued        Comments: Strawberry or vanilla ensure with each meal per patient preference   Question Answer Comment   Deliver with All meals    Select supplement: Ensure High Protein        10/28/24 1432    10/21/24 1456  Oral nutritional supplements  Until discontinued        Comments: chocolate   Question Answer Comment   Deliver with All meals    Select supplement: Magic Cup        10/21/24 1456    10/17/24 0053  May Participate in Room Service With Assistance  ( ROOM SERVICE MAY PARTICIPATE WITH ASSISTANCE)  Once        Question:  .  Answer:  Yes    10/17/24 0052                   Estimated Needs:   Estimated Energy Needs  Total Energy Estimated Needs (kCal): 1848 kCal  Total Estimated Energy Need per Day (kCal/kg): 30 kCal/kg  Method for Estimating Needs: Actual Wt    Estimated Protein Needs  Total Protein Estimated Needs (g): 62 g  Total Protein Estimated Needs (g/kg): 1 g/kg  Method for Estimating Needs: Actual Wt    Estimated Fluid Needs  Total Fluid Estimated Needs (mL): 1848 mL  Method for Estimating Needs: 1 mL/kcal      Nutrition Diagnosis   Nutrition Diagnosis:  Malnutrition Diagnosis  Patient has Malnutrition Diagnosis: Yes  Diagnosis Status:  Ongoing  Malnutrition Diagnosis: Severe malnutrition related to chronic disease or condition  As Evidenced by: Moderate to severe subcutaeous fat losses, severe muscle deficits, po intake likely less than estimate energy needs    Nutrition Diagnosis  Patient has Nutrition Diagnosis: Yes  Diagnosis Status (1): Ongoing  Nutrition Diagnosis 1: Inadequate energy intake  Related to (1): decreased ability to consume sufficient energy  As Evidenced by (1): poor po intake     Nutrition Interventions/Recommendations   Nutrition Interventions and Recommendations:    Nutrition Prescription:  Individualized Nutrition Prescription Provided for : 1848 calories, 66 gm protein when diet advances    Nutrition Interventions:   Food and/or Nutrient Delivery Interventions  Interventions: Enteral intake, Vitamin supplement therapy  Enteral Intake: Modify composition of enteral nutrition, Modify rate of enteral nutrition  Goal: If tube feed is initiated, recommend: Osmolite 1.5 goal rate @50 mL/Hr to provide 1800 calories, 75 gm protein, 914 mL free water. Suggest start @10 mL/Hr and increase by 10 mL Q12H until goal. Recommend additional 150 mL water flushes Q4H to meet estimated hydration neeeds.  Vitamin Supplement Therapy: Thiamin  Goal: Recommend 100 mg thiamin once daily for 7-10 days to prevent neurological complications during nutritional rehabilitation.  Additional Interventions: Monitor K+, PO4 and Mg BID for signs of refeeding syndrome.    Education Documentation  No documentation found.         Nutrition Monitoring and Evaluation   Monitoring/Evaluation:   Food/Nutrient Related History Monitoring  Monitoring and Evaluation Plan: Enteral and parenteral nutrition intake  Enteral and Parenteral Nutrition Intake: Enteral nutrition intake  Criteria: Monitoring for tube feed initiation/tolerance    Body Composition/Growth/Weight History  Monitoring and Evaluation Plan: Weight  Weight: Measured weight  Criteria: pt will  maintain/gain wt       Time Spent/Follow-up:   Follow Up  Time Spent (min): 25 minutes  Last Date of Nutrition Visit: 10/31/24  Nutrition Follow-Up Needed?: 3-5 days  Follow up Comment: 10/4/24

## 2024-10-31 NOTE — PROGRESS NOTES
Palliative Care Progress Note    Date of Admission: 10/16/2024    Patient is a 83 y.o. male admitted with NSTEMI (non-ST elevated myocardial infarction) (Multi). Multiple family members at the bedside, pt responsive to son Jorge A, answering questions appropriately. Corpak placed earlier but kinked so had to replace. Awaiting placement confirmation and TF orders. Urine output improved somewhat, bun creat are within acceptable ranges and stable.    Mental/Cognitive Status: still very lethargic, but definitely more responsive when family members present    Respiratory Status: stable on room air    Pain Assessment: overall appears calm and comfortable    Pertinent Symptoms: lethargy, weakness, urine retention    Diet/Nutrition: NPO    Bowel Regimen: unknown    Patient's current condition/Anticipated Prognosis: guarded.  Family/Healthcare Proxy involvement: sons Jorge A and Rich.    Scheduled medications  aspirin, 81 mg, oral, Daily  carvedilol, 3.125 mg, oral, BID after meals  enoxaparin, 40 mg, subcutaneous, Daily  insulin lispro, 0-5 Units, subcutaneous, TID  magnesium oxide, 400 mg, oral, Daily  midodrine, 5 mg, oral, TID  ofloxacin, 1 drop, Left Eye, 4x daily  oxygen, , inhalation, Continuous - 02/gases  piperacillin-tazobactam, 3.375 g, intravenous, q6h  pravastatin, 40 mg, oral, Nightly  tamsulosin, 0.8 mg, oral, Nightly  thiamine, 100 mg, nasogastric tube, Daily      Continuous medications  lactated Ringer's, 75 mL/hr, Last Rate: 75 mL/hr (10/31/24 0137)      PRN medications  PRN medications: aminophylline, dextrose, dextrose, glucagon, glucagon, melatonin, morphine, prochlorperazine, prochlorperazine     Results for orders placed or performed during the hospital encounter of 10/16/24 (from the past 24 hours)   POCT GLUCOSE   Result Value Ref Range    POCT Glucose 181 (H) 74 - 99 mg/dL   Prealbumin   Result Value Ref Range    Prealbumin 5.8 (L) 18.0 - 40.0 mg/dL   Lavender Top   Result Value Ref Range    Extra  Tube Hold for add-ons.    PST Top   Result Value Ref Range    Extra Tube Hold for add-ons.    CBC   Result Value Ref Range    WBC 6.5 4.4 - 11.3 x10*3/uL    nRBC 0.0 0.0 - 0.0 /100 WBCs    RBC 2.41 (L) 4.50 - 5.90 x10*6/uL    Hemoglobin 7.4 (L) 13.5 - 17.5 g/dL    Hematocrit 22.0 (L) 41.0 - 52.0 %    MCV 91 80 - 100 fL    MCH 30.7 26.0 - 34.0 pg    MCHC 33.6 32.0 - 36.0 g/dL    RDW 14.0 11.5 - 14.5 %    Platelets 186 150 - 450 x10*3/uL   Basic Metabolic Panel   Result Value Ref Range    Glucose 182 (H) 74 - 99 mg/dL    Sodium 132 (L) 136 - 145 mmol/L    Potassium 3.9 3.5 - 5.3 mmol/L    Chloride 101 98 - 107 mmol/L    Bicarbonate 23 21 - 32 mmol/L    Anion Gap 12 10 - 20 mmol/L    Urea Nitrogen 43 (H) 6 - 23 mg/dL    Creatinine 1.15 0.50 - 1.30 mg/dL    eGFR 64 >60 mL/min/1.73m*2    Calcium 8.0 (L) 8.6 - 10.3 mg/dL   POCT GLUCOSE   Result Value Ref Range    POCT Glucose 193 (H) 74 - 99 mg/dL   POCT GLUCOSE   Result Value Ref Range    POCT Glucose 154 (H) 74 - 99 mg/dL        XR chest 1 view    Result Date: 10/31/2024  Interpreted By:  Laurent Elam, STUDY: XR CHEST 1 VIEW; 10/31/2024 11:12 am   INDICATION: CLINICAL INFORMATION: Signs/Symptoms:Tube placement check.   COMPARISON: 10/31/2024 at 1054 hours   ACCESSION NUMBER(S): [id number]   ORDERING CLINICIAN: DONNA CEJA   TECHNIQUE: Portable chest one view.   FINDINGS: The cardiac size is indeterminate in view of the AP projection. Feeding tube is been repositioned with the tube now noted to be coiled within the stomach. Bibasilar infiltrates are present.       1. Feeding tube is present with extending into the stomach with the tube coiled within the region of the body and fundus. 2. Bibasilar infiltrates.   MACRO: none   Signed by: Laurent Elam 10/31/2024 12:07 PM Dictation workstation:   [id number]    XR chest 1 view    Result Date: 10/31/2024  Interpreted By:  Laurent Elam, STUDY: XR CHEST 1 VIEW; 10/31/2024 11:11 am   INDICATION: CLINICAL  INFORMATION: Signs/Symptoms:Verification of nasogastric tube location.   COMPARISON: 10/29/2024 at 1042 hours   ACCESSION NUMBER(S): [id number]   ORDERING CLINICIAN: KIMBERLEY BLAS   TECHNIQUE: Portable chest one view.   FINDINGS: The cardiac size is indeterminate in view of the AP projection. Nasogastric tube is coiled within the mid chest along the course of the thoracic esophagus. Bibasilar infiltrate is suspected. There is a limited depth of inspiration on the exam.       Nasogastric tube is coiled within the midthoracic esophagus. Probable bibasilar infiltrates although the exam is limited by poor depth of inspiration.   MACRO: none   Signed by: Laurent Elam 10/31/2024 12:05 PM Dictation workstation:   [id number]    XR chest 1 view    Result Date: 10/29/2024  Interpreted By:  Laurent Elam, STUDY: XR CHEST 1 VIEW; 10/29/2024 11:22 am   INDICATION: CLINICAL INFORMATION: Signs/Symptoms:cough.   COMPARISON: 10/16/2024   ACCESSION NUMBER(S): [id number]   ORDERING CLINICIAN: RICHARD ADAIR   TECHNIQUE: Portable chest one view.   FINDINGS: The cardiac size is indeterminate in view of the AP projection. Patient is slightly rotated to the left. There is a thoracic dextroscoliosis. Alveolar infiltrate is present within left lower lobe consistent with pneumonia. Right hemithorax is clear. No effusions are identified.       There is a new left lower lobe infiltrate consistent with pneumonia. Follow-up to assure complete clearing is suggested.   MACRO: none   Signed by: Laurent Elam 10/29/2024 2:55 PM Dictation workstation:   [id number]    CT head wo IV contrast    Result Date: 10/29/2024  Interpreted By:  Brielle Linares, STUDY: CT HEAD WO IV CONTRAST; ;  10/29/2024 1:26 pm   INDICATION: Signs/Symptoms:sudden onset confusion.   COMPARISON: 10/16/2024   ACCESSION NUMBER(S): [id number]   ORDERING CLINICIAN: RICHARD ADAIR   TECHNIQUE: Serial axial images of the head were obtained without intravenous contrast.  Sagittal and coronal reconstructions were generated.   FINDINGS: Trickles are midline and enlarged. There is prominence of the cortical sulci and superior cerebellar cisterns.   There are no acute parenchymal abnormalities. There is no hemorrhage or extra-axial fluid.   There is no obvious scalp hematoma or skull fracture.   Visualized portions of the paranasal sinuses and mastoids are unremarkable.   COMPARISON OF FINDINGS: The brain is similar.       No acute intracranial abnormality.     MACRO: none   Signed by: Brielle Linares 10/29/2024 2:19 PM Dictation workstation:   [id number]    Cardiac Catheterization Procedure    Result Date: 10/24/2024           [place] [address]            Phone [phone] Cardiovascular Catheterization Report Patient Name:     AMARILYS FARAH      Performing Physician:  Virginia Chavez DO Study Date:       10/23/2024           Verifying Physician:   Virginia Chavez DO MRN/PID:          [id number]             Cardiologist/Co-Scrub: Accession#:       [id number]         Ordering Provider:     Virginia CHAVEZ Date of           1940 / 83      Cardiologist: Birth/Age:        years Gender:           M                    Fellow: Encounter#:       [id number]           Surgeon:  Study: Left Heart Cath  Indications: AMARILYS FARAH is a 84 year old male who presents with dyslipidemia and an asymptomatic chest pain assessment. Cardiomyopathy, left ventricular dysfunction and NSTE - ACS. Stress test performed: No. CTA performed: NoShey Calvillo accessed: No. LVEF Assessed: No. Cardiac arrest: No. Cardiac surgical consult: No. Cardiovascular Instability: No Frailty status of patient entering lab: 6 = Moderately frail.  Coronary Angiography: The coronary  circulation is right dominant.  Coronary Angiography Comments: We obtained informed consent with the help of his son Jorge A because the patient is deaf, and he was brought to the cardiac catheterization lab with the timeout verified between his computer medical record number and his arm wristband. Both groins were prepped and draped in a sterile fashion and the remainder of the procedure performed under sterile technique. 10 cc of 1% lidocaine used to the right groin for local anesthesia and Versed 2 mg and fentanyl 50 mcg for intravenous sedation. Used single wall micropuncture technique to access the right common femoral artery and a micropuncture sheath was inserted, then exchanged over guidewire for a 6 Croatian Palo sheath. 6 Croatian JR4 JL 4 and pigtail catheters were used and catheters were advanced and withdrawn over the guidewire without difficulty. At the end of the procedure the sheath was removed and an Angio-Seal closure device was deployed with good hemostasis and he was returned to his telemetry bed in stable condition. Coronary angiography: 1. The left main was a large vessel and bifurcated into the LAD and circumflex and was normal 2 the LAD was a large vessel that extends to the apex and was widely patent with CHEYANNE grade III flow. The proximal LAD had an eccentric 40-50% irregular stenosis that was presumed to be the site of previous infarct vessel occlusion with spontaneous recanalization. The remaining mid and distal LAD and its branches had mild diffuse nonobstructive disease 3. The circumflex was a modest nondominant vessel that gave rise to 3 modest PLV branches and had no obstructive disease.  4. The right coronary artery was a dominant vessel that had mild 30-40% ostial /proximal stenosis and an eccentric 50% mid vessel stenosis, then gives rise to a modest sized small caliber PDA and small caliber distal RCA without obstruction. Left ventriculogram performed in the VALLES 30 projection showed  persistent modest hypokinesis of the mid anterior, anterolateral segments and severe hypokinesis of the LV apical segment with left ventricular ejection fraction estimated at 40 to 45%. Impression: 1.40% proximal LAD stenosis suggesting previous vessel occlusion with spontaneous recanalization. 2. 50% mid RCA stenosis. 3. Ischemic cardiomyopathy with residual anterior and anteroapical left ventricular hypokinesis with left ventricular ejection fraction 40 to 45%. 3. Nuclear stress test 10/22/2024 suggests viability in the anterior and apical segments without ischemia and with left ventricular ejection fraction estimated at 44%.   Hemo Personnel: +----------------+---------+ Name            Duty      +----------------+---------+ Haja Mckeon MD 1 +----------------+---------+  Hemodynamic Pressures:  +----+---------------+----------+-------------+--------------+-------+---------+ Site   Date Time   Phase NameSystolic mmHgDiastolic mmHgED mmHgMean mmHg +----+---------------+----------+-------------+--------------+-------+---------+   AO     10/23/2024  AIR REST          116            57              81          8:56:33 AM                                                      +----+---------------+----------+-------------+--------------+-------+---------+   LV     10/23/2024  AIR REST          103             2      8                   9:07:45 AM                                                      +----+---------------+----------+-------------+--------------+-------+---------+   LV     10/23/2024  AIR REST          109            -2      7                   9:07:57 AM                                                      +----+---------------+----------+-------------+--------------+-------+---------+  LVp     10/23/2024  AIR REST          106             0     12                   9:08:06 AM                                                       +----+---------------+----------+-------------+--------------+-------+---------+  AOp     10/23/2024  AIR REST          120            52              76          9:08:16 AM                                                      +----+---------------+----------+-------------+--------------+-------+---------+   AO     10/23/2024  AIR REST            0             0             -96          9:32:52 AM                                                      +----+---------------+----------+-------------+--------------+-------+---------+  Cardiac Cath Post Procedure Notes: Post Procedure Diagnosis: Double vessel disease. Blood Loss:               Estimated blood loss during the procedure was 10ml                           mls. Specimens Removed:        Number of specimen(s) removed: none.  ICD 10 Codes: Non ST elevation (NSTEMI) myocardial infarction-I21.4  CPT Codes: Left Heart Cath (visualization of coronaries) and LV-82288  [id number] Haja Mckeon DO Performing Physician Electronically signed by [id number] Mckeon DO on 10/24/2024 at 9:06:38 AM  ** Final **     Cardiology Interpretation Of Nuclear Stress - See Other Report For Nuclear Portion    Result Date: 10/23/2024           [place] [id number]            Phone [phone] Nuclear Pharmacologic Stress Test Patient Name:      AMARILYS FARAH     Ordering Provider:     [id number] COOPER RATLIFF Study Date:        10/22/2024          Reading Physician:     [id number] Fabien Pelaez MD MRN/PID:           [id number]            Supervising Physician: [id number] Pelaez MD Accession#:        [id number]        Fellow: Date of Birth/Age: 1940 / 83     Fellow:                    years Gender:            MEGAN                    Nurse:                 Inez Holley RN Admit Date:                            Technician:            Marina Feliciano Admission Status:                      Sonographer:           NA Height:            182.88 cm           Technologist: Weight:            62.60 kg            Additional Staff: BSA:               1.82 m2             Encounter#:            [id number] BMI:               18.72 kg/m2         Patient Location: Study Type:    CARDIOLOGY INTERPRETATION OF NUCLEAR STRESS Diagnosis/ICD: NonST elevation (NSTEMI) myocardial infarction-I21.4 Indication:    CARDIOMYOPATHY EF 15-20% CPT Codes:     Stress Test Interpretation-18992; Stress Test Supervision-21609 Falls Risk: Low: Patient has low risk for sustaining a fall; environmental safety interventions in place.  Study Details: Correct procedure and correct patient verified verbally and with                ID Band checked.  Patient History: Hyperlipidemia and congestive heart failure. NSTEMI, IDIOPATHIC HYPOTENSION.  Medications: ASPIRIN, CARVEDILOL, LOVENOX, PRAVASTATIN, SPIRONOLACTONE, MIDODRINE. The patient took medications as prescribed.  Patient Performance: Patient received a total of 0.4 mg of Regadenoson at 11:41:48 AM. The resting blood pressure was 114/69 mmHg with a heart rate of 81 bpm. The patient developed no symptoms during the stress exam. The blood pressure response was normal. The test was terminated due to: completed lab protocol and end of vasodilator infusion. Patient has met the discharge criteria and is discharged to their floor.  Baseline ECG: Resting ECG showed normal sinus rhythm with nonspecific ST-T wave changes. Artifact.  Stress ECG: Stress ECG showed normal sinus rhythm, with frequent premature ventricular contractions. No ST changes. Essentially negative ECG stress test for ischemia with a Lexiscan infusion. Please get the nuclear imaging report from radiology  department.  Stress Stage Data: +----------------+--+------+-------+                 HRSys BPDias BP +----------------+--+------+-------+ Baseline [id number]   69      +----------------+--+------+-------+  Recovery ECG: Recovery ECG showed normal sinus rhythm, with artifact.  +------------+--+------+-------+             HRSys BPDias BP +------------+--+------+-------+ Recovery I  8295    57      +------------+--+------+-------+ Recovery II 7288    51      +------------+--+------+-------+ Recovery [id number]    41      +------------+--+------+-------+ Recovery IV 8592    52      +------------+--+------+-------+ Recovery V  7392    54      +------------+--+------+-------+  Summary:  1. Adequate level of stress achieved.  2. Correlate with myocardial perfusion imaging results.  3. Essentially negative ECG stress test for ischemia with a Lexiscan infusion. Please get the nuclear imaging report from radiology department.  4. No clinical or electrocardiographic evidence for ischemia at maximal infusion.  5. No ECG changes from baseline.  6. Normal Stress Test.  7. Nuclear image results are reported separately. [id number] Fabien Pelaez MD Electronically signed on 10/23/2024 at 9:39:18 AM   ** Final **     Nuclear Stress Test    Result Date: 10/22/2024  Interpreted By:  Chapito García and Ogievich Taessa STUDY: NUCLEAR STRESS TEST; 10/22/2024 2:18 pm   INDICATION: Signs/Symptoms:Cardiomyopathy, EF 15-20%.   COMPARISON: None.   ACCESSION NUMBER(S): [id number]   ORDERING CLINICIAN: COOPER RATLIFF   TECHNIQUE: DIVISION OF NUCLEAR MEDICINE PHARMACOLOGIC STRESS MYOCARDIAL PERFUSION SCAN, ONE DAY PROTOCOL   The patient received an intravenous dose of  8.8 mCi of Tc-99m Myoview and resting emission tomographic (SPECT) images of the myocardium were acquired. The patient then received an intravenous infusion of 0.4mg regadenoson (Lexiscan)  followed by an additional dose of  25.1 mCi of Tc-99m   Myoview. Stress phase SPECT images of the myocardium were then acquired. These included ECG-gated images to assess and quantify ventricular function.  A low-dose, nondiagnostic regional CT was utilized for attenuation correction purposes.   FINDINGS: Both stress and rest studies demonstrate grossly normal perfusion throughout the left ventricle.   The left ventricle is normal in size.   Gated images demonstrate mild global hypokinesis of the LV wall motion with a post-stress LV EF estimated at 44%.   Attenuation correction CT images demonstrate no gross anatomic abnormalities.       1.  No evidence of inducible ischemia or prior infarction. 2. Left ventricle is normal in size. 3. Mild global hypokinesis of the LV wall motion with a post-stress LV EF estimated at 44%.   I personally reviewed the images/study and I agree with the findings as stated by Viry Up DO, PGY-3. This study was interpreted at [place], [place].   MACRO: None   Signed by: Chapito García 10/22/2024 3:15 PM Dictation workstation:   [id number]    Electrocardiogram, 12-lead PRN ACS symptoms    Result Date: 10/22/2024  Poor data quality in current ECG precludes serial comparison Sinus tachycardia with short OH with Premature supraventricular complexes and with frequent Premature ventricular complexes Indeterminate axis Pulmonary disease pattern Nonspecific ST and T wave abnormality Abnormal ECG When compared with ECG of 16-OCT-2024 20:40, (unconfirmed) Previous ECG has undetermined rhythm, needs review Questionable change in QRS duration Confirmed by Fer Vazquez ([id number]) on 10/22/2024 12:52:29 PM    MR brain wo IV contrast    Result Date: 10/19/2024  Interpreted By:  Hugo Rico and Hooper Grayson STUDY: MR BRAIN WO IV CONTRAST;  10/19/2024 3:36 pm   INDICATION: Signs/Symptoms:altered mental status.     COMPARISON: CT of the head obtained October 16, 2024   ACCESSION NUMBER(S): [id number]    ORDERING CLINICIAN: ROMULO JOHNSON   TECHNIQUE: Axial T2, FLAIR, DWI, gradient echo T2 and sagittal and coronal T1 weighted images of brain were acquired.  No contrast was administered.   FINDINGS: Midline brain structures are intact on mid sagittal imaging.   There is no territorial restricted diffusion to suggest acute intracranial infarct. No suspicious T2 signal hypointensity noted on gradient sequencing.   There is no evidence of acute intracranial hemorrhage. No intracranial mass or mass effect. No midline shift. No loss of gray-white differentiation. Few scattered supratentorial white matter FLAIR signal hyperintensities are observed.  Basilar cisterns appear intact. Moderate dilatation of the lateral and 3rd greater than 4th ventricles at least principally on the basis of global parenchymal volume loss.   Normal T2 vascular flow voids are seen.   T2 hyperintensity within the maxillary sinuses is consistent with mucosal inflammatory paranasal sinus disease.. Status post bilateral lens replacements.   There is no mastoid effusion. Moderate effusions of the occipital lateral mass C1 articulation bilaterally may be on a degenerative basis.       No MR evidence of acute intracranial infarct, hemorrhage, mass, or mass effect.   I personally reviewed the images/study and I agree with the findings as stated. This study was interpreted at [place], [place].   MACRO: None   Signed by: Hugo Rico 10/19/2024 4:41 PM Dictation workstation:   [id number]    EEG    IMPRESSION Impression This routine awake and drowsy EEG is indicative of a moderate diffuse encephalopathy. No epileptiform activity or lateralizing signs seen. A full report will be scanned into the patient's chart at a later time. This report has been interpreted and electronically signed by    Transthoracic Echo (TTE) Complete    Result Date: 10/17/2024           [address] [place] [address]            Phone [phone] TRANSTHORACIC ECHOCARDIOGRAM REPORT Patient Name:     AMARILYS FARAH      Reading Physician:   [id number] Jose David [facility] Study Date:       10/17/2024           Ordering Provider:   [id number] CECIL MEGAN                                                             LEE MRN/PID:          [id number]             Fellow: Accession#:       [id number]         Nurse: Date of           1940 / 83      Sonographer:         USR Birth/Age:        years Gender:           M                    Additional Staff: Height:           177.80 cm            Admit Date: Weight:           65.77 kg             Admission Status:    Inpatient - Routine BSA / BMI:        1.82 m2 / 20.81      Department Location: [facility]                   kg/m2 Blood Pressure: 97 /74 mmHg Study Type:    TRANSTHORACIC ECHO (TTE) COMPLETE Diagnosis/ICD: Non ST elevation (NSTEMI) myocardial infarction-I21.4 Indication:    NSTEMI CPT Codes:     Echo Complete w Full Doppler-[id number] Patient History: Diabetes:          Yes Pertinent History: HTN, Hyperlipidemia, CAD, Chest Pain and CHF. Renal dx III. Study Detail: The following Echo studies were performed: 2D, M-Mode, Doppler and               color flow. Technically challenging study due to poor acoustic               windows and patient lying in supine position.  PHYSICIAN INTERPRETATION: Left Ventricle: The left ventricular systolic function is severely decreased, with a visually estimated ejection fraction of 15-20%. There are multiple wall motion abnormalities. The left ventricular cavity size is moderately dilated. There is mild concentric left ventricular hypertrophy. Left ventricular diastolic filling was indeterminate. LV Wall Scoring: The entire apex, mid and apical anterior wall, mid and apical anterior septum, mid and apical inferior septum, mid and apical inferior wall, mid inferolateral segment, and mid anterolateral segment are akinetic. All remaining  scored segments are normal. Left Atrium: The left atrium is normal in size. Right Ventricle: The right ventricle is normal in size. There is normal right ventricular global systolic function. Right Atrium: The right atrium is normal in size. Aortic Valve: The aortic valve is probably trileaflet. The aortic valve dimensionless index is 0.70. There is no evidence of aortic valve regurgitation. The peak instantaneous gradient of the aortic valve is 2.7 mmHg. The mean gradient of the aortic valve is 1.4 mmHg. Mitral Valve: The mitral valve is normal in structure. There is no evidence of mitral valve regurgitation. Tricuspid Valve: The tricuspid valve is structurally normal. No evidence of tricuspid regurgitation. Pulmonic Valve: The pulmonic valve is not well visualized. The pulmonic valve regurgitation was not well visualized. Pericardium: No pericardial effusion noted. Aorta: The aortic root is normal.  CONCLUSIONS:  1. Multiple segmental abnormalities exist. See findings.  2. The left ventricular systolic function is severely decreased, with a visually estimated ejection fraction of 15-20%.  3. There are multiple wall motion abnormalities.  4. Left ventricular diastolic filling was indeterminate.  5. Left ventricular cavity size is moderately dilated.  6. There is normal right ventricular global systolic function.  7. 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 QUANTITATIVE DATA SUMMARY:  2D MEASUREMENTS:           Normal Ranges: Ao Root s:       3.51 cm IVSd:            0.79 cm   (0.6-1.1cm) LVPWd:           0.76 cm   (0.6-1.1cm) LVIDd:           4.44 cm   (3.9-5.9cm) LVIDs:           3.81 cm LV Mass Index:   58.6 g/m2 LV % FS          14.0 %  LA VOLUME:          Normal Ranges: LA Vol A4C: 45.2 ml  RA VOLUME BY A/L METHOD:          Normal Ranges: RA Area A4C:             12.0 cm2  LV SYSTOLIC FUNCTION BY 2D PLANIMETRY (MOD):                      Normal Ranges: EF-A4C View:    16  % (>=55%) EF-A2C View:    5 % EF-Biplane:     14 % EF-Visual:      18 % EF-3DQ:         19 % LV EF Reported: 18 %  LV DIASTOLIC FUNCTION:           Normal Ranges: MV Peak E:             0.45 m/s  (0.7-1.2 m/s) MV Peak A:             0.88 m/s  (0.42-0.7 m/s) E/A Ratio:             0.51      (1.0-2.2) MV e'                  0.073 m/s (>8.0) MV lateral e'          0.11 m/s MV medial e'           0.04 m/s E/e' Ratio:            6.16      (<8.0)  MITRAL VALVE:          Normal Ranges: MV DT:        110 msec (150-240msec)  AORTIC VALVE:                     Normal Ranges: AoV Vmax:                0.82 m/s (<=1.7m/s) AoV Peak P.7 mmHg (<20mmHg) AoV Mean P.4 mmHg (1.7-11.5mmHg) LVOT Max Florencio:            0.61 m/s (<=1.1m/s) AoV VTI:                 17.99 cm (18-25cm) LVOT VTI:                12.65 cm LVOT Diameter:           2.00 cm  (1.8-2.4cm) AoV Area, VTI:           2.20 cm2 (2.5-5.5cm2) AoV Area,Vmax:           2.32 cm2 (2.5-4.5cm2) AoV Dimensionless Index: 0.70  RIGHT VENTRICLE: RV Basal 3.30 cm RV Mid   2.70 cm RV Major 4.8 cm  TRICUSPID VALVE/RVSP:          Normal Ranges: Peak TR Velocity:     2.26 m/s RV Syst Pressure:     23 mmHg  (< 30mmHg)  PULMONIC VALVE:          Normal Ranges: PV Max Florencio:     0.6 m/s  (0.6-0.9m/s) PV Max P.3 mmHg PV Mean P.5 mmHg PV VTI:         6.59 cm  AORTA: Asc Ao Diam 0.00 cm  [id number] [facility]  Electronically signed on 10/17/2024 at 11:00:02 AM  Wall Scoring  ** Final (Updated) **     ECG 12 lead    Result Date: 10/17/2024  Normal sinus rhythm Left axis deviation Low voltage QRS Anteroseptal infarct Inferior infarct , age undetermined Abnormal ECG When compared with ECG of 2018 16:39, Significant changes have occurred Confirmed by Rell Vaughan ([id number]) on 10/17/2024 11:32:49 AM    XR chest 1 view    Result Date: 10/16/2024  Interpreted By:  Lobo Farrar, STUDY: XR CHEST 1 VIEW;  10/16/2024 9:27 pm   INDICATION:  Signs/Symptoms:malaise.   COMPARISON: Chest x-ray 04/18/2021   ACCESSION NUMBER(S): [id number]   ORDERING CLINICIAN: MICHELLE MARTE   FINDINGS: Multiple overlying leads are present.   CARDIOMEDIASTINAL SILHOUETTE: Cardiomediastinal silhouette is normal in size and configuration. Atherosclerotic calcification of the aorta.   LUNGS: No consolidation, pleural effusion or pneumothorax.   ABDOMEN: Surgical clips noted in the left upper quadrant.   BONES: Multilevel degenerative changes of the spine.       No acute cardiopulmonary process.   MACRO: None   Signed by: Lobo Farrar 10/16/2024 9:52 PM Dictation workstation:   [id number]    CT head wo IV contrast    Result Date: 10/16/2024  Interpreted By:  Ori Asher, STUDY: CT HEAD WO IV CONTRAST;  10/16/2024 9:18 pm   INDICATION: Signs/Symptoms:malaise/fatigue; AMS.     COMPARISON: 04/18/2021   ACCESSION NUMBER(S): [id number]   ORDERING CLINICIAN: MICHELLE MARTE   TECHNIQUE: Noncontrast axial CT scan of head was performed. Angled reformats in brain and bone windows were generated. The images were reviewed in bone, brain, blood and soft tissue windows.   FINDINGS: CSF Spaces: The ventricles, sulci and basal cisterns are within normal limits. There is no extraaxial fluid collection.   Parenchyma: There are periventricular white matter changes noted. The grey-white differentiation is intact. There is no mass effect or midline shift.  There is no intracranial hemorrhage.   Calvarium: The calvarium is unremarkable.   Paranasal sinuses and mastoids: Visualized paranasal sinuses and mastoids are clear.       No evidence of acute cortical infarct or intracranial hemorrhage.       MACRO: None   Signed by: Ori Asher 10/16/2024 9:28 PM Dictation workstation:   [id number]       Vitals:    10/31/24 1115   BP: 119/65   Pulse: 71   Resp: 23   Temp: 36.6 °C (97.9 °F)   SpO2: 94%         Physical Exam  Vitals and nursing note reviewed.   Constitutional:       General: He is not  in acute distress.     Appearance: He is ill-appearing.      Comments: Thin and frail appearing   HENT:      Nose:      Comments: Dobhoff       Mouth/Throat:      Mouth: Mucous membranes are dry.      Pharynx: Oropharynx is clear.   Eyes:      Extraocular Movements: Extraocular movements intact.   Cardiovascular:      Rate and Rhythm: Normal rate and regular rhythm.      Pulses: Normal pulses.      Heart sounds: No murmur heard.  Pulmonary:      Effort: Pulmonary effort is normal. No respiratory distress.      Breath sounds: No wheezing or rales.   Abdominal:      General: There is no distension.      Palpations: Abdomen is soft.      Tenderness: There is no abdominal tenderness. There is no guarding.   Musculoskeletal:      Right lower leg: No edema.      Left lower leg: No edema.      Comments: Moves all extremities     Skin:     General: Skin is warm and dry.      Coloration: Skin is pale.   Neurological:      General: No focal deficit present.      Comments: Lethargic but does respond to tactile stimuli   Psychiatric:      Comments: calm          Assessment/Plan   IMP:    HFrEF - EF improved s/p PCI. On BB  NSTEMI - aspirin, BB  2.  Hypotension - on midodrine tid  3.  Acute metabolic encephalopathy - baseline A&O x3, worsened, now NPO. Neuro ordered EEG.  4.  Severe protein calorie malnutrition-family ok with corpak temporarily, but no long term feeding tubes.   5.  Electrolyte derangements - receiving supplementations  6.  PNA-zosyn, LLL on CXR, suspect aspiration  7.  Urinary Retention-urology attempted coudet, unable. In continues to retain, would need to consider cytoscopy with dilation, family discussing, but seems to be in agreement that they would proceed with this if necessary to provide symptom relief.      Palliative Care Encounter  DNR-DNI  The patient is not capable  Son Jorge A is primary HPOA, son Rich is first alternate agent  A little more alert today but still too lethargic for any po intake.  Has dobhoff now. D/w son Jorge A at the bedside, cont to give him some more time nutritional support. Await results of EEG. We will cont to follow. Revisit GOC discussion if pt does not improve or continues to decline.    Advance Directives Info: Patient has advance directive, copy not in chart  Palliative Care Team will continue to follow patient.    D/w Dr. Haque.    Desi Jerome, APRN-CNP

## 2024-10-31 NOTE — NURSING NOTE
Assumed care of patient  Patient arousable to painful stimuli only, and barely  Lying in bed, head up  Bed low and locked  Bed alarm on  Family at bedside

## 2024-10-31 NOTE — ASSESSMENT & PLAN NOTE
-Nutrition following  Now unable to safely swallow. Intake poor prior to this.  Family conversation with Pall med and myself last night. Decision made to try short term feeding tube, treatment of infections, and see if mentation improves.  Corpak ordered and surgery to place. Nutrition to put in TF orders. Will closely watch labs, patient is at risk of refeeding syndrome.

## 2024-10-31 NOTE — PROGRESS NOTES
Inpatient Speech Language Pathology Dysphagia Treatment Note / Reassessment and Updated POC    Patient Name: Dat Mccallum  MRN: [id number]  : 1940  Today's Date: 10/31/24  Time Calculation  Start Time: 935  Stop Time: 1000  Time Calculation (min): 25 min       Total Number of Visits: 3/3   Speech-language treatment deferred this date due to limited level of alertness    PLAN:  Skilled dysphagia treatment continues to be warranted to provide training and instruction regarding the use of compensatory swallow strategies, for pt/caregiver education in order to reduce risk of aspiration, dehydration and malnutrition. , to determine ability to upgrade diet after PO trials with SLP  Plan  Inpatient/Swing Bed or Outpatient: Inpatient  SLP TX Plan: Continue Plan of Care  SLP Plan: Skilled SLP  SLP Frequency: 3x per week  Duration: 2 weeks  SLP Discharge Recommendations: Continue skilled SLP services at the next level of care  Next Treatment Priority: Oral/sensory stim, bolus trials  Discussed POC: Patient, Caregiver/family, Nursing  Discussed Risks/Benefits: Yes  Patient/Caregiver Agreeable: Yes    Recommendations:   Solid Diet Recommendations: NPO  Liquid Diet Recommendations: NPO    Medication administration: Non-oral    Subjective:  Pt seen at bedside for skilled dysphagia treatment/reassessment of swallow skills with his son and wife present.  Pt was positioned upright in bed and kept eyes closed and mouth open throughout.   Pain:  Pain Assessment  Pain Assessment: PAINAD (Pain Assessment in Advanced Dementia Scale)  0-10 (Numeric) Pain Score: 0 - No pain         Oxygen Status:   room air      Objective:  Therapeutic Swallow Intervention : Thermal Stimulation, Caregiver Education  Solid Diet Recommendations: NPO  Liquid Diet Recommendations: NPO       Swallow Goals:  In 2 weeks...  - Pt will consume prescribed diet (current diet is NPO as of 10/29/24) without overt s/sx aspiration/penetration in 95% of  observed trials.              GOAL START:   10/18/2024                                          GOAL END: 11/1/24              CURRENT STATUS: SLP provided pre-feeding stimulation with oral swabs, manual application of ice to lips, and wet spoon applied to lips and tongue. Pt kept mouth open with no attempt to close on spoon, and did not lick lips in response to icing.  Pt not deemed to be safe for oral intake at this time.               PROGRESS: regression--Discontinue goal 10/31/24  - Pt will consume trials of upgraded textures without overt s/sx aspiration in order for consideration of diet texture upgrade.               GOAL START:   10/18/2024                                          GOAL END:11/1/24              CURRENT STATUS: not attempted as patient had minimal response to pre-feeding stimuli              PROGRESS: regressed--Discontinue goal 10/31/24  - Pt will demonstrate follow-through of trained compensatory strategies during a meal/snack with 90% acc with max cues.              GOAL START:   10/18/2024                                          GOAL END:11/1/24              CURRENT STATUS: pt unable to self feed, no pharyngeal swallow elicited              PROGRESS: regressed--Discontinue goal 10/31/24     Updated Dysphagia Goal(s): (Established 10/31/24, projected discontinuation 2 weeks)  Pt will respond to oral-sensory/pre-feeding stimulation to elicit swallow response in order to determine readiness for PO trials.   CURRENT STATUS: Goal established 10/31/24   PROGRESS: N/A--pt to have temporary alternate means of nutrition placed today    Pt will consume PO trials with SLP without s/s aspiration in order to determine readiness for PO diet.   CURRENT STATUS: Goal established 10/31/24   PROGRESS: N/A--pt to have temporary alternate means of nutrition placed today    SLP Assessment:  Pt not opening eyes to tactile/multisensory stimuli including pre-feeding / oral-sensory stimulation.  Pt's family  present, reporting that pt woke up for brief periods of time and was more responsive today than yesterday, but kept his eyes closed during most of their visit, which included washing/shaving his face.  Pt with minimal responses to indicate awareness of utensils and pre-feeding techniques to elicit swallow response.  Pt not safe to attempt PO trials at this time.  POC/goals updated.  Pt to have NG tube placed for temporary source of non-oral nutrition today.    Treatment Outcome:  SLP TX Intervention Outcome: Decline in Function    Treatment Tolerance: Patient limited by fatigue   Prognosis: Good   Barriers: Comorbidities   Medical Staff Made Aware: Yes     Inpatient Education:     Individual(s) Educated: Patient's son and wife--pt's wife is also deaf so their son provided translation to ASL  Verbal Education : Results of reassessment, recommendations, POC  Risk and Benefits Discussed with Patient/Caregiver/Other: yes  Patient/Caregiver Demonstrated Understanding: yes  Plan of Care Discussed and Agreed Upon: yes  Patient Response to Education: Patient/Caregiver Verbalized Understanding of Information    Communication with Medical Team: SLP shared results/recommendations with bedside nurse via [employer].

## 2024-10-31 NOTE — PROGRESS NOTES
Dat Mccallum is a 83 y.o. male on day 15 of admission presenting with NSTEMI (non-ST elevated myocardial infarction) (Multi). Patient's past medical history is significant of DM type II, HTN, hyperlipidemia, chronic systolic heart failure, CKD stage III, and BPH. Patient was seen sleeping and laying supine on the bed, appeared in no acute distress. Communication through writing on a provided white board because the patient was deaf. When asked about pain and breathing problem, the patient attempted to write answer on the board. However, he dedra circles/oval as a response. Then, the patient fell back asleep and continued to snore. Unable to obtain further ROS due to encephalopathy.    Spoke with nurse. Reported that last night the patient was more awake and managed to sign that he was hungry. Patient was able to void urine yesterday, and bladder scan revealed 350 mL retention. Otherwise, no other acute events overnight.           Objective     Last Recorded Vitals  /55 (BP Location: Left arm, Patient Position: Lying)   Pulse 85   Temp 36 °C (96.8 °F) (Temporal)   Resp 18   Wt 65.5 kg (144 lb 6.4 oz)   SpO2 95%   Intake/Output last 3 Shifts:    Intake/Output Summary (Last 24 hours) at 10/31/2024 0801  Last data filed at 10/31/2024 0500  Gross per 24 hour   Intake 618.75 ml   Output 200 ml   Net 418.75 ml       Admission Weight  Weight: 68 kg (150 lb) (10/16/24 2047)    Daily Weight  10/30/24 : 65.5 kg (144 lb 6.4 oz)    Image Results  XR chest 1 view  Narrative: Interpreted By:  Laurent Elam,   STUDY:  XR CHEST 1 VIEW; 10/29/2024 11:22 am      INDICATION:  CLINICAL INFORMATION: Signs/Symptoms:cough.      COMPARISON:  10/16/2024      ACCESSION NUMBER(S):  [id number]      ORDERING CLINICIAN:  RICHARD ADAIR      TECHNIQUE:  Portable chest one view.      FINDINGS:  The cardiac size is indeterminate in view of the AP projection.  Patient is slightly rotated to the left. There is a  thoracic  dextroscoliosis. Alveolar infiltrate is present within left lower  lobe consistent with pneumonia. Right hemithorax is clear. No  effusions are identified.      Impression: There is a new left lower lobe infiltrate consistent with pneumonia.  Follow-up to assure complete clearing is suggested.      MACRO:  none      Signed by: Laurent Elam 10/29/2024 2:55 PM  Dictation workstation:   [id number]  CT head wo IV contrast  Narrative: Interpreted By:  Brielle Linares,   STUDY:  CT HEAD WO IV CONTRAST; ;  10/29/2024 1:26 pm      INDICATION:  Signs/Symptoms:sudden onset confusion.      COMPARISON:  10/16/2024      ACCESSION NUMBER(S):  [id number]      ORDERING CLINICIAN:  RICHARD ADAIR      TECHNIQUE:  Serial axial images of the head were obtained without intravenous  contrast. Sagittal and coronal reconstructions were generated.      FINDINGS:  Trickles are midline and enlarged. There is prominence of the  cortical sulci and superior cerebellar cisterns.      There are no acute parenchymal abnormalities. There is no hemorrhage  or extra-axial fluid.      There is no obvious scalp hematoma or skull fracture.      Visualized portions of the paranasal sinuses and mastoids are  unremarkable.      COMPARISON OF FINDINGS:  The brain is similar.      Impression: No acute intracranial abnormality.          MACRO:  none      Signed by: Brielle Linares 10/29/2024 2:19 PM  Dictation workstation:   [id number]      Physical Exam  Constitutional:       General: He is sleeping. He is not in acute distress.     Appearance: He is underweight. He is ill-appearing.   Pulmonary:      Effort: Accessory muscle usage present.      Comments: Visible intercostal and abdominal muscle usage for breathing for a short amount of time. No retraction. When the patient was writing answers on the white board and falling back asleep, normal respiratory effort. Last seen snoring without accessory muscle usage and did not appear in respiratory  distress.   Neurological:      Mental Status: He is lethargic.     Unable to perform PE without the patient's consent.     Relevant Results    Scheduled medications  aspirin, 81 mg, oral, Daily  carvedilol, 3.125 mg, oral, BID after meals  [Held by provider] enoxaparin, 40 mg, subcutaneous, Daily  insulin lispro, 0-5 Units, subcutaneous, TID  magnesium oxide, 400 mg, oral, Daily  midodrine, 5 mg, oral, TID  ofloxacin, 1 drop, Left Eye, 4x daily  oxygen, , inhalation, Continuous - 02/gases  piperacillin-tazobactam, 3.375 g, intravenous, q6h  pravastatin, 40 mg, oral, Nightly  tamsulosin, 0.8 mg, oral, Nightly      Continuous medications  lactated Ringer's, 75 mL/hr, Last Rate: 75 mL/hr (10/31/24 0137)      PRN medications  PRN medications: aminophylline, dextrose, dextrose, glucagon, glucagon, melatonin, morphine, prochlorperazine, prochlorperazine     Results for orders placed or performed during the hospital encounter of 10/16/24 (from the past 24 hours)   POCT GLUCOSE   Result Value Ref Range    POCT Glucose 181 (H) 74 - 99 mg/dL   POCT GLUCOSE   Result Value Ref Range    POCT Glucose 181 (H) 74 - 99 mg/dL   Lavender Top   Result Value Ref Range    Extra Tube Hold for add-ons.    PST Top   Result Value Ref Range    Extra Tube Hold for add-ons.     XR chest 1 view    Result Date: 10/29/2024  Interpreted By:  Laurent Elam, STUDY: XR CHEST 1 VIEW; 10/29/2024 11:22 am   INDICATION: CLINICAL INFORMATION: Signs/Symptoms:cough.   COMPARISON: 10/16/2024   ACCESSION NUMBER(S): [id number]   ORDERING CLINICIAN: RICHARD ADAIR   TECHNIQUE: Portable chest one view.   FINDINGS: The cardiac size is indeterminate in view of the AP projection. Patient is slightly rotated to the left. There is a thoracic dextroscoliosis. Alveolar infiltrate is present within left lower lobe consistent with pneumonia. Right hemithorax is clear. No effusions are identified.       There is a new left lower lobe infiltrate consistent with  pneumonia. Follow-up to assure complete clearing is suggested.   MACRO: none   Signed by: Laurent Elam 10/29/2024 2:55 PM Dictation workstation:   [id number]    CT head wo IV contrast    Result Date: 10/29/2024  Interpreted By:  Brielle Linares, STUDY: CT HEAD WO IV CONTRAST; ;  10/29/2024 1:26 pm   INDICATION: Signs/Symptoms:sudden onset confusion.   COMPARISON: 10/16/2024   ACCESSION NUMBER(S): [id number]   ORDERING CLINICIAN: RICHARD ADAIR   TECHNIQUE: Serial axial images of the head were obtained without intravenous contrast. Sagittal and coronal reconstructions were generated.   FINDINGS: Trickles are midline and enlarged. There is prominence of the cortical sulci and superior cerebellar cisterns.   There are no acute parenchymal abnormalities. There is no hemorrhage or extra-axial fluid.   There is no obvious scalp hematoma or skull fracture.   Visualized portions of the paranasal sinuses and mastoids are unremarkable.   COMPARISON OF FINDINGS: The brain is similar.       No acute intracranial abnormality.     MACRO: none   Signed by: Brielle Linares 10/29/2024 2:19 PM Dictation workstation:   [id number]    Cardiac Catheterization Procedure    Result Date: 10/24/2024           [place] [address]            Phone [phone] Cardiovascular Catheterization Report Patient Name:     AMARILYS ISABELLE FARAH      Performing Physician:  Virginia Chavez DO Study Date:       10/23/2024           Verifying Physician:   Virginia Chavez DO MRN/PID:          [id number]             Cardiologist/Co-Scrub: Accession#:       [id number]         Ordering Provider:     Virginia CHAVEZ Date of           1940 / 83      Cardiologist: Birth/Age:        years Gender:           M                     Fellow: Encounter#:       [id number]           Surgeon:  Study: Left Heart Cath  Indications: AMARILYS FARAH is a 84 year old male who presents with dyslipidemia and an asymptomatic chest pain assessment. Cardiomyopathy, left ventricular dysfunction and NSTE - ACS. Stress test performed: No. CTA performed: NoShey Calvillo accessed: No. LVEF Assessed: No. Cardiac arrest: No. Cardiac surgical consult: No. Cardiovascular Instability: No Frailty status of patient entering lab: 6 = Moderately frail.  Coronary Angiography: The coronary circulation is right dominant.  Coronary Angiography Comments: We obtained informed consent with the help of his son Jorge A because the patient is deaf, and he was brought to the cardiac catheterization lab with the timeout verified between his computer medical record number and his arm wristband. Both groins were prepped and draped in a sterile fashion and the remainder of the procedure performed under sterile technique. 10 cc of 1% lidocaine used to the right groin for local anesthesia and Versed 2 mg and fentanyl 50 mcg for intravenous sedation. Used single wall micropuncture technique to access the right common femoral artery and a micropuncture sheath was inserted, then exchanged over guidewire for a 6 Latvian [place] sheath. 6 Latvian JR4 JL 4 and pigtail catheters were used and catheters were advanced and withdrawn over the guidewire without difficulty. At the end of the procedure the sheath was removed and an Angio-Seal closure device was deployed with good hemostasis and he was returned to his telemetry bed in stable condition. Coronary angiography: 1. The left main was a large vessel and bifurcated into the LAD and circumflex and was normal 2 the LAD was a large vessel that extends to the apex and was widely patent with CHEYANNE grade III flow. The proximal LAD had an eccentric 40-50% irregular stenosis that was presumed to be the site of previous infarct vessel occlusion with  spontaneous recanalization. The remaining mid and distal LAD and its branches had mild diffuse nonobstructive disease 3. The circumflex was a modest nondominant vessel that gave rise to 3 modest PLV branches and had no obstructive disease.  4. The right coronary artery was a dominant vessel that had mild 30-40% ostial /proximal stenosis and an eccentric 50% mid vessel stenosis, then gives rise to a modest sized small caliber PDA and small caliber distal RCA without obstruction. Left ventriculogram performed in the VALLES 30 projection showed persistent modest hypokinesis of the mid anterior, anterolateral segments and severe hypokinesis of the LV apical segment with left ventricular ejection fraction estimated at 40 to 45%. Impression: 1.40% proximal LAD stenosis suggesting previous vessel occlusion with spontaneous recanalization. 2. 50% mid RCA stenosis. 3. Ischemic cardiomyopathy with residual anterior and anteroapical left ventricular hypokinesis with left ventricular ejection fraction 40 to 45%. 3. Nuclear stress test 10/22/2024 suggests viability in the anterior and apical segments without ischemia and with left ventricular ejection fraction estimated at 44%.   Hemo Personnel: +----------------+---------+ Name            Duty      +----------------+---------+ Haja Mckeon MD 1 +----------------+---------+  Hemodynamic Pressures:  +----+---------------+----------+-------------+--------------+-------+---------+ Site   Date Time   Phase NameSystolic mmHgDiastolic mmHgED mmHgMean mmHg +----+---------------+----------+-------------+--------------+-------+---------+   AO     10/23/2024  AIR REST          116            57              81          8:56:33 AM                                                      +----+---------------+----------+-------------+--------------+-------+---------+   LV     10/23/2024  AIR REST          103             2      8                    9:07:45 AM                                                      +----+---------------+----------+-------------+--------------+-------+---------+   LV     10/23/2024  AIR REST          109            -2      7                   9:07:57 AM                                                      +----+---------------+----------+-------------+--------------+-------+---------+  LVp     10/23/2024  AIR REST          106             0     12                   9:08:06 AM                                                      +----+---------------+----------+-------------+--------------+-------+---------+  AOp     10/23/2024  AIR REST          120            52              76          9:08:16 AM                                                      +----+---------------+----------+-------------+--------------+-------+---------+   AO     10/23/2024  AIR REST            0             0             -96          9:32:52 AM                                                      +----+---------------+----------+-------------+--------------+-------+---------+  Cardiac Cath Post Procedure Notes: Post Procedure Diagnosis: Double vessel disease. Blood Loss:               Estimated blood loss during the procedure was 10ml                           mls. Specimens Removed:        Number of specimen(s) removed: none.  ICD 10 Codes: Non ST elevation (NSTEMI) myocardial infarction-I21.4  CPT Codes: Left Heart Cath (visualization of coronaries) and LV-37023  [id number] Haja Mckeon DO Performing Physician Electronically signed by [id number] Mckeon DO on 10/24/2024 at 9:06:38 AM  ** Final **     Cardiology Interpretation Of Nuclear Stress - See Other Report For Nuclear Portion    Result Date: 10/23/2024           [place] [id number]            Phone [phone] Nuclear Pharmacologic Stress Test Patient Name:      AMARILYS FARHA      Ordering Provider:     [id number] COOPER RATLIFF Study Date:        10/22/2024          Reading Physician:     [id number] Pelaez MD MRN/PID:           [id number]            Supervising Physician: Deepthi Pelaez MD Accession#:        [id number]        Fellow: Date of Birth/Age: 1940 / 83     Fellow:                    years Gender:            M                   Nurse:                 Inez Holley RN Admit Date:                            Technician:            Marina Feliciano Admission Status:                      Sonographer:           NA Height:            182.88 cm           Technologist: Weight:            62.60 kg            Additional Staff: BSA:               1.82 m2             Encounter#:            [id number] BMI:               18.72 kg/m2         Patient Location: Study Type:    CARDIOLOGY INTERPRETATION OF NUCLEAR STRESS Diagnosis/ICD: NonST elevation (NSTEMI) myocardial infarction-I21.4 Indication:    CARDIOMYOPATHY EF 15-20% CPT Codes:     Stress Test Interpretation-63912; Stress Test Supervision-95827 Falls Risk: Low: Patient has low risk for sustaining a fall; environmental safety interventions in place.  Study Details: Correct procedure and correct patient verified verbally and with                ID Band checked.  Patient History: Hyperlipidemia and congestive heart failure. NSTEMI, IDIOPATHIC HYPOTENSION.  Medications: ASPIRIN, CARVEDILOL, LOVENOX, PRAVASTATIN, SPIRONOLACTONE, MIDODRINE. The patient took medications as prescribed.  Patient Performance: Patient received a total of 0.4 mg of Regadenoson at 11:41:48 AM. The resting blood pressure was 114/69 mmHg with a heart rate of 81 bpm. The patient developed no symptoms during the stress exam. The  blood pressure response was normal. The test was terminated due to: completed lab protocol and end of vasodilator infusion. Patient has met the discharge criteria and is discharged to their floor.  Baseline ECG: Resting ECG showed normal sinus rhythm with nonspecific ST-T wave changes. Artifact.  Stress ECG: Stress ECG showed normal sinus rhythm, with frequent premature ventricular contractions. No ST changes. Essentially negative ECG stress test for ischemia with a Lexiscan infusion. Please get the nuclear imaging report from radiology department.  Stress Stage Data: +----------------+--+------+-------+                 HRSys BPDias BP +----------------+--+------+-------+ Baseline [id number]   69      +----------------+--+------+-------+  Recovery ECG: Recovery ECG showed normal sinus rhythm, with artifact.  +------------+--+------+-------+             HRSys BPDias BP +------------+--+------+-------+ Recovery I  [id number]    57      +------------+--+------+-------+ Recovery II 7288    51      +------------+--+------+-------+ Recovery [id number]    41      +------------+--+------+-------+ Recovery IV 8592    52      +------------+--+------+-------+ Recovery V  7392    54      +------------+--+------+-------+  Summary:  1. Adequate level of stress achieved.  2. Correlate with myocardial perfusion imaging results.  3. Essentially negative ECG stress test for ischemia with a Lexiscan infusion. Please get the nuclear imaging report from radiology department.  4. No clinical or electrocardiographic evidence for ischemia at maximal infusion.  5. No ECG changes from baseline.  6. Normal Stress Test.  7. Nuclear image results are reported separately. [id number] Fabien Pelaez MD Electronically signed on 10/23/2024 at 9:39:18 AM   ** Final **     Nuclear Stress Test    Result Date: 10/22/2024  Interpreted By:  Chapito García and Ogievich Taessa STUDY: NUCLEAR STRESS TEST; 10/22/2024 2:18 pm    INDICATION: Signs/Symptoms:Cardiomyopathy, EF 15-20%.   COMPARISON: None.   ACCESSION NUMBER(S): [id number]   ORDERING CLINICIAN: COOPER RATLIFF   TECHNIQUE: DIVISION OF NUCLEAR MEDICINE PHARMACOLOGIC STRESS MYOCARDIAL PERFUSION SCAN, ONE DAY PROTOCOL   The patient received an intravenous dose of  8.8 mCi of Tc-99m Myoview and resting emission tomographic (SPECT) images of the myocardium were acquired. The patient then received an intravenous infusion of 0.4mg regadenoson (Lexiscan)  followed by an additional dose of  25.1 mCi of Tc-99m  Myoview. Stress phase SPECT images of the myocardium were then acquired. These included ECG-gated images to assess and quantify ventricular function.  A low-dose, nondiagnostic regional CT was utilized for attenuation correction purposes.   FINDINGS: Both stress and rest studies demonstrate grossly normal perfusion throughout the left ventricle.   The left ventricle is normal in size.   Gated images demonstrate mild global hypokinesis of the LV wall motion with a post-stress LV EF estimated at 44%.   Attenuation correction CT images demonstrate no gross anatomic abnormalities.       1.  No evidence of inducible ischemia or prior infarction. 2. Left ventricle is normal in size. 3. Mild global hypokinesis of the LV wall motion with a post-stress LV EF estimated at 44%.   I personally reviewed the images/study and I agree with the findings as stated by Viry Up DO, PGY-3. This study was interpreted at [facility], [place], [place].   MACRO: None   Signed by: Chapito García 10/22/2024 3:15 PM Dictation workstation:   [id number]    Electrocardiogram, 12-lead PRN ACS symptoms    Result Date: 10/22/2024  Poor data quality in current ECG precludes serial comparison Sinus tachycardia with short DE with Premature supraventricular complexes and with frequent Premature ventricular complexes Indeterminate axis Pulmonary disease pattern Nonspecific ST and  T wave abnormality Abnormal ECG When compared with ECG of 16-OCT-2024 20:40, (unconfirmed) Previous ECG has undetermined rhythm, needs review Questionable change in QRS duration Confirmed by Fer Vazquez ([id number]) on 10/22/2024 12:52:29 PM    MR brain wo IV contrast    Result Date: 10/19/2024  Interpreted By:  Hugo Rico  and Capo Burns STUDY: MR BRAIN WO IV CONTRAST;  10/19/2024 3:36 pm   INDICATION: Signs/Symptoms:altered mental status.     COMPARISON: CT of the head obtained October 16, 2024   ACCESSION NUMBER(S): [id number]   ORDERING CLINICIAN: ROMULO JOHNSON   TECHNIQUE: Axial T2, FLAIR, DWI, gradient echo T2 and sagittal and coronal T1 weighted images of brain were acquired.  No contrast was administered.   FINDINGS: Midline brain structures are intact on mid sagittal imaging.   There is no territorial restricted diffusion to suggest acute intracranial infarct. No suspicious T2 signal hypointensity noted on gradient sequencing.   There is no evidence of acute intracranial hemorrhage. No intracranial mass or mass effect. No midline shift. No loss of gray-white differentiation. Few scattered supratentorial white matter FLAIR signal hyperintensities are observed.  Basilar cisterns appear intact. Moderate dilatation of the lateral and 3rd greater than 4th ventricles at least principally on the basis of global parenchymal volume loss.   Normal T2 vascular flow voids are seen.   T2 hyperintensity within the maxillary sinuses is consistent with mucosal inflammatory paranasal sinus disease.. Status post bilateral lens replacements.   There is no mastoid effusion. Moderate effusions of the occipital lateral mass C1 articulation bilaterally may be on a degenerative basis.       No MR evidence of acute intracranial infarct, hemorrhage, mass, or mass effect.   I personally reviewed the images/study and I agree with the findings as stated. This study was interpreted at [facility],  [place], [place].   MACRO: None   Signed by: Hugo Rico 10/19/2024 4:41 PM Dictation workstation:   [id number]    EEG    IMPRESSION Impression This routine awake and drowsy EEG is indicative of a moderate diffuse encephalopathy. No epileptiform activity or lateralizing signs seen. A full report will be scanned into the patient's chart at a later time. This report has been interpreted and electronically signed by    Transthoracic Echo (TTE) Complete    Result Date: 10/17/2024           [place] [id number]            Phone [phone] TRANSTHORACIC ECHOCARDIOGRAM REPORT Patient Name:     AMARILYS FARAH      Reading Physician:   [id number] Jose David Workman DO Study Date:       10/17/2024           Ordering Provider:   [id number] CECIL HOWARD MRN/PID:          [id number]             Fellow: Accession#:       [id number]         Nurse: Date of           1940 / 83      Sonographer:         USR Birth/Age:        years Gender:           M                    Additional Staff: Height:           177.80 cm            Admit Date: Weight:           65.77 kg             Admission Status:    Inpatient - Routine BSA / BMI:        1.82 m2 / 20.81      Department Location: [facility] ICU                   kg/m2 Blood Pressure: 97 /74 mmHg Study Type:    TRANSTHORACIC ECHO (TTE) COMPLETE Diagnosis/ICD: Non ST elevation (NSTEMI) myocardial infarction-I21.4 Indication:    NSTEMI CPT Codes:     Echo Complete w Full Doppler-32728 Patient History: Diabetes:          Yes Pertinent History: HTN, Hyperlipidemia, CAD, Chest Pain and CHF. Renal dx III. Study Detail: The following Echo studies were performed: 2D, M-Mode, Doppler and               color flow. Technically challenging study due to poor acoustic               windows and patient lying in supine position.  PHYSICIAN INTERPRETATION: Left Ventricle: The left ventricular systolic function  is severely decreased, with a visually estimated ejection fraction of 15-20%. There are multiple wall motion abnormalities. The left ventricular cavity size is moderately dilated. There is mild concentric left ventricular hypertrophy. Left ventricular diastolic filling was indeterminate. LV Wall Scoring: The entire apex, mid and apical anterior wall, mid and apical anterior septum, mid and apical inferior septum, mid and apical inferior wall, mid inferolateral segment, and mid anterolateral segment are akinetic. All remaining scored segments are normal. Left Atrium: The left atrium is normal in size. Right Ventricle: The right ventricle is normal in size. There is normal right ventricular global systolic function. Right Atrium: The right atrium is normal in size. Aortic Valve: The aortic valve is probably trileaflet. The aortic valve dimensionless index is 0.70. There is no evidence of aortic valve regurgitation. The peak instantaneous gradient of the aortic valve is 2.7 mmHg. The mean gradient of the aortic valve is 1.4 mmHg. Mitral Valve: The mitral valve is normal in structure. There is no evidence of mitral valve regurgitation. Tricuspid Valve: The tricuspid valve is structurally normal. No evidence of tricuspid regurgitation. Pulmonic Valve: The pulmonic valve is not well visualized. The pulmonic valve regurgitation was not well visualized. Pericardium: No pericardial effusion noted. Aorta: The aortic root is normal.  CONCLUSIONS:  1. Multiple segmental abnormalities exist. See findings.  2. The left ventricular systolic function is severely decreased, with a visually estimated ejection fraction of 15-20%.  3. There are multiple wall motion abnormalities.  4. Left ventricular diastolic filling was indeterminate.  5. Left ventricular cavity size is moderately dilated.  6. There is normal right ventricular global systolic function.  7. Left ventricular dysfunction in a pattern suggestive of Takotsubo  Cardiomyopathy. QUANTITATIVE DATA SUMMARY:  2D MEASUREMENTS:           Normal Ranges: Ao Root s:       3.51 cm IVSd:            0.79 cm   (0.6-1.1cm) LVPWd:           0.76 cm   (0.6-1.1cm) LVIDd:           4.44 cm   (3.9-5.9cm) LVIDs:           3.81 cm LV Mass Index:   58.6 g/m2 LV % FS          14.0 %  LA VOLUME:          Normal Ranges: LA Vol A4C: 45.2 ml  RA VOLUME BY A/L METHOD:          Normal Ranges: RA Area A4C:             12.0 cm2  LV SYSTOLIC FUNCTION BY 2D PLANIMETRY (MOD):                      Normal Ranges: EF-A4C View:    16 % (>=55%) EF-A2C View:    5 % EF-Biplane:     14 % EF-Visual:      18 % EF-3DQ:         19 % LV EF Reported: 18 %  LV DIASTOLIC FUNCTION:           Normal Ranges: MV Peak E:             0.45 m/s  (0.7-1.2 m/s) MV Peak A:             0.88 m/s  (0.42-0.7 m/s) E/A Ratio:             0.51      (1.0-2.2) MV e'                  0.073 m/s (>8.0) MV lateral e'          0.11 m/s MV medial e'           0.04 m/s E/e' Ratio:            6.16      (<8.0)  MITRAL VALVE:          Normal Ranges: MV DT:        110 msec (150-240msec)  AORTIC VALVE:                     Normal Ranges: AoV Vmax:                0.82 m/s (<=1.7m/s) AoV Peak P.7 mmHg (<20mmHg) AoV Mean P.4 mmHg (1.7-11.5mmHg) LVOT Max Florencio:            0.61 m/s (<=1.1m/s) AoV VTI:                 17.99 cm (18-25cm) LVOT VTI:                12.65 cm LVOT Diameter:           2.00 cm  (1.8-2.4cm) AoV Area, VTI:           2.20 cm2 (2.5-5.5cm2) AoV Area,Vmax:           2.32 cm2 (2.5-4.5cm2) AoV Dimensionless Index: 0.70  RIGHT VENTRICLE: RV Basal 3.30 cm RV Mid   2.70 cm RV Major 4.8 cm  TRICUSPID VALVE/RVSP:          Normal Ranges: Peak TR Velocity:     2.26 m/s RV Syst Pressure:     23 mmHg  (< 30mmHg)  PULMONIC VALVE:          Normal Ranges: PV Max Florencio:     0.6 m/s  (0.6-0.9m/s) PV Max P.3 mmHg PV Mean P.5 mmHg PV VTI:         6.59 cm  AORTA: Asc Ao Diam 0.00 cm  [id number] [facility]  Electronically signed on 10/17/2024 at 11:00:02 AM  Wall Scoring  ** Final (Updated) **     ECG 12 lead    Result Date: 10/17/2024  Normal sinus rhythm Left axis deviation Low voltage QRS Anteroseptal infarct Inferior infarct , age undetermined Abnormal ECG When compared with ECG of 04-APR-2018 16:39, Significant changes have occurred Confirmed by Rell Vaughan ([id number]) on 10/17/2024 11:32:49 AM    XR chest 1 view    Result Date: 10/16/2024  Interpreted By:  Lobo Farrar, STUDY: XR CHEST 1 VIEW;  10/16/2024 9:27 pm   INDICATION: Signs/Symptoms:malaise.   COMPARISON: Chest x-ray 04/18/2021   ACCESSION NUMBER(S): [id number]   ORDERING CLINICIAN: MICHELLE MARTE   FINDINGS: Multiple overlying leads are present.   CARDIOMEDIASTINAL SILHOUETTE: Cardiomediastinal silhouette is normal in size and configuration. Atherosclerotic calcification of the aorta.   LUNGS: No consolidation, pleural effusion or pneumothorax.   ABDOMEN: Surgical clips noted in the left upper quadrant.   BONES: Multilevel degenerative changes of the spine.       No acute cardiopulmonary process.   MACRO: None   Signed by: Lobo Farrar 10/16/2024 9:52 PM Dictation workstation:   [id number]    CT head wo IV contrast    Result Date: 10/16/2024  Interpreted By:  Ori Asher, STUDY: CT HEAD WO IV CONTRAST;  10/16/2024 9:18 pm   INDICATION: Signs/Symptoms:malaise/fatigue; AMS.     COMPARISON: 04/18/2021   ACCESSION NUMBER(S): [id number]   ORDERING CLINICIAN: MICHELLE MARTE   TECHNIQUE: Noncontrast axial CT scan of head was performed. Angled reformats in brain and bone windows were generated. The images were reviewed in bone, brain, blood and soft tissue windows.   FINDINGS: CSF Spaces: The ventricles, sulci and basal cisterns are within normal limits. There is no extraaxial fluid collection.   Parenchyma: There are periventricular white matter changes noted. The grey-white differentiation is intact. There is no mass effect or midline shift.  There is no  intracranial hemorrhage.   Calvarium: The calvarium is unremarkable.   Paranasal sinuses and mastoids: Visualized paranasal sinuses and mastoids are clear.       No evidence of acute cortical infarct or intracranial hemorrhage.       MACRO: None   Signed by: Ori Asher 10/16/2024 9:28 PM Dictation workstation:   [id number]    Assessment/Plan      Dat Mccallum is a 83 y.o. male on day 15 of admission presenting with NSTEMI (non-ST elevated myocardial infarction) (Multi). Patient's past medical history is significant of DM type II, HTN, chronic systolic heart failure, hyperlipidemia, CKD stage III, and BPH.  NSTEMI  - On ED admission (10/16/2024), troponin elevated 7733 and . ECG on the same day revealed low voltage QRS, left axis deviation, anteroseptal and inferior infarct. TTE (10/17/2024) revealed LVEF 15-20%, left ventricle cavity size moderately dilated, and multiple wall motion abnormalities.   - On 10/23/2024, left heart cath revealed 40% proximal LAD stenosis, ischemic cardiomyopathy with anterior and anteroapical left ventricular hypokinesis with improved LVEF 40 - 45%. Nuclear stress test on 10/22/2024 revealed viability of the anterior and apical segments with LVEF 44%.   - Currently sinus rhythm. Midodrine for hypotension. Also on carvedilol and pravastatin.  - Cardiology signed off on 10/25/2024.   - Continue to monitor. Life Vest on discharge.  Acute Metabolic Encephalopathy  - On ED admission (10/16/2024), patient was presented with encephalopathy. Patient's mentation wax and wane. Yesterday (10/31/2024), patient was reportedly managed to sign that he was hungry; comparative to the days prior, this was an improvement. Today (10/31/2024), patient attempted to respond to written questions through writing. However, the responses were not clear as they were circles/ovals drawing.   - On 10/29/2024, suspected related to hospital delirium and also UTI.   - CT (10/29/2924) revealed no acute  intracranial abnormalities.   - Currently strict NPO.  - Continue to monitor   Acute on chronic systolic (congestive) Heart failure  - clinically euvolemic  -unable to tolerate ACE/ARB or diuretics.  Present treatment with beta-blocker is continued.  He is also treated with amiodarone for hypotension.  Vital signs will be monitored and medications adjusted as indicated.  -The patient has severe debility from his multiple acute and chronic medical problems.   Hypotension  - Likely related to the patient's cardiomyopathy/ acute on chronic systolic heart failure  - Currently on midodrine 5mg oral 3X daily.   - Coreg has not been given since 10/29/2024, which has improved hypotension.  - Continue to monitor  DM type II  - Blood sugar level ranged from mid 150s to high 200s.   - Currently on insulin lispro injection 3X daily. Continue this medication  - Continue to monitor  UTI + Acute cystitis with hematuria  - 10/29/2024 urinalysis revealed 2+ protein, 3+ blood, 15 ketone, positive nitrite, and trace leukocyte esterase. Microscopic UA showed 11-20 WBC and RBC, and 1+ bacteria. However, urine culture on the same day revealed normal.   - Started Rocephin on 10/29/2024. Discontinued since on the same day.   - Continue to monitor. No further antibiotic at the moment.   - Reported decrease in urine voiding on 10/29/2024, but unable to straight cath and place coude catheter. Today (10/31/2024), patient able to void more than previous day but there is still 350 mL retention. Urology consulted.   Pneumonia  - Chest X-ray (10/29/2024) revealed new left lower lobe infiltrate consistent with pneumonia.   - Continue IV Zosyn  Severe protein-calorie malnutrition  - Nutrition following  - Now unable to safely swallow. Intake poor prior to this. Will touch base with family, may need to consider feeding tube.  Palliative Care  - Palliative Care consulted.           Assessment & Plan  NSTEMI (non-ST elevated myocardial infarction)  (Multi)  -s/p [facility]: no intervention indicated  -aspirin and betablocker  -however he is on midodrine for hypotension as well and use of BB and midodrine may be canceling out the benefit of the carvedilol. BP slightly less soft than last week, will keep following  Lifevest on discharge  -Cardiology signed off 10/25  Acute metabolic encephalopathy  Was profound on admission, but overall improved and then continued to wax/wane. Worsened over past 48 hr. I did not see patient Monday, but RN today had him Monday and says he was more interactive and awake that day.  Suspect related to hospital delirium and also UTI (diagnosed 10/29)  Currently strict NPO.  CT brain normal. Labs otherwise unremarkable.  Acute on chronic systolic (congestive) heart failure  -clinically euvolemic  -unable to tolerate ACE/ARB or diuretics.  Present treatment with beta-blocker is continued.  He is also treated with amiodarone for hypotension.  Vital signs will be monitored and medications adjusted as indicated.  -The patient has severe debility from his multiple acute and chronic medical problems.    Idiopathic hypotension  -Less idiopathic and more likely related to his cardiomyopathy  -responds to midodrine, may be improved by discontinuing betablocker  Severe protein-calorie malnutrition (Multi)  -Nutrition following  Now unable to safely swallow. Intake poor prior to this. Will touch base with family today, may need to consider feeding tube.  Type 2 diabetes mellitus without complication, without long-term current use of insulin (Multi)  -Blood sugar adequately controlled.  Continue present treatment.  Hypomagnesemia  -receiving PO supplementation. Supplementing PRN with IV    Acute cystitis with hematuria  Started on rocephin. Cultures pending.                MATTHEW LEÓN

## 2024-10-31 NOTE — PROGRESS NOTES
Occupational Therapy                 Therapy Communication Note    Patient Name: Dat Mccallum  MRN: [id number]  Department: [facility] 3   Room: 12/12-A  Today's Date: 10/31/2024     Discipline: Occupational Therapy    Missed Visit Reason: Missed Visit Reason: Patient in a medical procedure (Patient just received NG tube however staff needed to take it out, staff will attempt to place a new one.)    Missed Time: Attempt at 10:48am    Comment:

## 2024-11-01 ENCOUNTER — APPOINTMENT (OUTPATIENT)
Dept: RADIOLOGY | Facility: HOSPITAL | Age: 84
End: 2024-11-01
Payer: MEDICARE

## 2024-11-01 ENCOUNTER — APPOINTMENT (OUTPATIENT)
Dept: NEUROLOGY | Facility: HOSPITAL | Age: 84
End: 2024-11-01
Payer: MEDICARE

## 2024-11-01 LAB
ALBUMIN SERPL BCP-MCNC: 2.1 G/DL (ref 3.4–5)
ANION GAP SERPL CALCULATED.3IONS-SCNC: 11 MMOL/L (ref 10–20)
BUN SERPL-MCNC: 39 MG/DL (ref 6–23)
CALCIUM SERPL-MCNC: 7.7 MG/DL (ref 8.6–10.3)
CHLORIDE SERPL-SCNC: 101 MMOL/L (ref 98–107)
CO2 SERPL-SCNC: 23 MMOL/L (ref 21–32)
CREAT SERPL-MCNC: 1.01 MG/DL (ref 0.5–1.3)
EGFRCR SERPLBLD CKD-EPI 2021: 75 ML/MIN/1.73M*2
ERYTHROCYTE [DISTWIDTH] IN BLOOD BY AUTOMATED COUNT: 13.5 % (ref 11.5–14.5)
GLUCOSE BLD MANUAL STRIP-MCNC: 163 MG/DL (ref 74–99)
GLUCOSE BLD MANUAL STRIP-MCNC: 197 MG/DL (ref 74–99)
GLUCOSE BLD MANUAL STRIP-MCNC: 207 MG/DL (ref 74–99)
GLUCOSE BLD MANUAL STRIP-MCNC: 221 MG/DL (ref 74–99)
GLUCOSE SERPL-MCNC: 208 MG/DL (ref 74–99)
HCT VFR BLD AUTO: 18.6 % (ref 41–52)
HGB BLD-MCNC: 6.9 G/DL (ref 13.5–17.5)
MAGNESIUM SERPL-MCNC: 1.74 MG/DL (ref 1.6–2.4)
MCH RBC QN AUTO: 30.7 PG (ref 26–34)
MCHC RBC AUTO-ENTMCNC: 37.1 G/DL (ref 32–36)
MCV RBC AUTO: 83 FL (ref 80–100)
NRBC BLD-RTO: 0 /100 WBCS (ref 0–0)
PHOSPHATE SERPL-MCNC: 2 MG/DL (ref 2.5–4.9)
PLATELET # BLD AUTO: 172 X10*3/UL (ref 150–450)
POTASSIUM SERPL-SCNC: 3.5 MMOL/L (ref 3.5–5.3)
RBC # BLD AUTO: 2.25 X10*6/UL (ref 4.5–5.9)
SODIUM SERPL-SCNC: 131 MMOL/L (ref 136–145)
WBC # BLD AUTO: 7.6 X10*3/UL (ref 4.4–11.3)

## 2024-11-01 PROCEDURE — 2500000002 HC RX 250 W HCPCS SELF ADMINISTERED DRUGS (ALT 637 FOR MEDICARE OP, ALT 636 FOR OP/ED): Performed by: NURSE PRACTITIONER

## 2024-11-01 PROCEDURE — 2060000001 HC INTERMEDIATE ICU ROOM DAILY

## 2024-11-01 PROCEDURE — 71045 X-RAY EXAM CHEST 1 VIEW: CPT

## 2024-11-01 PROCEDURE — 2500000001 HC RX 250 WO HCPCS SELF ADMINISTERED DRUGS (ALT 637 FOR MEDICARE OP): Performed by: INTERNAL MEDICINE

## 2024-11-01 PROCEDURE — 83735 ASSAY OF MAGNESIUM: CPT | Performed by: HOSPITALIST

## 2024-11-01 PROCEDURE — 2500000001 HC RX 250 WO HCPCS SELF ADMINISTERED DRUGS (ALT 637 FOR MEDICARE OP): Performed by: NURSE PRACTITIONER

## 2024-11-01 PROCEDURE — 2500000002 HC RX 250 W HCPCS SELF ADMINISTERED DRUGS (ALT 637 FOR MEDICARE OP, ALT 636 FOR OP/ED): Performed by: HOSPITALIST

## 2024-11-01 PROCEDURE — 2500000001 HC RX 250 WO HCPCS SELF ADMINISTERED DRUGS (ALT 637 FOR MEDICARE OP): Performed by: HOSPITALIST

## 2024-11-01 PROCEDURE — 99232 SBSQ HOSP IP/OBS MODERATE 35: CPT | Performed by: NURSE PRACTITIONER

## 2024-11-01 PROCEDURE — 95816 EEG AWAKE AND DROWSY: CPT | Performed by: PSYCHIATRY & NEUROLOGY

## 2024-11-01 PROCEDURE — 80069 RENAL FUNCTION PANEL: CPT | Performed by: HOSPITALIST

## 2024-11-01 PROCEDURE — 36415 COLL VENOUS BLD VENIPUNCTURE: CPT | Performed by: HOSPITALIST

## 2024-11-01 PROCEDURE — 82947 ASSAY GLUCOSE BLOOD QUANT: CPT

## 2024-11-01 PROCEDURE — 2500000004 HC RX 250 GENERAL PHARMACY W/ HCPCS (ALT 636 FOR OP/ED): Performed by: HOSPITALIST

## 2024-11-01 PROCEDURE — 95816 EEG AWAKE AND DROWSY: CPT

## 2024-11-01 PROCEDURE — 2500000005 HC RX 250 GENERAL PHARMACY W/O HCPCS: Performed by: NURSE PRACTITIONER

## 2024-11-01 PROCEDURE — 99232 SBSQ HOSP IP/OBS MODERATE 35: CPT

## 2024-11-01 PROCEDURE — 71045 X-RAY EXAM CHEST 1 VIEW: CPT | Performed by: RADIOLOGY

## 2024-11-01 PROCEDURE — 85027 COMPLETE CBC AUTOMATED: CPT | Performed by: HOSPITALIST

## 2024-11-01 PROCEDURE — 2500000004 HC RX 250 GENERAL PHARMACY W/ HCPCS (ALT 636 FOR OP/ED): Performed by: INTERNAL MEDICINE

## 2024-11-01 RX ORDER — PANTOPRAZOLE SODIUM 40 MG/10ML
40 INJECTION, POWDER, LYOPHILIZED, FOR SOLUTION INTRAVENOUS DAILY
Status: DISCONTINUED | OUTPATIENT
Start: 2024-11-02 | End: 2024-11-08 | Stop reason: HOSPADM

## 2024-11-01 RX ADMIN — MIDODRINE HYDROCHLORIDE 5 MG: 5 TABLET ORAL at 08:12

## 2024-11-01 RX ADMIN — INSULIN LISPRO 2 UNITS: 100 INJECTION, SOLUTION INTRAVENOUS; SUBCUTANEOUS at 12:51

## 2024-11-01 RX ADMIN — TAMSULOSIN HYDROCHLORIDE 0.8 MG: 0.4 CAPSULE ORAL at 21:23

## 2024-11-01 RX ADMIN — PIPERACILLIN SODIUM AND TAZOBACTAM SODIUM 3.38 G: 3; .375 INJECTION, SOLUTION INTRAVENOUS at 00:22

## 2024-11-01 RX ADMIN — ASPIRIN 81 MG CHEWABLE TABLET 81 MG: 81 TABLET CHEWABLE at 08:12

## 2024-11-01 RX ADMIN — MIDODRINE HYDROCHLORIDE 5 MG: 5 TABLET ORAL at 12:52

## 2024-11-01 RX ADMIN — INSULIN LISPRO 2 UNITS: 100 INJECTION, SOLUTION INTRAVENOUS; SUBCUTANEOUS at 05:57

## 2024-11-01 RX ADMIN — INSULIN LISPRO 1 UNITS: 100 INJECTION, SOLUTION INTRAVENOUS; SUBCUTANEOUS at 00:16

## 2024-11-01 RX ADMIN — PRAVASTATIN SODIUM 40 MG: 40 TABLET ORAL at 21:23

## 2024-11-01 RX ADMIN — OFLOXACIN 1 DROP: 3 SOLUTION OPHTHALMIC at 06:05

## 2024-11-01 RX ADMIN — CARVEDILOL 3.12 MG: 3.12 TABLET, FILM COATED ORAL at 08:12

## 2024-11-01 RX ADMIN — Medication 400 MG: at 08:13

## 2024-11-01 RX ADMIN — Medication 100 MG: at 08:13

## 2024-11-01 RX ADMIN — OFLOXACIN 1 DROP: 3 SOLUTION OPHTHALMIC at 18:11

## 2024-11-01 RX ADMIN — PIPERACILLIN SODIUM AND TAZOBACTAM SODIUM 3.38 G: 3; .375 INJECTION, SOLUTION INTRAVENOUS at 12:51

## 2024-11-01 RX ADMIN — OFLOXACIN 1 DROP: 3 SOLUTION OPHTHALMIC at 21:23

## 2024-11-01 RX ADMIN — PIPERACILLIN SODIUM AND TAZOBACTAM SODIUM 3.38 G: 3; .375 INJECTION, SOLUTION INTRAVENOUS at 18:18

## 2024-11-01 RX ADMIN — ENOXAPARIN SODIUM 40 MG: 40 INJECTION SUBCUTANEOUS at 08:12

## 2024-11-01 RX ADMIN — Medication 2 L/MIN: at 08:04

## 2024-11-01 RX ADMIN — PIPERACILLIN SODIUM AND TAZOBACTAM SODIUM 3.38 G: 3; .375 INJECTION, SOLUTION INTRAVENOUS at 06:02

## 2024-11-01 RX ADMIN — OFLOXACIN 1 DROP: 3 SOLUTION OPHTHALMIC at 12:51

## 2024-11-01 ASSESSMENT — COGNITIVE AND FUNCTIONAL STATUS - GENERAL
HELP NEEDED FOR BATHING: TOTAL
DRESSING REGULAR LOWER BODY CLOTHING: TOTAL
DRESSING REGULAR UPPER BODY CLOTHING: TOTAL
PERSONAL GROOMING: TOTAL
DAILY ACTIVITIY SCORE: 6
STANDING UP FROM CHAIR USING ARMS: TOTAL
PERSONAL GROOMING: TOTAL
MOVING TO AND FROM BED TO CHAIR: TOTAL
STANDING UP FROM CHAIR USING ARMS: TOTAL
TOILETING: TOTAL
MOVING FROM LYING ON BACK TO SITTING ON SIDE OF FLAT BED WITH BEDRAILS: TOTAL
HELP NEEDED FOR BATHING: TOTAL
EATING MEALS: TOTAL
TOILETING: TOTAL
DAILY ACTIVITIY SCORE: 6
CLIMB 3 TO 5 STEPS WITH RAILING: TOTAL
MOVING FROM LYING ON BACK TO SITTING ON SIDE OF FLAT BED WITH BEDRAILS: TOTAL
EATING MEALS: TOTAL
WALKING IN HOSPITAL ROOM: TOTAL
MOBILITY SCORE: 6
DRESSING REGULAR LOWER BODY CLOTHING: TOTAL
WALKING IN HOSPITAL ROOM: TOTAL
MOBILITY SCORE: 6
TURNING FROM BACK TO SIDE WHILE IN FLAT BAD: TOTAL
DRESSING REGULAR UPPER BODY CLOTHING: TOTAL
MOVING TO AND FROM BED TO CHAIR: TOTAL
TURNING FROM BACK TO SIDE WHILE IN FLAT BAD: TOTAL
CLIMB 3 TO 5 STEPS WITH RAILING: TOTAL

## 2024-11-01 ASSESSMENT — PAIN - FUNCTIONAL ASSESSMENT: PAIN_FUNCTIONAL_ASSESSMENT: PAINAD (PAIN ASSESSMENT IN ADVANCED DEMENTIA SCALE)

## 2024-11-01 ASSESSMENT — PAIN SCALES - PAIN ASSESSMENT IN ADVANCED DEMENTIA (PAINAD)
BODYLANGUAGE: RELAXED
TOTALSCORE: 0
FACIALEXPRESSION: SMILING OR INEXPRESSIVE
BREATHING: NORMAL
CONSOLABILITY: NO NEED TO CONSOLE

## 2024-11-01 ASSESSMENT — PAIN SCALES - GENERAL
PAINLEVEL_OUTOF10: 0 - NO PAIN
PAINLEVEL_OUTOF10: 0 - NO PAIN

## 2024-11-01 NOTE — CARE PLAN
The patient's goals for the shift include      The clinical goals for the shift include remain HDS    Problem: Skin  Goal: Decreased wound size/increased tissue granulation at next dressing change  Outcome: Progressing  Flowsheets (Taken 11/1/2024 1929)  Decreased wound size/increased tissue granulation at next dressing change:   Promote sleep for wound healing   Protective dressings over bony prominences   Utilize specialty bed per algorithm  Goal: Participates in plan/prevention/treatment measures  Outcome: Progressing  Flowsheets (Taken 11/1/2024 1929)  Participates in plan/prevention/treatment measures:   Discuss with provider PT/OT consult   Elevate heels   Increase activity/out of bed for meals  Goal: Prevent/manage excess moisture  Outcome: Progressing  Flowsheets (Taken 11/1/2024 1929)  Prevent/manage excess moisture:   Cleanse incontinence/protect with barrier cream   Monitor for/manage infection if present   Use wicking fabric (obtain order)   Follow provider orders for dressing changes   Moisturize dry skin  Goal: Prevent/minimize sheer/friction injuries  Outcome: Progressing  Flowsheets (Taken 11/1/2024 1929)  Prevent/minimize sheer/friction injuries:   Increase activity/out of bed for meals   Use pull sheet   Complete micro-shifts as needed if patient unable. Adjust patient position to relieve pressure points, not a full turn   HOB 30 degrees or less   Turn/reposition every 2 hours/use positioning/transfer devices   Utilize specialty bed per algorithm  Goal: Promote/optimize nutrition  Outcome: Progressing  Flowsheets (Taken 11/1/2024 1929)  Promote/optimize nutrition:   Consume > 50% meals/supplements   Offer water/supplements/favorite foods   Monitor/record intake including meals  Goal: Promote skin healing  Outcome: Progressing  Flowsheets (Taken 11/1/2024 1929)  Promote skin healing:   Assess skin/pad under line(s)/device(s)   Protective dressings over bony prominences   Turn/reposition every 2  hours/use positioning/transfer devices     Problem: Pain - Adult  Goal: Verbalizes/displays adequate comfort level or baseline comfort level  Outcome: Progressing  Flowsheets (Taken 11/1/2024 1929)  Verbalizes/displays adequate comfort level or baseline comfort level:   Encourage patient to monitor pain and request assistance   Assess pain using appropriate pain scale   Administer analgesics based on type and severity of pain and evaluate response   Implement non-pharmacological measures as appropriate and evaluate response   Consider cultural and social influences on pain and pain management   Notify Licensed Independent Practitioner if interventions unsuccessful or patient reports new pain     Problem: Safety - Adult  Goal: Free from fall injury  Outcome: Progressing  Flowsheets (Taken 11/1/2024 1929)  Free from fall injury:   Instruct family/caregiver on patient safety   Based on caregiver fall risk screen, instruct family/caregiver to ask for assistance with transferring infant if caregiver noted to have fall risk factors     Problem: Discharge Planning  Goal: Discharge to home or other facility with appropriate resources  Outcome: Progressing  Flowsheets (Taken 11/1/2024 1929)  Discharge to home or other facility with appropriate resources:   Identify barriers to discharge with patient and caregiver   Arrange for needed discharge resources and transportation as appropriate   Identify discharge learning needs (meds, wound care, etc)   Arrange for interpreters to assist at discharge as needed   Refer to discharge planning if patient needs post-hospital services based on physician order or complex needs related to functional status, cognitive ability or social support system     Problem: Chronic Conditions and Co-morbidities  Goal: Patient's chronic conditions and co-morbidity symptoms are monitored and maintained or improved  Outcome: Progressing  Flowsheets (Taken 11/1/2024 1929)  Care Plan - Patient's Chronic  Conditions and Co-Morbidity Symptoms are Monitored and Maintained or Improved:   Monitor and assess patient's chronic conditions and comorbid symptoms for stability, deterioration, or improvement   Collaborate with multidisciplinary team to address chronic and comorbid conditions and prevent exacerbation or deterioration   Update acute care plan with appropriate goals if chronic or comorbid symptoms are exacerbated and prevent overall improvement and discharge     Problem: Fall/Injury  Goal: Not fall by end of shift  Outcome: Progressing  Goal: Be free from injury by end of the shift  Outcome: Progressing  Goal: Verbalize understanding of personal risk factors for fall in the hospital  Outcome: Progressing  Goal: Verbalize understanding of risk factor reduction measures to prevent injury from fall in the home  Outcome: Progressing  Goal: Use assistive devices by end of the shift  Outcome: Progressing  Goal: Pace activities to prevent fatigue by end of the shift  Outcome: Progressing     Problem: ACS/CP/NSTEMI/STEMI  Goal: Chest pain managed (free from pain or at acceptable level)  Outcome: Progressing  Flowsheets (Taken 11/1/2024 1929)  Chest pain managed (free from pain or at acceptable level): Eliminate/minimize actual/potential pain  Goal: Lab values return to normal range  Outcome: Progressing  Flowsheets (Taken 11/1/2024 1929)  Lab values return to normal range:   Monitor for signs bleeding/correct coagulopathy   Monitor I&O, weight, labs  Goal: Promote self management  Outcome: Progressing  Flowsheets (Taken 11/1/2024 1929)  Promote self management:   Monitor for depression/anxiety/coping ability   Encourage activity/cluster activity to conserve energy   Promote nutrition/identify dietary restrictions  Goal: Serial ECG will return to baseline  Outcome: Progressing  Flowsheets (Taken 11/1/2024 1929)  Serial ECG will return to baseline: Monitor vitals/rhythm/ECG  Goal: Verbalize understanding of  procedures/devices  Outcome: Progressing  Flowsheets (Taken 11/1/2024 1929)  Verbalize understanding of procedures/devices:   Monitor effectiveness of supportive devices   Provide education  Goal: Wean vasopressors/achieve hemodynamic stability  Outcome: Progressing  Flowsheets (Taken 11/1/2024 1929)  Wean vasopressors/achieve hemodynamic stability:   Observe for signs/symptoms of inadequate perfusion   Monitor I&O, weight, labs     Problem: Nutrition  Goal: Less than 5 days NPO/clear liquids  Outcome: Progressing  Goal: Oral intake greater than 50%  Outcome: Progressing  Goal: Oral intake greater 75%  Outcome: Progressing  Goal: Consume prescribed supplement  Outcome: Progressing  Goal: Adequate PO fluid intake  Outcome: Progressing  Goal: Nutrition support goals are met within 48 hrs  Outcome: Progressing  Goal: Nutrition support is meeting 75% of nutrient needs  Outcome: Progressing  Goal: Tube feed tolerance  Outcome: Progressing  Goal: BG  mg/dL  Outcome: Progressing  Goal: Lab values WNL  Outcome: Progressing  Goal: Electrolytes WNL  Outcome: Progressing  Goal: Promote healing  Outcome: Progressing  Goal: Maintain stable weight  Outcome: Progressing  Goal: Reduce weight from edema/fluid  Outcome: Progressing  Goal: Gradual weight gain  Outcome: Progressing  Goal: Improve ostomy output  Outcome: Progressing     Problem: Diabetes  Goal: Maintain glucose levels >70mg/dl to <250mg/dl throughout shift  Outcome: Progressing  Flowsheets (Taken 11/1/2024 1929)  Maintain glucose levels >70mg/dl to <250mg/dl throughout shift:   Med administration/monitoring of effect   Self monitor blood glucose with staff oversight  Goal: No changes in neurological exam by end of shift  Outcome: Progressing  Flowsheets (Taken 11/1/2024 1929)  No changes in neurological exam by end of shift: Complete frequent neurological assessments  Goal: Vital signs within normal range for age by end of shift  Outcome: Progressing  Flowsheets  (Taken 11/1/2024 1929)  Vital signs within normal range for age by end of shift: Med administration/monitoring of effect     Problem: Heart Failure  Goal: Improved gas exchange this shift  Outcome: Progressing  Flowsheets (Taken 11/1/2024 1929)  Improved gas exchange this shift:   Prepare for use of devices/procedures to correct dysrhythmia   Assist with pulmonary hygiene and secretion clearance   Position to promote circulation/maximize ventilation  Goal: Improved urinary output this shift  Outcome: Progressing  Flowsheets (Taken 11/1/2024 1929)  Improved urinary output this shift:   Med administration/monitor effect   Monitor intake/output and daily weight   Monitor serum electrolytes/replace per order  Goal: Reduction in peripheral edema within 24 hours  Outcome: Progressing  Flowsheets (Taken 11/1/2024 1929)  Reduction in peripheral edema within 24 hours:   Monitor edema/skin/mucous membrane integrity   Consult dietician   Frequent small meals/supplements per order   SCDs/elevate extremities  Goal: Report improvement of dyspnea/breathlessness this shift  Outcome: Progressing  Flowsheets (Taken 11/1/2024 1929)  Report improvement of dyspnea/breathlessness this shift:   Incentive spirometry/deep breathing /HOB elevated elevated   Encourage activity/mobility/ROM   Consider PT/OT consult   Facilitate activity/mobility per patient tolerance/advance   Ambulate/OOB 3 times daily  Goal: Weight from fluid excess reduced over 2-3 days, then stabilize  Outcome: Progressing  Flowsheets (Taken 11/1/2024 1929)  Weight from fluid excess reduced over 2-3 days, then stabilize:   Med administration/monitor effect   Monitor serum electrolytes/replace per order   Monitor bowel elimination   Monitor intake/output and daily weight  Goal: Increase self care and/or family involvement in 24 hours  Outcome: Progressing  Flowsheets (Taken 11/1/2024 1929)  Increase self care and/or family involvement in 24 hours:   Assess for  signs/symptoms of depression   Organize tasks/allow time for rest   Therapy evaluation and treatment     Problem: Pain  Goal: Takes deep breaths with improved pain control throughout the shift  Outcome: Progressing  Goal: Turns in bed with improved pain control throughout the shift  Outcome: Progressing  Goal: Walks with improved pain control throughout the shift  Outcome: Progressing  Goal: Performs ADL's with improved pain control throughout shift  Outcome: Progressing  Goal: Participates in PT with improved pain control throughout the shift  Outcome: Progressing  Goal: Free from opioid side effects throughout the shift  Outcome: Progressing  Goal: Free from acute confusion related to pain meds throughout the shift  Outcome: Progressing

## 2024-11-01 NOTE — PROGRESS NOTES
Occupational Therapy                 Therapy Communication Note    Patient Name: Dat Mccallum  MRN: [id number]  Department: [facility] 3   Room: 12/12-A  Today's Date: 11/1/2024     Discipline: Occupational Therapy    Missed Visit Reason: Missed Visit Reason: Patient in a medical procedure (Patient off the unit for EEG per nursing staff)    Missed Time: Attempt at 14:40    Comment:

## 2024-11-01 NOTE — NURSING NOTE
RN awaiting surg consult so that corepack can be advanced. RN paged Dr. Tapia who wants RN to page surg. Paging surgery now .

## 2024-11-01 NOTE — PROGRESS NOTES
Neurology Follow Up    Referred by: No ref. provider found, PCP: Anjelica Pacheco, APRN-CNP    Subjective:  Mr. Mccallum is a 83 y.o. male admitted 10/16/2024 LOS day 16, consulted for worsened AMS..     Pt seen and examined. Pt eyes shut. He does withdraw to nox stimuli and seems to react to touch but not opening his eyes.     No family at the bedside  Spoke to the nurse and he hasn't opened his eyes for her.       Objective   Blood pressure 108/55, pulse 74, temperature 37 °C (98.6 °F), temperature source Temporal, resp. rate 16, height 1.829 m (6'), weight 65.5 kg (144 lb 6.4 oz), SpO2 98%.    Neurological Exam:  Neurological Exam:  General: eye closed, pt noted to have irregular abdominal breathing. Will be increased for a few seconds then regular.  Neuro: Pt unresponsive, will withdrawn in all 4 ext. to nox stimuli.   No rigidity noted, No abnormal movements noted.   Reflexes:      R          L  BR:               2          2  Biceps:         2          2  Triceps:        2          2  Knee:           2          2  Ankle:          2          2  Babinski: toes down to plantar stimulation bilaterally.   No clonus.    Reviewed relevant results, independent review of imaging:   Encounter Date: 10/16/24   Electrocardiogram, 12-lead PRN ACS symptoms   Result Value    Ventricular Rate 201    Atrial Rate 201    GA Interval 90    QRS Duration 16    QT Interval 216    QTC Calculation(Bazett) 395    R Axis 0    T Axis 135    QRS Count 32    Q Onset 241    P Onset 196    P Offset 239    T Offset 349    QTC Fredericia 323    Narrative    Poor data quality in current ECG precludes serial comparison  Sinus tachycardia with short GA with Premature supraventricular complexes and with frequent Premature ventricular complexes  Indeterminate axis  Pulmonary disease pattern  Nonspecific ST and T wave abnormality  Abnormal ECG  When compared with ECG of 16-OCT-2024 20:40, (unconfirmed)  Previous ECG has undetermined rhythm, needs  review  Questionable change in QRS duration  Confirmed by Fer Vazquez ([id number]) on 10/22/2024 12:52:29 PM            BNP   Date/Time Value Ref Range Status   10/16/2024 08:54  (H) 0 - 99 pg/mL Final     XR chest 1 view    Result Date: 10/31/2024  The feeding tube has been partially retracted and is no longer looped within the gastric fundus. The distal tip remains positioned within the gastric fundus however.   No change in the bilateral pulmonary infiltrates within each lower lung zone.   Signed by: Annia Dillon 10/31/2024 1:57 PM Dictation workstation:   [id number]    XR chest 1 view    Result Date: 10/31/2024  1. Feeding tube is present with extending into the stomach with the tube coiled within the region of the body and fundus. 2. Bibasilar infiltrates.   MACRO: none   Signed by: Laurent Elam 10/31/2024 12:07 PM Dictation workstation:   [id number]    XR chest 1 view    Result Date: 10/31/2024  Nasogastric tube is coiled within the midthoracic esophagus. Probable bibasilar infiltrates although the exam is limited by poor depth of inspiration.   MACRO: none   Signed by: Laurent Elam 10/31/2024 12:05 PM Dictation workstation:   [id number]    XR chest 1 view    Result Date: 10/29/2024  There is a new left lower lobe infiltrate consistent with pneumonia. Follow-up to assure complete clearing is suggested.   MACRO: none   Signed by: Laurent lEam 10/29/2024 2:55 PM Dictation workstation:   [id number]    CT head wo IV contrast    Result Date: 10/29/2024  No acute intracranial abnormality.     MACRO: none   Signed by: Brielle Linares 10/29/2024 2:19 PM Dictation workstation:   [id number]    Nuclear Stress Test    Result Date: 10/22/2024  1.  No evidence of inducible ischemia or prior infarction. 2. Left ventricle is normal in size. 3. Mild global hypokinesis of the LV wall motion with a post-stress LV EF estimated at 44%.   I personally reviewed the images/study and I agree with the findings as stated by  Viry Up DO, PGY-3. This study was interpreted at [facility], [place], [place].   MACRO: None   Signed by: Chapito García 10/22/2024 3:15 PM Dictation workstation:   [id number]    MR brain wo IV contrast    Result Date: 10/19/2024  No MR evidence of acute intracranial infarct, hemorrhage, mass, or mass effect.   I personally reviewed the images/study and I agree with the findings as stated. This study was interpreted at [facility], [place], [place].   MACRO: None   Signed by: Hugo Rico 10/19/2024 4:41 PM Dictation workstation:   [id number]    EEG    Result Date: 10/17/2024  IMPRESSION Impression This routine awake and drowsy EEG is indicative of a moderate diffuse encephalopathy. No epileptiform activity or lateralizing signs seen. A full report will be scanned into the patient's chart at a later time. This report has been interpreted and electronically signed by    XR chest 1 view    Result Date: 10/16/2024  No acute cardiopulmonary process.   MACRO: None   Signed by: Lobo Farrar 10/16/2024 9:52 PM Dictation workstation:   [id number]    CT head wo IV contrast    Result Date: 10/16/2024  No evidence of acute cortical infarct or intracranial hemorrhage.       MACRO: None   Signed by: Ori Asher 10/16/2024 9:28 PM Dictation workstation:   [id number]      Assessment:   Dat Mccallum is a 83 y.o. male with hx of T2DM, HTN, HLD, CKD3, BPH, deafness presenting with AMS/lethargy, found with NTEMI, SIRS, HF with EF of 15-20%. Neurology consulted for worsened AMS. Pt mentation seems to be waxing and weaning. Routine EEG showed diffuse encephalopathy. No witnessed seizures, abnormal movements. No prior hx of seizures. We are currently awaiting prolonged EEG.      Working dx is most likely toxic/metabolic encephalopathy.      Recommendations:   - Will await prolonged EEG at this time.         I spent 45 minutes in the professional and  overall care of this patient.      Kassi Alvarado, MERLENE-CNP

## 2024-11-01 NOTE — NURSING NOTE
RN assumed care of patient. Patient is asleep comfortably in bed. Bed alarm on and non violent restraints in place. TF running at 20ml/hr patient tolerating.

## 2024-11-01 NOTE — NURSING NOTE
Chest x ray shows corepack needs advanced . RN unable to do so. RN spoke with Dr. Tapia . Not using corepack at this time .

## 2024-11-01 NOTE — NURSING NOTE
TF rate increased to 20ml/hr @ 0300   Propranolol Pregnancy And Lactation Text: This medication is Pregnancy Category C and it isn't known if it is safe during pregnancy. It is excreted in breast milk.

## 2024-11-01 NOTE — PROGRESS NOTES
Physical Therapy                 Therapy Communication Note    Patient Name: Dat Mccallum  MRN: [id number]  Department: [facility] NEURO  Room: 12/12-A  Today's Date: 11/1/2024     Discipline: Physical Therapy    Missed Visit Reason: Missed Visit Reason: Patient in a medical procedure (Pt off the floor for EEG)    Missed Time: Attempt    Comment:

## 2024-11-01 NOTE — CARE PLAN
Problem: Skin  Goal: Decreased wound size/increased tissue granulation at next dressing change  Outcome: Progressing     Problem: Skin  Goal: Prevent/manage excess moisture  Outcome: Progressing     Problem: Skin  Goal: Prevent/minimize sheer/friction injuries  Outcome: Progressing     Problem: Skin  Goal: Promote/optimize nutrition  Outcome: Progressing     Problem: Skin  Goal: Promote skin healing  Outcome: Progressing     Problem: Nutrition  Goal: Tube feed tolerance  Outcome: Progressing     Problem: Heart Failure  Goal: Improved urinary output this shift  Outcome: Progressing

## 2024-11-01 NOTE — PROGRESS NOTES
Dat Mccallum is a 83 y.o. male on day 16 of admission presenting with NSTEMI (non-ST elevated myocardial infarction) (Multi).    Subjective   Tolerating tube feeding. Upon evaluation, tube feed currently at 10ml per hour. The order is to increase 10ml every 12 hours until goal. Communications sent to RN to watch time intervals for increase of feeds.        Objective     Physical Exam  Constitutional:       Comments: Thin and frail   HENT:      Head: Normocephalic and atraumatic.      Right Ear: Tympanic membrane normal.      Nose: Nose normal.      Mouth/Throat:      Mouth: Mucous membranes are moist.   Eyes:      Pupils: Pupils are equal, round, and reactive to light.   Cardiovascular:      Rate and Rhythm: Normal rate and regular rhythm.      Pulses: Normal pulses.   Pulmonary:      Effort: Pulmonary effort is normal.      Breath sounds: Normal breath sounds.   Abdominal:      General: Bowel sounds are normal. There is no distension.      Tenderness: There is no abdominal tenderness. There is no guarding.      Hernia: No hernia is present.   Genitourinary:     Rectum: Normal.   Musculoskeletal:         General: Normal range of motion.   Skin:     General: Skin is warm and dry.   Neurological:      Mental Status: He is disoriented.   Psychiatric:         Mood and Affect: Mood normal.         Behavior: Behavior normal.         Last Recorded Vitals  Blood pressure 108/55, pulse 74, temperature 37 °C (98.6 °F), temperature source Temporal, resp. rate 16, height 1.829 m (6'), weight 65.5 kg (144 lb 6.4 oz), SpO2 98%.  Intake/Output last 3 Shifts:  I/O last 3 completed shifts:  In: 718.8 (11 mL/kg) [I.V.:618.8 (9.4 mL/kg); IV Piggyback:100]  Out: 300 (4.6 mL/kg) [Urine:300 (0.1 mL/kg/hr)]  Weight: 65.5 kg     Relevant Results     Lab Results   Component Value Date    GLUCOSE 208 (H) 11/01/2024    CALCIUM 7.7 (L) 11/01/2024     (L) 11/01/2024    K 3.5 11/01/2024    CO2 23 11/01/2024     11/01/2024    BUN  39 (H) 11/01/2024    CREATININE 1.01 11/01/2024     Lab Results   Component Value Date    WBC 7.6 11/01/2024    HGB 6.9 (L) 11/01/2024    HCT 18.6 (L) 11/01/2024    MCV 83 11/01/2024     11/01/2024                      Assessment/Plan   Assessment & Plan  NSTEMI (non-ST elevated myocardial infarction) (Multi)    Type 2 diabetes mellitus without complication, without long-term current use of insulin (Multi)    BPH (benign prostatic hyperplasia)    Acute metabolic encephalopathy    Chronic systolic heart failure    Idiopathic hypotension    Severe protein-calorie malnutrition (Multi)    Anemia of chronic disease    Acute on chronic systolic (congestive) heart failure    Hypomagnesemia    Acute cystitis with hematuria    Urinary retention    Bacterial pneumonia    11/1/24: Tolerated tube feedings through [place] tube. It is bridled. Gen surgery will sign off. Please call if questions or concerns. Follow with speech therapy and pall med.        I spent 15 minutes in the professional and overall care of this patient.      Xiao Espinoza, APRN-CNP

## 2024-11-01 NOTE — PROGRESS NOTES
Palliative Care Progress Note    Date of Admission: 10/16/2024    Patient is a 83 y.o. male admitted with NSTEMI (non-ST elevated myocardial infarction) (Multi).   Hemoglobin down to 6.9 today.  Previously in the 8 range.  Still very sleepy but does open eyes and track me, attempting to use sign language to respond intermittently.   Pack in place and bridled, tube feeds infusing at 10.    Scheduled medications  aspirin, 81 mg, oral, Daily  carvedilol, 3.125 mg, oral, BID after meals  enoxaparin, 40 mg, subcutaneous, Daily  insulin lispro, 0-5 Units, subcutaneous, q6h  magnesium oxide, 400 mg, oral, Daily  midodrine, 5 mg, oral, TID  ofloxacin, 1 drop, Left Eye, 4x daily  oxygen, , inhalation, Continuous - 02/gases  piperacillin-tazobactam, 3.375 g, intravenous, q6h  pravastatin, 40 mg, oral, Nightly  tamsulosin, 0.8 mg, oral, Nightly  thiamine, 100 mg, nasogastric tube, Daily      Continuous medications       PRN medications  PRN medications: aminophylline, dextrose, dextrose, glucagon, glucagon, melatonin, morphine, prochlorperazine, prochlorperazine     Results for orders placed or performed during the hospital encounter of 10/16/24 (from the past 24 hours)   POCT GLUCOSE   Result Value Ref Range    POCT Glucose 154 (H) 74 - 99 mg/dL   POCT GLUCOSE   Result Value Ref Range    POCT Glucose 168 (H) 74 - 99 mg/dL   POCT GLUCOSE   Result Value Ref Range    POCT Glucose 191 (H) 74 - 99 mg/dL   POCT GLUCOSE   Result Value Ref Range    POCT Glucose 197 (H) 74 - 99 mg/dL   CBC   Result Value Ref Range    WBC 7.6 4.4 - 11.3 x10*3/uL    nRBC 0.0 0.0 - 0.0 /100 WBCs    RBC 2.25 (L) 4.50 - 5.90 x10*6/uL    Hemoglobin 6.9 (L) 13.5 - 17.5 g/dL    Hematocrit 18.6 (L) 41.0 - 52.0 %    MCV 83 80 - 100 fL    MCH 30.7 26.0 - 34.0 pg    MCHC 37.1 (H) 32.0 - 36.0 g/dL    RDW 13.5 11.5 - 14.5 %    Platelets 172 150 - 450 x10*3/uL   Magnesium   Result Value Ref Range    Magnesium 1.74 1.60 - 2.40 mg/dL   Renal Function Panel   Result  Value Ref Range    Glucose 208 (H) 74 - 99 mg/dL    Sodium 131 (L) 136 - 145 mmol/L    Potassium 3.5 3.5 - 5.3 mmol/L    Chloride 101 98 - 107 mmol/L    Bicarbonate 23 21 - 32 mmol/L    Anion Gap 11 10 - 20 mmol/L    Urea Nitrogen 39 (H) 6 - 23 mg/dL    Creatinine 1.01 0.50 - 1.30 mg/dL    eGFR 75 >60 mL/min/1.73m*2    Calcium 7.7 (L) 8.6 - 10.3 mg/dL    Phosphorus 2.0 (L) 2.5 - 4.9 mg/dL    Albumin 2.1 (L) 3.4 - 5.0 g/dL   POCT GLUCOSE   Result Value Ref Range    POCT Glucose 207 (H) 74 - 99 mg/dL        XR chest 1 view    Result Date: 10/31/2024  Interpreted By:  Laurent Elam, STUDY: XR CHEST 1 VIEW; 10/31/2024 11:12 am   INDICATION: CLINICAL INFORMATION: Signs/Symptoms:Tube placement check.   COMPARISON: 10/31/2024 at 1054 hours   ACCESSION NUMBER(S): [id number]   ORDERING CLINICIAN: DONNA CEJA   TECHNIQUE: Portable chest one view.   FINDINGS: The cardiac size is indeterminate in view of the AP projection. Feeding tube is been repositioned with the tube now noted to be coiled within the stomach. Bibasilar infiltrates are present.       1. Feeding tube is present with extending into the stomach with the tube coiled within the region of the body and fundus. 2. Bibasilar infiltrates.   MACRO: none   Signed by: Laurent Elam 10/31/2024 12:07 PM Dictation workstation:   [id number]    XR chest 1 view    Result Date: 10/31/2024  Interpreted By:  Laurent Elam, STUDY: XR CHEST 1 VIEW; 10/31/2024 11:11 am   INDICATION: CLINICAL INFORMATION: Signs/Symptoms:Verification of nasogastric tube location.   COMPARISON: 10/29/2024 at 1042 hours   ACCESSION NUMBER(S): [id number]   ORDERING CLINICIAN: KIMBERLEY BLAS   TECHNIQUE: Portable chest one view.   FINDINGS: The cardiac size is indeterminate in view of the AP projection. Nasogastric tube is coiled within the mid chest along the course of the thoracic esophagus. Bibasilar infiltrate is suspected. There is a limited depth of inspiration on the exam.        Nasogastric tube is coiled within the midthoracic esophagus. Probable bibasilar infiltrates although the exam is limited by poor depth of inspiration.   MACRO: none   Signed by: Laurent Elam 10/31/2024 12:05 PM Dictation workstation:   [id number]    XR chest 1 view    Result Date: 10/29/2024  Interpreted By:  Laurent Elam, STUDY: XR CHEST 1 VIEW; 10/29/2024 11:22 am   INDICATION: CLINICAL INFORMATION: Signs/Symptoms:cough.   COMPARISON: 10/16/2024   ACCESSION NUMBER(S): [id number]   ORDERING CLINICIAN: RICHARD ADAIR   TECHNIQUE: Portable chest one view.   FINDINGS: The cardiac size is indeterminate in view of the AP projection. Patient is slightly rotated to the left. There is a thoracic dextroscoliosis. Alveolar infiltrate is present within left lower lobe consistent with pneumonia. Right hemithorax is clear. No effusions are identified.       There is a new left lower lobe infiltrate consistent with pneumonia. Follow-up to assure complete clearing is suggested.   MACRO: none   Signed by: Laurent Elam 10/29/2024 2:55 PM Dictation workstation:   [id number]    CT head wo IV contrast    Result Date: 10/29/2024  Interpreted By:  Brielle Linares, STUDY: CT HEAD WO IV CONTRAST; ;  10/29/2024 1:26 pm   INDICATION: Signs/Symptoms:sudden onset confusion.   COMPARISON: 10/16/2024   ACCESSION NUMBER(S): [id number]   ORDERING CLINICIAN: RICHARD ADAIR   TECHNIQUE: Serial axial images of the head were obtained without intravenous contrast. Sagittal and coronal reconstructions were generated.   FINDINGS: Trickles are midline and enlarged. There is prominence of the cortical sulci and superior cerebellar cisterns.   There are no acute parenchymal abnormalities. There is no hemorrhage or extra-axial fluid.   There is no obvious scalp hematoma or skull fracture.   Visualized portions of the paranasal sinuses and mastoids are unremarkable.   COMPARISON OF FINDINGS: The brain is similar.       No acute intracranial  abnormality.     MACRO: none   Signed by: Brielle Linares 10/29/2024 2:19 PM Dictation workstation:   [id number]    Cardiac Catheterization Procedure    Result Date: 10/24/2024           [place] [address]            Phone [phone] Cardiovascular Catheterization Report Patient Name:     AMARILYS FARAH      Performing Physician:  Virginia Chavez DO Study Date:       10/23/2024           Verifying Physician:   Virginia Chavez DO MRN/PID:          [id number]             Cardiologist/Co-Scrub: Accession#:       [id number]         Ordering Provider:     Virginia CHAVEZ Date of           1940 / 83      Cardiologist: Birth/Age:        years Gender:           M                    Fellow: Encounter#:       [id number]           Surgeon:  Study: Left Heart Cath  Indications: AMARILYS FARAH is a 84 year old male who presents with dyslipidemia and an asymptomatic chest pain assessment. Cardiomyopathy, left ventricular dysfunction and NSTE - ACS. Stress test performed: No. CTA performed: No. Rajinder accessed: No. LVEF Assessed: No. Cardiac arrest: No. Cardiac surgical consult: No. Cardiovascular Instability: No Frailty status of patient entering lab: 6 = Moderately frail.  Coronary Angiography: The coronary circulation is right dominant.  Coronary Angiography Comments: We obtained informed consent with the help of his son Jorge A because the patient is deaf, and he was brought to the cardiac catheterization lab with the timeout verified between his computer medical record number and his arm wristband. Both groins were prepped and draped in a sterile fashion and the remainder of the procedure performed under sterile technique. 10 cc of 1% lidocaine used to the right groin for local  anesthesia and Versed 2 mg and fentanyl 50 mcg for intravenous sedation. Used single wall micropuncture technique to access the right common femoral artery and a micropuncture sheath was inserted, then exchanged over guidewire for a 6 Burmese [place] sheath. 6 Burmese JR4 JL 4 and pigtail catheters were used and catheters were advanced and withdrawn over the guidewire without difficulty. At the end of the procedure the sheath was removed and an Angio-Seal closure device was deployed with good hemostasis and he was returned to his telemetry bed in stable condition. Coronary angiography: 1. The left main was a large vessel and bifurcated into the LAD and circumflex and was normal 2 the LAD was a large vessel that extends to the apex and was widely patent with CHEYANNE grade III flow. The proximal LAD had an eccentric 40-50% irregular stenosis that was presumed to be the site of previous infarct vessel occlusion with spontaneous recanalization. The remaining mid and distal LAD and its branches had mild diffuse nonobstructive disease 3. The circumflex was a modest nondominant vessel that gave rise to 3 modest PLV branches and had no obstructive disease.  4. The right coronary artery was a dominant vessel that had mild 30-40% ostial /proximal stenosis and an eccentric 50% mid vessel stenosis, then gives rise to a modest sized small caliber PDA and small caliber distal RCA without obstruction. Left ventriculogram performed in the VALLES 30 projection showed persistent modest hypokinesis of the mid anterior, anterolateral segments and severe hypokinesis of the LV apical segment with left ventricular ejection fraction estimated at 40 to 45%. Impression: 1.40% proximal LAD stenosis suggesting previous vessel occlusion with spontaneous recanalization. 2. 50% mid RCA stenosis. 3. Ischemic cardiomyopathy with residual anterior and anteroapical left ventricular hypokinesis with left ventricular ejection fraction 40 to 45%. 3. Nuclear  stress test 10/22/2024 suggests viability in the anterior and apical segments without ischemia and with left ventricular ejection fraction estimated at 44%.   Hemo Personnel: +----------------+---------+ Name            Duty      +----------------+---------+ Haja Mckeon MD 1 +----------------+---------+  Hemodynamic Pressures:  +----+---------------+----------+-------------+--------------+-------+---------+ Site   Date Time   Phase NameSystolic mmHgDiastolic mmHgED mmHgMean mmHg +----+---------------+----------+-------------+--------------+-------+---------+   AO     10/23/2024  AIR REST          116            57              81          8:56:33 AM                                                      +----+---------------+----------+-------------+--------------+-------+---------+   LV     10/23/2024  AIR REST          103             2      8                   9:07:45 AM                                                      +----+---------------+----------+-------------+--------------+-------+---------+   LV     10/23/2024  AIR REST          109            -2      7                   9:07:57 AM                                                      +----+---------------+----------+-------------+--------------+-------+---------+  LVp     10/23/2024  AIR REST          106             0     12                   9:08:06 AM                                                      +----+---------------+----------+-------------+--------------+-------+---------+  AOp     10/23/2024  AIR REST          120            52              76          9:08:16 AM                                                      +----+---------------+----------+-------------+--------------+-------+---------+   AO     10/23/2024  AIR REST            0             0             -96          9:32:52 AM                                                       +----+---------------+----------+-------------+--------------+-------+---------+  Cardiac Cath Post Procedure Notes: Post Procedure Diagnosis: Double vessel disease. Blood Loss:               Estimated blood loss during the procedure was 10ml                           mls. Specimens Removed:        Number of specimen(s) removed: none.  ICD 10 Codes: Non ST elevation (NSTEMI) myocardial infarction-I21.4  CPT Codes: Left Heart Cath (visualization of coronaries) and LV-77552  [id number] Haja Mckeon DO Performing Physician Electronically signed by [id number] Haja Mckeon DO on 10/24/2024 at 9:06:38 AM  ** Final **     Cardiology Interpretation Of Nuclear Stress - See Other Report For Nuclear Portion    Result Date: 10/23/2024           [place] [address]            Phone [phone] Nuclear Pharmacologic Stress Test Patient Name:      AMARILYS FARAH     Ordering Provider:     [id number] COOPER RATLIFF Study Date:        10/22/2024          Reading Physician:     [id number] Fabien Pelaez MD MRN/PID:           [id number]            Supervising Physician: [id number] Pelaez MD Accession#:        [id number]        Fellow: Date of Birth/Age: 1940 / 83     Fellow:                    years Gender:            M                   Nurse:                 Inez Holley RN Admit Date:                            Technician:            Marina Feliciano Admission Status:                      Sonographer:           LORRIE Height:            182.88 cm           Technologist: Weight:            62.60 kg            Additional Staff: BSA:               1.82 m2             Encounter#:            [id number] BMI:               18.72 kg/m2         Patient  Location: Study Type:    CARDIOLOGY INTERPRETATION OF NUCLEAR STRESS Diagnosis/ICD: NonST elevation (NSTEMI) myocardial infarction-I21.4 Indication:    CARDIOMYOPATHY EF 15-20% CPT Codes:     Stress Test Interpretation-39850; Stress Test Supervision-93706 Falls Risk: Low: Patient has low risk for sustaining a fall; environmental safety interventions in place.  Study Details: Correct procedure and correct patient verified verbally and with                ID Band checked.  Patient History: Hyperlipidemia and congestive heart failure. NSTEMI, IDIOPATHIC HYPOTENSION.  Medications: ASPIRIN, CARVEDILOL, LOVENOX, PRAVASTATIN, SPIRONOLACTONE, MIDODRINE. The patient took medications as prescribed.  Patient Performance: Patient received a total of 0.4 mg of Regadenoson at 11:41:48 AM. The resting blood pressure was 114/69 mmHg with a heart rate of 81 bpm. The patient developed no symptoms during the stress exam. The blood pressure response was normal. The test was terminated due to: completed lab protocol and end of vasodilator infusion. Patient has met the discharge criteria and is discharged to their floor.  Baseline ECG: Resting ECG showed normal sinus rhythm with nonspecific ST-T wave changes. Artifact.  Stress ECG: Stress ECG showed normal sinus rhythm, with frequent premature ventricular contractions. No ST changes. Essentially negative ECG stress test for ischemia with a Lexiscan infusion. Please get the nuclear imaging report from radiology department.  Stress Stage Data: +----------------+--+------+-------+                 HRSys BPDias BP +----------------+--+------+-------+ Baseline [id number]   69      +----------------+--+------+-------+  Recovery ECG: Recovery ECG showed normal sinus rhythm, with artifact.  +------------+--+------+-------+             HRSys BPDias BP +------------+--+------+-------+ Recovery I  8295    57      +------------+--+------+-------+ Recovery II 4825 81       +------------+--+------+-------+ Recovery [id number]    41      +------------+--+------+-------+ Recovery IV 8592    52      +------------+--+------+-------+ Recovery V  7392    54      +------------+--+------+-------+  Summary:  1. Adequate level of stress achieved.  2. Correlate with myocardial perfusion imaging results.  3. Essentially negative ECG stress test for ischemia with a Lexiscan infusion. Please get the nuclear imaging report from radiology department.  4. No clinical or electrocardiographic evidence for ischemia at maximal infusion.  5. No ECG changes from baseline.  6. Normal Stress Test.  7. Nuclear image results are reported separately. [id number] Fabien Pelaez MD Electronically signed on 10/23/2024 at 9:39:18 AM   ** Final **     Nuclear Stress Test    Result Date: 10/22/2024  Interpreted By:  Chapito García and Ogievich Taessa STUDY: NUCLEAR STRESS TEST; 10/22/2024 2:18 pm   INDICATION: Signs/Symptoms:Cardiomyopathy, EF 15-20%.   COMPARISON: None.   ACCESSION NUMBER(S): [id number]   ORDERING CLINICIAN: COOPER RATLIFF   TECHNIQUE: DIVISION OF NUCLEAR MEDICINE PHARMACOLOGIC STRESS MYOCARDIAL PERFUSION SCAN, ONE DAY PROTOCOL   The patient received an intravenous dose of  8.8 mCi of Tc-99m Myoview and resting emission tomographic (SPECT) images of the myocardium were acquired. The patient then received an intravenous infusion of 0.4mg regadenoson (Lexiscan)  followed by an additional dose of  25.1 mCi of Tc-99m  Myoview. Stress phase SPECT images of the myocardium were then acquired. These included ECG-gated images to assess and quantify ventricular function.  A low-dose, nondiagnostic regional CT was utilized for attenuation correction purposes.   FINDINGS: Both stress and rest studies demonstrate grossly normal perfusion throughout the left ventricle.   The left ventricle is normal in size.   Gated images demonstrate mild global hypokinesis of the LV wall motion with a post-stress LV EF  estimated at 44%.   Attenuation correction CT images demonstrate no gross anatomic abnormalities.       1.  No evidence of inducible ischemia or prior infarction. 2. Left ventricle is normal in size. 3. Mild global hypokinesis of the LV wall motion with a post-stress LV EF estimated at 44%.   I personally reviewed the images/study and I agree with the findings as stated by Viry Up DO, PGY-3. This study was interpreted at [facility], .   MACRO: None   Signed by: Chapito García 10/22/2024 3:15 PM Dictation workstation:   [id number]    Electrocardiogram, 12-lead PRN ACS symptoms    Result Date: 10/22/2024  Poor data quality in current ECG precludes serial comparison Sinus tachycardia with short AL with Premature supraventricular complexes and with frequent Premature ventricular complexes Indeterminate axis Pulmonary disease pattern Nonspecific ST and T wave abnormality Abnormal ECG When compared with ECG of 16-OCT-2024 20:40, (unconfirmed) Previous ECG has undetermined rhythm, needs review Questionable change in QRS duration Confirmed by Fer Vazquez ([id number]) on 10/22/2024 12:52:29 PM    MR brain wo IV contrast    Result Date: 10/19/2024  Interpreted By:  Hugo Rico  and Capo Burns STUDY: MR BRAIN WO IV CONTRAST;  10/19/2024 3:36 pm   INDICATION: Signs/Symptoms:altered mental status.     COMPARISON: CT of the head obtained October 16, 2024   ACCESSION NUMBER(S): [id number]   ORDERING CLINICIAN: ROMULO JOHNSON   TECHNIQUE: Axial T2, FLAIR, DWI, gradient echo T2 and sagittal and coronal T1 weighted images of brain were acquired.  No contrast was administered.   FINDINGS: Midline brain structures are intact on mid sagittal imaging.   There is no territorial restricted diffusion to suggest acute intracranial infarct. No suspicious T2 signal hypointensity noted on gradient sequencing.   There is no evidence of acute intracranial hemorrhage. No intracranial mass  or mass effect. No midline shift. No loss of gray-white differentiation. Few scattered supratentorial white matter FLAIR signal hyperintensities are observed.  Basilar cisterns appear intact. Moderate dilatation of the lateral and 3rd greater than 4th ventricles at least principally on the basis of global parenchymal volume loss.   Normal T2 vascular flow voids are seen.   T2 hyperintensity within the maxillary sinuses is consistent with mucosal inflammatory paranasal sinus disease.. Status post bilateral lens replacements.   There is no mastoid effusion. Moderate effusions of the occipital lateral mass C1 articulation bilaterally may be on a degenerative basis.       No MR evidence of acute intracranial infarct, hemorrhage, mass, or mass effect.   I personally reviewed the images/study and I agree with the findings as stated. This study was interpreted at [place], [place].   MACRO: None   Signed by: Hugo Rico 10/19/2024 4:41 PM Dictation workstation:   [id number]    EEG    IMPRESSION Impression This routine awake and drowsy EEG is indicative of a moderate diffuse encephalopathy. No epileptiform activity or lateralizing signs seen. A full report will be scanned into the patient's chart at a later time. This report has been interpreted and electronically signed by    Transthoracic Echo (TTE) Complete    Result Date: 10/17/2024           [place] [address]            Phone [phone] TRANSTHORACIC ECHOCARDIOGRAM REPORT Patient Name:     AMARILYS Dawkins Physician:   [id number] Jose David Workman DO Study Date:       10/17/2024           Ordering Provider:   [id number] CECIL HOWARD MRN/PID:          [id number]             Fellow: Accession#:       [id number]         Nurse: Date of           1940 / 83      Sonographer:         R Birth/Age:        years Gender:            M                    Additional Staff: Height:           177.80 cm            Admit Date: Weight:           65.77 kg             Admission Status:    Inpatient - Routine BSA / BMI:        1.82 m2 / 20.81      Department Location: [facility]                   kg/m2 Blood Pressure: 97 /74 mmHg Study Type:    TRANSTHORACIC ECHO (TTE) COMPLETE Diagnosis/ICD: Non ST elevation (NSTEMI) myocardial infarction-I21.4 Indication:    NSTEMI CPT Codes:     Echo Complete w Full Doppler-00219 Patient History: Diabetes:          Yes Pertinent History: HTN, Hyperlipidemia, CAD, Chest Pain and CHF. Renal dx III. Study Detail: The following Echo studies were performed: 2D, M-Mode, Doppler and               color flow. Technically challenging study due to poor acoustic               windows and patient lying in supine position.  PHYSICIAN INTERPRETATION: Left Ventricle: The left ventricular systolic function is severely decreased, with a visually estimated ejection fraction of 15-20%. There are multiple wall motion abnormalities. The left ventricular cavity size is moderately dilated. There is mild concentric left ventricular hypertrophy. Left ventricular diastolic filling was indeterminate. LV Wall Scoring: The entire apex, mid and apical anterior wall, mid and apical anterior septum, mid and apical inferior septum, mid and apical inferior wall, mid inferolateral segment, and mid anterolateral segment are akinetic. All remaining scored segments are normal. Left Atrium: The left atrium is normal in size. Right Ventricle: The right ventricle is normal in size. There is normal right ventricular global systolic function. Right Atrium: The right atrium is normal in size. Aortic Valve: The aortic valve is probably trileaflet. The aortic valve dimensionless index is 0.70. There is no evidence of aortic valve regurgitation. The peak instantaneous gradient of the aortic valve is 2.7 mmHg. The mean gradient of the aortic valve is  1.4 mmHg. Mitral Valve: The mitral valve is normal in structure. There is no evidence of mitral valve regurgitation. Tricuspid Valve: The tricuspid valve is structurally normal. No evidence of tricuspid regurgitation. Pulmonic Valve: The pulmonic valve is not well visualized. The pulmonic valve regurgitation was not well visualized. Pericardium: No pericardial effusion noted. Aorta: The aortic root is normal.  CONCLUSIONS:  1. Multiple segmental abnormalities exist. See findings.  2. The left ventricular systolic function is severely decreased, with a visually estimated ejection fraction of 15-20%.  3. There are multiple wall motion abnormalities.  4. Left ventricular diastolic filling was indeterminate.  5. Left ventricular cavity size is moderately dilated.  6. There is normal right ventricular global systolic function.  7. 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 QUANTITATIVE DATA SUMMARY:  2D MEASUREMENTS:           Normal Ranges: Ao Root s:       3.51 cm IVSd:            0.79 cm   (0.6-1.1cm) LVPWd:           0.76 cm   (0.6-1.1cm) LVIDd:           4.44 cm   (3.9-5.9cm) LVIDs:           3.81 cm LV Mass Index:   58.6 g/m2 LV % FS          14.0 %  LA VOLUME:          Normal Ranges: LA Vol A4C: 45.2 ml  RA VOLUME BY A/L METHOD:          Normal Ranges: RA Area A4C:             12.0 cm2  LV SYSTOLIC FUNCTION BY 2D PLANIMETRY (MOD):                      Normal Ranges: EF-A4C View:    16 % (>=55%) EF-A2C View:    5 % EF-Biplane:     14 % EF-Visual:      18 % EF-3DQ:         19 % LV EF Reported: 18 %  LV DIASTOLIC FUNCTION:           Normal Ranges: MV Peak E:             0.45 m/s  (0.7-1.2 m/s) MV Peak A:             0.88 m/s  (0.42-0.7 m/s) E/A Ratio:             0.51      (1.0-2.2) MV e'                  0.073 m/s (>8.0) MV lateral e'          0.11 m/s MV medial e'           0.04 m/s E/e' Ratio:            6.16      (<8.0)  MITRAL VALVE:          Normal Ranges: MV DT:         110 msec (150-240msec)  AORTIC VALVE:                     Normal Ranges: AoV Vmax:                0.82 m/s (<=1.7m/s) AoV Peak P.7 mmHg (<20mmHg) AoV Mean P.4 mmHg (1.7-11.5mmHg) LVOT Max Florencio:            0.61 m/s (<=1.1m/s) AoV VTI:                 17.99 cm (18-25cm) LVOT VTI:                12.65 cm LVOT Diameter:           2.00 cm  (1.8-2.4cm) AoV Area, VTI:           2.20 cm2 (2.5-5.5cm2) AoV Area,Vmax:           2.32 cm2 (2.5-4.5cm2) AoV Dimensionless Index: 0.70  RIGHT VENTRICLE: RV Basal 3.30 cm RV Mid   2.70 cm RV Major 4.8 cm  TRICUSPID VALVE/RVSP:          Normal Ranges: Peak TR Velocity:     2.26 m/s RV Syst Pressure:     23 mmHg  (< 30mmHg)  PULMONIC VALVE:          Normal Ranges: PV Max Florencio:     0.6 m/s  (0.6-0.9m/s) PV Max P.3 mmHg PV Mean P.5 mmHg PV VTI:         6.59 cm  AORTA: Asc Ao Diam 0.00 cm  [id number] [facility] Electronically signed on 10/17/2024 at 11:00:02 AM  Wall Scoring  ** Final (Updated) **     ECG 12 lead    Result Date: 10/17/2024  Normal sinus rhythm Left axis deviation Low voltage QRS Anteroseptal infarct Inferior infarct , age undetermined Abnormal ECG When compared with ECG of 2018 16:39, Significant changes have occurred Confirmed by Rell Vaughan ([id number]) on 10/17/2024 11:32:49 AM    XR chest 1 view    Result Date: 10/16/2024  Interpreted By:  Lobo Farrar, STUDY: XR CHEST 1 VIEW;  10/16/2024 9:27 pm   INDICATION: Signs/Symptoms:malaise.   COMPARISON: Chest x-ray 2021   ACCESSION NUMBER(S): [id number]   ORDERING CLINICIAN: MICHELLE MARTE   FINDINGS: Multiple overlying leads are present.   CARDIOMEDIASTINAL SILHOUETTE: Cardiomediastinal silhouette is normal in size and configuration. Atherosclerotic calcification of the aorta.   LUNGS: No consolidation, pleural effusion or pneumothorax.   ABDOMEN: Surgical clips noted in the left upper quadrant.   BONES: Multilevel degenerative changes of the spine.       No acute  cardiopulmonary process.   MACRO: None   Signed by: Lobo Farrar 10/16/2024 9:52 PM Dictation workstation:   [id number]    CT head wo IV contrast    Result Date: 10/16/2024  Interpreted By:  Ori Asher, STUDY: CT HEAD WO IV CONTRAST;  10/16/2024 9:18 pm   INDICATION: Signs/Symptoms:malaise/fatigue; AMS.     COMPARISON: 04/18/2021   ACCESSION NUMBER(S): [id number]   ORDERING CLINICIAN: MICHELLE MARTE   TECHNIQUE: Noncontrast axial CT scan of head was performed. Angled reformats in brain and bone windows were generated. The images were reviewed in bone, brain, blood and soft tissue windows.   FINDINGS: CSF Spaces: The ventricles, sulci and basal cisterns are within normal limits. There is no extraaxial fluid collection.   Parenchyma: There are periventricular white matter changes noted. The grey-white differentiation is intact. There is no mass effect or midline shift.  There is no intracranial hemorrhage.   Calvarium: The calvarium is unremarkable.   Paranasal sinuses and mastoids: Visualized paranasal sinuses and mastoids are clear.       No evidence of acute cortical infarct or intracranial hemorrhage.       MACRO: None   Signed by: Ori Asher 10/16/2024 9:28 PM Dictation workstation:   [id number]       Vitals:    11/01/24 1136   BP:    Pulse:    Resp:    Temp: 37 °C (98.6 °F)   SpO2: 98%         Physical Exam  Vitals and nursing note reviewed.   Constitutional:       General: He is not in acute distress.     Appearance: He is ill-appearing.      Comments: Thin and frail appearing, lying in bed, somnolent   HENT:      Nose:      Comments: Dobhoff in place and bridled       Mouth/Throat:      Mouth: Mucous membranes are dry.      Pharynx: Oropharynx is clear.   Eyes:      Extraocular Movements: Extraocular movements intact.   Cardiovascular:      Rate and Rhythm: Normal rate and regular rhythm.      Pulses: Normal pulses.      Heart sounds: No murmur heard.  Pulmonary:      Effort: Pulmonary effort is  normal. No respiratory distress.      Breath sounds: No wheezing or rales.   Abdominal:      General: There is no distension.      Palpations: Abdomen is soft.      Tenderness: There is no abdominal tenderness. There is no guarding.   Musculoskeletal:      Right lower leg: No edema.      Left lower leg: No edema.      Comments: Moves all extremities     Skin:     General: Skin is warm and dry.      Coloration: Skin is pale.   Neurological:      General: No focal deficit present.      Motor: Weakness present.      Comments: Lethargic but does respond to tactile stimuli   Psychiatric:      Comments: calm          Assessment/Plan   IMP:    HFrEF - EF improved s/p PCI. On BB  NSTEMI - aspirin, BB  2.  Hypotension - on midodrine tid  3.  Acute metabolic encephalopathy - baseline A&O x3, worsened, now NPO. Neuro ordered EEG.  4.  Severe protein calorie malnutrition-family ok with corpak temporarily, but no long term feeding tubes.   5.  Electrolyte derangements - receiving supplementations  6.  PNA-zosyn, LLL on CXR, suspect aspiration, on 4 L oxygen  7.  Urinary Retention-urology attempted coudet, unable. In continues to retain, would need to consider cytoscopy with dilation, family discussing, but seems to be in agreement that they would proceed with this if necessary to provide symptom relief.      Palliative Care Encounter  DNR-DNI  The patient is not capable  Son Jorge A is primary HPOA, son Rich is first alternate agent    11/1  Hemoglobin trending downwards, no signs of active GI bleeding, Corpak in place with tube feeds infusing at very slow rate, currently at 10, goal is 50, per nursing note, tube feed rate was turned down at 0900 for reports of increased coughing.  Continues on IV antibiotics with stable white blood cell count, afebrile.  Blood pressure low stable.  Remains somnolent but seems more responsive.    Continue aggressive treatment model of care, monitor overall patient condition, if progressively  worsening despite aggressive treatment and/or no significant improvement after sufficient course of IV antibiotics, family willing to readdress goals at that time.    Total time 35 minutes.    Avani Gray, APRN-CNP

## 2024-11-01 NOTE — CARE PLAN
The patient's goals for the shift include      The clinical goals for the shift include VSS/ Q2 turns    Problem: Skin  Goal: Decreased wound size/increased tissue granulation at next dressing change  Flowsheets (Taken 11/1/2024 0047)  Decreased wound size/increased tissue granulation at next dressing change:   Protective dressings over bony prominences   Utilize specialty bed per algorithm  Goal: Participates in plan/prevention/treatment measures  Flowsheets (Taken 11/1/2024 0047)  Participates in plan/prevention/treatment measures:   Discuss with provider PT/OT consult   Elevate heels   Increase activity/out of bed for meals     Problem: Pain - Adult  Goal: Verbalizes/displays adequate comfort level or baseline comfort level  Flowsheets (Taken 11/1/2024 0047)  Verbalizes/displays adequate comfort level or baseline comfort level:   Encourage patient to monitor pain and request assistance   Assess pain using appropriate pain scale   Administer analgesics based on type and severity of pain and evaluate response   Implement non-pharmacological measures as appropriate and evaluate response   Consider cultural and social influences on pain and pain management   Notify Licensed Independent Practitioner if interventions unsuccessful or patient reports new pain     Problem: Safety - Adult  Goal: Free from fall injury  Flowsheets (Taken 11/1/2024 0047)  Free from fall injury:   Instruct family/caregiver on patient safety   Based on caregiver fall risk screen, instruct family/caregiver to ask for assistance with transferring infant if caregiver noted to have fall risk factors     Problem: Discharge Planning  Goal: Discharge to home or other facility with appropriate resources  Flowsheets (Taken 11/1/2024 0047)  Discharge to home or other facility with appropriate resources:   Identify barriers to discharge with patient and caregiver   Arrange for needed discharge resources and transportation as appropriate   Identify  discharge learning needs (meds, wound care, etc)   Arrange for interpreters to assist at discharge as needed   Refer to discharge planning if patient needs post-hospital services based on physician order or complex needs related to functional status, cognitive ability or social support system     Problem: Chronic Conditions and Co-morbidities  Goal: Patient's chronic conditions and co-morbidity symptoms are monitored and maintained or improved  Flowsheets (Taken 11/1/2024 0047)  Care Plan - Patient's Chronic Conditions and Co-Morbidity Symptoms are Monitored and Maintained or Improved:   Monitor and assess patient's chronic conditions and comorbid symptoms for stability, deterioration, or improvement   Collaborate with multidisciplinary team to address chronic and comorbid conditions and prevent exacerbation or deterioration

## 2024-11-01 NOTE — PROGRESS NOTES
24 1636   Discharge Planning   Expected Discharge Disposition SNF  ([facility])     Accepted by [facility].  Precert will  11/3. Corpak was placed on 10/31. Tube feed running at 20 ml/hr.  EEG completed today. Continues to be unresponsive to verbal stimuli. Palliative following.  Updates sent to [facility] via [employer].   7000 is in progress.     PLEASE NOTIFY CARE COORDINATION PRIOR TO DISCHARGE

## 2024-11-01 NOTE — NURSING NOTE
RN marielle [facility] ist requesting chest x ray to ensure placement of corepack. Per placement notes it was 65 at [facility] and 60 currently. Bridle in place

## 2024-11-02 ENCOUNTER — APPOINTMENT (OUTPATIENT)
Dept: RADIOLOGY | Facility: HOSPITAL | Age: 84
End: 2024-11-02
Payer: MEDICARE

## 2024-11-02 PROBLEM — J90 PLEURAL EFFUSION: Status: ACTIVE | Noted: 2024-11-02

## 2024-11-02 LAB
ALBUMIN SERPL BCP-MCNC: 2.1 G/DL (ref 3.4–5)
ANION GAP SERPL CALCULATED.3IONS-SCNC: 11 MMOL/L (ref 10–20)
BUN SERPL-MCNC: 32 MG/DL (ref 6–23)
CALCIUM SERPL-MCNC: 7.8 MG/DL (ref 8.6–10.3)
CHLORIDE SERPL-SCNC: 101 MMOL/L (ref 98–107)
CO2 SERPL-SCNC: 25 MMOL/L (ref 21–32)
CREAT SERPL-MCNC: 0.9 MG/DL (ref 0.5–1.3)
EGFRCR SERPLBLD CKD-EPI 2021: 86 ML/MIN/1.73M*2
ERYTHROCYTE [DISTWIDTH] IN BLOOD BY AUTOMATED COUNT: 13.9 % (ref 11.5–14.5)
GLUCOSE BLD MANUAL STRIP-MCNC: 176 MG/DL (ref 74–99)
GLUCOSE BLD MANUAL STRIP-MCNC: 202 MG/DL (ref 74–99)
GLUCOSE BLD MANUAL STRIP-MCNC: 295 MG/DL (ref 74–99)
GLUCOSE SERPL-MCNC: 201 MG/DL (ref 74–99)
HCT VFR BLD AUTO: 21.7 % (ref 41–52)
HGB BLD-MCNC: 7.5 G/DL (ref 13.5–17.5)
MAGNESIUM SERPL-MCNC: 1.71 MG/DL (ref 1.6–2.4)
MCH RBC QN AUTO: 30 PG (ref 26–34)
MCHC RBC AUTO-ENTMCNC: 34.6 G/DL (ref 32–36)
MCV RBC AUTO: 87 FL (ref 80–100)
NRBC BLD-RTO: 0.3 /100 WBCS (ref 0–0)
PHOSPHATE SERPL-MCNC: 2.1 MG/DL (ref 2.5–4.9)
PLATELET # BLD AUTO: 232 X10*3/UL (ref 150–450)
POTASSIUM SERPL-SCNC: 3.5 MMOL/L (ref 3.5–5.3)
RBC # BLD AUTO: 2.5 X10*6/UL (ref 4.5–5.9)
SODIUM SERPL-SCNC: 133 MMOL/L (ref 136–145)
WBC # BLD AUTO: 6.7 X10*3/UL (ref 4.4–11.3)

## 2024-11-02 PROCEDURE — 2500000001 HC RX 250 WO HCPCS SELF ADMINISTERED DRUGS (ALT 637 FOR MEDICARE OP): Performed by: NURSE PRACTITIONER

## 2024-11-02 PROCEDURE — 82947 ASSAY GLUCOSE BLOOD QUANT: CPT

## 2024-11-02 PROCEDURE — 2500000004 HC RX 250 GENERAL PHARMACY W/ HCPCS (ALT 636 FOR OP/ED): Performed by: INTERNAL MEDICINE

## 2024-11-02 PROCEDURE — 83735 ASSAY OF MAGNESIUM: CPT | Performed by: HOSPITALIST

## 2024-11-02 PROCEDURE — 2500000002 HC RX 250 W HCPCS SELF ADMINISTERED DRUGS (ALT 637 FOR MEDICARE OP, ALT 636 FOR OP/ED): Performed by: HOSPITALIST

## 2024-11-02 PROCEDURE — 2500000001 HC RX 250 WO HCPCS SELF ADMINISTERED DRUGS (ALT 637 FOR MEDICARE OP): Performed by: INTERNAL MEDICINE

## 2024-11-02 PROCEDURE — 80069 RENAL FUNCTION PANEL: CPT | Performed by: HOSPITALIST

## 2024-11-02 PROCEDURE — 2500000004 HC RX 250 GENERAL PHARMACY W/ HCPCS (ALT 636 FOR OP/ED): Performed by: STUDENT IN AN ORGANIZED HEALTH CARE EDUCATION/TRAINING PROGRAM

## 2024-11-02 PROCEDURE — 2500000004 HC RX 250 GENERAL PHARMACY W/ HCPCS (ALT 636 FOR OP/ED): Performed by: HOSPITALIST

## 2024-11-02 PROCEDURE — 2500000001 HC RX 250 WO HCPCS SELF ADMINISTERED DRUGS (ALT 637 FOR MEDICARE OP): Performed by: HOSPITALIST

## 2024-11-02 PROCEDURE — 2500000005 HC RX 250 GENERAL PHARMACY W/O HCPCS: Performed by: NURSE PRACTITIONER

## 2024-11-02 PROCEDURE — 2060000001 HC INTERMEDIATE ICU ROOM DAILY

## 2024-11-02 PROCEDURE — 99232 SBSQ HOSP IP/OBS MODERATE 35: CPT | Performed by: NURSE PRACTITIONER

## 2024-11-02 PROCEDURE — 2500000002 HC RX 250 W HCPCS SELF ADMINISTERED DRUGS (ALT 637 FOR MEDICARE OP, ALT 636 FOR OP/ED): Performed by: NURSE PRACTITIONER

## 2024-11-02 PROCEDURE — 36415 COLL VENOUS BLD VENIPUNCTURE: CPT | Performed by: HOSPITALIST

## 2024-11-02 PROCEDURE — 85027 COMPLETE CBC AUTOMATED: CPT | Performed by: HOSPITALIST

## 2024-11-02 PROCEDURE — 99231 SBSQ HOSP IP/OBS SF/LOW 25: CPT | Performed by: STUDENT IN AN ORGANIZED HEALTH CARE EDUCATION/TRAINING PROGRAM

## 2024-11-02 PROCEDURE — 71045 X-RAY EXAM CHEST 1 VIEW: CPT | Performed by: RADIOLOGY

## 2024-11-02 PROCEDURE — 71045 X-RAY EXAM CHEST 1 VIEW: CPT

## 2024-11-02 RX ORDER — ACETAMINOPHEN 160 MG/5ML
650 SOLUTION ORAL EVERY 4 HOURS PRN
Status: DISCONTINUED | OUTPATIENT
Start: 2024-11-02 | End: 2024-11-08 | Stop reason: HOSPADM

## 2024-11-02 RX ORDER — ACETAMINOPHEN 325 MG/1
650 TABLET ORAL EVERY 4 HOURS PRN
Status: DISCONTINUED | OUTPATIENT
Start: 2024-11-02 | End: 2024-11-08 | Stop reason: HOSPADM

## 2024-11-02 RX ORDER — FUROSEMIDE 10 MG/ML
20 INJECTION INTRAMUSCULAR; INTRAVENOUS ONCE
Status: COMPLETED | OUTPATIENT
Start: 2024-11-02 | End: 2024-11-02

## 2024-11-02 RX ADMIN — INSULIN LISPRO 5 UNITS: 100 INJECTION, SOLUTION INTRAVENOUS; SUBCUTANEOUS at 23:15

## 2024-11-02 RX ADMIN — PIPERACILLIN SODIUM AND TAZOBACTAM SODIUM 3.38 G: 3; .375 INJECTION, SOLUTION INTRAVENOUS at 18:04

## 2024-11-02 RX ADMIN — CARVEDILOL 3.12 MG: 3.12 TABLET, FILM COATED ORAL at 08:42

## 2024-11-02 RX ADMIN — PIPERACILLIN SODIUM AND TAZOBACTAM SODIUM 3.38 G: 3; .375 INJECTION, SOLUTION INTRAVENOUS at 12:09

## 2024-11-02 RX ADMIN — OFLOXACIN 1 DROP: 3 SOLUTION OPHTHALMIC at 12:09

## 2024-11-02 RX ADMIN — PANTOPRAZOLE SODIUM 40 MG: 40 INJECTION, POWDER, FOR SOLUTION INTRAVENOUS at 08:42

## 2024-11-02 RX ADMIN — INSULIN LISPRO 3 UNITS: 100 INJECTION, SOLUTION INTRAVENOUS; SUBCUTANEOUS at 17:06

## 2024-11-02 RX ADMIN — INSULIN LISPRO 2 UNITS: 100 INJECTION, SOLUTION INTRAVENOUS; SUBCUTANEOUS at 12:09

## 2024-11-02 RX ADMIN — Medication 100 MG: at 08:42

## 2024-11-02 RX ADMIN — PROCHLORPERAZINE EDISYLATE 10 MG: 5 INJECTION INTRAMUSCULAR; INTRAVENOUS at 14:27

## 2024-11-02 RX ADMIN — FUROSEMIDE 20 MG: 10 INJECTION, SOLUTION INTRAMUSCULAR; INTRAVENOUS at 14:27

## 2024-11-02 RX ADMIN — Medication 400 MG: at 08:42

## 2024-11-02 RX ADMIN — OFLOXACIN 1 DROP: 3 SOLUTION OPHTHALMIC at 06:00

## 2024-11-02 RX ADMIN — OFLOXACIN 1 DROP: 3 SOLUTION OPHTHALMIC at 21:17

## 2024-11-02 RX ADMIN — PIPERACILLIN SODIUM AND TAZOBACTAM SODIUM 3.38 G: 3; .375 INJECTION, SOLUTION INTRAVENOUS at 06:00

## 2024-11-02 RX ADMIN — INSULIN LISPRO 1 UNITS: 100 INJECTION, SOLUTION INTRAVENOUS; SUBCUTANEOUS at 00:16

## 2024-11-02 RX ADMIN — INSULIN LISPRO 1 UNITS: 100 INJECTION, SOLUTION INTRAVENOUS; SUBCUTANEOUS at 05:52

## 2024-11-02 RX ADMIN — MIDODRINE HYDROCHLORIDE 5 MG: 5 TABLET ORAL at 08:42

## 2024-11-02 RX ADMIN — ACETAMINOPHEN 650 MG: 160 SOLUTION ORAL at 17:06

## 2024-11-02 RX ADMIN — TAMSULOSIN HYDROCHLORIDE 0.8 MG: 0.4 CAPSULE ORAL at 21:16

## 2024-11-02 RX ADMIN — ASPIRIN 81 MG CHEWABLE TABLET 81 MG: 81 TABLET CHEWABLE at 08:42

## 2024-11-02 RX ADMIN — OFLOXACIN 1 DROP: 3 SOLUTION OPHTHALMIC at 17:18

## 2024-11-02 RX ADMIN — ENOXAPARIN SODIUM 40 MG: 40 INJECTION SUBCUTANEOUS at 08:42

## 2024-11-02 RX ADMIN — PIPERACILLIN SODIUM AND TAZOBACTAM SODIUM 3.38 G: 3; .375 INJECTION, SOLUTION INTRAVENOUS at 00:16

## 2024-11-02 RX ADMIN — PRAVASTATIN SODIUM 40 MG: 40 TABLET ORAL at 21:16

## 2024-11-02 RX ADMIN — Medication 2 L/MIN: at 07:54

## 2024-11-02 RX ADMIN — CARVEDILOL 3.12 MG: 3.12 TABLET, FILM COATED ORAL at 17:06

## 2024-11-02 ASSESSMENT — COGNITIVE AND FUNCTIONAL STATUS - GENERAL
DRESSING REGULAR UPPER BODY CLOTHING: TOTAL
WALKING IN HOSPITAL ROOM: TOTAL
HELP NEEDED FOR BATHING: TOTAL
CLIMB 3 TO 5 STEPS WITH RAILING: TOTAL
MOVING TO AND FROM BED TO CHAIR: TOTAL
MOBILITY SCORE: 6
TURNING FROM BACK TO SIDE WHILE IN FLAT BAD: TOTAL
DRESSING REGULAR LOWER BODY CLOTHING: TOTAL
TOILETING: TOTAL
STANDING UP FROM CHAIR USING ARMS: TOTAL
PERSONAL GROOMING: TOTAL
DAILY ACTIVITIY SCORE: 6
EATING MEALS: TOTAL
MOVING FROM LYING ON BACK TO SITTING ON SIDE OF FLAT BED WITH BEDRAILS: TOTAL

## 2024-11-02 ASSESSMENT — PAIN SCALES - PAIN ASSESSMENT IN ADVANCED DEMENTIA (PAINAD): TOTALSCORE: MEDICATION (SEE MAR)

## 2024-11-02 ASSESSMENT — PAIN SCALES - GENERAL: PAINLEVEL_OUTOF10: 0 - NO PAIN

## 2024-11-02 ASSESSMENT — PAIN SCALES - WONG BAKER
WONGBAKER_NUMERICALRESPONSE: HURTS LITTLE MORE
WONGBAKER_NUMERICALRESPONSE: HURTS LITTLE BIT

## 2024-11-02 NOTE — ASSESSMENT & PLAN NOTE
11/2 - No change in condition.  Neurology input appreciated.   -Diffuse encephalopathy on EEG  -Working diagnosis: toxic/metabolic encephalopathy  -Awaiting prolonged EEG results  -sodium 133, will correct slowly  11/1 - remains verbally unresponsive.  Eyes remain closed.  Moves hands to reach for tube and moves head to side.  10/31 Was profound on admission, but overall improved and then continued to wax/wane. Worsened over past 72 hr. I did not see patient Monday, but RN today had him Monday and says he was more interactive and awake that day.  Suspect related to hospital delirium and also UTI (diagnosed 10/29)  Currently strict NPO.  CT brain normal. Labs otherwise unremarkable.  Consulted neurology to evaluate. Dr. Christopher planning more prolonged EEG.

## 2024-11-02 NOTE — PROGRESS NOTES
Dat Mccallum is a 83 y.o. male on day 17 of admission presenting with NSTEMI (non-ST elevated myocardial infarction) (Multi). Also being treated for Acute on Chronic CHF, Encephalopathy, Pneumonia, urinary retention.      Subjective   Patient seen lying in bed.  Unresponsive.        Objective     Last Recorded Vitals  /72 (BP Location: Left arm, Patient Position: Lying)   Pulse 81   Temp 37.8 °C (100 °F) (Temporal)   Resp 20   Wt 65.5 kg (144 lb 6.4 oz)   SpO2 94%   Intake/Output last 3 Shifts:    Intake/Output Summary (Last 24 hours) at 11/2/2024 1331  Last data filed at 11/2/2024 0630  Gross per 24 hour   Intake 200 ml   Output 600 ml   Net -400 ml       Admission Weight  Weight: 68 kg (150 lb) (10/16/24 2047)    Daily Weight  10/30/24 : 65.5 kg (144 lb 6.4 oz)    Image Results  XR chest 1 view  Narrative: Interpreted By:  Elvis Linares,   STUDY:  XR CHEST 1 VIEW      INDICATION:  Signs/Symptoms:corepack placement? is it ok to use?.      COMPARISON:  Earlier the same day      ACCESSION NUMBER(S):  [id number]      ORDERING CLINICIAN:  RICHARD ADAIR      FINDINGS:  Slight advancement of the Corpak now overlying the region of the  gastric cardia.      Worsening basilar edema and effusions.      Impression:     Slight advancement of the Corpak now overlying the region of the  gastric cardia.      Worsening basilar edema and effusions.      Signed by: Elvis Linares 11/1/2024 8:26 PM  Dictation workstation:   [id number]  XR chest 1 view  Narrative: Interpreted By:  Elvis Linares,   STUDY:  XR CHEST 1 VIEW      INDICATION:  Signs/Symptoms:corepack placement.      COMPARISON:  October 31      ACCESSION NUMBER(S):  [id number]      ORDERING CLINICIAN:  RICHARD ADAIR      FINDINGS:  Feeding tube at the gastroesophageal junction.      Mild perihilar edema suggested.      Impression:     Feeding tube at the gastroesophageal junction.      Mild perihilar edema suggested.      Signed by: Elvis Linares  11/1/2024 5:34 PM  Dictation workstation:   [id number]      Physical Exam  Constitutional:       Appearance: He is ill-appearing.      Comments: Thin, frail, chronically ill appearing, pale   HENT:      Head: Normocephalic.      Nose:      Comments: Corpak in nose  Eyes:      Comments: Conjuctiva pale   Cardiovascular:      Rate and Rhythm: Normal rate.      Heart sounds: Normal heart sounds.   Pulmonary:      Breath sounds: Rhonchi and rales present.      Comments: Mild tachypnea  Abdominal:      General: Abdomen is flat. Bowel sounds are normal. There is no distension.      Palpations: Abdomen is soft.      Tenderness: There is no abdominal tenderness. There is no guarding.   Musculoskeletal:      Right lower leg: No edema.      Left lower leg: No edema.   Skin:     General: Skin is warm.   Neurological:      Comments: encephalopathic         Relevant Results               Assessment/Plan                  Assessment & Plan  Acute metabolic encephalopathy  11/2 - No change in condition.  Neurology input appreciated.   -Diffuse encephalopathy on EEG  -Working diagnosis: toxic/metabolic encephalopathy  -Awaiting prolonged EEG results  -sodium 133, will correct slowly  11/1 - remains verbally unresponsive.  Eyes remain closed.  Moves hands to reach for tube and moves head to side.  10/31 Was profound on admission, but overall improved and then continued to wax/wane. Worsened over past 72 hr. I did not see patient Monday, but RN today had him Monday and says he was more interactive and awake that day.  Suspect related to hospital delirium and also UTI (diagnosed 10/29)  Currently strict NPO.  CT brain normal. Labs otherwise unremarkable.  Consulted neurology to evaluate. Dr. Christopher planning more prolonged EEG.   Pleural effusion  11/2 - worsening bibasilar effusions seen on CXR  -likely contributing to respiratory tachypnea  -acute on chronic CHF contributing as well as PNA  -will give gentle IV diuresis, monitor  closely given prior soft BPs  Acute on chronic systolic (congestive) heart failure  11/2 - continue Coreg. Current BP will tolerate one time dose of IV lasix. Monitor closely.  -previously unable to tolerate ACE/ARB or diuretics.  Present treatment with beta-blocker is continued.  He is also treated with amiodarone for hypotension.  Vital signs will be monitored and medications adjusted as indicated.  -The patient has severe debility from his multiple acute and chronic medical problems.    NSTEMI (non-ST elevated myocardial infarction) (Multi)  11/1 - continue current treatment plan.  10/31 -s/p LHC: no intervention indicated  -aspirin and betablocker  -however he is on midodrine for hypotension as well and use of BB and midodrine may be canceling out the benefit of the carvedilol. BP slightly less soft than last week, will keep following  Lifevest on discharge  -Cardiology signed off 10/25  Bacterial pneumonia  11/1 - continue Zosyn Abx.  Repeat CXR shows worsening bibasilar edema and effusions.  10/31 - Started on zosyn. I suspect aspiration as source. Strict NPO now. Unknown organism.  Idiopathic hypotension  -Less idiopathic and more likely related to his cardiomyopathy  -BP's overall improved from last week. Will try to wean off midodrine prior to discharge  Severe protein-calorie malnutrition (Multi)  11/1 - Patient pulled out corepak from 65 to 60.  Repeat XR to verify placement in stomach completed.  OK to use.  Will reconsult surgery in AM to come and advance it.  -Nutrition following  Now unable to safely swallow. Intake poor prior to this.  Family conversation with Pall med and myself last night. Decision made to try short term feeding tube, treatment of infections, and see if mentation improves.  Corpak ordered and surgery to place. Nutrition to put in TF orders. Will closely watch labs, patient is at risk of refeeding syndrome.    Type 2 diabetes mellitus without complication, without long-term current  use of insulin (Multi)  -Blood sugar adequately controlled.  Continue present treatment.  Hypomagnesemia  11/1 - resolved with supplementation.  Will continue to monitor and replete prn  -receiving PO supplementation. Supplementing PRN with IV    Acute cystitis with hematuria  Started on  abx but cultures negative  Urinary retention  11/1 - has external catheter. Will monitor I/O closely  Scant urine output by incontinence. Unable to pass trotter catheter. Urology consulted and also unable to pass trotter catheter.   If he develops painful distention or MARISSA, then will need to go for cystoscopy with dilatation    DVT PPX: Lovenox  GI PPX: IV Protonix  Code Status: DNR    Discharge Planning:  -[facility] go to [facility]              Lisa Tapia MD

## 2024-11-02 NOTE — CARE PLAN
Problem: Skin  Goal: Participates in plan/prevention/treatment measures  Outcome: Progressing     Problem: Skin  Goal: Prevent/manage excess moisture  Outcome: Progressing     Problem: Skin  Goal: Prevent/minimize sheer/friction injuries  Outcome: Progressing     Problem: Safety - Adult  Goal: Free from fall injury  Outcome: Progressing     Problem: Chronic Conditions and Co-morbidities  Goal: Patient's chronic conditions and co-morbidity symptoms are monitored and maintained or improved  Outcome: Progressing     Problem: Fall/Injury  Goal: Pace activities to prevent fatigue by end of the shift  Outcome: Progressing

## 2024-11-02 NOTE — PROGRESS NOTES
Physical Therapy                 Therapy Communication Note    Patient Name: Dat Mccallum  MRN: [id number]  Department: [address]  Room: 12/12-A  Today's Date: 11/2/2024     Discipline: Physical Therapy    Missed Visit Reason: Missed Visit Reason: Patient sleeping, Other (Comment) (Deep sleep, unable to maintain wakefulness.)    Missed Time: Cancel    Comment:Pt sleeping soundly, unable to maintain wakeful state.  NSG notified.

## 2024-11-02 NOTE — ASSESSMENT & PLAN NOTE
11/1 - continue current treatment plan.  10/31 -s/p LH: no intervention indicated  -aspirin and betablocker  -however he is on midodrine for hypotension as well and use of BB and midodrine may be canceling out the benefit of the carvedilol. BP slightly less soft than last week, will keep following  Lifevest on discharge  -Cardiology signed off 10/25

## 2024-11-02 NOTE — ASSESSMENT & PLAN NOTE
11/1 - continue Zosyn Abx.  Repeat CXR shows worsening bibasilar edema and effusions.  10/31 - Started on zosyn. I suspect aspiration as source. Strict NPO now. Unknown organism.

## 2024-11-02 NOTE — ASSESSMENT & PLAN NOTE
11/1 - Patient pulled out corepak from 65 to 60.  Repeat XR to verify placement in stomach completed.  OK to use.  Will reconsult surgery in AM to come and advance it.  -Nutrition following  Now unable to safely swallow. Intake poor prior to this.  Family conversation with Pall med and myself last night. Decision made to try short term feeding tube, treatment of infections, and see if mentation improves.  Corpak ordered and surgery to place. Nutrition to put in TF orders. Will closely watch labs, patient is at risk of refeeding syndrome.

## 2024-11-02 NOTE — ASSESSMENT & PLAN NOTE
-Less idiopathic and more likely related to his cardiomyopathy  -BP's overall improved from last week. Will try to wean off midodrine prior to discharge   EXAMINATION TYPE: CT iac wo con

CT DLP: 142.70 mGycm, Automated exposure control for dose reduction was used.

 

DATE OF EXAM: 10/26/2023 8:21 AM

 

INDICATION: 

Patient age:Female;  60 years old; 

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

 

COMPARISON: None.

 

TECHNIQUE: Multiple thin axial images were obtained through the temporal bones and internal auditory 
canals.  Additional coronal reformatted images were obtained.  No IV contrast was utilized.  

CT Contrast: Contrast used none.

 

FINDINGS: 

Right Temporal Bone:

External Ear: The external auditory canal is unremarkable, The tympanic membrane is present and unrem
arkable.

Middle Ear:  The ossicles demonstrate a normal appearance.  Prussak's space is clear and the scutum i
s intact. There is no evidence of osseous erosion and the tegmen tympani is intact. 

Inner Ear: Cochlea, vestibule and semi circular canals are unremarkable.  No evidence of carotid ifeoma
l dehiscence.  Two and a half turns of the cochlea are identified. The vestibular aqueduct is not enl
arged.

Mastoid Air Cells: The mastoid air cells are clear. The tegmen mastoideum is intact. The aditus ad an
trum is clear.

Internal Auditory Canal: The internal auditory canal is unremarkable. 

 

 

Left Temporal Bone:

External Ear: The external auditory canal is unremarkable, The tympanic membrane is present and unrem
arkable.

Middle Ear:  The ossicles demonstrate a normal appearance.  Prussak's space is clear and the scutum i
s intact. There is no evidence of osseous erosion and the tegmen tympani is intact. 

Inner Ear: Cochlea, vestibule and semi circular canals are unremarkable.  No evidence of carotid ifeoma
l dehiscence.  Two and a half turns of the cochlea are identified. The vestibular aqueduct is not enl
arged.

Mastoid Air Cells: The mastoid air cells are clear. The tegmen mastoideum is intact. The aditus ad an
trum is clear.

Internal Auditory Canal: The internal auditory canal is unremarkable. 

 

 

IMPRESSION:

Normal internal auditory canal study.

## 2024-11-02 NOTE — PROGRESS NOTES
Dat Mccallum is a 83 y.o. male on day 16 of admission presenting with NSTEMI (non-ST elevated myocardial infarction) (Multi).      Subjective   Patient seen lying in bed.  Moves hands to face when corepak manipulated. Non-verbal.       Objective     Last Recorded Vitals  /70   Pulse 72   Temp 36.5 °C (97.7 °F) (Temporal)   Resp 14   Wt 65.5 kg (144 lb 6.4 oz)   SpO2 94%   Intake/Output last 3 Shifts:    Intake/Output Summary (Last 24 hours) at 11/1/2024 2040  Last data filed at 11/1/2024 1920  Gross per 24 hour   Intake 200 ml   Output 550 ml   Net -350 ml       Admission Weight  Weight: 68 kg (150 lb) (10/16/24 2047)    Daily Weight  10/30/24 : 65.5 kg (144 lb 6.4 oz)    Image Results  XR chest 1 view  Narrative: Interpreted By:  Elvis Linares,   STUDY:  XR CHEST 1 VIEW      INDICATION:  Signs/Symptoms:corepack placement? is it ok to use?.      COMPARISON:  Earlier the same day      ACCESSION NUMBER(S):  [id number]      ORDERING CLINICIAN:  RICHARD ADAIR      FINDINGS:  Slight advancement of the Corpak now overlying the region of the  gastric cardia.      Worsening basilar edema and effusions.      Impression:     Slight advancement of the Corpak now overlying the region of the  gastric cardia.      Worsening basilar edema and effusions.      Signed by: Elvis Linares 11/1/2024 8:26 PM  Dictation workstation:   [id number]  XR chest 1 view  Narrative: Interpreted By:  Elvis Linares,   STUDY:  XR CHEST 1 VIEW      INDICATION:  Signs/Symptoms:corepack placement.      COMPARISON:  October 31      ACCESSION NUMBER(S):  [id number]      ORDERING CLINICIAN:  RICHARD ADAIR      FINDINGS:  Feeding tube at the gastroesophageal junction.      Mild perihilar edema suggested.      Impression:     Feeding tube at the gastroesophageal junction.      Mild perihilar edema suggested.      Signed by: Elvis Linares 11/1/2024 5:34 PM  Dictation workstation:   [id number]      Physical Exam  Constitutional:        Appearance: He is ill-appearing.      Comments: Thin, frail, chronically ill appearing, pale   HENT:      Head: Normocephalic.      Nose:      Comments: Corpak in nose  Eyes:      Comments: Conjuctiva pale   Cardiovascular:      Rate and Rhythm: Normal rate.      Heart sounds: Normal heart sounds.   Pulmonary:      Effort: Pulmonary effort is normal.   Abdominal:      General: Abdomen is flat. Bowel sounds are normal. There is no distension.      Palpations: Abdomen is soft.      Tenderness: There is no abdominal tenderness. There is no guarding.   Musculoskeletal:      Right lower leg: No edema.      Left lower leg: No edema.   Skin:     General: Skin is warm.   Neurological:      Comments: Verbally unresponsive, does not open eyes, does not follow commands         Relevant Results               Assessment/Plan                  Assessment & Plan  NSTEMI (non-ST elevated myocardial infarction) (Multi)  11/1 - continue current treatment plan.  10/31 -s/p LHC: no intervention indicated  -aspirin and betablocker  -however he is on midodrine for hypotension as well and use of BB and midodrine may be canceling out the benefit of the carvedilol. BP slightly less soft than last week, will keep following  [facility] on discharge  -Cardiology signed off 10/25  Acute metabolic encephalopathy  11/1 - remains verbally unresponsive.  Eyes remain closed.  Moves hands to reach for tube and moves head to side.  10/31 Was profound on admission, but overall improved and then continued to wax/wane. Worsened over past 72 hr. I did not see patient Monday, but RN today had him Monday and says he was more interactive and awake that day.  Suspect related to hospital delirium and also UTI (diagnosed 10/29)  Currently strict NPO.  CT brain normal. Labs otherwise unremarkable.  Consulted neurology to evaluate. Dr. Christopher planning more prolonged EEG.   Acute on chronic systolic (congestive) heart failure  -clinically euvolemic  -unable to tolerate  ACE/ARB or diuretics.  Present treatment with beta-blocker is continued.  He is also treated with amiodarone for hypotension.  Vital signs will be monitored and medications adjusted as indicated.  -The patient has severe debility from his multiple acute and chronic medical problems.    Idiopathic hypotension  -Less idiopathic and more likely related to his cardiomyopathy  -BP's overall improved from last week. Will try to wean off midodrine prior to discharge  Severe protein-calorie malnutrition (Multi)  11/1 - Patient pulled out corepak from 65 to 60.  Repeat XR to verify placement in stomach completed.  OK to use.  Will reconsult surgery in AM to come and advance it.  -Nutrition following  Now unable to safely swallow. Intake poor prior to this.  Family conversation with Pall med and myself last night. Decision made to try short term feeding tube, treatment of infections, and see if mentation improves.  Corpak ordered and surgery to place. Nutrition to put in TF orders. Will closely watch labs, patient is at risk of refeeding syndrome.    Type 2 diabetes mellitus without complication, without long-term current use of insulin (Multi)  -Blood sugar adequately controlled.  Continue present treatment.  Hypomagnesemia  11/1 - resolved with supplementation.  Will continue to monitor and replete prn  -receiving PO supplementation. Supplementing PRN with IV    Acute cystitis with hematuria  Started on  abx but cultures negative  Urinary retention  11/1 - has external catheter. Will monitor I/O closely  Scant urine output by incontinence. Unable to pass trotter catheter. Urology consulted and also unable to pass trotter catheter.   If he develops painful distention or MARISSA, then will need to go for cystoscopy with dilatation  Bacterial pneumonia  11/1 - continue Zosyn Abx.  Repeat CXR shows worsening bibasilar edema and effusions.  10/31 - Started on zosyn. I suspect aspiration as source. Strict NPO now. Unknown  organism.    DVT PPX: Lovenox  GI PPX: IV Protonix  Code Status: DNR    Discharge Planning:  -Will go to [facility]              Lisa Tapia MD

## 2024-11-02 NOTE — ASSESSMENT & PLAN NOTE
11/2 - continue Coreg. Current BP will tolerate one time dose of IV lasix. Monitor closely.  -previously unable to tolerate ACE/ARB or diuretics.  Present treatment with beta-blocker is continued.  He is also treated with amiodarone for hypotension.  Vital signs will be monitored and medications adjusted as indicated.  -The patient has severe debility from his multiple acute and chronic medical problems.

## 2024-11-02 NOTE — ASSESSMENT & PLAN NOTE
11/1 - has external catheter. Will monitor I/O closely  Scant urine output by incontinence. Unable to pass trotter catheter. Urology consulted and also unable to pass trotter catheter.   If he develops painful distention or MARISSA, then will need to go for cystoscopy with dilatation

## 2024-11-02 NOTE — ASSESSMENT & PLAN NOTE
11/1 - resolved with supplementation.  Will continue to monitor and replete prn  -receiving PO supplementation. Supplementing PRN with IV

## 2024-11-02 NOTE — ASSESSMENT & PLAN NOTE
11/1 - remains verbally unresponsive.  Eyes remain closed.  Moves hands to reach for tube and moves head to side.  10/31 Was profound on admission, but overall improved and then continued to wax/wane. Worsened over past 72 hr. I did not see patient Monday, but RN today had him Monday and says he was more interactive and awake that day.  Suspect related to hospital delirium and also UTI (diagnosed 10/29)  Currently strict NPO.  CT brain normal. Labs otherwise unremarkable.  Consulted neurology to evaluate. Dr. Christopher planning more prolonged EEG.

## 2024-11-02 NOTE — ASSESSMENT & PLAN NOTE
11/2 - worsening bibasilar effusions seen on CXR  -likely contributing to respiratory tachypnea  -acute on chronic CHF contributing as well as PNA  -will give gentle IV diuresis, monitor closely given prior soft BPs

## 2024-11-02 NOTE — NURSING NOTE
RN at bedside with Treasure NP and son. RN ordered to place tube feed at goal rate and order changed accordingly. Patient given PRN compazine for hiccup like abdominal movements per NP. One time order of IV lasix given.

## 2024-11-02 NOTE — PROGRESS NOTES
Palliative Care Progress Note    Date of Admission: 10/16/2024    Patient is a 83 y.o. male admitted with NSTEMI (non-ST elevated myocardial infarction) (Multi).   Remains encephalopathic, opening eyes to noxious stimuli. Son Hector at bedside. TF now at goal. Tolerating. Voiding, 800ml UO in 24hrs.     Scheduled medications  aspirin, 81 mg, oral, Daily  carvedilol, 3.125 mg, oral, BID after meals  enoxaparin, 40 mg, subcutaneous, Daily  insulin lispro, 0-5 Units, subcutaneous, q6h  magnesium oxide, 400 mg, oral, Daily  midodrine, 5 mg, oral, TID  ofloxacin, 1 drop, Left Eye, 4x daily  oxygen, , inhalation, Continuous - 02/gases  pantoprazole, 40 mg, intravenous, Daily  piperacillin-tazobactam, 3.375 g, intravenous, q6h  pravastatin, 40 mg, oral, Nightly  tamsulosin, 0.8 mg, oral, Nightly  thiamine, 100 mg, nasogastric tube, Daily      Continuous medications       PRN medications  PRN medications: acetaminophen **OR** acetaminophen, aminophylline, dextrose, dextrose, glucagon, glucagon, melatonin, prochlorperazine, prochlorperazine     Results for orders placed or performed during the hospital encounter of 10/16/24 (from the past 24 hours)   POCT GLUCOSE   Result Value Ref Range    POCT Glucose 163 (H) 74 - 99 mg/dL   POCT GLUCOSE   Result Value Ref Range    POCT Glucose 176 (H) 74 - 99 mg/dL   CBC   Result Value Ref Range    WBC 6.7 4.4 - 11.3 x10*3/uL    nRBC 0.3 (H) 0.0 - 0.0 /100 WBCs    RBC 2.50 (L) 4.50 - 5.90 x10*6/uL    Hemoglobin 7.5 (L) 13.5 - 17.5 g/dL    Hematocrit 21.7 (L) 41.0 - 52.0 %    MCV 87 80 - 100 fL    MCH 30.0 26.0 - 34.0 pg    MCHC 34.6 32.0 - 36.0 g/dL    RDW 13.9 11.5 - 14.5 %    Platelets 232 150 - 450 x10*3/uL   Magnesium   Result Value Ref Range    Magnesium 1.71 1.60 - 2.40 mg/dL   Renal Function Panel   Result Value Ref Range    Glucose 201 (H) 74 - 99 mg/dL    Sodium 133 (L) 136 - 145 mmol/L    Potassium 3.5 3.5 - 5.3 mmol/L    Chloride 101 98 - 107 mmol/L    Bicarbonate 25 21 - 32  mmol/L    Anion Gap 11 10 - 20 mmol/L    Urea Nitrogen 32 (H) 6 - 23 mg/dL    Creatinine 0.90 0.50 - 1.30 mg/dL    eGFR 86 >60 mL/min/1.73m*2    Calcium 7.8 (L) 8.6 - 10.3 mg/dL    Phosphorus 2.1 (L) 2.5 - 4.9 mg/dL    Albumin 2.1 (L) 3.4 - 5.0 g/dL   POCT GLUCOSE   Result Value Ref Range    POCT Glucose 202 (H) 74 - 99 mg/dL   POCT GLUCOSE   Result Value Ref Range    POCT Glucose 295 (H) 74 - 99 mg/dL        XR chest 1 view    Result Date: 10/31/2024  Interpreted By:  Laurent Elam, STUDY: XR CHEST 1 VIEW; 10/31/2024 11:12 am   INDICATION: CLINICAL INFORMATION: Signs/Symptoms:Tube placement check.   COMPARISON: 10/31/2024 at 1054 hours   ACCESSION NUMBER(S): [id number]   ORDERING CLINICIAN: DONNA CEJA   TECHNIQUE: Portable chest one view.   FINDINGS: The cardiac size is indeterminate in view of the AP projection. Feeding tube is been repositioned with the tube now noted to be coiled within the stomach. Bibasilar infiltrates are present.       1. Feeding tube is present with extending into the stomach with the tube coiled within the region of the body and fundus. 2. Bibasilar infiltrates.   MACRO: none   Signed by: Laurent Elam 10/31/2024 12:07 PM Dictation workstation:   [id number]    XR chest 1 view    Result Date: 10/31/2024  Interpreted By:  Laurent Elam, STUDY: XR CHEST 1 VIEW; 10/31/2024 11:11 am   INDICATION: CLINICAL INFORMATION: Signs/Symptoms:Verification of nasogastric tube location.   COMPARISON: 10/29/2024 at 1042 hours   ACCESSION NUMBER(S): [id number]   ORDERING CLINICIAN: KIMBERLEY BLAS   TECHNIQUE: Portable chest one view.   FINDINGS: The cardiac size is indeterminate in view of the AP projection. Nasogastric tube is coiled within the mid chest along the course of the thoracic esophagus. Bibasilar infiltrate is suspected. There is a limited depth of inspiration on the exam.       Nasogastric tube is coiled within the midthoracic esophagus. Probable bibasilar infiltrates although the  exam is limited by poor depth of inspiration.   MACRO: none   Signed by: Laurent Elam 10/31/2024 12:05 PM Dictation workstation:   [id number]    XR chest 1 view    Result Date: 10/29/2024  Interpreted By:  Laurent Elam, STUDY: XR CHEST 1 VIEW; 10/29/2024 11:22 am   INDICATION: CLINICAL INFORMATION: Signs/Symptoms:cough.   COMPARISON: 10/16/2024   ACCESSION NUMBER(S): [id number]   ORDERING CLINICIAN: RICHARD ADAIR   TECHNIQUE: Portable chest one view.   FINDINGS: The cardiac size is indeterminate in view of the AP projection. Patient is slightly rotated to the left. There is a thoracic dextroscoliosis. Alveolar infiltrate is present within left lower lobe consistent with pneumonia. Right hemithorax is clear. No effusions are identified.       There is a new left lower lobe infiltrate consistent with pneumonia. Follow-up to assure complete clearing is suggested.   MACRO: none   Signed by: Laurent Elam 10/29/2024 2:55 PM Dictation workstation:   [id number]    CT head wo IV contrast    Result Date: 10/29/2024  Interpreted By:  Brielle Linares, STUDY: CT HEAD WO IV CONTRAST; ;  10/29/2024 1:26 pm   INDICATION: Signs/Symptoms:sudden onset confusion.   COMPARISON: 10/16/2024   ACCESSION NUMBER(S): [id number]   ORDERING CLINICIAN: RICHARD ADAIR   TECHNIQUE: Serial axial images of the head were obtained without intravenous contrast. Sagittal and coronal reconstructions were generated.   FINDINGS: Trickles are midline and enlarged. There is prominence of the cortical sulci and superior cerebellar cisterns.   There are no acute parenchymal abnormalities. There is no hemorrhage or extra-axial fluid.   There is no obvious scalp hematoma or skull fracture.   Visualized portions of the paranasal sinuses and mastoids are unremarkable.   COMPARISON OF FINDINGS: The brain is similar.       No acute intracranial abnormality.     MACRO: none   Signed by: Brielle Linares 10/29/2024 2:19 PM Dictation workstation:    [id number]    Cardiac Catheterization Procedure    Result Date: 10/24/2024           [facility] [id number] [place] [address]            Phone [phone] Cardiovascular Catheterization Report Patient Name:     AMARILYS FARAH      Performing Physician:  Virginia Chavez DO Study Date:       10/23/2024           Verifying Physician:   Virginia Chavez DO MRN/PID:          [id number]             Cardiologist/Co-Scrub: Accession#:       [id number]         Ordering Provider:     Virginia CHAVEZ Date of           1940 / 83      Cardiologist: Birth/Age:        years Gender:           M                    Fellow: Encounter#:       [id number]           Surgeon:  Study: Left Heart Cath  Indications: AMARILYS FARAH is a 84 year old male who presents with dyslipidemia and an asymptomatic chest pain assessment. Cardiomyopathy, left ventricular dysfunction and NSTE - ACS. Stress test performed: No. CTA performed: NoShey Calvillo accessed: No. LVEF Assessed: No. Cardiac arrest: No. Cardiac surgical consult: No. Cardiovascular Instability: No Frailty status of patient entering lab: 6 = Moderately frail.  Coronary Angiography: The coronary circulation is right dominant.  Coronary Angiography Comments: We obtained informed consent with the help of his son Jorge A because the patient is deaf, and he was brought to the cardiac catheterization lab with the timeout verified between his computer medical record number and his arm wristband. Both groins were prepped and draped in a sterile fashion and the remainder of the procedure performed under sterile technique. 10 cc of 1% lidocaine used to the right groin for local anesthesia and Versed 2 mg and fentanyl 50 mcg for intravenous sedation. Used single wall  micropuncture technique to access the right common femoral artery and a micropuncture sheath was inserted, then exchanged over guidewire for a 6 Albanian Creighton sheath. 6 Albanian JR4 JL 4 and pigtail catheters were used and catheters were advanced and withdrawn over the guidewire without difficulty. At the end of the procedure the sheath was removed and an Angio-Seal closure device was deployed with good hemostasis and he was returned to his telemetry bed in stable condition. Coronary angiography: 1. The left main was a large vessel and bifurcated into the LAD and circumflex and was normal 2 the LAD was a large vessel that extends to the apex and was widely patent with CHEYANNE grade III flow. The proximal LAD had an eccentric 40-50% irregular stenosis that was presumed to be the site of previous infarct vessel occlusion with spontaneous recanalization. The remaining mid and distal LAD and its branches had mild diffuse nonobstructive disease 3. The circumflex was a modest nondominant vessel that gave rise to 3 modest PLV branches and had no obstructive disease.  4. The right coronary artery was a dominant vessel that had mild 30-40% ostial /proximal stenosis and an eccentric 50% mid vessel stenosis, then gives rise to a modest sized small caliber PDA and small caliber distal RCA without obstruction. Left ventriculogram performed in the VALLES 30 projection showed persistent modest hypokinesis of the mid anterior, anterolateral segments and severe hypokinesis of the LV apical segment with left ventricular ejection fraction estimated at 40 to 45%. Impression: 1.40% proximal LAD stenosis suggesting previous vessel occlusion with spontaneous recanalization. 2. 50% mid RCA stenosis. 3. Ischemic cardiomyopathy with residual anterior and anteroapical left ventricular hypokinesis with left ventricular ejection fraction 40 to 45%. 3. Nuclear stress test 10/22/2024 suggests viability in the anterior and apical segments without  ischemia and with left ventricular ejection fraction estimated at 44%.   Hemo Personnel: +----------------+---------+ Name            Duty      +----------------+---------+ Haja Mckeon MD 1 +----------------+---------+  Hemodynamic Pressures:  +----+---------------+----------+-------------+--------------+-------+---------+ Site   Date Time   Phase NameSystolic mmHgDiastolic mmHgED mmHgMean mmHg +----+---------------+----------+-------------+--------------+-------+---------+   AO     10/23/2024  AIR REST          116            57              81          8:56:33 AM                                                      +----+---------------+----------+-------------+--------------+-------+---------+   LV     10/23/2024  AIR REST          103             2      8                   9:07:45 AM                                                      +----+---------------+----------+-------------+--------------+-------+---------+   LV     10/23/2024  AIR REST          109            -2      7                   9:07:57 AM                                                      +----+---------------+----------+-------------+--------------+-------+---------+  LVp     10/23/2024  AIR REST          106             0     12                   9:08:06 AM                                                      +----+---------------+----------+-------------+--------------+-------+---------+  AOp     10/23/2024  AIR REST          120            52              76          9:08:16 AM                                                      +----+---------------+----------+-------------+--------------+-------+---------+   AO     10/23/2024  AIR REST            0             0             -96          9:32:52 AM                                                       +----+---------------+----------+-------------+--------------+-------+---------+  Cardiac Cath Post Procedure Notes: Post Procedure Diagnosis: Double vessel disease. Blood Loss:               Estimated blood loss during the procedure was 10ml                           mls. Specimens Removed:        Number of specimen(s) removed: none.  ICD 10 Codes: Non ST elevation (NSTEMI) myocardial infarction-I21.4  CPT Codes: Left Heart Cath (visualization of coronaries) and LV-15562  [id number] Haja Mckeon DO Performing Physician Electronically signed by [id number] Haja Mckeon DO on 10/24/2024 at 9:06:38 AM  ** Final **     Cardiology Interpretation Of Nuclear Stress - See Other Report For Nuclear Portion    Result Date: 10/23/2024           [place] [address]            Phone [phone] Nuclear Pharmacologic Stress Test Patient Name:      AMARILYS FARAH     Ordering Provider:     [id number] COOPER RATLIFF Study Date:        10/22/2024          Reading Physician:     [id number] Fabien Pelaez MD MRN/PID:           [id number]            Supervising Physician: [id number] Pelaez MD Accession#:        [id number]        Fellow: Date of Birth/Age: 1940 / 83     Fellow:                    years Gender:            M                   Nurse:                 Inez Holley RN Admit Date:                            Technician:            Marina Feliciano Admission Status:                      Sonographer:           LORRIE Height:            182.88 cm           Technologist: Weight:            62.60 kg            Additional Staff: BSA:               1.82 m2             Encounter#:            [id number] BMI:               18.72 kg/m2         Patient Location: Study  Type:    CARDIOLOGY INTERPRETATION OF NUCLEAR STRESS Diagnosis/ICD: NonST elevation (NSTEMI) myocardial infarction-I21.4 Indication:    CARDIOMYOPATHY EF 15-20% CPT Codes:     Stress Test Interpretation-05759; Stress Test Supervision-92323 Falls Risk: Low: Patient has low risk for sustaining a fall; environmental safety interventions in place.  Study Details: Correct procedure and correct patient verified verbally and with                ID Band checked.  Patient History: Hyperlipidemia and congestive heart failure. NSTEMI, IDIOPATHIC HYPOTENSION.  Medications: ASPIRIN, CARVEDILOL, LOVENOX, PRAVASTATIN, SPIRONOLACTONE, MIDODRINE. The patient took medications as prescribed.  Patient Performance: Patient received a total of 0.4 mg of Regadenoson at 11:41:48 AM. The resting blood pressure was 114/69 mmHg with a heart rate of 81 bpm. The patient developed no symptoms during the stress exam. The blood pressure response was normal. The test was terminated due to: completed lab protocol and end of vasodilator infusion. Patient has met the discharge criteria and is discharged to their floor.  Baseline ECG: Resting ECG showed normal sinus rhythm with nonspecific ST-T wave changes. Artifact.  Stress ECG: Stress ECG showed normal sinus rhythm, with frequent premature ventricular contractions. No ST changes. Essentially negative ECG stress test for ischemia with a Lexiscan infusion. Please get the nuclear imaging report from radiology department.  Stress Stage Data: +----------------+--+------+-------+                 HRSys BPDias BP +----------------+--+------+-------+ Baseline [id number]   69      +----------------+--+------+-------+  Recovery ECG: Recovery ECG showed normal sinus rhythm, with artifact.  +------------+--+------+-------+             HRSys BPDias BP +------------+--+------+-------+ Recovery I  8295    57      +------------+--+------+-------+ Recovery II 1806 15       +------------+--+------+-------+ Recovery [id number]    41      +------------+--+------+-------+ Recovery IV 8592    52      +------------+--+------+-------+ Recovery V  7392    54      +------------+--+------+-------+  Summary:  1. Adequate level of stress achieved.  2. Correlate with myocardial perfusion imaging results.  3. Essentially negative ECG stress test for ischemia with a Lexiscan infusion. Please get the nuclear imaging report from radiology department.  4. No clinical or electrocardiographic evidence for ischemia at maximal infusion.  5. No ECG changes from baseline.  6. Normal Stress Test.  7. Nuclear image results are reported separately. [id number] Fabien Pelaez MD Electronically signed on 10/23/2024 at 9:39:18 AM   ** Final **     Nuclear Stress Test    Result Date: 10/22/2024  Interpreted By:  Chapito García and Ogievich Taessa STUDY: NUCLEAR STRESS TEST; 10/22/2024 2:18 pm   INDICATION: Signs/Symptoms:Cardiomyopathy, EF 15-20%.   COMPARISON: None.   ACCESSION NUMBER(S): [id number]   ORDERING CLINICIAN: COOPER RATLIFF   TECHNIQUE: DIVISION OF NUCLEAR MEDICINE PHARMACOLOGIC STRESS MYOCARDIAL PERFUSION SCAN, ONE DAY PROTOCOL   The patient received an intravenous dose of  8.8 mCi of Tc-99m Myoview and resting emission tomographic (SPECT) images of the myocardium were acquired. The patient then received an intravenous infusion of 0.4mg regadenoson (Lexiscan)  followed by an additional dose of  25.1 mCi of Tc-99m  Myoview. Stress phase SPECT images of the myocardium were then acquired. These included ECG-gated images to assess and quantify ventricular function.  A low-dose, nondiagnostic regional CT was utilized for attenuation correction purposes.   FINDINGS: Both stress and rest studies demonstrate grossly normal perfusion throughout the left ventricle.   The left ventricle is normal in size.   Gated images demonstrate mild global hypokinesis of the LV wall motion with a post-stress LV EF  estimated at 44%.   Attenuation correction CT images demonstrate no gross anatomic abnormalities.       1.  No evidence of inducible ischemia or prior infarction. 2. Left ventricle is normal in size. 3. Mild global hypokinesis of the LV wall motion with a post-stress LV EF estimated at 44%.   I personally reviewed the images/study and I agree with the findings as stated by Viry Up DO, PGY-3. This study was interpreted at [facility], [place], [place].   MACRO: None   Signed by: Chapito García 10/22/2024 3:15 PM Dictation workstation:   [id number]    Electrocardiogram, 12-lead PRN ACS symptoms    Result Date: 10/22/2024  Poor data quality in current ECG precludes serial comparison Sinus tachycardia with short VT with Premature supraventricular complexes and with frequent Premature ventricular complexes Indeterminate axis Pulmonary disease pattern Nonspecific ST and T wave abnormality Abnormal ECG When compared with ECG of 16-OCT-2024 20:40, (unconfirmed) Previous ECG has undetermined rhythm, needs review Questionable change in QRS duration Confirmed by Fer Vazquez ([id number]) on 10/22/2024 12:52:29 PM    MR brain wo IV contrast    Result Date: 10/19/2024  Interpreted By:  Hugo Rico  and Capo Burns STUDY: MR BRAIN WO IV CONTRAST;  10/19/2024 3:36 pm   INDICATION: Signs/Symptoms:altered mental status.     COMPARISON: CT of the head obtained October 16, 2024   ACCESSION NUMBER(S): [id number]   ORDERING CLINICIAN: ROMULO JOHNSON   TECHNIQUE: Axial T2, FLAIR, DWI, gradient echo T2 and sagittal and coronal T1 weighted images of brain were acquired.  No contrast was administered.   FINDINGS: Midline brain structures are intact on mid sagittal imaging.   There is no territorial restricted diffusion to suggest acute intracranial infarct. No suspicious T2 signal hypointensity noted on gradient sequencing.   There is no evidence of acute intracranial hemorrhage. No intracranial mass  or mass effect. No midline shift. No loss of gray-white differentiation. Few scattered supratentorial white matter FLAIR signal hyperintensities are observed.  Basilar cisterns appear intact. Moderate dilatation of the lateral and 3rd greater than 4th ventricles at least principally on the basis of global parenchymal volume loss.   Normal T2 vascular flow voids are seen.   T2 hyperintensity within the maxillary sinuses is consistent with mucosal inflammatory paranasal sinus disease.. Status post bilateral lens replacements.   There is no mastoid effusion. Moderate effusions of the occipital lateral mass C1 articulation bilaterally may be on a degenerative basis.       No MR evidence of acute intracranial infarct, hemorrhage, mass, or mass effect.   I personally reviewed the images/study and I agree with the findings as stated. This study was interpreted at [place], [place].   MACRO: None   Signed by: Hugo Rico 10/19/2024 4:41 PM Dictation workstation:   [id number]    EEG    IMPRESSION Impression This routine awake and drowsy EEG is indicative of a moderate diffuse encephalopathy. No epileptiform activity or lateralizing signs seen. A full report will be scanned into the patient's chart at a later time. This report has been interpreted and electronically signed by    Transthoracic Echo (TTE) Complete    Result Date: 10/17/2024           [place] [address]            Phone [phone] TRANSTHORACIC ECHOCARDIOGRAM REPORT Patient Name:     AMARILYS Dawkins Physician:   [id number] Jose David Workman DO Study Date:       10/17/2024           Ordering Provider:   [id number] CECIL HOWARD MRN/PID:          [id number]             Fellow: Accession#:       [id number]         Nurse: Date of           1940 / 83      Sonographer:         R Birth/Age:        years Gender:            M                    Additional Staff: Height:           177.80 cm            Admit Date: Weight:           65.77 kg             Admission Status:    Inpatient - Routine BSA / BMI:        1.82 m2 / 20.81      Department Location: [facility]                   kg/m2 Blood Pressure: 97 /74 mmHg Study Type:    TRANSTHORACIC ECHO (TTE) COMPLETE Diagnosis/ICD: Non ST elevation (NSTEMI) myocardial infarction-I21.4 Indication:    NSTEMI CPT Codes:     Echo Complete w Full Doppler-20328 Patient History: Diabetes:          Yes Pertinent History: HTN, Hyperlipidemia, CAD, Chest Pain and CHF. Renal dx III. Study Detail: The following Echo studies were performed: 2D, M-Mode, Doppler and               color flow. Technically challenging study due to poor acoustic               windows and patient lying in supine position.  PHYSICIAN INTERPRETATION: Left Ventricle: The left ventricular systolic function is severely decreased, with a visually estimated ejection fraction of 15-20%. There are multiple wall motion abnormalities. The left ventricular cavity size is moderately dilated. There is mild concentric left ventricular hypertrophy. Left ventricular diastolic filling was indeterminate. LV Wall Scoring: The entire apex, mid and apical anterior wall, mid and apical anterior septum, mid and apical inferior septum, mid and apical inferior wall, mid inferolateral segment, and mid anterolateral segment are akinetic. All remaining scored segments are normal. Left Atrium: The left atrium is normal in size. Right Ventricle: The right ventricle is normal in size. There is normal right ventricular global systolic function. Right Atrium: The right atrium is normal in size. Aortic Valve: The aortic valve is probably trileaflet. The aortic valve dimensionless index is 0.70. There is no evidence of aortic valve regurgitation. The peak instantaneous gradient of the aortic valve is 2.7 mmHg. The mean gradient of the aortic valve is  1.4 mmHg. Mitral Valve: The mitral valve is normal in structure. There is no evidence of mitral valve regurgitation. Tricuspid Valve: The tricuspid valve is structurally normal. No evidence of tricuspid regurgitation. Pulmonic Valve: The pulmonic valve is not well visualized. The pulmonic valve regurgitation was not well visualized. Pericardium: No pericardial effusion noted. Aorta: The aortic root is normal.  CONCLUSIONS:  1. Multiple segmental abnormalities exist. See findings.  2. The left ventricular systolic function is severely decreased, with a visually estimated ejection fraction of 15-20%.  3. There are multiple wall motion abnormalities.  4. Left ventricular diastolic filling was indeterminate.  5. Left ventricular cavity size is moderately dilated.  6. There is normal right ventricular global systolic function.  7. 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 QUANTITATIVE DATA SUMMARY:  2D MEASUREMENTS:           Normal Ranges: Ao Root s:       3.51 cm IVSd:            0.79 cm   (0.6-1.1cm) LVPWd:           0.76 cm   (0.6-1.1cm) LVIDd:           4.44 cm   (3.9-5.9cm) LVIDs:           3.81 cm LV Mass Index:   58.6 g/m2 LV % FS          14.0 %  LA VOLUME:          Normal Ranges: LA Vol A4C: 45.2 ml  RA VOLUME BY A/L METHOD:          Normal Ranges: RA Area A4C:             12.0 cm2  LV SYSTOLIC FUNCTION BY 2D PLANIMETRY (MOD):                      Normal Ranges: EF-A4C View:    16 % (>=55%) EF-A2C View:    5 % EF-Biplane:     14 % EF-Visual:      18 % EF-3DQ:         19 % LV EF Reported: 18 %  LV DIASTOLIC FUNCTION:           Normal Ranges: MV Peak E:             0.45 m/s  (0.7-1.2 m/s) MV Peak A:             0.88 m/s  (0.42-0.7 m/s) E/A Ratio:             0.51      (1.0-2.2) MV e'                  0.073 m/s (>8.0) MV lateral e'          0.11 m/s MV medial e'           0.04 m/s E/e' Ratio:            6.16      (<8.0)  MITRAL VALVE:          Normal Ranges: MV DT:         110 msec (150-240msec)  AORTIC VALVE:                     Normal Ranges: AoV Vmax:                0.82 m/s (<=1.7m/s) AoV Peak P.7 mmHg (<20mmHg) AoV Mean P.4 mmHg (1.7-11.5mmHg) LVOT Max Florencio:            0.61 m/s (<=1.1m/s) AoV VTI:                 17.99 cm (18-25cm) LVOT VTI:                12.65 cm LVOT Diameter:           2.00 cm  (1.8-2.4cm) AoV Area, VTI:           2.20 cm2 (2.5-5.5cm2) AoV Area,Vmax:           2.32 cm2 (2.5-4.5cm2) AoV Dimensionless Index: 0.70  RIGHT VENTRICLE: RV Basal 3.30 cm RV Mid   2.70 cm RV Major 4.8 cm  TRICUSPID VALVE/RVSP:          Normal Ranges: Peak TR Velocity:     2.26 m/s RV Syst Pressure:     23 mmHg  (< 30mmHg)  PULMONIC VALVE:          Normal Ranges: PV Max Florencio:     0.6 m/s  (0.6-0.9m/s) PV Max P.3 mmHg PV Mean P.5 mmHg PV VTI:         6.59 cm  AORTA: Asc Ao Diam 0.00 cm  [id number] [facility] Electronically signed on 10/17/2024 at 11:00:02 AM  Wall Scoring  ** Final (Updated) **     ECG 12 lead    Result Date: 10/17/2024  Normal sinus rhythm Left axis deviation Low voltage QRS Anteroseptal infarct Inferior infarct , age undetermined Abnormal ECG When compared with ECG of 2018 16:39, Significant changes have occurred Confirmed by Rell Vaughan ([id number]) on 10/17/2024 11:32:49 AM    XR chest 1 view    Result Date: 10/16/2024  Interpreted By:  Lobo Farrar, STUDY: XR CHEST 1 VIEW;  10/16/2024 9:27 pm   INDICATION: Signs/Symptoms:malaise.   COMPARISON: Chest x-ray 2021   ACCESSION NUMBER(S): [id number]   ORDERING CLINICIAN: MICHELLE MARTE   FINDINGS: Multiple overlying leads are present.   CARDIOMEDIASTINAL SILHOUETTE: Cardiomediastinal silhouette is normal in size and configuration. Atherosclerotic calcification of the aorta.   LUNGS: No consolidation, pleural effusion or pneumothorax.   ABDOMEN: Surgical clips noted in the left upper quadrant.   BONES: Multilevel degenerative changes of the spine.       No acute  cardiopulmonary process.   MACRO: None   Signed by: Lobo Farrar 10/16/2024 9:52 PM Dictation workstation:   [id number]    CT head wo IV contrast    Result Date: 10/16/2024  Interpreted By:  Ori Asher, STUDY: CT HEAD WO IV CONTRAST;  10/16/2024 9:18 pm   INDICATION: Signs/Symptoms:malaise/fatigue; AMS.     COMPARISON: 04/18/2021   ACCESSION NUMBER(S): [id number]   ORDERING CLINICIAN: MICHELLE MARTE   TECHNIQUE: Noncontrast axial CT scan of head was performed. Angled reformats in brain and bone windows were generated. The images were reviewed in bone, brain, blood and soft tissue windows.   FINDINGS: CSF Spaces: The ventricles, sulci and basal cisterns are within normal limits. There is no extraaxial fluid collection.   Parenchyma: There are periventricular white matter changes noted. The grey-white differentiation is intact. There is no mass effect or midline shift.  There is no intracranial hemorrhage.   Calvarium: The calvarium is unremarkable.   Paranasal sinuses and mastoids: Visualized paranasal sinuses and mastoids are clear.       No evidence of acute cortical infarct or intracranial hemorrhage.       MACRO: None   Signed by: Ori Asher 10/16/2024 9:28 PM Dictation workstation:   [id number]       Vitals:    11/02/24 1500   BP: 145/73   Pulse:    Resp:    Temp: 37.6 °C (99.7 °F)   SpO2: 92%         Physical Exam  Vitals and nursing note reviewed.   Constitutional:       General: He is not in acute distress.     Appearance: He is ill-appearing.      Comments: Thin and frail appearing, lying in bed, somnolent   HENT:      Nose:      Comments: Dobhoff in place and bridled       Mouth/Throat:      Mouth: Mucous membranes are dry.      Pharynx: Oropharynx is clear.   Eyes:      Extraocular Movements: Extraocular movements intact.   Cardiovascular:      Rate and Rhythm: Normal rate and regular rhythm.      Pulses: Normal pulses.      Heart sounds: No murmur heard.  Pulmonary:      Effort: Pulmonary  effort is normal. No respiratory distress.      Breath sounds: No wheezing or rales.   Abdominal:      General: There is no distension.      Palpations: Abdomen is soft.      Tenderness: There is no abdominal tenderness. There is no guarding.   Musculoskeletal:      Right lower leg: No edema.      Left lower leg: No edema.      Comments: Moves all extremities     Skin:     General: Skin is warm and dry.      Coloration: Skin is pale.   Neurological:      General: No focal deficit present.      Motor: Weakness present.      Comments: Lethargic but does respond to tactile stimuli   Psychiatric:      Comments: calm          Assessment/Plan   IMP:    HFrEF - EF improved s/p PCI. On BB  NSTEMI - aspirin, BB  2.  Hypotension - on midodrine tid  3.  Acute metabolic encephalopathy - baseline A&O x3, worsened, now NPO. Neuro ordered EEG.  4.  Severe protein calorie malnutrition-family ok with corpak temporarily, but no long term feeding tubes.   5.  Electrolyte derangements - receiving supplementations  6.  PNA-zosyn, LLL on CXR, suspect aspiration, on 4 L oxygen  7.  Urinary Retention-urology attempted coudet, unable. In continues to retain, would need to consider cytoscopy with dilation, family discussing, but seems to be in agreement that they would proceed with this if necessary to provide symptom relief.      Palliative Care Encounter  DNR-DNI  The patient is not capable  Son Jorge A is primary HPOA, son Rich is first alternate agent    11/2  Remains encephalopathic, no significant improvement. Corpak in place, tolerating TF. Discussed with son at bedside, cont treatment model of care, awaiting EEG results.     11/1  Hemoglobin trending downwards, no signs of active GI bleeding, Corpak in place with tube feeds infusing at very slow rate, currently at 10, goal is 50, per nursing note, tube feed rate was turned down at 0900 for reports of increased coughing.  Continues on IV antibiotics with stable white blood cell  count, afebrile.  Blood pressure low stable.  Remains somnolent but seems more responsive.    Continue aggressive treatment model of care, monitor overall patient condition, if progressively worsening despite aggressive treatment and/or no significant improvement after sufficient course of IV antibiotics, family willing to readdress goals at that time.    Total time 35 minutes.    Avani Gray, APRN-CNP

## 2024-11-02 NOTE — PROGRESS NOTES
Neurology Follow Up    Referred by: No ref. provider found, PCP: Anjelica Pacheco, APRN-CNP    Subjective:  Mr. Mccallum is a 83 y.o. male admitted 10/16/2024 LOS day 17, consulted for altered mental status.    Condition unchanged remains encephalopathic, slightly better than described previously.    Objective   Blood pressure 144/72, pulse 81, temperature 37.8 °C (100 °F), temperature source Temporal, resp. rate 20, height 1.829 m (6'), weight 65.5 kg (144 lb 6.4 oz), SpO2 94%.    Physical Exam:  Neurological Exam:  General: eye open to light tactile stimulation, pt noted to have irregular abdominal breathing.    Neuro: Attends to the examiner visually, he is deaf no sign language interpreters available  Withdrawn in all 4 ext. to nox stimuli.   No rigidity noted, No abnormal movements noted.   Reflexes:      R          L  BR:               2          2  Biceps:         2          2  Triceps:        2          2  Knee:           2          2  Ankle:          2          2  Babinski: toes down to plantar stimulation bilaterally.   No clonus.      Reviewed relevant results, independent review of imaging:   Encounter Date: 10/16/24   Electrocardiogram, 12-lead PRN ACS symptoms   Result Value    Ventricular Rate 201    Atrial Rate 201    UT Interval 90    QRS Duration 16    QT Interval 216    QTC Calculation(Bazett) 395    R Axis 0    T Axis 135    QRS Count 32    Q Onset 241    P Onset 196    P Offset 239    T Offset 349    QTC Fredericia 323    Narrative    Poor data quality in current ECG precludes serial comparison  Sinus tachycardia with short UT with Premature supraventricular complexes and with frequent Premature ventricular complexes  Indeterminate axis  Pulmonary disease pattern  Nonspecific ST and T wave abnormality  Abnormal ECG  When compared with ECG of 16-OCT-2024 20:40, (unconfirmed)  Previous ECG has undetermined rhythm, needs review  Questionable change in QRS duration  Confirmed by Fer Vazquez ([id number])  on 10/22/2024 12:52:29 PM            BNP   Date/Time Value Ref Range Status   10/16/2024 08:54  (H) 0 - 99 pg/mL Final     EEG from 10/17/2024 routine was awake and drowsy indicative of moderate diffuse encephalopathy.  Nothing epileptiform.    EEG from 11/1/2024 pending read      Assessment:   aDt Mccallum is a 83 y.o. male with hx of T2DM, HTN, HLD, CKD3, BPH, deafness presenting with AMS/lethargy, found with NTEMI, SIRS, HF with EF of 15-20%. Neurology consulted for worsened AMS. Pt mentation seems to be waxing and weaning. Routine EEG showed diffuse encephalopathy. No witnessed seizures, abnormal movements. No prior hx of seizures. We are currently awaiting prolonged EEG.      Working dx is most likely toxic/metabolic encephalopathy.       Recommendations:   - Will await prolonged EEG at this time.        I spent 25 minutes in the professional and overall care of this patient.      Gregor Leonard MD  Neurology and Neuromuscular Medicine   [facility]  [facility] [place]

## 2024-11-03 ENCOUNTER — APPOINTMENT (OUTPATIENT)
Dept: RADIOLOGY | Facility: HOSPITAL | Age: 84
End: 2024-11-03
Payer: MEDICARE

## 2024-11-03 VITALS
SYSTOLIC BLOOD PRESSURE: 106 MMHG | BODY MASS INDEX: 19.56 KG/M2 | DIASTOLIC BLOOD PRESSURE: 57 MMHG | RESPIRATION RATE: 19 BRPM | TEMPERATURE: 97.2 F | OXYGEN SATURATION: 93 % | HEIGHT: 72 IN | HEART RATE: 82 BPM | WEIGHT: 144.4 LBS

## 2024-11-03 LAB
ALBUMIN SERPL BCP-MCNC: 2 G/DL (ref 3.4–5)
ALP SERPL-CCNC: 67 U/L (ref 33–136)
ALT SERPL W P-5'-P-CCNC: 43 U/L (ref 10–52)
ANION GAP SERPL CALCULATED.3IONS-SCNC: 34 MMOL/L (ref 10–20)
ANION GAP SERPL CALCULATED.3IONS-SCNC: 9 MMOL/L (ref 10–20)
AST SERPL W P-5'-P-CCNC: 49 U/L (ref 9–39)
BILIRUB SERPL-MCNC: 0.5 MG/DL (ref 0–1.2)
BUN SERPL-MCNC: 20 MG/DL (ref 6–23)
BUN SERPL-MCNC: 22 MG/DL (ref 6–23)
CALCIUM SERPL-MCNC: 6.5 MG/DL (ref 8.6–10.3)
CALCIUM SERPL-MCNC: 7.8 MG/DL (ref 8.6–10.3)
CHLORIDE SERPL-SCNC: 83 MMOL/L (ref 98–107)
CHLORIDE SERPL-SCNC: 97 MMOL/L (ref 98–107)
CO2 SERPL-SCNC: 24 MMOL/L (ref 21–32)
CO2 SERPL-SCNC: 28 MMOL/L (ref 21–32)
CREAT SERPL-MCNC: 0.9 MG/DL (ref 0.5–1.3)
CREAT SERPL-MCNC: 0.92 MG/DL (ref 0.5–1.3)
EGFRCR SERPLBLD CKD-EPI 2021: 84 ML/MIN/1.73M*2
EGFRCR SERPLBLD CKD-EPI 2021: 86 ML/MIN/1.73M*2
ERYTHROCYTE [DISTWIDTH] IN BLOOD BY AUTOMATED COUNT: 13.6 % (ref 11.5–14.5)
GLUCOSE BLD MANUAL STRIP-MCNC: 105 MG/DL (ref 74–99)
GLUCOSE BLD MANUAL STRIP-MCNC: 174 MG/DL (ref 74–99)
GLUCOSE BLD MANUAL STRIP-MCNC: 271 MG/DL (ref 74–99)
GLUCOSE BLD MANUAL STRIP-MCNC: 364 MG/DL (ref 74–99)
GLUCOSE BLD MANUAL STRIP-MCNC: 382 MG/DL (ref 74–99)
GLUCOSE SERPL-MCNC: 314 MG/DL (ref 74–99)
GLUCOSE SERPL-MCNC: 411 MG/DL (ref 74–99)
HCT VFR BLD AUTO: 20.6 % (ref 41–52)
HGB BLD-MCNC: 7.1 G/DL (ref 13.5–17.5)
LACTATE SERPL-SCNC: 0.5 MMOL/L (ref 0.4–2)
LACTATE SERPL-SCNC: 2.4 MMOL/L (ref 0.4–2)
MAGNESIUM SERPL-MCNC: 1.53 MG/DL (ref 1.6–2.4)
MAGNESIUM SERPL-MCNC: 2.26 MG/DL (ref 1.6–2.4)
MCH RBC QN AUTO: 29.5 PG (ref 26–34)
MCHC RBC AUTO-ENTMCNC: 34.5 G/DL (ref 32–36)
MCV RBC AUTO: 86 FL (ref 80–100)
NRBC BLD-RTO: 0.4 /100 WBCS (ref 0–0)
PHOSPHATE SERPL-MCNC: 1.3 MG/DL (ref 2.5–4.9)
PHOSPHATE SERPL-MCNC: 1.5 MG/DL (ref 2.5–4.9)
PLATELET # BLD AUTO: 231 X10*3/UL (ref 150–450)
POTASSIUM SERPL-SCNC: 3.4 MMOL/L (ref 3.5–5.3)
POTASSIUM SERPL-SCNC: 3.5 MMOL/L (ref 3.5–5.3)
PROT SERPL-MCNC: 6.6 G/DL (ref 6.4–8.2)
RBC # BLD AUTO: 2.41 X10*6/UL (ref 4.5–5.9)
SODIUM SERPL-SCNC: 131 MMOL/L (ref 136–145)
SODIUM SERPL-SCNC: 137 MMOL/L (ref 136–145)
WBC # BLD AUTO: 8 X10*3/UL (ref 4.4–11.3)

## 2024-11-03 PROCEDURE — 2500000004 HC RX 250 GENERAL PHARMACY W/ HCPCS (ALT 636 FOR OP/ED): Performed by: INTERNAL MEDICINE

## 2024-11-03 PROCEDURE — 2500000004 HC RX 250 GENERAL PHARMACY W/ HCPCS (ALT 636 FOR OP/ED): Performed by: HOSPITALIST

## 2024-11-03 PROCEDURE — 80048 BASIC METABOLIC PNL TOTAL CA: CPT | Mod: CCI | Performed by: INTERNAL MEDICINE

## 2024-11-03 PROCEDURE — 83735 ASSAY OF MAGNESIUM: CPT | Performed by: INTERNAL MEDICINE

## 2024-11-03 PROCEDURE — 2500000001 HC RX 250 WO HCPCS SELF ADMINISTERED DRUGS (ALT 637 FOR MEDICARE OP): Performed by: NURSE PRACTITIONER

## 2024-11-03 PROCEDURE — 82947 ASSAY GLUCOSE BLOOD QUANT: CPT

## 2024-11-03 PROCEDURE — 2060000001 HC INTERMEDIATE ICU ROOM DAILY

## 2024-11-03 PROCEDURE — 2500000001 HC RX 250 WO HCPCS SELF ADMINISTERED DRUGS (ALT 637 FOR MEDICARE OP): Performed by: INTERNAL MEDICINE

## 2024-11-03 PROCEDURE — 99232 SBSQ HOSP IP/OBS MODERATE 35: CPT | Performed by: NURSE PRACTITIONER

## 2024-11-03 PROCEDURE — 36415 COLL VENOUS BLD VENIPUNCTURE: CPT | Performed by: HOSPITALIST

## 2024-11-03 PROCEDURE — 82247 BILIRUBIN TOTAL: CPT | Performed by: INTERNAL MEDICINE

## 2024-11-03 PROCEDURE — 71045 X-RAY EXAM CHEST 1 VIEW: CPT | Performed by: RADIOLOGY

## 2024-11-03 PROCEDURE — 2500000002 HC RX 250 W HCPCS SELF ADMINISTERED DRUGS (ALT 637 FOR MEDICARE OP, ALT 636 FOR OP/ED): Performed by: HOSPITALIST

## 2024-11-03 PROCEDURE — 2500000002 HC RX 250 W HCPCS SELF ADMINISTERED DRUGS (ALT 637 FOR MEDICARE OP, ALT 636 FOR OP/ED): Performed by: INTERNAL MEDICINE

## 2024-11-03 PROCEDURE — 71045 X-RAY EXAM CHEST 1 VIEW: CPT

## 2024-11-03 PROCEDURE — 36415 COLL VENOUS BLD VENIPUNCTURE: CPT | Performed by: INTERNAL MEDICINE

## 2024-11-03 PROCEDURE — 2500000002 HC RX 250 W HCPCS SELF ADMINISTERED DRUGS (ALT 637 FOR MEDICARE OP, ALT 636 FOR OP/ED): Performed by: NURSE PRACTITIONER

## 2024-11-03 PROCEDURE — 84100 ASSAY OF PHOSPHORUS: CPT | Performed by: INTERNAL MEDICINE

## 2024-11-03 PROCEDURE — 2500000001 HC RX 250 WO HCPCS SELF ADMINISTERED DRUGS (ALT 637 FOR MEDICARE OP): Performed by: HOSPITALIST

## 2024-11-03 PROCEDURE — 99222 1ST HOSP IP/OBS MODERATE 55: CPT | Performed by: STUDENT IN AN ORGANIZED HEALTH CARE EDUCATION/TRAINING PROGRAM

## 2024-11-03 PROCEDURE — 84100 ASSAY OF PHOSPHORUS: CPT | Performed by: HOSPITALIST

## 2024-11-03 PROCEDURE — 83605 ASSAY OF LACTIC ACID: CPT | Performed by: INTERNAL MEDICINE

## 2024-11-03 PROCEDURE — 2500000005 HC RX 250 GENERAL PHARMACY W/O HCPCS: Performed by: NURSE PRACTITIONER

## 2024-11-03 PROCEDURE — 83735 ASSAY OF MAGNESIUM: CPT | Performed by: HOSPITALIST

## 2024-11-03 PROCEDURE — 85027 COMPLETE CBC AUTOMATED: CPT | Performed by: HOSPITALIST

## 2024-11-03 RX ORDER — INSULIN GLARGINE 100 [IU]/ML
8 INJECTION, SOLUTION SUBCUTANEOUS NIGHTLY
Status: DISCONTINUED | OUTPATIENT
Start: 2024-11-03 | End: 2024-11-08

## 2024-11-03 RX ORDER — MAGNESIUM SULFATE HEPTAHYDRATE 40 MG/ML
2 INJECTION, SOLUTION INTRAVENOUS ONCE
Status: COMPLETED | OUTPATIENT
Start: 2024-11-03 | End: 2024-11-03

## 2024-11-03 RX ADMIN — INSULIN LISPRO 1 UNITS: 100 INJECTION, SOLUTION INTRAVENOUS; SUBCUTANEOUS at 18:00

## 2024-11-03 RX ADMIN — OFLOXACIN 1 DROP: 3 SOLUTION OPHTHALMIC at 06:09

## 2024-11-03 RX ADMIN — PRAVASTATIN SODIUM 40 MG: 40 TABLET ORAL at 20:17

## 2024-11-03 RX ADMIN — MAGNESIUM SULFATE HEPTAHYDRATE 2 G: 40 INJECTION, SOLUTION INTRAVENOUS at 11:49

## 2024-11-03 RX ADMIN — PIPERACILLIN SODIUM AND TAZOBACTAM SODIUM 3.38 G: 3; .375 INJECTION, SOLUTION INTRAVENOUS at 19:24

## 2024-11-03 RX ADMIN — Medication 2 L/MIN: at 07:58

## 2024-11-03 RX ADMIN — ENOXAPARIN SODIUM 40 MG: 40 INJECTION SUBCUTANEOUS at 10:56

## 2024-11-03 RX ADMIN — PANTOPRAZOLE SODIUM 40 MG: 40 INJECTION, POWDER, FOR SOLUTION INTRAVENOUS at 10:56

## 2024-11-03 RX ADMIN — INSULIN LISPRO 5 UNITS: 100 INJECTION, SOLUTION INTRAVENOUS; SUBCUTANEOUS at 10:56

## 2024-11-03 RX ADMIN — OFLOXACIN 1 DROP: 3 SOLUTION OPHTHALMIC at 20:17

## 2024-11-03 RX ADMIN — INSULIN LISPRO 5 UNITS: 100 INJECTION, SOLUTION INTRAVENOUS; SUBCUTANEOUS at 05:43

## 2024-11-03 RX ADMIN — PIPERACILLIN SODIUM AND TAZOBACTAM SODIUM 3.38 G: 3; .375 INJECTION, SOLUTION INTRAVENOUS at 06:09

## 2024-11-03 RX ADMIN — PIPERACILLIN SODIUM AND TAZOBACTAM SODIUM 3.38 G: 3; .375 INJECTION, SOLUTION INTRAVENOUS at 13:54

## 2024-11-03 RX ADMIN — SODIUM CHLORIDE 500 ML: 9 INJECTION, SOLUTION INTRAVENOUS at 16:55

## 2024-11-03 RX ADMIN — PIPERACILLIN SODIUM AND TAZOBACTAM SODIUM 3.38 G: 3; .375 INJECTION, SOLUTION INTRAVENOUS at 01:05

## 2024-11-03 RX ADMIN — INSULIN HUMAN 10 UNITS: 100 INJECTION, SOLUTION PARENTERAL at 12:05

## 2024-11-03 RX ADMIN — OFLOXACIN 1 DROP: 3 SOLUTION OPHTHALMIC at 13:54

## 2024-11-03 RX ADMIN — Medication 100 MG: at 10:56

## 2024-11-03 RX ADMIN — INSULIN GLARGINE 8 UNITS: 100 INJECTION, SOLUTION SUBCUTANEOUS at 20:31

## 2024-11-03 RX ADMIN — TAMSULOSIN HYDROCHLORIDE 0.8 MG: 0.4 CAPSULE ORAL at 20:17

## 2024-11-03 ASSESSMENT — COGNITIVE AND FUNCTIONAL STATUS - GENERAL
TOILETING: TOTAL
DRESSING REGULAR LOWER BODY CLOTHING: TOTAL
MOBILITY SCORE: 6
PERSONAL GROOMING: TOTAL
MOVING TO AND FROM BED TO CHAIR: TOTAL
STANDING UP FROM CHAIR USING ARMS: TOTAL
HELP NEEDED FOR BATHING: TOTAL
WALKING IN HOSPITAL ROOM: TOTAL
MOVING FROM LYING ON BACK TO SITTING ON SIDE OF FLAT BED WITH BEDRAILS: TOTAL
DAILY ACTIVITIY SCORE: 6
DRESSING REGULAR UPPER BODY CLOTHING: TOTAL
EATING MEALS: TOTAL
CLIMB 3 TO 5 STEPS WITH RAILING: TOTAL
TURNING FROM BACK TO SIDE WHILE IN FLAT BAD: TOTAL

## 2024-11-03 ASSESSMENT — PAIN SCALES - WONG BAKER: WONGBAKER_NUMERICALRESPONSE: HURTS LITTLE BIT

## 2024-11-03 ASSESSMENT — PAIN SCALES - PAIN ASSESSMENT IN ADVANCED DEMENTIA (PAINAD)
BREATHING: NORMAL
FACIALEXPRESSION: SMILING OR INEXPRESSIVE
CONSOLABILITY: NO NEED TO CONSOLE
TOTALSCORE: 0
BODYLANGUAGE: RELAXED

## 2024-11-03 ASSESSMENT — ENCOUNTER SYMPTOMS: ALTERED MENTAL STATUS: 1

## 2024-11-03 ASSESSMENT — PAIN SCALES - GENERAL: PAINLEVEL_OUTOF10: 0 - NO PAIN

## 2024-11-03 NOTE — ASSESSMENT & PLAN NOTE
11/3 - Resolving.  Clinically much improved today.  Awake and alert. Conversing with son in sign language. Answering questions appropriately and following commands. Awaiting results of prolonged EEG. Sodium corrected.   11/2 - No change in condition.  Neurology input appreciated.   -Diffuse encephalopathy on EEG  -Working diagnosis: toxic/metabolic encephalopathy  -Awaiting prolonged EEG results  -sodium 133, will correct slowly  11/1 - remains verbally unresponsive.  Eyes remain closed.  Moves hands to reach for tube and moves head to side.  10/31 Was profound on admission, but overall improved and then continued to wax/wane. Worsened over past 72 hr. I did not see patient Monday, but RN today had him Monday and says he was more interactive and awake that day.  Suspect related to hospital delirium and also UTI (diagnosed 10/29)  Currently strict NPO.  CT brain normal. Labs otherwise unremarkable.  Consulted neurology to evaluate. Dr. Christopher planning more prolonged EEG.

## 2024-11-03 NOTE — ASSESSMENT & PLAN NOTE
11/3 - 2g IV magnesium given today. Recheck level. Replete prn.  11/1 - resolved with supplementation.  Will continue to monitor and replete prn  -receiving PO supplementation. Supplementing PRN with IV

## 2024-11-03 NOTE — ASSESSMENT & PLAN NOTE
11/3 - To complete 5 days of therapy through 11/3.  WC 8.0. BCx no growth. Will discontinue.  11/1 - continue Zosyn Abx.  Repeat CXR shows worsening bibasilar edema and effusions.  10/31 - Started on zosyn. I suspect aspiration as source. Strict NPO now. Unknown organism.

## 2024-11-03 NOTE — ASSESSMENT & PLAN NOTE
11/3 - BP currently well controlled at 125/60  -Less idiopathic and more likely related to his cardiomyopathy  -BP's overall improved from last week. Will try to wean off midodrine prior to discharge

## 2024-11-03 NOTE — CONSULTS
Assessment/Plan     Inpatient consult to Acute Care Surgery  Consult performed by: Arcelia Diop MD  Consult ordered by: Lisa Tapia MD  Reason for consult: Replace corpak  Assessment/Recommendations: 83M with previously place NG small bore feeding tube, requiring replacement after it was dislodged.      MD was able to replace a new tube at bedside without complication. Tube was TAPED in place at RIGHT nare.  CXR confirmed placement.         Subjective     Altered Mental Status      Review of Systems  Review of Systems   Unable to perform ROS: Patient nonverbal       Objective     Vital signs for last 24 hours:  Temp:  [35.8 °C (96.4 °F)-37.6 °C (99.7 °F)] 35.9 °C (96.6 °F)  Heart Rate:  [67-96] 67  Resp:  [19-29] 19  BP: ()/(57-81) 96/57    Intake/Output this shift:  I/O this shift:  In: 125 [P.O.:125]  Out: -     Physical Exam  Physical Exam  Vitals and nursing note reviewed. Exam conducted with a chaperone present.   HENT:      Head: Normocephalic.      Mouth/Throat:      Pharynx: Oropharynx is clear.   Cardiovascular:      Rate and Rhythm: Normal rate.   Pulmonary:      Effort: Pulmonary effort is normal. No respiratory distress.   Musculoskeletal:      Cervical back: Neck supple. No tenderness.

## 2024-11-03 NOTE — ASSESSMENT & PLAN NOTE
11/3 - s/p IV Lasix dose yesterday. Repeat follow up CXR in AM   11/2 - worsening bibasilar effusions seen on CXR  -likely contributing to respiratory tachypnea  -acute on chronic CHF contributing as well as PNA  -will give gentle IV diuresis, monitor closely given prior soft BPs

## 2024-11-03 NOTE — ASSESSMENT & PLAN NOTE
11/2 - continue Coreg. Current BP will tolerate one time dose of IV lasix. Monitor closely.  10/31 -previously unable to tolerate ACE/ARB or diuretics.  Present treatment with beta-blocker is continued.  He is also treated with amiodarone for hypotension.  Vital signs will be monitored and medications adjusted as indicated.  -The patient has severe debility from his multiple acute and chronic medical problems.

## 2024-11-03 NOTE — ASSESSMENT & PLAN NOTE
11/3 - s/p Dietician consult. Tube feeds at goal rate now.  Corpak in place.  11/1 - Patient pulled out corepak from 65 to 60.  Repeat XR to verify placement in stomach completed.  OK to use.  Will reconsult surgery in AM to come and advance it.  -Nutrition following  Now unable to safely swallow. Intake poor prior to this.  Family conversation with [facility] med and myself last night. Decision made to try short term feeding tube, treatment of infections, and see if mentation improves.  Corpak ordered and surgery to place. Nutrition to put in TF orders. Will closely watch labs, patient is at risk of refeeding syndrome.

## 2024-11-03 NOTE — PROGRESS NOTES
Palliative Care Progress Note    Date of Admission: 10/16/2024    Patient is a 83 y.o. male admitted with NSTEMI (non-ST elevated myocardial infarction) (Multi).   More awake, alert, following commands, answering questions. Denies pain. Now on oxygen, 2L, blood sugars elevated overnight. TF now running at goal.    Scheduled medications  aspirin, 81 mg, oral, Daily  carvedilol, 3.125 mg, oral, BID after meals  enoxaparin, 40 mg, subcutaneous, Daily  insulin glargine, 8 Units, subcutaneous, Nightly  insulin lispro, 0-5 Units, subcutaneous, q6h  magnesium oxide, 400 mg, oral, Daily  magnesium sulfate, 2 g, intravenous, Once  midodrine, 5 mg, oral, TID  ofloxacin, 1 drop, Left Eye, 4x daily  oxygen, , inhalation, Continuous - 02/gases  pantoprazole, 40 mg, intravenous, Daily  piperacillin-tazobactam, 3.375 g, intravenous, q6h  potassium phosphate, 15 mmol, intravenous, Once  pravastatin, 40 mg, oral, Nightly  tamsulosin, 0.8 mg, oral, Nightly  thiamine, 100 mg, nasogastric tube, Daily      Continuous medications       PRN medications  PRN medications: acetaminophen **OR** acetaminophen, aminophylline, dextrose, dextrose, glucagon, glucagon, melatonin, prochlorperazine, prochlorperazine     Results for orders placed or performed during the hospital encounter of 10/16/24 (from the past 24 hours)   POCT GLUCOSE   Result Value Ref Range    POCT Glucose 295 (H) 74 - 99 mg/dL   CBC   Result Value Ref Range    WBC 8.0 4.4 - 11.3 x10*3/uL    nRBC 0.4 (H) 0.0 - 0.0 /100 WBCs    RBC 2.41 (L) 4.50 - 5.90 x10*6/uL    Hemoglobin 7.1 (L) 13.5 - 17.5 g/dL    Hematocrit 20.6 (L) 41.0 - 52.0 %    MCV 86 80 - 100 fL    MCH 29.5 26.0 - 34.0 pg    MCHC 34.5 32.0 - 36.0 g/dL    RDW 13.6 11.5 - 14.5 %    Platelets 231 150 - 450 x10*3/uL   Magnesium   Result Value Ref Range    Magnesium 1.53 (L) 1.60 - 2.40 mg/dL   Comprehensive Metabolic Panel   Result Value Ref Range    Glucose 411 (H) 74 - 99 mg/dL    Sodium 137 136 - 145 mmol/L     Potassium 3.5 3.5 - 5.3 mmol/L    Chloride 83 (L) 98 - 107 mmol/L    Bicarbonate 24 21 - 32 mmol/L    Anion Gap 34 (H) 10 - 20 mmol/L    Urea Nitrogen 20 6 - 23 mg/dL    Creatinine 0.92 0.50 - 1.30 mg/dL    eGFR 84 >60 mL/min/1.73m*2    Calcium 6.5 (L) 8.6 - 10.3 mg/dL    Albumin 2.0 (L) 3.4 - 5.0 g/dL    Alkaline Phosphatase 67 33 - 136 U/L    Total Protein 6.6 6.4 - 8.2 g/dL    AST 49 (H) 9 - 39 U/L    Bilirubin, Total 0.5 0.0 - 1.2 mg/dL    ALT 43 10 - 52 U/L   Phosphorus   Result Value Ref Range    Phosphorus 1.3 (L) 2.5 - 4.9 mg/dL   POCT GLUCOSE   Result Value Ref Range    POCT Glucose 364 (H) 74 - 99 mg/dL   POCT GLUCOSE   Result Value Ref Range    POCT Glucose 382 (H) 74 - 99 mg/dL        XR chest 1 view    Result Date: 10/31/2024  Interpreted By:  Laurent Elam, STUDY: XR CHEST 1 VIEW; 10/31/2024 11:12 am   INDICATION: CLINICAL INFORMATION: Signs/Symptoms:Tube placement check.   COMPARISON: 10/31/2024 at 1054 hours   ACCESSION NUMBER(S): [id number]   ORDERING CLINICIAN: DONNA CEJA   TECHNIQUE: Portable chest one view.   FINDINGS: The cardiac size is indeterminate in view of the AP projection. Feeding tube is been repositioned with the tube now noted to be coiled within the stomach. Bibasilar infiltrates are present.       1. Feeding tube is present with extending into the stomach with the tube coiled within the region of the body and fundus. 2. Bibasilar infiltrates.   MACRO: none   Signed by: Laurent Elam 10/31/2024 12:07 PM Dictation workstation:   [id number]    XR chest 1 view    Result Date: 10/31/2024  Interpreted By:  Laurent Elam, STUDY: XR CHEST 1 VIEW; 10/31/2024 11:11 am   INDICATION: CLINICAL INFORMATION: Signs/Symptoms:Verification of nasogastric tube location.   COMPARISON: 10/29/2024 at 1042 hours   ACCESSION NUMBER(S): [id number]   ORDERING CLINICIAN: KIMBERLEY BLAS   TECHNIQUE: Portable chest one view.   FINDINGS: The cardiac size is indeterminate in view of the AP  projection. Nasogastric tube is coiled within the mid chest along the course of the thoracic esophagus. Bibasilar infiltrate is suspected. There is a limited depth of inspiration on the exam.       Nasogastric tube is coiled within the midthoracic esophagus. Probable bibasilar infiltrates although the exam is limited by poor depth of inspiration.   MACRO: none   Signed by: Laurent Elam 10/31/2024 12:05 PM Dictation workstation:   [id number]    XR chest 1 view    Result Date: 10/29/2024  Interpreted By:  Laurent Elam, STUDY: XR CHEST 1 VIEW; 10/29/2024 11:22 am   INDICATION: CLINICAL INFORMATION: Signs/Symptoms:cough.   COMPARISON: 10/16/2024   ACCESSION NUMBER(S): [id number]   ORDERING CLINICIAN: RICHARD ADAIR   TECHNIQUE: Portable chest one view.   FINDINGS: The cardiac size is indeterminate in view of the AP projection. Patient is slightly rotated to the left. There is a thoracic dextroscoliosis. Alveolar infiltrate is present within left lower lobe consistent with pneumonia. Right hemithorax is clear. No effusions are identified.       There is a new left lower lobe infiltrate consistent with pneumonia. Follow-up to assure complete clearing is suggested.   MACRO: none   Signed by: Laurent Elam 10/29/2024 2:55 PM Dictation workstation:   [id number]    CT head wo IV contrast    Result Date: 10/29/2024  Interpreted By:  Brilele Linares, STUDY: CT HEAD WO IV CONTRAST; ;  10/29/2024 1:26 pm   INDICATION: Signs/Symptoms:sudden onset confusion.   COMPARISON: 10/16/2024   ACCESSION NUMBER(S): [id number]   ORDERING CLINICIAN: RICHARD ADAIR   TECHNIQUE: Serial axial images of the head were obtained without intravenous contrast. Sagittal and coronal reconstructions were generated.   FINDINGS: Trickles are midline and enlarged. There is prominence of the cortical sulci and superior cerebellar cisterns.   There are no acute parenchymal abnormalities. There is no hemorrhage or extra-axial fluid.   There is no  obvious scalp hematoma or skull fracture.   Visualized portions of the paranasal sinuses and mastoids are unremarkable.   COMPARISON OF FINDINGS: The brain is similar.       No acute intracranial abnormality.     MACRO: none   Signed by: Brielle Linares 10/29/2024 2:19 PM Dictation workstation:   [id number]    Cardiac Catheterization Procedure    Result Date: 10/24/2024           [place] [address]            Phone [phone] Cardiovascular Catheterization Report Patient Name:     AMARILYS FARAH      Performing Physician:  Virginia Chavez DO Study Date:       10/23/2024           Verifying Physician:   Virginia Chavez DO MRN/PID:          [id number]             Cardiologist/Co-Scrub: Accession#:       [id number]         Ordering Provider:     Virginia CHAVEZ Date of           1940 / 83      Cardiologist: Birth/Age:        years Gender:           M                    Fellow: Encounter#:       [id number]           Surgeon:  Study: Left Heart Cath  Indications: AMARILYS FARAH is a 84 year old male who presents with dyslipidemia and an asymptomatic chest pain assessment. Cardiomyopathy, left ventricular dysfunction and NSTE - ACS. Stress test performed: No. CTA performed: No. Rajinder accessed: No. LVEF Assessed: No. Cardiac arrest: No. Cardiac surgical consult: No. Cardiovascular Instability: No Frailty status of patient entering lab: 6 = Moderately frail.  Coronary Angiography: The coronary circulation is right dominant.  Coronary Angiography Comments: We obtained informed consent with the help of his son Jorge A because the patient is deaf, and he was brought to the cardiac catheterization lab with the timeout verified between his computer medical record number and his  arm wristband. Both groins were prepped and draped in a sterile fashion and the remainder of the procedure performed under sterile technique. 10 cc of 1% lidocaine used to the right groin for local anesthesia and Versed 2 mg and fentanyl 50 mcg for intravenous sedation. Used single wall micropuncture technique to access the right common femoral artery and a micropuncture sheath was inserted, then exchanged over guidewire for a 6 Slovak [place] sheath. 6 Slovak JR4 JL 4 and pigtail catheters were used and catheters were advanced and withdrawn over the guidewire without difficulty. At the end of the procedure the sheath was removed and an Angio-Seal closure device was deployed with good hemostasis and he was returned to his telemetry bed in stable condition. Coronary angiography: 1. The left main was a large vessel and bifurcated into the LAD and circumflex and was normal 2 the LAD was a large vessel that extends to the apex and was widely patent with CHEYANNE grade III flow. The proximal LAD had an eccentric 40-50% irregular stenosis that was presumed to be the site of previous infarct vessel occlusion with spontaneous recanalization. The remaining mid and distal LAD and its branches had mild diffuse nonobstructive disease 3. The circumflex was a modest nondominant vessel that gave rise to 3 modest PLV branches and had no obstructive disease.  4. The right coronary artery was a dominant vessel that had mild 30-40% ostial /proximal stenosis and an eccentric 50% mid vessel stenosis, then gives rise to a modest sized small caliber PDA and small caliber distal RCA without obstruction. Left ventriculogram performed in the VALLES 30 projection showed persistent modest hypokinesis of the mid anterior, anterolateral segments and severe hypokinesis of the LV apical segment with left ventricular ejection fraction estimated at 40 to 45%. Impression: 1.40% proximal LAD stenosis suggesting previous vessel occlusion with spontaneous  recanalization. 2. 50% mid RCA stenosis. 3. Ischemic cardiomyopathy with residual anterior and anteroapical left ventricular hypokinesis with left ventricular ejection fraction 40 to 45%. 3. Nuclear stress test 10/22/2024 suggests viability in the anterior and apical segments without ischemia and with left ventricular ejection fraction estimated at 44%.   Hemo Personnel: +----------------+---------+ Name            Duty      +----------------+---------+ Haja Mckeon MD 1 +----------------+---------+  Hemodynamic Pressures:  +----+---------------+----------+-------------+--------------+-------+---------+ Site   Date Time   Phase NameSystolic mmHgDiastolic mmHgED mmHgMean mmHg +----+---------------+----------+-------------+--------------+-------+---------+   AO     10/23/2024  AIR REST          116            57              81          8:56:33 AM                                                      +----+---------------+----------+-------------+--------------+-------+---------+   LV     10/23/2024  AIR REST          103             2      8                   9:07:45 AM                                                      +----+---------------+----------+-------------+--------------+-------+---------+   LV     10/23/2024  AIR REST          109            -2      7                   9:07:57 AM                                                      +----+---------------+----------+-------------+--------------+-------+---------+  LVp     10/23/2024  AIR REST          106             0     12                   9:08:06 AM                                                      +----+---------------+----------+-------------+--------------+-------+---------+  AOp     10/23/2024  AIR REST          120            52              76          9:08:16 AM                                                       +----+---------------+----------+-------------+--------------+-------+---------+   AO     10/23/2024  AIR REST            0             0             -96          9:32:52 AM                                                      +----+---------------+----------+-------------+--------------+-------+---------+  Cardiac Cath Post Procedure Notes: Post Procedure Diagnosis: Double vessel disease. Blood Loss:               Estimated blood loss during the procedure was 10ml                           mls. Specimens Removed:        Number of specimen(s) removed: none.  ICD 10 Codes: Non ST elevation (NSTEMI) myocardial infarction-I21.4  CPT Codes: Left Heart Cath (visualization of coronaries) and LV-24454  [id number] Haja Mckeon DO Performing Physician Electronically signed by [id number] Haja Mckeon DO on 10/24/2024 at 9:06:38 AM  ** Final **     Cardiology Interpretation Of Nuclear Stress - See Other Report For Nuclear Portion    Result Date: 10/23/2024           [place] [address]            Phone [phone] Nuclear Pharmacologic Stress Test Patient Name:      AMARILYS FARAH     Ordering Provider:     [id number] COOPER RATLIFF Study Date:        10/22/2024          Reading Physician:     [id number] Pelaez MD MRN/PID:           [id number]            Supervising Physician: [id number] Pelaez MD Accession#:        [id number]        Fellow: Date of Birth/Age: 1940 / 83     Fellow:                    years Gender:            M                   Nurse:                 Inez Holley RN Admit Date:                            Technician:            Marina Feliciano Admission Status:                      Sonographer:           LORRIE  Height:            182.88 cm           Technologist: Weight:            62.60 kg            Additional Staff: BSA:               1.82 m2             Encounter#:            [id number] BMI:               18.72 kg/m2         Patient Location: Study Type:    CARDIOLOGY INTERPRETATION OF NUCLEAR STRESS Diagnosis/ICD: NonST elevation (NSTEMI) myocardial infarction-I21.4 Indication:    CARDIOMYOPATHY EF 15-20% CPT Codes:     Stress Test Interpretation-54209; Stress Test Supervision-42715 Falls Risk: Low: Patient has low risk for sustaining a fall; environmental safety interventions in place.  Study Details: Correct procedure and correct patient verified verbally and with                ID Band checked.  Patient History: Hyperlipidemia and congestive heart failure. NSTEMI, IDIOPATHIC HYPOTENSION.  Medications: ASPIRIN, CARVEDILOL, LOVENOX, PRAVASTATIN, SPIRONOLACTONE, MIDODRINE. The patient took medications as prescribed.  Patient Performance: Patient received a total of 0.4 mg of Regadenoson at 11:41:48 AM. The resting blood pressure was 114/69 mmHg with a heart rate of 81 bpm. The patient developed no symptoms during the stress exam. The blood pressure response was normal. The test was terminated due to: completed lab protocol and end of vasodilator infusion. Patient has met the discharge criteria and is discharged to their floor.  Baseline ECG: Resting ECG showed normal sinus rhythm with nonspecific ST-T wave changes. Artifact.  Stress ECG: Stress ECG showed normal sinus rhythm, with frequent premature ventricular contractions. No ST changes. Essentially negative ECG stress test for ischemia with a Lexiscan infusion. Please get the nuclear imaging report from radiology department.  Stress Stage Data: +----------------+--+------+-------+                 HRSys BPDias BP +----------------+--+------+-------+ Baseline [id number]   69      +----------------+--+------+-------+  Recovery ECG: Recovery ECG showed  normal sinus rhythm, with artifact.  +------------+--+------+-------+             HRSys BPDias BP +------------+--+------+-------+ Recovery I  8295    57      +------------+--+------+-------+ Recovery II 7288    51      +------------+--+------+-------+ Recovery [id number]    41      +------------+--+------+-------+ Recovery IV 8592    52      +------------+--+------+-------+ Recovery V  7392    54      +------------+--+------+-------+  Summary:  1. Adequate level of stress achieved.  2. Correlate with myocardial perfusion imaging results.  3. Essentially negative ECG stress test for ischemia with a Lexiscan infusion. Please get the nuclear imaging report from radiology department.  4. No clinical or electrocardiographic evidence for ischemia at maximal infusion.  5. No ECG changes from baseline.  6. Normal Stress Test.  7. Nuclear image results are reported separately. [id number] Fabien Pelaez MD Electronically signed on 10/23/2024 at 9:39:18 AM   ** Final **     Nuclear Stress Test    Result Date: 10/22/2024  Interpreted By:  Chapito García and Ogievich Taessa STUDY: NUCLEAR STRESS TEST; 10/22/2024 2:18 pm   INDICATION: Signs/Symptoms:Cardiomyopathy, EF 15-20%.   COMPARISON: None.   ACCESSION NUMBER(S): [id number]   ORDERING CLINICIAN: COOPER RATLIFF   TECHNIQUE: DIVISION OF NUCLEAR MEDICINE PHARMACOLOGIC STRESS MYOCARDIAL PERFUSION SCAN, ONE DAY PROTOCOL   The patient received an intravenous dose of  8.8 mCi of Tc-99m Myoview and resting emission tomographic (SPECT) images of the myocardium were acquired. The patient then received an intravenous infusion of 0.4mg regadenoson (Lexiscan)  followed by an additional dose of  25.1 mCi of Tc-99m  Myoview. Stress phase SPECT images of the myocardium were then acquired. These included ECG-gated images to assess and quantify ventricular function.  A low-dose, nondiagnostic regional CT was utilized for attenuation correction purposes.   FINDINGS: Both  stress and rest studies demonstrate grossly normal perfusion throughout the left ventricle.   The left ventricle is normal in size.   Gated images demonstrate mild global hypokinesis of the LV wall motion with a post-stress LV EF estimated at 44%.   Attenuation correction CT images demonstrate no gross anatomic abnormalities.       1.  No evidence of inducible ischemia or prior infarction. 2. Left ventricle is normal in size. 3. Mild global hypokinesis of the LV wall motion with a post-stress LV EF estimated at 44%.   I personally reviewed the images/study and I agree with the findings as stated by Viry Up DO, PGY-3. This study was interpreted at [place], [place].   MACRO: None   Signed by: Chapito García 10/22/2024 3:15 PM Dictation workstation:   [id number]    Electrocardiogram, 12-lead PRN ACS symptoms    Result Date: 10/22/2024  Poor data quality in current ECG precludes serial comparison Sinus tachycardia with short FL with Premature supraventricular complexes and with frequent Premature ventricular complexes Indeterminate axis Pulmonary disease pattern Nonspecific ST and T wave abnormality Abnormal ECG When compared with ECG of 16-OCT-2024 20:40, (unconfirmed) Previous ECG has undetermined rhythm, needs review Questionable change in QRS duration Confirmed by Fer Vazquez ([id number]) on 10/22/2024 12:52:29 PM    MR brain wo IV contrast    Result Date: 10/19/2024  Interpreted By:  Hugo Rico  and Capo Burns STUDY: MR BRAIN WO IV CONTRAST;  10/19/2024 3:36 pm   INDICATION: Signs/Symptoms:altered mental status.     COMPARISON: CT of the head obtained October 16, 2024   ACCESSION NUMBER(S): [id number]   ORDERING CLINICIAN: ROMULO JOHNSON   TECHNIQUE: Axial T2, FLAIR, DWI, gradient echo T2 and sagittal and coronal T1 weighted images of brain were acquired.  No contrast was administered.   FINDINGS: Midline brain structures are intact on mid sagittal imaging.    There is no territorial restricted diffusion to suggest acute intracranial infarct. No suspicious T2 signal hypointensity noted on gradient sequencing.   There is no evidence of acute intracranial hemorrhage. No intracranial mass or mass effect. No midline shift. No loss of gray-white differentiation. Few scattered supratentorial white matter FLAIR signal hyperintensities are observed.  Basilar cisterns appear intact. Moderate dilatation of the lateral and 3rd greater than 4th ventricles at least principally on the basis of global parenchymal volume loss.   Normal T2 vascular flow voids are seen.   T2 hyperintensity within the maxillary sinuses is consistent with mucosal inflammatory paranasal sinus disease.. Status post bilateral lens replacements.   There is no mastoid effusion. Moderate effusions of the occipital lateral mass C1 articulation bilaterally may be on a degenerative basis.       No MR evidence of acute intracranial infarct, hemorrhage, mass, or mass effect.   I personally reviewed the images/study and I agree with the findings as stated. This study was interpreted at [place], [place].   MACRO: None   Signed by: Hugo Rico 10/19/2024 4:41 PM Dictation workstation:   [id number]    EEG    IMPRESSION Impression This routine awake and drowsy EEG is indicative of a moderate diffuse encephalopathy. No epileptiform activity or lateralizing signs seen. A full report will be scanned into the patient's chart at a later time. This report has been interpreted and electronically signed by    Transthoracic Echo (TTE) Complete    Result Date: 10/17/2024           [address] [address]            Phone [phone] TRANSTHORACIC ECHOCARDIOGRAM REPORT Patient Name:     AMARILYS FARAH      Reading Physician:   [id number] Jose David Workman DO Study Date:       10/17/2024           Ordering Provider:   [id number] GUZMAN                                                              HOWARD MRN/PID:          [id number]             Fellow: Accession#:       [id number]         Nurse: Date of           1940 / 83      Sonographer:         USR Birth/Age:        years Gender:           M                    Additional Staff: Height:           177.80 cm            Admit Date: Weight:           65.77 kg             Admission Status:    Inpatient - Routine BSA / BMI:        1.82 m2 / 20.81      Department Location: [facility]                   kg/m2 Blood Pressure: 97 /74 mmHg Study Type:    TRANSTHORACIC ECHO (TTE) COMPLETE Diagnosis/ICD: Non ST elevation (NSTEMI) myocardial infarction-I21.4 Indication:    NSTEMI CPT Codes:     Echo Complete w Full Doppler-26241 Patient History: Diabetes:          Yes Pertinent History: HTN, Hyperlipidemia, CAD, Chest Pain and CHF. Renal dx III. Study Detail: The following Echo studies were performed: 2D, M-Mode, Doppler and               color flow. Technically challenging study due to poor acoustic               windows and patient lying in supine position.  PHYSICIAN INTERPRETATION: Left Ventricle: The left ventricular systolic function is severely decreased, with a visually estimated ejection fraction of 15-20%. There are multiple wall motion abnormalities. The left ventricular cavity size is moderately dilated. There is mild concentric left ventricular hypertrophy. Left ventricular diastolic filling was indeterminate. LV Wall Scoring: The entire apex, mid and apical anterior wall, mid and apical anterior septum, mid and apical inferior septum, mid and apical inferior wall, mid inferolateral segment, and mid anterolateral segment are akinetic. All remaining scored segments are normal. Left Atrium: The left atrium is normal in size. Right Ventricle: The right ventricle is normal in size. There is normal right ventricular global systolic function. Right Atrium: The right atrium is normal in size. Aortic Valve: The aortic valve  is probably trileaflet. The aortic valve dimensionless index is 0.70. There is no evidence of aortic valve regurgitation. The peak instantaneous gradient of the aortic valve is 2.7 mmHg. The mean gradient of the aortic valve is 1.4 mmHg. Mitral Valve: The mitral valve is normal in structure. There is no evidence of mitral valve regurgitation. Tricuspid Valve: The tricuspid valve is structurally normal. No evidence of tricuspid regurgitation. Pulmonic Valve: The pulmonic valve is not well visualized. The pulmonic valve regurgitation was not well visualized. Pericardium: No pericardial effusion noted. Aorta: The aortic root is normal.  CONCLUSIONS:  1. Multiple segmental abnormalities exist. See findings.  2. The left ventricular systolic function is severely decreased, with a visually estimated ejection fraction of 15-20%.  3. There are multiple wall motion abnormalities.  4. Left ventricular diastolic filling was indeterminate.  5. Left ventricular cavity size is moderately dilated.  6. There is normal right ventricular global systolic function.  7. 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 QUANTITATIVE DATA SUMMARY:  2D MEASUREMENTS:           Normal Ranges: Ao Root s:       3.51 cm IVSd:            0.79 cm   (0.6-1.1cm) LVPWd:           0.76 cm   (0.6-1.1cm) LVIDd:           4.44 cm   (3.9-5.9cm) LVIDs:           3.81 cm LV Mass Index:   58.6 g/m2 LV % FS          14.0 %  LA VOLUME:          Normal Ranges: LA Vol A4C: 45.2 ml  RA VOLUME BY A/L METHOD:          Normal Ranges: RA Area A4C:             12.0 cm2  LV SYSTOLIC FUNCTION BY 2D PLANIMETRY (MOD):                      Normal Ranges: EF-A4C View:    16 % (>=55%) EF-A2C View:    5 % EF-Biplane:     14 % EF-Visual:      18 % EF-3DQ:         19 % LV EF Reported: 18 %  LV DIASTOLIC FUNCTION:           Normal Ranges: MV Peak E:             0.45 m/s  (0.7-1.2 m/s) MV Peak A:             0.88 m/s  (0.42-0.7 m/s) E/A Ratio:              0.51      (1.0-2.2) MV e'                  0.073 m/s (>8.0) MV lateral e'          0.11 m/s MV medial e'           0.04 m/s E/e' Ratio:            6.16      (<8.0)  MITRAL VALVE:          Normal Ranges: MV DT:        110 msec (150-240msec)  AORTIC VALVE:                     Normal Ranges: AoV Vmax:                0.82 m/s (<=1.7m/s) AoV Peak P.7 mmHg (<20mmHg) AoV Mean P.4 mmHg (1.7-11.5mmHg) LVOT Max Florencio:            0.61 m/s (<=1.1m/s) AoV VTI:                 17.99 cm (18-25cm) LVOT VTI:                12.65 cm LVOT Diameter:           2.00 cm  (1.8-2.4cm) AoV Area, VTI:           2.20 cm2 (2.5-5.5cm2) AoV Area,Vmax:           2.32 cm2 (2.5-4.5cm2) AoV Dimensionless Index: 0.70  RIGHT VENTRICLE: RV Basal 3.30 cm RV Mid   2.70 cm RV Major 4.8 cm  TRICUSPID VALVE/RVSP:          Normal Ranges: Peak TR Velocity:     2.26 m/s RV Syst Pressure:     23 mmHg  (< 30mmHg)  PULMONIC VALVE:          Normal Ranges: PV Max Florencio:     0.6 m/s  (0.6-0.9m/s) PV Max P.3 mmHg PV Mean P.5 mmHg PV VTI:         6.59 cm  AORTA: Asc Ao Diam 0.00 cm  [id number] [facility] Electronically signed on 10/17/2024 at 11:00:02 AM  Wall Scoring  ** Final (Updated) **     ECG 12 lead    Result Date: 10/17/2024  Normal sinus rhythm Left axis deviation Low voltage QRS Anteroseptal infarct Inferior infarct , age undetermined Abnormal ECG When compared with ECG of 2018 16:39, Significant changes have occurred Confirmed by Rell Vaughan ([id number]) on 10/17/2024 11:32:49 AM    XR chest 1 view    Result Date: 10/16/2024  Interpreted By:  Lobo Farrar, STUDY: XR CHEST 1 VIEW;  10/16/2024 9:27 pm   INDICATION: Signs/Symptoms:malaise.   COMPARISON: Chest x-ray 2021   ACCESSION NUMBER(S): [id number]   ORDERING CLINICIAN: MICHELLE MARTE   FINDINGS: Multiple overlying leads are present.   CARDIOMEDIASTINAL SILHOUETTE: Cardiomediastinal silhouette is normal in size and configuration.  Atherosclerotic calcification of the aorta.   LUNGS: No consolidation, pleural effusion or pneumothorax.   ABDOMEN: Surgical clips noted in the left upper quadrant.   BONES: Multilevel degenerative changes of the spine.       No acute cardiopulmonary process.   MACRO: None   Signed by: Lobo Farrar 10/16/2024 9:52 PM Dictation workstation:   [id number]    CT head wo IV contrast    Result Date: 10/16/2024  Interpreted By:  Ori Asher, STUDY: CT HEAD WO IV CONTRAST;  10/16/2024 9:18 pm   INDICATION: Signs/Symptoms:malaise/fatigue; AMS.     COMPARISON: 04/18/2021   ACCESSION NUMBER(S): [id number]   ORDERING CLINICIAN: MICHELLE MARTE   TECHNIQUE: Noncontrast axial CT scan of head was performed. Angled reformats in brain and bone windows were generated. The images were reviewed in bone, brain, blood and soft tissue windows.   FINDINGS: CSF Spaces: The ventricles, sulci and basal cisterns are within normal limits. There is no extraaxial fluid collection.   Parenchyma: There are periventricular white matter changes noted. The grey-white differentiation is intact. There is no mass effect or midline shift.  There is no intracranial hemorrhage.   Calvarium: The calvarium is unremarkable.   Paranasal sinuses and mastoids: Visualized paranasal sinuses and mastoids are clear.       No evidence of acute cortical infarct or intracranial hemorrhage.       MACRO: None   Signed by: Ori Asher 10/16/2024 9:28 PM Dictation workstation:   [id number]       Vitals:    11/03/24 1116   BP: 115/74   Pulse: 90   Resp: 24   Temp: 35.9 °C (96.6 °F)   SpO2: 92%         Physical Exam  Vitals and nursing note reviewed.   Constitutional:       General: He is not in acute distress.     Appearance: He is ill-appearing.      Comments: Thin and frail appearing, lying in bed, somnolent   HENT:      Nose:      Comments: Dobhoff in place and bridled       Mouth/Throat:      Mouth: Mucous membranes are dry.      Pharynx: Oropharynx is clear.    Eyes:      Extraocular Movements: Extraocular movements intact.   Cardiovascular:      Rate and Rhythm: Normal rate and regular rhythm.      Pulses: Normal pulses.      Heart sounds: No murmur heard.  Pulmonary:      Effort: Pulmonary effort is normal. No respiratory distress.      Breath sounds: No wheezing or rales.   Abdominal:      General: There is no distension.      Palpations: Abdomen is soft.      Tenderness: There is no abdominal tenderness. There is no guarding.   Musculoskeletal:      Right lower leg: No edema.      Left lower leg: No edema.      Comments: Moves all extremities     Skin:     General: Skin is warm and dry.      Coloration: Skin is pale.   Neurological:      General: No focal deficit present.      Motor: Weakness present.      Comments: Lethargic but does respond to tactile stimuli   Psychiatric:      Comments: calm          Assessment/Plan   IMP:    HFrEF - EF improved s/p PCI. On BB  NSTEMI - aspirin, BB  2.  Hypotension - on midodrine tid  3.  Acute metabolic encephalopathy - baseline A&O x3, worsened, now NPO. Neuro ordered EEG.  4.  Severe protein calorie malnutrition-family ok with corpak temporarily, but no long term feeding tubes.   5.  Electrolyte derangements - receiving supplementations  6.  PNA-zosyn, LLL on CXR, suspect aspiration, on 4 L oxygen  7.  Urinary Retention-urology attempted coudet, unable. In continues to retain, would need to consider cytoscopy with dilation, family discussing, but seems to be in agreement that they would proceed with this if necessary to provide symptom relief.      Palliative Care Encounter  DNR-DNI  The patient is not capable  Cristian Jules is primary HPOA, cristian Villanueva is first alternate agent    11/3  More awake and alert today, following commands, answering questions appropriately. TF at goal 50ml/hr. Blood sugars elevated, anion gap elevated. Discussed with attending service, plan to adjust insulin.   Cont aggressive treatment model of care,  son at bedside for updates.     11/2  Remains encephalopathic, no significant improvement. Corpak in place, tolerating TF. Discussed with son at bedside, cont treatment model of care, awaiting EEG results.     11/1  Hemoglobin trending downwards, no signs of active GI bleeding, Corpak in place with tube feeds infusing at very slow rate, currently at 10, goal is 50, per nursing note, tube feed rate was turned down at 0900 for reports of increased coughing.  Continues on IV antibiotics with stable white blood cell count, afebrile.  Blood pressure low stable.  Remains somnolent but seems more responsive.    Continue aggressive treatment model of care, monitor overall patient condition, if progressively worsening despite aggressive treatment and/or no significant improvement after sufficient course of IV antibiotics, family willing to readdress goals at that time.    Total time 35 minutes.    Avani Gray, APRN-CNP

## 2024-11-03 NOTE — ASSESSMENT & PLAN NOTE
11/3 - BG levels have been elevated.  Patient feeding now at goal rate.  Also with Dextrose in Abx fluids.    -at home was on oral antihyperglycemics, only on SSI coverage here  -Will add low dose Lantus at night and monitor closely  -Given 10 units IV regular insulin now for blood sugar of 411. Will continue to monitor and

## 2024-11-03 NOTE — CARE PLAN
The patient's goals for the shift include  Pt unable tp verbalize goals    The clinical goals for the shift include pt will be safe and comfortable throughout the shift    Problem: Skin  Goal: Prevent/minimize sheer/friction injuries  Outcome: Progressing  Flowsheets (Taken 11/2/2024 2333)  Prevent/minimize sheer/friction injuries:   Use pull sheet   Turn/reposition every 2 hours/use positioning/transfer devices     Problem: Skin  Goal: Prevent/manage excess moisture  Outcome: Progressing     Problem: Skin  Goal: Promote skin healing  Outcome: Progressing     Problem: Skin  Goal: Promote/optimize nutrition  Outcome: Progressing  Flowsheets (Taken 11/2/2024 2333)  Promote/optimize nutrition: Monitor/record intake including meals

## 2024-11-03 NOTE — CARE PLAN
The patient's goals for the shift include      The clinical goals for the shift include pt will be safe and comfortable throughout the shift    Over the shift, the patient did not make progress toward the following goals. Barriers to progression include na. Recommendations to address these barriers include na.

## 2024-11-03 NOTE — ASSESSMENT & PLAN NOTE
11/3 - Medical management. No intervention. Cardiology signed off. Life Vest on discharge.  11/1 - continue current treatment plan.  10/31 -s/p [facility]: no intervention indicated  -aspirin and betablocker  -however he is on midodrine for hypotension as well and use of BB and midodrine may be canceling out the benefit of the carvedilol. BP slightly less soft than last week, will keep following  Lifevest on discharge  -Cardiology signed off 10/25

## 2024-11-03 NOTE — NURSING NOTE
Until General Surgery is able to place a new corpak, This RN tapped broken corpak in a way it is still functional for feeds and medications. Pt received morning meds and feed continued. Care ongoing.

## 2024-11-03 NOTE — PROGRESS NOTES
Dat Mccallum is a 83 y.o. male on day 18 of admission presenting with NSTEMI (non-ST elevated myocardial infarction) (Multi). Also being treated for Acute on Chronic CHF, Encephalopathy, Pneumonia, urinary retention.      Subjective   Patient seen in bed with son and palliative care consult nurse. Clinically much improved today.  Awake and alert. Eyes opened and conversing with son in sign language. Answering questions appropriately and following commands. Denies any pain.         Objective     Last Recorded Vitals  /60 (BP Location: Right arm, Patient Position: Lying)   Pulse 77   Temp 35.8 °C (96.4 °F) (Temporal)   Resp 24   Wt 65.5 kg (144 lb 6.4 oz)   SpO2 96%   Intake/Output last 3 Shifts:    Intake/Output Summary (Last 24 hours) at 11/3/2024 1047  Last data filed at 11/3/2024 0400  Gross per 24 hour   Intake 798 ml   Output 2350 ml   Net -1552 ml       Admission Weight  Weight: 68 kg (150 lb) (10/16/24 2047)    Daily Weight  10/30/24 : 65.5 kg (144 lb 6.4 oz)    Image Results  EEG  IMPRESSION    Impression  This 1 hour EEG is indicative of a severe diffuse encephalopathy. No epileptiform activity or lateralizing signs seen.    A full report will be scanned into the patient's chart at a later time.    This report has been interpreted and electronically signed by      Physical Exam  Constitutional:       Appearance: He is ill-appearing.      Comments: Awake and alert, conversing with son in sign language   HENT:      Head: Normocephalic.      Nose:      Comments: Corpak in nose  Eyes:      Comments: Conjuctiva pale   Cardiovascular:      Rate and Rhythm: Normal rate.      Heart sounds: Normal heart sounds.   Pulmonary:      Breath sounds: Rhonchi and rales present.      Comments: Mild tachypnea  Abdominal:      General: Abdomen is flat. Bowel sounds are normal. There is no distension.      Palpations: Abdomen is soft.      Tenderness: There is no abdominal tenderness. There is no guarding.    Musculoskeletal:      Right lower leg: No edema.      Left lower leg: No edema.   Skin:     General: Skin is warm.   Neurological:      Mental Status: He is alert.      Comments: Awake and alert, following commands, answering appropriately, eyes open and moving upper extremities         Relevant Results               Assessment/Plan                  Assessment & Plan  Acute metabolic encephalopathy  11/3 - Resolving.  Clinically much improved today.  Awake and alert. Conversing with son in sign language. Answering questions appropriately and following commands. Awaiting results of prolonged EEG. Sodium corrected.   11/2 - No change in condition.  Neurology input appreciated.   -Diffuse encephalopathy on EEG  -Working diagnosis: toxic/metabolic encephalopathy  -Awaiting prolonged EEG results  -sodium 133, will correct slowly  11/1 - remains verbally unresponsive.  Eyes remain closed.  Moves hands to reach for tube and moves head to side.  10/31 Was profound on admission, but overall improved and then continued to wax/wane. Worsened over past 72 hr. I did not see patient Monday, but RN today had him Monday and says he was more interactive and awake that day.  Suspect related to hospital delirium and also UTI (diagnosed 10/29)  Currently strict NPO.  CT brain normal. Labs otherwise unremarkable.  Consulted neurology to evaluate. Dr. Christopher planning more prolonged EEG.   Pleural effusion  11/3 - s/p IV Lasix dose yesterday. Repeat follow up CXR in AM   11/2 - worsening bibasilar effusions seen on CXR  -likely contributing to respiratory tachypnea  -acute on chronic CHF contributing as well as PNA  -will give gentle IV diuresis, monitor closely given prior soft BPs  Acute on chronic systolic (congestive) heart failure  11/2 - continue Coreg. Current BP will tolerate one time dose of IV lasix. Monitor closely.  10/31 -previously unable to tolerate ACE/ARB or diuretics.  Present treatment with beta-blocker is continued.  He  is also treated with amiodarone for hypotension.  Vital signs will be monitored and medications adjusted as indicated.  -The patient has severe debility from his multiple acute and chronic medical problems.    NSTEMI (non-ST elevated myocardial infarction) (Multi)  11/3 - Medical management. No intervention. Cardiology signed off. Life Vest on discharge.  11/1 - continue current treatment plan.  10/31 -s/p [facility]: no intervention indicated  -aspirin and betablocker  -however he is on midodrine for hypotension as well and use of BB and midodrine may be canceling out the benefit of the carvedilol. BP slightly less soft than last week, will keep following  Lifevest on discharge  -Cardiology signed off 10/25  Bacterial pneumonia  11/3 - To complete 5 days of therapy through 11/3.  WC 8.0. BCx no growth. Will discontinue.  11/1 - continue Zosyn Abx.  Repeat CXR shows worsening bibasilar edema and effusions.  10/31 - Started on zosyn. I suspect aspiration as source. Strict NPO now. Unknown organism.  Idiopathic hypotension  11/3 - BP currently well controlled at 125/60  -Less idiopathic and more likely related to his cardiomyopathy  -BP's overall improved from last week. Will try to wean off midodrine prior to discharge  Severe protein-calorie malnutrition (Multi)  11/3 - s/p Dietician consult. Tube feeds at goal rate now.  Corpak in place.  11/1 - Patient pulled out corepak from 65 to 60.  Repeat XR to verify placement in stomach completed.  OK to use.  Will reconsult surgery in AM to come and advance it.  -Nutrition following  Now unable to safely swallow. Intake poor prior to this.  Family conversation with Pall med and myself last night. Decision made to try short term feeding tube, treatment of infections, and see if mentation improves.  Corpak ordered and surgery to place. Nutrition to put in TF orders. Will closely watch labs, patient is at risk of refeeding syndrome.    Type 2 diabetes mellitus without complication,  without long-term current use of insulin (Multi)  11/3 - BG levels have been elevated.  Patient feeding now at goal rate.  Also with Dextrose in Abx fluids.    -at home was on oral antihyperglycemics, only on SSI coverage here  -Will add low dose Lantus at night and monitor closely  -Given 10 units IV regular insulin now for blood sugar of 411. Will continue to monitor and   Hypomagnesemia  11/3 - 2g IV magnesium given today. Recheck level. Replete prn.  11/1 - resolved with supplementation.  Will continue to monitor and replete prn  -receiving PO supplementation. Supplementing PRN with IV    Acute cystitis with hematuria  Started on  abx but cultures negative  Urinary retention  11/1 - has external catheter. Will monitor I/O closely  Scant urine output by incontinence. Unable to pass trotter catheter. Urology consulted and also unable to pass trotter catheter.   If he develops painful distention or MARISSA, then will need to go for cystoscopy with dilatation    DVT PPX: Lovenox  GI PPX: IV Protonix  Code Status: DNR    Discharge Planning:  -Will go to [facility]              Lisa Tapia MD

## 2024-11-03 NOTE — PROGRESS NOTES
24 1313   Discharge Planning   Expected Discharge Disposition SNF   Does the patient need discharge transport arranged? Yes   RoundTrip coordination needed? Yes     Patient was planned discharge to [facility].  Auth has now .       Patient with corpak/NG and tubefeedings at this time.   Updated notes sent to facility for review, will need to confirm discharge plan.  If plan remains for SNF, will need to be sure [facility] is still willing and able to accept with updated medical conditions.   Patient will also need a new precert.     Discharge plan NOT secure  DO NOT DC without speaking to care coordination

## 2024-11-03 NOTE — NURSING NOTE
Pt Lost IV access due to 500 mL bolus. Rapid Response Val called and attempted three IVs on pt but pt continued to flail as Val attempted. Val discontinued attempts as she was afraid of needle sticking someone and pt does not have a lot of options in regards to veins. Dr. SEXTON made aware. Care ongoing.

## 2024-11-03 NOTE — NURSING NOTE
Pt pulled corpak while labs were being pulled with mits off. This RN removed residual corpak hanging from nose. Pt showed no signs of distress, pain, and no desaturation to SpO2.   Pts BP is soft 97/50, HR in the 70s. Last lactate 2.4. Dr. Tapia put in order for 1000 mL bolus, only available IV fluid on floor is 500 mL NS bags. 500 mL NS bag running at 500 mL/hr. Care ongoing.

## 2024-11-03 NOTE — NURSING NOTE
Assumed care of pt. Pt lying in bed, groaning at times. Bed alarm on and non-violent restraints are in place. TF running at goal, pt tolerating but BS have been running high and are uncontrolled by sliding scale. Pt in no distress or pain at this time. Care ongoing.

## 2024-11-03 NOTE — NURSING NOTE
Pts corpac ports were found by this RN to be clogged with feed/ the tubing would not dislodge from the primary port to give medications this morning. RN Dayanara, RN Km, JORGE Butcher attempted different methods ( warm water, cola) to loosen the stuck feed in corpac to open it up to give meds. After some handling, the primary corpac port had broken off. A new Corpac is needed. This RN is in contact with Dr. Tapia and Dr. Diop about the situation. Care ongoing.

## 2024-11-04 LAB
ALBUMIN SERPL BCP-MCNC: 2.2 G/DL (ref 3.4–5)
ANION GAP SERPL CALCULATED.3IONS-SCNC: 9 MMOL/L (ref 10–20)
BUN SERPL-MCNC: 23 MG/DL (ref 6–23)
CALCIUM SERPL-MCNC: 7.6 MG/DL (ref 8.6–10.3)
CHLORIDE SERPL-SCNC: 99 MMOL/L (ref 98–107)
CO2 SERPL-SCNC: 28 MMOL/L (ref 21–32)
CREAT SERPL-MCNC: 0.99 MG/DL (ref 0.5–1.3)
EGFRCR SERPLBLD CKD-EPI 2021: 77 ML/MIN/1.73M*2
ERYTHROCYTE [DISTWIDTH] IN BLOOD BY AUTOMATED COUNT: 13.6 % (ref 11.5–14.5)
GLUCOSE BLD MANUAL STRIP-MCNC: 117 MG/DL (ref 74–99)
GLUCOSE BLD MANUAL STRIP-MCNC: 138 MG/DL (ref 74–99)
GLUCOSE BLD MANUAL STRIP-MCNC: 166 MG/DL (ref 74–99)
GLUCOSE BLD MANUAL STRIP-MCNC: 202 MG/DL (ref 74–99)
GLUCOSE BLD MANUAL STRIP-MCNC: 211 MG/DL (ref 74–99)
GLUCOSE BLD MANUAL STRIP-MCNC: 225 MG/DL (ref 74–99)
GLUCOSE BLD MANUAL STRIP-MCNC: 238 MG/DL (ref 74–99)
GLUCOSE SERPL-MCNC: 225 MG/DL (ref 74–99)
HCT VFR BLD AUTO: 22.2 % (ref 41–52)
HGB BLD-MCNC: 8.1 G/DL (ref 13.5–17.5)
MAGNESIUM SERPL-MCNC: 2.13 MG/DL (ref 1.6–2.4)
MCH RBC QN AUTO: 29.8 PG (ref 26–34)
MCHC RBC AUTO-ENTMCNC: 36.5 G/DL (ref 32–36)
MCV RBC AUTO: 82 FL (ref 80–100)
NRBC BLD-RTO: 0.4 /100 WBCS (ref 0–0)
PHOSPHATE SERPL-MCNC: 1.9 MG/DL (ref 2.5–4.9)
PLATELET # BLD AUTO: 142 X10*3/UL (ref 150–450)
POTASSIUM SERPL-SCNC: 3.9 MMOL/L (ref 3.5–5.3)
RBC # BLD AUTO: 2.72 X10*6/UL (ref 4.5–5.9)
SODIUM SERPL-SCNC: 132 MMOL/L (ref 136–145)
WBC # BLD AUTO: 9 X10*3/UL (ref 4.4–11.3)

## 2024-11-04 PROCEDURE — 2500000002 HC RX 250 W HCPCS SELF ADMINISTERED DRUGS (ALT 637 FOR MEDICARE OP, ALT 636 FOR OP/ED): Performed by: NURSE PRACTITIONER

## 2024-11-04 PROCEDURE — 92526 ORAL FUNCTION THERAPY: CPT | Mod: GN | Performed by: SPEECH-LANGUAGE PATHOLOGIST

## 2024-11-04 PROCEDURE — 2500000001 HC RX 250 WO HCPCS SELF ADMINISTERED DRUGS (ALT 637 FOR MEDICARE OP): Performed by: NURSE PRACTITIONER

## 2024-11-04 PROCEDURE — 83735 ASSAY OF MAGNESIUM: CPT | Performed by: HOSPITALIST

## 2024-11-04 PROCEDURE — 2500000005 HC RX 250 GENERAL PHARMACY W/O HCPCS: Performed by: NURSE PRACTITIONER

## 2024-11-04 PROCEDURE — 2500000002 HC RX 250 W HCPCS SELF ADMINISTERED DRUGS (ALT 637 FOR MEDICARE OP, ALT 636 FOR OP/ED): Performed by: HOSPITALIST

## 2024-11-04 PROCEDURE — 2500000004 HC RX 250 GENERAL PHARMACY W/ HCPCS (ALT 636 FOR OP/ED): Performed by: HOSPITALIST

## 2024-11-04 PROCEDURE — 2500000002 HC RX 250 W HCPCS SELF ADMINISTERED DRUGS (ALT 637 FOR MEDICARE OP, ALT 636 FOR OP/ED): Performed by: INTERNAL MEDICINE

## 2024-11-04 PROCEDURE — 2500000001 HC RX 250 WO HCPCS SELF ADMINISTERED DRUGS (ALT 637 FOR MEDICARE OP): Performed by: HOSPITALIST

## 2024-11-04 PROCEDURE — 85027 COMPLETE CBC AUTOMATED: CPT | Performed by: HOSPITALIST

## 2024-11-04 PROCEDURE — 82947 ASSAY GLUCOSE BLOOD QUANT: CPT

## 2024-11-04 PROCEDURE — 2060000001 HC INTERMEDIATE ICU ROOM DAILY

## 2024-11-04 PROCEDURE — 2500000004 HC RX 250 GENERAL PHARMACY W/ HCPCS (ALT 636 FOR OP/ED): Performed by: INTERNAL MEDICINE

## 2024-11-04 PROCEDURE — 80069 RENAL FUNCTION PANEL: CPT | Performed by: HOSPITALIST

## 2024-11-04 PROCEDURE — 2500000001 HC RX 250 WO HCPCS SELF ADMINISTERED DRUGS (ALT 637 FOR MEDICARE OP): Performed by: INTERNAL MEDICINE

## 2024-11-04 PROCEDURE — 99233 SBSQ HOSP IP/OBS HIGH 50: CPT | Performed by: INTERNAL MEDICINE

## 2024-11-04 PROCEDURE — 92507 TX SP LANG VOICE COMM INDIV: CPT | Mod: GN | Performed by: SPEECH-LANGUAGE PATHOLOGIST

## 2024-11-04 PROCEDURE — 36415 COLL VENOUS BLD VENIPUNCTURE: CPT | Performed by: HOSPITALIST

## 2024-11-04 PROCEDURE — 99232 SBSQ HOSP IP/OBS MODERATE 35: CPT | Performed by: NURSE PRACTITIONER

## 2024-11-04 RX ADMIN — PIPERACILLIN SODIUM AND TAZOBACTAM SODIUM 3.38 G: 3; .375 INJECTION, SOLUTION INTRAVENOUS at 00:12

## 2024-11-04 RX ADMIN — INSULIN GLARGINE 8 UNITS: 100 INJECTION, SOLUTION SUBCUTANEOUS at 20:53

## 2024-11-04 RX ADMIN — OFLOXACIN 1 DROP: 3 SOLUTION OPHTHALMIC at 18:08

## 2024-11-04 RX ADMIN — Medication 3 L/MIN: at 08:00

## 2024-11-04 RX ADMIN — OFLOXACIN 1 DROP: 3 SOLUTION OPHTHALMIC at 12:43

## 2024-11-04 RX ADMIN — INSULIN LISPRO 2 UNITS: 100 INJECTION, SOLUTION INTRAVENOUS; SUBCUTANEOUS at 18:04

## 2024-11-04 RX ADMIN — ENOXAPARIN SODIUM 40 MG: 40 INJECTION SUBCUTANEOUS at 12:44

## 2024-11-04 RX ADMIN — ASPIRIN 81 MG CHEWABLE TABLET 81 MG: 81 TABLET CHEWABLE at 11:31

## 2024-11-04 RX ADMIN — CARVEDILOL 3.12 MG: 3.12 TABLET, FILM COATED ORAL at 11:40

## 2024-11-04 RX ADMIN — PRAVASTATIN SODIUM 40 MG: 40 TABLET ORAL at 20:53

## 2024-11-04 RX ADMIN — CARVEDILOL 3.12 MG: 3.12 TABLET, FILM COATED ORAL at 18:37

## 2024-11-04 RX ADMIN — MIDODRINE HYDROCHLORIDE 5 MG: 5 TABLET ORAL at 11:31

## 2024-11-04 RX ADMIN — INSULIN LISPRO 2 UNITS: 100 INJECTION, SOLUTION INTRAVENOUS; SUBCUTANEOUS at 00:11

## 2024-11-04 RX ADMIN — OFLOXACIN 1 DROP: 3 SOLUTION OPHTHALMIC at 20:53

## 2024-11-04 RX ADMIN — INSULIN LISPRO 2 UNITS: 100 INJECTION, SOLUTION INTRAVENOUS; SUBCUTANEOUS at 05:22

## 2024-11-04 RX ADMIN — MIDODRINE HYDROCHLORIDE 5 MG: 5 TABLET ORAL at 18:08

## 2024-11-04 RX ADMIN — OFLOXACIN 1 DROP: 3 SOLUTION OPHTHALMIC at 06:41

## 2024-11-04 RX ADMIN — Medication 400 MG: at 11:31

## 2024-11-04 RX ADMIN — Medication 100 MG: at 11:31

## 2024-11-04 RX ADMIN — TAMSULOSIN HYDROCHLORIDE 0.8 MG: 0.4 CAPSULE ORAL at 20:53

## 2024-11-04 RX ADMIN — INSULIN LISPRO 1 UNITS: 100 INJECTION, SOLUTION INTRAVENOUS; SUBCUTANEOUS at 11:48

## 2024-11-04 RX ADMIN — PANTOPRAZOLE SODIUM 40 MG: 40 INJECTION, POWDER, FOR SOLUTION INTRAVENOUS at 11:33

## 2024-11-04 ASSESSMENT — PAIN - FUNCTIONAL ASSESSMENT: PAIN_FUNCTIONAL_ASSESSMENT: 0-10

## 2024-11-04 ASSESSMENT — COGNITIVE AND FUNCTIONAL STATUS - GENERAL
EATING MEALS: TOTAL
DRESSING REGULAR UPPER BODY CLOTHING: TOTAL
DRESSING REGULAR LOWER BODY CLOTHING: TOTAL
TURNING FROM BACK TO SIDE WHILE IN FLAT BAD: TOTAL
PERSONAL GROOMING: TOTAL
TOILETING: TOTAL
MOVING FROM LYING ON BACK TO SITTING ON SIDE OF FLAT BED WITH BEDRAILS: TOTAL
STANDING UP FROM CHAIR USING ARMS: TOTAL
WALKING IN HOSPITAL ROOM: TOTAL
HELP NEEDED FOR BATHING: TOTAL
MOVING TO AND FROM BED TO CHAIR: TOTAL
CLIMB 3 TO 5 STEPS WITH RAILING: TOTAL
DAILY ACTIVITIY SCORE: 6
MOBILITY SCORE: 6

## 2024-11-04 ASSESSMENT — PAIN SCALES - PAIN ASSESSMENT IN ADVANCED DEMENTIA (PAINAD)
FACIALEXPRESSION: SMILING OR INEXPRESSIVE
CONSOLABILITY: NO NEED TO CONSOLE
BREATHING: NORMAL
TOTALSCORE: 0
BODYLANGUAGE: RELAXED

## 2024-11-04 ASSESSMENT — PAIN SCALES - GENERAL: PAINLEVEL_OUTOF10: 0 - NO PAIN

## 2024-11-04 NOTE — ASSESSMENT & PLAN NOTE
He has been here for 3 weeks.  He has had an exhaustive evaluation.  I have noticed what seems to be a left hemineglect but he had an MRI of his brain done which was negative.  According to his son the hemineglect seem to be present when the patient presented so given that he had a negative MRI since then I am not sure what the benefit would be of another MRI.  I will reassess him tomorrow.  He is following commands right now which I think is an improvement.

## 2024-11-04 NOTE — PROGRESS NOTES
Speech-Language Pathology    Speech-Language Pathology Clinical Swallow Treatment and skilled speech treatment    Patient Name: Dat Mccallum  MRN: [id number]  : 1940  Today's Date: 24  Start Time: 1002  Stop Time: 1029  Time Calculation (min): 27 min    ASSESSMENT  SLP TX Intervention Outcome: Making Progress Towards Goals  Treatment Tolerance: Patient tolerated treatment well    Impressions: Dr Anderson requesting SLP reassess swallow, pt has been NPO since 10/29/24  d/t AMS and has pulled out CORPAK x 2. He is now more alert, and family is @ bedside.  Pt safely tolerated puree and thin liquids with mild oral stage dysphagia, no s/s pharyngeal stage. Pts comprehension improving, able to follow commands with son providing ASL and occasional gestures, also responding to basic yes/no questions. Pt is non verbal and uses ASL patient's son states that pts sign language responses are nonsense and that he keeps repeating the word April.  Pt would benefit from ongoing skilled ST to minimize aspiration risk and ensure ongoing safety with the least restrictive diet and improved communication to express basic needs. Pts son feels he is getting closer to his baseline, but still appears confused    PLAN  Is MBSS recommended? No; no pharyngeal dysphagia suspected.  Recommended solid consistency: Pureed (IDDSI level 4)  Recommended liquid consistency: Thin (IDDSI 0)  Recommended medication administration: Crushed, in puree  afe swallow strategies:  Upright positioning for all PO intake  - Slow rate of intake  - Small bites  - Single sips  - cue pt to swallow (visual cue) when holding food/liquids  - Supervision @ meals      Discharge recommendation: Recommend MODERATE intensity ST upon DC in order to ensure safety with least restrictive diet.  Inpatient/Swing Bed or Outpatient: Inpatient  SLP TX Plan: Continue Plan of Care  SLP Plan: Skilled SLP  SLP Frequency: 3x per week  Duration: 2 weeks  Next Treatment Priority:  diet janey, strat, trials, rec/exp lang, commun fam educ,  Discussed POC: Patient, Caregiver/family, Nursing  Patient/Caregiver Agreeable: Yes    SUBJECTIVE  Prior to Session Communication: Bedside nurse  RN cleared pt to participate in session and reported pt more alert, and following simple commands, communicating some basic needs with ASL  Respiratory status: Room air  Positioning: Upright in bed  Pt was alert, pleasantly confused, inattentive, and distracted for session.    Pain Assessment  Pain Assessment: 0-10  0-10 (Numeric) Pain Score: 0 - No pain  Jaramillo-Baker FACES Pain Rating: Hurts little bit  Clinical Progression: Not changed  PAINAD Score: 0    Orientation: Oriented to self  Ability to follow functional commands: Follows simple commands inconsistently    OBJECTIVE  Therapeutic Swallow:  Therapeutic Swallow Intervention : PO Trials, Compensatory Strategies caregiver education    Swallow Goals:  In 2 weeks...  - Pt will consume PO trials  without overt s/sx aspiration in order for consideration of initiating diet               GOAL START:   10/18/2024                                          GOAL END:11/18/24              CURRENT STATUS: Progressing              PROGRESS: Pt reassessed with the following trials: ice chips x 3, 2 sips thin via cup and, 2 single sips via straw and 4 sequential sips via straw, 2 oz applesauce and 1 piece of alexandro cracker softened in applesauce, moderate oral stage: slow oral manipulation with ice chips, puree and softened alexandro cracker, mild residues with cracker, complete oral clearance, of puree with ample time, no overt s/s aspiration with any textures assessed   - Pt will demonstrate follow-through of trained compensatory strategies during a meal/snack with 90% acc with max cues.              GOAL START:   10/18/2024                                          GOAL END:11/1/24              CURRENT STATUS: With asssist from SLP, Pt fed self puree in adequately sized spoonfuls  with appropriate rate of intake. And Single sips via cup and straw also noted.              PROGRESS: Good follow-through noted wit assisst; progressing with skilled instruction.     Language Expression: pt non verbal  Language Expression Interventions: ASL  Language Expression Comments: Pt responded at times with ASL signs per son     Language Comprehension:  Language Comprehension Interventions: Single Step Commands, Yes/No Questions     Speech Goals   (Start date 10/18/24. Anticipated time frame for goal attainment: 2 weeks)     - Pt will complete low level expressive language tasks with 50% acc given max cues.  CURRENT STATUS: SLP engaged pt in informal tasks.  Pt expressed immediate needs with use of ASL signs per son to indicate he wanted more applesauce, and apple juice (with sign for apple) did not want more cracker trials.              PROGRESS: Improving communication of basics via multimodal expressions per son getting to his baseline, but at time ASL is nonsense     -Pt will complete low level receptive language tasks with 50% acc given max cues.              CURRENT STATUS: Pt responded to sons ASL to providde instruction with 70% acc, required gestrues 30% of the time, improved per son              PROGRESS: Progressing; continue to address             - Pt will use multimodal communication to communicate basic wants/needs in 50% of opportunities given max assist.              CURRENT STATUS: 50% of opportunities in session pt used gestures and signs.                PROGRESS:  Improving ability to communicate basic wants/needs with encouragement and written cues.               - Pt will imitate consonants/vowels/single words with 50 acc given max cues.              CURRENT STATUS: Not targeted              PROGRESS: N/A     - Pt will demonstrate orientation x4 given max cues.  CURRENT STATUS: Not targeted              PROGRESS: N/A  Treatment/Education:  Results and recommendations were relayed to:  Patient, Family, and Bedside nurse  Education provided: Yes   Learner: Patient and Child   Barriers to learning: Cognitive limitations barrier, Communication limitations barrier, and Hearing impairment barrier   Method of teaching: Verbal and Demonstration ASL   Topic: role of ST, results of assessment, recommended diet modifications, recommended safe swallow strategies, recommendation for dysphagia follow-up, recommendation for speech/language/cognition therapy, and recommended cognitive-communication strategies   Outcome of teaching: Caregiver demonstrated good understanding and Unable to demonstrate understanding at this time

## 2024-11-04 NOTE — PROGRESS NOTES
Physical Therapy                 Therapy Communication Note    Patient Name: Dat Mccallum  MRN: [id number]  Department: [address]  Room: 12/12-B  Today's Date: 11/4/2024     Discipline: Physical Therapy    Missed Visit Reason: Missed Visit Reason: Other (Comment) (Pt unarousable to multiple stimuli (sternal rub, cloth to face, supported seated, lights)-not appropriate for therapy at this time. Once therapist left room, pt observed waking up, ripping pulse ox off forehead and returning back to heavy sleep.)    Missed Time: Cancel    Comment:

## 2024-11-04 NOTE — PROGRESS NOTES
Occupational Therapy                 Therapy Communication Note    Patient Name: Dat Mccallum  MRN: [id number]  Department: [facility]  Room: 12/12-B  Today's Date: 11/4/2024     Discipline: Occupational Therapy    Missed Visit Reason: Missed Visit Reason: Cancel (Hold OT this date-pt becoming agitated and ripping corpak out previous date and placed in ANGÉLICA mitt restraints. RN requesting to hold)    Missed Time: Cancel    Comment:

## 2024-11-04 NOTE — PROGRESS NOTES
Palliative Care Progress Note    Date of Admission: 10/16/2024    Patient is a 83 y.o. male admitted with NSTEMI (non-ST elevated myocardial infarction) (Multi).   Pulled out corpak 11/3, replaced by surgery, then puled out again this am at 0300. Currently sitting up at side of bed with assist of nurse, Able to eat 25% of meal today.     Scheduled medications  aspirin, 81 mg, oral, Daily  carvedilol, 3.125 mg, oral, BID after meals  enoxaparin, 40 mg, subcutaneous, Daily  insulin glargine, 8 Units, subcutaneous, Nightly  insulin lispro, 0-5 Units, subcutaneous, q6h  magnesium oxide, 400 mg, oral, Daily  midodrine, 5 mg, oral, TID  ofloxacin, 1 drop, Left Eye, 4x daily  oxygen, , inhalation, Continuous - 02/gases  pantoprazole, 40 mg, intravenous, Daily  potassium phosphate, 15 mmol, intravenous, Once  pravastatin, 40 mg, oral, Nightly  tamsulosin, 0.8 mg, oral, Nightly  thiamine, 100 mg, nasogastric tube, Daily      Continuous medications       PRN medications  PRN medications: acetaminophen **OR** acetaminophen, aminophylline, dextrose, dextrose, glucagon, glucagon, melatonin, prochlorperazine, prochlorperazine     Results for orders placed or performed during the hospital encounter of 10/16/24 (from the past 24 hours)   POCT GLUCOSE   Result Value Ref Range    POCT Glucose 271 (H) 74 - 99 mg/dL   Lactate   Result Value Ref Range    Lactate 0.5 0.4 - 2.0 mmol/L   POCT GLUCOSE   Result Value Ref Range    POCT Glucose 174 (H) 74 - 99 mg/dL   POCT GLUCOSE   Result Value Ref Range    POCT Glucose 117 (H) 74 - 99 mg/dL   POCT GLUCOSE   Result Value Ref Range    POCT Glucose 105 (H) 74 - 99 mg/dL   POCT GLUCOSE   Result Value Ref Range    POCT Glucose 202 (H) 74 - 99 mg/dL   CBC   Result Value Ref Range    WBC 9.0 4.4 - 11.3 x10*3/uL    nRBC 0.4 (H) 0.0 - 0.0 /100 WBCs    RBC 2.72 (L) 4.50 - 5.90 x10*6/uL    Hemoglobin 8.1 (L) 13.5 - 17.5 g/dL    Hematocrit 22.2 (L) 41.0 - 52.0 %    MCV 82 80 - 100 fL    MCH 29.8 26.0 -  34.0 pg    MCHC 36.5 (H) 32.0 - 36.0 g/dL    RDW 13.6 11.5 - 14.5 %    Platelets 142 (L) 150 - 450 x10*3/uL   Magnesium   Result Value Ref Range    Magnesium 2.13 1.60 - 2.40 mg/dL   Renal Function Panel   Result Value Ref Range    Glucose 225 (H) 74 - 99 mg/dL    Sodium 132 (L) 136 - 145 mmol/L    Potassium 3.9 3.5 - 5.3 mmol/L    Chloride 99 98 - 107 mmol/L    Bicarbonate 28 21 - 32 mmol/L    Anion Gap 9 (L) 10 - 20 mmol/L    Urea Nitrogen 23 6 - 23 mg/dL    Creatinine 0.99 0.50 - 1.30 mg/dL    eGFR 77 >60 mL/min/1.73m*2    Calcium 7.6 (L) 8.6 - 10.3 mg/dL    Phosphorus 1.9 (L) 2.5 - 4.9 mg/dL    Albumin 2.2 (L) 3.4 - 5.0 g/dL   POCT GLUCOSE   Result Value Ref Range    POCT Glucose 211 (H) 74 - 99 mg/dL   POCT GLUCOSE   Result Value Ref Range    POCT Glucose 138 (H) 74 - 99 mg/dL   POCT GLUCOSE   Result Value Ref Range    POCT Glucose 166 (H) 74 - 99 mg/dL        XR chest 1 view    Result Date: 10/31/2024  Interpreted By:  Laurent Elam, STUDY: XR CHEST 1 VIEW; 10/31/2024 11:12 am   INDICATION: CLINICAL INFORMATION: Signs/Symptoms:Tube placement check.   COMPARISON: 10/31/2024 at 1054 hours   ACCESSION NUMBER(S): [id number]   ORDERING CLINICIAN: DONNA CEJA   TECHNIQUE: Portable chest one view.   FINDINGS: The cardiac size is indeterminate in view of the AP projection. Feeding tube is been repositioned with the tube now noted to be coiled within the stomach. Bibasilar infiltrates are present.       1. Feeding tube is present with extending into the stomach with the tube coiled within the region of the body and fundus. 2. Bibasilar infiltrates.   MACRO: none   Signed by: Laurent Elam 10/31/2024 12:07 PM Dictation workstation:   [id number]    XR chest 1 view    Result Date: 10/31/2024  Interpreted By:  Laurent Elam, STUDY: XR CHEST 1 VIEW; 10/31/2024 11:11 am   INDICATION: CLINICAL INFORMATION: Signs/Symptoms:Verification of nasogastric tube location.   COMPARISON: 10/29/2024 at 1042 hours   ACCESSION  NUMBER(S): [id number]   ORDERING CLINICIAN: KIMBERLEY BLAS   TECHNIQUE: Portable chest one view.   FINDINGS: The cardiac size is indeterminate in view of the AP projection. Nasogastric tube is coiled within the mid chest along the course of the thoracic esophagus. Bibasilar infiltrate is suspected. There is a limited depth of inspiration on the exam.       Nasogastric tube is coiled within the midthoracic esophagus. Probable bibasilar infiltrates although the exam is limited by poor depth of inspiration.   MACRO: none   Signed by: Laurent Elam 10/31/2024 12:05 PM Dictation workstation:   [id number]    XR chest 1 view    Result Date: 10/29/2024  Interpreted By:  Laurent Elam, STUDY: XR CHEST 1 VIEW; 10/29/2024 11:22 am   INDICATION: CLINICAL INFORMATION: Signs/Symptoms:cough.   COMPARISON: 10/16/2024   ACCESSION NUMBER(S): [id number]   ORDERING CLINICIAN: RICHARD ADAIR   TECHNIQUE: Portable chest one view.   FINDINGS: The cardiac size is indeterminate in view of the AP projection. Patient is slightly rotated to the left. There is a thoracic dextroscoliosis. Alveolar infiltrate is present within left lower lobe consistent with pneumonia. Right hemithorax is clear. No effusions are identified.       There is a new left lower lobe infiltrate consistent with pneumonia. Follow-up to assure complete clearing is suggested.   MACRO: none   Signed by: Laurent Elam 10/29/2024 2:55 PM Dictation workstation:   [id number]    CT head wo IV contrast    Result Date: 10/29/2024  Interpreted By:  Brielle Linares, STUDY: CT HEAD WO IV CONTRAST; ;  10/29/2024 1:26 pm   INDICATION: Signs/Symptoms:sudden onset confusion.   COMPARISON: 10/16/2024   ACCESSION NUMBER(S): [id number]   ORDERING CLINICIAN: RICHARD ADAIR   TECHNIQUE: Serial axial images of the head were obtained without intravenous contrast. Sagittal and coronal reconstructions were generated.   FINDINGS: Trickles are midline and enlarged. There is prominence  of the cortical sulci and superior cerebellar cisterns.   There are no acute parenchymal abnormalities. There is no hemorrhage or extra-axial fluid.   There is no obvious scalp hematoma or skull fracture.   Visualized portions of the paranasal sinuses and mastoids are unremarkable.   COMPARISON OF FINDINGS: The brain is similar.       No acute intracranial abnormality.     MACRO: none   Signed by: Brielle Linares 10/29/2024 2:19 PM Dictation workstation:   [id number]    Cardiac Catheterization Procedure    Result Date: 10/24/2024           [place] [address]            Phone [phone] Cardiovascular Catheterization Report Patient Name:     AMARILYS FARAH      Performing Physician:  Virginia Chavez DO Study Date:       10/23/2024           Verifying Physician:   Virginia Chavez DO MRN/PID:          [id number]             Cardiologist/Co-Scrub: Accession#:       [id number]         Ordering Provider:     Virginia CHAVEZ Date of           1940 / 83      Cardiologist: Birth/Age:        years Gender:           M                    Fellow: Encounter#:       [id number]           Surgeon:  Study: Left Heart Cath  Indications: AMARILYS FARAH is a 84 year old male who presents with dyslipidemia and an asymptomatic chest pain assessment. Cardiomyopathy, left ventricular dysfunction and NSTE - ACS. Stress test performed: No. CTA performed: NoShey Calvillo accessed: No. LVEF Assessed: No. Cardiac arrest: No. Cardiac surgical consult: No. Cardiovascular Instability: No Frailty status of patient entering lab: 6 = Moderately frail.  Coronary Angiography: The coronary circulation is right dominant.  Coronary Angiography Comments: We obtained informed consent with the help of his son  Jorge A because the patient is deaf, and he was brought to the cardiac catheterization lab with the timeout verified between his computer medical record number and his arm wristband. Both groins were prepped and draped in a sterile fashion and the remainder of the procedure performed under sterile technique. 10 cc of 1% lidocaine used to the right groin for local anesthesia and Versed 2 mg and fentanyl 50 mcg for intravenous sedation. Used single wall micropuncture technique to access the right common femoral artery and a micropuncture sheath was inserted, then exchanged over guidewire for a 6 Romanian Sylvester sheath. 6 Romanian JR4 JL 4 and pigtail catheters were used and catheters were advanced and withdrawn over the guidewire without difficulty. At the end of the procedure the sheath was removed and an Angio-Seal closure device was deployed with good hemostasis and he was returned to his telemetry bed in stable condition. Coronary angiography: 1. The left main was a large vessel and bifurcated into the LAD and circumflex and was normal 2 the LAD was a large vessel that extends to the apex and was widely patent with CHEYANNE grade III flow. The proximal LAD had an eccentric 40-50% irregular stenosis that was presumed to be the site of previous infarct vessel occlusion with spontaneous recanalization. The remaining mid and distal LAD and its branches had mild diffuse nonobstructive disease 3. The circumflex was a modest nondominant vessel that gave rise to 3 modest PLV branches and had no obstructive disease.  4. The right coronary artery was a dominant vessel that had mild 30-40% ostial /proximal stenosis and an eccentric 50% mid vessel stenosis, then gives rise to a modest sized small caliber PDA and small caliber distal RCA without obstruction. Left ventriculogram performed in the VALLES 30 projection showed persistent modest hypokinesis of the mid anterior, anterolateral segments and severe hypokinesis of the LV apical  segment with left ventricular ejection fraction estimated at 40 to 45%. Impression: 1.40% proximal LAD stenosis suggesting previous vessel occlusion with spontaneous recanalization. 2. 50% mid RCA stenosis. 3. Ischemic cardiomyopathy with residual anterior and anteroapical left ventricular hypokinesis with left ventricular ejection fraction 40 to 45%. 3. Nuclear stress test 10/22/2024 suggests viability in the anterior and apical segments without ischemia and with left ventricular ejection fraction estimated at 44%.   Hemo Personnel: +----------------+---------+ Name            Duty      +----------------+---------+ Haja Mckeon MD 1 +----------------+---------+  Hemodynamic Pressures:  +----+---------------+----------+-------------+--------------+-------+---------+ Site   Date Time   Phase NameSystolic mmHgDiastolic mmHgED mmHgMean mmHg +----+---------------+----------+-------------+--------------+-------+---------+   AO     10/23/2024  AIR REST          116            57              81          8:56:33 AM                                                      +----+---------------+----------+-------------+--------------+-------+---------+   LV     10/23/2024  AIR REST          103             2      8                   9:07:45 AM                                                      +----+---------------+----------+-------------+--------------+-------+---------+   LV     10/23/2024  AIR REST          109            -2      7                   9:07:57 AM                                                      +----+---------------+----------+-------------+--------------+-------+---------+  LVp     10/23/2024  AIR REST          106             0     12                   9:08:06 AM                                                      +----+---------------+----------+-------------+--------------+-------+---------+  AOp     10/23/2024   AIR REST          120            52              76          9:08:16 AM                                                      +----+---------------+----------+-------------+--------------+-------+---------+   AO     10/23/2024  AIR REST            0             0             -96          9:32:52 AM                                                      +----+---------------+----------+-------------+--------------+-------+---------+  Cardiac Cath Post Procedure Notes: Post Procedure Diagnosis: Double vessel disease. Blood Loss:               Estimated blood loss during the procedure was 10ml                           mls. Specimens Removed:        Number of specimen(s) removed: none.  ICD 10 Codes: Non ST elevation (NSTEMI) myocardial infarction-I21.4  CPT Codes: Left Heart Cath (visualization of coronaries) and LV-15676  [id number] Haja Mckeon DO Performing Physician Electronically signed by Virginia Mckeon DO on 10/24/2024 at 9:06:38 AM  ** Final **     Cardiology Interpretation Of Nuclear Stress - See Other Report For Nuclear Portion    Result Date: 10/23/2024           [place] [id number]            Phone [phone] Nuclear Pharmacologic Stress Test Patient Name:      AMARILYS FARAH     Ordering Provider:     [id number] COOPER RATLIFF Study Date:        10/22/2024          Reading Physician:     [id number] Pelaez MD MRN/PID:           [id number]            Supervising Physician: Deepthi Pelaez MD Accession#:        [id number]        Fellow: Date of Birth/Age: 1940 / 83     Fellow:                    years Gender:            M                   Nurse:                 Inez Holley RN Admit  Date:                            Technician:            Marina Feliciano Admission Status:                      Sonographer:           LORRIE Height:            182.88 cm           Technologist: Weight:            62.60 kg            Additional Staff: BSA:               1.82 m2             Encounter#:            [id number] BMI:               18.72 kg/m2         Patient Location: Study Type:    CARDIOLOGY INTERPRETATION OF NUCLEAR STRESS Diagnosis/ICD: NonST elevation (NSTEMI) myocardial infarction-I21.4 Indication:    CARDIOMYOPATHY EF 15-20% CPT Codes:     Stress Test Interpretation-65677; Stress Test Supervision-76275 Falls Risk: Low: Patient has low risk for sustaining a fall; environmental safety interventions in place.  Study Details: Correct procedure and correct patient verified verbally and with                ID Band checked.  Patient History: Hyperlipidemia and congestive heart failure. NSTEMI, IDIOPATHIC HYPOTENSION.  Medications: ASPIRIN, CARVEDILOL, LOVENOX, PRAVASTATIN, SPIRONOLACTONE, MIDODRINE. The patient took medications as prescribed.  Patient Performance: Patient received a total of 0.4 mg of Regadenoson at 11:41:48 AM. The resting blood pressure was 114/69 mmHg with a heart rate of 81 bpm. The patient developed no symptoms during the stress exam. The blood pressure response was normal. The test was terminated due to: completed lab protocol and end of vasodilator infusion. Patient has met the discharge criteria and is discharged to their floor.  Baseline ECG: Resting ECG showed normal sinus rhythm with nonspecific ST-T wave changes. Artifact.  Stress ECG: Stress ECG showed normal sinus rhythm, with frequent premature ventricular contractions. No ST changes. Essentially negative ECG stress test for ischemia with a Lexiscan infusion. Please get the nuclear imaging report from radiology department.  Stress Stage Data: +----------------+--+------+-------+                 HRSys BPDias BP  +----------------+--+------+-------+ Baseline [id number]   69      +----------------+--+------+-------+  Recovery ECG: Recovery ECG showed normal sinus rhythm, with artifact.  +------------+--+------+-------+             HRSys BPDias BP +------------+--+------+-------+ Recovery I  [id number]    57      +------------+--+------+-------+ Recovery II 7288    51      +------------+--+------+-------+ Recovery [id number]    41      +------------+--+------+-------+ Recovery IV 8592    52      +------------+--+------+-------+ Recovery V  7392    54      +------------+--+------+-------+  Summary:  1. Adequate level of stress achieved.  2. Correlate with myocardial perfusion imaging results.  3. Essentially negative ECG stress test for ischemia with a Lexiscan infusion. Please get the nuclear imaging report from radiology department.  4. No clinical or electrocardiographic evidence for ischemia at maximal infusion.  5. No ECG changes from baseline.  6. Normal Stress Test.  7. Nuclear image results are reported separately. [id number] Fabien Pelaez MD Electronically signed on 10/23/2024 at 9:39:18 AM   ** Final **     Nuclear Stress Test    Result Date: 10/22/2024  Interpreted By:  Chapito García and Ogievich Taessa STUDY: NUCLEAR STRESS TEST; 10/22/2024 2:18 pm   INDICATION: Signs/Symptoms:Cardiomyopathy, EF 15-20%.   COMPARISON: None.   ACCESSION NUMBER(S): [id number]   ORDERING CLINICIAN: COOPER RATLIFF   TECHNIQUE: DIVISION OF NUCLEAR MEDICINE PHARMACOLOGIC STRESS MYOCARDIAL PERFUSION SCAN, ONE DAY PROTOCOL   The patient received an intravenous dose of  8.8 mCi of Tc-99m Myoview and resting emission tomographic (SPECT) images of the myocardium were acquired. The patient then received an intravenous infusion of 0.4mg regadenoson (Lexiscan)  followed by an additional dose of  25.1 mCi of Tc-99m  Myoview. Stress phase SPECT images of the myocardium were then acquired. These included ECG-gated images to  assess and quantify ventricular function.  A low-dose, nondiagnostic regional CT was utilized for attenuation correction purposes.   FINDINGS: Both stress and rest studies demonstrate grossly normal perfusion throughout the left ventricle.   The left ventricle is normal in size.   Gated images demonstrate mild global hypokinesis of the LV wall motion with a post-stress LV EF estimated at 44%.   Attenuation correction CT images demonstrate no gross anatomic abnormalities.       1.  No evidence of inducible ischemia or prior infarction. 2. Left ventricle is normal in size. 3. Mild global hypokinesis of the LV wall motion with a post-stress LV EF estimated at 44%.   I personally reviewed the images/study and I agree with the findings as stated by Viry Up DO, PGY-3. This study was interpreted at [place], [place].   MACRO: None   Signed by: Chapito García 10/22/2024 3:15 PM Dictation workstation:   [id number]    Electrocardiogram, 12-lead PRN ACS symptoms    Result Date: 10/22/2024  Poor data quality in current ECG precludes serial comparison Sinus tachycardia with short ME with Premature supraventricular complexes and with frequent Premature ventricular complexes Indeterminate axis Pulmonary disease pattern Nonspecific ST and T wave abnormality Abnormal ECG When compared with ECG of 16-OCT-2024 20:40, (unconfirmed) Previous ECG has undetermined rhythm, needs review Questionable change in QRS duration Confirmed by Fer Vazquez ([id number]) on 10/22/2024 12:52:29 PM    MR brain wo IV contrast    Result Date: 10/19/2024  Interpreted By:  Hugo Rico  and Capo Burns STUDY: MR BRAIN WO IV CONTRAST;  10/19/2024 3:36 pm   INDICATION: Signs/Symptoms:altered mental status.     COMPARISON: CT of the head obtained October 16, 2024   ACCESSION NUMBER(S): [id number]   ORDERING CLINICIAN: ROMULO JOHNSON   TECHNIQUE: Axial T2, FLAIR, DWI, gradient echo T2 and sagittal and coronal  T1 weighted images of brain were acquired.  No contrast was administered.   FINDINGS: Midline brain structures are intact on mid sagittal imaging.   There is no territorial restricted diffusion to suggest acute intracranial infarct. No suspicious T2 signal hypointensity noted on gradient sequencing.   There is no evidence of acute intracranial hemorrhage. No intracranial mass or mass effect. No midline shift. No loss of gray-white differentiation. Few scattered supratentorial white matter FLAIR signal hyperintensities are observed.  Basilar cisterns appear intact. Moderate dilatation of the lateral and 3rd greater than 4th ventricles at least principally on the basis of global parenchymal volume loss.   Normal T2 vascular flow voids are seen.   T2 hyperintensity within the maxillary sinuses is consistent with mucosal inflammatory paranasal sinus disease.. Status post bilateral lens replacements.   There is no mastoid effusion. Moderate effusions of the occipital lateral mass C1 articulation bilaterally may be on a degenerative basis.       No MR evidence of acute intracranial infarct, hemorrhage, mass, or mass effect.   I personally reviewed the images/study and I agree with the findings as stated. This study was interpreted at [place], [place].   MACRO: None   Signed by: Hugo Rico 10/19/2024 4:41 PM Dictation workstation:   [id number]    EEG    IMPRESSION Impression This routine awake and drowsy EEG is indicative of a moderate diffuse encephalopathy. No epileptiform activity or lateralizing signs seen. A full report will be scanned into the patient's chart at a later time. This report has been interpreted and electronically signed by    Transthoracic Echo (TTE) Complete    Result Date: 10/17/2024           [address] [address]            Phone [phone] TRANSTHORACIC ECHOCARDIOGRAM REPORT Patient Name:     AMARILYS WALTON  SOFI Dawkins Physician:   [id number] [facility]  Study Date:       10/17/2024           Ordering Provider:   [id number] CECIL HOWARD MRN/PID:          [id number]             Fellow: Accession#:       [id number]         Nurse: Date of           1940 / 83      Sonographer:         R Birth/Age:        years Gender:           M                    Additional Staff: Height:           177.80 cm            Admit Date: Weight:           65.77 kg             Admission Status:    Inpatient - Routine BSA / BMI:        1.82 m2 / 20.81      Department Location: [facility]                   kg/m2 Blood Pressure: 97 /74 mmHg Study Type:    TRANSTHORACIC ECHO (TTE) COMPLETE Diagnosis/ICD: Non ST elevation (NSTEMI) myocardial infarction-I21.4 Indication:    NSTEMI CPT Codes:     Echo Complete w Full Doppler-80209 Patient History: Diabetes:          Yes Pertinent History: HTN, Hyperlipidemia, CAD, Chest Pain and CHF. Renal dx III. Study Detail: The following Echo studies were performed: 2D, M-Mode, Doppler and               color flow. Technically challenging study due to poor acoustic               windows and patient lying in supine position.  PHYSICIAN INTERPRETATION: Left Ventricle: The left ventricular systolic function is severely decreased, with a visually estimated ejection fraction of 15-20%. There are multiple wall motion abnormalities. The left ventricular cavity size is moderately dilated. There is mild concentric left ventricular hypertrophy. Left ventricular diastolic filling was indeterminate. LV Wall Scoring: The entire apex, mid and apical anterior wall, mid and apical anterior septum, mid and apical inferior septum, mid and apical inferior wall, mid inferolateral segment, and mid anterolateral segment are akinetic. All remaining scored segments are normal. Left Atrium: The left atrium is normal in size. Right Ventricle: The right ventricle is  normal in size. There is normal right ventricular global systolic function. Right Atrium: The right atrium is normal in size. Aortic Valve: The aortic valve is probably trileaflet. The aortic valve dimensionless index is 0.70. There is no evidence of aortic valve regurgitation. The peak instantaneous gradient of the aortic valve is 2.7 mmHg. The mean gradient of the aortic valve is 1.4 mmHg. Mitral Valve: The mitral valve is normal in structure. There is no evidence of mitral valve regurgitation. Tricuspid Valve: The tricuspid valve is structurally normal. No evidence of tricuspid regurgitation. Pulmonic Valve: The pulmonic valve is not well visualized. The pulmonic valve regurgitation was not well visualized. Pericardium: No pericardial effusion noted. Aorta: The aortic root is normal.  CONCLUSIONS:  1. Multiple segmental abnormalities exist. See findings.  2. The left ventricular systolic function is severely decreased, with a visually estimated ejection fraction of 15-20%.  3. There are multiple wall motion abnormalities.  4. Left ventricular diastolic filling was indeterminate.  5. Left ventricular cavity size is moderately dilated.  6. There is normal right ventricular global systolic function.  7. 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 QUANTITATIVE DATA SUMMARY:  2D MEASUREMENTS:           Normal Ranges: Ao Root s:       3.51 cm IVSd:            0.79 cm   (0.6-1.1cm) LVPWd:           0.76 cm   (0.6-1.1cm) LVIDd:           4.44 cm   (3.9-5.9cm) LVIDs:           3.81 cm LV Mass Index:   58.6 g/m2 LV % FS          14.0 %  LA VOLUME:          Normal Ranges: LA Vol A4C: 45.2 ml  RA VOLUME BY A/L METHOD:          Normal Ranges: RA Area A4C:             12.0 cm2  LV SYSTOLIC FUNCTION BY 2D PLANIMETRY (MOD):                      Normal Ranges: EF-A4C View:    16 % (>=55%) EF-A2C View:    5 % EF-Biplane:     14 % EF-Visual:      18 % EF-3DQ:         19 % LV EF Reported: 18 %   LV DIASTOLIC FUNCTION:           Normal Ranges: MV Peak E:             0.45 m/s  (0.7-1.2 m/s) MV Peak A:             0.88 m/s  (0.42-0.7 m/s) E/A Ratio:             0.51      (1.0-2.2) MV e'                  0.073 m/s (>8.0) MV lateral e'          0.11 m/s MV medial e'           0.04 m/s E/e' Ratio:            6.16      (<8.0)  MITRAL VALVE:          Normal Ranges: MV DT:        110 msec (150-240msec)  AORTIC VALVE:                     Normal Ranges: AoV Vmax:                0.82 m/s (<=1.7m/s) AoV Peak P.7 mmHg (<20mmHg) AoV Mean P.4 mmHg (1.7-11.5mmHg) LVOT Max Florencio:            0.61 m/s (<=1.1m/s) AoV VTI:                 17.99 cm (18-25cm) LVOT VTI:                12.65 cm LVOT Diameter:           2.00 cm  (1.8-2.4cm) AoV Area, VTI:           2.20 cm2 (2.5-5.5cm2) AoV Area,Vmax:           2.32 cm2 (2.5-4.5cm2) AoV Dimensionless Index: 0.70  RIGHT VENTRICLE: RV Basal 3.30 cm RV Mid   2.70 cm RV Major 4.8 cm  TRICUSPID VALVE/RVSP:          Normal Ranges: Peak TR Velocity:     2.26 m/s RV Syst Pressure:     23 mmHg  (< 30mmHg)  PULMONIC VALVE:          Normal Ranges: PV Max Florencio:     0.6 m/s  (0.6-0.9m/s) PV Max P.3 mmHg PV Mean P.5 mmHg PV VTI:         6.59 cm  AORTA: Asc Ao Diam 0.00 cm  [id number] [facility]  Electronically signed on 10/17/2024 at 11:00:02 AM  Wall Scoring  ** Final (Updated) **     ECG 12 lead    Result Date: 10/17/2024  Normal sinus rhythm Left axis deviation Low voltage QRS Anteroseptal infarct Inferior infarct , age undetermined Abnormal ECG When compared with ECG of 2018 16:39, Significant changes have occurred Confirmed by Rell Vaughan ([id number]) on 10/17/2024 11:32:49 AM    XR chest 1 view    Result Date: 10/16/2024  Interpreted By:  Lobo Farrar, STUDY: XR CHEST 1 VIEW;  10/16/2024 9:27 pm   INDICATION: Signs/Symptoms:malaise.   COMPARISON: Chest x-ray 2021   ACCESSION NUMBER(S): [id number]   ORDERING CLINICIAN: MICHELLE MARTE    FINDINGS: Multiple overlying leads are present.   CARDIOMEDIASTINAL SILHOUETTE: Cardiomediastinal silhouette is normal in size and configuration. Atherosclerotic calcification of the aorta.   LUNGS: No consolidation, pleural effusion or pneumothorax.   ABDOMEN: Surgical clips noted in the left upper quadrant.   BONES: Multilevel degenerative changes of the spine.       No acute cardiopulmonary process.   MACRO: None   Signed by: Lobo Farrar 10/16/2024 9:52 PM Dictation workstation:   [id number]    CT head wo IV contrast    Result Date: 10/16/2024  Interpreted By:  Ori Asher, STUDY: CT HEAD WO IV CONTRAST;  10/16/2024 9:18 pm   INDICATION: Signs/Symptoms:malaise/fatigue; AMS.     COMPARISON: 04/18/2021   ACCESSION NUMBER(S): [id number]   ORDERING CLINICIAN: MICHELLE MARTE   TECHNIQUE: Noncontrast axial CT scan of head was performed. Angled reformats in brain and bone windows were generated. The images were reviewed in bone, brain, blood and soft tissue windows.   FINDINGS: CSF Spaces: The ventricles, sulci and basal cisterns are within normal limits. There is no extraaxial fluid collection.   Parenchyma: There are periventricular white matter changes noted. The grey-white differentiation is intact. There is no mass effect or midline shift.  There is no intracranial hemorrhage.   Calvarium: The calvarium is unremarkable.   Paranasal sinuses and mastoids: Visualized paranasal sinuses and mastoids are clear.       No evidence of acute cortical infarct or intracranial hemorrhage.       MACRO: None   Signed by: Ori Asher 10/16/2024 9:28 PM Dictation workstation:   [id number]       Vitals:    11/04/24 1123   BP: 101/59   Pulse: 90   Resp:    Temp: 36 °C (96.8 °F)   SpO2: 100%         Physical Exam  Vitals and nursing note reviewed.   Constitutional:       General: He is not in acute distress.     Appearance: He is ill-appearing.      Comments: Thin and frail appearing, lying in bed, somnolent   HENT:       Mouth/Throat:      Mouth: Mucous membranes are dry.      Pharynx: Oropharynx is clear.   Eyes:      Extraocular Movements: Extraocular movements intact.   Cardiovascular:      Rate and Rhythm: Normal rate and regular rhythm.      Pulses: Normal pulses.      Heart sounds: No murmur heard.  Pulmonary:      Effort: Pulmonary effort is normal. No respiratory distress.      Breath sounds: No wheezing or rales.   Abdominal:      General: There is no distension.      Palpations: Abdomen is soft.      Tenderness: There is no abdominal tenderness. There is no guarding.   Musculoskeletal:      Right lower leg: No edema.      Left lower leg: No edema.      Comments: Moves all extremities     Skin:     General: Skin is warm and dry.      Coloration: Skin is pale.   Neurological:      General: No focal deficit present.      Motor: Weakness present.      Comments: Eyes open, slowed responses, but signing yes no appropriately.    Psychiatric:      Comments: calm          Assessment/Plan   IMP:    HFrEF - EF improved s/p PCI. On BB, EF 40% per cardiac cath  NSTEMI - aspirin, BB  2.  Hypotension - on midodrine tid  3.  Acute metabolic encephalopathy - baseline A&O x3, worsened,  4.  Severe protein calorie malnutrition-family ok with corpak temporarily, but no long term feeding tubes.   5.  Electrolyte derangements - receiving supplementations  6.  PNA-zosyn completed 11/4 midnight, LLL on CXR, suspect aspiration  7.  Urinary Retention-urology attempted coudet, unable. In continues to retain, would need to consider cytoscopy with dilation, family discussing, but seems to be in agreement that they would proceed with this if necessary to provide symptom relief.   8. Anemia-monitor closely.   9. Hyperglycemia-insulin adjusted by attending service.      Palliative Care Encounter  DNR-DNI  The patient is not capable  Son Jorge A is primary HPOA, son Rich is first alternate agent    11/4  Passed swallow evaluation, on pureed diet, 1:1  assist with meals. Corpak to remain out for now, monitor oral intake. Completed IV zosyn for possible PNA, last dose 11/4 midnight. Now on 2L NC, CXR showing b/l infiltrate, possible layering effusion, afebrile. Encourage up to side of bed, cough and deep breath, Repeat CXR scheduled for tomorrow.   Continue treatment model of care per family as patient has shown slight improvement over past 48hrs.     11/3  More awake and alert today, following commands, answering questions appropriately. TF at goal 50ml/hr. Blood sugars elevated, anion gap elevated. Discussed with attending service, plan to adjust insulin.   Cont aggressive treatment model of care, son at bedside for updates.     11/2  Remains encephalopathic, no significant improvement. Corpak in place, tolerating TF. Discussed with son at bedside, cont treatment model of care, awaiting EEG results.     11/1  Hemoglobin trending downwards, no signs of active GI bleeding, Corpak in place with tube feeds infusing at very slow rate, currently at 10, goal is 50, per nursing note, tube feed rate was turned down at 0900 for reports of increased coughing.  Continues on IV antibiotics with stable white blood cell count, afebrile.  Blood pressure low stable.  Remains somnolent but seems more responsive.    Continue aggressive treatment model of care, monitor overall patient condition, if progressively worsening despite aggressive treatment and/or no significant improvement after sufficient course of IV antibiotics, family willing to readdress goals at that time.    Total time 35 minutes.    Avani Gray, APRN-CNP

## 2024-11-04 NOTE — ASSESSMENT & PLAN NOTE
He seems to be awake enough to at least attempt a swallow eval today.  Perhaps with the son in the room able to help with communication he will pass.  I have discussed what to do if he fails with the son.  The son for what ever reason prefers a small bore NG tube to a regular NG tube.  If he fails a swallow eval I will place the NG tube myself.

## 2024-11-04 NOTE — ASSESSMENT & PLAN NOTE
There is an inconsistency between his echo and his cath regarding EF.  Apparently were getting use the cath as the definitive source.  Last cardiology note which was a signoff note said he does not need the LifeVest.

## 2024-11-04 NOTE — ASSESSMENT & PLAN NOTE
He is on 3 L nasal cannula.  His chest x-ray shows maybe a small left-sided pleural effusion may be a small left infiltrate.  Recheck chest x-ray tomorrow

## 2024-11-04 NOTE — PROGRESS NOTES
Nutrition Follow up Note    Nutrition Assessment      Patient pulled out feeding tube twice, tube feed stopped. Spoke to RN. Passed swallow eval this morning. Diet advanced, waiting for first tray. Multiple nutritional supplements already ordered. Will continue to follow and monitor nutritional needs.    Nutrition History:  Food and Nutrient History: NA  Energy Intake: Poor < 50 %  Food Allergies/Intolerances:  None  GI Symptoms: None  Oral Problems: None    Anthropometrics:  Ht: 182.9 cm (6'), Wt:  (bed would not weigh), BMI: 19.58  IBW/kg (Dietitian Calculated): 80.9 kg  Percent of IBW: 81 %     Weight Change:  Daily Weight  10/30/24 : 65.5 kg (144 lb 6.4 oz)  02/27/24 : 68.9 kg (152 lb)  08/10/23 : 70.3 kg (155 lb)  01/03/22 : 76.7 kg (169 lb)  10/21/21 : 77.6 kg (171 lb)  07/21/21 : 76.3 kg (168 lb 4 oz)  02/26/21 : 68.9 kg (152 lb)  12/29/20 : 68.6 kg (151 lb 3.2 oz)  12/03/20 : 62.6 kg (138 lb)     Nutrition Focused Physical Exam Findings:   Subcutaneous Fat Loss  Orbital Fat Pads: Mild-Moderate (slight dark circles and slight hollowing)  Buccal Fat Pads: Mild-Moderate (flat cheeks, minimal bounce)  Triceps: Severe (negligible fat tissue)    Muscle Wasting  Temporalis: Severe (hollowed scooping depression)  Pectoralis (Clavicular Region): Severe (protruding prominent clavicle)  Deltoid/Trapezius: Severe (squared shoulders, acromion process prominent)    Nutrition Significant Labs:  Lab Results   Component Value Date    WBC 9.0 11/04/2024    HGB 8.1 (L) 11/04/2024    HCT 22.2 (L) 11/04/2024     (L) 11/04/2024    CHOL 67 02/23/2024    TRIG 129 02/23/2024    HDL 24.5 02/23/2024    ALT 43 11/03/2024    AST 49 (H) 11/03/2024     (L) 11/04/2024    K 3.9 11/04/2024    CL 99 11/04/2024    CREATININE 0.99 11/04/2024    BUN 23 11/04/2024    CO2 28 11/04/2024    TSH 2.84 12/03/2020    INR 1.5 (H) 10/16/2024    HGBA1C 5.3 10/16/2024     Nutrition Specific Medications:  aspirin, 81 mg, oral, Daily  carvedilol,  3.125 mg, oral, BID after meals  enoxaparin, 40 mg, subcutaneous, Daily  insulin glargine, 8 Units, subcutaneous, Nightly  insulin lispro, 0-5 Units, subcutaneous, q6h  magnesium oxide, 400 mg, oral, Daily  midodrine, 5 mg, oral, TID  ofloxacin, 1 drop, Left Eye, 4x daily  oxygen, , inhalation, Continuous - 02/gases  pantoprazole, 40 mg, intravenous, Daily  potassium phosphate, 15 mmol, intravenous, Once  pravastatin, 40 mg, oral, Nightly  tamsulosin, 0.8 mg, oral, Nightly  thiamine, 100 mg, nasogastric tube, Daily         Dietary Orders (From admission, onward)       Start     Ordered    11/04/24 1033  Adult diet Regular; Pureed 4; Thin 0; 1:1 Feeding  Diet effective now        Comments: Compensatory Swallowing Strategies:                                                                                                                                                                                                                                                           Upright 90 degrees as possible for all oral intake, single sips/bites, slow rate eating/feeding,  upright after meals   Question Answer Comment   Diet type Regular    Texture Pureed 4    Fluid consistency Thin 0    Select tray type: 1:1 Feeding        11/04/24 1033    10/28/24 1432  Oral nutritional supplements  Until discontinued        Comments: Strawberry or vanilla ensure with each meal per patient preference   Question Answer Comment   Deliver with All meals    Select supplement: Ensure High Protein        10/28/24 1432    10/21/24 1456  Oral nutritional supplements  Until discontinued        Comments: chocolate   Question Answer Comment   Deliver with All meals    Select supplement: Magic Cup        10/21/24 1456    10/17/24 0053  May Participate in Room Service With Assistance  ( ROOM SERVICE MAY PARTICIPATE WITH ASSISTANCE)  Once        Question:  .  Answer:  Yes    10/17/24 0052                   Estimated Needs:   Estimated Energy  Needs  Total Energy Estimated Needs (kCal): 1848 kCal  Total Estimated Energy Need per Day (kCal/kg): 30 kCal/kg  Method for Estimating Needs: Actual Wt    Estimated Protein Needs  Total Protein Estimated Needs (g): 62 g  Total Protein Estimated Needs (g/kg): 1 g/kg  Method for Estimating Needs: Actual Wt    Estimated Fluid Needs  Total Fluid Estimated Needs (mL): 1848 mL  Method for Estimating Needs: 1 mL/kcal      Nutrition Diagnosis   Nutrition Diagnosis:  Malnutrition Diagnosis  Patient has Malnutrition Diagnosis: Yes  Diagnosis Status: Ongoing  Malnutrition Diagnosis: Severe malnutrition related to chronic disease or condition  As Evidenced by: Moderate to severe subcutaeous fat losses, severe muscle deficits, po intake likely less than estimate energy needs    Nutrition Diagnosis  Patient has Nutrition Diagnosis: Yes  Diagnosis Status (1): Ongoing  Nutrition Diagnosis 1: Inadequate energy intake  Related to (1): decreased ability to consume sufficient energy  As Evidenced by (1): poor po intake     Nutrition Interventions/Recommendations   Nutrition Interventions and Recommendations:    Nutrition Prescription:  Individualized Nutrition Prescription Provided for : 1848 calories, 66 gm protein when diet advances    Nutrition Interventions:   Food and/or Nutrient Delivery Interventions  Interventions: Medical food supplement  Meals and Snacks: Texture-modified diet  Goal: provide as ordered  Medical Food Supplement: Commercial beverage, Modified beverage  Goal: ensure high protein TID to provide 160 kcals and 16g protein each; magic cup TID to provide 290 kcals and 9g protein each    Education Documentation  No documentation found.         Nutrition Monitoring and Evaluation   Monitoring/Evaluation:   Food/Nutrient Related History Monitoring  Monitoring and Evaluation Plan: Energy intake  Energy Intake: Estimated energy intake  Criteria: monitoring po itake    Body Composition/Growth/Weight History  Monitoring  and Evaluation Plan: Weight  Weight: Measured weight  Criteria: pt will maintain/gain wt       Time Spent/Follow-up:   Follow Up  Time Spent (min): 25 minutes  Last Date of Nutrition Visit: 11/04/24  Nutrition Follow-Up Needed?: 5-7 days  Follow up Comment: 11/8/24

## 2024-11-04 NOTE — ASSESSMENT & PLAN NOTE
I am not going to modify his insulin right now given I am not quite sure what his dietary status will be.

## 2024-11-04 NOTE — NURSING NOTE
During rounds patient was noticed with mitten in his mouth and Corpak pulled out. Tube feeding stopped and MD notified. Mitten replace to protect other lines.

## 2024-11-04 NOTE — PROGRESS NOTES
Occupational Therapy                 Therapy Communication Note    Patient Name: Dat Mccallum  MRN: [id number]  Department: [address]  Room: 12/12-B  Today's Date: 11/4/2024     Discipline: Occupational Therapy    Missed Visit Reason: Missed Visit Reason: Other (Comment)    Missed Time: Cancel    Comment: Treatment attempted with pt unarousable to multiple stimuli (sternal rub, cloth to face, supported seated, lights)-not appropriate for therapy at this time. Once therapist left room, pt observed waking up, ripping pulse ox off forehead and returning back to heavy sleep.

## 2024-11-04 NOTE — PROGRESS NOTES
Pt had another swallow test performed at bedside today due to patient pulling out feeding tube. Pt passed thin liquids and pureed and assist in feeding. TCC spoke to patient's son at bedside. Jorge A would like referrals that were sent to [facility] and [facility] asked if they can accept and have beds available before precert is started again for [place] [facility]. Son Jorge A is POA. Also need updated PT/OT notes for precert.    DISCHARGE PLAN TO GO TO SNF. DISCHARGE PLAN NOT SECURE. PT WILL NEED NEW AUTH.        11/04/24 1120   Discharge Planning   Living Arrangements Alone   Support Systems Children   Type of Residence Private residence   Who is requesting discharge planning? Provider   Home or Post Acute Services Post acute facilities (Rehab/SNF/etc)   Type of Post Acute Facility Services Skilled nursing   Expected Discharge Disposition SNF   Does the patient need discharge transport arranged? Yes   RoundTrip coordination needed? Yes   Has discharge transport been arranged? No

## 2024-11-04 NOTE — PROGRESS NOTES
Spiritual Care Visit    Clinical Encounter Type  Visited With: Patient  Routine Visit: Follow-up  Continue Visiting: Yes         Values/Beliefs  Spiritual Requests During Hospitalization: I tried to speak to Dat castrejon his wipeoff broad to see if he wantd to receive CommuniOn. But, he fell asleep on me. I gave him a blessing instrad and left a note for his son Faustino to tell him I sw his day today.     Demetrio Ray

## 2024-11-04 NOTE — PROGRESS NOTES
Dat Mccallum is a 83 y.o. male on day 19 of admission presenting with NSTEMI (non-ST elevated myocardial infarction) (Multi).      Subjective   This patient has been here for 3 weeks.  He has a history of diabetes hypertension BPH.  Scented with acute decline in mental status.  Found to have an NSTEMI.  His echo showed EF of 15% but a cardiac cath showed a EF of 44%.  He is now on a medical regimen for his coronary disease and cardiology signed off.  His mental status has been a major problem.  He is deaf.  He uses sign language.  Right now his son is here today.  Last week at some point he was tolerating puréed diet but then his mental status deteriorated and he has been NPO.  He had feeding tubes placed twice and 2 times he has pulled them out.  Right now he does not have a feeding tube.  With his son in the room who is attempting to communicate with him he says that the patient's sign language responses are nonsense and that he keeps repeating the word April.  Patient is awake and able to pay attention and follow commands.       Objective   LURD undistressed face symmetrical extraocular motion intact heart regular rate and rhythm  Lungs clear to auscultation  Abdomen soft nontender nondistended  Abdomen soft nontender nondistended  Extremities no significant edema  Neuro I brought the son into the room to help with the commands.  Patient is able to follow some commands.  Did have trouble with complicated ones Jazz extraocular motion testing.  He is able to move all 4 extremities but he seems to require more prompting in order to move his left side.  He did well finger-nose on the right but again required a lot of prompting to even attempt this on the left.  Last Recorded Vitals  /68 (BP Location: Right arm, Patient Position: Lying)   Pulse 86   Temp 36.2 °C (97.2 °F) (Temporal)   Resp 15   Wt 65.5 kg (144 lb 6.4 oz)   SpO2 100%   Intake/Output last 3 Shifts:    Intake/Output Summary (Last 24 hours)  at 11/4/2024 1013  Last data filed at 11/3/2024 1936  Gross per 24 hour   Intake 625 ml   Output 450 ml   Net 175 ml       Admission Weight  Weight: 68 kg (150 lb) (10/16/24 2047)    Daily Weight  10/30/24 : 65.5 kg (144 lb 6.4 oz)    Image Results  XR chest 1 view  Narrative: Interpreted By:  Laurent Elam,   STUDY:  XR CHEST 1 VIEW; 11/3/2024 6:52 pm      INDICATION:  CLINICAL INFORMATION: Signs/Symptoms:readjustment NG again.      COMPARISON:  11/03/2024 at 1811 hours      ACCESSION NUMBER(S):  [id number]      ORDERING CLINICIAN:  DENG BURNHAM      TECHNIQUE:  Portable chest one view.      FINDINGS:  Exam was targeted to the lower chest and upper abdomen for assessment  of NG tube placement. Tip of the nasogastric tube overlies the region  of the body of the stomach. Bibasilar infiltrate is suspected more  marked on the left.      Impression: Tip of the nasogastric tube overlies the region of the body of the  stomach. Bibasilar infiltrate more marked on the left.      MACRO:  none      Signed by: Laurent Elam 11/4/2024 8:27 AM  Dictation workstation:   [id number]        Assessment/Plan                  Assessment & Plan  Acute metabolic encephalopathy  He has been here for 3 weeks.  He has had an exhaustive evaluation.  I have noticed what seems to be a left hemineglect but he had an MRI of his brain done which was negative.  According to his son the hemineglect seem to be present when the patient presented so given that he had a negative MRI since then I am not sure what the benefit would be of another MRI.  I will reassess him tomorrow.  He is following commands right now which I think is an improvement.  Pleural effusion  He is on 3 L nasal cannula.  His chest x-ray shows maybe a small left-sided pleural effusion may be a small left infiltrate.  Recheck chest x-ray tomorrow  Acute on chronic systolic (congestive) heart failure  There is an inconsistency between his echo and his cath regarding EF.   Apparently were getting use the cath as the definitive source.  Last cardiology note which was a signoff note said he does not need the LifeVest.  NSTEMI (non-ST elevated myocardial infarction) (Multi)  Cardiology has signed off  Bacterial pneumonia  He has been on Zosyn since October 30.  Will consider stopping it tomorrow.  Repeat chest x-ray tomorrow  Idiopathic hypotension  11/3 - BP currently well controlled at 125/60  -Less idiopathic and more likely related to his cardiomyopathy  -BP's overall improved from last week. Will try to wean off midodrine prior to discharge  Severe protein-calorie malnutrition (Multi)  He seems to be awake enough to at least attempt a swallow eval today.  Perhaps with the son in the room able to help with communication he will pass.  I have discussed what to do if he fails with the son.  The son for what ever reason prefers a small bore NG tube to a regular NG tube.  If he fails a swallow eval I will place the NG tube myself.  Type 2 diabetes mellitus without complication, without long-term current use of insulin (Multi)  I am not going to modify his insulin right now given I am not quite sure what his dietary status will be.  Hypomagnesemia  11/3 - 2g IV magnesium given today. Recheck level. Replete prn.  11/1 - resolved with supplementation.  Will continue to monitor and replete prn  -receiving PO supplementation. Supplementing PRN with IV    Acute cystitis with hematuria  Started on  abx but cultures negative  Urinary retention  11/1 - has external catheter. Will monitor I/O closely  Scant urine output by incontinence. Unable to pass trotter catheter. Urology consulted and also unable to pass trotter catheter.   If he develops painful distention or MARISSA, then will need to go for cystoscopy with dilatation         Today's agenda is swallow eval and then possibly replace smallbore feeding tube.       Laurent Anderson MD

## 2024-11-04 NOTE — ASSESSMENT & PLAN NOTE
He has been on Zosyn since October 30.  Will consider stopping it tomorrow.  Repeat chest x-ray tomorrow

## 2024-11-05 ENCOUNTER — APPOINTMENT (OUTPATIENT)
Dept: RADIOLOGY | Facility: HOSPITAL | Age: 84
End: 2024-11-05
Payer: MEDICARE

## 2024-11-05 LAB
ALBUMIN SERPL BCP-MCNC: 2.3 G/DL (ref 3.4–5)
ANION GAP SERPL CALCULATED.3IONS-SCNC: 9 MMOL/L (ref 10–20)
BUN SERPL-MCNC: 24 MG/DL (ref 6–23)
CALCIUM SERPL-MCNC: 7.7 MG/DL (ref 8.6–10.3)
CHLORIDE SERPL-SCNC: 100 MMOL/L (ref 98–107)
CO2 SERPL-SCNC: 27 MMOL/L (ref 21–32)
CREAT SERPL-MCNC: 0.84 MG/DL (ref 0.5–1.3)
EGFRCR SERPLBLD CKD-EPI 2021: 88 ML/MIN/1.73M*2
ERYTHROCYTE [DISTWIDTH] IN BLOOD BY AUTOMATED COUNT: 14 % (ref 11.5–14.5)
GLUCOSE BLD MANUAL STRIP-MCNC: 107 MG/DL (ref 74–99)
GLUCOSE BLD MANUAL STRIP-MCNC: 109 MG/DL (ref 74–99)
GLUCOSE BLD MANUAL STRIP-MCNC: 158 MG/DL (ref 74–99)
GLUCOSE BLD MANUAL STRIP-MCNC: 174 MG/DL (ref 74–99)
GLUCOSE BLD MANUAL STRIP-MCNC: 202 MG/DL (ref 74–99)
GLUCOSE BLD MANUAL STRIP-MCNC: 204 MG/DL (ref 74–99)
GLUCOSE SERPL-MCNC: 114 MG/DL (ref 74–99)
HCT VFR BLD AUTO: 22.9 % (ref 41–52)
HGB BLD-MCNC: 7.9 G/DL (ref 13.5–17.5)
MAGNESIUM SERPL-MCNC: 2.02 MG/DL (ref 1.6–2.4)
MCH RBC QN AUTO: 29.6 PG (ref 26–34)
MCHC RBC AUTO-ENTMCNC: 34.5 G/DL (ref 32–36)
MCV RBC AUTO: 86 FL (ref 80–100)
NRBC BLD-RTO: 0.3 /100 WBCS (ref 0–0)
PHOSPHATE SERPL-MCNC: 2.2 MG/DL (ref 2.5–4.9)
PLATELET # BLD AUTO: 331 X10*3/UL (ref 150–450)
POTASSIUM SERPL-SCNC: 3.9 MMOL/L (ref 3.5–5.3)
RBC # BLD AUTO: 2.67 X10*6/UL (ref 4.5–5.9)
SODIUM SERPL-SCNC: 132 MMOL/L (ref 136–145)
WBC # BLD AUTO: 10.5 X10*3/UL (ref 4.4–11.3)

## 2024-11-05 PROCEDURE — 2500000002 HC RX 250 W HCPCS SELF ADMINISTERED DRUGS (ALT 637 FOR MEDICARE OP, ALT 636 FOR OP/ED): Performed by: HOSPITALIST

## 2024-11-05 PROCEDURE — 83735 ASSAY OF MAGNESIUM: CPT | Performed by: HOSPITALIST

## 2024-11-05 PROCEDURE — 36415 COLL VENOUS BLD VENIPUNCTURE: CPT | Performed by: HOSPITALIST

## 2024-11-05 PROCEDURE — 85027 COMPLETE CBC AUTOMATED: CPT | Performed by: HOSPITALIST

## 2024-11-05 PROCEDURE — 2500000001 HC RX 250 WO HCPCS SELF ADMINISTERED DRUGS (ALT 637 FOR MEDICARE OP): Performed by: HOSPITALIST

## 2024-11-05 PROCEDURE — 2500000004 HC RX 250 GENERAL PHARMACY W/ HCPCS (ALT 636 FOR OP/ED): Performed by: HOSPITALIST

## 2024-11-05 PROCEDURE — 2500000001 HC RX 250 WO HCPCS SELF ADMINISTERED DRUGS (ALT 637 FOR MEDICARE OP): Performed by: INTERNAL MEDICINE

## 2024-11-05 PROCEDURE — 99232 SBSQ HOSP IP/OBS MODERATE 35: CPT

## 2024-11-05 PROCEDURE — 2500000004 HC RX 250 GENERAL PHARMACY W/ HCPCS (ALT 636 FOR OP/ED): Performed by: INTERNAL MEDICINE

## 2024-11-05 PROCEDURE — 97530 THERAPEUTIC ACTIVITIES: CPT | Mod: GP,CQ

## 2024-11-05 PROCEDURE — 2500000002 HC RX 250 W HCPCS SELF ADMINISTERED DRUGS (ALT 637 FOR MEDICARE OP, ALT 636 FOR OP/ED): Performed by: INTERNAL MEDICINE

## 2024-11-05 PROCEDURE — 99233 SBSQ HOSP IP/OBS HIGH 50: CPT | Performed by: INTERNAL MEDICINE

## 2024-11-05 PROCEDURE — 82947 ASSAY GLUCOSE BLOOD QUANT: CPT

## 2024-11-05 PROCEDURE — 2500000001 HC RX 250 WO HCPCS SELF ADMINISTERED DRUGS (ALT 637 FOR MEDICARE OP): Performed by: NURSE PRACTITIONER

## 2024-11-05 PROCEDURE — 94664 DEMO&/EVAL PT USE INHALER: CPT

## 2024-11-05 PROCEDURE — 71045 X-RAY EXAM CHEST 1 VIEW: CPT | Performed by: RADIOLOGY

## 2024-11-05 PROCEDURE — 80069 RENAL FUNCTION PANEL: CPT | Performed by: HOSPITALIST

## 2024-11-05 PROCEDURE — 2060000001 HC INTERMEDIATE ICU ROOM DAILY

## 2024-11-05 PROCEDURE — 99232 SBSQ HOSP IP/OBS MODERATE 35: CPT | Performed by: NURSE PRACTITIONER

## 2024-11-05 PROCEDURE — 97535 SELF CARE MNGMENT TRAINING: CPT | Mod: GO,CO

## 2024-11-05 PROCEDURE — 90792 PSYCH DIAG EVAL W/MED SRVCS: CPT

## 2024-11-05 PROCEDURE — 94640 AIRWAY INHALATION TREATMENT: CPT

## 2024-11-05 PROCEDURE — 9420000001 HC RT PATIENT EDUCATION 5 MIN

## 2024-11-05 PROCEDURE — 2500000002 HC RX 250 W HCPCS SELF ADMINISTERED DRUGS (ALT 637 FOR MEDICARE OP, ALT 636 FOR OP/ED): Performed by: NURSE PRACTITIONER

## 2024-11-05 PROCEDURE — 97530 THERAPEUTIC ACTIVITIES: CPT | Mod: GO,CO

## 2024-11-05 PROCEDURE — 71045 X-RAY EXAM CHEST 1 VIEW: CPT

## 2024-11-05 PROCEDURE — 2500000005 HC RX 250 GENERAL PHARMACY W/O HCPCS: Performed by: INTERNAL MEDICINE

## 2024-11-05 RX ORDER — IPRATROPIUM BROMIDE AND ALBUTEROL SULFATE 2.5; .5 MG/3ML; MG/3ML
3 SOLUTION RESPIRATORY (INHALATION)
Status: DISCONTINUED | OUTPATIENT
Start: 2024-11-06 | End: 2024-11-07

## 2024-11-05 RX ORDER — IPRATROPIUM BROMIDE AND ALBUTEROL SULFATE 2.5; .5 MG/3ML; MG/3ML
3 SOLUTION RESPIRATORY (INHALATION)
Status: DISCONTINUED | OUTPATIENT
Start: 2024-11-05 | End: 2024-11-05

## 2024-11-05 RX ORDER — BUDESONIDE 0.5 MG/2ML
0.5 INHALANT ORAL
Status: DISCONTINUED | OUTPATIENT
Start: 2024-11-05 | End: 2024-11-08 | Stop reason: HOSPADM

## 2024-11-05 RX ADMIN — INSULIN LISPRO 2 UNITS: 100 INJECTION, SOLUTION INTRAVENOUS; SUBCUTANEOUS at 12:55

## 2024-11-05 RX ADMIN — TAMSULOSIN HYDROCHLORIDE 0.8 MG: 0.4 CAPSULE ORAL at 21:52

## 2024-11-05 RX ADMIN — INSULIN LISPRO 1 UNITS: 100 INJECTION, SOLUTION INTRAVENOUS; SUBCUTANEOUS at 00:14

## 2024-11-05 RX ADMIN — Medication 4 L/MIN: at 20:53

## 2024-11-05 RX ADMIN — INSULIN LISPRO 1 UNITS: 100 INJECTION, SOLUTION INTRAVENOUS; SUBCUTANEOUS at 17:47

## 2024-11-05 RX ADMIN — Medication 100 MG: at 09:06

## 2024-11-05 RX ADMIN — ASPIRIN 81 MG CHEWABLE TABLET 81 MG: 81 TABLET CHEWABLE at 09:06

## 2024-11-05 RX ADMIN — MIDODRINE HYDROCHLORIDE 5 MG: 5 TABLET ORAL at 12:55

## 2024-11-05 RX ADMIN — PRAVASTATIN SODIUM 40 MG: 40 TABLET ORAL at 21:52

## 2024-11-05 RX ADMIN — CARVEDILOL 3.12 MG: 3.12 TABLET, FILM COATED ORAL at 17:45

## 2024-11-05 RX ADMIN — BUDESONIDE 0.5 MG: 0.5 SUSPENSION RESPIRATORY (INHALATION) at 20:40

## 2024-11-05 RX ADMIN — CARVEDILOL 3.12 MG: 3.12 TABLET, FILM COATED ORAL at 09:06

## 2024-11-05 RX ADMIN — PANTOPRAZOLE SODIUM 40 MG: 40 INJECTION, POWDER, FOR SOLUTION INTRAVENOUS at 09:06

## 2024-11-05 RX ADMIN — OFLOXACIN 1 DROP: 3 SOLUTION OPHTHALMIC at 09:06

## 2024-11-05 RX ADMIN — Medication 400 MG: at 09:06

## 2024-11-05 RX ADMIN — INSULIN GLARGINE 8 UNITS: 100 INJECTION, SOLUTION SUBCUTANEOUS at 21:55

## 2024-11-05 RX ADMIN — IPRATROPIUM BROMIDE AND ALBUTEROL SULFATE 3 ML: .5; 2.5 SOLUTION RESPIRATORY (INHALATION) at 20:49

## 2024-11-05 RX ADMIN — MIDODRINE HYDROCHLORIDE 5 MG: 5 TABLET ORAL at 09:06

## 2024-11-05 RX ADMIN — INSULIN LISPRO 2 UNITS: 100 INJECTION, SOLUTION INTRAVENOUS; SUBCUTANEOUS at 21:55

## 2024-11-05 RX ADMIN — OFLOXACIN 1 DROP: 3 SOLUTION OPHTHALMIC at 17:45

## 2024-11-05 RX ADMIN — OFLOXACIN 1 DROP: 3 SOLUTION OPHTHALMIC at 21:52

## 2024-11-05 RX ADMIN — OFLOXACIN 1 DROP: 3 SOLUTION OPHTHALMIC at 13:18

## 2024-11-05 RX ADMIN — MIDODRINE HYDROCHLORIDE 5 MG: 5 TABLET ORAL at 17:44

## 2024-11-05 RX ADMIN — ENOXAPARIN SODIUM 40 MG: 40 INJECTION SUBCUTANEOUS at 09:06

## 2024-11-05 ASSESSMENT — COGNITIVE AND FUNCTIONAL STATUS - GENERAL
PERSONAL GROOMING: A LOT
CLIMB 3 TO 5 STEPS WITH RAILING: TOTAL
EATING MEALS: TOTAL
TURNING FROM BACK TO SIDE WHILE IN FLAT BAD: A LITTLE
MOBILITY SCORE: 11
DAILY ACTIVITIY SCORE: 10
TOILETING: TOTAL
STANDING UP FROM CHAIR USING ARMS: TOTAL
DRESSING REGULAR LOWER BODY CLOTHING: TOTAL
PERSONAL GROOMING: TOTAL
MOBILITY SCORE: 6
TURNING FROM BACK TO SIDE WHILE IN FLAT BAD: TOTAL
MOVING TO AND FROM BED TO CHAIR: TOTAL
WALKING IN HOSPITAL ROOM: TOTAL
CLIMB 3 TO 5 STEPS WITH RAILING: TOTAL
WALKING IN HOSPITAL ROOM: TOTAL
MOVING FROM LYING ON BACK TO SITTING ON SIDE OF FLAT BED WITH BEDRAILS: A LITTLE
MOVING FROM LYING ON BACK TO SITTING ON SIDE OF FLAT BED WITH BEDRAILS: TOTAL
STANDING UP FROM CHAIR USING ARMS: A LOT
DRESSING REGULAR UPPER BODY CLOTHING: TOTAL
DRESSING REGULAR LOWER BODY CLOTHING: TOTAL
DRESSING REGULAR UPPER BODY CLOTHING: A LOT
HELP NEEDED FOR BATHING: A LOT
TOILETING: TOTAL
MOVING TO AND FROM BED TO CHAIR: TOTAL
HELP NEEDED FOR BATHING: TOTAL
DAILY ACTIVITIY SCORE: 6
EATING MEALS: A LOT

## 2024-11-05 ASSESSMENT — PAIN SCALES - PAIN ASSESSMENT IN ADVANCED DEMENTIA (PAINAD)
CONSOLABILITY: NO NEED TO CONSOLE
BREATHING: NORMAL
BODYLANGUAGE: RELAXED
FACIALEXPRESSION: SMILING OR INEXPRESSIVE
TOTALSCORE: 0

## 2024-11-05 ASSESSMENT — PAIN SCALES - GENERAL
PAINLEVEL_OUTOF10: 0 - NO PAIN

## 2024-11-05 ASSESSMENT — PAIN - FUNCTIONAL ASSESSMENT
PAIN_FUNCTIONAL_ASSESSMENT: FLACC (FACE, LEGS, ACTIVITY, CRY, CONSOLABILITY)
PAIN_FUNCTIONAL_ASSESSMENT: 0-10
PAIN_FUNCTIONAL_ASSESSMENT: 0-10

## 2024-11-05 ASSESSMENT — ACTIVITIES OF DAILY LIVING (ADL): HOME_MANAGEMENT_TIME_ENTRY: 17

## 2024-11-05 NOTE — PROGRESS NOTES
Occupational Therapy    OT Treatment    Patient Name: Dat Mccallum  MRN: [id number]  Department: [address]  Room: 12/12-B  Today's Date: 11/5/2024  Time Calculation  Start Time: 0802  Stop Time: 0828  Time Calculation (min): 26 min        Assessment:  OT Assessment: Demonstrating improved overall arousal this date with pt able to carryout feeding task with assist + intermittent cues with RN made aware. Pt would benefit from continued skilled OT services to improve strength, balance, and functional tolerance to increase independence with ADL tasks  End of Session Communication: Bedside nurse  End of Session Patient Position: Bed, 3 rail up, Alarm on  OT Assessment Results: Decreased ADL status, Decreased upper extremity strength, Decreased safe judgment during ADL, Decreased cognition, Decreased fine motor control, Decreased functional mobility, Decreased gross motor control  Plan:  Treatment Interventions: ADL retraining, Functional transfer training, UE strengthening/ROM, Endurance training, Patient/family training, Cognitive reorientation  OT Frequency: 4 times per week  OT Discharge Recommendations: Moderate intensity level of continued care  OT Recommended Transfer Status: Assist of 2  OT - OK to Discharge: Yes  Treatment Interventions: ADL retraining, Functional transfer training, UE strengthening/ROM, Endurance training, Patient/family training, Cognitive reorientation    Subjective   Previous Visit Info:  OT Last Visit  OT Received On: 11/05/24  General:    Prior to Session Communication: Bedside nurse  Patient Position Received: Bed, 3 rail up, Alarm off, not on at start of session  General Comment: Cleared for therapy per RN. Pt supine in bed upon arrival with BLE off EOB, agreeable to tx  Precautions:  Hearing/Visual Limitations: Profoundly hard of hearing;  use writting or lip reading to communicate  Medical Precautions: Fall precautions    Vital Signs (Past 2hrs)        Date/Time Vitals Session Patient  Position Pulse Resp SpO2 BP MAP (mmHg)    11/05/24 0820 --  --  92  21  97 %  99/61  74                   Pain:  Pain Assessment  Pain Assessment: 0-10  0-10 (Numeric) Pain Score: 0 - No pain    Objective    Cognition:  Cognition  Overall Cognitive Status: Impaired  Orientation Level: Unable to assess  Following Commands: Follows one step commands with repetition (use of white board + minimal ASL to yes/no)  Cognition Comments: increased arousal this date with fair command following  Insight: Severe  Processing Speed: Delayed    Activities of Daily Living: Feeding  Feeding Level of Assistance: Setup, Moderate assistance, Close supervision  Feeding Where Assessed: Bed level (supported seated)  Feeding Comments: s/u for feeding task in support seated position with pt able to lift multiple drinks and bring to mouth with straw with written cues provided to pace sips to decrease risk of choking. Pt able to hold magic cup in hand and scoop with S/intermittent [place] A and bring spoon to mouth with pt completing entire magic cup with increased time.    Grooming  Grooming Level of Assistance: Setup, Minimum assistance  Grooming Where Assessed: Bed level  Grooming Comments: assist to initate face washing task with visual cues for task carryover    Bed Mobility/Transfers: Bed Mobility  Bed Mobility: Yes  Bed Mobility 1  Bed Mobility 1: Supine to sitting, Sitting to supine  Level of Assistance 1: Maximum assistance  Bed Mobility Comments 1: assist for BLE advancement and trunk elevation with tacticle cues for postural alignment with pt demonstrating increased retropulsion in sitting, tolerating EOB ~1 min then pushing self back to supine with max A  Bed Mobility 2  Bed Mobility  2: Scooting  Level of Assistance 2: Dependent  Bed Mobility Comments 2: dependent assist to boost to HOB with use of drawsheet    Transfers  Transfer: No    Therapy/Activity:    Therapeutic Activity  Therapeutic Activity Performed: Yes  Therapeutic  Activity 1: tolerated supported seated posture in bed for feeding task with pt demonstrating increased overall arousal this date and carrying out feeding tasks with minimal assist    Outcome Measures:[facility] Daily Activity  Putting on and taking off regular lower body clothing: Total  Bathing (including washing, rinsing, drying): A lot  Putting on and taking off regular upper body clothing: A lot  Toileting, which includes using toilet, bedpan or urinal: Total  Taking care of personal grooming such as brushing teeth: A lot  Eating Meals: A lot  Daily Activity - Total Score: 10    Education Documentation  ADL Training, taught by HARMEET Mascorro at 11/5/2024  9:57 AM.  Learner: Patient  Readiness: Acceptance  Method: Explanation  Response: No Evidence of Learning, Needs Reinforcement    Education Comments  No comments found.      Problem: ADL  Goal: Goal 1  Description: Patient will demonstrate improved ADL skills:  Bathing with Min assist with adaptive equipment prn    Grooming with SBA assist       Feeding with supervision assist      UE Dressing with SBA assist           LE Dressing with Min assist with adaptive equipment prn     Toileting with Min assist with adaptive equipment prn      Outcome: Progressing

## 2024-11-05 NOTE — PROGRESS NOTES
Palliative Care Progress Note    Date of Admission: 10/16/2024    Patient is a 83 y.o. male admitted with NSTEMI (non-ST elevated myocardial infarction) (Multi).   Eating modified diet with 1:1 assist. Stood at bedside today with PT. Awake, alert, still with slowed responses, but appears improved. Cough noted, moist, nonproductive. CXR pending.     Scheduled medications  aspirin, 81 mg, oral, Daily  budesonide, 0.5 mg, nebulization, BID  carvedilol, 3.125 mg, oral, BID after meals  enoxaparin, 40 mg, subcutaneous, Daily  insulin glargine, 8 Units, subcutaneous, Nightly  insulin lispro, 0-5 Units, subcutaneous, q6h  ipratropium-albuteroL, 3 mL, nebulization, q6h  magnesium oxide, 400 mg, oral, Daily  midodrine, 5 mg, oral, TID  ofloxacin, 1 drop, Left Eye, 4x daily  pantoprazole, 40 mg, intravenous, Daily  potassium phosphate, 15 mmol, intravenous, Once  pravastatin, 40 mg, oral, Nightly  tamsulosin, 0.8 mg, oral, Nightly  thiamine, 100 mg, nasogastric tube, Daily      Continuous medications       PRN medications  PRN medications: acetaminophen **OR** acetaminophen, aminophylline, dextrose, dextrose, glucagon, glucagon, melatonin, oxygen, prochlorperazine, prochlorperazine     Results for orders placed or performed during the hospital encounter of 10/16/24 (from the past 24 hours)   POCT GLUCOSE   Result Value Ref Range    POCT Glucose 238 (H) 74 - 99 mg/dL   POCT GLUCOSE   Result Value Ref Range    POCT Glucose 174 (H) 74 - 99 mg/dL   POCT GLUCOSE   Result Value Ref Range    POCT Glucose 107 (H) 74 - 99 mg/dL   CBC   Result Value Ref Range    WBC 10.5 4.4 - 11.3 x10*3/uL    nRBC 0.3 (H) 0.0 - 0.0 /100 WBCs    RBC 2.67 (L) 4.50 - 5.90 x10*6/uL    Hemoglobin 7.9 (L) 13.5 - 17.5 g/dL    Hematocrit 22.9 (L) 41.0 - 52.0 %    MCV 86 80 - 100 fL    MCH 29.6 26.0 - 34.0 pg    MCHC 34.5 32.0 - 36.0 g/dL    RDW 14.0 11.5 - 14.5 %    Platelets 331 150 - 450 x10*3/uL   Magnesium   Result Value Ref Range    Magnesium 2.02 1.60 -  2.40 mg/dL   Renal Function Panel   Result Value Ref Range    Glucose 114 (H) 74 - 99 mg/dL    Sodium 132 (L) 136 - 145 mmol/L    Potassium 3.9 3.5 - 5.3 mmol/L    Chloride 100 98 - 107 mmol/L    Bicarbonate 27 21 - 32 mmol/L    Anion Gap 9 (L) 10 - 20 mmol/L    Urea Nitrogen 24 (H) 6 - 23 mg/dL    Creatinine 0.84 0.50 - 1.30 mg/dL    eGFR 88 >60 mL/min/1.73m*2    Calcium 7.7 (L) 8.6 - 10.3 mg/dL    Phosphorus 2.2 (L) 2.5 - 4.9 mg/dL    Albumin 2.3 (L) 3.4 - 5.0 g/dL   POCT GLUCOSE   Result Value Ref Range    POCT Glucose 109 (H) 74 - 99 mg/dL   POCT GLUCOSE   Result Value Ref Range    POCT Glucose 204 (H) 74 - 99 mg/dL   POCT GLUCOSE   Result Value Ref Range    POCT Glucose 158 (H) 74 - 99 mg/dL        XR chest 1 view    Result Date: 10/31/2024  Interpreted By:  Laurent Elam, STUDY: XR CHEST 1 VIEW; 10/31/2024 11:12 am   INDICATION: CLINICAL INFORMATION: Signs/Symptoms:Tube placement check.   COMPARISON: 10/31/2024 at 1054 hours   ACCESSION NUMBER(S): [id number]   ORDERING CLINICIAN: DONNA CEJA   TECHNIQUE: Portable chest one view.   FINDINGS: The cardiac size is indeterminate in view of the AP projection. Feeding tube is been repositioned with the tube now noted to be coiled within the stomach. Bibasilar infiltrates are present.       1. Feeding tube is present with extending into the stomach with the tube coiled within the region of the body and fundus. 2. Bibasilar infiltrates.   MACRO: none   Signed by: Laurent Elam 10/31/2024 12:07 PM Dictation workstation:   [id number]    XR chest 1 view    Result Date: 10/31/2024  Interpreted By:  Laurent Elam, STUDY: XR CHEST 1 VIEW; 10/31/2024 11:11 am   INDICATION: CLINICAL INFORMATION: Signs/Symptoms:Verification of nasogastric tube location.   COMPARISON: 10/29/2024 at 1042 hours   ACCESSION NUMBER(S): [id number]   ORDERING CLINICIAN: KIMBERLEY BLAS   TECHNIQUE: Portable chest one view.   FINDINGS: The cardiac size is indeterminate in view of the AP  projection. Nasogastric tube is coiled within the mid chest along the course of the thoracic esophagus. Bibasilar infiltrate is suspected. There is a limited depth of inspiration on the exam.       Nasogastric tube is coiled within the midthoracic esophagus. Probable bibasilar infiltrates although the exam is limited by poor depth of inspiration.   MACRO: none   Signed by: Laurent Elam 10/31/2024 12:05 PM Dictation workstation:   [id number]    XR chest 1 view    Result Date: 10/29/2024  Interpreted By:  Laurent Elam, STUDY: XR CHEST 1 VIEW; 10/29/2024 11:22 am   INDICATION: CLINICAL INFORMATION: Signs/Symptoms:cough.   COMPARISON: 10/16/2024   ACCESSION NUMBER(S): [id number]   ORDERING CLINICIAN: RICHARD ADAIR   TECHNIQUE: Portable chest one view.   FINDINGS: The cardiac size is indeterminate in view of the AP projection. Patient is slightly rotated to the left. There is a thoracic dextroscoliosis. Alveolar infiltrate is present within left lower lobe consistent with pneumonia. Right hemithorax is clear. No effusions are identified.       There is a new left lower lobe infiltrate consistent with pneumonia. Follow-up to assure complete clearing is suggested.   MACRO: none   Signed by: Laurent Elam 10/29/2024 2:55 PM Dictation workstation:   [id number]    CT head wo IV contrast    Result Date: 10/29/2024  Interpreted By:  Brielle Linares, STUDY: CT HEAD WO IV CONTRAST; ;  10/29/2024 1:26 pm   INDICATION: Signs/Symptoms:sudden onset confusion.   COMPARISON: 10/16/2024   ACCESSION NUMBER(S): [id number]   ORDERING CLINICIAN: RICHARD ADAIR   TECHNIQUE: Serial axial images of the head were obtained without intravenous contrast. Sagittal and coronal reconstructions were generated.   FINDINGS: Trickles are midline and enlarged. There is prominence of the cortical sulci and superior cerebellar cisterns.   There are no acute parenchymal abnormalities. There is no hemorrhage or extra-axial fluid.   There is no  obvious scalp hematoma or skull fracture.   Visualized portions of the paranasal sinuses and mastoids are unremarkable.   COMPARISON OF FINDINGS: The brain is similar.       No acute intracranial abnormality.     MACRO: none   Signed by: Brielle Linares 10/29/2024 2:19 PM Dictation workstation:   [id number]    Cardiac Catheterization Procedure    Result Date: 10/24/2024           [place] [address]            Phone [phone] Cardiovascular Catheterization Report Patient Name:     AMARILYS FARAH      Performing Physician:  Virginia Chavez DO Study Date:       10/23/2024           Verifying Physician:   Virginia Chavez DO MRN/PID:          [id number]             Cardiologist/Co-Scrub: Accession#:       [id number]         Ordering Provider:     Virginia CHAVEZ Date of           1940 / 83      Cardiologist: Birth/Age:        years Gender:           M                    Fellow: Encounter#:       [id number]           Surgeon:  Study: Left Heart Cath  Indications: AMARILYS FARAH is a 84 year old male who presents with dyslipidemia and an asymptomatic chest pain assessment. Cardiomyopathy, left ventricular dysfunction and NSTE - ACS. Stress test performed: No. CTA performed: No. Rajinder accessed: No. LVEF Assessed: No. Cardiac arrest: No. Cardiac surgical consult: No. Cardiovascular Instability: No Frailty status of patient entering lab: 6 = Moderately frail.  Coronary Angiography: The coronary circulation is right dominant.  Coronary Angiography Comments: We obtained informed consent with the help of his son Jorge A because the patient is deaf, and he was brought to the cardiac catheterization lab with the timeout verified between his computer medical record number and his  arm wristband. Both groins were prepped and draped in a sterile fashion and the remainder of the procedure performed under sterile technique. 10 cc of 1% lidocaine used to the right groin for local anesthesia and Versed 2 mg and fentanyl 50 mcg for intravenous sedation. Used single wall micropuncture technique to access the right common femoral artery and a micropuncture sheath was inserted, then exchanged over guidewire for a 6 Kisw[place]ili [place] sheath. 6 Kiswahili JR4 JL 4 and pigtail catheters were used and catheters were advanced and withdrawn over the guidewire without difficulty. At the end of the procedure the sheath was removed and an Angio-Seal closure device was deployed with good hemostasis and he was returned to his telemetry bed in stable condition. Coronary angiography: 1. The left main was a large vessel and bifurcated into the LAD and circumflex and was normal 2 the LAD was a large vessel that extends to the apex and was widely patent with CHEYANNE grade III flow. The proximal LAD had an eccentric 40-50% irregular stenosis that was presumed to be the site of previous infarct vessel occlusion with spontaneous recanalization. The remaining mid and distal LAD and its branches had mild diffuse nonobstructive disease 3. The circumflex was a modest nondominant vessel that gave rise to 3 modest PLV branches and had no obstructive disease.  4. The right coronary artery was a dominant vessel that had mild 30-40% ostial /proximal stenosis and an eccentric 50% mid vessel stenosis, then gives rise to a modest sized small caliber PDA and small caliber distal RCA without obstruction. Left ventriculogram performed in the VALLES 30 projection showed persistent modest hypokinesis of the mid anterior, anterolateral segments and severe hypokinesis of the LV apical segment with left ventricular ejection fraction estimated at 40 to 45%. Impression: 1.40% proximal LAD stenosis suggesting previous vessel occlusion with spontaneous  recanalization. 2. 50% mid RCA stenosis. 3. Ischemic cardiomyopathy with residual anterior and anteroapical left ventricular hypokinesis with left ventricular ejection fraction 40 to 45%. 3. Nuclear stress test 10/22/2024 suggests viability in the anterior and apical segments without ischemia and with left ventricular ejection fraction estimated at 44%.   Hemo Personnel: +----------------+---------+ Name            Duty      +----------------+---------+ Haja Mckeon MD 1 +----------------+---------+  Hemodynamic Pressures:  +----+---------------+----------+-------------+--------------+-------+---------+ Site   Date Time   Phase NameSystolic mmHgDiastolic mmHgED mmHgMean mmHg +----+---------------+----------+-------------+--------------+-------+---------+   AO     10/23/2024  AIR REST          116            57              81          8:56:33 AM                                                      +----+---------------+----------+-------------+--------------+-------+---------+   LV     10/23/2024  AIR REST          103             2      8                   9:07:45 AM                                                      +----+---------------+----------+-------------+--------------+-------+---------+   LV     10/23/2024  AIR REST          109            -2      7                   9:07:57 AM                                                      +----+---------------+----------+-------------+--------------+-------+---------+  LVp     10/23/2024  AIR REST          106             0     12                   9:08:06 AM                                                      +----+---------------+----------+-------------+--------------+-------+---------+  AOp     10/23/2024  AIR REST          120            52              76          9:08:16 AM                                                       +----+---------------+----------+-------------+--------------+-------+---------+   AO     10/23/2024  AIR REST            0             0             -96          9:32:52 AM                                                      +----+---------------+----------+-------------+--------------+-------+---------+  Cardiac Cath Post Procedure Notes: Post Procedure Diagnosis: Double vessel disease. Blood Loss:               Estimated blood loss during the procedure was 10ml                           mls. Specimens Removed:        Number of specimen(s) removed: none.  ICD 10 Codes: Non ST elevation (NSTEMI) myocardial infarction-I21.4  CPT Codes: Left Heart Cath (visualization of coronaries) and LV-36683  [id number] Haja Mckeon DO Performing Physician Electronically signed by [id number] Haja Mckeon DO on 10/24/2024 at 9:06:38 AM  ** Final **     Cardiology Interpretation Of Nuclear Stress - See Other Report For Nuclear Portion    Result Date: 10/23/2024           [place] [address]            Phone [phone] Nuclear Pharmacologic Stress Test Patient Name:      AMARILYS FARAH     Ordering Provider:     [id number] COOPER RATLIFF Study Date:        10/22/2024          Reading Physician:     [id number] Pelaez MD MRN/PID:           [id number]            Supervising Physician: [id number] Pelaez MD Accession#:        [id number]        Fellow: Date of Birth/Age: 1940 / 83     Fellow:                    years Gender:            M                   Nurse:                 Inez Holley RN Admit Date:                            Technician:            Marina Feliciano Admission Status:                      Sonographer:           LORRIE  Height:            182.88 cm           Technologist: Weight:            62.60 kg            Additional Staff: BSA:               1.82 m2             Encounter#:            [id number] BMI:               18.72 kg/m2         Patient Location: Study Type:    CARDIOLOGY INTERPRETATION OF NUCLEAR STRESS Diagnosis/ICD: NonST elevation (NSTEMI) myocardial infarction-I21.4 Indication:    CARDIOMYOPATHY EF 15-20% CPT Codes:     Stress Test Interpretation-68443; Stress Test Supervision-37304 Falls Risk: Low: Patient has low risk for sustaining a fall; environmental safety interventions in place.  Study Details: Correct procedure and correct patient verified verbally and with                ID Band checked.  Patient History: Hyperlipidemia and congestive heart failure. NSTEMI, IDIOPATHIC HYPOTENSION.  Medications: ASPIRIN, CARVEDILOL, LOVENOX, PRAVASTATIN, SPIRONOLACTONE, MIDODRINE. The patient took medications as prescribed.  Patient Performance: Patient received a total of 0.4 mg of Regadenoson at 11:41:48 AM. The resting blood pressure was 114/69 mmHg with a heart rate of 81 bpm. The patient developed no symptoms during the stress exam. The blood pressure response was normal. The test was terminated due to: completed lab protocol and end of vasodilator infusion. Patient has met the discharge criteria and is discharged to their floor.  Baseline ECG: Resting ECG showed normal sinus rhythm with nonspecific ST-T wave changes. Artifact.  Stress ECG: Stress ECG showed normal sinus rhythm, with frequent premature ventricular contractions. No ST changes. Essentially negative ECG stress test for ischemia with a Lexiscan infusion. Please get the nuclear imaging report from radiology department.  Stress Stage Data: +----------------+--+------+-------+                 HRSys BPDias BP +----------------+--+------+-------+ Baseline [id number]   69      +----------------+--+------+-------+  Recovery ECG: Recovery ECG showed  normal sinus rhythm, with artifact.  +------------+--+------+-------+             HRSys BPDias BP +------------+--+------+-------+ Recovery I  8295    57      +------------+--+------+-------+ Recovery II 7288    51      +------------+--+------+-------+ Recovery [id number]    41      +------------+--+------+-------+ Recovery IV 8592    52      +------------+--+------+-------+ Recovery V  7392    54      +------------+--+------+-------+  Summary:  1. Adequate level of stress achieved.  2. Correlate with myocardial perfusion imaging results.  3. Essentially negative ECG stress test for ischemia with a Lexiscan infusion. Please get the nuclear imaging report from radiology department.  4. No clinical or electrocardiographic evidence for ischemia at maximal infusion.  5. No ECG changes from baseline.  6. Normal Stress Test.  7. Nuclear image results are reported separately. [id number] Fabien Pelaez MD Electronically signed on 10/23/2024 at 9:39:18 AM   ** Final **     Nuclear Stress Test    Result Date: 10/22/2024  Interpreted By:  Chapito García and Ogievich Taessa STUDY: NUCLEAR STRESS TEST; 10/22/2024 2:18 pm   INDICATION: Signs/Symptoms:Cardiomyopathy, EF 15-20%.   COMPARISON: None.   ACCESSION NUMBER(S): [id number]   ORDERING CLINICIAN: COOPER RATLIFF   TECHNIQUE: DIVISION OF NUCLEAR MEDICINE PHARMACOLOGIC STRESS MYOCARDIAL PERFUSION SCAN, ONE DAY PROTOCOL   The patient received an intravenous dose of  8.8 mCi of Tc-99m Myoview and resting emission tomographic (SPECT) images of the myocardium were acquired. The patient then received an intravenous infusion of 0.4mg regadenoson (Lexiscan)  followed by an additional dose of  25.1 mCi of Tc-99m  Myoview. Stress phase SPECT images of the myocardium were then acquired. These included ECG-gated images to assess and quantify ventricular function.  A low-dose, nondiagnostic regional CT was utilized for attenuation correction purposes.   FINDINGS: Both  stress and rest studies demonstrate grossly normal perfusion throughout the left ventricle.   The left ventricle is normal in size.   Gated images demonstrate mild global hypokinesis of the LV wall motion with a post-stress LV EF estimated at 44%.   Attenuation correction CT images demonstrate no gross anatomic abnormalities.       1.  No evidence of inducible ischemia or prior infarction. 2. Left ventricle is normal in size. 3. Mild global hypokinesis of the LV wall motion with a post-stress LV EF estimated at 44%.   I personally reviewed the images/study and I agree with the findings as stated by Viry Up DO, PGY-3. This study was interpreted at [place], [place].   MACRO: None   Signed by: Chapito García 10/22/2024 3:15 PM Dictation workstation:   [id number]    Electrocardiogram, 12-lead PRN ACS symptoms    Result Date: 10/22/2024  Poor data quality in current ECG precludes serial comparison Sinus tachycardia with short MT with Premature supraventricular complexes and with frequent Premature ventricular complexes Indeterminate axis Pulmonary disease pattern Nonspecific ST and T wave abnormality Abnormal ECG When compared with ECG of 16-OCT-2024 20:40, (unconfirmed) Previous ECG has undetermined rhythm, needs review Questionable change in QRS duration Confirmed by Fer Vazquez ([id number]) on 10/22/2024 12:52:29 PM    MR brain wo IV contrast    Result Date: 10/19/2024  Interpreted By:  Hugo Rico  and Capo Burns STUDY: MR BRAIN WO IV CONTRAST;  10/19/2024 3:36 pm   INDICATION: Signs/Symptoms:altered mental status.     COMPARISON: CT of the head obtained October 16, 2024   ACCESSION NUMBER(S): [id number]   ORDERING CLINICIAN: ROMULO JOHNSON   TECHNIQUE: Axial T2, FLAIR, DWI, gradient echo T2 and sagittal and coronal T1 weighted images of brain were acquired.  No contrast was administered.   FINDINGS: Midline brain structures are intact on mid sagittal imaging.    There is no territorial restricted diffusion to suggest acute intracranial infarct. No suspicious T2 signal hypointensity noted on gradient sequencing.   There is no evidence of acute intracranial hemorrhage. No intracranial mass or mass effect. No midline shift. No loss of gray-white differentiation. Few scattered supratentorial white matter FLAIR signal hyperintensities are observed.  Basilar cisterns appear intact. Moderate dilatation of the lateral and 3rd greater than 4th ventricles at least principally on the basis of global parenchymal volume loss.   Normal T2 vascular flow voids are seen.   T2 hyperintensity within the maxillary sinuses is consistent with mucosal inflammatory paranasal sinus disease.. Status post bilateral lens replacements.   There is no mastoid effusion. Moderate effusions of the occipital lateral mass C1 articulation bilaterally may be on a degenerative basis.       No MR evidence of acute intracranial infarct, hemorrhage, mass, or mass effect.   I personally reviewed the images/study and I agree with the findings as stated. This study was interpreted at [place], [place].   MACRO: None   Signed by: Hugo Rico 10/19/2024 4:41 PM Dictation workstation:   [id number]    EEG    IMPRESSION Impression This routine awake and drowsy EEG is indicative of a moderate diffuse encephalopathy. No epileptiform activity or lateralizing signs seen. A full report will be scanned into the patient's chart at a later time. This report has been interpreted and electronically signed by    Transthoracic Echo (TTE) Complete    Result Date: 10/17/2024           [address] [address]            Phone [phone] TRANSTHORACIC ECHOCARDIOGRAM REPORT Patient Name:     AMARILYS FARAH      Reading Physician:   [id number] Jose David Workman DO Study Date:       10/17/2024           Ordering Provider:   [id number] GUZMAN                                                              HOWARD MRN/PID:          [id number]             Fellow: Accession#:       [id number]         Nurse: Date of           1940 / 83      Sonographer:         USR Birth/Age:        years Gender:           M                    Additional Staff: Height:           177.80 cm            Admit Date: Weight:           65.77 kg             Admission Status:    Inpatient - Routine BSA / BMI:        1.82 m2 / 20.81      Department Location: [facility]                   kg/m2 Blood Pressure: 97 /74 mmHg Study Type:    TRANSTHORACIC ECHO (TTE) COMPLETE Diagnosis/ICD: Non ST elevation (NSTEMI) myocardial infarction-I21.4 Indication:    NSTEMI CPT Codes:     Echo Complete w Full Doppler-51595 Patient History: Diabetes:          Yes Pertinent History: HTN, Hyperlipidemia, CAD, Chest Pain and CHF. Renal dx III. Study Detail: The following Echo studies were performed: 2D, M-Mode, Doppler and               color flow. Technically challenging study due to poor acoustic               windows and patient lying in supine position.  PHYSICIAN INTERPRETATION: Left Ventricle: The left ventricular systolic function is severely decreased, with a visually estimated ejection fraction of 15-20%. There are multiple wall motion abnormalities. The left ventricular cavity size is moderately dilated. There is mild concentric left ventricular hypertrophy. Left ventricular diastolic filling was indeterminate. LV Wall Scoring: The entire apex, mid and apical anterior wall, mid and apical anterior septum, mid and apical inferior septum, mid and apical inferior wall, mid inferolateral segment, and mid anterolateral segment are akinetic. All remaining scored segments are normal. Left Atrium: The left atrium is normal in size. Right Ventricle: The right ventricle is normal in size. There is normal right ventricular global systolic function. Right Atrium: The right atrium is normal in size. Aortic Valve: The aortic valve  is probably trileaflet. The aortic valve dimensionless index is 0.70. There is no evidence of aortic valve regurgitation. The peak instantaneous gradient of the aortic valve is 2.7 mmHg. The mean gradient of the aortic valve is 1.4 mmHg. Mitral Valve: The mitral valve is normal in structure. There is no evidence of mitral valve regurgitation. Tricuspid Valve: The tricuspid valve is structurally normal. No evidence of tricuspid regurgitation. Pulmonic Valve: The pulmonic valve is not well visualized. The pulmonic valve regurgitation was not well visualized. Pericardium: No pericardial effusion noted. Aorta: The aortic root is normal.  CONCLUSIONS:  1. Multiple segmental abnormalities exist. See findings.  2. The left ventricular systolic function is severely decreased, with a visually estimated ejection fraction of 15-20%.  3. There are multiple wall motion abnormalities.  4. Left ventricular diastolic filling was indeterminate.  5. Left ventricular cavity size is moderately dilated.  6. There is normal right ventricular global systolic function.  7. 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 QUANTITATIVE DATA SUMMARY:  2D MEASUREMENTS:           Normal Ranges: Ao Root s:       3.51 cm IVSd:            0.79 cm   (0.6-1.1cm) LVPWd:           0.76 cm   (0.6-1.1cm) LVIDd:           4.44 cm   (3.9-5.9cm) LVIDs:           3.81 cm LV Mass Index:   58.6 g/m2 LV % FS          14.0 %  LA VOLUME:          Normal Ranges: LA Vol A4C: 45.2 ml  RA VOLUME BY A/L METHOD:          Normal Ranges: RA Area A4C:             12.0 cm2  LV SYSTOLIC FUNCTION BY 2D PLANIMETRY (MOD):                      Normal Ranges: EF-A4C View:    16 % (>=55%) EF-A2C View:    5 % EF-Biplane:     14 % EF-Visual:      18 % EF-3DQ:         19 % LV EF Reported: 18 %  LV DIASTOLIC FUNCTION:           Normal Ranges: MV Peak E:             0.45 m/s  (0.7-1.2 m/s) MV Peak A:             0.88 m/s  (0.42-0.7 m/s) E/A Ratio:              0.51      (1.0-2.2) MV e'                  0.073 m/s (>8.0) MV lateral e'          0.11 m/s MV medial e'           0.04 m/s E/e' Ratio:            6.16      (<8.0)  MITRAL VALVE:          Normal Ranges: MV DT:        110 msec (150-240msec)  AORTIC VALVE:                     Normal Ranges: AoV Vmax:                0.82 m/s (<=1.7m/s) AoV Peak P.7 mmHg (<20mmHg) AoV Mean P.4 mmHg (1.7-11.5mmHg) LVOT Max Florencio:            0.61 m/s (<=1.1m/s) AoV VTI:                 17.99 cm (18-25cm) LVOT VTI:                12.65 cm LVOT Diameter:           2.00 cm  (1.8-2.4cm) AoV Area, VTI:           2.20 cm2 (2.5-5.5cm2) AoV Area,Vmax:           2.32 cm2 (2.5-4.5cm2) AoV Dimensionless Index: 0.70  RIGHT VENTRICLE: RV Basal 3.30 cm RV Mid   2.70 cm RV Major 4.8 cm  TRICUSPID VALVE/RVSP:          Normal Ranges: Peak TR Velocity:     2.26 m/s RV Syst Pressure:     23 mmHg  (< 30mmHg)  PULMONIC VALVE:          Normal Ranges: PV Max Florencio:     0.6 m/s  (0.6-0.9m/s) PV Max P.3 mmHg PV Mean P.5 mmHg PV VTI:         6.59 cm  AORTA: Asc Ao Diam 0.00 cm  [id number] [facility] Electronically signed on 10/17/2024 at 11:00:02 AM  Wall Scoring  ** Final (Updated) **     ECG 12 lead    Result Date: 10/17/2024  Normal sinus rhythm Left axis deviation Low voltage QRS Anteroseptal infarct Inferior infarct , age undetermined Abnormal ECG When compared with ECG of 2018 16:39, Significant changes have occurred Confirmed by Rell Vaughan ([id number]) on 10/17/2024 11:32:49 AM    XR chest 1 view    Result Date: 10/16/2024  Interpreted By:  Lobo Farrar, STUDY: XR CHEST 1 VIEW;  10/16/2024 9:27 pm   INDICATION: Signs/Symptoms:malaise.   COMPARISON: Chest x-ray 2021   ACCESSION NUMBER(S): [id number]   ORDERING CLINICIAN: MICHELLE MARTE   FINDINGS: Multiple overlying leads are present.   CARDIOMEDIASTINAL SILHOUETTE: Cardiomediastinal silhouette is normal in size and configuration.  Atherosclerotic calcification of the aorta.   LUNGS: No consolidation, pleural effusion or pneumothorax.   ABDOMEN: Surgical clips noted in the left upper quadrant.   BONES: Multilevel degenerative changes of the spine.       No acute cardiopulmonary process.   MACRO: None   Signed by: Lobo Farrar 10/16/2024 9:52 PM Dictation workstation:   [id number]    CT head wo IV contrast    Result Date: 10/16/2024  Interpreted By:  Ori Asher, STUDY: CT HEAD WO IV CONTRAST;  10/16/2024 9:18 pm   INDICATION: Signs/Symptoms:malaise/fatigue; AMS.     COMPARISON: 04/18/2021   ACCESSION NUMBER(S): [id number]   ORDERING CLINICIAN: MICHELLE MARTE   TECHNIQUE: Noncontrast axial CT scan of head was performed. Angled reformats in brain and bone windows were generated. The images were reviewed in bone, brain, blood and soft tissue windows.   FINDINGS: CSF Spaces: The ventricles, sulci and basal cisterns are within normal limits. There is no extraaxial fluid collection.   Parenchyma: There are periventricular white matter changes noted. The grey-white differentiation is intact. There is no mass effect or midline shift.  There is no intracranial hemorrhage.   Calvarium: The calvarium is unremarkable.   Paranasal sinuses and mastoids: Visualized paranasal sinuses and mastoids are clear.       No evidence of acute cortical infarct or intracranial hemorrhage.       MACRO: None   Signed by: Ori Asher 10/16/2024 9:28 PM Dictation workstation:   [id number]       Vitals:    11/05/24 1606   BP: 129/72   Pulse: 82   Resp: 18   Temp: 36.1 °C (97 °F)   SpO2: 90%         Physical Exam  Vitals and nursing note reviewed.   Constitutional:       General: He is not in acute distress.     Appearance: He is ill-appearing.      Comments: Thin and frail appearing, lying in bed, somnolent   HENT:      Mouth/Throat:      Mouth: Mucous membranes are dry.      Pharynx: Oropharynx is clear.   Eyes:      Extraocular Movements: Extraocular movements  intact.   Cardiovascular:      Rate and Rhythm: Normal rate and regular rhythm.      Pulses: Normal pulses.      Heart sounds: No murmur heard.  Pulmonary:      Effort: Pulmonary effort is normal. No respiratory distress.      Breath sounds: No wheezing or rales.      Comments: Moist nonprod cough  Abdominal:      General: Abdomen is flat. Bowel sounds are normal. There is no distension.      Palpations: Abdomen is soft.      Tenderness: There is no abdominal tenderness. There is no guarding.   Musculoskeletal:      Right lower leg: No edema.      Left lower leg: No edema.      Comments: Moves all extremities     Skin:     General: Skin is warm and dry.      Coloration: Skin is pale.   Neurological:      General: No focal deficit present.      Motor: Weakness present.      Comments: Eyes open, slowed responses, but signing yes no appropriately.    Psychiatric:      Comments: calm          Assessment/Plan   IMP:    HFrEF - EF improved s/p PCI. On BB, EF 40% per cardiac cath  NSTEMI - aspirin, BB  2.  Hypotension - on midodrine tid  3.  Acute metabolic encephalopathy - baseline A&O x3, worsened,  4.  Severe protein calorie malnutrition-family ok with corpak temporarily, but no long term feeding tubes.   5.  Electrolyte derangements - receiving supplementations  6.  PNA-zosyn completed 11/4 midnight, LLL on CXR, suspect aspiration  7.  Urinary Retention-urology attempted coudet, unable. In continues to retain, would need to consider cytoscopy with dilation, family discussing, but seems to be in agreement that they would proceed with this if necessary to provide symptom relief.   8. Anemia-monitor closely.   9. Hyperglycemia-insulin adjusted by attending service.      Palliative Care Encounter  DNR-DNI  The patient is not capable  Cristian Jules is primary HPOA, cristian Villanueva is first alternate agent    11/5  Awake, participating with therapy, tolerating modified diet, 1:1 assist with meals. CXR pending. Overall improved, cont  treatment model of care per family, goal of SNF when medically stable. No acute palliative needs, will sign off.     11/4  Passed swallow evaluation, on pureed diet, 1:1 assist with meals. Corpak to remain out for now, monitor oral intake. Completed IV zosyn for possible PNA, last dose 11/4 midnight. Now on 2L NC, CXR showing b/l infiltrate, possible layering effusion, afebrile. Encourage up to side of bed, cough and deep breath, Repeat CXR scheduled for tomorrow.   Continue treatment model of care per family as patient has shown slight improvement over past 48hrs.     11/3  More awake and alert today, following commands, answering questions appropriately. TF at goal 50ml/hr. Blood sugars elevated, anion gap elevated. Discussed with attending service, plan to adjust insulin.   Cont aggressive treatment model of care, son at bedside for updates.     11/2  Remains encephalopathic, no significant improvement. Corpak in place, tolerating TF. Discussed with son at bedside, cont treatment model of care, awaiting EEG results.     11/1  Hemoglobin trending downwards, no signs of active GI bleeding, Corpak in place with tube feeds infusing at very slow rate, currently at 10, goal is 50, per nursing note, tube feed rate was turned down at 0900 for reports of increased coughing.  Continues on IV antibiotics with stable white blood cell count, afebrile.  Blood pressure low stable.  Remains somnolent but seems more responsive.    Continue aggressive treatment model of care, monitor overall patient condition, if progressively worsening despite aggressive treatment and/or no significant improvement after sufficient course of IV antibiotics, family willing to readdress goals at that time.    Total time 35 minutes.    Avani Gray, APRN-CNP

## 2024-11-05 NOTE — PROGRESS NOTES
Dat Mccallum is a 83 y.o. male on day 20 of admission presenting with NSTEMI (non-ST elevated myocardial infarction) (Multi).      Subjective   Death patient who uses sign language and writing to communicate..  Prolonged hospital stay after a myocardial infarction and aspiration pneumonia.  Prolonged delirium and encephalopathy.  He is coming out of it.  Yesterday he passed a swallow eval.  Today he actually participated in physical therapy.  Despite this he is not communicating well.  His writing and his sign language responses are minimal content.  He is breathing comfortably on room air.  Eating well.    Objective   Alert following commands undistressed.  Lungs diffuse rhonchorous sound  Heart regular rate and rhythm  Abdomen soft nontender nondistended  Neuro-with the telemetry  he was able to pay attention seem to understand her partially but had trouble following commands.  Did not seem to want to move his left side voluntarily.  It required prompting and then he did seem to have motor control over his left side.  Did not want to look to the left side.  His responses to commands were partial and inconsistent.  According to the  his language responses were repetitive and of minimal content.  Last Recorded Vitals  /60 (BP Location: Right arm, Patient Position: Lying)   Pulse 77   Temp 35.4 °C (95.7 °F) (Temporal)   Resp 14   Wt 65.5 kg (144 lb 6.4 oz)   SpO2 100%   Intake/Output last 3 Shifts:    Intake/Output Summary (Last 24 hours) at 11/5/2024 1300  Last data filed at 11/5/2024 0700  Gross per 24 hour   Intake 245 ml   Output 200 ml   Net 45 ml       Admission Weight  Weight: 68 kg (150 lb) (10/16/24 2047)    Daily Weight  10/30/24 : 65.5 kg (144 lb 6.4 oz)    Image Results  XR chest 1 view  Narrative: Interpreted By:  Laurent Elam,   STUDY:  XR CHEST 1 VIEW; 11/3/2024 6:52 pm      INDICATION:  CLINICAL INFORMATION: Signs/Symptoms:readjustment NG again.       COMPARISON:  11/03/2024 at 1811 hours      ACCESSION NUMBER(S):  [id number]      ORDERING CLINICIAN:  DENG BURNHAM      TECHNIQUE:  Portable chest one view.      FINDINGS:  Exam was targeted to the lower chest and upper abdomen for assessment  of NG tube placement. Tip of the nasogastric tube overlies the region  of the body of the stomach. Bibasilar infiltrate is suspected more  marked on the left.      Impression: Tip of the nasogastric tube overlies the region of the body of the  stomach. Bibasilar infiltrate more marked on the left.      MACRO:  none      Signed by: Laurent Elam 11/4/2024 8:27 AM  Dictation workstation:   [id number]             Assessment/Plan                  Assessment & Plan  Acute metabolic encephalopathy  He has been here for 3 weeks.  He has had an exhaustive evaluation.  He seems to be improving but he is not at his baseline and there seems to possibly be something focal about his current presentation.  He already had an MRI of the brain so I am not sure what else to do.  Neurology signed off about 5 days ago.  I am going to ask them to reevaluate the patient.  Pleural effusion  Respiratory status is stable.  Chest x-ray shows possibly a small left pleural effusion but he is breathing comfortably on room air.  Acute on chronic systolic (congestive) heart failure  There is an inconsistency between his echo and his cath regarding EF.  Apparently were getting use the cath as the definitive source.  Last cardiology note which was a signoff note said he does not need the LifeVest.  NSTEMI (non-ST elevated myocardial infarction) (Multi)  Cardiology has signed off  Bacterial pneumonia  Chest x-ray shows bibasilar patches but he is finished a 7-day course of Zosyn  Idiopathic hypotension  11/3 - BP currently well controlled at 125/60  -Less idiopathic and more likely related to his cardiomyopathy  -BP's overall improved from last week. Will try to wean off midodrine prior to  discharge  Severe protein-calorie malnutrition (Multi)  Passed a swallow eval yesterday.  He seems to be eating well.  Type 2 diabetes mellitus without complication, without long-term current use of insulin (Multi)  I am not going to modify his insulin right now given I am not quite sure what his dietary status will be.  Hypomagnesemia  11/3 - 2g IV magnesium given today. Recheck level. Replete prn.  11/1 - resolved with supplementation.  Will continue to monitor and replete prn  -receiving PO supplementation. Supplementing PRN with IV    Acute cystitis with hematuria  Started on  abx but cultures negative  Urinary retention  11/1 - has external catheter. Will monitor I/O closely  Scant urine output by incontinence. Unable to pass trotter catheter. Urology consulted and also unable to pass trotter catheter.   If he develops painful distention or MARISSA, then will need to go for cystoscopy with dilatation                Laurent Anderson MD

## 2024-11-05 NOTE — ASSESSMENT & PLAN NOTE
Respiratory status is stable.  Chest x-ray shows possibly a small left pleural effusion but he is breathing comfortably on room air.

## 2024-11-05 NOTE — CARE PLAN
The patient's goals for the shift include  unable to express    The clinical goals for the shift include Maintain safety

## 2024-11-05 NOTE — PROGRESS NOTES
Neurology Follow Up    Referred by: No ref. provider found, PCP: Anjelica Pacheco, APRN-CNP    Subjective:  Mr. Mccallum is a 83 y.o.  male admitted 10/16/2024 LOS day 20, consulted for altered mental status.    Pt seen and examined at the bedside.  Patient is deaf and uses sign language and writing to communicate . No acute distress at this time.  Patient awake and alert on examination.  Encephalopathy seems to be improving from previously described.    Objective   Blood pressure 104/60, pulse 77, temperature 35.4 °C (95.7 °F), temperature source Temporal, resp. rate 14, height 1.829 m (6'), weight 65.5 kg (144 lb 6.4 oz), SpO2 100%.    Neurological Exam:  Neurological Exam:  Pt is deaf with, Used an ALS  to assist with examination. Language barrier did make it difficult to examine the patient.  General: NAD, cooperative   Neuro:  Awake alert, deaf unable to assess for dysarthria, patient mimicking our movements  Visual fields unable to assess  Slight asymmetry with subtle left facial droop  Tongue midline  Pt having trouble moving the left arm when asked the will move it at times. Seems to have decreased range of motion in the left shoulder. Pt was 4+ on bilateral upper ext. Pushes and pulls.   Pt slight rigidity in the right upper ext. Waxy flexibility in the right upper ext.  Rigidity noted in the lower extremities.   Reflexes:      R          L  BR:               2          2  Biceps:         2          2  Triceps:        2          2  Knee:           2          2  Ankle:          2          2  Toes up going  Sensory unable to assess  Gait: unable to assess due to pt safety        Reviewed relevant results, independent review of imaging:   Encounter Date: 10/16/24   Electrocardiogram, 12-lead PRN ACS symptoms   Result Value    Ventricular Rate 201    Atrial Rate 201    WY Interval 90    QRS Duration 16    QT Interval 216    QTC Calculation(Bazett) 395    R Axis 0    T Axis 135    QRS Count 32    Q  Onset 241    P Onset 196    P Offset 239    T Offset 349    QTC Fredericia 323    Narrative    Poor data quality in current ECG precludes serial comparison  Sinus tachycardia with short PA with Premature supraventricular complexes and with frequent Premature ventricular complexes  Indeterminate axis  Pulmonary disease pattern  Nonspecific ST and T wave abnormality  Abnormal ECG  When compared with ECG of 16-OCT-2024 20:40, (unconfirmed)  Previous ECG has undetermined rhythm, needs review  Questionable change in QRS duration  Confirmed by Fer Vazquez ([id number]) on 10/22/2024 12:52:29 PM            BNP   Date/Time Value Ref Range Status   10/16/2024 08:54  (H) 0 - 99 pg/mL Final         Assessment:   Dat Mccallum is a 83 y.o. male with hx of T2DM, HTN, HLD, CKD3, BPH, deafness presenting with AMS/lethargy, found with NTEMI, SIRS, HF with EF of 15-20%. Neurology consulted for worsened AMS. Pt mentation seems to be waxing and weaning. Routine EEG showed diffuse encephalopathy. No witnessed seizures, abnormal movements. No prior hx of seizures.     11/5/24- Prolonged EEG showed severe diffuse encephalopathy no epileptiform or lateralizing signs.  Toxic/ metabolic encephalopathy seems to be improving. Pt has multiple medical issues that could be contributing to the encephalopathy such as pneumonia and decreased ejection fraction. He is at high risk of ongoing delirium while inpatient.  However neuro exam markedly different from previous exam. Pt has wax flexibility, rigidity and mimicking our movements which if concerning for catatonia. No mental health history that I am aware of. Spoke to the son over the phone and will plan on meeting with him at the bedside tomorrow to get a better understand of what pts baseline mentation is.       Recommendations:   - Recommend psych consult specifically for possible catatonia  - Another consideration is trying a benzodiazepine trial.       Neurology will continue to follow           I spent 35 minutes in the professional and overall care of this patient.      Kassi Alvarado, APRN-CNP

## 2024-11-05 NOTE — PROGRESS NOTES
Spiritual Care Visit    Clinical Encounter Type  Visited With: Patient  Routine Visit: Follow-up  Continue Visiting: Yes         Values/Beliefs  Spiritual Requests During Hospitalization: Dat received a blessing from me today. I called his son, Rich, asking cornell to bring up a new marker pen, so that we can use it to write messages on his father's wipe off board. I left a messae on his machine at home.     Demetrio Ray

## 2024-11-05 NOTE — PROGRESS NOTES
Physical Therapy    Physical Therapy Treatment    Patient Name: Dat Mccallum  MRN: [id number]  Department: [address]  Room: 12/12-  Today's Date: 11/5/2024  Time Calculation  Start Time: 0945  Stop Time: 1023  Time Calculation (min): 38 min         Assessment/Plan   PT Assessment  End of Session Communication: Bedside nurse  Assessment Comment: Pt demoing great progress this session with pt able to tolerate increased mobility. Increased time req'd for tx d/t pt hearing deficits and communication via whiteboard/ASL. Pt would benefit from skilled physical therapy services to safely maximize functional mobility to modified independent levels.  End of Session Patient Position: Bed, 3 rail up, Alarm on  PT Plan  Inpatient/Swing Bed or Outpatient: Inpatient  PT Plan  Treatment/Interventions: Bed mobility, Transfer training, Gait training, Balance training, Strengthening, Endurance training, Therapeutic exercise, Therapeutic activity  PT Plan: Ongoing PT  PT Frequency: 4 times per week  PT Discharge Recommendations: Moderate intensity level of continued care  PT Recommended Transfer Status: Total assist  PT - OK to Discharge: Yes (with skilled physical therapy services at next level of care)      General Visit Information:   PT  Visit  PT Received On: 11/05/24  General  Missed Visit: Yes  Missed Visit Reason: Other (Comment) (Pt unarousable to multiple stimuli (sternal rub, cloth to face, supported seated, lights)-not appropriate for therapy at this time. Once therapist left room, pt observed waking up, ripping pulse ox off forehead and returning back to heavy sleep.)  Prior to Session Communication: Bedside nurse  Patient Position Received: Bed, 3 rail up, Alarm off, not on at start of session  General Comment: Pt cleared for PT by nursing. Pt agreeable to PT services .    Subjective   Precautions:  Precautions  Hearing/Visual Limitations: Profoundly hard of hearing;  use writting or lip reading to communicate  Medical  Precautions: Fall precautions    Objective   Pain:  Pain Assessment  Pain Assessment: FLACC (Face, Legs, Activity, Cry, Consolability)  0-10 (Numeric) Pain Score: 0 - No pain  Cognition:  Cognition  Overall Cognitive Status: Impaired     Postural Control:  Static Sitting Balance  Static Sitting-Balance Support: Bilateral upper extremity supported, Feet supported  Static Sitting-Level of Assistance: Contact guard, Moderate assistance  Static Sitting-Comment/Number of Minutes: Fluctuating between CGA/ModA to maintain midline. Fair - static seated balance  Static Standing Balance  Static Standing-Balance Support: Bilateral upper extremity supported  Static Standing-Level of Assistance: Moderate assistance  Static Standing-Comment/Number of Minutes: poor static stand balance    Treatments:  Therapeutic Activity  Therapeutic Activity Performed: Yes  Therapeutic Activity 1: Seated at EOB x15 mins with intermittent CGA-ModA. Assist with hand placement and weight shifting to maintain midline.  Therapeutic Activity 2: Standing at FWW for ~4 mins with ModA.    Bed Mobility  Bed Mobility: Yes  Bed Mobility 1  Bed Mobility 1: Supine to sitting, Sitting to supine  Level of Assistance 1: Maximum assistance  Bed Mobility Comments 1: assist for trunk and BLE mgmt when entering/exiting bed. Pt very participitive this session and able to use bedrails to assist with movement.  Bed Mobility 2  Bed Mobility  2: Scooting  Level of Assistance 2: Moderate assistance  Bed Mobility Comments 2: ModA with use of bed sheet to scoot to EOB.    Transfers  Transfer: Yes  Transfer 1  Transfer From 1: Sit to, Stand to  Transfer to 1: Stand, Sit  Technique 1: Sit to stand, Stand to sit  Transfer Device 1: Walker  Transfer Level of Assistance 1: Moderate assistance  Trials/Comments 1: Assist with rising from EOB and cues for hand placement. Cues for fwd gaze and upright posture with good carryover    Outcome Measures:  [facility] Basic Mobility  Turning  from your back to your side while in a flat bed without using bedrails: A little  Moving from lying on your back to sitting on the side of a flat bed without using bedrails: A little  Moving to and from bed to chair (including a wheelchair): Total  Standing up from a chair using your arms (e.g. wheelchair or bedside chair): A lot  To walk in hospital room: Total  Climbing 3-5 steps with railing: Total  Basic Mobility - Total Score: 11    Encounter Problems       Encounter Problems (Active)       Mobility       Bed mobility including supine to sit and sit to supine with supervision. (Progressing)       Start:  10/20/24    Expected End:  11/03/24            Transfers including sit to stand and stand to sit with supervision. (Progressing)       Start:  10/20/24    Expected End:  11/03/24            Ambulate 50 feet with rolling walker and min assist. (Not Progressing)       Start:  10/20/24    Expected End:  11/03/24               Pain - Adult

## 2024-11-05 NOTE — ASSESSMENT & PLAN NOTE
He has been here for 3 weeks.  He has had an exhaustive evaluation.  He seems to be improving but he is not at his baseline and there seems to possibly be something focal about his current presentation.  He already had an MRI of the brain so I am not sure what else to do.  Neurology signed off about 5 days ago.  I am going to ask them to reevaluate the patient.

## 2024-11-05 NOTE — CONSULTS
"Inpatient consult to Psychiatry  Consult performed by: MERLENE Campos-CNP  Consult ordered by: Laurent Anderson MD  Reason for consult: \"Seen by neurology today for encephalopathy and they are concerned about catatonia.\"      PSYCHIATRY CONSULT NOTE      Visit type: Face to face evaluation       HISTORY OF PRESENT ILLNESS:     Dat Mccallum is a 83 y.o. male with a past psychiatric history of depression and a past medical history of  NSTEMI (non-ST elevated myocardial infarction) (Multi), Type 2 diabetes mellitus without complication, without long-term current use of insulin (Multi), BPH (benign prostatic hyperplasia), Acute metabolic encephalopathy, Chronic systolic heart failure, Idiopathic hypotension, Severe protein-calorie malnutrition (Multi), Anemia of chronic disease, Acute on chronic systolic (congestive) heart failure, Hypomagnesemia, Acute cystitis with hematuria, Urinary retention, Bacterial pneumonia, Pleural effusion who was admitted to [facility] on 10/16/2024 for altered mental status/lethargy. Psychiatry was consulted on 11/05/24 for concern of catatonia.    On chart review, The patient is an 83-year-old male presenting to the emergency department by EMS from home for evaluation of altered mental status/lethargy.      On interview, patient was seen in his room, laying in bed. He opened his eyes and was able to write his name only. 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 For us, he was mute. 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 Patient did not appear to be anxious/restless during our encounter. He appeared sleepy, gazing at time and closing his eyes. He did not appear to be internally stimulated. Tried to reach patient's son (Rich Mccallum [phone]) and Jorge A Mccallum ([phone]) for collateral information due to having difficulty gathering information from patient. However, call went to " voicemail.         Past Psychiatric History  Unable to assess at this time.     Substance Abuse History  Unable to assess at this time.    Social History  Unable to assess at this time.    Past Medical History  He has a past medical history of Acute frontal sinusitis, unspecified (01/06/2015), Encounter for screening for malignant neoplasm of colon (01/22/2020), Encounter for screening for malignant neoplasm of prostate (08/14/2017), Other conditions influencing health status, Other conditions influencing health status (01/19/2018), Other skin changes (07/21/2021), Other specified health status, Personal history of other diseases of the nervous system and sense organs, Personal history of other diseases of urinary system (08/10/2021), Personal history of other specified conditions (06/29/2018), Personal history of other specified conditions (09/25/2017), Personal history of other specified conditions (08/14/2017), Personal history of other specified conditions (10/11/2017), Personal history of pneumonia (recurrent) (01/19/2018), Rash and other nonspecific skin eruption (12/17/2020), 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      ALLERGIES  Patient has no known allergies.    Surgical History  He has a past surgical history that includes Other surgical history (12/02/2013); Other surgical history (11/26/2018); Other surgical history (11/26/2018); and Cardiac catheterization (N/A, 10/23/2024).      PSYCHIATRIC REVIEW OF SYSTEMS  Unable to assess at this time.     OBJECTIVE:     VITALS      11/4/2024     3:43 PM 11/4/2024     7:47 PM 11/4/2024    11:00 PM 11/5/2024     3:43 AM 11/5/2024     8:20 AM 11/5/2024    11:45 AM 11/5/2024     4:06 PM   Vitals   Systolic 102 126 124 121 99 104 129   Diastolic 69 69 68 62 61 60 72   Heart Rate 77 86   92 77 82   Temp 35.3 °C (95.5 °F) 36 °C (96.8 °F) 36 °C (96.8 °F) 35.8 °C (96.4 °F) 35.6 °C  (96.1 °F) 35.4 °C (95.7 °F) 36.1 °C (97 °F)   Resp 21 19   21 14 18      Body mass index is 19.58 kg/m².  Facility age limit for growth %zev is 20 years.  Wt Readings from Last 4 Encounters:   10/30/24 65.5 kg (144 lb 6.4 oz)   02/27/24 68.9 kg (152 lb)   08/10/23 70.3 kg (155 lb)   01/03/22 76.7 kg (169 lb)       Mental Status Exam  Unable to assess at this time because of pt's mutism and medical complexities. Pt is seen by neurology for encephalopathy.       HOME MEDICATIONS  Medication Documentation Review Audit       Reviewed by Jus Curiel RN (Registered Nurse) on 10/17/24 at 0051      Medication Order Taking? Sig Documenting Provider Last Dose Status   glimepiride (Amaryl) 4 mg tablet [id number] No Take 1 tablet (4 mg) by mouth 2 times a day. LARISSA Scott Unknown Active   lovastatin (Mevacor) 20 mg tablet [id number] No Take 1 tablet (20 mg) by mouth once daily at bedtime. LARISSA Scott Unknown Active   metFORMIN (Glucophage) 500 mg tablet [id number] No Take 2 tablets by mouth twice daily LARISSA Scott Unknown Active   omeprazole (PriLOSEC) 20 mg DR capsule [id number] No Take by mouth. Historical Provider, MD Unknown Active   OneTouch Ultra Test strip [id number] No TEST 3 TIMES DAILY Historical Provider, MD Unknown Active   tamsulosin (Flomax) 0.4 mg 24 hr capsule [id number] No Take 2 capsules (0.8 mg) by mouth once daily at bedtime. LARISSA Scott Unknown Active                     CURRENT MEDICATIONS  Scheduled medications  aspirin, 81 mg, oral, Daily  budesonide, 0.5 mg, nebulization, BID  carvedilol, 3.125 mg, oral, BID after meals  enoxaparin, 40 mg, subcutaneous, Daily  insulin glargine, 8 Units, subcutaneous, Nightly  insulin lispro, 0-5 Units, subcutaneous, q6h  ipratropium-albuteroL, 3 mL, nebulization, q6h  magnesium oxide, 400 mg, oral, Daily  midodrine, 5 mg, oral, TID  ofloxacin, 1 drop, Left Eye, 4x daily  pantoprazole, 40 mg, intravenous,  Daily  potassium phosphate, 15 mmol, intravenous, Once  pravastatin, 40 mg, oral, Nightly  tamsulosin, 0.8 mg, oral, Nightly  thiamine, 100 mg, nasogastric tube, Daily        Continuous medications       PRN medications  PRN medications: acetaminophen **OR** acetaminophen, aminophylline, dextrose, dextrose, glucagon, glucagon, melatonin, oxygen, prochlorperazine, prochlorperazine     LABS  Results for orders placed or performed during the hospital encounter of 10/16/24 (from the past 24 hours)   POCT GLUCOSE   Result Value Ref Range    POCT Glucose 204 (H) 74 - 99 mg/dL   POCT GLUCOSE   Result Value Ref Range    POCT Glucose 158 (H) 74 - 99 mg/dL   POCT GLUCOSE   Result Value Ref Range    POCT Glucose 202 (H) 74 - 99 mg/dL   CBC   Result Value Ref Range    WBC 13.1 (H) 4.4 - 11.3 x10*3/uL    nRBC 0.2 (H) 0.0 - 0.0 /100 WBCs    RBC 2.75 (L) 4.50 - 5.90 x10*6/uL    Hemoglobin 8.2 (L) 13.5 - 17.5 g/dL    Hematocrit 22.7 (L) 41.0 - 52.0 %    MCV 83 80 - 100 fL    MCH 29.8 26.0 - 34.0 pg    MCHC 36.1 (H) 32.0 - 36.0 g/dL    RDW 14.0 11.5 - 14.5 %    Platelets 371 150 - 450 x10*3/uL   Comprehensive Metabolic Panel   Result Value Ref Range    Glucose 86 74 - 99 mg/dL    Sodium 129 (L) 136 - 145 mmol/L    Potassium 4.0 3.5 - 5.3 mmol/L    Chloride 99 98 - 107 mmol/L    Bicarbonate 25 21 - 32 mmol/L    Anion Gap 9 (L) 10 - 20 mmol/L    Urea Nitrogen 24 (H) 6 - 23 mg/dL    Creatinine 0.82 0.50 - 1.30 mg/dL    eGFR 88 >60 mL/min/1.73m*2    Calcium 7.7 (L) 8.6 - 10.3 mg/dL    Albumin 2.2 (L) 3.4 - 5.0 g/dL    Alkaline Phosphatase 65 33 - 136 U/L    Total Protein 6.6 6.4 - 8.2 g/dL    AST 50 (H) 9 - 39 U/L    Bilirubin, Total 0.6 0.0 - 1.2 mg/dL    ALT 36 10 - 52 U/L   Phosphorus   Result Value Ref Range    Phosphorus 2.9 2.5 - 4.9 mg/dL   POCT GLUCOSE   Result Value Ref Range    POCT Glucose 81 74 - 99 mg/dL   Urinalysis with Reflex Culture and Microscopic   Result Value Ref Range    Color, Urine Yellow Light-Yellow, Yellow,  Dark-Yellow    Appearance, Urine Turbid (N) Clear    Specific Gravity, Urine 1.035 1.005 - 1.035    pH, Urine 8.5 (N) 5.0, 5.5, 6.0, 6.5, 7.0, 7.5, 8.0    Protein, Urine 100 (2+) (A) NEGATIVE, 10 (TRACE), 20 (TRACE) mg/dL    Glucose, Urine 300 (3+) (A) Normal mg/dL    Blood, Urine 0.5 (2+) (A) NEGATIVE    Ketones, Urine NEGATIVE NEGATIVE mg/dL    Bilirubin, Urine NEGATIVE NEGATIVE    Urobilinogen, Urine OVER (4+) (A) Normal mg/dL    Nitrite, Urine NEGATIVE NEGATIVE    Leukocyte Esterase, Urine NEGATIVE NEGATIVE   Urinalysis Microscopic   Result Value Ref Range    WBC, Urine 1-5 1-5, NONE /HPF    RBC, Urine >20 (A) NONE, 1-2, 3-5 /HPF    Squamous Epithelial Cells, Urine 1-9 (SPARSE) Reference range not established. /HPF    Bacteria, Urine 1+ (A) NONE SEEN /HPF    Mucus, Urine FEW Reference range not established. /LPF   POCT GLUCOSE   Result Value Ref Range    POCT Glucose 101 (H) 74 - 99 mg/dL       IMAGING         ASSESSMENT:     PSYCHIATRIC RISK ASSESSMENT  Unable to assess at this time because of pt's mutism    DIAGNOSIS  Assessment & Plan  NSTEMI (non-ST elevated myocardial infarction) (Multi)    Type 2 diabetes mellitus without complication, without long-term current use of insulin (Multi)    BPH (benign prostatic hyperplasia)    Acute metabolic encephalopathy    Chronic systolic heart failure    Idiopathic hypotension    Severe protein-calorie malnutrition (Multi)    Anemia of chronic disease    Acute on chronic systolic (congestive) heart failure    Hypomagnesemia    Acute cystitis with hematuria    Urinary retention    Bacterial pneumonia    Pleural effusion        IMPRESSION      PLAN/ RECOMMENDATIONS:     -Depression  Per chart pt, pt was prescribed paroxetine hcl 20 mg PO daily. Will initiate at later time. Uncertain if this medication was discontinued. Per chart review, patient was receiving this medications from 4730-0745.  -Catatonia  Will try Ativan challenge: lorazepam 1 mg intravenously three times  per day. Refer to UpToDate.    -Would recommend delirium precautions, as below:  -- Minimize use of benzos, opiates, anticholinergics, as these may worsen mental status  -- Would use caution with narcotic pain medications  -- Would still adequately controlling pain, as uncontrolled pain is also a risk factor for delirium  -- Reinforce sleep hygiene; encourage patient to stay awake during the day  -- Keep curtains/blinds open during the day and closed at night.  -- Would recommend reorienting/redirecting patient as much as possible,   -- Aim for consistent staffing, familiar objects, avoiding bright lights and loud noises, etc.     -Patient does not currently meet criteria for inpatient psychiatric admission.   -Thank you for allowing us to participate in the care of this patient.  Psychiatry will continue to follow.    I personally spent 77 minutes today providing care for this patient, including preparation, face to face time, documentation and other services such as review of medical records, diagnostic result, patient education, counseling, coordination of care as specified in the encounter.

## 2024-11-05 NOTE — PROGRESS NOTES
11/05/24 1520   Discharge Planning   Expected Discharge Disposition SNF   Does the patient need discharge transport arranged? Yes   RoundTrip coordination needed? Yes     SNF referral reviewed.  [facility], and [facility].     Call placed to patients heike Jules to discuss which is facility of choice to initiate auth.  No answer, message left.     Discharge plan NOT secure  DO NOT DC without speaking to care coordination

## 2024-11-05 NOTE — CARE PLAN
Problem: Skin  Goal: Decreased wound size/increased tissue granulation at next dressing change  Outcome: Progressing  Goal: Participates in plan/prevention/treatment measures  Outcome: Progressing  Goal: Prevent/manage excess moisture  Outcome: Progressing  Goal: Prevent/minimize sheer/friction injuries  Outcome: Progressing  Goal: Promote/optimize nutrition  Outcome: Progressing  Goal: Promote skin healing  Outcome: Progressing     Problem: Pain - Adult  Goal: Verbalizes/displays adequate comfort level or baseline comfort level  Outcome: Progressing     Problem: Safety - Adult  Goal: Free from fall injury  Outcome: Progressing     Problem: Discharge Planning  Goal: Discharge to home or other facility with appropriate resources  Outcome: Progressing     Problem: Chronic Conditions and Co-morbidities  Goal: Patient's chronic conditions and co-morbidity symptoms are monitored and maintained or improved  Outcome: Progressing     Problem: Fall/Injury  Goal: Not fall by end of shift  Outcome: Progressing  Goal: Be free from injury by end of the shift  Outcome: Progressing  Goal: Verbalize understanding of personal risk factors for fall in the hospital  Outcome: Progressing  Goal: Verbalize understanding of risk factor reduction measures to prevent injury from fall in the home  Outcome: Progressing  Goal: Use assistive devices by end of the shift  Outcome: Progressing  Goal: Pace activities to prevent fatigue by end of the shift  Outcome: Progressing     Problem: ACS/CP/NSTEMI/STEMI  Goal: Chest pain managed (free from pain or at acceptable level)  Outcome: Progressing  Goal: Lab values return to normal range  Outcome: Progressing  Goal: Promote self management  Outcome: Progressing  Goal: Serial ECG will return to baseline  Outcome: Progressing  Goal: Verbalize understanding of procedures/devices  Outcome: Progressing  Goal: Wean vasopressors/achieve hemodynamic stability  Outcome: Progressing     Problem:  Nutrition  Goal: Less than 5 days NPO/clear liquids  Outcome: Progressing  Goal: Oral intake greater than 50%  Outcome: Progressing  Goal: Oral intake greater 75%  Outcome: Progressing  Goal: Consume prescribed supplement  Outcome: Progressing  Goal: Adequate PO fluid intake  Outcome: Progressing  Goal: Nutrition support goals are met within 48 hrs  Outcome: Progressing  Goal: Nutrition support is meeting 75% of nutrient needs  Outcome: Progressing  Goal: Tube feed tolerance  Outcome: Progressing  Goal: BG  mg/dL  Outcome: Progressing  Goal: Lab values WNL  Outcome: Progressing  Goal: Electrolytes WNL  Outcome: Progressing  Goal: Promote healing  Outcome: Progressing  Goal: Maintain stable weight  Outcome: Progressing  Goal: Reduce weight from edema/fluid  Outcome: Progressing  Goal: Gradual weight gain  Outcome: Progressing  Goal: Improve ostomy output  Outcome: Progressing     Problem: Diabetes  Goal: Maintain glucose levels >70mg/dl to <250mg/dl throughout shift  Outcome: Progressing  Goal: No changes in neurological exam by end of shift  Outcome: Progressing  Goal: Vital signs within normal range for age by end of shift  Outcome: Progressing     Problem: Heart Failure  Goal: Improved gas exchange this shift  Outcome: Progressing  Goal: Improved urinary output this shift  Outcome: Progressing  Goal: Reduction in peripheral edema within 24 hours  Outcome: Progressing  Goal: Report improvement of dyspnea/breathlessness this shift  Outcome: Progressing  Goal: Weight from fluid excess reduced over 2-3 days, then stabilize  Outcome: Progressing  Goal: Increase self care and/or family involvement in 24 hours  Outcome: Progressing     Problem: Pain  Goal: Takes deep breaths with improved pain control throughout the shift  Outcome: Progressing  Goal: Turns in bed with improved pain control throughout the shift  Outcome: Progressing  Goal: Walks with improved pain control throughout the shift  Outcome:  Progressing  Goal: Performs ADL's with improved pain control throughout shift  Outcome: Progressing  Goal: Participates in PT with improved pain control throughout the shift  Outcome: Progressing  Goal: Free from opioid side effects throughout the shift  Outcome: Progressing  Goal: Free from acute confusion related to pain meds throughout the shift  Outcome: Progressing     Problem: Safety - Medical Restraint  Goal: Remains free of injury from restraints (Restraint for Interference with Medical Device)  Outcome: Progressing  Goal: Free from restraint(s) (Restraint for Interference with Medical Device)  Outcome: Progressing   The patient's goals for the shift include      The clinical goals for the shift include maintain safety

## 2024-11-06 LAB
ALBUMIN SERPL BCP-MCNC: 2.2 G/DL (ref 3.4–5)
ALP SERPL-CCNC: 65 U/L (ref 33–136)
ALT SERPL W P-5'-P-CCNC: 36 U/L (ref 10–52)
ANION GAP SERPL CALCULATED.3IONS-SCNC: 9 MMOL/L (ref 10–20)
APPEARANCE UR: ABNORMAL
AST SERPL W P-5'-P-CCNC: 50 U/L (ref 9–39)
BACTERIA #/AREA URNS AUTO: ABNORMAL /HPF
BILIRUB SERPL-MCNC: 0.6 MG/DL (ref 0–1.2)
BILIRUB UR STRIP.AUTO-MCNC: NEGATIVE MG/DL
BUN SERPL-MCNC: 24 MG/DL (ref 6–23)
CALCIUM SERPL-MCNC: 7.7 MG/DL (ref 8.6–10.3)
CHLORIDE SERPL-SCNC: 99 MMOL/L (ref 98–107)
CO2 SERPL-SCNC: 25 MMOL/L (ref 21–32)
COLOR UR: YELLOW
CREAT SERPL-MCNC: 0.82 MG/DL (ref 0.5–1.3)
EGFRCR SERPLBLD CKD-EPI 2021: 88 ML/MIN/1.73M*2
ERYTHROCYTE [DISTWIDTH] IN BLOOD BY AUTOMATED COUNT: 14 % (ref 11.5–14.5)
GLUCOSE BLD MANUAL STRIP-MCNC: 101 MG/DL (ref 74–99)
GLUCOSE BLD MANUAL STRIP-MCNC: 154 MG/DL (ref 74–99)
GLUCOSE BLD MANUAL STRIP-MCNC: 161 MG/DL (ref 74–99)
GLUCOSE BLD MANUAL STRIP-MCNC: 81 MG/DL (ref 74–99)
GLUCOSE SERPL-MCNC: 86 MG/DL (ref 74–99)
GLUCOSE UR STRIP.AUTO-MCNC: ABNORMAL MG/DL
HCT VFR BLD AUTO: 22.7 % (ref 41–52)
HGB BLD-MCNC: 8.2 G/DL (ref 13.5–17.5)
HOLD SPECIMEN: NORMAL
KETONES UR STRIP.AUTO-MCNC: NEGATIVE MG/DL
LEUKOCYTE ESTERASE UR QL STRIP.AUTO: NEGATIVE
MCH RBC QN AUTO: 29.8 PG (ref 26–34)
MCHC RBC AUTO-ENTMCNC: 36.1 G/DL (ref 32–36)
MCV RBC AUTO: 83 FL (ref 80–100)
MUCOUS THREADS #/AREA URNS AUTO: ABNORMAL /LPF
NITRITE UR QL STRIP.AUTO: NEGATIVE
NRBC BLD-RTO: 0.2 /100 WBCS (ref 0–0)
PH UR STRIP.AUTO: 8.5 [PH]
PHOSPHATE SERPL-MCNC: 2.9 MG/DL (ref 2.5–4.9)
PLATELET # BLD AUTO: 371 X10*3/UL (ref 150–450)
POTASSIUM SERPL-SCNC: 4 MMOL/L (ref 3.5–5.3)
PROT SERPL-MCNC: 6.6 G/DL (ref 6.4–8.2)
PROT UR STRIP.AUTO-MCNC: ABNORMAL MG/DL
RBC # BLD AUTO: 2.75 X10*6/UL (ref 4.5–5.9)
RBC # UR STRIP.AUTO: ABNORMAL /UL
RBC #/AREA URNS AUTO: >20 /HPF
SODIUM SERPL-SCNC: 129 MMOL/L (ref 136–145)
SP GR UR STRIP.AUTO: 1.03
SQUAMOUS #/AREA URNS AUTO: ABNORMAL /HPF
UROBILINOGEN UR STRIP.AUTO-MCNC: ABNORMAL MG/DL
WBC # BLD AUTO: 13.1 X10*3/UL (ref 4.4–11.3)
WBC #/AREA URNS AUTO: ABNORMAL /HPF

## 2024-11-06 PROCEDURE — 36415 COLL VENOUS BLD VENIPUNCTURE: CPT | Performed by: INTERNAL MEDICINE

## 2024-11-06 PROCEDURE — 2500000002 HC RX 250 W HCPCS SELF ADMINISTERED DRUGS (ALT 637 FOR MEDICARE OP, ALT 636 FOR OP/ED): Performed by: INTERNAL MEDICINE

## 2024-11-06 PROCEDURE — 82947 ASSAY GLUCOSE BLOOD QUANT: CPT

## 2024-11-06 PROCEDURE — 97530 THERAPEUTIC ACTIVITIES: CPT | Mod: GP,CQ

## 2024-11-06 PROCEDURE — 2500000001 HC RX 250 WO HCPCS SELF ADMINISTERED DRUGS (ALT 637 FOR MEDICARE OP): Performed by: INTERNAL MEDICINE

## 2024-11-06 PROCEDURE — 94640 AIRWAY INHALATION TREATMENT: CPT

## 2024-11-06 PROCEDURE — 99233 SBSQ HOSP IP/OBS HIGH 50: CPT

## 2024-11-06 PROCEDURE — 87040 BLOOD CULTURE FOR BACTERIA: CPT | Mod: WESLAB | Performed by: INTERNAL MEDICINE

## 2024-11-06 PROCEDURE — 2500000004 HC RX 250 GENERAL PHARMACY W/ HCPCS (ALT 636 FOR OP/ED)

## 2024-11-06 PROCEDURE — 2500000004 HC RX 250 GENERAL PHARMACY W/ HCPCS (ALT 636 FOR OP/ED): Performed by: INTERNAL MEDICINE

## 2024-11-06 PROCEDURE — 85027 COMPLETE CBC AUTOMATED: CPT | Performed by: INTERNAL MEDICINE

## 2024-11-06 PROCEDURE — 99233 SBSQ HOSP IP/OBS HIGH 50: CPT | Performed by: INTERNAL MEDICINE

## 2024-11-06 PROCEDURE — 2500000001 HC RX 250 WO HCPCS SELF ADMINISTERED DRUGS (ALT 637 FOR MEDICARE OP): Performed by: NURSE PRACTITIONER

## 2024-11-06 PROCEDURE — 81001 URINALYSIS AUTO W/SCOPE: CPT | Performed by: INTERNAL MEDICINE

## 2024-11-06 PROCEDURE — 2500000002 HC RX 250 W HCPCS SELF ADMINISTERED DRUGS (ALT 637 FOR MEDICARE OP, ALT 636 FOR OP/ED): Performed by: HOSPITALIST

## 2024-11-06 PROCEDURE — 9420000001 HC RT PATIENT EDUCATION 5 MIN

## 2024-11-06 PROCEDURE — 2500000004 HC RX 250 GENERAL PHARMACY W/ HCPCS (ALT 636 FOR OP/ED): Performed by: HOSPITALIST

## 2024-11-06 PROCEDURE — 92526 ORAL FUNCTION THERAPY: CPT | Mod: GN | Performed by: SPEECH-LANGUAGE PATHOLOGIST

## 2024-11-06 PROCEDURE — 2500000001 HC RX 250 WO HCPCS SELF ADMINISTERED DRUGS (ALT 637 FOR MEDICARE OP): Performed by: HOSPITALIST

## 2024-11-06 PROCEDURE — 2500000005 HC RX 250 GENERAL PHARMACY W/O HCPCS: Performed by: INTERNAL MEDICINE

## 2024-11-06 PROCEDURE — 84100 ASSAY OF PHOSPHORUS: CPT | Performed by: HOSPITALIST

## 2024-11-06 PROCEDURE — 80053 COMPREHEN METABOLIC PANEL: CPT | Performed by: INTERNAL MEDICINE

## 2024-11-06 PROCEDURE — 97535 SELF CARE MNGMENT TRAINING: CPT | Mod: GO,CO

## 2024-11-06 PROCEDURE — 92507 TX SP LANG VOICE COMM INDIV: CPT | Mod: GN | Performed by: SPEECH-LANGUAGE PATHOLOGIST

## 2024-11-06 PROCEDURE — 2060000001 HC INTERMEDIATE ICU ROOM DAILY

## 2024-11-06 PROCEDURE — 97530 THERAPEUTIC ACTIVITIES: CPT | Mod: GO,CO

## 2024-11-06 RX ORDER — MIDODRINE HYDROCHLORIDE 10 MG/1
10 TABLET ORAL
Status: DISCONTINUED | OUTPATIENT
Start: 2024-11-06 | End: 2024-11-08 | Stop reason: HOSPADM

## 2024-11-06 RX ORDER — LORAZEPAM 2 MG/ML
1 INJECTION INTRAMUSCULAR EVERY 8 HOURS SCHEDULED
Status: DISCONTINUED | OUTPATIENT
Start: 2024-11-06 | End: 2024-11-06

## 2024-11-06 RX ORDER — FUROSEMIDE 10 MG/ML
20 INJECTION INTRAMUSCULAR; INTRAVENOUS 2 TIMES DAILY
Status: DISCONTINUED | OUTPATIENT
Start: 2024-11-06 | End: 2024-11-07

## 2024-11-06 RX ORDER — OLANZAPINE 10 MG/2ML
2.5 INJECTION, POWDER, FOR SOLUTION INTRAMUSCULAR EVERY 4 HOURS PRN
Status: DISCONTINUED | OUTPATIENT
Start: 2024-11-06 | End: 2024-11-08 | Stop reason: HOSPADM

## 2024-11-06 RX ADMIN — PIPERACILLIN SODIUM AND TAZOBACTAM SODIUM 3.38 G: 3; .375 INJECTION, SOLUTION INTRAVENOUS at 22:43

## 2024-11-06 RX ADMIN — CARVEDILOL 3.12 MG: 3.12 TABLET, FILM COATED ORAL at 09:50

## 2024-11-06 RX ADMIN — FUROSEMIDE 20 MG: 10 INJECTION, SOLUTION INTRAMUSCULAR; INTRAVENOUS at 15:02

## 2024-11-06 RX ADMIN — MIDODRINE HYDROCHLORIDE 5 MG: 5 TABLET ORAL at 12:38

## 2024-11-06 RX ADMIN — BUDESONIDE 0.5 MG: 0.5 SUSPENSION RESPIRATORY (INHALATION) at 20:34

## 2024-11-06 RX ADMIN — IPRATROPIUM BROMIDE AND ALBUTEROL SULFATE 3 ML: .5; 2.5 SOLUTION RESPIRATORY (INHALATION) at 12:17

## 2024-11-06 RX ADMIN — BUDESONIDE 0.5 MG: 0.5 SUSPENSION RESPIRATORY (INHALATION) at 08:00

## 2024-11-06 RX ADMIN — PANTOPRAZOLE SODIUM 40 MG: 40 INJECTION, POWDER, FOR SOLUTION INTRAVENOUS at 09:49

## 2024-11-06 RX ADMIN — ASPIRIN 81 MG CHEWABLE TABLET 81 MG: 81 TABLET CHEWABLE at 09:49

## 2024-11-06 RX ADMIN — MIDODRINE HYDROCHLORIDE 5 MG: 5 TABLET ORAL at 09:50

## 2024-11-06 RX ADMIN — Medication 400 MG: at 09:50

## 2024-11-06 RX ADMIN — INSULIN GLARGINE 8 UNITS: 100 INJECTION, SOLUTION SUBCUTANEOUS at 22:43

## 2024-11-06 RX ADMIN — INSULIN LISPRO 1 UNITS: 100 INJECTION, SOLUTION INTRAVENOUS; SUBCUTANEOUS at 22:36

## 2024-11-06 RX ADMIN — PIPERACILLIN SODIUM AND TAZOBACTAM SODIUM 3.38 G: 3; .375 INJECTION, SOLUTION INTRAVENOUS at 15:02

## 2024-11-06 RX ADMIN — OFLOXACIN 1 DROP: 3 SOLUTION OPHTHALMIC at 12:38

## 2024-11-06 RX ADMIN — INSULIN LISPRO 1 UNITS: 100 INJECTION, SOLUTION INTRAVENOUS; SUBCUTANEOUS at 12:38

## 2024-11-06 RX ADMIN — ENOXAPARIN SODIUM 40 MG: 40 INJECTION SUBCUTANEOUS at 09:49

## 2024-11-06 RX ADMIN — OFLOXACIN 1 DROP: 3 SOLUTION OPHTHALMIC at 09:49

## 2024-11-06 RX ADMIN — IPRATROPIUM BROMIDE AND ALBUTEROL SULFATE 3 ML: .5; 2.5 SOLUTION RESPIRATORY (INHALATION) at 08:00

## 2024-11-06 RX ADMIN — Medication 2 L/MIN: at 20:43

## 2024-11-06 RX ADMIN — LORAZEPAM 1 MG: 2 INJECTION INTRAMUSCULAR; INTRAVENOUS at 14:45

## 2024-11-06 RX ADMIN — Medication 100 MG: at 09:50

## 2024-11-06 RX ADMIN — OFLOXACIN 1 DROP: 3 SOLUTION OPHTHALMIC at 22:43

## 2024-11-06 RX ADMIN — IPRATROPIUM BROMIDE AND ALBUTEROL SULFATE 3 ML: .5; 2.5 SOLUTION RESPIRATORY (INHALATION) at 20:43

## 2024-11-06 ASSESSMENT — COGNITIVE AND FUNCTIONAL STATUS - GENERAL
MOVING FROM LYING ON BACK TO SITTING ON SIDE OF FLAT BED WITH BEDRAILS: A LOT
DRESSING REGULAR UPPER BODY CLOTHING: TOTAL
CLIMB 3 TO 5 STEPS WITH RAILING: TOTAL
TOILETING: TOTAL
EATING MEALS: A LOT
MOBILITY SCORE: 10
MOVING TO AND FROM BED TO CHAIR: TOTAL
DAILY ACTIVITIY SCORE: 10
WALKING IN HOSPITAL ROOM: TOTAL
PERSONAL GROOMING: TOTAL
DAILY ACTIVITIY SCORE: 6
MOVING FROM LYING ON BACK TO SITTING ON SIDE OF FLAT BED WITH BEDRAILS: TOTAL
TOILETING: TOTAL
MOBILITY SCORE: 6
HELP NEEDED FOR BATHING: A LOT
TURNING FROM BACK TO SIDE WHILE IN FLAT BAD: A LOT
MOVING TO AND FROM BED TO CHAIR: A LOT
EATING MEALS: TOTAL
WALKING IN HOSPITAL ROOM: TOTAL
PERSONAL GROOMING: A LOT
STANDING UP FROM CHAIR USING ARMS: A LOT
CLIMB 3 TO 5 STEPS WITH RAILING: TOTAL
STANDING UP FROM CHAIR USING ARMS: TOTAL
DRESSING REGULAR LOWER BODY CLOTHING: TOTAL
DRESSING REGULAR UPPER BODY CLOTHING: A LOT
HELP NEEDED FOR BATHING: TOTAL
TURNING FROM BACK TO SIDE WHILE IN FLAT BAD: TOTAL
DRESSING REGULAR LOWER BODY CLOTHING: TOTAL

## 2024-11-06 ASSESSMENT — PAIN SCALES - GENERAL
PAINLEVEL_OUTOF10: 0 - NO PAIN

## 2024-11-06 ASSESSMENT — PAIN - FUNCTIONAL ASSESSMENT
PAIN_FUNCTIONAL_ASSESSMENT: 0-10
PAIN_FUNCTIONAL_ASSESSMENT: FLACC (FACE, LEGS, ACTIVITY, CRY, CONSOLABILITY)

## 2024-11-06 ASSESSMENT — ACTIVITIES OF DAILY LIVING (ADL): HOME_MANAGEMENT_TIME_ENTRY: 8

## 2024-11-06 NOTE — PROGRESS NOTES
Patient got agitated and started pulling out his IVs and pulling at his telemetry leads and oxygen.  This is definitely a deterioration in his mental status.  This occurred several hours after his first dose of Ativan which was given to him as a trial to see if it would improve his neurologic status as a treatment for catatonia..    At this point I will say that trial failed.  I am stopping the Ativan.  I am putting him in restraints and ordering as needed Zyprexa.    Laurent Anderson MD

## 2024-11-06 NOTE — CARE PLAN
The patient's goals for the shift include  unable to assess    The clinical goals for the shift include Pt will remain safe and HDS

## 2024-11-06 NOTE — PROGRESS NOTES
11/06/24 1344   Discharge Planning   Expected Discharge Disposition SNF   Does the patient need discharge transport arranged? Yes   RoundTrip coordination needed? Yes     Received call from patient's son Jorge A stating that [facility] is [facility] for skilled rehab.      [facility] updated, and confirmed acceptance.   Patient will need a precert started once medically ready for discharge.      Discharge plan NOT secure  DO NOT DC without speaking to care coordination

## 2024-11-06 NOTE — ASSESSMENT & PLAN NOTE
He is being reevaluated by neurology.  They seem to think there is something atypical about his prolonged symptoms and this is not just [facility] delirium.  We are giving him Ativan as a trial of treatment for catatonia.  He is going to get another MRI.  Possibly an LP and we may try to transfer him downtown.

## 2024-11-06 NOTE — NURSING NOTE
Assumed care of pt.  Bedside report received.  Pt in bed resting.  Call light and patient belongings within reach. Bed low and locked.

## 2024-11-06 NOTE — ASSESSMENT & PLAN NOTE
He is requiring oxygen again.  His chest x-ray looks worse.  I am going to give him Lasix, resume antibiotics and consult pulmonary.

## 2024-11-06 NOTE — CARE PLAN
Problem: Skin  Goal: Decreased wound size/increased tissue granulation at next dressing change  Outcome: Progressing  Goal: Participates in plan/prevention/treatment measures  Outcome: Progressing  Goal: Prevent/manage excess moisture  Outcome: Progressing  Goal: Prevent/minimize sheer/friction injuries  Outcome: Progressing  Goal: Promote/optimize nutrition  Outcome: Progressing  Goal: Promote skin healing  Outcome: Progressing     Problem: Pain - Adult  Goal: Verbalizes/displays adequate comfort level or baseline comfort level  Outcome: Progressing     Problem: Safety - Adult  Goal: Free from fall injury  Outcome: Progressing     Problem: Discharge Planning  Goal: Discharge to home or other facility with appropriate resources  Outcome: Progressing     Problem: Chronic Conditions and Co-morbidities  Goal: Patient's chronic conditions and co-morbidity symptoms are monitored and maintained or improved  Outcome: Progressing     Problem: Fall/Injury  Goal: Not fall by end of shift  Outcome: Progressing  Goal: Be free from injury by end of the shift  Outcome: Progressing  Goal: Verbalize understanding of personal risk factors for fall in the hospital  Outcome: Progressing  Goal: Verbalize understanding of risk factor reduction measures to prevent injury from fall in the home  Outcome: Progressing  Goal: Use assistive devices by end of the shift  Outcome: Progressing  Goal: Pace activities to prevent fatigue by end of the shift  Outcome: Progressing     Problem: ACS/CP/NSTEMI/STEMI  Goal: Chest pain managed (free from pain or at acceptable level)  Outcome: Progressing  Goal: Lab values return to normal range  Outcome: Progressing  Goal: Promote self management  Outcome: Progressing  Goal: Serial ECG will return to baseline  Outcome: Progressing  Goal: Verbalize understanding of procedures/devices  Outcome: Progressing  Goal: Wean vasopressors/achieve hemodynamic stability  Outcome: Progressing     Problem:  Nutrition  Goal: Less than 5 days NPO/clear liquids  Outcome: Progressing  Goal: Oral intake greater than 50%  Outcome: Progressing  Goal: Oral intake greater 75%  Outcome: Progressing  Goal: Consume prescribed supplement  Outcome: Progressing  Goal: Adequate PO fluid intake  Outcome: Progressing  Goal: Nutrition support goals are met within 48 hrs  Outcome: Progressing  Goal: Nutrition support is meeting 75% of nutrient needs  Outcome: Progressing  Goal: Tube feed tolerance  Outcome: Progressing  Goal: BG  mg/dL  Outcome: Progressing  Goal: Lab values WNL  Outcome: Progressing  Goal: Electrolytes WNL  Outcome: Progressing  Goal: Promote healing  Outcome: Progressing  Goal: Maintain stable weight  Outcome: Progressing  Goal: Reduce weight from edema/fluid  Outcome: Progressing  Goal: Gradual weight gain  Outcome: Progressing  Goal: Improve ostomy output  Outcome: Progressing     Problem: Diabetes  Goal: Maintain glucose levels >70mg/dl to <250mg/dl throughout shift  Outcome: Progressing  Goal: No changes in neurological exam by end of shift  Outcome: Progressing  Goal: Vital signs within normal range for age by end of shift  Outcome: Progressing     Problem: Heart Failure  Goal: Improved gas exchange this shift  Outcome: Progressing  Goal: Improved urinary output this shift  Outcome: Progressing  Goal: Reduction in peripheral edema within 24 hours  Outcome: Progressing  Goal: Report improvement of dyspnea/breathlessness this shift  Outcome: Progressing  Goal: Weight from fluid excess reduced over 2-3 days, then stabilize  Outcome: Progressing  Goal: Increase self care and/or family involvement in 24 hours  Outcome: Progressing     Problem: Pain  Goal: Takes deep breaths with improved pain control throughout the shift  Outcome: Progressing  Goal: Turns in bed with improved pain control throughout the shift  Outcome: Progressing  Goal: Walks with improved pain control throughout the shift  Outcome:  Progressing  Goal: Performs ADL's with improved pain control throughout shift  Outcome: Progressing  Goal: Participates in PT with improved pain control throughout the shift  Outcome: Progressing  Goal: Free from opioid side effects throughout the shift  Outcome: Progressing  Goal: Free from acute confusion related to pain meds throughout the shift  Outcome: Progressing     Problem: Safety - Medical Restraint  Goal: Remains free of injury from restraints (Restraint for Interference with Medical Device)  Outcome: Progressing  Goal: Free from restraint(s) (Restraint for Interference with Medical Device)  Outcome: Progressing   The patient's goals for the shift include      The clinical goals for the shift include safety

## 2024-11-06 NOTE — PROGRESS NOTES
Spiritual Care Visit    Clinical Encounter Type  Visited With: Patient not available  Routine Visit: Follow-up  Continue Visiting: Yes         Values/Beliefs  Spiritual Requests During Hospitalization: Dat was having Patient Care when I tried to see him this morning. He does have another marker pen,so that the staff and his family can communicate with him.     Demetrio Ray

## 2024-11-06 NOTE — PROGRESS NOTES
10/25/24 1341   Discharge Planning   Expected Discharge Disposition SNF     Accepted by [facility] which was previously third choice on son Jorge A's list.  Son no longer wants [facility] and requested a new SNF list be emailed to [email or web address]. List was emailed.  Waiting for additional choices. Will need precert.     UPDATE:  1530 Attempt to call heike Jules unsuccessful.  Message left.     UPDATE: 1650 [facility] to [facility]. Attempt to call son for additional choices unsuccessful. Message left.     DISCHARGE PLAN IS NOT SECURE.  NEED SNF CHOICE. NEED ACCEPTING FACILITY AND PRECERT.    E-scribe request for imdur. Please review and e-scribe if applicable.     Last Visit Date:  10/28/24  Next Visit Date:  3/3/2025    Hemoglobin A1C (%)   Date Value   09/09/2024 6.5   11/06/2023 6.4 (H)   06/08/2022 6.4 (H)             ( goal A1C is < 7)   No components found for: \"LABMICR\"  No components found for: \"LDLCHOLESTEROL\", \"LDLCALC\"    (goal LDL is <100)   AST (U/L)   Date Value   08/27/2024 31     ALT (U/L)   Date Value   08/27/2024 11     BUN (mg/dL)   Date Value   08/27/2024 33 (H)     BP Readings from Last 3 Encounters:   10/30/24 (!) 129/97   10/29/24 126/76   10/28/24 109/70          (goal 120/80)        Patient Active Problem List:     Temporary loss of eyesight     Syncope : posterior circulation stroke vs Neurocardiogenic syncope      Intracranial bleed : traumatic left sub-dural hematoma ,managed conservatively in 2011     Headache     CKD (chronic kidney disease) stage 3, GFR 30-59 ml/min ([facility])     Depression     Hyperlipidemia     Microhematuria     Microalbuminuria     Essential hypertension     Generalized abdominal pain     Tubular adenoma of colon     Diverticulosis of colon     History of skull fracture     Nausea     Pure hypercholesterolemia     Prediabetes     Parkinson disease ([facility])     Chronic post-traumatic headache, not intractable     Fall (on) (from) other stairs and steps, initial encounter     Strain of iliopsoas muscle, initial encounter     Chronic pain of left knee     Closed fracture of second toe of right foot     Closed fracture of second toe of left foot     Abnormal ECG     Cerebrovascular accident (HCC)     Chest pain, unspecified     Hematoma of scalp     Left ventricular hypertrophy     Mild aortic valve regurgitation     Pulmonary hypertension, mild (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)     Stage

## 2024-11-06 NOTE — PROGRESS NOTES
Dat Mccallum is a 83 y.o. male on day 21 of admission presenting with NSTEMI (non-ST elevated myocardial infarction) (Multi).      Subjective   Deaf patient who uses sign language.  Recovering from an NSTEMI.  Has been treated for aspiration pneumonia.  Had a prolonged encephalopathy with possibly some focal neuro signs and symptoms.  Today he is requiring oxygen which is worse than yesterday.  Today his white count is up a little bit which is worse than yesterday.  Has been off antibiotics for about 3 days.       Objective   He is alert he pays attention follows commands.  According to his son his sign language output is very minimal limited to perhaps 1 word and not baseline.    Heart regular rate and rhythm  Lungs bibasilar crackles  Abdomen soft nontender nondistended  Extremities trace edema  Neuro-he is following commands with all 4 extremities but he seems to have a bit of a left-sided neglect.  Last Recorded Vitals  BP 97/68 (BP Location: Right arm, Patient Position: Lying)   Pulse 84   Temp 36.3 °C (97.3 °F) (Oral)   Resp 15   Wt 65.5 kg (144 lb 6.4 oz)   SpO2 93%   Intake/Output last 3 Shifts:    Intake/Output Summary (Last 24 hours) at 11/6/2024 1427  Last data filed at 11/6/2024 0700  Gross per 24 hour   Intake 100 ml   Output 400 ml   Net -300 ml       Admission Weight  Weight: 68 kg (150 lb) (10/16/24 2047)    Daily Weight  02/27/24 : 68.9 kg (152 lb)    Image Results  XR chest 1 view  Narrative: Interpreted By:  Stanford Richey,   STUDY:  XR CHEST 1 VIEW; 11/5/2024 8:03 am      INDICATION:  Signs/Symptoms:pneumonia      COMPARISON:  11/03/2024.      ACCESSION NUMBER(S):  [id number]      ORDERING CLINICIAN:  ANNETTE BRANTLEY      FINDINGS:  The study is limited due to  rotation.  The cardiomediastinal silhouette is within normal limits for the  technique. Bibasilar infiltrates/atelectatic changes and left more  than right pleural effusion are again seen; allowing for differences  in technique there  is some worsening. There is no pneumothorax.  Degenerative changes involve the spine and shoulders.      Impression: Limited study. Worsening bibasilar infiltrates/atelectatic changes  and pleural effusions. Correlate clinically and further follow-up as  needed.      Signed by: Stanford Richey 11/5/2024 2:57 PM  Dictation workstation:   [id number]      Physical Exam    Relevant Results               Assessment/Plan                  Assessment & Plan  Acute metabolic encephalopathy  He is being reevaluated by neurology.  They seem to think there is something atypical about his prolonged symptoms and this is not just [facility] delirium.  We are giving him Ativan as a trial of treatment for catatonia.  He is going to get another MRI.  Possibly an LP and we may try to transfer him downtown.  Pleural effusion  He is requiring oxygen again.  His chest x-ray looks worse.  I am going to give him Lasix, resume antibiotics and consult pulmonary.  Acute on chronic systolic (congestive) heart failure  See above  NSTEMI (non-ST elevated myocardial infarction) (Multi)  Cardiology has signed off  Bacterial pneumonia  See above  Idiopathic hypotension  I am increasing the midodrine.  Severe protein-calorie malnutrition (Multi)  Passed a swallow eval yesterday.  He seems to be eating well.  Type 2 diabetes mellitus without complication, without long-term current use of insulin (Multi)  I am not going to modify his insulin right now given I am not quite sure what his dietary status will be.  Hypomagnesemia  11/3 - 2g IV magnesium given today. Recheck level. Replete prn.  11/1 - resolved with supplementation.  Will continue to monitor and replete prn  -receiving PO supplementation. Supplementing PRN with IV    Acute cystitis with hematuria  Started on  abx but cultures negative  Urinary retention  11/1 - has external catheter. Will monitor I/O closely  Scant urine output by incontinence. Unable to pass trotter catheter.  Urology consulted and also unable to pass trotter catheter.   If he develops painful distention or MARISSA, then will need to go for cystoscopy with dilatation                Laurent Anderson MD

## 2024-11-06 NOTE — PROGRESS NOTES
Physical Therapy    Physical Therapy Treatment    Patient Name: Dat Mccallum  MRN: [id number]  Department: [address]  Room: 12/12-B  Today's Date: 11/6/2024  Time Calculation  Start Time: 1315  Stop Time: 1340  Time Calculation (min): 25 min         Assessment/Plan   PT Assessment  End of Session Communication: Bedside nurse  Assessment Comment: Pt continues to progress with functional mobility, however, pt with more difficulty communicating this session. Pt continued to have blank stare into distance with less engagement than previous session. Increased time req'd for tx d/t pt hearing deficits and communication via whiteboard/ASL. Pt would benefit from skilled physical therapy services to safely maximize functional mobility to modified independent levels.  End of Session Patient Position: Bed, 3 rail up, Alarm on  PT Plan  Inpatient/Swing Bed or Outpatient: Inpatient  PT Plan  Treatment/Interventions: Bed mobility, Transfer training, Gait training, Balance training, Strengthening, Endurance training, Therapeutic exercise, Therapeutic activity  PT Plan: Ongoing PT  PT Frequency: 4 times per week  PT Discharge Recommendations: Moderate intensity level of continued care  PT Recommended Transfer Status: Total assist  PT - OK to Discharge: Yes (with skilled physical therapy services at next level of care)      General Visit Information:   PT  Visit  PT Received On: 11/06/24  General  Family/Caregiver Present: Yes  Caregiver Feedback: son present, assisting with ASL with pt  Co-Treatment: OT  Co-Treatment Reason: partial co-tx d/t need for 2 skilled person assist for safe transfer tasks + optimize pt outcomes  Prior to Session Communication: Bedside nurse  Patient Position Received: Bed, 3 rail up, Alarm off, caregiver present  General Comment: Pt cleared or PT by nursing. Pt agreeable to PT services via whiteboard/ASL.    Subjective   Precautions:  Precautions  Hearing/Visual Limitations: Profoundly hard of hearing;   use writting or lip reading to communicate-son present to assist with ASL, however stating pt's responses were not making any sense. No peripheral tracking/scanning noted  Medical Precautions: Fall precautions, Oxygen therapy device and L/min (3L 02 NC)    Vital Signs Comment: RR increased to 42 sitting EOB with RN present and aware     Objective   Pain:  Pain Assessment  Pain Assessment: FLACC (Face, Legs, Activity, Cry, Consolability)  0-10 (Numeric) Pain Score: 0 - No pain  Cognition:  Cognition  Overall Cognitive Status: Impaired  Orientation Level: Unable to assess     Postural Control:  Static Sitting Balance  Static Sitting-Balance Support: Bilateral upper extremity supported, Feet supported  Static Sitting-Level of Assistance: Contact guard  Static Sitting-Comment/Number of Minutes: Pt mostly CGA for sitting at EOB, with fatigue towards end of session requiring Lex for L lateral lean.  Static Standing Balance  Static Standing-Balance Support: Bilateral upper extremity supported  Static Standing-Level of Assistance: Contact guard, Minimum assistance  Static Standing-Comment/Number of Minutes: poor static stand balance    Treatments:  Therapeutic Activity  Therapeutic Activity Performed: Yes  Therapeutic Activity 1: Sitting at EOB for ~12 mins with blank stare, difficult to gain attn.  Therapeutic Activity 2: Standing at FWW x3 trials for a total of 4 min static stand time.    Bed Mobility  Bed Mobility: Yes  Bed Mobility 1  Bed Mobility 1: Supine to sitting, Sitting to supine  Level of Assistance 1: Maximum assistance  Bed Mobility Comments 1: MaxA to clear legs over EOB d/t increased tone, assist to raise trunk with pt able to reach out for bed rail to assist.    Transfers  Transfer: Yes  Transfer 1  Transfer From 1: Sit to, Stand to  Transfer to 1: Stand, Sit  Technique 1: Sit to stand, Stand to sit  Transfer Device 1: Walker  Transfer Level of Assistance 1: Moderate assistance, +2  Trials/Comments 1: ModA  x2 to raise from EOB. Pt able to widen MARGOTH this session with assist from son. Poor eccentric control noted when lowering to EOB.    Outcome Measures:  [facility] Basic Mobility  Turning from your back to your side while in a flat bed without using bedrails: A lot  Moving from lying on your back to sitting on the side of a flat bed without using bedrails: A lot  Moving to and from bed to chair (including a wheelchair): A lot  Standing up from a chair using your arms (e.g. wheelchair or bedside chair): A lot  To walk in hospital room: Total  Climbing 3-5 steps with railing: Total  Basic Mobility - Total Score: 10    Encounter Problems       Encounter Problems (Active)       Mobility       Bed mobility including supine to sit and sit to supine with supervision. (Progressing)       Start:  10/20/24    Expected End:  11/03/24            Transfers including sit to stand and stand to sit with supervision. (Progressing)       Start:  10/20/24    Expected End:  11/03/24            Ambulate 50 feet with rolling walker and min assist. (Not Progressing)       Start:  10/20/24    Expected End:  11/03/24               Pain - Adult

## 2024-11-06 NOTE — PROGRESS NOTES
Neurology Follow Up    Referred by: No ref. provider found, PCP: Anjelica Pacheco, APRN-CNP    Subjective:  Mr. Mccallum is a 83 y.o.  male admitted 10/16/2024 LOS day 21, consulted for altered mental status.    Pt seen and examined at the bedside with Dr. Leonard.  Son at the bedside. Pt alert. On examination has left alfred neglect.     Objective   Blood pressure 100/64, pulse 83, temperature 36.2 °C (97.2 °F), temperature source Temporal, resp. rate 19, height 1.829 m (6'), weight 65.5 kg (144 lb 6.4 oz), SpO2 95%.    Neurological Exam:  Neurological Exam:  Pt is deaf with, Son at the bedside helping with ALS. Language barrier did make it difficult to examine the patient.  General: NAD, cooperative   Neuro:  Awake alert, deaf unable to assess for dysarthria, patient appears to be neglecting his left side at times. When asked to cross lines on board only making x on the ones on the right side.   Visual fields unable to assess  Slight asymmetry with subtle left facial droop  Tongue midline  Pt having trouble moving the left arm when asked the will move it at times. Seems to have decreased range of motion in the left shoulder. Pt was 4+ on bilateral upper ext. Pushes and pulls.   Rigidity in the right upper ext. Waxy flexibility in the right upper ext. Maintaining a abnormal contracted posture at rest in the left upper ext. Then intermittently relaxes the left upper ext.    Rigidity noted in the bilateral lower extremities.   Toes up going  Sensory unable to assess  Gait: unable to assess due to pt safety        Reviewed relevant results, independent review of imaging:   Encounter Date: 10/16/24   Electrocardiogram, 12-lead PRN ACS symptoms   Result Value    Ventricular Rate 201    Atrial Rate 201    NV Interval 90    QRS Duration 16    QT Interval 216    QTC Calculation(Bazett) 395    R Axis 0    T Axis 135    QRS Count 32    Q Onset 241    P Onset 196    P Offset 239    T Offset 349    QTC Fredericia 323    Narrative     Poor data quality in current ECG precludes serial comparison  Sinus tachycardia with short ME with Premature supraventricular complexes and with frequent Premature ventricular complexes  Indeterminate axis  Pulmonary disease pattern  Nonspecific ST and T wave abnormality  Abnormal ECG  When compared with ECG of 16-OCT-2024 20:40, (unconfirmed)  Previous ECG has undetermined rhythm, needs review  Questionable change in QRS duration  Confirmed by Fer Vazquez ([id number]) on 10/22/2024 12:52:29 PM            BNP   Date/Time Value Ref Range Status   10/16/2024 08:54  (H) 0 - 99 pg/mL Final         Assessment:   Dat Mccallum is a 83 y.o. male with hx of T2DM, HTN, HLD, CKD3, BPH, deafness presenting with AMS/lethargy, found with NTEMI, SIRS, HF with EF of 15-20%. Neurology consulted for worsened AMS. Pt mentation seems to be waxing and weaning. Routine EEG showed diffuse encephalopathy. No witnessed seizures, abnormal movements. No prior hx of seizures.     11/5/24- Prolonged EEG showed severe diffuse encephalopathy no epileptiform or lateralizing signs.  Toxic/ metabolic encephalopathy seems to be improving. Pt has multiple medical issues that could be contributing to the encephalopathy such as pneumonia and decreased ejection fraction. He is at high risk of ongoing delirium while inpatient.  However neuro exam markedly different from previous exam. Pt has wax flexibility, rigidity and mimicking our movements which if concerning for catatonia. No mental health history that I am aware of. Spoke to the son over the phone and will plan on meeting with him at the bedside tomorrow to get a better understand of what pts baseline mentation is.     11/6/24- On neuro exam pt progressively encephalopathic with left hemineglect with upper motor signs. 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 Another consideration is prion disease. Previous MRI negative. Recommend MRI  brain w/wo contrast then an LP.     Pt may need transferred downtown for a higher level of care will reach out the the neurology team downtown to discuss the case.      Recommendations:   - psych consulted specifically for possible catatonia  - consideration is trying a benzodiazepine trial.   - MRI brain w/wo   - LP after MRI Brain       Neurology will continue to follow        I spent 45 minutes in the professional and overall care of this patient.      Kassi Alvarado, MERLENE-CNP

## 2024-11-06 NOTE — PROGRESS NOTES
Speech-Language Pathology    Speech-Language Pathology Clinical Swallow Treatment and skilled speech treatment    Patient Name: Dat Mccallum  MRN: [id number]  : 1940 Date: 24  Start Time: 900  Stop Time: 925  Time Calculation (min): 25 min    ASSESSMENT  SLP TX Intervention Outcome: Making Progress Towards Goals  Treatment Tolerance: Patient tolerated treatment well    Impressions: Swallowing: Pt tolerating puree and thin liquids safely without s/s aspiration, pt is edentulous, oral stage is slow but adequate, he declines solid trials (suspect d/t edentulous status) minimal PO intake during todays session. Pt would benefit from ongoing skilled ST to minimize aspiration risk and ensure ongoing safety with the least restrictive diet. Speech: pt is deaf and non verbal,He follows simple commands with cues (gestures, writing) ,staff is able to communicate with pt re basic needs through gestures/writing, pt communicates basic needs with gestures and some ASL, pt is @ baseline with communication per staff and family. Will discontinue speech goals, cont with skilled dysphagia tx       PLAN  Recommended solid consistency: Pureed (IDDSI level 4)  Recommended liquid consistency: Thin (IDDSI 0)  Recommended medication administration: Crushed, in puree  Safe swallow strategies:  Upright positioning for all PO intake  - Slow rate of intake  - Small bites  - Single sips  - cue pt to swallow (visual cue) when holding food/liquids  - Supervision @ meals     Discharge recommendation: Recommend MODERATE intensity ST upon DC in order to ensure safety with least restrictive diet.  Inpatient/Swing Bed or Outpatient: Inpatient  SLP TX Plan: Continue Plan of Care  SLP Plan: Skilled SLP  SLP Frequency: 3x per week  Duration: 2 weeks  Next Treatment Priority: diet janey, jonathan, keo educ  Discussed POC: Patient, Caregiver/family, Nursing  Patient/Caregiver Agreeable: Yes    SUBJECTIVE  Prior to Session Communication:  Bedside nurse  RN cleared pt to participate in session and reported pt requires feeding  Respiratory status: Room air  Positioning: Upright in bed  Pt was alert and cooperative for session.    Pain Assessment  Pain Assessment: FLACC (Face, Legs, Activity, Cry, Consolability)  0-10 (Numeric) Pain Score: 0 - No pain  Jaramillo-Baker FACES Pain Rating: Hurts little bit  Clinical Progression: Not changed  PAINAD Score: 0    Orientation: Oriented to self  Ability to follow functional commands: Requires cueing to follow simple commands    OBJECTIVE  Therapeutic Swallow  Therapeutic Swallow Intervention : PO Trials, Compensatory Strategies  Dysphagia goals  In 2 weeks...  - Pt will consume recommended diet of puree and thin liquids without overt s/sx aspiration in 90% of trials observed               GOAL START:   10/18/2024                                          GOAL END:11/18/24              CURRENT STATUS: Progressing              PROGRESS: Pt consumed the following trials: 4 oz thin liquids via straw, and 4 oz magic cup, 2 bites puree eggs. Pt tolearated safely without s/s aspiration.-                                                                                                         Pt will demonstrate follow-through of trained compensatory strategies during a meal/snack with 90% acc with max cues.              GOAL START:   10/18/2024                                          GOAL END:11/1/24              CURRENT STATUS: With asssist from SLP, Pt fed self with assisst from SLP. Demonstrated single bites and sips with min visual cues with 90% acc                                                                                                               PROGRESS: Good follow-through noted wit assisst; progressing with skilled instruction.      Language Expression: pt non verbal  Language Expression Interventions: ASL, writing (SLP)  Language Expression Comments: Pt responded at times with ASL                                                                Language Comprehension:  Language Comprehension Interventions: Single Step Commands, Yes/No Questions     Speech Goals   (Start date 10/18/24. Anticipated time frame for goal attainment: 2 weeks) Goals discontiued d/t pt @ baseline    - Pt will complete low level expressive language tasks with 50% acc given max cues.  CURRENT STATUS: SLP engaged pt in informal tasks.  Pt expressed immediate needs with use of ASL with 50% acc                                                                                                                                                           PROGRESS: No furhter tx indicated @ this timeImproving communication of basics via multimodal expressions , pt @ baseline     -Pt will complete low level receptive language tasks with 50% acc given max cues.              CURRENT STATUS: Pt able to follow commands with 90% acc, given visual stimuli (gestures and written). Pt at baseline            PROGRESS: no further tx indicated @ this time             - Pt will use multimodal communication to communicate basic wants/needs in 50% of opportunities given max assist.              CURRENT STATUS: 90% of opportunities in session pt used gestures and signs to communicate basic needs               PROGRESS:  no furhter tx indicated @ this time,pt @ baseline             - Pt will imitate consonants/vowels/single words with 50 acc given max cues.              CURRENT STATUS: pt makes no attempt to verbalize, suspect this is his baseline              PROGRESS: pt remains non verbal, ,will discontinue goal  - Pt will demonstrate orientation x4 given max cues.  CURRENT STATUS: Pt is oriented to self only, suspect this is his baseline              PROGRESS: no furhter tx indicated @ this time  Treatment/Education:  Results and recommendations were relayed to: Patient and Bedside nurse  Education provided: Yes   Learner: Patient   Barriers to learning: Cognitive  limitations barrier, Communication limitations barrier, and Hearing impairment barrier   Method of teaching: Written and Demonstration   Topic: recommended diet modifications and recommended safe swallow strategies   Outcome of teaching: Pt demonstrated partial understanding

## 2024-11-06 NOTE — NURSING NOTE
Pt found agitated and pulling off all wires and attempting to pull IV.  Pt removed all clothing and Ox Nasal Cannula.  Unable to reorient pt.  Contacted provider and applied restraints.

## 2024-11-06 NOTE — PROGRESS NOTES
Occupational Therapy    OT Treatment    Patient Name: Dat Mccallum  MRN: [id number]  Department: [address]  Room: 12/12-B  Today's Date: 11/6/2024  Time Calculation  Start Time: 1301  Stop Time: 1339  Time Calculation (min): 38 min        Assessment:  OT Assessment: Tolerated session fairly, demonstrating minor improvements towards POC with increased cues required this date d/t poor overall arousal. Pt would benefit from continued skilled OT services to improve strength, balance, and functional tolerance to increase independence with ADL tasks  End of Session Communication: Bedside nurse  End of Session Patient Position: Bed, 3 rail up, Alarm on  OT Assessment Results: Decreased ADL status, Decreased upper extremity strength, Decreased safe judgment during ADL, Decreased cognition, Decreased fine motor control, Decreased functional mobility, Decreased gross motor control  Plan:  Treatment Interventions: ADL retraining, Functional transfer training, UE strengthening/ROM, Endurance training, Patient/family training, Cognitive reorientation  OT Frequency: 4 times per week  OT Discharge Recommendations: Moderate intensity level of continued care  OT Recommended Transfer Status: Assist of 2  OT - OK to Discharge: Yes  Treatment Interventions: ADL retraining, Functional transfer training, UE strengthening/ROM, Endurance training, Patient/family training, Cognitive reorientation    Subjective   Previous Visit Info:  OT Last Visit  OT Received On: 11/06/24  General:  General  Family/Caregiver Present: Yes  Caregiver Feedback: son present, assisting with ASL with pt  Co-Treatment: PT  Co-Treatment Reason: partial co-tx d/t need for 2 skilled person assist for safe transfer tasks + optimize pt outcomes  Prior to Session Communication: Bedside nurse  Patient Position Received: Bed, 3 rail up, Alarm off, caregiver present  General Comment: Cleared for therapy per RN. Pt supported seated in bed upon arrival with distant stare and  SABINO postured in elbow flexion with son expressing concern with RN retrieved and present to assess. With increased cues + stimulation, pt intermittently arousable and responsive to cues, and agreeable to sit EOB  Precautions:  Hearing/Visual Limitations: Profoundly hard of hearing;  use writting or lip reading to communicate-son present to assist with ASL, however stating pt's responses were not making any sense. No peripheral tracking/scanning noted  Medical Precautions: Fall precautions, Oxygen therapy device and L/min (3L O2 nc)    Vital Signs (Past 2hrs)        Date/Time Vitals Session Patient Position Pulse Resp SpO2 BP MAP (mmHg)    11/06/24 1301 --  --  --  --  93 %  --  --                   Vital Signs Comment: RR increased to 42 sitting EOB with RN present and aware     Pain:  Pain Assessment  Pain Assessment: 0-10  0-10 (Numeric) Pain Score: 0 - No pain    Objective    Cognition:  Cognition  Overall Cognitive Status: Impaired  Orientation Level: Unable to assess  Cognition Comments: decreased arousal this date in supported seated with distant gaze + decreased responsiveness to stimuli, once seated improved then returning once seated in bed again  Insight: Severe  Processing Speed: Delayed  Coordination:  Movements are Fluid and Coordinated: No  Upper Body Coordination: decreased rate, however significant  strength in BUE  Activities of Daily Living:    Grooming: cleansed face of leftover food   Toileting  Toileting Level of Assistance: Dependent  Where Assessed: Bed level, Other (Comment)  Toileting Comments: demonstrating bowel incontinence upon stand with dependent assist required for pericare hygiene  Functional Standing Tolerance:  Time: ~3-4 min  Functional Standing Tolerance Comments: tolerated stand trials x3  Bed Mobility/Transfers: Bed Mobility  Bed Mobility: Yes  Bed Mobility 1  Bed Mobility 1: Supine to sitting  Level of Assistance 1: Maximum assistance, +2, Moderate tactile cues  Bed  Mobility Comments 1: increased time to initiate supine>sit with LLE resistive + increased tone, assist with BLE advancement and trunk elevation with cues for proper UE placement, demonstrating improved arousal once seated EOB  Bed Mobility 2  Bed Mobility  2: Sitting to supine  Level of Assistance 2: Maximum assistance, +2  Bed Mobility Comments 2: assist to lift BLE on bed and for trunk control with pt placed in supported seated position with stiff posture, son signing to relax UB on pillow, eventually doing so    Transfers  Transfer: Yes  Transfer 1  Technique 1: Sit to stand, Stand to sit  Transfer Device 1: Walker  Transfer Level of Assistance 1: Moderate assistance, +2, Minimal tactile cues  Trials/Comments 1: sit>stand x3 trials with assist for trunk elevation and forward weightshifitng with cues for proper hand placement, tolerating stands ~3-4 min total with RR elevated and RN aware. Decreased eccentric lowering to EOB  Standing Balance:  Static Standing Balance  Static Standing-Level of Assistance: Minimum assistance, Contact guard  Static Standing-Comment/Number of Minutes: CGA-min A standing EOB x3 trials  Therapy/Activity:    Therapeutic Activity  Therapeutic Activity Performed: Yes  Therapeutic Activity 1: tolerated sitting EOB ~12 min, intermittently dozing off with blank stare, alert with increased cues    Outcome Measures:[facility] Daily Activity  Putting on and taking off regular lower body clothing: Total  Bathing (including washing, rinsing, drying): A lot  Putting on and taking off regular upper body clothing: A lot  Toileting, which includes using toilet, bedpan or urinal: Total  Taking care of personal grooming such as brushing teeth: A lot  Eating Meals: A lot  Daily Activity - Total Score: 10    Education Documentation  ADL Training, taught by HARMEET Mascorro at 11/6/2024  2:26 PM.  Learner: Family, Patient  Readiness: Acceptance  Method: Explanation  Response: No Evidence of  Learning, Needs Reinforcement    Education Comments  No comments found.      Problem: ADL  Goal: Goal 1  Description: Patient will demonstrate improved ADL skills:  Bathing with Min assist with adaptive equipment prn    Grooming with SBA assist       Feeding with supervision assist      UE Dressing with SBA assist           LE Dressing with Min assist with adaptive equipment prn     Toileting with Min assist with adaptive equipment prn      Outcome: Progressing

## 2024-11-07 ENCOUNTER — APPOINTMENT (OUTPATIENT)
Dept: RADIOLOGY | Facility: HOSPITAL | Age: 84
End: 2024-11-07
Payer: MEDICARE

## 2024-11-07 LAB
ALBUMIN SERPL BCP-MCNC: 2.1 G/DL (ref 3.4–5)
ALP SERPL-CCNC: 53 U/L (ref 33–136)
ALT SERPL W P-5'-P-CCNC: 30 U/L (ref 10–52)
ANION GAP SERPL CALCULATED.3IONS-SCNC: 11 MMOL/L (ref 10–20)
AST SERPL W P-5'-P-CCNC: 40 U/L (ref 9–39)
BILIRUB SERPL-MCNC: 0.7 MG/DL (ref 0–1.2)
BUN SERPL-MCNC: 28 MG/DL (ref 6–23)
CALCIUM SERPL-MCNC: 7.7 MG/DL (ref 8.6–10.3)
CHLORIDE SERPL-SCNC: 98 MMOL/L (ref 98–107)
CO2 SERPL-SCNC: 26 MMOL/L (ref 21–32)
CREAT SERPL-MCNC: 1.16 MG/DL (ref 0.5–1.3)
EGFRCR SERPLBLD CKD-EPI 2021: 63 ML/MIN/1.73M*2
ERYTHROCYTE [DISTWIDTH] IN BLOOD BY AUTOMATED COUNT: 14.2 % (ref 11.5–14.5)
GLUCOSE BLD MANUAL STRIP-MCNC: 78 MG/DL (ref 74–99)
GLUCOSE BLD MANUAL STRIP-MCNC: 83 MG/DL (ref 74–99)
GLUCOSE BLD MANUAL STRIP-MCNC: 91 MG/DL (ref 74–99)
GLUCOSE SERPL-MCNC: 104 MG/DL (ref 74–99)
HCT VFR BLD AUTO: 22.7 % (ref 41–52)
HGB BLD-MCNC: 7.6 G/DL (ref 13.5–17.5)
MCH RBC QN AUTO: 29.2 PG (ref 26–34)
MCHC RBC AUTO-ENTMCNC: 33.5 G/DL (ref 32–36)
MCV RBC AUTO: 87 FL (ref 80–100)
NRBC BLD-RTO: 0 /100 WBCS (ref 0–0)
PHOSPHATE SERPL-MCNC: 4.3 MG/DL (ref 2.5–4.9)
PLATELET # BLD AUTO: 378 X10*3/UL (ref 150–450)
POTASSIUM SERPL-SCNC: 3.9 MMOL/L (ref 3.5–5.3)
PROT SERPL-MCNC: 6.3 G/DL (ref 6.4–8.2)
RBC # BLD AUTO: 2.6 X10*6/UL (ref 4.5–5.9)
SARS-COV-2 RNA RESP QL NAA+PROBE: NOT DETECTED
SODIUM SERPL-SCNC: 131 MMOL/L (ref 136–145)
TSH SERPL-ACNC: 1.7 MIU/L (ref 0.44–3.98)
WBC # BLD AUTO: 14.6 X10*3/UL (ref 4.4–11.3)

## 2024-11-07 PROCEDURE — 71045 X-RAY EXAM CHEST 1 VIEW: CPT

## 2024-11-07 PROCEDURE — 2550000001 HC RX 255 CONTRASTS: Performed by: INTERNAL MEDICINE

## 2024-11-07 PROCEDURE — 36415 COLL VENOUS BLD VENIPUNCTURE: CPT | Performed by: INTERNAL MEDICINE

## 2024-11-07 PROCEDURE — 2500000002 HC RX 250 W HCPCS SELF ADMINISTERED DRUGS (ALT 637 FOR MEDICARE OP, ALT 636 FOR OP/ED): Performed by: INTERNAL MEDICINE

## 2024-11-07 PROCEDURE — 82947 ASSAY GLUCOSE BLOOD QUANT: CPT

## 2024-11-07 PROCEDURE — 2500000004 HC RX 250 GENERAL PHARMACY W/ HCPCS (ALT 636 FOR OP/ED): Performed by: INTERNAL MEDICINE

## 2024-11-07 PROCEDURE — 2500000001 HC RX 250 WO HCPCS SELF ADMINISTERED DRUGS (ALT 637 FOR MEDICARE OP): Performed by: HOSPITALIST

## 2024-11-07 PROCEDURE — 99232 SBSQ HOSP IP/OBS MODERATE 35: CPT

## 2024-11-07 PROCEDURE — 2500000001 HC RX 250 WO HCPCS SELF ADMINISTERED DRUGS (ALT 637 FOR MEDICARE OP): Performed by: NURSE PRACTITIONER

## 2024-11-07 PROCEDURE — 87040 BLOOD CULTURE FOR BACTERIA: CPT | Mod: WESLAB | Performed by: INTERNAL MEDICINE

## 2024-11-07 PROCEDURE — 70553 MRI BRAIN STEM W/O & W/DYE: CPT | Performed by: RADIOLOGY

## 2024-11-07 PROCEDURE — A9575 INJ GADOTERATE MEGLUMI 0.1ML: HCPCS | Performed by: INTERNAL MEDICINE

## 2024-11-07 PROCEDURE — 2500000004 HC RX 250 GENERAL PHARMACY W/ HCPCS (ALT 636 FOR OP/ED): Performed by: HOSPITALIST

## 2024-11-07 PROCEDURE — 87081 CULTURE SCREEN ONLY: CPT | Mod: WESLAB | Performed by: INTERNAL MEDICINE

## 2024-11-07 PROCEDURE — 94640 AIRWAY INHALATION TREATMENT: CPT

## 2024-11-07 PROCEDURE — 87635 SARS-COV-2 COVID-19 AMP PRB: CPT | Performed by: INTERNAL MEDICINE

## 2024-11-07 PROCEDURE — 2500000001 HC RX 250 WO HCPCS SELF ADMINISTERED DRUGS (ALT 637 FOR MEDICARE OP): Performed by: INTERNAL MEDICINE

## 2024-11-07 PROCEDURE — 85027 COMPLETE CBC AUTOMATED: CPT | Performed by: INTERNAL MEDICINE

## 2024-11-07 PROCEDURE — 99233 SBSQ HOSP IP/OBS HIGH 50: CPT | Performed by: INTERNAL MEDICINE

## 2024-11-07 PROCEDURE — 80053 COMPREHEN METABOLIC PANEL: CPT | Performed by: INTERNAL MEDICINE

## 2024-11-07 PROCEDURE — 70553 MRI BRAIN STEM W/O & W/DYE: CPT

## 2024-11-07 PROCEDURE — 99221 1ST HOSP IP/OBS SF/LOW 40: CPT | Performed by: INTERNAL MEDICINE

## 2024-11-07 PROCEDURE — 99231 SBSQ HOSP IP/OBS SF/LOW 25: CPT

## 2024-11-07 PROCEDURE — 2060000001 HC INTERMEDIATE ICU ROOM DAILY

## 2024-11-07 PROCEDURE — 9420000001 HC RT PATIENT EDUCATION 5 MIN

## 2024-11-07 PROCEDURE — 2500000005 HC RX 250 GENERAL PHARMACY W/O HCPCS: Performed by: INTERNAL MEDICINE

## 2024-11-07 PROCEDURE — 84100 ASSAY OF PHOSPHORUS: CPT | Performed by: HOSPITALIST

## 2024-11-07 PROCEDURE — 71045 X-RAY EXAM CHEST 1 VIEW: CPT | Performed by: RADIOLOGY

## 2024-11-07 PROCEDURE — 84443 ASSAY THYROID STIM HORMONE: CPT | Performed by: INTERNAL MEDICINE

## 2024-11-07 RX ORDER — GADOTERATE MEGLUMINE 376.9 MG/ML
13 INJECTION INTRAVENOUS
Status: COMPLETED | OUTPATIENT
Start: 2024-11-07 | End: 2024-11-07

## 2024-11-07 RX ADMIN — GADOTERATE MEGLUMINE 13 ML: 376.9 INJECTION INTRAVENOUS at 10:07

## 2024-11-07 RX ADMIN — Medication 2 L/MIN: at 21:43

## 2024-11-07 RX ADMIN — PANTOPRAZOLE SODIUM 40 MG: 40 INJECTION, POWDER, FOR SOLUTION INTRAVENOUS at 08:49

## 2024-11-07 RX ADMIN — CARVEDILOL 3.12 MG: 3.12 TABLET, FILM COATED ORAL at 08:51

## 2024-11-07 RX ADMIN — IPRATROPIUM BROMIDE AND ALBUTEROL SULFATE 3 ML: .5; 2.5 SOLUTION RESPIRATORY (INHALATION) at 08:05

## 2024-11-07 RX ADMIN — PIPERACILLIN SODIUM AND TAZOBACTAM SODIUM 3.38 G: 3; .375 INJECTION, SOLUTION INTRAVENOUS at 10:30

## 2024-11-07 RX ADMIN — MIDODRINE HYDROCHLORIDE 10 MG: 10 TABLET ORAL at 16:36

## 2024-11-07 RX ADMIN — OFLOXACIN 1 DROP: 3 SOLUTION OPHTHALMIC at 20:20

## 2024-11-07 RX ADMIN — BUDESONIDE 0.5 MG: 0.5 SUSPENSION RESPIRATORY (INHALATION) at 08:04

## 2024-11-07 RX ADMIN — OFLOXACIN 1 DROP: 3 SOLUTION OPHTHALMIC at 06:19

## 2024-11-07 RX ADMIN — FUROSEMIDE 20 MG: 10 INJECTION, SOLUTION INTRAMUSCULAR; INTRAVENOUS at 06:19

## 2024-11-07 RX ADMIN — OLANZAPINE 2.5 MG: 10 INJECTION, POWDER, FOR SOLUTION INTRAMUSCULAR at 08:43

## 2024-11-07 RX ADMIN — OFLOXACIN 1 DROP: 3 SOLUTION OPHTHALMIC at 14:22

## 2024-11-07 RX ADMIN — PIPERACILLIN SODIUM AND TAZOBACTAM SODIUM 3.38 G: 3; .375 INJECTION, SOLUTION INTRAVENOUS at 16:36

## 2024-11-07 RX ADMIN — ENOXAPARIN SODIUM 40 MG: 40 INJECTION SUBCUTANEOUS at 08:50

## 2024-11-07 RX ADMIN — PIPERACILLIN SODIUM AND TAZOBACTAM SODIUM 3.38 G: 3; .375 INJECTION, SOLUTION INTRAVENOUS at 22:50

## 2024-11-07 RX ADMIN — MIDODRINE HYDROCHLORIDE 10 MG: 10 TABLET ORAL at 14:22

## 2024-11-07 RX ADMIN — BUDESONIDE 0.5 MG: 0.5 SUSPENSION RESPIRATORY (INHALATION) at 21:36

## 2024-11-07 RX ADMIN — ASPIRIN 81 MG CHEWABLE TABLET 81 MG: 81 TABLET CHEWABLE at 08:50

## 2024-11-07 RX ADMIN — OFLOXACIN 1 DROP: 3 SOLUTION OPHTHALMIC at 16:43

## 2024-11-07 RX ADMIN — PIPERACILLIN SODIUM AND TAZOBACTAM SODIUM 3.38 G: 3; .375 INJECTION, SOLUTION INTRAVENOUS at 06:18

## 2024-11-07 RX ADMIN — MIDODRINE HYDROCHLORIDE 10 MG: 10 TABLET ORAL at 08:50

## 2024-11-07 ASSESSMENT — COGNITIVE AND FUNCTIONAL STATUS - GENERAL
PERSONAL GROOMING: TOTAL
DRESSING REGULAR LOWER BODY CLOTHING: TOTAL
WALKING IN HOSPITAL ROOM: TOTAL
DRESSING REGULAR UPPER BODY CLOTHING: TOTAL
MOVING TO AND FROM BED TO CHAIR: TOTAL
MOVING TO AND FROM BED TO CHAIR: TOTAL
EATING MEALS: TOTAL
WALKING IN HOSPITAL ROOM: TOTAL
MOVING FROM LYING ON BACK TO SITTING ON SIDE OF FLAT BED WITH BEDRAILS: TOTAL
TOILETING: TOTAL
MOVING FROM LYING ON BACK TO SITTING ON SIDE OF FLAT BED WITH BEDRAILS: TOTAL
EATING MEALS: TOTAL
MOBILITY SCORE: 6
HELP NEEDED FOR BATHING: TOTAL
DAILY ACTIVITIY SCORE: 6
STANDING UP FROM CHAIR USING ARMS: TOTAL
TURNING FROM BACK TO SIDE WHILE IN FLAT BAD: TOTAL
PERSONAL GROOMING: TOTAL
HELP NEEDED FOR BATHING: TOTAL
MOBILITY SCORE: 6
DRESSING REGULAR UPPER BODY CLOTHING: TOTAL
CLIMB 3 TO 5 STEPS WITH RAILING: TOTAL
DAILY ACTIVITIY SCORE: 6
TOILETING: TOTAL
STANDING UP FROM CHAIR USING ARMS: TOTAL
CLIMB 3 TO 5 STEPS WITH RAILING: TOTAL
TURNING FROM BACK TO SIDE WHILE IN FLAT BAD: TOTAL
DRESSING REGULAR LOWER BODY CLOTHING: TOTAL

## 2024-11-07 ASSESSMENT — PAIN SCALES - GENERAL
PAINLEVEL_OUTOF10: 0 - NO PAIN
PAINLEVEL_OUTOF10: 0 - NO PAIN

## 2024-11-07 ASSESSMENT — PAIN - FUNCTIONAL ASSESSMENT: PAIN_FUNCTIONAL_ASSESSMENT: FLACC (FACE, LEGS, ACTIVITY, CRY, CONSOLABILITY)

## 2024-11-07 NOTE — PROGRESS NOTES
Speech-Language Pathology                 Therapy Communication Note    Patient Name: Dat Mccallum  MRN: [id number]  Department: [facility]  Room: 06/06-A  Today's Date: 11/7/2024     Discipline: Speech Language Pathology    Missed Visit Reason:  Pt currently off the floor for MRI, will attempt later when pt available    Missed Time: Attempt    Comment:

## 2024-11-07 NOTE — PROGRESS NOTES
Spiritual Care Visit    Clinical Encounter Type  Visited With: Patient  Routine Visit: Follow-up  Continue Visiting: Yes         Values/Beliefs  Spiritual Requests During Hospitalization: Dat received a blessing from me today.     Demetrio Ray

## 2024-11-07 NOTE — DOCUMENTATION CLARIFICATION NOTE
"    PATIENT:               DAT MCCALLUM  ACCT #:                  [id number]  MRN:                       [id number]  :                       1940  ADMIT DATE:       10/16/2024 8:35 PM  DISCH DATE:  RESPONDING PROVIDER #:        [id number]          PROVIDER RESPONSE TEXT:    Sepsis 2/2 UTI and bacterial PNA with neurological organ dysfunction of encephalopathy    CDI QUERY TEXT:    Clarification        Instruction:  Based on your assessment of the patient and the clinical information, please provide the requested documentation by clicking on the appropriate radio button and enter any additional information if prompted.    Question: Is there a diagnosis indicative of a patient meeting SIRS criteria and with organ dysfunction in the setting of PNA/UTI infection    When answering this query, please exercise your independent professional judgment. The fact that a question is being asked, does not imply that any particular answer is desired or expected.    The patient's clinical indicators include:  Clinical Information: ED:  10/16  Patient is an 83-year-old male who presents ED for altered mental status.    10/18:  Med CC ([facility]):  \"Acute encephalopathy\"    10/30 Urology Consutl:  Dat Mccallum is a 83 y.o. male presenting with altered mental status.  He was found to have retention in the 300 mL range yesterday.  Staff was unable to place a Drummond catheter.  His creatinine today is 1.17.  He is unable to provide a history.  In the proximal urethra there was obstruction, consistent with a urethral stricture    Clinical Indicators:  HR:  110/103/110/107/99--VS on 10/16  RR:  24/22/24/25-10/16  WBC:  6.6/.6.2/6.2/6.7/4.3/3.5/4.7/5.0  (10/23)    BP on 10/18:  75/55;85/60;77/59;69/49    10/27 93/101/90/98/90/93    10/29 CXR:  IMPRESSION:  IMPRESSION: There is a new left lower lobe infiltrate consistent with pneumonia.  UA on 10/16  40(2+) ketones  1.0(3+) Blood  50(1+) protein  Neg Nitrite, Neg LE    UA on " 10/29  !+Bacteria  Positive Nitrite  Trace LE    Treatment:  Septic ALEX;  cc Bolus X2(10/16 and 10/18);  bolus(10/16); LR@75; Rocephin IV(10/29-)Zosyn IV(10/30-); ICU admission    Risk Factors: Age; DM2;  Options provided:  -- Sepsis 2/2 UTI and bacterial PNA with neurological organ dysfunction of encephalopathy  -- Sepsis with other organ dysfunction, Please specify sepsis associated organ dysfunction below  -- Patient treated for UTI and bacterial PNA without Sepsis  -- Other - I will add my own diagnosis  -- Refer to Clinical Documentation Reviewer    Query created by: Lauren Macedo on 11/4/2024 3:51 PM      Electronically signed by:  VALERIA TREJO MD 11/7/2024 9:46 AM

## 2024-11-07 NOTE — CONSULTS
GENERAL NEUROLOGY INITIAL CONSULT    History Of Present Illness  Dat Mccallum is a 83 y.o. male presenting as a transfer patient, on his *** day of admission for a now-stabilized NSTEMI. Neurology is consulted for further workup of persistent, worsening AMS with possible L-sided focal neurologic deficits. He is deaf (communicates with ASL) and has a medical hx significant for chronic HFrEF (nuclear stress test 10/21 with LVEF 44%), NID-T2DM (A1c 5.3 on 10/16), HTN, HLD well-controlled with statin, CKD (eGFR 63 on 11/7), and BPH. Currently DNR-CCA, DNI status per son/POA Jorge A Mccallum.     Cardiac HPI  He was admitted to the [facility] ICU on 10/16 after being found confused by family, found to have NSTEMI with significantly elevated troponins and TTE showing LVEF 15-20%. His NSTEMI was medically managed with heparin, no actionable stenosis was found on catheterization and 10/21 NM stress test showed improved LVEF to 44%. Cardiology signed off on 10/25 recommending continuation of ASA, coreg, and statin with SNF placement. His BP remained soft throughout admission, intermittently requiring midodrine (including today, with most recent BP 98/65. Notably, deemed unable to tolerate GDMT, He was stepped down from the ICU on 10/21.     Infectious HPI  Found to have UTI with urinary retention on 10/30, started on Zosyn, Coude catheter unable to be placed due to obstruction. External catheter placed and patient now monitored for painful distention/MARISSA. CXR 11/1 concerning for worsening suspected aspiration PNA, made NPO and Corpak placed. No fever or leukocytosis during stay, until 11/6 when WBC jumped to 13.1 (14.6 on 11/7). Zosyn course finished on 11/3 but restarted 11/6 due to repeat CXR showing non-resolution of PNA.      Neurological HPI  Since admission 10/16, Dat has demonstrated waxing and waning mentation, with true mental status difficult to ascertain due to language barrier (deaf, uses ASL). Repeatedly,  "the team at [facility] noted he seemed to be doing better (participating in PT, engaging appropriately in examination), only to repeatedly become confused/agitated and at times appearing catatonic (waxy flexibility, rigidity, and echopraxia noted 11/5, failed trial of Ativan). He pulled two Corpaks and is in soft restraints, being fed a pureed diet. This waxing/waning picture fits with the reports of his son, who has been communicating with him through ALS. At times he is lucid and makes sense, but often and more recently his signing has been nonsensical and at times repetitive (repeating \"April\" 11/4). MRI brain wo IVCON 10/19 and w/wo IVCON 11/6 revealed only moderate-marked volume loss, nonspecific white matter disease, and punctate foci within the subinsular regions and basal ganglia suggesting prominent Virchow-Nico spaces vs remote lacunar infarcts. 20min EEG showed moderate diffuse encephalopathy on 10/17, 1hr showed severe diffuse encephalopathy on 11/1.      The leading rationale for this patient's AMS has been TME/delirium, however evaluation by the IM and neurology teams on 11/6 revealed concerns for new findings including possible L hemineglect and bilateral Babinski reflex. The neurology team recommended LP for possible encephalitis, and mentioned prion disease as well.     Past Medical History  Past Medical History:   Diagnosis Date    Acute frontal sinusitis, unspecified 01/06/2015    Acute frontal sinusitis    Encounter for screening for malignant neoplasm of colon 01/22/2020    Colon cancer screening    Encounter for screening for malignant neoplasm of prostate 08/14/2017    Special screening, prostate cancer    Other conditions influencing health status     Colonoscopy planned    Other conditions influencing health status 01/19/2018    History of cough    Other skin changes 07/21/2021    Blisters of multiple sites    Other specified health status     Known health problems: none    Personal " history of other diseases of the nervous system and sense organs     History of cataract    Personal history of other diseases of urinary system 08/10/2021    History of hematuria    Personal history of other specified conditions 06/29/2018    History of dizziness    Personal history of other specified conditions 09/25/2017    History of abdominal pain    Personal history of other specified conditions 08/14/2017    History of diarrhea    Personal history of other specified conditions 10/11/2017    History of urinary incontinence    Personal history of pneumonia (recurrent) 01/19/2018    History of community acquired pneumonia    Rash and other nonspecific skin eruption 12/17/2020    Rash    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    Ulcerative blepharitis of right upper eyelid    Unspecified injury of left lower leg, initial encounter 12/03/2020    Left knee injury, initial encounter     Surgical History  Past Surgical History:   Procedure Laterality Date    CARDIAC CATHETERIZATION N/A 10/23/2024    Procedure: Left Heart Cath;  Surgeon: Haja Mckeon DO;  Location: [facility] Cardiac Cath Lab;  Service: Cardiovascular;  Laterality: N/A;    OTHER SURGICAL HISTORY  12/02/2013    Proximal Subtotal Pancreatectomy (Whipple Procedure)    OTHER SURGICAL HISTORY  11/26/2018    Whipple procedure    OTHER SURGICAL HISTORY  11/26/2018    Knee surgery     Social History  Social History     Tobacco Use    Smoking status: Never     Passive exposure: Never    Smokeless tobacco: Never   Vaping Use    Vaping status: Never Used   Substance Use Topics    Alcohol use: Not Currently    Drug use: Never     Allergies  Patient has no known allergies.  Medications Prior to Admission   Medication Sig Dispense Refill Last Dose/Taking    glimepiride (Amaryl) 4 mg tablet Take 1 tablet (4 mg) by mouth 2 times a day. 180 tablet 3 Unknown    lovastatin (Mevacor) 20 mg tablet Take 1 tablet (20 mg) by mouth once daily at bedtime. 90 tablet 3  Unknown    metFORMIN (Glucophage) 500 mg tablet Take 2 tablets by mouth twice daily 360 tablet 0 Unknown    omeprazole (PriLOSEC) 20 mg DR capsule Take by mouth.   Unknown    OneTouch Ultra Test strip TEST 3 TIMES DAILY   Unknown    tamsulosin (Flomax) 0.4 mg 24 hr capsule Take 2 capsules (0.8 mg) by mouth once daily at bedtime. 180 capsule 3 Unknown       Review of Systems  Neurological Exam  Physical Exam  Last Recorded Vitals  Blood pressure 98/65, pulse 85, temperature 36.3 °C (97.3 °F), temperature source Temporal, resp. rate 18, height 1.829 m (6'), weight 65.2 kg (143 lb 11.8 oz), SpO2 96%.    Results  Recent/Relevant Labs  Results for orders placed or performed during the hospital encounter of 10/16/24 (from the past 24 hours)   POCT GLUCOSE   Result Value Ref Range    POCT Glucose 83 74 - 99 mg/dL   Renal Function Panel   Result Value Ref Range    Glucose 69 (L) 74 - 99 mg/dL    Sodium 132 (L) 136 - 145 mmol/L    Potassium 3.2 (L) 3.5 - 5.3 mmol/L    Chloride 100 98 - 107 mmol/L    Bicarbonate 24 21 - 32 mmol/L    Anion Gap 11 10 - 20 mmol/L    Urea Nitrogen 28 (H) 6 - 23 mg/dL    Creatinine 1.22 0.50 - 1.30 mg/dL    eGFR 60 (L) >60 mL/min/1.73m*2    Calcium 7.6 (L) 8.6 - 10.3 mg/dL    Phosphorus 3.8 2.5 - 4.9 mg/dL    Albumin 2.1 (L) 3.4 - 5.0 g/dL   Lavender Top   Result Value Ref Range    Extra Tube Hold for add-ons.    POCT GLUCOSE   Result Value Ref Range    POCT Glucose 71 (L) 74 - 99 mg/dL   POCT GLUCOSE   Result Value Ref Range    POCT Glucose 79 74 - 99 mg/dL   POCT GLUCOSE   Result Value Ref Range    POCT Glucose 104 (H) 74 - 99 mg/dL     11/7  Sars-Cov-2 PCR negative  Phos wnl  TSH wnl  Blood cultures NGTD    11/3  Lactate wnl    10/17  Ammonia 19    Recent/Relevant Imaging  MRI Brain w/wo IVCON 11/7/24:  There is no MRI evidence of acute infarction on the  diffusion-weighted images.  There is again evidence of moderate-to-marked brain parenchymal  volume loss.  Nonspecific white matter changes  are again noted within cerebral  hemispheres bilaterally which while nonspecific, given the patient's  age, likely represent sequelae of more remote small-vessel ischemic  change. Additional punctate foci of bright signal on the T2 images  are again noted within the subinsular regions and basal ganglia  bilaterally suggesting incidental mildly prominent perivascular  spaces and/or small scattered more remote lacunar infarctions.  No abnormal intracranial mass lesion or abnormal intracranial  enhancement is identified on the postcontrast images.    CXR 11/7/24:  Normal-sized heart with mild pulmonary vascular congestion and  persistent areas of infiltrate in each lower lung zone with small  left pleural effusion.    CTH wo IVCON 10/29/24:  No acute intracranial abnormality.     Nuclear Medicine Stress Test 10/22/24:  1.  No evidence of inducible ischemia or prior infarction.  2. Left ventricle is normal in size.  3. Mild global hypokinesis of the LV wall motion with a post-stress  LV EF estimated at 44%.    MRI Brain wo IVCON 10/19/24:  No MR evidence of acute intracranial infarct, hemorrhage, mass, or  mass effect.    Jossy Coma Scale  Best Eye Response: Spontaneous  Best Verbal Response: None  Best Motor Response: Follows commands  Jossy Coma Scale Score: 11    EEG  20min study 10/17/24:  This routine awake and drowsy EEG is indicative of a moderate diffuse encephalopathy. No epileptiform activity or lateralizing signs seen.     1hr study 11/1/24:  This 1 hour EEG is indicative of a severe diffuse encephalopathy. No epileptiform activity or lateralizing signs seen.      Assessment/Plan   Assessment & Plan  NSTEMI (non-ST elevated myocardial infarction) (Multi)    Type 2 diabetes mellitus without complication, without long-term current use of insulin (Multi)    BPH (benign prostatic hyperplasia)    Acute metabolic encephalopathy    Chronic systolic heart failure    Idiopathic hypotension    Severe  protein-calorie malnutrition (Multi)    Anemia of chronic disease    Acute on chronic systolic (congestive) heart failure    Hypomagnesemia    Acute cystitis with hematuria    Urinary retention    Bacterial pneumonia    Pleural effusion      ***

## 2024-11-07 NOTE — CARE PLAN
The patient's goals for the shift include  falls free    The clinical goals for the shift include no falls this shift.

## 2024-11-07 NOTE — PROGRESS NOTES
Occupational Therapy                 Therapy Communication Note    Patient Name: Dat Mccallum  MRN: [id number]  Department: [address]  Room: 06/06-A  Today's Date: 11/7/2024     Discipline: Occupational Therapy    Missed Visit Reason: Missed Visit Reason: Cancel    Missed Time: Cancel    Comment: treatment attempted with pt found in bed with whiteboard + expo in hand with scribble noted on board and pt demonstrating distant gaze. Attempt to orient with increased arousal noted and pt continuing to attempt to write, however non-legible. Pt once again demonstrating distance gaze with no tracking and scanning present. Pt not appropriate for OT at this time-will hold with RN aware

## 2024-11-07 NOTE — CARE PLAN
Problem: Skin  Goal: Decreased wound size/increased tissue granulation at next dressing change  Outcome: Progressing  Flowsheets (Taken 11/7/2024 0441)  Decreased wound size/increased tissue granulation at next dressing change:   Promote sleep for wound healing   Protective dressings over bony prominences   Utilize specialty bed per algorithm  Goal: Participates in plan/prevention/treatment measures  Outcome: Progressing  Flowsheets (Taken 11/7/2024 0441)  Participates in plan/prevention/treatment measures:   Discuss with provider PT/OT consult   Elevate heels   Increase activity/out of bed for meals  Goal: Prevent/manage excess moisture  Outcome: Progressing  Flowsheets (Taken 11/7/2024 0441)  Prevent/manage excess moisture:   Cleanse incontinence/protect with barrier cream   Follow provider orders for dressing changes   Monitor for/manage infection if present  Goal: Prevent/minimize sheer/friction injuries  Outcome: Progressing  Flowsheets (Taken 11/7/2024 0441)  Prevent/minimize sheer/friction injuries:   HOB 30 degrees or less   Turn/reposition every 2 hours/use positioning/transfer devices   Use pull sheet   Utilize specialty bed per algorithm  Goal: Promote/optimize nutrition  Outcome: Progressing  Flowsheets (Taken 11/7/2024 0441)  Promote/optimize nutrition:   Assist with feeding   Offer water/supplements/favorite foods   Consume > 50% meals/supplements   Monitor/record intake including meals  Goal: Promote skin healing  Outcome: Progressing  Flowsheets (Taken 11/7/2024 0441)  Promote skin healing:   Protective dressings over bony prominences   Rotate device position/do not position patient on device   Turn/reposition every 2 hours/use positioning/transfer devices     Problem: Pain - Adult  Goal: Verbalizes/displays adequate comfort level or baseline comfort level  Outcome: Progressing     Problem: Safety - Adult  Goal: Free from fall injury  Outcome: Progressing     Problem: Discharge Planning  Goal:  Discharge to home or other facility with appropriate resources  Outcome: Progressing     Problem: Chronic Conditions and Co-morbidities  Goal: Patient's chronic conditions and co-morbidity symptoms are monitored and maintained or improved  Outcome: Progressing     Problem: Fall/Injury  Goal: Not fall by end of shift  Outcome: Progressing  Goal: Be free from injury by end of the shift  Outcome: Progressing  Goal: Verbalize understanding of personal risk factors for fall in the hospital  Outcome: Progressing  Goal: Verbalize understanding of risk factor reduction measures to prevent injury from fall in the home  Outcome: Progressing  Goal: Use assistive devices by end of the shift  Outcome: Progressing  Goal: Pace activities to prevent fatigue by end of the shift  Outcome: Progressing     Problem: ACS/CP/NSTEMI/STEMI  Goal: Chest pain managed (free from pain or at acceptable level)  Outcome: Progressing  Goal: Lab values return to normal range  Outcome: Progressing  Goal: Promote self management  Outcome: Progressing  Goal: Serial ECG will return to baseline  Outcome: Progressing  Goal: Verbalize understanding of procedures/devices  Outcome: Progressing  Goal: Wean vasopressors/achieve hemodynamic stability  Outcome: Progressing     Problem: Nutrition  Goal: Less than 5 days NPO/clear liquids  Outcome: Progressing  Goal: Oral intake greater than 50%  Outcome: Progressing  Goal: Oral intake greater 75%  Outcome: Progressing  Goal: Consume prescribed supplement  Outcome: Progressing  Goal: Adequate PO fluid intake  Outcome: Progressing  Goal: Nutrition support goals are met within 48 hrs  Outcome: Progressing  Goal: Nutrition support is meeting 75% of nutrient needs  Outcome: Progressing  Goal: Tube feed tolerance  Outcome: Progressing  Goal: BG  mg/dL  Outcome: Progressing  Goal: Lab values WNL  Outcome: Progressing  Goal: Electrolytes WNL  Outcome: Progressing  Goal: Promote healing  Outcome: Progressing  Goal:  Maintain stable weight  Outcome: Progressing  Goal: Reduce weight from edema/fluid  Outcome: Progressing  Goal: Gradual weight gain  Outcome: Progressing  Goal: Improve ostomy output  Outcome: Progressing     Problem: Diabetes  Goal: Maintain glucose levels >70mg/dl to <250mg/dl throughout shift  Outcome: Progressing  Goal: No changes in neurological exam by end of shift  Outcome: Progressing  Goal: Vital signs within normal range for age by end of shift  Outcome: Progressing     Problem: Heart Failure  Goal: Improved gas exchange this shift  Outcome: Progressing  Goal: Improved urinary output this shift  Outcome: Progressing  Goal: Reduction in peripheral edema within 24 hours  Outcome: Progressing  Goal: Report improvement of dyspnea/breathlessness this shift  Outcome: Progressing  Goal: Weight from fluid excess reduced over 2-3 days, then stabilize  Outcome: Progressing  Goal: Increase self care and/or family involvement in 24 hours  Outcome: Progressing     Problem: Pain  Goal: Takes deep breaths with improved pain control throughout the shift  Outcome: Progressing  Goal: Turns in bed with improved pain control throughout the shift  Outcome: Progressing  Goal: Walks with improved pain control throughout the shift  Outcome: Progressing  Goal: Performs ADL's with improved pain control throughout shift  Outcome: Progressing  Goal: Participates in PT with improved pain control throughout the shift  Outcome: Progressing  Goal: Free from opioid side effects throughout the shift  Outcome: Progressing  Goal: Free from acute confusion related to pain meds throughout the shift  Outcome: Progressing     Problem: Safety - Medical Restraint  Goal: Remains free of injury from restraints (Restraint for Interference with Medical Device)  Outcome: Progressing  Goal: Free from restraint(s) (Restraint for Interference with Medical Device)  Outcome: Progressing     Problem: Respiratory  Goal: Minimize anxiety/maximize coping  throughout shift  Outcome: Progressing  Goal: Minimal/no exertional discomfort or dyspnea this shift  Outcome: Progressing  Goal: No signs of respiratory distress (eg. Use of accessory muscles. Peds grunting)  Outcome: Progressing  Goal: Wean oxygen to maintain O2 saturation per order/standard this shift  Outcome: Progressing   The patient's goals for the shift include      The clinical goals for the shift include no falls this shift.    Over the shift, the patient did not make progress toward the following goals. Barriers to progression include confusion, unable to be reoriented. Recommendations to address these barriers include bed alarm, frequent checks and attempts to reorient.

## 2024-11-07 NOTE — PROGRESS NOTES
Dat Mccallum is a 83 y.o. male on day 22 of admission presenting with NSTEMI (non-ST elevated myocardial infarction) (Multi).      Subjective   The patient's condition has changed since we initially decided to follow him for concerns of catatonia. After the failure of the Ativan challenge, we have no further recommendations at this time.  Currently, the patient is in isolation, with a worsening mental status observed over the last 48 hours. He was stated on Zyprexa for agitation yesterday evening, and his condition has been well managed by the primary team. RN reports no new acute issues during our discussion    We appreciate being involved in this patient case and will sign off for now. Please re consult inf necessary       Objective     Last Recorded Vitals  Blood pressure 100/55, pulse 91, temperature 36.3 °C (97.3 °F), temperature source Temporal, resp. rate 18, height 1.829 m (6'), weight 65.2 kg (143 lb 11.8 oz), SpO2 94%.    Review of Systems   Unable to perform ROS: Acuity of condition     Mental Status Exam  Unable to assess at this time     Psychiatric ROS - Adult  Anxiety:  Unable to assess  Depression: Unable to assess    Delirium: Unable to assess   Psychosis: Unable to assess   Simran: Unable to assess   Safety Issues: none  Psychiatric ROS Comment: As noted above      Mental Status Exam  General: sleeping   Appearance: Fairly groomed.  Attitude/Behavior: sleeping  Motor Activity: No psychomotor agitation or retardation, no tremor or other abnormal movements.  Speech: mute  Mood:  Unable to assess  Affect: flat  Thought Process: unable to assess  Thought Content: Not formally tested  Thought Perception: Unable to assess   Cognition: Unable to access.  Insight: unable to assess  Judgement: Unable to access    Psychiatric Risk Assessment  Violence Risk Assessment: male  Acute Risk of Harm to Others is Considered: low   Suicide Risk Assessment: age > 65 yrs old, , and male  Protective Factors  against Suicide: adherence to  treatment and positive family relationships  Acute Risk of Harm to Self is Considered: low    Current Medications    Scheduled medications  aspirin, 81 mg, oral, Daily  budesonide, 0.5 mg, nebulization, BID  carvedilol, 3.125 mg, oral, BID after meals  enoxaparin, 40 mg, subcutaneous, Daily  insulin glargine, 8 Units, subcutaneous, Nightly  insulin lispro, 0-5 Units, subcutaneous, q6h  magnesium oxide, 400 mg, oral, Daily  midodrine, 10 mg, oral, TID  ofloxacin, 1 drop, Left Eye, 4x daily  pantoprazole, 40 mg, intravenous, Daily  piperacillin-tazobactam, 3.375 g, intravenous, q6h  potassium phosphate, 15 mmol, intravenous, Once  pravastatin, 40 mg, oral, Nightly  tamsulosin, 0.8 mg, oral, Nightly  thiamine, 100 mg, nasogastric tube, Daily      Continuous medications     PRN medications  PRN medications: acetaminophen **OR** acetaminophen, aminophylline, dextrose, dextrose, glucagon, glucagon, melatonin, OLANZapine, oxygen, prochlorperazine, prochlorperazine       Medication efficacy: N/A  Patient reporting any side effects? No  Any observed side effects? No      Relevant Results  Results for orders placed or performed during the hospital encounter of 10/16/24 (from the past 24 hours)   Blood Culture    Specimen: Peripheral Venipuncture; Blood culture   Result Value Ref Range    Blood Culture Loaded on Instrument - Culture in progress    POCT GLUCOSE   Result Value Ref Range    POCT Glucose 161 (H) 74 - 99 mg/dL   Blood Culture    Specimen: Peripheral Venipuncture; Blood culture   Result Value Ref Range    Blood Culture Loaded on Instrument - Culture in progress    CBC   Result Value Ref Range    WBC 14.6 (H) 4.4 - 11.3 x10*3/uL    nRBC 0.0 0.0 - 0.0 /100 WBCs    RBC 2.60 (L) 4.50 - 5.90 x10*6/uL    Hemoglobin 7.6 (L) 13.5 - 17.5 g/dL    Hematocrit 22.7 (L) 41.0 - 52.0 %    MCV 87 80 - 100 fL    MCH 29.2 26.0 - 34.0 pg    MCHC 33.5 32.0 - 36.0 g/dL    RDW 14.2 11.5 - 14.5 %    Platelets  378 150 - 450 x10*3/uL   Comprehensive Metabolic Panel   Result Value Ref Range    Glucose 104 (H) 74 - 99 mg/dL    Sodium 131 (L) 136 - 145 mmol/L    Potassium 3.9 3.5 - 5.3 mmol/L    Chloride 98 98 - 107 mmol/L    Bicarbonate 26 21 - 32 mmol/L    Anion Gap 11 10 - 20 mmol/L    Urea Nitrogen 28 (H) 6 - 23 mg/dL    Creatinine 1.16 0.50 - 1.30 mg/dL    eGFR 63 >60 mL/min/1.73m*2    Calcium 7.7 (L) 8.6 - 10.3 mg/dL    Albumin 2.1 (L) 3.4 - 5.0 g/dL    Alkaline Phosphatase 53 33 - 136 U/L    Total Protein 6.3 (L) 6.4 - 8.2 g/dL    AST 40 (H) 9 - 39 U/L    Bilirubin, Total 0.7 0.0 - 1.2 mg/dL    ALT 30 10 - 52 U/L   TSH with reflex to Free T4 if abnormal   Result Value Ref Range    Thyroid Stimulating Hormone 1.70 0.44 - 3.98 mIU/L   Phosphorus   Result Value Ref Range    Phosphorus 4.3 2.5 - 4.9 mg/dL   POCT GLUCOSE   Result Value Ref Range    POCT Glucose 91 74 - 99 mg/dL   Sars-CoV-2 PCR   Result Value Ref Range    Coronavirus 2019, PCR Not Detected Not Detected   POCT GLUCOSE   Result Value Ref Range    POCT Glucose 78 74 - 99 mg/dL               Reviewed    Assessment/Plan   Principal Problem:    NSTEMI (non-ST elevated myocardial infarction) (Multi)  Active Problems:    Type 2 diabetes mellitus without complication, without long-term current use of insulin (Multi)    BPH (benign prostatic hyperplasia)    Acute metabolic encephalopathy    Chronic systolic heart failure    Idiopathic hypotension    Severe protein-calorie malnutrition (Multi)    Anemia of chronic disease    Acute on chronic systolic (congestive) heart failure    Hypomagnesemia    Acute cystitis with hematuria    Urinary retention    Bacterial pneumonia    Pleural effusion        Current malnutriton diganoses:  Malnutrition Diagnosis: Severe malnutrition related to chronic disease or condition           PLAN/ RECOMMENDATIONS:      -Depression  Consider antidepressant once the patients overall medical needs improve. Can do this outpatient.   -Would  recommend delirium precautions, for this patient  -- Minimize use of benzos, opiates, anticholinergics, as these may worsen mental status  -- Would use caution with narcotic pain medications  -- Would still adequately controlling pain, as uncontrolled pain is also a risk factor for delirium  -- Reinforce sleep hygiene; encourage patient to stay awake during the day  -- Keep curtains/blinds open during the day and closed at night.  -- Would recommend reorienting/redirecting patient as much as possible,   -- Aim for consistent staffing, familiar objects, avoiding bright lights and loud noises, etc.     -Patient does not currently meet criteria for inpatient psychiatric admission.   -Thank you for allowing us to participate in the care of this patient.         I personally spent 25 minutes today providing care for this patient, including preparation, face to face time, documentation and other services such as review of medical records, diagnostic result, patient education, counseling, coordination of care as specified in the encounter.       Yani Li, APRN-CNP

## 2024-11-07 NOTE — PROGRESS NOTES
Therapy Communication Note    Patient Name: Dat Mccallum  MRN: [id number]  Today's Date: 11/7/2024    Discipline: Physical Therapy    Missed Visit Reason:      Missed Time: Cancel    Comment: Pt is non verbal during PT attempt at follow up, unable to follow commands, and unable to participate with physical therapy. Cancel PT follow up for today.

## 2024-11-07 NOTE — ASSESSMENT & PLAN NOTE
Neurology did a very thorough evaluation yesterday and they thought his delirium looked atypical and he might have something more unusual than garden-variety hospital-acquired delirium.  In fact they ordered an MRI and discussed things with neurology at [facility] and came up with a plan that if the MRI did not show anything new or revealing that we would likely transfer him to [facility].

## 2024-11-07 NOTE — NURSING NOTE
Received report from Anila PATEL, assumed care of patient. Patient currently in soft mitt restraints due to inability to be reoriented.   Family and family friend both updated on POC.

## 2024-11-07 NOTE — ASSESSMENT & PLAN NOTE
Since he is not as awake and interactive as yesterday I suspect his fluid intake will be poor today.  Am stopping the Lasix

## 2024-11-07 NOTE — PROGRESS NOTES
11/07/24 1748   Discharge Planning   Expected Discharge Disposition SNF     Accepted by [facility] .  Will need precert started when closer to discharge.  Neuro following. Psych has signed off. Currently not in restraints.  Calm. MRI completed today. Plan is transfer to [facility]. Waiting for an available bed.

## 2024-11-07 NOTE — PROGRESS NOTES
Dat Mccallum is a 83 y.o. male on day 22 of admission presenting with NSTEMI (non-ST elevated myocardial infarction) (Multi).      Subjective   Patient's mental status has been worse in the last 48 hours.  He was given a trial of Ativan for catatonia and I can say that the 1 dose he got did not help.  We had to give him Zyprexa this morning to settle him down so he can go for MRI.  Now he is a bit sedated from the Zyprexa.  He just came back from MRI.       Objective   He is awake and moving a bit but he is looking sedated.  Heart regular rate and rhythm  Lungs clear to auscultation  Abdomen soft nontender nondistended  Extremities no edema  Skin no obvious areas of cellulitis or ulceration.  Last Recorded Vitals  /55 (BP Location: Right arm, Patient Position: Lying)   Pulse 91   Temp 36.3 °C (97.3 °F) (Temporal)   Resp 18   Wt 65.2 kg (143 lb 11.8 oz)   SpO2 94%   Intake/Output last 3 Shifts:    Intake/Output Summary (Last 24 hours) at 11/7/2024 1033  Last data filed at 11/7/2024 0700  Gross per 24 hour   Intake 770 ml   Output 250 ml   Net 520 ml       Admission Weight  Weight: 68 kg (150 lb) (10/16/24 2047)    Daily Weight  11/07/24 : 65.2 kg (143 lb 11.8 oz)    Image Results  MR brain w and wo IV contrast  Narrative: Interpreted By:  Rich Purvis,   STUDY:  MR BRAIN W AND WO IV CONTRAST;  11/7/2024 10:05 am      INDICATION:  Signs/Symptoms:Progressive encephalopathy with left hemineglect,  upper motor neuron signs and possible catatonia, Prior MRI normal,  assess for signs of encephalitis and/or diffusion changes of prion  disease.      COMPARISON:  11/19/2024      ACCESSION NUMBER(S):  [id number]      ORDERING CLINICIAN:  MILTON GARCIA      TECHNIQUE:  Axial diffusion, axial T2, axial FLAIR, axial gradient echo T2, axial  T1, post gadolinium volumetric T1, as well as post gadolinium axial  T1 weighted MRI images of the brain were obtained. The patient  received  13 mL of  Dotarem gadolinium  intravenously.      FINDINGS:  The diffusion weighted images fail to demonstrate abnormal diffusion  restriction to suggest acute infarction.      There is again evidence of moderate-to-marked brain parenchymal  volume loss.      Nonspecific white matter changes are again noted within cerebral  hemispheres bilaterally which while nonspecific, given the patient's  age, likely represent sequelae of more remote small-vessel ischemic  change.      Additional punctate foci of bright signal on the T2 images are again  noted within the subinsular regions and basal ganglia bilaterally  suggesting incidental mildly prominent perivascular spaces and/or  small scattered more remote lacunar infarctions.      No abnormal intracranial mass lesion or abnormal intracranial  enhancement is identified on the postcontrast images.      There is a lobulated mucosal thickening and/or retention cyst/polyp  noted within the inferior right maxillary sinus. Minimal mucosal  thickening is noted within scattered ethmoid air cells.      The mastoid air cells are clear.      Impression: There is no MRI evidence of acute infarction on the  diffusion-weighted images.      There is again evidence of moderate-to-marked brain parenchymal  volume loss.      Nonspecific white matter changes are again noted within cerebral  hemispheres bilaterally which while nonspecific, given the patient's  age, likely represent sequelae of more remote small-vessel ischemic  change. Additional punctate foci of bright signal on the T2 images  are again noted within the subinsular regions and basal ganglia  bilaterally suggesting incidental mildly prominent perivascular  spaces and/or small scattered more remote lacunar infarctions.      No abnormal intracranial mass lesion or abnormal intracranial  enhancement is identified on the postcontrast images.      MACRO:  None.      Signed by: Rich Purvis 11/7/2024 10:28 AM  Dictation workstation:   [id number]  XR chest 1  view  Narrative: Interpreted By:  Annia Dillon,   STUDY:  XR CHEST 1 VIEW 11/7/2024 7:48 am      INDICATION:  CHF      COMPARISON:  11/05/2024      ACCESSION NUMBER(S):  [id number]      ORDERING CLINICIAN:  ANNETTE BRANTLEY      TECHNIQUE:  AP semi-erect view of the chest      FINDINGS:  Cardiac size is normal calcified visible within the aortic arch. No  interval change in the appearance of the chest when compared study  done 2 days earlier. Persistent areas of infiltrate within each lower  lung zone with probable small left pleural effusion. Is mild  pulmonary vascular congestion.      Impression: Normal-sized heart with mild pulmonary vascular congestion and  persistent areas of infiltrate in each lower lung zone with small  left pleural effusion.      Signed by: Annia Dillon 11/7/2024 8:02 AM  Dictation workstation:   [id number]      Physical Exam    Relevant Results               Assessment/Plan                  Assessment & Plan  Acute metabolic encephalopathy  Neurology did a very thorough evaluation yesterday and they thought his delirium looked atypical and he might have something more unusual than garden-variety hospital-acquired delirium.  In fact they ordered an MRI and discussed things with neurology at [facility] and came up with a plan that if the MRI did not show anything new or revealing that we would likely transfer him to [facility].  Pleural effusion  Started requiring oxygen again.  His white count went up.  His chest x-ray findings are vague.  He already finished a course of Zosyn for hospital-acquired pneumonia but I put him back on Zosyn and consulting pulmonary.  I am going to have him tested for COVID just to be thorough.  Acute on chronic systolic (congestive) heart failure  Since he is not as awake and interactive as yesterday I suspect his fluid intake will be poor today.  Am stopping the Lasix  NSTEMI (non-ST elevated myocardial infarction) (Multi)  Cardiology has signed off  Bacterial  pneumonia  See above  Idiopathic hypotension  I am increasing the midodrine.  Severe protein-calorie malnutrition (Multi)  Passed a swallow eval yesterday.  He seems to be eating well.  Type 2 diabetes mellitus without complication, without long-term current use of insulin (Multi)  I am not going to modify his insulin right now given I am not quite sure what his dietary status will be.  Hypomagnesemia  11/3 - 2g IV magnesium given today. Recheck level. Replete prn.  11/1 - resolved with supplementation.  Will continue to monitor and replete prn  -receiving PO supplementation. Supplementing PRN with IV    Acute cystitis with hematuria  Started on  abx but cultures negative  Urinary retention  11/1 - has external catheter. Will monitor I/O closely  Scant urine output by incontinence. Unable to pass trotter catheter. Urology consulted and also unable to pass trotter catheter.   If he develops painful distention or MARISSA, then will need to go for cystoscopy with dilatation                Laurent Anderson MD

## 2024-11-07 NOTE — ASSESSMENT & PLAN NOTE
Started requiring oxygen again.  His white count went up.  His chest x-ray findings are vague.  He already finished a course of Zosyn for hospital-acquired pneumonia but I put him back on Zosyn and consulting pulmonary.  I am going to have him tested for COVID just to be thorough.

## 2024-11-07 NOTE — PROGRESS NOTES
Neurology Follow Up    Referred by: No ref. provider found, PCP: Anjelica Pacheco, MERLENE-CNP    Subjective:  Mr. Mccallum is a 83 y.o.  male admitted 10/16/2024 LOS day 22, consulted for altered mental status.    Pt seen and examined. Pt much more drowsy today then yesterday. Per the nurse they had to give him Zyprexa for the MRI and since then he has been drowsy.     Ativan trial was attempted yesterday for catatonia and several hours after his 1st dose his mentation decreased and pt pulling out IVs, telemetry leads and oxygen. Ativan was then discontinued and pt was ordered soft herman restraints and given Zyprexa to help with the agitation.     Objective   Blood pressure 100/55, pulse 85, temperature 36.3 °C (97.3 °F), temperature source Temporal, resp. rate 18, height 1.829 m (6'), weight 65.2 kg (143 lb 11.8 oz), SpO2 94%.    Neurological Exam:  Neurological Exam:  Pt is deaf with Language barrier did make it difficult to examine the patient.  General: NAD, cooperative   Neuro:  Drowsy easy to arouse, deaf unable to assess for dysarthria, patient appears to be neglecting his left side at times. When asked to cross lines on board only making x on the ones on the right side.   Visual fields unable to assess  Slight asymmetry with subtle left facial droop  Tongue midline  Pt having trouble moving the left arm when asked the will move it at times. Seems to have decreased range of motion in the left shoulder. Pt was 4+ on bilateral upper ext. Pushes and pulls.   Rigidity in the right upper ext. Waxy flexibility in the right upper ext. Maintaining a abnormal contracted posture at rest in the left upper ext. Then intermittently relaxes the left upper ext.    Rigidity noted in the bilateral lower extremities.   Toes up going  Sensory unable to assess  Gait: unable to assess due to pt safety        Reviewed relevant results, independent review of imaging:   Encounter Date: 10/16/24   Electrocardiogram, 12-lead PRN ACS  symptoms   Result Value    Ventricular Rate 201    Atrial Rate 201    MA Interval 90    QRS Duration 16    QT Interval 216    QTC Calculation(Bazett) 395    R Axis 0    T Axis 135    QRS Count 32    Q Onset 241    P Onset 196    P Offset 239    T Offset 349    QTC Fredericia 323    Narrative    Poor data quality in current ECG precludes serial comparison  Sinus tachycardia with short MA with Premature supraventricular complexes and with frequent Premature ventricular complexes  Indeterminate axis  Pulmonary disease pattern  Nonspecific ST and T wave abnormality  Abnormal ECG  When compared with ECG of 16-OCT-2024 20:40, (unconfirmed)  Previous ECG has undetermined rhythm, needs review  Questionable change in QRS duration  Confirmed by Fer Vazquez ([id number]) on 10/22/2024 12:52:29 PM            BNP   Date/Time Value Ref Range Status   10/16/2024 08:54  (H) 0 - 99 pg/mL Final         Assessment:   Dat Mccallum is a 83 y.o. male with hx of T2DM, HTN, HLD, CKD3, BPH, deafness presenting with AMS/lethargy, found with NTEMI, SIRS, HF with EF of 15-20%. Neurology consulted for worsened AMS. Pt mentation seems to be waxing and weaning. Routine EEG showed diffuse encephalopathy. No witnessed seizures, abnormal movements. No prior hx of seizures.     11/5/24- Prolonged EEG showed severe diffuse encephalopathy no epileptiform or lateralizing signs.  Toxic/ metabolic encephalopathy seems to be improving. Pt has multiple medical issues that could be contributing to the encephalopathy such as pneumonia and decreased ejection fraction. He is at high risk of ongoing delirium while inpatient.  However neuro exam markedly different from previous exam. Pt has wax flexibility, rigidity and mimicking our movements which if concerning for catatonia. No mental health history that I am aware of. Spoke to the son over the phone and will plan on meeting with him at the bedside tomorrow to get a better understand of what pts baseline  mentation is.     11/6/24- On neuro exam pt progressively encephalopathic with left hemineglect with upper motor signs. 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 Another consideration is prion disease. Previous MRI negative. Recommend MRI brain w/wo contrast then an LP.     11/7/24-Reviewed MRI. MRI didn't show any stroke or lesion, there is no definite DWI changes in the cortex which would suggest encephalitis or degenerative process. Pt failed benzodiazepine trial for catatonia, recommend stopping at this time. Recommend transfer [facility] [place] for a higher level of care.     Had a discussion with the son about the plan and son agrees to the patient being transferred to [facility] [place].      Recommendations:   - Recommend transfer [facility] [place] for a higher level of care.   - LP with extensive analysis after MRI Brain         I spent 45 minutes in the professional and overall care of this patient.    Kassi Alvarado, MERLENE-CNP

## 2024-11-08 ENCOUNTER — APPOINTMENT (OUTPATIENT)
Dept: RADIOLOGY | Facility: HOSPITAL | Age: 84
End: 2024-11-08
Payer: MEDICARE

## 2024-11-08 ENCOUNTER — HOSPITAL ENCOUNTER (INPATIENT)
Facility: HOSPITAL | Age: 84
End: 2024-11-08
Attending: INTERNAL MEDICINE | Admitting: STUDENT IN AN ORGANIZED HEALTH CARE EDUCATION/TRAINING PROGRAM
Payer: MEDICARE

## 2024-11-08 VITALS
OXYGEN SATURATION: 93 % | RESPIRATION RATE: 16 BRPM | SYSTOLIC BLOOD PRESSURE: 84 MMHG | BODY MASS INDEX: 19.47 KG/M2 | TEMPERATURE: 96.8 F | WEIGHT: 143.74 LBS | DIASTOLIC BLOOD PRESSURE: 57 MMHG | HEART RATE: 84 BPM | HEIGHT: 72 IN

## 2024-11-08 DIAGNOSIS — Z51.5 END OF LIFE CARE: ICD-10-CM

## 2024-11-08 DIAGNOSIS — G93.40 ENCEPHALOPATHY ACUTE: Primary | ICD-10-CM

## 2024-11-08 LAB
ALBUMIN SERPL BCP-MCNC: 2.1 G/DL (ref 3.4–5)
ANION GAP SERPL CALCULATED.3IONS-SCNC: 11 MMOL/L (ref 10–20)
BUN SERPL-MCNC: 28 MG/DL (ref 6–23)
CALCIUM SERPL-MCNC: 7.6 MG/DL (ref 8.6–10.3)
CHLORIDE SERPL-SCNC: 100 MMOL/L (ref 98–107)
CO2 SERPL-SCNC: 24 MMOL/L (ref 21–32)
CREAT SERPL-MCNC: 1.22 MG/DL (ref 0.5–1.3)
EGFRCR SERPLBLD CKD-EPI 2021: 60 ML/MIN/1.73M*2
GLUCOSE BLD MANUAL STRIP-MCNC: 104 MG/DL (ref 74–99)
GLUCOSE BLD MANUAL STRIP-MCNC: 116 MG/DL (ref 74–99)
GLUCOSE BLD MANUAL STRIP-MCNC: 71 MG/DL (ref 74–99)
GLUCOSE BLD MANUAL STRIP-MCNC: 79 MG/DL (ref 74–99)
GLUCOSE SERPL-MCNC: 69 MG/DL (ref 74–99)
HOLD SPECIMEN: NORMAL
PHOSPHATE SERPL-MCNC: 3.8 MG/DL (ref 2.5–4.9)
POTASSIUM SERPL-SCNC: 3.2 MMOL/L (ref 3.5–5.3)
SODIUM SERPL-SCNC: 132 MMOL/L (ref 136–145)

## 2024-11-08 PROCEDURE — 82947 ASSAY GLUCOSE BLOOD QUANT: CPT

## 2024-11-08 PROCEDURE — 99223 1ST HOSP IP/OBS HIGH 75: CPT

## 2024-11-08 PROCEDURE — 99231 SBSQ HOSP IP/OBS SF/LOW 25: CPT

## 2024-11-08 PROCEDURE — 71045 X-RAY EXAM CHEST 1 VIEW: CPT

## 2024-11-08 PROCEDURE — 99223 1ST HOSP IP/OBS HIGH 75: CPT | Performed by: STUDENT IN AN ORGANIZED HEALTH CARE EDUCATION/TRAINING PROGRAM

## 2024-11-08 PROCEDURE — 2500000001 HC RX 250 WO HCPCS SELF ADMINISTERED DRUGS (ALT 637 FOR MEDICARE OP): Performed by: NURSE PRACTITIONER

## 2024-11-08 PROCEDURE — 99233 SBSQ HOSP IP/OBS HIGH 50: CPT | Performed by: INTERNAL MEDICINE

## 2024-11-08 PROCEDURE — 2500000004 HC RX 250 GENERAL PHARMACY W/ HCPCS (ALT 636 FOR OP/ED): Performed by: INTERNAL MEDICINE

## 2024-11-08 PROCEDURE — 94640 AIRWAY INHALATION TREATMENT: CPT

## 2024-11-08 PROCEDURE — 1210000001 HC SEMI-PRIVATE ROOM DAILY

## 2024-11-08 PROCEDURE — 2500000002 HC RX 250 W HCPCS SELF ADMINISTERED DRUGS (ALT 637 FOR MEDICARE OP, ALT 636 FOR OP/ED): Performed by: INTERNAL MEDICINE

## 2024-11-08 PROCEDURE — 74018 RADEX ABDOMEN 1 VIEW: CPT | Performed by: RADIOLOGY

## 2024-11-08 PROCEDURE — 71045 X-RAY EXAM CHEST 1 VIEW: CPT | Performed by: RADIOLOGY

## 2024-11-08 PROCEDURE — 97530 THERAPEUTIC ACTIVITIES: CPT | Mod: GP,CQ

## 2024-11-08 PROCEDURE — 2500000001 HC RX 250 WO HCPCS SELF ADMINISTERED DRUGS (ALT 637 FOR MEDICARE OP): Performed by: INTERNAL MEDICINE

## 2024-11-08 PROCEDURE — 2500000001 HC RX 250 WO HCPCS SELF ADMINISTERED DRUGS (ALT 637 FOR MEDICARE OP): Performed by: HOSPITALIST

## 2024-11-08 PROCEDURE — 9420000001 HC RT PATIENT EDUCATION 5 MIN

## 2024-11-08 PROCEDURE — 99231 SBSQ HOSP IP/OBS SF/LOW 25: CPT | Performed by: INTERNAL MEDICINE

## 2024-11-08 PROCEDURE — 84100 ASSAY OF PHOSPHORUS: CPT | Performed by: HOSPITALIST

## 2024-11-08 PROCEDURE — 36415 COLL VENOUS BLD VENIPUNCTURE: CPT | Performed by: INTERNAL MEDICINE

## 2024-11-08 PROCEDURE — 97535 SELF CARE MNGMENT TRAINING: CPT | Mod: GO,CO

## 2024-11-08 RX ORDER — POTASSIUM CHLORIDE 14.9 MG/ML
20 INJECTION INTRAVENOUS ONCE
Status: COMPLETED | OUTPATIENT
Start: 2024-11-08 | End: 2024-11-08

## 2024-11-08 RX ORDER — TAMSULOSIN HYDROCHLORIDE 0.4 MG/1
0.8 CAPSULE ORAL NIGHTLY
OUTPATIENT
Start: 2024-11-08

## 2024-11-08 RX ORDER — DEXAMETHASONE SODIUM PHOSPHATE 10 MG/ML
10 INJECTION INTRAMUSCULAR; INTRAVENOUS EVERY 6 HOURS
Status: DISCONTINUED | OUTPATIENT
Start: 2024-11-08 | End: 2024-11-08 | Stop reason: HOSPADM

## 2024-11-08 RX ORDER — VANCOMYCIN HYDROCHLORIDE
1250 EVERY 24 HOURS
Status: DISCONTINUED | OUTPATIENT
Start: 2024-11-08 | End: 2024-11-09

## 2024-11-08 RX ORDER — SODIUM CHLORIDE AND POTASSIUM CHLORIDE 150; 900 MG/100ML; MG/100ML
50 INJECTION, SOLUTION INTRAVENOUS CONTINUOUS
Status: DISCONTINUED | OUTPATIENT
Start: 2024-11-08 | End: 2024-11-08 | Stop reason: HOSPADM

## 2024-11-08 RX ORDER — DEXTROSE 50 % IN WATER (D50W) INTRAVENOUS SYRINGE
12.5
Status: ACTIVE | OUTPATIENT
Start: 2024-11-08

## 2024-11-08 RX ORDER — ACETAMINOPHEN 325 MG/1
650 TABLET ORAL EVERY 4 HOURS PRN
OUTPATIENT
Start: 2024-11-08

## 2024-11-08 RX ORDER — LANOLIN ALCOHOL/MO/W.PET/CERES
400 CREAM (GRAM) TOPICAL DAILY
OUTPATIENT
Start: 2024-11-09

## 2024-11-08 RX ORDER — CARVEDILOL 3.12 MG/1
3.12 TABLET ORAL
OUTPATIENT
Start: 2024-11-08

## 2024-11-08 RX ORDER — MIDODRINE HYDROCHLORIDE 10 MG/1
10 TABLET ORAL
OUTPATIENT
Start: 2024-11-09

## 2024-11-08 RX ORDER — PROCHLORPERAZINE EDISYLATE 5 MG/ML
10 INJECTION INTRAMUSCULAR; INTRAVENOUS EVERY 6 HOURS PRN
OUTPATIENT
Start: 2024-11-08

## 2024-11-08 RX ORDER — DEXAMETHASONE SODIUM PHOSPHATE 10 MG/ML
10 INJECTION INTRAMUSCULAR; INTRAVENOUS EVERY 6 HOURS
Status: DISPENSED | OUTPATIENT
Start: 2024-11-08

## 2024-11-08 RX ORDER — SODIUM CHLORIDE AND POTASSIUM CHLORIDE 150; 900 MG/100ML; MG/100ML
50 INJECTION, SOLUTION INTRAVENOUS CONTINUOUS
OUTPATIENT
Start: 2024-11-08

## 2024-11-08 RX ORDER — PROCHLORPERAZINE MALEATE 10 MG
5 TABLET ORAL EVERY 6 HOURS PRN
OUTPATIENT
Start: 2024-11-08

## 2024-11-08 RX ORDER — ACETAMINOPHEN 160 MG/5ML
650 SOLUTION ORAL EVERY 4 HOURS PRN
OUTPATIENT
Start: 2024-11-08

## 2024-11-08 RX ORDER — LANOLIN ALCOHOL/MO/W.PET/CERES
100 CREAM (GRAM) TOPICAL DAILY
OUTPATIENT
Start: 2024-11-09

## 2024-11-08 RX ORDER — VANCOMYCIN HYDROCHLORIDE 1 G/20ML
INJECTION, POWDER, LYOPHILIZED, FOR SOLUTION INTRAVENOUS DAILY PRN
Status: DISCONTINUED | OUTPATIENT
Start: 2024-11-08 | End: 2024-11-08 | Stop reason: HOSPADM

## 2024-11-08 RX ORDER — DEXTROSE, SODIUM CHLORIDE, SODIUM LACTATE, POTASSIUM CHLORIDE, AND CALCIUM CHLORIDE 5; .6; .31; .03; .02 G/100ML; G/100ML; G/100ML; G/100ML; G/100ML
50 INJECTION, SOLUTION INTRAVENOUS CONTINUOUS
Status: DISPENSED | OUTPATIENT
Start: 2024-11-09 | End: 2024-11-10

## 2024-11-08 RX ORDER — PRAVASTATIN SODIUM 40 MG/1
40 TABLET ORAL NIGHTLY
OUTPATIENT
Start: 2024-11-08

## 2024-11-08 RX ORDER — CEFTRIAXONE 2 G/50ML
2 INJECTION, SOLUTION INTRAVENOUS EVERY 12 HOURS
Status: DISCONTINUED | OUTPATIENT
Start: 2024-11-08 | End: 2024-11-08 | Stop reason: HOSPADM

## 2024-11-08 RX ORDER — PANTOPRAZOLE SODIUM 40 MG/10ML
40 INJECTION, POWDER, LYOPHILIZED, FOR SOLUTION INTRAVENOUS DAILY
OUTPATIENT
Start: 2024-11-09

## 2024-11-08 RX ORDER — CEFTRIAXONE 2 G/50ML
2 INJECTION, SOLUTION INTRAVENOUS EVERY 24 HOURS
Status: DISCONTINUED | OUTPATIENT
Start: 2024-11-09 | End: 2024-11-09

## 2024-11-08 RX ORDER — INSULIN LISPRO 100 [IU]/ML
0-5 INJECTION, SOLUTION INTRAVENOUS; SUBCUTANEOUS EVERY 6 HOURS PRN
Status: DISPENSED | OUTPATIENT
Start: 2024-11-08

## 2024-11-08 RX ORDER — TALC
3 POWDER (GRAM) TOPICAL NIGHTLY PRN
OUTPATIENT
Start: 2024-11-08

## 2024-11-08 RX ORDER — DEXTROSE 50 % IN WATER (D50W) INTRAVENOUS SYRINGE
25
OUTPATIENT
Start: 2024-11-08

## 2024-11-08 RX ORDER — INSULIN LISPRO 100 [IU]/ML
0-5 INJECTION, SOLUTION INTRAVENOUS; SUBCUTANEOUS EVERY 6 HOURS
OUTPATIENT
Start: 2024-11-08

## 2024-11-08 RX ORDER — VANCOMYCIN HYDROCHLORIDE 1 G/20ML
INJECTION, POWDER, LYOPHILIZED, FOR SOLUTION INTRAVENOUS DAILY PRN
Status: DISCONTINUED | OUTPATIENT
Start: 2024-11-08 | End: 2024-11-09

## 2024-11-08 RX ORDER — VANCOMYCIN 1.75 G/350ML
1250 INJECTION, SOLUTION INTRAVENOUS EVERY 24 HOURS
Status: DISCONTINUED | OUTPATIENT
Start: 2024-11-08 | End: 2024-11-08 | Stop reason: HOSPADM

## 2024-11-08 RX ORDER — OLANZAPINE 10 MG/2ML
2.5 INJECTION, POWDER, FOR SOLUTION INTRAMUSCULAR EVERY 4 HOURS PRN
OUTPATIENT
Start: 2024-11-08

## 2024-11-08 RX ORDER — AMINOPHYLLINE 25 MG/ML
125 INJECTION, SOLUTION INTRAVENOUS AS NEEDED
OUTPATIENT
Start: 2024-11-08

## 2024-11-08 RX ORDER — OFLOXACIN 3 MG/ML
1 SOLUTION/ DROPS OPHTHALMIC 4 TIMES DAILY
OUTPATIENT
Start: 2024-11-08

## 2024-11-08 RX ORDER — MIDODRINE HYDROCHLORIDE 10 MG/1
10 TABLET ORAL
Status: DISCONTINUED | OUTPATIENT
Start: 2024-11-08 | End: 2024-11-10

## 2024-11-08 RX ORDER — BUDESONIDE 0.5 MG/2ML
0.5 INHALANT ORAL
OUTPATIENT
Start: 2024-11-08

## 2024-11-08 RX ORDER — OLANZAPINE 10 MG/2ML
5 INJECTION, POWDER, FOR SOLUTION INTRAMUSCULAR EVERY 8 HOURS PRN
Status: ACTIVE | OUTPATIENT
Start: 2024-11-08

## 2024-11-08 RX ORDER — NAPROXEN SODIUM 220 MG/1
81 TABLET, FILM COATED ORAL DAILY
OUTPATIENT
Start: 2024-11-09

## 2024-11-08 RX ORDER — DEXTROSE 50 % IN WATER (D50W) INTRAVENOUS SYRINGE
25
Status: ACTIVE | OUTPATIENT
Start: 2024-11-08

## 2024-11-08 RX ORDER — DEXTROSE 50 % IN WATER (D50W) INTRAVENOUS SYRINGE
12.5
OUTPATIENT
Start: 2024-11-08

## 2024-11-08 RX ADMIN — BUDESONIDE 0.5 MG: 0.5 SUSPENSION RESPIRATORY (INHALATION) at 07:57

## 2024-11-08 RX ADMIN — OFLOXACIN 1 DROP: 3 SOLUTION OPHTHALMIC at 17:17

## 2024-11-08 RX ADMIN — PIPERACILLIN SODIUM AND TAZOBACTAM SODIUM 3.38 G: 3; .375 INJECTION, SOLUTION INTRAVENOUS at 03:24

## 2024-11-08 RX ADMIN — MIDODRINE HYDROCHLORIDE 10 MG: 10 TABLET ORAL at 09:36

## 2024-11-08 RX ADMIN — OFLOXACIN 1 DROP: 3 SOLUTION OPHTHALMIC at 06:34

## 2024-11-08 RX ADMIN — PANTOPRAZOLE SODIUM 40 MG: 40 INJECTION, POWDER, FOR SOLUTION INTRAVENOUS at 10:01

## 2024-11-08 RX ADMIN — CARVEDILOL 3.12 MG: 3.12 TABLET, FILM COATED ORAL at 09:36

## 2024-11-08 RX ADMIN — OFLOXACIN 1 DROP: 3 SOLUTION OPHTHALMIC at 12:08

## 2024-11-08 RX ADMIN — SODIUM CHLORIDE AND POTASSIUM CHLORIDE 50 ML/HR: .9; .15 SOLUTION INTRAVENOUS at 11:25

## 2024-11-08 RX ADMIN — Medication 400 MG: at 09:36

## 2024-11-08 RX ADMIN — PIPERACILLIN SODIUM AND TAZOBACTAM SODIUM 3.38 G: 3; .375 INJECTION, SOLUTION INTRAVENOUS at 15:13

## 2024-11-08 RX ADMIN — ASPIRIN 81 MG CHEWABLE TABLET 81 MG: 81 TABLET CHEWABLE at 09:36

## 2024-11-08 RX ADMIN — POTASSIUM CHLORIDE 20 MEQ: 14.9 INJECTION, SOLUTION INTRAVENOUS at 10:00

## 2024-11-08 RX ADMIN — PIPERACILLIN SODIUM AND TAZOBACTAM SODIUM 3.38 G: 3; .375 INJECTION, SOLUTION INTRAVENOUS at 09:37

## 2024-11-08 RX ADMIN — MIDODRINE HYDROCHLORIDE 10 MG: 10 TABLET ORAL at 11:58

## 2024-11-08 RX ADMIN — Medication 100 MG: at 09:36

## 2024-11-08 RX ADMIN — CEFTRIAXONE SODIUM 2 G: 2 INJECTION, SOLUTION INTRAVENOUS at 17:45

## 2024-11-08 ASSESSMENT — COGNITIVE AND FUNCTIONAL STATUS - GENERAL
DAILY ACTIVITIY SCORE: 10
WALKING IN HOSPITAL ROOM: TOTAL
MOVING TO AND FROM BED TO CHAIR: TOTAL
MOBILITY SCORE: 11
TURNING FROM BACK TO SIDE WHILE IN FLAT BAD: A LITTLE
STANDING UP FROM CHAIR USING ARMS: A LOT
HELP NEEDED FOR BATHING: A LOT
MOVING FROM LYING ON BACK TO SITTING ON SIDE OF FLAT BED WITH BEDRAILS: A LITTLE
TOILETING: TOTAL
DRESSING REGULAR LOWER BODY CLOTHING: TOTAL
DRESSING REGULAR UPPER BODY CLOTHING: A LOT
EATING MEALS: A LOT
PERSONAL GROOMING: A LOT
CLIMB 3 TO 5 STEPS WITH RAILING: TOTAL

## 2024-11-08 ASSESSMENT — PAIN - FUNCTIONAL ASSESSMENT
PAIN_FUNCTIONAL_ASSESSMENT: PAINAD (PAIN ASSESSMENT IN ADVANCED DEMENTIA SCALE)
PAIN_FUNCTIONAL_ASSESSMENT: FLACC (FACE, LEGS, ACTIVITY, CRY, CONSOLABILITY)
PAIN_FUNCTIONAL_ASSESSMENT: FLACC (FACE, LEGS, ACTIVITY, CRY, CONSOLABILITY)

## 2024-11-08 ASSESSMENT — PAIN SCALES - PAIN ASSESSMENT IN ADVANCED DEMENTIA (PAINAD)
TOTALSCORE: 2
BODYLANGUAGE: RELAXED
BREATHING: NORMAL
CONSOLABILITY: DISTRACTED OR REASSURED BY VOICE/TOUCH
FACIALEXPRESSION: SMILING OR INEXPRESSIVE
NEGVOCALIZATION: OCCASIONAL MOAN/GROAN, LOW SPEECH, NEGATIVE/DISAPPROVING QUALITY

## 2024-11-08 ASSESSMENT — ACTIVITIES OF DAILY LIVING (ADL): HOME_MANAGEMENT_TIME_ENTRY: 13

## 2024-11-08 ASSESSMENT — PAIN SCALES - GENERAL
PAINLEVEL_OUTOF10: 0 - NO PAIN
PAINLEVEL_OUTOF10: 0 - NO PAIN

## 2024-11-08 NOTE — PROGRESS NOTES
Dat Mccallum is a 83 y.o. male on day 23 of admission presenting with NSTEMI (non-ST elevated myocardial infarction) (Multi).      Subjective   This patient has been here for about 3 weeks after suffering an NSTEMI and aspiration pneumonia and his course has now been prolonged by a severe and atypical encephalopathy.  Mental status is still waxing and waning but when its at its best he is able to pay attention and follow commands but he seems to have a left hemineglect and he seems to have a language deficit.  That is relative to how he usually communicates with his sign language given that he is deaf.  2 days ago I resumed another round of antibiotics for hospital-acquired pneumonia.  He has a persisting white blood cell count.  He had a deterioration in his mental status about 2 days ago but that seems to be improving.  He is now awake and interacting       Objective   Is awake able to pay attention tries to follow commands  Heart regular rate and rhythm  Lungs clear to auscultation  Abdomen soft nontender nondistended  Extremities no significant edema.  I used the translation service that is available on a telemetry screen.  His communication was minimal.  He mimicked what the  said rather than giving true responses.  He did say home repeatedly.  Last Recorded Vitals  BP (!) 97/49 (BP Location: Left arm, Patient Position: Lying)   Pulse 75   Temp 36.2 °C (97.2 °F) (Temporal)   Resp 21   Wt 65.2 kg (143 lb 11.8 oz)   SpO2 93%   Intake/Output last 3 Shifts:    Intake/Output Summary (Last 24 hours) at 11/8/2024 1030  Last data filed at 11/8/2024 0900  Gross per 24 hour   Intake 550 ml   Output --   Net 550 ml       Admission Weight  Weight: 68 kg (150 lb) (10/16/24 2047)    Daily Weight  11/07/24 : 65.2 kg (143 lb 11.8 oz)    Image Results  MR brain w and wo IV contrast  Narrative: Interpreted By:  Rich Purvis,   STUDY:  MR BRAIN W AND WO IV CONTRAST;  11/7/2024 10:05 am       INDICATION:  Signs/Symptoms:Progressive encephalopathy with left hemineglect,  upper motor neuron signs and possible catatonia, Prior MRI normal,  assess for signs of encephalitis and/or diffusion changes of prion  disease.      COMPARISON:  11/19/2024      ACCESSION NUMBER(S):  [id number]      ORDERING CLINICIAN:  MILTON GARCIA      TECHNIQUE:  Axial diffusion, axial T2, axial FLAIR, axial gradient echo T2, axial  T1, post gadolinium volumetric T1, as well as post gadolinium axial  T1 weighted MRI images of the brain were obtained. The patient  received  13 mL of  Dotarem gadolinium intravenously.      FINDINGS:  The diffusion weighted images fail to demonstrate abnormal diffusion  restriction to suggest acute infarction.      There is again evidence of moderate-to-marked brain parenchymal  volume loss.      Nonspecific white matter changes are again noted within cerebral  hemispheres bilaterally which while nonspecific, given the patient's  age, likely represent sequelae of more remote small-vessel ischemic  change.      Additional punctate foci of bright signal on the T2 images are again  noted within the subinsular regions and basal ganglia bilaterally  suggesting incidental mildly prominent perivascular spaces and/or  small scattered more remote lacunar infarctions.      No abnormal intracranial mass lesion or abnormal intracranial  enhancement is identified on the postcontrast images.      There is a lobulated mucosal thickening and/or retention cyst/polyp  noted within the inferior right maxillary sinus. Minimal mucosal  thickening is noted within scattered ethmoid air cells.      The mastoid air cells are clear.      Impression: There is no MRI evidence of acute infarction on the  diffusion-weighted images.      There is again evidence of moderate-to-marked brain parenchymal  volume loss.      Nonspecific white matter changes are again noted within cerebral  hemispheres bilaterally which while  nonspecific, given the patient's  age, likely represent sequelae of more remote small-vessel ischemic  change. Additional punctate foci of bright signal on the T2 images  are again noted within the subinsular regions and basal ganglia  bilaterally suggesting incidental mildly prominent perivascular  spaces and/or small scattered more remote lacunar infarctions.      No abnormal intracranial mass lesion or abnormal intracranial  enhancement is identified on the postcontrast images.      MACRO:  None.      Signed by: Rich Purvis 11/7/2024 10:28 AM  Dictation workstation:   [id number]  XR chest 1 view  Narrative: Interpreted By:  Annia Dillon,   STUDY:  XR CHEST 1 VIEW 11/7/2024 7:48 am      INDICATION:  CHF      COMPARISON:  11/05/2024      ACCESSION NUMBER(S):  [id number]      ORDERING CLINICIAN:  ANNETTE BRANTLEY      TECHNIQUE:  AP semi-erect view of the chest      FINDINGS:  Cardiac size is normal calcified visible within the aortic arch. No  interval change in the appearance of the chest when compared study  done 2 days earlier. Persistent areas of infiltrate within each lower  lung zone with probable small left pleural effusion. Is mild  pulmonary vascular congestion.      Impression: Normal-sized heart with mild pulmonary vascular congestion and  persistent areas of infiltrate in each lower lung zone with small  left pleural effusion.      Signed by: Annia Dillon 11/7/2024 8:02 AM  Dictation workstation:   [id number]      Physical Exam    Relevant Results               Assessment/Plan                  Assessment & Plan  Acute metabolic encephalopathy  Mental status has been fluctuating but it seems better today.  He still has a long way to go and neurology and I think his delirium is atypical and that there is something atypical happening with him and that is why he is getting transferred to main [facility].  Pleural effusion  I resumed antibiotics about 2 days ago and his mental status was declining his chest x-ray  was equivocal his white count went up and his O2 needs went up.  Have asked pulmonary to see him.  They have not seen him yet.  We did try to get him off oxygen today he still has a high white count.  I am going to ask infectious disease for their input.  Repeat chest x-ray tomorrow if he still here.  Acute on chronic systolic (congestive) heart failure  His oral intake has been very poor lately I stopped the Lasix.  In fact I am going to start a little bit of IV fluid.  NSTEMI (non-ST elevated myocardial infarction) (Multi)  Cardiology has signed off  Bacterial pneumonia  See above  Idiopathic hypotension  I am increasing the midodrine.  Severe protein-calorie malnutrition (Multi)  I will talk to the nurse after lunch about his oral intake and likely place a Dobbhoff tube for tube feeds  Type 2 diabetes mellitus without complication, without long-term current use of insulin (Multi)  Blood sugars low.  Stop Lantus.  Hypomagnesemia      Acute cystitis with hematuria  Started on  abx but cultures negative  Urinary retention  11/1 - has external catheter. Will monitor I/O closely  Scant urine output by incontinence. Unable to pass trotter catheter. Urology consulted and also unable to pass trotter catheter.   If he develops painful distention or MARISSA, then will need to go for cystoscopy with dilatation         In summary I am trying to treat and figure out what if any acute infection he has will probably resume tube feeds and less he does well today.  Try to get him transferred to [facility] waiting out of bed       Laurent Anderson MD

## 2024-11-08 NOTE — PROGRESS NOTES
Neurology Follow Up    Referred by: No ref. provider found, PCP: Anjelica Pacheco, APRN-CNP    Subjective:  Mr. Mccallum is a 83 y.o.  male admitted 10/16/2024 LOS day 23, consulted for altered mental status.    Pt seen and examined. Pt alert on examination. During exam pt writes go home. Continues to mimicking my movements, neglecting the left side.     Has been accepted to [facility]. Currently awaiting bed.       Objective   Blood pressure 101/57, pulse 75, temperature 36.2 °C (97.2 °F), temperature source Temporal, resp. rate 21, height 1.829 m (6'), weight 65.2 kg (143 lb 11.8 oz), SpO2 93%.    Neurological Exam:  Neurological Exam:  Pt is deaf with Language barrier did make it difficult to examine the patient.  General: NAD, cooperative   Neuro:  Drowsy easy to arouse, deaf unable to assess for dysarthria, patient appears to be neglecting his left side at times. When asked to cross lines on board only making x on the ones on the right side.   Visual fields unable to assess  Slight asymmetry with subtle left facial droop  Tongue midline  Pt having trouble moving the left arm when asked the will move it at times. Seems to have decreased range of motion in the left shoulder. Pt was 4+ on bilateral upper ext. Pushes and pulls.   Rigidity in the right upper ext. Waxy flexibility in the right upper ext. Maintaining a abnormal contracted posture at rest in the left upper ext. Then intermittently relaxes the left upper ext.    Rigidity noted in the bilateral lower extremities.   Toes up going  Sensory unable to assess  Gait: unable to assess due to pt safety        Reviewed relevant results, independent review of imaging:   Encounter Date: 10/16/24   Electrocardiogram, 12-lead PRN ACS symptoms   Result Value    Ventricular Rate 201    Atrial Rate 201    UT Interval 90    QRS Duration 16    QT Interval 216    QTC Calculation(Bazett) 395    R Axis 0    T Axis 135    QRS Count 32    Q Onset 241    P Onset 196    P Offset 239    T  Offset 349    QTC Fredericia 323    Narrative    Poor data quality in current ECG precludes serial comparison  Sinus tachycardia with short [place] with Premature supraventricular complexes and with frequent Premature ventricular complexes  Indeterminate axis  Pulmonary disease pattern  Nonspecific ST and T wave abnormality  Abnormal ECG  When compared with ECG of 16-OCT-2024 20:40, (unconfirmed)  Previous ECG has undetermined rhythm, needs review  Questionable change in QRS duration  Confirmed by Fer Vazquez ([id number]) on 10/22/2024 12:52:29 PM            BNP   Date/Time Value Ref Range Status   10/16/2024 08:54  (H) 0 - 99 pg/mL Final         Assessment:   Dat Mccallum is a 83 y.o. male with hx of T2DM, HTN, HLD, CKD3, BPH, deafness presenting with AMS/lethargy, found with NTEMI, SIRS, HF with EF of 15-20%. Neurology consulted for worsened AMS. Pt mentation seems to be waxing and weaning. Routine EEG showed diffuse encephalopathy. No witnessed seizures, abnormal movements. No prior hx of seizures.     11/5/24- Prolonged EEG showed severe diffuse encephalopathy no epileptiform or lateralizing signs.  Toxic/ metabolic encephalopathy seems to be improving. Pt has multiple medical issues that could be contributing to the encephalopathy such as pneumonia and decreased ejection fraction. He is at high risk of ongoing delirium while inpatient.  However neuro exam markedly different from previous exam. Pt has wax flexibility, rigidity and mimicking our movements which if concerning for catatonia. No mental health history that I am aware of. Spoke to the son over the phone and will plan on meeting with him at the bedside tomorrow to get a better understand of what pts baseline mentation is.     11/6/24- On neuro exam pt progressively encephalopathic with left hemineglect with upper motor signs. 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 Another consideration is prion  disease. Previous MRI negative. Recommend MRI brain w/wo contrast then an LP.     11/7/24-Reviewed MRI. MRI didn't show any stroke or lesion, there is no definite DWI changes in the cortex which would suggest encephalitis or degenerative process. Pt failed benzodiazepine trial for catatonia, recommend stopping at this time. Recommend transfer [facility] for a higher level of care.     Had a discussion with the son about the plan and son agrees to the patient being transferred to [facility].     11/8/24 Pt continues to display features of catatonia by mimicking my behaviors, rigidity, repeating the same sign and writing go home. Continues to only regard the right side of the room. Still recommend patient to be transferred to [facility]. Has been accepted and currently is awaiting a bed. If unable to get a bed will consider doing the LP here to start the work up.      Recommendations:   - Recommend transfer [facility] for a higher level of care.   - LP with extensive analysis after MRI Brain         I spent 25 minutes in the professional and overall care of this patient.    Kassi Alvarado, MERLENE-CNP

## 2024-11-08 NOTE — ASSESSMENT & PLAN NOTE
I will talk to the nurse after lunch about his oral intake and likely place a Dobbhoff tube for tube feeds

## 2024-11-08 NOTE — CARE PLAN
Problem: Skin  Goal: Decreased wound size/increased tissue granulation at next dressing change  Outcome: Progressing     Problem: Skin  Goal: Participates in plan/prevention/treatment measures  Outcome: Progressing     Problem: Skin  Goal: Prevent/manage excess moisture  Outcome: Progressing     Problem: Skin  Goal: Prevent/minimize sheer/friction injuries  Outcome: Progressing     Problem: Skin  Goal: Promote/optimize nutrition  Outcome: Progressing     Problem: Skin  Goal: Promote skin healing  Outcome: Progressing

## 2024-11-08 NOTE — PROGRESS NOTES
Patient did not do well today taking oral intake.  I did place a small bore feeding tube.  I just looked at the x-ray.  The tubes clearly in the stomach.  Me to pull the wire.  Evidently he is got a bed downtown and transport is coming to take him any minute so we did not start any tube feeds but hopefully we will send him with a functioning feeding tube that can be used upon his arrival at the next hospital    Laurent Anderson MD

## 2024-11-08 NOTE — ASSESSMENT & PLAN NOTE
His oral intake has been very poor lately I stopped the Lasix.  In fact I am going to start a little bit of IV fluid.

## 2024-11-08 NOTE — NURSING NOTE
Assumed care of patient. Pt currently resting with eyes closed in bed. No apparent distress noted. Call bell and personal items within reach. Bed in low and locked position.

## 2024-11-08 NOTE — CARE PLAN
Problem: Skin  Goal: Decreased wound size/increased tissue granulation at next dressing change  Outcome: Progressing  Flowsheets (Taken 11/7/2024 0441)  Decreased wound size/increased tissue granulation at next dressing change:   Promote sleep for wound healing   Protective dressings over bony prominences   Utilize specialty bed per algorithm  Goal: Participates in plan/prevention/treatment measures  Outcome: Progressing  Flowsheets (Taken 11/7/2024 0441)  Participates in plan/prevention/treatment measures:   Discuss with provider PT/OT consult   Elevate heels   Increase activity/out of bed for meals  Goal: Prevent/manage excess moisture  Outcome: Progressing  Flowsheets (Taken 11/8/2024 0456)  Prevent/manage excess moisture:   Follow provider orders for dressing changes   Monitor for/manage infection if present  Goal: Prevent/minimize sheer/friction injuries  Outcome: Progressing  Flowsheets (Taken 11/8/2024 0456)  Prevent/minimize sheer/friction injuries:   Turn/reposition every 2 hours/use positioning/transfer devices   Use pull sheet   Utilize specialty bed per algorithm  Goal: Promote/optimize nutrition  Outcome: Progressing  Flowsheets (Taken 11/8/2024 0456)  Promote/optimize nutrition:   Assist with feeding   Offer water/supplements/favorite foods   Monitor/record intake including meals  Goal: Promote skin healing  Outcome: Progressing  Flowsheets (Taken 11/8/2024 0456)  Promote skin healing:   Turn/reposition every 2 hours/use positioning/transfer devices   Assess skin/pad under line(s)/device(s)   Protective dressings over bony prominences     Problem: Pain - Adult  Goal: Verbalizes/displays adequate comfort level or baseline comfort level  Outcome: Progressing     Problem: Safety - Adult  Goal: Free from fall injury  Outcome: Progressing     Problem: Discharge Planning  Goal: Discharge to home or other facility with appropriate resources  Outcome: Progressing     Problem: Chronic Conditions and  Co-morbidities  Goal: Patient's chronic conditions and co-morbidity symptoms are monitored and maintained or improved  Outcome: Progressing     Problem: Fall/Injury  Goal: Not fall by end of shift  Outcome: Progressing  Goal: Be free from injury by end of the shift  Outcome: Progressing  Goal: Verbalize understanding of personal risk factors for fall in the hospital  Outcome: Progressing  Goal: Verbalize understanding of risk factor reduction measures to prevent injury from fall in the home  Outcome: Progressing  Goal: Use assistive devices by end of the shift  Outcome: Progressing  Goal: Pace activities to prevent fatigue by end of the shift  Outcome: Progressing     Problem: ACS/CP/NSTEMI/STEMI  Goal: Chest pain managed (free from pain or at acceptable level)  Outcome: Progressing  Goal: Lab values return to normal range  Outcome: Progressing  Goal: Promote self management  Outcome: Progressing  Goal: Serial ECG will return to baseline  Outcome: Progressing  Goal: Verbalize understanding of procedures/devices  Outcome: Progressing  Goal: Wean vasopressors/achieve hemodynamic stability  Outcome: Progressing     Problem: Nutrition  Goal: Less than 5 days NPO/clear liquids  Outcome: Progressing  Goal: Oral intake greater than 50%  Outcome: Progressing  Goal: Oral intake greater 75%  Outcome: Progressing  Goal: Consume prescribed supplement  Outcome: Progressing  Goal: Adequate PO fluid intake  Outcome: Progressing  Goal: Nutrition support goals are met within 48 hrs  Outcome: Progressing  Goal: Nutrition support is meeting 75% of nutrient needs  Outcome: Progressing  Goal: Tube feed tolerance  Outcome: Progressing  Goal: BG  mg/dL  Outcome: Progressing  Goal: Lab values WNL  Outcome: Progressing  Goal: Electrolytes WNL  Outcome: Progressing  Goal: Promote healing  Outcome: Progressing  Goal: Maintain stable weight  Outcome: Progressing  Goal: Reduce weight from edema/fluid  Outcome: Progressing  Goal: Gradual  weight gain  Outcome: Progressing  Goal: Improve ostomy output  Outcome: Progressing     Problem: Diabetes  Goal: Maintain glucose levels >70mg/dl to <250mg/dl throughout shift  Outcome: Progressing  Goal: No changes in neurological exam by end of shift  Outcome: Progressing  Goal: Vital signs within normal range for age by end of shift  Outcome: Progressing     Problem: Heart Failure  Goal: Improved gas exchange this shift  Outcome: Progressing  Goal: Improved urinary output this shift  Outcome: Progressing  Goal: Reduction in peripheral edema within 24 hours  Outcome: Progressing  Goal: Report improvement of dyspnea/breathlessness this shift  Outcome: Progressing  Goal: Weight from fluid excess reduced over 2-3 days, then stabilize  Outcome: Progressing  Goal: Increase self care and/or family involvement in 24 hours  Outcome: Progressing     Problem: Pain  Goal: Takes deep breaths with improved pain control throughout the shift  Outcome: Progressing  Goal: Turns in bed with improved pain control throughout the shift  Outcome: Progressing  Goal: Walks with improved pain control throughout the shift  Outcome: Progressing  Goal: Performs ADL's with improved pain control throughout shift  Outcome: Progressing  Goal: Participates in PT with improved pain control throughout the shift  Outcome: Progressing  Goal: Free from opioid side effects throughout the shift  Outcome: Progressing  Goal: Free from acute confusion related to pain meds throughout the shift  Outcome: Progressing     Problem: Safety - Medical Restraint  Goal: Remains free of injury from restraints (Restraint for Interference with Medical Device)  Outcome: Progressing  Goal: Free from restraint(s) (Restraint for Interference with Medical Device)  Outcome: Progressing     Problem: Respiratory  Goal: Minimize anxiety/maximize coping throughout shift  Outcome: Progressing  Goal: Minimal/no exertional discomfort or dyspnea this shift  Outcome:  Progressing  Goal: No signs of respiratory distress (eg. Use of accessory muscles. Peds grunting)  Outcome: Progressing  Goal: Wean oxygen to maintain O2 saturation per order/standard this shift  Outcome: Progressing   The patient's goals for the shift include      The clinical goals for the shift include no falls this shift.    Over the shift, the patient did not make progress toward the following goals. Barriers to progression include confusion. Recommendations to address these barriers include bed alarm, PT/OT.

## 2024-11-08 NOTE — ASSESSMENT & PLAN NOTE
Mental status has been fluctuating but it seems better today.  He still has a long way to go and neurology and I think his delirium is atypical and that there is something atypical happening with him and that is why he is getting transferred to main campus.

## 2024-11-08 NOTE — CONSULTS
Vancomycin Dosing by Pharmacy- INITIAL    Dat Mccallum is a 83 y.o. year old male who Pharmacy has been consulted for vancomycin dosing for CNS/meningoencephalitis. Based on the patient's indication and renal status this patient will be dosed based on a goal AUC of 500-600.     Renal function is currently declining.    Visit Vitals  BP 84/57 (BP Location: Left arm, Patient Position: Lying)   Pulse 84   Temp 36 °C (96.8 °F) (Temporal)   Resp 16        Lab Results   Component Value Date    CREATININE 1.22 2024    CREATININE 1.16 2024    CREATININE 0.82 2024    CREATININE 0.84 2024        Patient weight is as follows:   Vitals:    24 0600   Weight: 65.2 kg (143 lb 11.8 oz)       Cultures:  No results found for the encounter in last 14 days.        I/O last 3 completed shifts:  In: 300 (4.6 mL/kg) [I.V.:50 (0.8 mL/kg); IV Piggyback:250]  Out: 0 (0 mL/kg)   Weight: 65.2 kg   I/O during current shift:  I/O this shift:  In: 472.5 [P.O.:300; I.V.:172.5]  Out: -     Temp (24hrs), Av.1 °C (97 °F), Min:35.9 °C (96.6 °F), Max:36.3 °C (97.3 °F)         Assessment/Plan     Patient will not be given a loading dose.  Will initiate vancomycin maintenance,  1250 mg every 24 hours.    This dosing regimen is predicted by [employer] to result in the following pharmacokinetic parameters:    Regimen: 1250 mg IV every 24 hours.  Start time: 17:28 on 2024  Exposure target: AUC24 (range)400-600 mg/L.hr   NGB70-30: 403 mg/L.hr  AUC24,ss: 578 mg/L.hr  Probability of AUC24 > 400: 87 %  Ctrough,ss: 17.8 mg/L  Probability of Ctrough,ss > 20: 38 %      Follow-up level will be ordered on  at 0500 unless clinically indicated sooner.  Will continue to monitor renal function daily while on vancomycin and order serum creatinine at least every 48 hours if not already ordered.  Follow for continued vancomycin needs, clinical response, and signs/symptoms of toxicity.       Francesco Rothman, [facility]

## 2024-11-08 NOTE — PROGRESS NOTES
Nutrition Follow up Note    Nutrition Assessment      Awaiting transfer to [facility] for further care and management, pending bed availability. PO intake continues to be minimal. MD notes considering resuming tube feeds. Will provide recommendations at this time.    Nutrition History:     Energy Intake: Poor < 50 %  Food Allergies/Intolerances:  None  GI Symptoms: None  Oral Problems: Oral aversion    Anthropometrics:  Ht: 182.9 cm (6'), Wt: 65.2 kg (143 lb 11.8 oz), BMI: 19.49  IBW/kg (Dietitian Calculated): 80.9 kg  Percent of IBW: 81 %     Weight Change:  Daily Weight  11/07/24 : 65.2 kg (143 lb 11.8 oz)  02/27/24 : 68.9 kg (152 lb)  08/10/23 : 70.3 kg (155 lb)  01/03/22 : 76.7 kg (169 lb)  10/21/21 : 77.6 kg (171 lb)  07/21/21 : 76.3 kg (168 lb 4 oz)  02/26/21 : 68.9 kg (152 lb)  12/29/20 : 68.6 kg (151 lb 3.2 oz)  12/03/20 : 62.6 kg (138 lb)     Nutrition Focused Physical Exam Findings:   Subcutaneous Fat Loss  Orbital Fat Pads: Mild-Moderate (slight dark circles and slight hollowing)  Buccal Fat Pads: Mild-Moderate (flat cheeks, minimal bounce)  Triceps: Severe (negligible fat tissue)    Muscle Wasting  Temporalis: Severe (hollowed scooping depression)  Pectoralis (Clavicular Region): Severe (protruding prominent clavicle)  Deltoid/Trapezius: Severe (squared shoulders, acromion process prominent)    Nutrition Significant Labs:  Lab Results   Component Value Date    WBC 14.6 (H) 11/07/2024    HGB 7.6 (L) 11/07/2024    HCT 22.7 (L) 11/07/2024     11/07/2024    CHOL 67 02/23/2024    TRIG 129 02/23/2024    HDL 24.5 02/23/2024    ALT 30 11/07/2024    AST 40 (H) 11/07/2024     (L) 11/08/2024    K 3.2 (L) 11/08/2024     11/08/2024    CREATININE 1.22 11/08/2024    BUN 28 (H) 11/08/2024    CO2 24 11/08/2024    TSH 1.70 11/07/2024    INR 1.5 (H) 10/16/2024    HGBA1C 5.3 10/16/2024     Nutrition Specific Medications:  aspirin, 81 mg, oral, Daily  budesonide, 0.5 mg, nebulization, BID  carvedilol, 3.125  mg, oral, BID after meals  insulin lispro, 0-5 Units, subcutaneous, q6h  magnesium oxide, 400 mg, oral, Daily  midodrine, 10 mg, oral, TID  ofloxacin, 1 drop, Left Eye, 4x daily  pantoprazole, 40 mg, intravenous, Daily  piperacillin-tazobactam, 3.375 g, intravenous, q6h  potassium phosphate, 15 mmol, intravenous, Once  pravastatin, 40 mg, oral, Nightly  tamsulosin, 0.8 mg, oral, Nightly  thiamine, 100 mg, nasogastric tube, Daily      potassium chloride in 0.9%NaCl, 50 mL/hr, Last Rate: 50 mL/hr (11/08/24 1125)      Dietary Orders (From admission, onward)       Start     Ordered    11/04/24 1033  Adult diet Regular; Pureed 4; Thin 0; 1:1 Feeding  Diet effective now        Comments: Compensatory Swallowing Strategies:                                                                                                                                                                                                                                                           Upright 90 degrees as possible for all oral intake, single sips/bites, slow rate eating/feeding,  upright after meals   Question Answer Comment   Diet type Regular    Texture Pureed 4    Fluid consistency Thin 0    Select tray type: 1:1 Feeding        11/04/24 1033    10/28/24 1432  Oral nutritional supplements  Until discontinued        Comments: Strawberry or vanilla ensure with each meal per patient preference   Question Answer Comment   Deliver with All meals    Select supplement: Ensure High Protein        10/28/24 1432    10/21/24 1456  Oral nutritional supplements  Until discontinued        Comments: chocolate   Question Answer Comment   Deliver with All meals    Select supplement: Magic Cup        10/21/24 1456    10/17/24 0053  May Participate in Room Service With Assistance  ( ROOM SERVICE MAY PARTICIPATE WITH ASSISTANCE)  Once        Question:  .  Answer:  Yes    10/17/24 0052                   Estimated Needs:   Estimated Energy Needs  Total  Energy Estimated Needs (kCal): 1848 kCal  Total Estimated Energy Need per Day (kCal/kg): 30 kCal/kg  Method for Estimating Needs: Actual Wt    Estimated Protein Needs  Total Protein Estimated Needs (g): 62 g  Total Protein Estimated Needs (g/kg): 1 g/kg  Method for Estimating Needs: Actual Wt    Estimated Fluid Needs  Total Fluid Estimated Needs (mL): 1848 mL  Method for Estimating Needs: 1 mL/kcal        Nutrition Diagnosis   Nutrition Diagnosis:  Malnutrition Diagnosis  Patient has Malnutrition Diagnosis: Yes  Diagnosis Status: Ongoing  Malnutrition Diagnosis: Severe malnutrition related to chronic disease or condition  As Evidenced by: Moderate to severe subcutaeous fat losses, severe muscle deficits, po intake likely less than estimate energy needs    Nutrition Diagnosis  Patient has Nutrition Diagnosis: Yes  Diagnosis Status (1): Ongoing  Nutrition Diagnosis 1: Inadequate energy intake  Related to (1): decreased ability to consume sufficient energy  As Evidenced by (1): poor po intake     Nutrition Interventions/Recommendations   Nutrition Interventions and Recommendations:    Nutrition Prescription:  Individualized Nutrition Prescription Provided for : 1848 calories, 66 gm protein when diet advances    Nutrition Interventions:   Food and/or Nutrient Delivery Interventions  Interventions: Medical food supplement  Meals and Snacks: Texture-modified diet  Goal: provide as ordered  Enteral Intake: Modify composition of enteral nutrition, Modify rate of enteral nutrition  Goal: If tube feed is resumes, recommend: Osmolite 1.5 goal rate @50 mL/Hr to provide 1800 calories, 75 gm protein, 914 mL free water. Suggest start @10 mL/Hr and increase by 10 mL Q12H until goal. Recommend additional 150 mL water flushes Q4H to meet estimated hydration neeeds.  Medical Food Supplement: Commercial beverage, Modified beverage  Goal: ensure high protein TID to provide 160 kcals and 16g protein each; magic cup TID to provide 290  kcals and 9g protein each  Vitamin Supplement Therapy: Thiamin  Goal: Recommend 100 mg thiamin once daily for 7-10 days to prevent neurological complications during nutritional rehabilitation.  Additional Interventions: Monitor K+, PO4 and Mg BID for signs of refeeding syndrome.    Education Documentation  No documentation found.         Nutrition Monitoring and Evaluation   Monitoring/Evaluation:   Food/Nutrient Related History Monitoring  Monitoring and Evaluation Plan: Energy intake  Energy Intake: Estimated energy intake  Criteria: monitoring po intake  Enteral and Parenteral Nutrition Intake: Enteral nutrition intake  Criteria: Monitoring for tube feed initiation/tolerance    Body Composition/Growth/Weight History  Monitoring and Evaluation Plan: Weight  Weight: Measured weight  Criteria: pt will maintain/gain wt       Time Spent/Follow-up:   Follow Up  Time Spent (min): 25 minutes  Last Date of Nutrition Visit: 11/08/24  Nutrition Follow-Up Needed?: 5-7 days  Follow up Comment: 11/13/24

## 2024-11-08 NOTE — PROGRESS NOTES
Occupational Therapy    OT Treatment    Patient Name: Dat Mccallum  MRN: [id number]  Department: [address]  Room: 06/06-A  Today's Date: 11/8/2024  Time Calculation  Start Time: 1147  Stop Time: 1200  Time Calculation (min): 13 min        Assessment:  OT Assessment: Gradual progress made towards OT goals. Continue with current OT POC to increase strength, balance and functional tolerance to maximize safety and independence during ADLs.  Barriers to Discharge: Decreased caregiver support  Evaluation/Treatment Tolerance: Patient limited by fatigue  End of Session Communication: PCT/NA/CTA  End of Session Patient Position: Bed, 3 rail up, Alarm off, caregiver present (PCA present in room, all needs in reach)  OT Assessment Results: Decreased ADL status, Decreased upper extremity strength, Decreased safe judgment during ADL, Decreased cognition, Decreased fine motor control, Decreased functional mobility, Decreased gross motor control  Barriers to Discharge: Decreased caregiver support  Evaluation/Treatment Tolerance: Patient limited by fatigue  Plan:  Treatment Interventions: ADL retraining, Functional transfer training, UE strengthening/ROM, Endurance training, Patient/family training, Cognitive reorientation  OT Frequency: 4 times per week  OT Discharge Recommendations: Moderate intensity level of continued care  OT Recommended Transfer Status: Assist of 2  OT - OK to Discharge: Yes  Treatment Interventions: ADL retraining, Functional transfer training, UE strengthening/ROM, Endurance training, Patient/family training, Cognitive reorientation    Subjective   Previous Visit Info:  OT Last Visit  OT Received On: 11/08/24  General:  General  Reason for Referral: Impaired ADLs due to NSTEMI  Past Medical History Relevant to Rehab: DM, HTN, HLD, CKD 3, BPH, pancreatectomy Whipple procedure, knee surgery  Co-Treatment: PT  Co-Treatment Reason: partial co-tx d/t need for 2 skilled person assist for safety and to maximize  functional outcomes.  Prior to Session Communication: Bedside nurse  Patient Position Received: Bed, 2 rail up, Alarm off, caregiver present (pt seated EOB [facility] PTA present in room upon arrival)  General Comment: Pt cleared for therapy session per nursing, cooperative this date however fatigues easily.  Precautions:  Hearing/Visual Limitations: Profoundly hard of hearing;  use writting or lip reading to communicate  Medical Precautions: Fall precautions, Oxygen therapy device and L/min (3L O2 via NC)  Precautions Comment: + tele, + cont sp02, + IV            Pain:  Pain Assessment  Pain Assessment: FLACC (Face, Legs, Activity, Cry, Consolability)  0-10 (Numeric) Pain Score: 0 - No pain  Clinical Progression: Not changed    Objective    Cognition:  Cognition  Overall Cognitive Status: Impaired  Orientation Level: Unable to assess  Following Commands:  (inconsistent command following)  Cognition Comments: pt utilizes dry erase board to communicate- minimal eye contact or use of board this date.  Safety/Judgement: Exceptions to WFL  Insight: Severe  Impulsive: Mildly  Task Initiation: Initiates with cues (inconsistent)  Processing Speed: Delayed  Coordination:  Movements are Fluid and Coordinated: No  Upper Body Coordination: slower rate and accuracy of movements  Lower Body Coordination: slower rate of movements  Activities of Daily Living: LE Dressing  LE Dressing Comments: total dependent assist required to don socks while seated EOB    Toileting  Toileting Comments: pt with bowel incontinence requiring maxAx2 for posterior hygiene completion in supported standing with FWW  Functional Standing Tolerance:     Bed Mobility/Transfers: Bed Mobility  Bed Mobility: Yes  Bed Mobility 1  Bed Mobility 1: Sitting to supine  Level of Assistance 1: Maximum assistance, +2  Bed Mobility Comments 1: pt seated EOB upon arrival, at end of session pt required to supine in bed with maxAx2 and use of draw sheet to complete scooting  tasks towards HOB. PCA present in room to assist with self-feeding at end of session.    Transfer 1  Trials/Comments 1: sit<>stand completed from moderately elevated EOB height with FWW and maxAx2- verbal/ tactile cues required for proper hand placement to increase safety and decrease risk of falls, poor carry-over observed.      Outcome Measures:[facility] Daily Activity  Putting on and taking off regular lower body clothing: Total  Bathing (including washing, rinsing, drying): A lot  Putting on and taking off regular upper body clothing: A lot  Toileting, which includes using toilet, bedpan or urinal: Total  Taking care of personal grooming such as brushing teeth: A lot  Eating Meals: A lot  Daily Activity - Total Score: 10        Education Documentation  ADL Training, taught by HARMEET Avendaño at 11/8/2024  2:17 PM.  Learner: Patient  Readiness: Nonacceptance  Method: Explanation, Demonstration  Response: Needs Reinforcement    Education Comments  Education provided on role of OT/POC, safety awareness throughout functional tasks/transfers, importance of activity/ rest routine, EC/WS techniques, and use of call light for assistance.     Goals:  Encounter Problems       Encounter Problems (Active)       ADL       Goal 1 (Progressing)       Start:  10/21/24    Expected End:  11/01/24       Patient will demonstrate improved ADL skills:  Bathing with Min assist with adaptive equipment prn    Grooming with SBA assist       Feeding with supervision assist      UE Dressing with SBA assist           LE Dressing with Min assist with adaptive equipment prn     Toileting with Min assist with adaptive equipment prn

## 2024-11-08 NOTE — PROGRESS NOTES
Pulmonary Progress Note    Dat Mccallum is a 83 y.o. male on day 23 of admission presenting with NSTEMI (non-ST elevated myocardial infarction) (Multi).    Subjective   More awake today. Trying to write on a dry erase board but it doesn't make sense  Objective   Vital Signs      11/7/2024     2:23 PM 11/7/2024     4:08 PM 11/7/2024     8:32 PM 11/7/2024    11:40 PM 11/8/2024     3:35 AM 11/8/2024     7:57 AM 11/8/2024    11:00 AM   Vitals   Systolic  92 102 103 97  101   Diastolic  51 55 57 49  57   Heart Rate 85 81 93 90 75     Temp 36.3 °C (97.3 °F) 36 °C (96.8 °F) 36.3 °C (97.3 °F) 35.9 °C (96.6 °F) 36 °C (96.8 °F) 36.2 °C (97.2 °F) 36.2 °C (97.2 °F)   Resp 18 17  21      Visit Report Report Report Report Report          Oxygen Therapy  SpO2: 93 %  Medical Gas Therapy: Supplemental oxygen  Medical Gas Delivery Method: Nasal cannula         Intake/Output previous 24 hours:    Intake/Output Summary (Last 24 hours) at 11/8/2024 1525  Last data filed at 11/8/2024 1452  Gross per 24 hour   Intake 722.5 ml   Output --   Net 722.5 ml       Physical Exam  Vitals reviewed.   Constitutional:       Appearance: Normal appearance.   HENT:      Head: Normocephalic and atraumatic.      Mouth/Throat:      Mouth: Mucous membranes are moist.   Eyes:      Extraocular Movements: Extraocular movements intact.      Pupils: Pupils are equal, round, and reactive to light.   Cardiovascular:      Rate and Rhythm: Normal rate and regular rhythm.   Pulmonary:      Effort: Pulmonary effort is normal.      Breath sounds: Normal breath sounds and air entry.   Abdominal:      General: Abdomen is flat. Bowel sounds are normal.      Palpations: Abdomen is soft.   Musculoskeletal:         General: Normal range of motion.      Cervical back: Normal range of motion and neck supple.   Skin:     General: Skin is warm and dry.   Neurological:      General: No focal deficit present.      Mental Status: He is alert and oriented to person, place,  and time. Mental status is at baseline.       ...  Lines and Tubes:  Peripheral IV 11/03/24 22 G Proximal;Right;Anterior Forearm (Active)   Placement Date/Time: 11/03/24 1858   Size (Gauge): 22 G  Orientation: Proximal;Right;Anterior  Location: Forearm   Number of days: 4       External Urinary Catheter Male (Active)   Placement Date/Time: 10/17/24 0000   External Catheter Type: Male   Number of days: 22         Scheduled medications  aspirin, 81 mg, oral, Daily  budesonide, 0.5 mg, nebulization, BID  carvedilol, 3.125 mg, oral, BID after meals  insulin lispro, 0-5 Units, subcutaneous, q6h  magnesium oxide, 400 mg, oral, Daily  midodrine, 10 mg, oral, TID  ofloxacin, 1 drop, Left Eye, 4x daily  pantoprazole, 40 mg, intravenous, Daily  piperacillin-tazobactam, 3.375 g, intravenous, q6h  potassium phosphate, 15 mmol, intravenous, Once  pravastatin, 40 mg, oral, Nightly  tamsulosin, 0.8 mg, oral, Nightly  thiamine, 100 mg, nasogastric tube, Daily      Continuous medications  potassium chloride in 0.9%NaCl, 50 mL/hr, Last Rate: 50 mL/hr (11/08/24 1452)      PRN medications  PRN medications: acetaminophen **OR** acetaminophen, aminophylline, dextrose, dextrose, glucagon, glucagon, melatonin, OLANZapine, oxygen, prochlorperazine, prochlorperazine    Relevant Results  Results from last 7 days   Lab Units 11/07/24 0453 11/06/24 0458 11/05/24  0525   WBC AUTO x10*3/uL 14.6* 13.1* 10.5   HEMOGLOBIN g/dL 7.6* 8.2* 7.9*   HEMATOCRIT % 22.7* 22.7* 22.9*   PLATELETS AUTO x10*3/uL 378 371 331      Results from last 7 days   Lab Units 11/08/24 0448 11/07/24 0453 11/06/24  0458   SODIUM mmol/L 132* 131* 129*   POTASSIUM mmol/L 3.2* 3.9 4.0   CHLORIDE mmol/L 100 98 99   CO2 mmol/L 24 26 25   BUN mg/dL 28* 28* 24*   CREATININE mg/dL 1.22 1.16 0.82   GLUCOSE mg/dL 69* 104* 86   CALCIUM mg/dL 7.6* 7.7* 7.7*          XR chest 1 view 11/07/2024    Narrative  Interpreted By:  Annia Dillon,  STUDY:  XR CHEST 1 VIEW 11/7/2024 7:48  am    INDICATION:  CHF    COMPARISON:  11/05/2024    ACCESSION NUMBER(S):  [id number]    ORDERING CLINICIAN:  ANNETTE BRANTLEY    TECHNIQUE:  AP semi-erect view of the chest    FINDINGS:  Cardiac size is normal calcified visible within the aortic arch. No  interval change in the appearance of the chest when compared study  done 2 days earlier. Persistent areas of infiltrate within each lower  lung zone with probable small left pleural effusion. Is mild  pulmonary vascular congestion.    Impression  Normal-sized heart with mild pulmonary vascular congestion and  persistent areas of infiltrate in each lower lung zone with small  left pleural effusion.    Signed by: Annia Dillon 11/7/2024 8:02 AM  Dictation workstation:   [id number]      Patient Active Problem List   Diagnosis    Arthritis    Autoimmune sclerosing pancreatitis (Multi)    Chronic constipation    Deaf    Depression    Type 2 diabetes mellitus without complication, without long-term current use of insulin (Multi)    Dry eye syndrome    BPH (benign prostatic hyperplasia)    Hyperlipidemia    Mixed type age-related cataract, both eyes    Overactive bladder    Primary osteoarthritis of left knee    Ulcerative blepharitis left lower eyelid    Vitamin D deficiency    Medicare annual wellness visit, subsequent    NSTEMI (non-ST elevated myocardial infarction) (Multi)    Acute metabolic encephalopathy    Chronic systolic heart failure    Idiopathic hypotension    Severe protein-calorie malnutrition (Multi)    Anemia of chronic disease    Acute on chronic systolic (congestive) heart failure    Hypomagnesemia    Acute cystitis with hematuria    Urinary retention    Bacterial pneumonia    Pleural effusion     Assessment/Plan   Worsening of chest xray over the past several days  WBCs rising as well  Agree with adding abx     ID consult pending    Neuro following     Transfer to main [facility] being discussed for further neurologic workup.     Blake Santos MD

## 2024-11-08 NOTE — PROGRESS NOTES
Physical Therapy    Physical Therapy Treatment    Patient Name: Dat Mccallum  MRN: [id number]  Department: [address]  Room: 06/06A  Today's Date: 11/8/2024  Time Calculation  Start Time: 1122  Stop Time: 1200  Time Calculation (min): 38 min         Assessment/Plan   PT Assessment  End of Session Communication: Bedside nurse  Assessment Comment: Pt attempting to communicate via whiteboard, unable to read. Attempted MARTII use with patient unable to engage with . Pt more alert upon arrival but req'd increased assistance with transfer this session.  End of Session Patient Position: Bed, 3 rail up, Alarm off, caregiver present  PT Plan  Inpatient/Swing Bed or Outpatient: Inpatient  PT Plan  Treatment/Interventions: Bed mobility, Transfer training, Gait training, Balance training, Strengthening, Endurance training, Therapeutic exercise, Therapeutic activity  PT Plan: Ongoing PT  PT Frequency: 4 times per week  PT Discharge Recommendations: Moderate intensity level of continued care  PT Recommended Transfer Status: Total assist  PT - OK to Discharge: Yes (with skilled physical therapy services at next level of care)      General Visit Information:   PT  Visit  PT Received On: 11/08/24  General  Co-Treatment: OT  Co-Treatment Reason: partial co-tx d/t need for 2 skilled person assist for safe transfer tasks + optimize pt outcomes  Prior to Session Communication: Bedside nurse  Patient Position Received: Bed, 3 rail up, Alarm off, not on at start of session  General Comment: Pt cleared for PT by nursing. Pt found alert in bed with pt signing yes when asked via whiteboard if we could come to EOB.    Subjective   Precautions:  Precautions  Hearing/Visual Limitations: Profoundly hard of hearing;  use writting or lip reading to communicate  Medical Precautions: Fall precautions, Oxygen therapy device and L/min (3L 02 NC)  Precautions Comment: + tele, + cont sp02, + IV      Objective   Pain:  Pain Assessment  Pain  Assessment: FLACC (Face, Legs, Activity, Cry, Consolability)  0-10 (Numeric) Pain Score: 0 - No pain  Cognition:  Cognition  Overall Cognitive Status: Impaired  Orientation Level: Unable to assess     Postural Control:  Static Sitting Balance  Static Sitting-Balance Support: Bilateral upper extremity supported, Feet supported  Static Sitting-Level of Assistance: Contact guard  Static Sitting-Comment/Number of Minutes: fair + seated static balance  Static Standing Balance  Static Standing-Balance Support: Bilateral upper extremity supported  Static Standing-Level of Assistance: Minimum assistance  Static Standing-Comment/Number of Minutes: poor static stand balance    Treatments:  Therapeutic Exercise  Therapeutic Exercise Performed: Yes  Therapeutic Exercise Activity 1: Seated B LAQ x5    Therapeutic Activity  Therapeutic Activity Performed: Yes  Therapeutic Activity 1: Seated at EOB for ~15 mins with light CGA. Pt reaching ouside MARGOTH for lines on bed with pt demoig good core control with no LOB.  Therapeutic Activity 2: Standing at FWW for ~3 mins.    Bed Mobility  Bed Mobility: Yes  Bed Mobility 1  Bed Mobility 1: Supine to sitting  Level of Assistance 1: Minimum assistance  Bed Mobility Comments 1: HOB elevated, pt utilzing PTA hands to pull self towards EOB. Improved ability to exit bed with less assist.  Bed Mobility 2  Bed Mobility  2: Sitting to supine  Level of Assistance 2: Maximum assistance, +2  Bed Mobility Comments 2: MaxAx2 to clear LEs over EOB and to manage trunk.  Bed Mobility 3  Bed Mobility 3: Scooting  Level of Assistance 3: Dependent  Bed Mobility Comments 3: Dep to scoot to HOB with drawsheet.    Transfers  Transfer: Yes  Transfer 1  Transfer From 1: Sit to, Stand to  Transfer to 1: Stand, Sit  Technique 1: Sit to stand, Stand to sit  Transfer Device 1: Walker  Transfer Level of Assistance 1: Maximum assistance, +2  Trials/Comments 1: MaxAx2 to raise trunk from EOB. Improved ability to hold  self up upon standing. Pt pulling up to FWW with PTA stabilizing FWW.    Outcome Measures:  [facility] Basic Mobility  Turning from your back to your side while in a flat bed without using bedrails: A little  Moving from lying on your back to sitting on the side of a flat bed without using bedrails: A little  Moving to and from bed to chair (including a wheelchair): Total  Standing up from a chair using your arms (e.g. wheelchair or bedside chair): A lot  To walk in hospital room: Total  Climbing 3-5 steps with railing: Total  Basic Mobility - Total Score: 11    Encounter Problems       Encounter Problems (Active)       Mobility       Bed mobility including supine to sit and sit to supine with supervision. (Progressing)       Start:  10/20/24    Expected End:  11/03/24            Transfers including sit to stand and stand to sit with supervision. (Progressing)       Start:  10/20/24    Expected End:  11/03/24            Ambulate 50 feet with rolling walker and min assist. (Not Progressing)       Start:  10/20/24    Expected End:  11/03/24               Pain - Adult

## 2024-11-08 NOTE — CONSULTS
Inpatient consult to Pulmonology  Consult performed by: Blake Santos MD  Consult ordered by: Laurent Anderson MD          Pulmonary Consult    Reason For Consult  Pneumonia  History Of Present Illness  Dat Mccallum is a 83 y.o. male presenting with NSTEMI (non-ST elevated myocardial infarction) (Multi).     Past Medical History  He has a past medical history of Acute frontal sinusitis, unspecified (01/06/2015), Encounter for screening for malignant neoplasm of colon (01/22/2020), Encounter for screening for malignant neoplasm of prostate (08/14/2017), Other conditions influencing health status, Other conditions influencing health status (01/19/2018), Other skin changes (07/21/2021), Other specified health status, Personal history of other diseases of the nervous system and sense organs, Personal history of other diseases of urinary system (08/10/2021), Personal history of other specified conditions (06/29/2018), Personal history of other specified conditions (09/25/2017), Personal history of other specified conditions (08/14/2017), Personal history of other specified conditions (10/11/2017), Personal history of pneumonia (recurrent) (01/19/2018), Rash and other nonspecific skin eruption (12/17/2020), 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    Surgical History  He has a past surgical history that includes Other surgical history (12/02/2013); Other surgical history (11/26/2018); Other surgical history (11/26/2018); and Cardiac catheterization (N/A, 10/23/2024).     Social History  He reports that he has never smoked. He has never been exposed to tobacco smoke. He has never used smokeless tobacco. He reports that he does not currently use alcohol. He reports that he does not use drugs.      Family History  No family history on file.     Allergies  Patient has no known allergies.    Review of Systems   Reason unable to perform  ROS: AMS.       Last Recorded Vitals  Blood pressure 102/55, pulse 93, temperature 36.3 °C (97.3 °F), temperature source Temporal, resp. rate 17, height 1.829 m (6'), weight 65.2 kg (143 lb 11.8 oz), SpO2 94%.    Intake/Output    Intake/Output Summary (Last 24 hours) at 11/7/2024 2215  Last data filed at 11/7/2024 1930  Gross per 24 hour   Intake 200 ml   Output 0 ml   Net 200 ml       Physical Exam  Vitals reviewed.   Constitutional:       Appearance: Normal appearance.   HENT:      Head: Normocephalic and atraumatic.      Mouth/Throat:      Mouth: Mucous membranes are moist.   Eyes:      Extraocular Movements: Extraocular movements intact.      Pupils: Pupils are equal, round, and reactive to light.   Cardiovascular:      Rate and Rhythm: Normal rate and regular rhythm.   Pulmonary:      Effort: Pulmonary effort is normal.      Breath sounds: Normal breath sounds and air entry.   Abdominal:      General: Abdomen is flat. Bowel sounds are normal.      Palpations: Abdomen is soft.   Musculoskeletal:         General: Normal range of motion.      Cervical back: Normal range of motion and neck supple.   Skin:     General: Skin is warm and dry.   Neurological:      General: No focal deficit present.      Mental Status: He is alert and oriented to person, place, and time. Mental status is at baseline.      ...    Oxygen Therapy  SpO2: 94 %  Medical Gas Therapy: None (Room air)  Medical Gas Delivery Method: Nasal cannula       Lines and Tubes:  Peripheral IV 11/03/24 22 G Proximal;Right;Anterior Forearm (Active)   Placement Date/Time: 11/03/24 1858   Size (Gauge): 22 G  Orientation: Proximal;Right;Anterior  Location: Forearm   Number of days: 4       External Urinary Catheter Male (Active)   Placement Date/Time: 10/17/24 0000   External Catheter Type: Male   Number of days: 21         Scheduled medications  aspirin, 81 mg, oral, Daily  budesonide, 0.5 mg, nebulization, BID  carvedilol, 3.125 mg, oral, BID after  meals  insulin glargine, 8 Units, subcutaneous, Nightly  insulin lispro, 0-5 Units, subcutaneous, q6h  magnesium oxide, 400 mg, oral, Daily  midodrine, 10 mg, oral, TID  ofloxacin, 1 drop, Left Eye, 4x daily  pantoprazole, 40 mg, intravenous, Daily  piperacillin-tazobactam, 3.375 g, intravenous, q6h  potassium phosphate, 15 mmol, intravenous, Once  pravastatin, 40 mg, oral, Nightly  tamsulosin, 0.8 mg, oral, Nightly  thiamine, 100 mg, nasogastric tube, Daily      Continuous medications     PRN medications  PRN medications: acetaminophen **OR** acetaminophen, aminophylline, dextrose, dextrose, glucagon, glucagon, melatonin, OLANZapine, oxygen, prochlorperazine, prochlorperazine    Relevant Results  Results from last 7 days   Lab Units 11/07/24  0453 11/06/24  0458 11/05/24  0525   WBC AUTO x10*3/uL 14.6* 13.1* 10.5   HEMOGLOBIN g/dL 7.6* 8.2* 7.9*   HEMATOCRIT % 22.7* 22.7* 22.9*   PLATELETS AUTO x10*3/uL 378 371 331      Results from last 7 days   Lab Units 11/07/24  0453 11/06/24  0458 11/05/24  0525   SODIUM mmol/L 131* 129* 132*   POTASSIUM mmol/L 3.9 4.0 3.9   CHLORIDE mmol/L 98 99 100   CO2 mmol/L 26 25 27   BUN mg/dL 28* 24* 24*   CREATININE mg/dL 1.16 0.82 0.84   GLUCOSE mg/dL 104* 86 114*   CALCIUM mg/dL 7.7* 7.7* 7.7*          XR chest 1 view 11/07/2024    Narrative  Interpreted By:  Annia Dillon,  STUDY:  XR CHEST 1 VIEW 11/7/2024 7:48 am    INDICATION:  CHF    COMPARISON:  11/05/2024    ACCESSION NUMBER(S):  [id number]    ORDERING CLINICIAN:  ANNETTE BRANTLEY    TECHNIQUE:  AP semi-erect view of the chest    FINDINGS:  Cardiac size is normal calcified visible within the aortic arch. No  interval change in the appearance of the chest when compared study  done 2 days earlier. Persistent areas of infiltrate within each lower  lung zone with probable small left pleural effusion. Is mild  pulmonary vascular congestion.    Impression  Normal-sized heart with mild pulmonary vascular congestion and  persistent areas  of infiltrate in each lower lung zone with small  left pleural effusion.    Signed by: Annia Dillon 11/7/2024 8:02 AM  Dictation workstation:   [id number]    Assessment/Plan   Assessment & Plan  NSTEMI (non-ST elevated myocardial infarction) (Multi)    Type 2 diabetes mellitus without complication, without long-term current use of insulin (Multi)    BPH (benign prostatic hyperplasia)    Acute metabolic encephalopathy    Chronic systolic heart failure    Idiopathic hypotension    Severe protein-calorie malnutrition (Multi)    Anemia of chronic disease    Acute on chronic systolic (congestive) heart failure    Hypomagnesemia    Acute cystitis with hematuria    Urinary retention    Bacterial pneumonia    Pleural effusion      Worsening of chest xray over the past several days  WBCs rising as well  Agree with adding abx    Neuro following    Transfer to main campus being discussed for further neurologic workup.     Blake Santos MD

## 2024-11-08 NOTE — ASSESSMENT & PLAN NOTE
I resumed antibiotics about 2 days ago and his mental status was declining his chest x-ray was equivocal his white count went up and his O2 needs went up.  Have asked pulmonary to see him.  They have not seen him yet.  We did try to get him off oxygen today he still has a high white count.  I am going to ask infectious disease for their input.  Repeat chest x-ray tomorrow if he still here.

## 2024-11-08 NOTE — CONSULTS
Inpatient consult to Infectious Diseases  Consult performed by: Waqas Campbell MD  Consult ordered by: Laurent Anderson MD          Primary MD: MERLENE Scott-CNP    Reason For Consult  Leukocytosis/pneumonia    History Of Present Illness  Dat Mccallum is a 83 y.o. male presenting with altered mental status.  He was admitted on 10/16/2024 for being less responsive.  He was brought to the hospital for further care management.  He is alert provide any comprehensive history.  He had a chest x-ray which was suggestive of atelectasis versus infiltrate.  He is on IV Zosyn.  He is somewhat drowsy but arousable.  He is on low-flow oxygen, 2 L     Past Medical History  He has a past medical history of Acute frontal sinusitis, unspecified (01/06/2015), Encounter for screening for malignant neoplasm of colon (01/22/2020), Encounter for screening for malignant neoplasm of prostate (08/14/2017), Other conditions influencing health status, Other conditions influencing health status (01/19/2018), Other skin changes (07/21/2021), Other specified health status, Personal history of other diseases of the nervous system and sense organs, Personal history of other diseases of urinary system (08/10/2021), Personal history of other specified conditions (06/29/2018), Personal history of other specified conditions (09/25/2017), Personal history of other specified conditions (08/14/2017), Personal history of other specified conditions (10/11/2017), Personal history of pneumonia (recurrent) (01/19/2018), Rash and other nonspecific skin eruption (12/17/2020), 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    Surgical History  He has a past surgical history that includes Other surgical history (12/02/2013); Other surgical history (11/26/2018); Other surgical history (11/26/2018); and Cardiac catheterization (N/A, 10/23/2024).     Social History      Occupational History    Not on file   Tobacco Use    Smoking status: Never     Passive exposure: Never    Smokeless tobacco: Never   Vaping Use    Vaping status: Never Used   Substance and Sexual Activity    Alcohol use: Not Currently    Drug use: Never    Sexual activity: Not on file     Travel History   Travel since 10/08/24    No documented travel since 10/08/24           Family History  No family history on file.  Allergies  Patient has no known allergies.     Immunization History   Administered Date(s) Administered    Flu vaccine, quadrivalent, high-dose, preservative free, age 65y+ (FLUZONE) 09/20/2021, 10/29/2022    Flu vaccine, trivalent, preservative free, HIGH-DOSE, age 65y+ (Fluzone) 09/20/2018, 12/03/2020    Influenza, Unspecified 09/21/2017, 09/25/2021    Moderna COVID-19 vaccine, bivalent, blue cap/gray label *Check age/dose* 10/29/2022    Pneumococcal conjugate vaccine, 13-valent (PREVNAR 13) 01/19/2018    Pneumococcal polysaccharide vaccine, 23-valent, age 2 years and older (PNEUMOVAX 23) 07/21/2021     Medications  Home medications:  Medications Prior to Admission   Medication Sig Dispense Refill Last Dose/Taking    glimepiride (Amaryl) 4 mg tablet Take 1 tablet (4 mg) by mouth 2 times a day. 180 tablet 3 Unknown    lovastatin (Mevacor) 20 mg tablet Take 1 tablet (20 mg) by mouth once daily at bedtime. 90 tablet 3 Unknown    metFORMIN (Glucophage) 500 mg tablet Take 2 tablets by mouth twice daily 360 tablet 0 Unknown    omeprazole (PriLOSEC) 20 mg DR capsule Take by mouth.   Unknown    OneTouch Ultra Test strip TEST 3 TIMES DAILY   Unknown    tamsulosin (Flomax) 0.4 mg 24 hr capsule Take 2 capsules (0.8 mg) by mouth once daily at bedtime. 180 capsule 3 Unknown     Current medications:  Scheduled medications  aspirin, 81 mg, oral, Daily  budesonide, 0.5 mg, nebulization, BID  carvedilol, 3.125 mg, oral, BID after meals  insulin lispro, 0-5 Units, subcutaneous, q6h  magnesium oxide, 400 mg, oral,  Daily  midodrine, 10 mg, oral, TID  ofloxacin, 1 drop, Left Eye, 4x daily  pantoprazole, 40 mg, intravenous, Daily  piperacillin-tazobactam, 3.375 g, intravenous, q6h  potassium phosphate, 15 mmol, intravenous, Once  pravastatin, 40 mg, oral, Nightly  tamsulosin, 0.8 mg, oral, Nightly  thiamine, 100 mg, nasogastric tube, Daily      Continuous medications  potassium chloride in 0.9%NaCl, 50 mL/hr, Last Rate: 50 mL/hr (11/08/24 1452)      PRN medications  PRN medications: acetaminophen **OR** acetaminophen, aminophylline, dextrose, dextrose, glucagon, glucagon, melatonin, OLANZapine, oxygen, prochlorperazine, prochlorperazine    Review of Systems   Unable to perform ROS: Mental status change        Objective  Range of Vitals (last 24 hours)  Heart Rate:  [75-93]   Temp:  [35.9 °C (96.6 °F)-36.3 °C (97.3 °F)]   Resp:  [17-21]   BP: ()/(49-57)   SpO2:  [93 %-96 %]   Daily Weight  11/07/24 : 65.2 kg (143 lb 11.8 oz)    Body mass index is 19.49 kg/m².     Physical Exam  Constitutional:       Appearance: He is ill-appearing.   HENT:      Head: Normocephalic and atraumatic.      Nose: Nose normal.   Eyes:      General: No scleral icterus.     Conjunctiva/sclera: Conjunctivae normal.   Cardiovascular:      Rate and Rhythm: Normal rate and regular rhythm.      Heart sounds: Normal heart sounds, S1 normal and S2 normal.   Pulmonary:      Breath sounds: Decreased breath sounds present.   Abdominal:      General: Bowel sounds are normal.      Palpations: Abdomen is soft.      Tenderness: There is no abdominal tenderness.   Musculoskeletal:      Cervical back: Neck supple.      Right lower leg: No edema.      Left lower leg: No edema.   Skin:     General: Skin is warm and dry.   Neurological:      Comments: Not responsive   Psychiatric:         Behavior: Behavior is cooperative.          Relevant Results  Outside Hospital Results    Labs  Results from last 72 hours   Lab Units 11/07/24  0453 11/06/24  0458   WBC AUTO  x10*3/uL 14.6* 13.1*   HEMOGLOBIN g/dL 7.6* 8.2*   HEMATOCRIT % 22.7* 22.7*   PLATELETS AUTO x10*3/uL 378 371     Results from last 72 hours   Lab Units 11/08/24 0448 11/07/24 0453 11/06/24 0458   SODIUM mmol/L 132* 131* 129*   POTASSIUM mmol/L 3.2* 3.9 4.0   CHLORIDE mmol/L 100 98 99   CO2 mmol/L 24 26 25   BUN mg/dL 28* 28* 24*   CREATININE mg/dL 1.22 1.16 0.82   GLUCOSE mg/dL 69* 104* 86   CALCIUM mg/dL 7.6* 7.7* 7.7*   ANION GAP mmol/L 11 11 9*   EGFR mL/min/1.73m*2 60* 63 88   PHOSPHORUS mg/dL 3.8 4.3 2.9     Results from last 72 hours   Lab Units 11/08/24 0448 11/07/24 0453 11/06/24 0458   ALK PHOS U/L  --  53 65   BILIRUBIN TOTAL mg/dL  --  0.7 0.6   PROTEIN TOTAL g/dL  --  6.3* 6.6   ALT U/L  --  30 36   AST U/L  --  40* 50*   ALBUMIN g/dL 2.1* 2.1* 2.2*     Estimated Creatinine Clearance: 43.8 mL/min (by C-G formula based on SCr of 1.22 mg/dL).  Sedimentation Rate   Date Value Ref Range Status   02/12/2021 16 0 - 20 mm/h Final     HIV 1 and 2 Screen   Date Value Ref Range Status   03/02/2021 NONREACTIVE NONREACTIVE Final     Comment:      HIV Ag/Ab screen is performed using the Siem[employer]   HIV Ag/Ab Combo assay which detects the presence of HIV    p24 antigen as well as antibodies to HIV-1   (Group M and O) and HIV-2.  .  No laboratory evidence of HIV infection. If acute HIV infection is   suspected, consider testing for HIV RNA by PCR (viral load).       Hepatitis C Ab   Date Value Ref Range Status   03/02/2021 NONREACTIVE NONREACTIVE Final     Comment:      Results from patients taking biotin supplements or receiving   high-dose biotin therapy should be interpreted with caution   due to possible interference with this test. Providers may    contact their local laboratory for further information.       Microbiology  Reviewed  Imaging  MR brain w and wo IV contrast    Result Date: 11/7/2024  Interpreted By:  Rich Purvis, STUDY: MR BRAIN W AND WO IV CONTRAST;  11/7/2024 10:05 am    INDICATION: Signs/Symptoms:Progressive encephalopathy with left hemineglect, upper motor neuron signs and possible catatonia, Prior MRI normal, assess for signs of encephalitis and/or diffusion changes of prion disease.   COMPARISON: 11/19/2024   ACCESSION NUMBER(S): [id number]   ORDERING CLINICIAN: MILTON GARCIA   TECHNIQUE: Axial diffusion, axial T2, axial FLAIR, axial gradient echo T2, axial T1, post gadolinium volumetric T1, as well as post gadolinium axial T1 weighted MRI images of the brain were obtained. The patient received  13 mL of  Dotarem gadolinium intravenously.   FINDINGS: The diffusion weighted images fail to demonstrate abnormal diffusion restriction to suggest acute infarction.   There is again evidence of moderate-to-marked brain parenchymal volume loss.   Nonspecific white matter changes are again noted within cerebral hemispheres bilaterally which while nonspecific, given the patient's age, likely represent sequelae of more remote small-vessel ischemic change.   Additional punctate foci of bright signal on the T2 images are again noted within the subinsular regions and basal ganglia bilaterally suggesting incidental mildly prominent perivascular spaces and/or small scattered more remote lacunar infarctions.   No abnormal intracranial mass lesion or abnormal intracranial enhancement is identified on the postcontrast images.   There is a lobulated mucosal thickening and/or retention cyst/polyp noted within the inferior right maxillary sinus. Minimal mucosal thickening is noted within scattered ethmoid air cells.   The mastoid air cells are clear.       There is no MRI evidence of acute infarction on the diffusion-weighted images.   There is again evidence of moderate-to-marked brain parenchymal volume loss.   Nonspecific white matter changes are again noted within cerebral hemispheres bilaterally which while nonspecific, given the patient's age, likely represent sequelae of more remote  small-vessel ischemic change. Additional punctate foci of bright signal on the T2 images are again noted within the subinsular regions and basal ganglia bilaterally suggesting incidental mildly prominent perivascular spaces and/or small scattered more remote lacunar infarctions.   No abnormal intracranial mass lesion or abnormal intracranial enhancement is identified on the postcontrast images.   MACRO: None.   Signed by: Rich Purvis 11/7/2024 10:28 AM Dictation workstation:   [id number]    XR chest 1 view    Result Date: 11/7/2024  Interpreted By:  Annia Dillon, STUDY: XR CHEST 1 VIEW 11/7/2024 7:48 am   INDICATION: CHF   COMPARISON: 11/05/2024   ACCESSION NUMBER(S): [id number]   ORDERING CLINICIAN: ANNETTE BRANTLEY   TECHNIQUE: AP semi-erect view of the chest   FINDINGS: Cardiac size is normal calcified visible within the aortic arch. No interval change in the appearance of the chest when compared study done 2 days earlier. Persistent areas of infiltrate within each lower lung zone with probable small left pleural effusion. Is mild pulmonary vascular congestion.       Normal-sized heart with mild pulmonary vascular congestion and persistent areas of infiltrate in each lower lung zone with small left pleural effusion.   Signed by: Annia Dillon 11/7/2024 8:02 AM Dictation workstation:   [id number]    XR chest 1 view    Result Date: 11/5/2024  Interpreted By:  Stanford Richey, STUDY: XR CHEST 1 VIEW; 11/5/2024 8:03 am   INDICATION: Signs/Symptoms:pneumonia   COMPARISON: 11/03/2024.   ACCESSION NUMBER(S): [id number]   ORDERING CLINICIAN: ANNETTE BRANTLEY   FINDINGS: The study is limited due to  rotation. The cardiomediastinal silhouette is within normal limits for the technique. Bibasilar infiltrates/atelectatic changes and left more than right pleural effusion are again seen; allowing for differences in technique there is some worsening. There is no pneumothorax. Degenerative changes involve the spine and shoulders.        Limited study. Worsening bibasilar infiltrates/atelectatic changes and pleural effusions. Correlate clinically and further follow-up as needed.   Signed by: Stanford Richey 11/5/2024 2:57 PM Dictation workstation:   [id number]    XR chest 1 view    Result Date: 11/4/2024  Interpreted By:  Laurent Elam, STUDY: XR CHEST 1 VIEW; 11/3/2024 6:52 pm   INDICATION: CLINICAL INFORMATION: Signs/Symptoms:readjustment NG again.   COMPARISON: 11/03/2024 at 1811 hours   ACCESSION NUMBER(S): [id number]   ORDERING CLINICIAN: DENG BURNHAM   TECHNIQUE: Portable chest one view.   FINDINGS: Exam was targeted to the lower chest and upper abdomen for assessment of NG tube placement. Tip of the nasogastric tube overlies the region of the body of the stomach. Bibasilar infiltrate is suspected more marked on the left.       Tip of the nasogastric tube overlies the region of the body of the stomach. Bibasilar infiltrate more marked on the left.   MACRO: none   Signed by: Laurent Elam 11/4/2024 8:27 AM Dictation workstation:   [id number]    XR chest 1 view    Result Date: 11/3/2024  Interpreted By:  Anabelle Devi, STUDY: XR CHEST 1 VIEW;  11/3/2024 6:29 pm   INDICATION: Signs/Symptoms:s/p NG re-placement.   COMPARISON: 11/02/2024   ACCESSION NUMBER(S): [id number]   ORDERING CLINICIAN: DENG BURNHAM   FINDINGS:     CARDIOMEDIASTINAL SILHOUETTE: Cardiomediastinal silhouette is normal in size and configuration.   LUNGS: Stable bibasilar opacification, likely a combination of layering pleural effusions and airspace disease, left greater than right. No pneumothorax.   ABDOMEN: The feeding tube courses below the diaphragm with weighted tip overlying the gastric body.   BONES: No acute osseous abnormality.       Feeding tube courses below the diaphragm with tip overlying the gastric body.   Stable bibasilar opacities, left greater than right, likely combination of layering pleural effusions and atelectasis or pneumonia.    MACRO: None   Signed by: Anabelle Devi 11/3/2024 6:51 PM Dictation workstation:   [id number]    XR chest 1 view    Result Date: 11/2/2024  Interpreted By:  Leigha Gomez, STUDY: XR CHEST 1 VIEW;  11/2/2024 10:55 am   INDICATION: Signs/Symptoms:confirm NG tube adjustment.   COMPARISON: 11/01/2024   ACCESSION NUMBER(S): [id number]   ORDERING CLINICIAN: DENG BURNHAM   FINDINGS: CARDIOMEDIASTINAL SILHOUETTE: Cardiomediastinal silhouette is normal in size and configuration.     LUNGS: There is improvement in bibasilar infiltrates.   ABDOMEN: No remarkable upper abdominal findings. There is a nasogastric tube with the tip in the proximal stomach.   BONES: No acute osseous changes.       1. Improvement bibasilar infiltrates. 2. Nasogastric tube with the tip in the proximal stomach.   MACRO: None   Signed by: Leigha Gomez 11/2/2024 1:42 PM Dictation workstation:   [id number]    EEG    IMPRESSION Impression This 1 hour EEG is indicative of a severe diffuse encephalopathy. No epileptiform activity or lateralizing signs seen. A full report will be scanned into the patient's chart at a later time. This report has been interpreted and electronically signed by    XR chest 1 view    Result Date: 11/1/2024  Interpreted By:  Elvis Linares, STUDY: XR CHEST 1 VIEW   INDICATION: Signs/Symptoms:corepack placement? is it ok to use?.   COMPARISON: Earlier the same day   ACCESSION NUMBER(S): [id number]   ORDERING CLINICIAN: RICHARD ADAIR   FINDINGS: Slight advancement of the Corpak now overlying the region of the gastric cardia.   Worsening basilar edema and effusions.         Slight advancement of the Corpak now overlying the region of the gastric cardia.   Worsening basilar edema and effusions.   Signed by: Elvis Linares 11/1/2024 8:26 PM Dictation workstation:   [id number]    XR chest 1 view    Result Date: 11/1/2024  Interpreted By:  Elvis Linares, STUDY: XR CHEST 1 VIEW   INDICATION: Signs/Symptoms:corepack  placement.   COMPARISON: October 31   ACCESSION NUMBER(S): [id number]   ORDERING CLINICIAN: RICHARD ADAIR   FINDINGS: Feeding tube at the gastroesophageal junction.   Mild perihilar edema suggested.         Feeding tube at the gastroesophageal junction.   Mild perihilar edema suggested.   Signed by: Elvis Linares 11/1/2024 5:34 PM Dictation workstation:   [id number]    XR chest 1 view    Result Date: 10/31/2024  Interpreted By:  Annia Dillon, STUDY: XR CHEST 1 VIEW 10/31/2024 1:09 pm   INDICATION: Signs/Symptoms:Tube placement check. Myocardial infarct.   COMPARISON: 10/31/2024 done at 10:59 a.m.   ACCESSION NUMBER(S): [id number]   ORDERING CLINICIAN: DONNA CEJA   TECHNIQUE: AP semi-erect view of the chest at bedside   FINDINGS: It would appear that the feeding tube has been partially retracted and is no longer coiled within the gastric fundus. The distal tip remains positioned within the gastric fundus.   There are bilateral pulmonary infiltrates unchanged with cardiac size normal.       The feeding tube has been partially retracted and is no longer looped within the gastric fundus. The distal tip remains positioned within the gastric fundus however.   No change in the bilateral pulmonary infiltrates within each lower lung zone.   Signed by: Annia Dillon 10/31/2024 1:57 PM Dictation workstation:   [id number]    XR chest 1 view    Result Date: 10/31/2024  Interpreted By:  aLurent Elam, STUDY: XR CHEST 1 VIEW; 10/31/2024 11:12 am   INDICATION: CLINICAL INFORMATION: Signs/Symptoms:Tube placement check.   COMPARISON: 10/31/2024 at 1054 hours   ACCESSION NUMBER(S): [id number]   ORDERING CLINICIAN: DONNA CEJA   TECHNIQUE: Portable chest one view.   FINDINGS: The cardiac size is indeterminate in view of the AP projection. Feeding tube is been repositioned with the tube now noted to be coiled within the stomach. Bibasilar infiltrates are present.       1. Feeding tube is present with extending into  the stomach with the tube coiled within the region of the body and fundus. 2. Bibasilar infiltrates.   MACRO: none   Signed by: Laurent Elam 10/31/2024 12:07 PM Dictation workstation:   [id number]    XR chest 1 view    Result Date: 10/31/2024  Interpreted By:  Laurent Elam, STUDY: XR CHEST 1 VIEW; 10/31/2024 11:11 am   INDICATION: CLINICAL INFORMATION: Signs/Symptoms:Verification of nasogastric tube location.   COMPARISON: 10/29/2024 at 1042 hours   ACCESSION NUMBER(S): [id number]   ORDERING CLINICIAN: KIMBERLEY BLAS   TECHNIQUE: Portable chest one view.   FINDINGS: The cardiac size is indeterminate in view of the AP projection. Nasogastric tube is coiled within the mid chest along the course of the thoracic esophagus. Bibasilar infiltrate is suspected. There is a limited depth of inspiration on the exam.       Nasogastric tube is coiled within the midthoracic esophagus. Probable bibasilar infiltrates although the exam is limited by poor depth of inspiration.   MACRO: none   Signed by: Laurent Elam 10/31/2024 12:05 PM Dictation workstation:   [id number]    XR chest 1 view    Result Date: 10/29/2024  Interpreted By:  Laurent Elam, STUDY: XR CHEST 1 VIEW; 10/29/2024 11:22 am   INDICATION: CLINICAL INFORMATION: Signs/Symptoms:cough.   COMPARISON: 10/16/2024   ACCESSION NUMBER(S): [id number]   ORDERING CLINICIAN: RICHARD ADAIR   TECHNIQUE: Portable chest one view.   FINDINGS: The cardiac size is indeterminate in view of the AP projection. Patient is slightly rotated to the left. There is a thoracic dextroscoliosis. Alveolar infiltrate is present within left lower lobe consistent with pneumonia. Right hemithorax is clear. No effusions are identified.       There is a new left lower lobe infiltrate consistent with pneumonia. Follow-up to assure complete clearing is suggested.   MACRO: none   Signed by: Laurent Elam 10/29/2024 2:55 PM Dictation workstation:   [id number]    CT head wo IV  contrast    Result Date: 10/29/2024  Interpreted By:  Brielle Linares, STUDY: CT HEAD WO IV CONTRAST; ;  10/29/2024 1:26 pm   INDICATION: Signs/Symptoms:sudden onset confusion.   COMPARISON: 10/16/2024   ACCESSION NUMBER(S): [id number]   ORDERING CLINICIAN: RICHARD ADAIR   TECHNIQUE: Serial axial images of the head were obtained without intravenous contrast. Sagittal and coronal reconstructions were generated.   FINDINGS: Trickles are midline and enlarged. There is prominence of the cortical sulci and superior cerebellar cisterns.   There are no acute parenchymal abnormalities. There is no hemorrhage or extra-axial fluid.   There is no obvious scalp hematoma or skull fracture.   Visualized portions of the paranasal sinuses and mastoids are unremarkable.   COMPARISON OF FINDINGS: The brain is similar.       No acute intracranial abnormality.     MACRO: none   Signed by: Brielle Linares 10/29/2024 2:19 PM Dictation workstation:   [id number]    Cardiac Catheterization Procedure    Result Date: 10/24/2024           [place] [address]            Phone [phone] Cardiovascular Catheterization Report Patient Name:     AMARILYS FARAH      Performing Physician:  Virginia Chavez DO Study Date:       10/23/2024           Verifying Physician:   Virginia Chavez DO MRN/PID:          [id number]             Cardiologist/Co-Scrub: Accession#:       [id number]         Ordering Provider:     Virginia CHAVEZ Date of           1940 / 83      Cardiologist: Birth/Age:        years Gender:           M                    Fellow: Encounter#:       [id number]           Surgeon:  Study: Left Heart Cath  Indications: AMARILYS FARAH is a 84 year old male who presents with dyslipidemia and an  asymptomatic chest pain assessment. Cardiomyopathy, left ventricular dysfunction and NSTE - ACS. Stress test performed: No. CTA performed: No. Valentinaron accessed: No. LVEF Assessed: No. Cardiac arrest: No. Cardiac surgical consult: No. Cardiovascular Instability: No Frailty status of patient entering lab: 6 = Moderately frail.  Coronary Angiography: The coronary circulation is right dominant.  Coronary Angiography Comments: We obtained informed consent with the help of his son Jorge A because the patient is deaf, and he was brought to the cardiac catheterization lab with the timeout verified between his computer medical record number and his arm wristband. Both groins were prepped and draped in a sterile fashion and the remainder of the procedure performed under sterile technique. 10 cc of 1% lidocaine used to the right groin for local anesthesia and Versed 2 mg and fentanyl 50 mcg for intravenous sedation. Used single wall micropuncture technique to access the right common femoral artery and a micropuncture sheath was inserted, then exchanged over guidewire for a 6 Faroese Varney sheath. 6 Faroese JR4 JL 4 and pigtail catheters were used and catheters were advanced and withdrawn over the guidewire without difficulty. At the end of the procedure the sheath was removed and an Angio-Seal closure device was deployed with good hemostasis and he was returned to his telemetry bed in stable condition. Coronary angiography: 1. The left main was a large vessel and bifurcated into the LAD and circumflex and was normal 2 the LAD was a large vessel that extends to the apex and was widely patent with CHEYANNE grade III flow. The proximal LAD had an eccentric 40-50% irregular stenosis that was presumed to be the site of previous infarct vessel occlusion with spontaneous recanalization. The remaining mid and distal LAD and its branches had mild diffuse nonobstructive disease 3. The circumflex was a modest nondominant vessel that gave  rise to 3 modest PLV branches and had no obstructive disease.  4. The right coronary artery was a dominant vessel that had mild 30-40% ostial /proximal stenosis and an eccentric 50% mid vessel stenosis, then gives rise to a modest sized small caliber PDA and small caliber distal RCA without obstruction. Left ventriculogram performed in the VALLES 30 projection showed persistent modest hypokinesis of the mid anterior, anterolateral segments and severe hypokinesis of the LV apical segment with left ventricular ejection fraction estimated at 40 to 45%. Impression: 1.40% proximal LAD stenosis suggesting previous vessel occlusion with spontaneous recanalization. 2. 50% mid RCA stenosis. 3. Ischemic cardiomyopathy with residual anterior and anteroapical left ventricular hypokinesis with left ventricular ejection fraction 40 to 45%. 3. Nuclear stress test 10/22/2024 suggests viability in the anterior and apical segments without ischemia and with left ventricular ejection fraction estimated at 44%.   Hemo Personnel: +----------------+---------+ Name            Duty      +----------------+---------+ Haja Mckeon MD 1 +----------------+---------+  Hemodynamic Pressures:  +----+---------------+----------+-------------+--------------+-------+---------+ Site   Date Time   Phase NameSystolic mmHgDiastolic mmHgED mmHgMean mmHg +----+---------------+----------+-------------+--------------+-------+---------+   AO     10/23/2024  AIR REST          116            57              81          8:56:33 AM                                                      +----+---------------+----------+-------------+--------------+-------+---------+   LV     10/23/2024  AIR REST          103             2      8                   9:07:45 AM                                                      +----+---------------+----------+-------------+--------------+-------+---------+   LV     10/23/2024  AIR  REST          109            -2      7                   9:07:57 AM                                                      +----+---------------+----------+-------------+--------------+-------+---------+  LVp     10/23/2024  AIR REST          106             0     12                   9:08:06 AM                                                      +----+---------------+----------+-------------+--------------+-------+---------+  AOp     10/23/2024  AIR REST          120            52              76          9:08:16 AM                                                      +----+---------------+----------+-------------+--------------+-------+---------+   AO     10/23/2024  AIR REST            0             0             -96          9:32:52 AM                                                      +----+---------------+----------+-------------+--------------+-------+---------+  Cardiac Cath Post Procedure Notes: Post Procedure Diagnosis: Double vessel disease. Blood Loss:               Estimated blood loss during the procedure was 10ml                           mls. Specimens Removed:        Number of specimen(s) removed: none.  ICD 10 Codes: Non ST elevation (NSTEMI) myocardial infarction-I21.4  CPT Codes: Left Heart Cath (visualization of coronaries) and LV-91735  [id number] Haja Mckeon DO Performing Physician Electronically signed by [id number] Haja Mckeon DO on 10/24/2024 at 9:06:38 AM  ** Final **     Cardiology Interpretation Of Nuclear Stress - See Other Report For Nuclear Portion    Result Date: 10/23/2024           [address] [address]            Phone [phone] Nuclear Pharmacologic Stress Test Patient Name:      AMARILYS FARAH     Ordering Provider:     [id number] COOPER RATLIFF Study Date:        10/22/2024          Reading Physician:     [id number] Conn                                                                Benigno GARCIA MRN/PID:           [id number]            Supervising Physician: [id number] Fabien Pelaez MD Accession#:        [id number]        Fellow: Date of Birth/Age: 1940 / 83     Fellow:                    years Gender:            M                   Nurse:                 Inez Holley RN Admit Date:                            Technician:            Marina Feliciano Admission Status:                      Sonographer:           NA Height:            182.88 cm           Technologist: Weight:            62.60 kg            Additional Staff: BSA:               1.82 m2             Encounter#:            [id number] BMI:               18.72 kg/m2         Patient Location: Study Type:    CARDIOLOGY INTERPRETATION OF NUCLEAR STRESS Diagnosis/ICD: NonST elevation (NSTEMI) myocardial infarction-I21.4 Indication:    CARDIOMYOPATHY EF 15-20% CPT Codes:     Stress Test Interpretation-43402; Stress Test Supervision-55081 Falls Risk: Low: Patient has low risk for sustaining a fall; environmental safety interventions in place.  Study Details: Correct procedure and correct patient verified verbally and with                ID Band checked.  Patient History: Hyperlipidemia and congestive heart failure. NSTEMI, IDIOPATHIC HYPOTENSION.  Medications: ASPIRIN, CARVEDILOL, LOVENOX, PRAVASTATIN, SPIRONOLACTONE, MIDODRINE. The patient took medications as prescribed.  Patient Performance: Patient received a total of 0.4 mg of Regadenoson at 11:41:48 AM. The resting blood pressure was 114/69 mmHg with a heart rate of 81 bpm. The patient developed no symptoms during the stress exam. The blood pressure response was normal. The test was terminated due to: completed lab protocol and end of vasodilator infusion. Patient has met the discharge criteria and is discharged to  their floor.  Baseline ECG: Resting ECG showed normal sinus rhythm with nonspecific ST-T wave changes. Artifact.  Stress ECG: Stress ECG showed normal sinus rhythm, with frequent premature ventricular contractions. No ST changes. Essentially negative ECG stress test for ischemia with a Lexiscan infusion. Please get the nuclear imaging report from radiology department.  Stress Stage Data: +----------------+--+------+-------+                 HRSys BPDias BP +----------------+--+------+-------+ Baseline [id number]   69      +----------------+--+------+-------+  Recovery ECG: Recovery ECG showed normal sinus rhythm, with artifact.  +------------+--+------+-------+             HRSys BPDias BP +------------+--+------+-------+ Recovery I  8295    57      +------------+--+------+-------+ Recovery II 7288    51      +------------+--+------+-------+ Recovery [id number]    41      +------------+--+------+-------+ Recovery IV 8592    52      +------------+--+------+-------+ Recovery V  7392    54      +------------+--+------+-------+  Summary:  1. Adequate level of stress achieved.  2. Correlate with myocardial perfusion imaging results.  3. Essentially negative ECG stress test for ischemia with a Lexiscan infusion. Please get the nuclear imaging report from radiology department.  4. No clinical or electrocardiographic evidence for ischemia at maximal infusion.  5. No ECG changes from baseline.  6. Normal Stress Test.  7. Nuclear image results are reported separately. [id number] Fabien Pelaez MD Electronically signed on 10/23/2024 at 9:39:18 AM   ** Final **     Nuclear Stress Test    Result Date: 10/22/2024  Interpreted By:  Chapito García and Ogievich Taessa STUDY: NUCLEAR STRESS TEST; 10/22/2024 2:18 pm   INDICATION: Signs/Symptoms:Cardiomyopathy, EF 15-20%.   COMPARISON: None.   ACCESSION NUMBER(S): [id number]   ORDERING CLINICIAN: COOPER RATLIFF   TECHNIQUE: DIVISION OF NUCLEAR MEDICINE  PHARMACOLOGIC STRESS MYOCARDIAL PERFUSION SCAN, ONE DAY PROTOCOL   The patient received an intravenous dose of  8.8 mCi of Tc-99m Myoview and resting emission tomographic (SPECT) images of the myocardium were acquired. The patient then received an intravenous infusion of 0.4mg regadenoson (Lexiscan)  followed by an additional dose of  25.1 mCi of Tc-99m  Myoview. Stress phase SPECT images of the myocardium were then acquired. These included ECG-gated images to assess and quantify ventricular function.  A low-dose, nondiagnostic regional CT was utilized for attenuation correction purposes.   FINDINGS: Both stress and rest studies demonstrate grossly normal perfusion throughout the left ventricle.   The left ventricle is normal in size.   Gated images demonstrate mild global hypokinesis of the LV wall motion with a post-stress LV EF estimated at 44%.   Attenuation correction CT images demonstrate no gross anatomic abnormalities.       1.  No evidence of inducible ischemia or prior infarction. 2. Left ventricle is normal in size. 3. Mild global hypokinesis of the LV wall motion with a post-stress LV EF estimated at 44%.   I personally reviewed the images/study and I agree with the findings as stated by Viry Up DO, PGY-3. This study was interpreted at [facility], [place], [place].   MACRO: None   Signed by: Chapito García 10/22/2024 3:15 PM Dictation workstation:   [id number]    Electrocardiogram, 12-lead PRN ACS symptoms    Result Date: 10/22/2024  Poor data quality in current ECG precludes serial comparison Sinus tachycardia with short CO with Premature supraventricular complexes and with frequent Premature ventricular complexes Indeterminate axis Pulmonary disease pattern Nonspecific ST and T wave abnormality Abnormal ECG When compared with ECG of 16-OCT-2024 20:40, (unconfirmed) Previous ECG has undetermined rhythm, needs review Questionable change in QRS duration  Confirmed by Fer Vazquez ([id number]) on 10/22/2024 12:52:29 PM    MR brain wo IV contrast    Result Date: 10/19/2024  Interpreted By:  Hugo Rico,  and Capo Burns STUDY: MR BRAIN WO IV CONTRAST;  10/19/2024 3:36 pm   INDICATION: Signs/Symptoms:altered mental status.     COMPARISON: CT of the head obtained October 16, 2024   ACCESSION NUMBER(S): [id number]   ORDERING CLINICIAN: ROMULO JOHNSON   TECHNIQUE: Axial T2, FLAIR, DWI, gradient echo T2 and sagittal and coronal T1 weighted images of brain were acquired.  No contrast was administered.   FINDINGS: Midline brain structures are intact on mid sagittal imaging.   There is no territorial restricted diffusion to suggest acute intracranial infarct. No suspicious T2 signal hypointensity noted on gradient sequencing.   There is no evidence of acute intracranial hemorrhage. No intracranial mass or mass effect. No midline shift. No loss of gray-white differentiation. Few scattered supratentorial white matter FLAIR signal hyperintensities are observed.  Basilar cisterns appear intact. Moderate dilatation of the lateral and 3rd greater than 4th ventricles at least principally on the basis of global parenchymal volume loss.   Normal T2 vascular flow voids are seen.   T2 hyperintensity within the maxillary sinuses is consistent with mucosal inflammatory paranasal sinus disease.. Status post bilateral lens replacements.   There is no mastoid effusion. Moderate effusions of the occipital lateral mass C1 articulation bilaterally may be on a degenerative basis.       No MR evidence of acute intracranial infarct, hemorrhage, mass, or mass effect.   I personally reviewed the images/study and I agree with the findings as stated. This study was interpreted at [place], [place].   MACRO: None   Signed by: Hugo Rico 10/19/2024 4:41 PM Dictation workstation:   [id number]    EEG    IMPRESSION Impression This routine awake and drowsy EEG is  indicative of a moderate diffuse encephalopathy. No epileptiform activity or lateralizing signs seen. A full report will be scanned into the patient's chart at a later time. This report has been interpreted and electronically signed by    Transthoracic Echo (TTE) Complete    Result Date: 10/17/2024           [address] [address]            Phone [phone] TRANSTHORACIC ECHOCARDIOGRAM REPORT Patient Name:     AMARILYS WALTON SOFI      Reading Physician:   [id number] Jose David Workman DO Study Date:       10/17/2024           Ordering Provider:   [id number] HOWARD MRN/PID:          [id number]             Fellow: Accession#:       [id number]         Nurse: Date of           1940 / 83      Sonographer:         USR Birth/Age:        years Gender:           M                    Additional Staff: Height:           177.80 cm            Admit Date: Weight:           65.77 kg             Admission Status:    Inpatient - Routine BSA / BMI:        1.82 m2 / 20.81      Department Location: [facility] ICU                   kg/m2 Blood Pressure: 97 /74 mmHg Study Type:    TRANSTHORACIC ECHO (TTE) COMPLETE Diagnosis/ICD: Non ST elevation (NSTEMI) myocardial infarction-I21.4 Indication:    NSTEMI CPT Codes:     Echo Complete w Full Doppler-[id number] Patient History: Diabetes:          Yes Pertinent History: HTN, Hyperlipidemia, CAD, Chest Pain and CHF. Renal dx III. Study Detail: The following Echo studies were performed: 2D, M-Mode, Doppler and               color flow. Technically challenging study due to poor acoustic               windows and patient lying in supine position.  PHYSICIAN INTERPRETATION: Left Ventricle: The left ventricular systolic function is severely decreased, with a visually estimated ejection fraction of 15-20%. There are multiple wall motion abnormalities. The left ventricular cavity size is moderately dilated.  There is mild concentric left ventricular hypertrophy. Left ventricular diastolic filling was indeterminate. LV Wall Scoring: The entire apex, mid and apical anterior wall, mid and apical anterior septum, mid and apical inferior septum, mid and apical inferior wall, mid inferolateral segment, and mid anterolateral segment are akinetic. All remaining scored segments are normal. Left Atrium: The left atrium is normal in size. Right Ventricle: The right ventricle is normal in size. There is normal right ventricular global systolic function. Right Atrium: The right atrium is normal in size. Aortic Valve: The aortic valve is probably trileaflet. The aortic valve dimensionless index is 0.70. There is no evidence of aortic valve regurgitation. The peak instantaneous gradient of the aortic valve is 2.7 mmHg. The mean gradient of the aortic valve is 1.4 mmHg. Mitral Valve: The mitral valve is normal in structure. There is no evidence of mitral valve regurgitation. Tricuspid Valve: The tricuspid valve is structurally normal. No evidence of tricuspid regurgitation. Pulmonic Valve: The pulmonic valve is not well visualized. The pulmonic valve regurgitation was not well visualized. Pericardium: No pericardial effusion noted. Aorta: The aortic root is normal.  CONCLUSIONS:  1. Multiple segmental abnormalities exist. See findings.  2. The left ventricular systolic function is severely decreased, with a visually estimated ejection fraction of 15-20%.  3. There are multiple wall motion abnormalities.  4. Left ventricular diastolic filling was indeterminate.  5. Left ventricular cavity size is moderately dilated.  6. There is normal right ventricular global systolic function.  7. 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 QUANTITATIVE DATA SUMMARY:  2D MEASUREMENTS:           Normal Ranges: Ao Root s:       3.51 cm IVSd:            0.79 cm   (0.6-1.1cm) LVPWd:           0.76 cm   (0.6-1.1cm)  LVIDd:           4.44 cm   (3.9-5.9cm) LVIDs:           3.81 cm LV Mass Index:   58.6 g/m2 LV % FS          14.0 %  LA VOLUME:          Normal Ranges: LA Vol A4C: 45.2 ml  RA VOLUME BY A/L METHOD:          Normal Ranges: RA Area A4C:             12.0 cm2  LV SYSTOLIC FUNCTION BY 2D PLANIMETRY (MOD):                      Normal Ranges: EF-A4C View:    16 % (>=55%) EF-A2C View:    5 % EF-Biplane:     14 % EF-Visual:      18 % EF-3DQ:         19 % LV EF Reported: 18 %  LV DIASTOLIC FUNCTION:           Normal Ranges: MV Peak E:             0.45 m/s  (0.7-1.2 m/s) MV Peak A:             0.88 m/s  (0.42-0.7 m/s) E/A Ratio:             0.51      (1.0-2.2) MV e'                  0.073 m/s (>8.0) MV lateral e'          0.11 m/s MV medial e'           0.04 m/s E/e' Ratio:            6.16      (<8.0)  MITRAL VALVE:          Normal Ranges: MV DT:        110 msec (150-240msec)  AORTIC VALVE:                     Normal Ranges: AoV Vmax:                0.82 m/s (<=1.7m/s) AoV Peak P.7 mmHg (<20mmHg) AoV Mean P.4 mmHg (1.7-11.5mmHg) LVOT Max Florencio:            0.61 m/s (<=1.1m/s) AoV VTI:                 17.99 cm (18-25cm) LVOT VTI:                12.65 cm LVOT Diameter:           2.00 cm  (1.8-2.4cm) AoV Area, VTI:           2.20 cm2 (2.5-5.5cm2) AoV Area,Vmax:           2.32 cm2 (2.5-4.5cm2) AoV Dimensionless Index: 0.70  RIGHT VENTRICLE: RV Basal 3.30 cm RV Mid   2.70 cm RV Major 4.8 cm  TRICUSPID VALVE/RVSP:          Normal Ranges: Peak TR Velocity:     2.26 m/s RV Syst Pressure:     23 mmHg  (< 30mmHg)  PULMONIC VALVE:          Normal Ranges: PV Max Florencio:     0.6 m/s  (0.6-0.9m/s) PV Max P.3 mmHg PV Mean P.5 mmHg PV VTI:         6.59 cm  AORTA: Asc Ao Diam 0.00 cm  [id number] Jose David Workman  Electronically signed on 10/17/2024 at 11:00:02 AM  Wall Scoring  ** Final (Updated) **     ECG 12 lead    Result Date: 10/17/2024  Normal sinus rhythm Left axis deviation Low voltage QRS Anteroseptal  infarct Inferior infarct , age undetermined Abnormal ECG When compared with ECG of 04-APR-2018 16:39, Significant changes have occurred Confirmed by Rell Vaughan ([id number]) on 10/17/2024 11:32:49 AM    XR chest 1 view    Result Date: 10/16/2024  Interpreted By:  Lobo Farrar, STUDY: XR CHEST 1 VIEW;  10/16/2024 9:27 pm   INDICATION: Signs/Symptoms:malaise.   COMPARISON: Chest x-ray 04/18/2021   ACCESSION NUMBER(S): [id number]   ORDERING CLINICIAN: MICHELLE MARTE   FINDINGS: Multiple overlying leads are present.   CARDIOMEDIASTINAL SILHOUETTE: Cardiomediastinal silhouette is normal in size and configuration. Atherosclerotic calcification of the aorta.   LUNGS: No consolidation, pleural effusion or pneumothorax.   ABDOMEN: Surgical clips noted in the left upper quadrant.   BONES: Multilevel degenerative changes of the spine.       No acute cardiopulmonary process.   MACRO: None   Signed by: Lobo Farrar 10/16/2024 9:52 PM Dictation workstation:   [id number]    CT head wo IV contrast    Result Date: 10/16/2024  Interpreted By:  Ori Asher, STUDY: CT HEAD WO IV CONTRAST;  10/16/2024 9:18 pm   INDICATION: Signs/Symptoms:malaise/fatigue; AMS.     COMPARISON: 04/18/2021   ACCESSION NUMBER(S): [id number]   ORDERING CLINICIAN: MICHELLE MARTE   TECHNIQUE: Noncontrast axial CT scan of head was performed. Angled reformats in brain and bone windows were generated. The images were reviewed in bone, brain, blood and soft tissue windows.   FINDINGS: CSF Spaces: The ventricles, sulci and basal cisterns are within normal limits. There is no extraaxial fluid collection.   Parenchyma: There are periventricular white matter changes noted. The grey-white differentiation is intact. There is no mass effect or midline shift.  There is no intracranial hemorrhage.   Calvarium: The calvarium is unremarkable.   Paranasal sinuses and mastoids: Visualized paranasal sinuses and mastoids are clear.       No evidence of acute cortical infarct  or intracranial hemorrhage.       MACRO: None   Signed by: Ori Asher 10/16/2024 9:28 PM Dictation workstation:   [id number]    Assessment/Plan   Encephalopathy-rule out meningitis  Acute hypoxic respiratory failure  Abnormal chest x-ray-rule out pneumonia    Discontinue Zosyn  IV vancomycin  IV ceftriaxone  IV ampicillin  IV acyclovir  Chest x-ray-rule out pneumonia  Agree with transfer to tertiary institution  Oxygen as needed  Monitor renal function closely  Monitor temperature and WBC      Waqas Campbell MD

## 2024-11-09 ENCOUNTER — APPOINTMENT (OUTPATIENT)
Dept: RADIOLOGY | Facility: HOSPITAL | Age: 84
End: 2024-11-09
Payer: OTHER MISCELLANEOUS

## 2024-11-09 LAB
ALBUMIN SERPL BCP-MCNC: 2.1 G/DL (ref 3.4–5)
ANION GAP SERPL CALC-SCNC: 15 MMOL/L (ref 10–20)
APPEARANCE CSF: CLEAR
APPEARANCE CSF: CLEAR
APTT PPP: 27 SECONDS (ref 27–38)
BASOPHILS NFR CSF MANUAL: 0 %
BASOPHILS NFR CSF MANUAL: 0 %
BLASTS CSF MANUAL: 0 %
BLASTS CSF MANUAL: 0 %
BUN SERPL-MCNC: 31 MG/DL (ref 6–23)
CALCIUM SERPL-MCNC: 7.9 MG/DL (ref 8.6–10.6)
CHLORIDE SERPL-SCNC: 100 MMOL/L (ref 98–107)
CO2 SERPL-SCNC: 24 MMOL/L (ref 21–32)
COLOR CSF: COLORLESS
COLOR CSF: COLORLESS
COLOR SPUN CSF: COLORLESS
COLOR SPUN CSF: COLORLESS
CREAT SERPL-MCNC: 1.33 MG/DL (ref 0.5–1.3)
CRP SERPL-MCNC: 8.6 MG/DL
EGFRCR SERPLBLD CKD-EPI 2021: 54 ML/MIN/1.73M*2
EOSINOPHIL NFR CSF MANUAL: 0 %
EOSINOPHIL NFR CSF MANUAL: 0 %
ERYTHROCYTE [DISTWIDTH] IN BLOOD BY AUTOMATED COUNT: 14.6 % (ref 11.5–14.5)
ERYTHROCYTE [SEDIMENTATION RATE] IN BLOOD BY WESTERGREN METHOD: 60 MM/H (ref 0–20)
FOLATE SERPL-MCNC: 5.5 NG/ML
GLUCOSE BLD MANUAL STRIP-MCNC: 130 MG/DL (ref 74–99)
GLUCOSE BLD MANUAL STRIP-MCNC: 144 MG/DL (ref 74–99)
GLUCOSE BLD MANUAL STRIP-MCNC: 183 MG/DL (ref 74–99)
GLUCOSE CSF-MCNC: 81 MG/DL (ref 40–70)
GLUCOSE SERPL-MCNC: 143 MG/DL (ref 74–99)
HCT VFR BLD AUTO: 23.7 % (ref 41–52)
HGB BLD-MCNC: 7.5 G/DL (ref 13.5–17.5)
HIV 1+2 AB+HIV1 P24 AG SERPL QL IA: NONREACTIVE
HOLD SPECIMEN: NORMAL
HSV1 DNA CSF QL NAA+PROBE: NOT DETECTED
HSV2 DNA CSF QL NAA+PROBE: NOT DETECTED
IMM GRANULOCYTES NFR CSF: 0 %
IMM GRANULOCYTES NFR CSF: 0 %
INR PPP: 1.3 (ref 0.9–1.1)
LYMPHOCYTES NFR CSF MANUAL: 72 % (ref 28–96)
LYMPHOCYTES NFR CSF MANUAL: 76 % (ref 28–96)
MAGNESIUM SERPL-MCNC: 2.08 MG/DL (ref 1.6–2.4)
MCH RBC QN AUTO: 28.7 PG (ref 26–34)
MCHC RBC AUTO-ENTMCNC: 31.6 G/DL (ref 32–36)
MCV RBC AUTO: 91 FL (ref 80–100)
MONOS+MACROS NFR CSF MANUAL: 24 % (ref 16–56)
MONOS+MACROS NFR CSF MANUAL: 28 % (ref 16–56)
NEUTS SEG NFR CSF MANUAL: 0 % (ref 0–5)
NEUTS SEG NFR CSF MANUAL: 0 % (ref 0–5)
NRBC BLD-RTO: 0 /100 WBCS (ref 0–0)
OTHER CELLS NFR CSF MANUAL: 0 %
OTHER CELLS NFR CSF MANUAL: 0 %
PHOSPHATE SERPL-MCNC: 3.5 MG/DL (ref 2.5–4.9)
PLASMA CELLS NFR CSF MICRO: 0 %
PLASMA CELLS NFR CSF MICRO: 0 %
PLATELET # BLD AUTO: 447 X10*3/UL (ref 150–450)
POTASSIUM SERPL-SCNC: 3.5 MMOL/L (ref 3.5–5.3)
PROT CSF-MCNC: 33 MG/DL (ref 15–45)
PROTHROMBIN TIME: 14.6 SECONDS (ref 9.8–12.8)
RBC # BLD AUTO: 2.61 X10*6/UL (ref 4.5–5.9)
RBC # CSF AUTO: 5 /UL (ref 0–5)
RBC # CSF AUTO: 8 /UL (ref 0–5)
SODIUM SERPL-SCNC: 135 MMOL/L (ref 136–145)
STAPHYLOCOCCUS SPEC CULT: NORMAL
TOTAL CELLS COUNTED CSF: 18
TOTAL CELLS COUNTED CSF: 21
TREPONEMA PALLIDUM IGG+IGM AB [PRESENCE] IN SERUM OR PLASMA BY IMMUNOASSAY: NONREACTIVE
TUBE # CSF: ABNORMAL
TUBE # CSF: NORMAL
VANCOMYCIN SERPL-MCNC: 15.2 UG/ML (ref 5–20)
VIT B12 SERPL-MCNC: 473 PG/ML (ref 211–911)
WBC # BLD AUTO: 8.4 X10*3/UL (ref 4.4–11.3)
WBC # CSF AUTO: 1 /UL (ref 1–5)
WBC # CSF AUTO: 3 /UL (ref 1–5)

## 2024-11-09 PROCEDURE — 87799 DETECT AGENT NOS DNA QUANT: CPT | Performed by: STUDENT IN AN ORGANIZED HEALTH CARE EDUCATION/TRAINING PROGRAM

## 2024-11-09 PROCEDURE — 82040 ASSAY OF SERUM ALBUMIN: CPT | Performed by: STUDENT IN AN ORGANIZED HEALTH CARE EDUCATION/TRAINING PROGRAM

## 2024-11-09 PROCEDURE — 36415 COLL VENOUS BLD VENIPUNCTURE: CPT | Performed by: STUDENT IN AN ORGANIZED HEALTH CARE EDUCATION/TRAINING PROGRAM

## 2024-11-09 PROCEDURE — 87102 FUNGUS ISOLATION CULTURE: CPT | Performed by: STUDENT IN AN ORGANIZED HEALTH CARE EDUCATION/TRAINING PROGRAM

## 2024-11-09 PROCEDURE — 80202 ASSAY OF VANCOMYCIN: CPT | Performed by: STUDENT IN AN ORGANIZED HEALTH CARE EDUCATION/TRAINING PROGRAM

## 2024-11-09 PROCEDURE — 62270 DX LMBR SPI PNXR: CPT | Mod: GC | Performed by: STUDENT IN AN ORGANIZED HEALTH CARE EDUCATION/TRAINING PROGRAM

## 2024-11-09 PROCEDURE — 99222 1ST HOSP IP/OBS MODERATE 55: CPT | Performed by: INTERNAL MEDICINE

## 2024-11-09 PROCEDURE — 2500000001 HC RX 250 WO HCPCS SELF ADMINISTERED DRUGS (ALT 637 FOR MEDICARE OP): Performed by: STUDENT IN AN ORGANIZED HEALTH CARE EDUCATION/TRAINING PROGRAM

## 2024-11-09 PROCEDURE — 86038 ANTINUCLEAR ANTIBODIES: CPT | Performed by: STUDENT IN AN ORGANIZED HEALTH CARE EDUCATION/TRAINING PROGRAM

## 2024-11-09 PROCEDURE — 83519 RIA NONANTIBODY: CPT | Performed by: STUDENT IN AN ORGANIZED HEALTH CARE EDUCATION/TRAINING PROGRAM

## 2024-11-09 PROCEDURE — 86403 PARTICLE AGGLUT ANTBDY SCRN: CPT | Performed by: STUDENT IN AN ORGANIZED HEALTH CARE EDUCATION/TRAINING PROGRAM

## 2024-11-09 PROCEDURE — 82947 ASSAY GLUCOSE BLOOD QUANT: CPT

## 2024-11-09 PROCEDURE — 87206 SMEAR FLUORESCENT/ACID STAI: CPT | Performed by: STUDENT IN AN ORGANIZED HEALTH CARE EDUCATION/TRAINING PROGRAM

## 2024-11-09 PROCEDURE — 86255 FLUORESCENT ANTIBODY SCREEN: CPT | Performed by: STUDENT IN AN ORGANIZED HEALTH CARE EDUCATION/TRAINING PROGRAM

## 2024-11-09 PROCEDURE — 86592 SYPHILIS TEST NON-TREP QUAL: CPT | Performed by: STUDENT IN AN ORGANIZED HEALTH CARE EDUCATION/TRAINING PROGRAM

## 2024-11-09 PROCEDURE — 74018 RADEX ABDOMEN 1 VIEW: CPT | Performed by: RADIOLOGY

## 2024-11-09 PROCEDURE — 86780 TREPONEMA PALLIDUM: CPT | Performed by: STUDENT IN AN ORGANIZED HEALTH CARE EDUCATION/TRAINING PROGRAM

## 2024-11-09 PROCEDURE — 85027 COMPLETE CBC AUTOMATED: CPT | Performed by: STUDENT IN AN ORGANIZED HEALTH CARE EDUCATION/TRAINING PROGRAM

## 2024-11-09 PROCEDURE — 2500000004 HC RX 250 GENERAL PHARMACY W/ HCPCS (ALT 636 FOR OP/ED): Performed by: STUDENT IN AN ORGANIZED HEALTH CARE EDUCATION/TRAINING PROGRAM

## 2024-11-09 PROCEDURE — 86140 C-REACTIVE PROTEIN: CPT | Performed by: STUDENT IN AN ORGANIZED HEALTH CARE EDUCATION/TRAINING PROGRAM

## 2024-11-09 PROCEDURE — 89051 BODY FLUID CELL COUNT: CPT | Performed by: STUDENT IN AN ORGANIZED HEALTH CARE EDUCATION/TRAINING PROGRAM

## 2024-11-09 PROCEDURE — 87070 CULTURE OTHR SPECIMN AEROBIC: CPT | Performed by: STUDENT IN AN ORGANIZED HEALTH CARE EDUCATION/TRAINING PROGRAM

## 2024-11-09 PROCEDURE — 87798 DETECT AGENT NOS DNA AMP: CPT | Performed by: STUDENT IN AN ORGANIZED HEALTH CARE EDUCATION/TRAINING PROGRAM

## 2024-11-09 PROCEDURE — 74018 RADEX ABDOMEN 1 VIEW: CPT

## 2024-11-09 PROCEDURE — 1210000001 HC SEMI-PRIVATE ROOM DAILY

## 2024-11-09 PROCEDURE — 87075 CULTR BACTERIA EXCEPT BLOOD: CPT | Performed by: STUDENT IN AN ORGANIZED HEALTH CARE EDUCATION/TRAINING PROGRAM

## 2024-11-09 PROCEDURE — 85652 RBC SED RATE AUTOMATED: CPT | Performed by: STUDENT IN AN ORGANIZED HEALTH CARE EDUCATION/TRAINING PROGRAM

## 2024-11-09 PROCEDURE — 83735 ASSAY OF MAGNESIUM: CPT | Performed by: STUDENT IN AN ORGANIZED HEALTH CARE EDUCATION/TRAINING PROGRAM

## 2024-11-09 PROCEDURE — 80069 RENAL FUNCTION PANEL: CPT | Performed by: STUDENT IN AN ORGANIZED HEALTH CARE EDUCATION/TRAINING PROGRAM

## 2024-11-09 PROCEDURE — 2500000004 HC RX 250 GENERAL PHARMACY W/ HCPCS (ALT 636 FOR OP/ED)

## 2024-11-09 PROCEDURE — 2500000005 HC RX 250 GENERAL PHARMACY W/O HCPCS: Performed by: STUDENT IN AN ORGANIZED HEALTH CARE EDUCATION/TRAINING PROGRAM

## 2024-11-09 PROCEDURE — 85610 PROTHROMBIN TIME: CPT | Performed by: STUDENT IN AN ORGANIZED HEALTH CARE EDUCATION/TRAINING PROGRAM

## 2024-11-09 PROCEDURE — 84157 ASSAY OF PROTEIN OTHER: CPT | Performed by: STUDENT IN AN ORGANIZED HEALTH CARE EDUCATION/TRAINING PROGRAM

## 2024-11-09 PROCEDURE — 82746 ASSAY OF FOLIC ACID SERUM: CPT | Performed by: STUDENT IN AN ORGANIZED HEALTH CARE EDUCATION/TRAINING PROGRAM

## 2024-11-09 PROCEDURE — 83921 ORGANIC ACID SINGLE QUANT: CPT | Performed by: STUDENT IN AN ORGANIZED HEALTH CARE EDUCATION/TRAINING PROGRAM

## 2024-11-09 PROCEDURE — 82945 GLUCOSE OTHER FLUID: CPT | Performed by: STUDENT IN AN ORGANIZED HEALTH CARE EDUCATION/TRAINING PROGRAM

## 2024-11-09 PROCEDURE — 87529 HSV DNA AMP PROBE: CPT | Performed by: STUDENT IN AN ORGANIZED HEALTH CARE EDUCATION/TRAINING PROGRAM

## 2024-11-09 PROCEDURE — 84182 PROTEIN WESTERN BLOT TEST: CPT | Performed by: STUDENT IN AN ORGANIZED HEALTH CARE EDUCATION/TRAINING PROGRAM

## 2024-11-09 PROCEDURE — 87389 HIV-1 AG W/HIV-1&-2 AB AG IA: CPT | Performed by: STUDENT IN AN ORGANIZED HEALTH CARE EDUCATION/TRAINING PROGRAM

## 2024-11-09 PROCEDURE — 82607 VITAMIN B-12: CPT | Performed by: STUDENT IN AN ORGANIZED HEALTH CARE EDUCATION/TRAINING PROGRAM

## 2024-11-09 PROCEDURE — 62270 DX LMBR SPI PNXR: CPT | Performed by: STUDENT IN AN ORGANIZED HEALTH CARE EDUCATION/TRAINING PROGRAM

## 2024-11-09 RX ORDER — LANOLIN ALCOHOL/MO/W.PET/CERES
100 CREAM (GRAM) TOPICAL DAILY
Status: CANCELLED | OUTPATIENT
Start: 2024-11-09

## 2024-11-09 RX ORDER — VANCOMYCIN HYDROCHLORIDE
1250 ONCE
Status: COMPLETED | OUTPATIENT
Start: 2024-11-09 | End: 2024-11-09

## 2024-11-09 RX ORDER — ATORVASTATIN CALCIUM 40 MG/1
40 TABLET, FILM COATED ORAL NIGHTLY
Status: DISPENSED | OUTPATIENT
Start: 2024-11-09

## 2024-11-09 RX ORDER — NAPROXEN SODIUM 220 MG/1
81 TABLET, FILM COATED ORAL DAILY
Status: DISPENSED | OUTPATIENT
Start: 2024-11-09

## 2024-11-09 RX ORDER — THIAMINE HYDROCHLORIDE 100 MG/ML
500 INJECTION, SOLUTION INTRAMUSCULAR; INTRAVENOUS 3 TIMES DAILY
Status: DISPENSED | OUTPATIENT
Start: 2024-11-09 | End: 2024-11-12

## 2024-11-09 RX ORDER — ACETAMINOPHEN 160 MG/5ML
650 SOLUTION ORAL EVERY 6 HOURS PRN
Status: ACTIVE | OUTPATIENT
Start: 2024-11-09

## 2024-11-09 RX ORDER — HEPARIN SODIUM 5000 [USP'U]/ML
5000 INJECTION, SOLUTION INTRAVENOUS; SUBCUTANEOUS EVERY 8 HOURS
Status: DISPENSED | OUTPATIENT
Start: 2024-11-09

## 2024-11-09 RX ORDER — ACETAMINOPHEN 650 MG/1
650 SUPPOSITORY RECTAL EVERY 6 HOURS PRN
Status: ACTIVE | OUTPATIENT
Start: 2024-11-09

## 2024-11-09 RX ORDER — LIDOCAINE HYDROCHLORIDE 10 MG/ML
10 INJECTION, SOLUTION EPIDURAL; INFILTRATION; INTRACAUDAL; PERINEURAL ONCE
Status: COMPLETED | OUTPATIENT
Start: 2024-11-09 | End: 2024-11-09

## 2024-11-09 RX ORDER — CEFTRIAXONE 2 G/50ML
2 INJECTION, SOLUTION INTRAVENOUS EVERY 12 HOURS
Status: DISCONTINUED | OUTPATIENT
Start: 2024-11-09 | End: 2024-11-09

## 2024-11-09 RX ORDER — CARVEDILOL 3.12 MG/1
3.12 TABLET ORAL 2 TIMES DAILY
Status: DISCONTINUED | OUTPATIENT
Start: 2024-11-09 | End: 2024-11-10

## 2024-11-09 RX ADMIN — THIAMINE HYDROCHLORIDE 500 MG: 100 INJECTION, SOLUTION INTRAMUSCULAR; INTRAVENOUS at 17:14

## 2024-11-09 RX ADMIN — CARBOXYMETHYLCELLULOSE SODIUM 2 DROP: 5 SOLUTION/ DROPS OPHTHALMIC at 22:14

## 2024-11-09 RX ADMIN — CEFTRIAXONE SODIUM 2 G: 2 INJECTION, SOLUTION INTRAVENOUS at 17:13

## 2024-11-09 RX ADMIN — AMPICILLIN SODIUM 2 G: 2 INJECTION, POWDER, FOR SOLUTION INTRAMUSCULAR; INTRAVENOUS at 01:25

## 2024-11-09 RX ADMIN — HEPARIN SODIUM 5000 UNITS: 5000 INJECTION, SOLUTION INTRAVENOUS; SUBCUTANEOUS at 22:14

## 2024-11-09 RX ADMIN — LIDOCAINE HYDROCHLORIDE 100 MG: 10 SOLUTION INTRAVENOUS at 13:03

## 2024-11-09 RX ADMIN — ACYCLOVIR SODIUM 650 MG: 50 INJECTION, SOLUTION INTRAVENOUS at 00:03

## 2024-11-09 RX ADMIN — ACYCLOVIR SODIUM 650 MG: 50 INJECTION, SOLUTION INTRAVENOUS at 22:15

## 2024-11-09 RX ADMIN — CEFTRIAXONE SODIUM 2 G: 2 INJECTION, SOLUTION INTRAVENOUS at 04:48

## 2024-11-09 RX ADMIN — Medication 15 ML: at 04:39

## 2024-11-09 RX ADMIN — AMPICILLIN SODIUM 2 G: 2 INJECTION, POWDER, FOR SOLUTION INTRAMUSCULAR; INTRAVENOUS at 17:13

## 2024-11-09 RX ADMIN — Medication 1250 MG: at 13:30

## 2024-11-09 RX ADMIN — SODIUM CHLORIDE, SODIUM LACTATE, POTASSIUM CHLORIDE, CALCIUM CHLORIDE AND DEXTROSE MONOHYDRATE 50 ML/HR: 5; 600; 310; 30; 20 INJECTION, SOLUTION INTRAVENOUS at 04:28

## 2024-11-09 RX ADMIN — Medication 15 ML: at 22:35

## 2024-11-09 RX ADMIN — AMPICILLIN SODIUM 2 G: 2 INJECTION, POWDER, FOR SOLUTION INTRAMUSCULAR; INTRAVENOUS at 09:42

## 2024-11-09 RX ADMIN — HEPARIN SODIUM 5000 UNITS: 5000 INJECTION, SOLUTION INTRAVENOUS; SUBCUTANEOUS at 13:30

## 2024-11-09 RX ADMIN — HEPARIN SODIUM 5000 UNITS: 5000 INJECTION, SOLUTION INTRAVENOUS; SUBCUTANEOUS at 03:36

## 2024-11-09 RX ADMIN — CARBOXYMETHYLCELLULOSE SODIUM 2 DROP: 5 SOLUTION/ DROPS OPHTHALMIC at 09:43

## 2024-11-09 RX ADMIN — ACYCLOVIR SODIUM 650 MG: 50 INJECTION, SOLUTION INTRAVENOUS at 09:42

## 2024-11-09 RX ADMIN — Medication 1250 MG: at 01:39

## 2024-11-09 RX ADMIN — Medication 15 ML: at 09:43

## 2024-11-09 RX ADMIN — THIAMINE HYDROCHLORIDE 500 MG: 100 INJECTION, SOLUTION INTRAMUSCULAR; INTRAVENOUS at 22:14

## 2024-11-09 RX ADMIN — SODIUM BICARBONATE: 650 TABLET ORAL at 04:37

## 2024-11-09 SDOH — ECONOMIC STABILITY: HOUSING INSECURITY: AT ANY TIME IN THE PAST 12 MONTHS, WERE YOU HOMELESS OR LIVING IN A SHELTER (INCLUDING NOW)?: PATIENT UNABLE TO ANSWER

## 2024-11-09 SDOH — SOCIAL STABILITY: SOCIAL INSECURITY: DOES ANYONE TRY TO KEEP YOU FROM HAVING/CONTACTING OTHER FRIENDS OR DOING THINGS OUTSIDE YOUR HOME?: UNABLE TO ASSESS

## 2024-11-09 SDOH — SOCIAL STABILITY: SOCIAL INSECURITY: ARE THERE ANY APPARENT SIGNS OF INJURIES/BEHAVIORS THAT COULD BE RELATED TO ABUSE/NEGLECT?: UNABLE TO ASSESS

## 2024-11-09 SDOH — SOCIAL STABILITY: SOCIAL INSECURITY: DO YOU FEEL ANYONE HAS EXPLOITED OR TAKEN ADVANTAGE OF YOU FINANCIALLY OR OF YOUR PERSONAL PROPERTY?: UNABLE TO ASSESS

## 2024-11-09 SDOH — SOCIAL STABILITY: SOCIAL INSECURITY: DO YOU FEEL UNSAFE GOING BACK TO THE PLACE WHERE YOU ARE LIVING?: UNABLE TO ASSESS

## 2024-11-09 SDOH — SOCIAL STABILITY: SOCIAL INSECURITY: ABUSE: ADULT

## 2024-11-09 SDOH — SOCIAL STABILITY: SOCIAL INSECURITY: WERE YOU ABLE TO COMPLETE ALL THE BEHAVIORAL HEALTH SCREENINGS?: NO

## 2024-11-09 SDOH — SOCIAL STABILITY: SOCIAL INSECURITY: ARE YOU OR HAVE YOU BEEN THREATENED OR ABUSED PHYSICALLY, EMOTIONALLY, OR SEXUALLY BY ANYONE?: UNABLE TO ASSESS

## 2024-11-09 SDOH — SOCIAL STABILITY: SOCIAL INSECURITY: HAVE YOU HAD ANY THOUGHTS OF HARMING ANYONE ELSE?: UNABLE TO ASSESS

## 2024-11-09 SDOH — SOCIAL STABILITY: SOCIAL INSECURITY: HAVE YOU HAD THOUGHTS OF HARMING ANYONE ELSE?: UNABLE TO ASSESS

## 2024-11-09 SDOH — SOCIAL STABILITY: SOCIAL INSECURITY: HAS ANYONE EVER THREATENED TO HURT YOUR FAMILY OR YOUR PETS?: UNABLE TO ASSESS

## 2024-11-09 SDOH — ECONOMIC STABILITY: HOUSING INSECURITY: IN THE PAST 12 MONTHS, HOW MANY TIMES HAVE YOU MOVED WHERE YOU WERE LIVING?: 0

## 2024-11-09 SDOH — SOCIAL STABILITY: SOCIAL INSECURITY: WITHIN THE LAST YEAR, HAVE YOU BEEN AFRAID OF YOUR PARTNER OR EX-PARTNER?: PATIENT UNABLE TO ANSWER

## 2024-11-09 ASSESSMENT — COGNITIVE AND FUNCTIONAL STATUS - GENERAL
DAILY ACTIVITIY SCORE: 6
DAILY ACTIVITIY SCORE: 7
HELP NEEDED FOR BATHING: TOTAL
DRESSING REGULAR LOWER BODY CLOTHING: A LOT
TOILETING: TOTAL
STANDING UP FROM CHAIR USING ARMS: TOTAL
HELP NEEDED FOR BATHING: TOTAL
CLIMB 3 TO 5 STEPS WITH RAILING: TOTAL
WALKING IN HOSPITAL ROOM: TOTAL
TOILETING: TOTAL
PATIENT BASELINE BEDBOUND: YES
MOVING TO AND FROM BED TO CHAIR: TOTAL
MOVING TO AND FROM BED TO CHAIR: TOTAL
MOVING FROM LYING ON BACK TO SITTING ON SIDE OF FLAT BED WITH BEDRAILS: A LOT
EATING MEALS: TOTAL
HELP NEEDED FOR BATHING: TOTAL
TOILETING: TOTAL
TURNING FROM BACK TO SIDE WHILE IN FLAT BAD: TOTAL
DRESSING REGULAR LOWER BODY CLOTHING: TOTAL
EATING MEALS: TOTAL
DRESSING REGULAR UPPER BODY CLOTHING: TOTAL
STANDING UP FROM CHAIR USING ARMS: TOTAL
MOBILITY SCORE: 7
MOVING FROM LYING ON BACK TO SITTING ON SIDE OF FLAT BED WITH BEDRAILS: A LOT
TURNING FROM BACK TO SIDE WHILE IN FLAT BAD: TOTAL
PERSONAL GROOMING: TOTAL
CLIMB 3 TO 5 STEPS WITH RAILING: TOTAL
STANDING UP FROM CHAIR USING ARMS: TOTAL
DRESSING REGULAR UPPER BODY CLOTHING: TOTAL
MOVING FROM LYING ON BACK TO SITTING ON SIDE OF FLAT BED WITH BEDRAILS: TOTAL
WALKING IN HOSPITAL ROOM: TOTAL
PERSONAL GROOMING: TOTAL
DRESSING REGULAR UPPER BODY CLOTHING: TOTAL
MOBILITY SCORE: 6
WALKING IN HOSPITAL ROOM: TOTAL
MOVING TO AND FROM BED TO CHAIR: TOTAL
DAILY ACTIVITIY SCORE: 6
PERSONAL GROOMING: TOTAL
CLIMB 3 TO 5 STEPS WITH RAILING: TOTAL
TURNING FROM BACK TO SIDE WHILE IN FLAT BAD: TOTAL
DRESSING REGULAR LOWER BODY CLOTHING: TOTAL
EATING MEALS: TOTAL
MOBILITY SCORE: 7

## 2024-11-09 ASSESSMENT — ACTIVITIES OF DAILY LIVING (ADL)
FEEDING YOURSELF: DEPENDENT
HEARING - RIGHT EAR: DEAF
LACK_OF_TRANSPORTATION: PATIENT UNABLE TO ANSWER
WALKS IN HOME: DEPENDENT
BATHING: DEPENDENT
PATIENT'S MEMORY ADEQUATE TO SAFELY COMPLETE DAILY ACTIVITIES?: UNABLE TO ASSESS
HEARING - LEFT EAR: DEAF
LACK_OF_TRANSPORTATION: PATIENT UNABLE TO ANSWER
LACK_OF_TRANSPORTATION: PATIENT UNABLE TO ANSWER
DRESSING YOURSELF: DEPENDENT
GROOMING: DEPENDENT
ADEQUATE_TO_COMPLETE_ADL: UNABLE TO ASSESS
TOILETING: DEPENDENT
JUDGMENT_ADEQUATE_SAFELY_COMPLETE_DAILY_ACTIVITIES: UNABLE TO ASSESS

## 2024-11-09 ASSESSMENT — LIFESTYLE VARIABLES
AUDIT-C TOTAL SCORE: -1
HOW OFTEN DO YOU HAVE A DRINK CONTAINING ALCOHOL: PATIENT UNABLE TO ANSWER
HOW OFTEN DO YOU HAVE 6 OR MORE DRINKS ON ONE OCCASION: PATIENT UNABLE TO ANSWER
SKIP TO QUESTIONS 9-10: 0
HOW MANY STANDARD DRINKS CONTAINING ALCOHOL DO YOU HAVE ON A TYPICAL DAY: PATIENT UNABLE TO ANSWER
AUDIT-C TOTAL SCORE: -1

## 2024-11-09 NOTE — ASSESSMENT & PLAN NOTE
BRIEFLY Dat Mccallum is a 83 y.o. male w/ PMH notable for  HFrEF (EF improved from 20% to stress test 10/21 with LVEF 44%), DMII, HTN, HLD ,CKD, BPH, Deaf (ASL)  transferred from OSH (on Hosp Day 23) to [facility] for for continued Acute Encephalopathy workup, having been started on empiric regimen (continued on arrival) for suspicion of CNS/meningoencephalitis and Neurology evaluation/possible urgent LP this evening.     #Acute encephalopathy, suspicion Encephalitis, in context of PNA & UTI earlier in OSH course  -imaging, labs, EEG, consult notes as applicable to acute needs reviewed  -seen by ID at OSH, will consult, meds continued as ordered, holding IV decadron for now as per neuro request, would kvng benefit from autoimmune encephalitis panel onec LP obtained in addition to CNS infection workup as per prelim discussion w/ID this am   -PNA and UTI were tx in early course, persisting pleural consolidation on xray noted, no acute resp sx noted   -Neuro cks q4 hrs for now   -Neuro involved, will do LP, obtained coags   -appreciate NEURO & ID recs     #Decreased Po intake  -cachetic exam though xh decline w/mentation prompted Dobbhoff which has been unable to flush despite verifying placement overnight w/KUB ordered , pancrelipase kit ordered ,may need replaced, TF discussion/pallmed to continue (consult pallmed am, involved at LW)     #Hypotension -cont mididrine, pt has trended SBP  past 24 hrs though HDS map > 60 at [facility] despiet inability to give midodrine yet (enteric access issue)     #HLD  #Hx HFrEF w/recent improved EF, hypovolemic on exam, restarted d5LR @50 (ivf @50 at OSH tolerated well) may cont coreg, statin, asa via dophoff once access working as above    DNR DNI at OSH   Son (medical professional) emergency contact

## 2024-11-09 NOTE — POST-PROCEDURE NOTE
"RAPID RESPONSE RN NOTE:  \"Nutritional support\" page received for clogged feeding tube.  Existing tube and nasal bridle removed without difficulty once clog confirmed and unable to declog with usual troubleshooting methods.   #12 Fr, small bore feeding tube inserted into RIGHT nare with Enteral Access System CORTARK 2 per provider orders,including insertion of nasal bridle and removal of wire stylet; patient tolerated procedure; feeding tube secured at 78 cm; team made aware; bedside nurse notified and educated on appropriate use; no bleeding or adverse reaction noted; KUB activated for placement verification.    "

## 2024-11-09 NOTE — PROGRESS NOTES
"  NEUROLOGY CONSULT PROGRESS NOTE    Subjective   Dat Mccallum is an 83 y.o. male on hospital day 25, now day 1 of transfer to [facility], admitted for now-stabilized NSTEMI. Neurology is following for AMS.    Interval HPI  NAEO. HDS and afebrile.    Patient examined at bedside with son present, providing ASL interpretation. Patient also able to read lips per son. Son reports patient was independent in ADLs/IADLs and lived alone prior to admission. States patient is intermittently making sensible sign language movements but often just repeatedly making sign for yes and/or no. Also states that patient's L arm changes are new (changes: moving arm less, often keeping it flexed to chest).    Notably son was sitting on L side of room throughout communication with patient, reassuring given concern for L hemineglect.    LP performed without complication.     Medications  Current Outpatient Medications   Medication Instructions    aspirin 81 mg, oral, Daily    carvedilol (COREG) 3.125 mg, oral, 2 times daily after meals    glimepiride (AMARYL) 4 mg, oral, 2 times daily    metFORMIN (GLUCOPHAGE) 1,000 mg, oral, 2 times daily    midodrine (PROAMATINE) 5 mg, oral, 3 times daily (morning, midday, late afternoon)    omeprazole (PriLOSEC) 20 mg DR capsule oral    OneTouch Ultra Test strip TEST 3 TIMES DAILY    pen needle, diabetic (Sure Comfort Pen Needle) 32 gauge x 5/32\" needle Use three times a day with insulin    pravastatin (PRAVACHOL) 20 mg, oral, Nightly    pravastatin (PRAVACHOL) 40 mg, oral, Nightly    tamsulosin (FLOMAX) 0.8 mg, oral, Nightly     Scheduled Meds:acyclovir, 10 mg/kg, intravenous, q12h  ampicillin, 2 g, intravenous, q6h  aspirin, 81 mg, nasogastric tube, Daily  atorvastatin, 40 mg, nasogastric tube, Nightly  carvedilol, 3.125 mg, nasogastric tube, BID  cefTRIAXone, 2 g, intravenous, q12h  [Held by provider] dexAMETHasone, 10 mg, intravenous, q6h  heparin, 5,000 Units, subcutaneous, q8h  lubricating eye " "drops, 2 drop, Both Eyes, TID  midodrine, 10 mg, oral, TID  moisturizing mouth, 15 mL, Swish & Spit, BID  pancrelipase (Lip-Prot-Amyl) 2 tablet, sodium bicarbonate 650 mg, , nasogastric tube, Once  thiamine, 500 mg, intravenous, TID  vancomycin, 1,250 mg, intravenous, Once      Continuous Infusions: dextrose 5 % and lactated Ringer's, 50 mL/hr, Last Rate: 50 mL/hr (11/09/24 0428)      PRN Meds:PRN medications: acetaminophen, acetaminophen, dextrose, dextrose, glucagon, glucagon, insulin lispro, OLANZapine, vancomycin     ==========  Objective   Blood pressure 116/65, pulse 92, temperature 36.7 °C (98.1 °F), resp. rate 15, height 1.829 m (6' 0.01\"), weight 65.2 kg (143 lb 11.8 oz), SpO2 92%.    Physical Exam  General appearance:  No distress. Thin and frail but not ill-appearing.   Head: NC/AT  Resp: Normal effort of breathing, no conversational dyspnea    Mental Status:  Difficult to assess due to language barrier and intermittent participation in exam/responsiveness. Throughout exam, performs some commands (show me thumbs up) but not others (follow finger with eyes). Son states some signs he is making are sensible and others not so. Often makes repetitive signs with R hand that son states are \"yes\" and/or \"no\".     Cranial Nerves:   CN 2   Visual fields full to confrontation.   CN 3, 4, 6   Pupils with slightly irregular round shape (LASIK surgery within last two years), equally reactive to light. Lids symmetric; no ptosis. EOMs normal alignment. No nystagmus.   CN 5   Unable to assess due to limited cooperation.   CN 7   Nasolabial folds symmetric.   CN 8   Completely deaf since childhood.     Motor:  Strength exam limited due to poor participation, but has at least 4/5 strength in all extremities.   Overall muscle bulk low, patient borderline emaciated (BMI 19.5). Tone intermittently increased symmetrically but unclear if this is volitional, seemed able to relax when asked to. Possible postural tremor " "intermittently noted when hands raised, though not consistently present. Repetitive \"pinching\" movements made with R hand.     Does move and attend to L arm but less movement with it overall compared to R and often keeps it in flexed posture. Uses L arm in bilateral commands (raise arms) but not in unliateral L commands (show me L thumb).     Reflexes:  RIGHT UE   LEFT UE   BR: 3        BR: 3     Biceps: 3     Biceps: 3      RIGHT LLE   LEFT LLE     Knee: 3        Knee: 3     Difficult to assess ankle reflexes due to withdrawal/poor relaxation when manipulated.    Babinski: toes downgoing to plantar stimulation    Recent/Relevant Results    Labs  Results for orders placed or performed during the hospital encounter of 11/08/24 (from the past 24 hours)   POCT GLUCOSE   Result Value Ref Range    POCT Glucose 116 (H) 74 - 99 mg/dL   Coagulation Screen   Result Value Ref Range    Protime 14.6 (H) 9.8 - 12.8 seconds    INR 1.3 (H) 0.9 - 1.1    aPTT 27 27 - 38 seconds   POCT GLUCOSE   Result Value Ref Range    POCT Glucose 130 (H) 74 - 99 mg/dL   Lavender Top   Result Value Ref Range    Extra Tube Hold for add-ons.    POCT GLUCOSE   Result Value Ref Range    POCT Glucose 144 (H) 74 - 99 mg/dL   Vancomycin   Result Value Ref Range    Vancomycin 15.2 5.0 - 20.0 ug/mL   Renal Function Panel   Result Value Ref Range    Glucose 143 (H) 74 - 99 mg/dL    Sodium 135 (L) 136 - 145 mmol/L    Potassium 3.5 3.5 - 5.3 mmol/L    Chloride 100 98 - 107 mmol/L    Bicarbonate 24 21 - 32 mmol/L    Anion Gap 15 10 - 20 mmol/L    Urea Nitrogen 31 (H) 6 - 23 mg/dL    Creatinine 1.33 (H) 0.50 - 1.30 mg/dL    eGFR 54 (L) >60 mL/min/1.73m*2    Calcium 7.9 (L) 8.6 - 10.6 mg/dL    Phosphorus 3.5 2.5 - 4.9 mg/dL    Albumin 2.1 (L) 3.4 - 5.0 g/dL   Magnesium   Result Value Ref Range    Magnesium 2.08 1.60 - 2.40 mg/dL   CBC   Result Value Ref Range    WBC 8.4 4.4 - 11.3 x10*3/uL    nRBC 0.0 0.0 - 0.0 /100 WBCs    RBC 2.61 (L) 4.50 - 5.90 x10*6/uL    " Hemoglobin 7.5 (L) 13.5 - 17.5 g/dL    Hematocrit 23.7 (L) 41.0 - 52.0 %    MCV 91 80 - 100 fL    MCH 28.7 26.0 - 34.0 pg    MCHC 31.6 (L) 32.0 - 36.0 g/dL    RDW 14.6 (H) 11.5 - 14.5 %    Platelets 447 150 - 450 x10*3/uL     11/7  Sars-Cov-2 PCR negative  Phos wnl  TSH wnl  Blood cultures NGTD     11/3  Lactate wnl     10/17  Ammonia 19     Imaging  MRI Brain w/wo IVCON 11/7/24:  There is no MRI evidence of acute infarction on the  diffusion-weighted images.  There is again evidence of moderate-to-marked brain parenchymal  volume loss.  Nonspecific white matter changes are again noted within cerebral  hemispheres bilaterally which while nonspecific, given the patient's  age, likely represent sequelae of more remote small-vessel ischemic  change. Additional punctate foci of bright signal on the T2 images  are again noted within the subinsular regions and basal ganglia  bilaterally suggesting incidental mildly prominent perivascular  spaces and/or small scattered more remote lacunar infarctions.  No abnormal intracranial mass lesion or abnormal intracranial  enhancement is identified on the postcontrast images.     CXR 11/7/24:  Normal-sized heart with mild pulmonary vascular congestion and  persistent areas of infiltrate in each lower lung zone with small  left pleural effusion.     CTH wo IVCON 10/29/24:  No acute intracranial abnormality.      Nuclear Medicine Stress Test 10/22/24:  1.  No evidence of inducible ischemia or prior infarction.  2. Left ventricle is normal in size.  3. Mild global hypokinesis of the LV wall motion with a post-stress  LV EF estimated at 44%.     MRI Brain wo IVCON 10/19/24:  No MR evidence of acute intracranial infarct, hemorrhage, mass, or  mass effect.     EEG  20min study 10/17/24:  This routine awake and drowsy EEG is indicative of a moderate diffuse encephalopathy. No epileptiform activity or lateralizing signs seen.      1hr study 11/1/24:  This 1 hour EEG is indicative of a  severe diffuse encephalopathy. No epileptiform activity or lateralizing signs seen.     ==========  Assessment/Plan   Assessment  Encephalopathy acute  Dat Mccallum is an 83 y.o. male with a hx of HFrEF, NID-T2DM, HTN, HLD, CKD presenting as a transfer patient 11/8 from [facility], on his 25th day of admission for a now-stabilized NSTEMI and altered mentation. Neurology is consulted for further workup of persistent, worsening AMS (baseline independent). Patient neurological examination concerning for fluctuating episodic confusion, with catatonia like behavior and concern for L hemineglect observed prior to transfer.  EEG sig for diffuse encephalopathy but no seizures. MRI Jorge A w/wo unremarkable. Will follow for workup of infectious/autoimmune/paraneoplastic encephalitis.     No evidence of catatonia on exam 11/9 and do not suspect L hemineglect, but will continue to monitor for these given previous waxing/waning and often unpredictable changes between examinations.     Recommendations  - Continue IV ampicillin, acyclovir, vancomycin, ceftriaxone for CNS coverage.  - LP completed and infectious/autoimmune/prion panels ordered  - Resume DVT PPX per primary  - Please ensure adequate hydration following procedure  - For malignancy screening, please order either CT chest/abd/pelv w IVCON or full body PET if concerned with contrast in light of impaired renal function  - Checking ANGEL w/reflex to VIDAL  - Checking reversible dementia causes B12, B9, syph, HIV (TSH recently normal)  - Empirically repleting B1 500mg IV TID for 3 days (ordered)  - Continue delirium and fall precautions  - Neurology will continue to follow    Recommendations are not final until signed by attending physician.        David Livingston, MS4  1:11 PM  11/9/24    -----------------------------------------------------------------  Senior Resident Addendum:  I examined & discussed the patient above. I have reviewed and agree with the excellent note  above.     Rochelle Cortes MD (Paul)  PGY-4 Neurology  Gen Neuro Pager: [phone]

## 2024-11-09 NOTE — SIGNIFICANT EVENT
CSF studies reviewed and unremarkable for underlying bacterial infectious process. From CNS coverage perspective okay to de-escalate antibiotics. HSV PCR pending and thus would continue treating with Acyclovir until HSV PCR results.

## 2024-11-09 NOTE — H&P
History Of Present Illness  Dat Mccallum is a 83 y.o. male w/ PMH notable for  HFrEF (EF improved from 20% to stress test 10/21 with LVEF 44%), DMII, HTN, HLD ,CKD, BPH, Deaf (ASL)  transferred from OSH (on Hosp Day 23) to [facility] for for continued Acute Encephalopathy workup, having been started on empiric regimen (continued on arrival) for suspicion of CNS/meningoencephalitis and Neurology evaluation/possible urgent LP this evening.     Pt seen by medicine and Neuro this evening on arrival, family not at bedside, per report & chart  pt is increasingly lethargic and unable to be alert enoug hto take po x1 wk (Dobbhoff added at OSH, no TF yet), course at OSH began 10/16 ([facility]) after family found pt home altered from baseline, was treated iniatlly for PNA, thought to have had NSTEMI though cardiology found no sig stenosis on cath, UTI, (initially on zosyn) that were primarily addressed during course and had brief ICU needs, stepped down and was scheduled midodrine, and however  contiued to decline w/agiation and intremitent waxy or confused states and neuro changes which included nonsensical pharses or repitive pharses (see neuro note) w/EEG notable for diffuser enecepahlthy and MRI w/o acute stroke though other add'l finsngs such as launar infarcts, volumeloss, etc (see read below) and he was thus transefred for [facility] neuro care while on acyclovir, vancomycin, ampicillin, decadron, and appears to have also had coverage with ceftriaxone which were contuned upon arrival.     Pt was calm snoring but alerts w sternal rub or shaking shoulder (is deaf) and squeezed R hand when prompted, did not vocalize anything or attempt to use sign language or gestures (though followed gestures for f/e feet etc for RN on arrival.       Past Medical History  He has a past medical history of Acute frontal sinusitis, unspecified (01/06/2015), Encounter for screening for malignant neoplasm of colon (01/22/2020), Encounter for screening  for malignant neoplasm of prostate (08/14/2017), Other conditions influencing health status, Other conditions influencing health status (01/19/2018), Other skin changes (07/21/2021), Other specified health status, Personal history of other diseases of the nervous system and sense organs, Personal history of other diseases of urinary system (08/10/2021), Personal history of other specified conditions (06/29/2018), Personal history of other specified conditions (09/25/2017), Personal history of other specified conditions (08/14/2017), Personal history of other specified conditions (10/11/2017), Personal history of pneumonia (recurrent) (01/19/2018), Rash and other nonspecific skin eruption (12/17/2020), 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    Surgical History  He has a past surgical history that includes Other surgical history (12/02/2013); Other surgical history (11/26/2018); Other surgical history (11/26/2018); and Cardiac catheterization (N/A, 10/23/2024).     Social History  He reports that he has never smoked. He has never been exposed to tobacco smoke. He has never used smokeless tobacco. He reports that he does not currently use alcohol. He reports that he does not use drugs.    Family History  Reviewed, as per chart, not acutely relevant      Allergies  Patient has no known allergies.)     Review of Systems  A 12 point ROS is incomplete due to patient condition/ability to participate      Physical Exam  GEN elderly cachectic  M lying in hospital bed NAD   HEENT NCAT appearing w/o scleral icterus, MMM  CV RRR, no JVD   PULM No wheeze noted upper and anterior fields, diminished bilat base, symmetric rise and fall, rhonchi, no cough  ABD soft, No guarding, BS distant   EXT no pitting edema bilat, cap refill < 3 sec  NEURO alerts to touch/sternal rub, unable to assess orientation, unable to assess gait, R hand  with persisting  when prompted to squeeze (unclear if intentional)  defer detailed neuro exam to neuro team   PSYCH not agitated at this time, unable to assess for AVH or delusions at present      Last Recorded Vitals  /54   Pulse 83   Temp 36.2 °C (97.2 °F)   Resp 18   SpO2 95%     Selected Relevant Results from OSH (please see chart for all studies from course)   Interpreted By:  Rich Purvis,   STUDY:  MR BRAIN W AND WO IV CONTRAST;  11/7/2024 10:05 am      INDICATION:  Signs/Symptoms:Progressive encephalopathy with left hemineglect,  upper motor neuron signs and possible catatonia, Prior MRI normal,  assess for signs of encephalitis and/or diffusion changes of prion  disease.      COMPARISON:  11/19/2024      ACCESSION NUMBER(S):  [id number]      ORDERING CLINICIAN:  MILTON GARCIA      TECHNIQUE:  Axial diffusion, axial T2, axial FLAIR, axial gradient echo T2, axial  T1, post gadolinium volumetric T1, as well as post gadolinium axial  T1 weighted MRI images of the brain were obtained. The patient  received  13 mL of  Dotarem gadolinium intravenously.      FINDINGS:  The diffusion weighted images fail to demonstrate abnormal diffusion  restriction to suggest acute infarction.      There is again evidence of moderate-to-marked brain parenchymal  volume loss.      Nonspecific white matter changes are again noted within cerebral  hemispheres bilaterally which while nonspecific, given the patient's  age, likely represent sequelae of more remote small-vessel ischemic  change.      Additional punctate foci of bright signal on the T2 images are again  noted within the subinsular regions and basal ganglia bilaterally  suggesting incidental mildly prominent perivascular spaces and/or  small scattered more remote lacunar infarctions.      No abnormal intracranial mass lesion or abnormal intracranial  enhancement is identified on the postcontrast images.      There is a lobulated mucosal thickening  and/or retention cyst/polyp  noted within the inferior right maxillary sinus. Minimal mucosal  thickening is noted within scattered ethmoid air cells.      The mastoid air cells are clear.      IMPRESSION:  There is no MRI evidence of acute infarction on the  diffusion-weighted images.      There is again evidence of moderate-to-marked brain parenchymal  volume loss.      Nonspecific white matter changes are again noted within cerebral  hemispheres bilaterally which while nonspecific, given the patient's  age, likely represent sequelae of more remote small-vessel ischemic  change. Additional punctate foci of bright signal on the T2 images  are again noted within the subinsular regions and basal ganglia  bilaterally suggesting incidental mildly prominent perivascular  spaces and/or small scattered more remote lacunar infarctions.      No abnormal intracranial mass lesion or abnormal intracranial  enhancement is identified on the postcontrast images.      MACRO:  None.      Signed by: Rich Purvis 11/7/2024 10:28 AM  Dictation workstation:   [id number]    Status Exam Begun Exam Ended   Final 10/29/2024 13:19 10/29/2024 13:26     Study Result    Narrative & Impression   Interpreted By:  Brielle Linares,   STUDY:  CT HEAD WO IV CONTRAST; ;  10/29/2024 1:26 pm      INDICATION:  Signs/Symptoms:sudden onset confusion.      COMPARISON:  10/16/2024      ACCESSION NUMBER(S):  [id number]      ORDERING CLINICIAN:  RICHARD ADAIR      TECHNIQUE:  Serial axial images of the head were obtained without intravenous  contrast. Sagittal and coronal reconstructions were generated.      FINDINGS:  Trickles are midline and enlarged. There is prominence of the  cortical sulci and superior cerebellar cisterns.      There are no acute parenchymal abnormalities. There is no hemorrhage  or extra-axial fluid.      There is no obvious scalp hematoma or skull fracture.      Visualized portions of the paranasal sinuses and mastoids  are  unremarkable.      COMPARISON OF FINDINGS:  The brain is similar.      IMPRESSION:  No acute intracranial abnormality.          MACRO:  none      Signed by: Brielle Linares 10/29/2024 2:19 PM  Dictation workstation:   [id number]         Assessment/Plan   Assessment & Plan  Encephalopathy acute  BRIEFLY Dat Mccallum is a 83 y.o. male w/ PMH notable for  HFrEF (EF improved from 20% to stress test 10/21 with LVEF 44%), DMII, HTN, HLD ,CKD, BPH, Deaf (ASL)  transferred from OSH (on Hosp Day 23) to [facility] for for continued Acute Encephalopathy workup, having been started on empiric regimen (continued on arrival) for suspicion of CNS/meningoencephalitis and Neurology evaluation/possible urgent LP this evening.     #Acute encephalopathy, suspicion Encephalitis, in context of PNA & UTI earlier in OSH course  -imaging, labs, EEG, consult notes as applicable to acute needs reviewed  -seen by ID at OSH, will consult, meds continued as ordered, holding IV decadron for now as per neuro request, would likley benefit from autoimmune encephalitis panel onec LP obtained in addition to CNS infection workup as per prelim discussion w/ID this am   -PNA and UTI were tx in early course, persisting pleural consolidation on xray noted, no acute resp sx noted   -Neuro cks q4 hrs for now   -Neuro involved, will do LP, obtained coags   -appreciate NEURO & ID recs     #Decreased Po intake  -cachetic exam though xh decline w/mentation prompted Dobbhoff which has been unable to flush despite verifying placement overnight w/KUB ordered , pancrelipase kit ordered ,may need replaced, TF discussion/pallmed to continue (consult pallmed am, involved at LW)     #Hypotension -cont mididrine, pt has trended SBP  past 24 hrs though HDS map > 60 at [facility] despiet inability to give midodrine yet (enteric access issue)     #HLD  #Hx HFrEF w/recent improved EF, hypovolemic on exam, restarted d5LR @50 (ivf @50 at OSH tolerated well) may cont coreg,  statin, asa via dophoff once access working as above    DNR DNI at OSH   Son (medical professional) emergency contact     > 60 min obtaining new studies, coordinating care plan/interventions/order canges in concert w/case discussion w/pharm, ID, Neuro specialists     Kati Mena, DO

## 2024-11-09 NOTE — PROGRESS NOTES
Vancomycin Dosing by Pharmacy- FOLLOW UP    Dat Mccallum is a 83 y.o. year old male who Pharmacy has been consulted for vancomycin dosing for CNS. Based on the patient's indication and renal status this patient is being dosed based on a goal trough/random level of 15-20.     Renal function is currently Declining. Will switch to DBL for now.     Current vancomycin dose:  1250 mg every 24 hours    Most recent random level: 15.2 mcg/mL    Estimated Creatinine Clearance: 40.2 mL/min (A) (by C-G formula based on SCr of 1.33 mg/dL (H)).     Results from last 7 days   Lab Units 11/09/24  0754 11/08/24  0448 11/07/24  0453 11/06/24  0458   BUN mg/dL 31* 28* 28* 24*   CREATININE mg/dL 1.33* 1.22 1.16 0.82   WBC AUTO x10*3/uL 8.4  --  14.6* 13.1*   VANCOMYCIN RM ug/mL 15.2  --   --   --         Urine Culture   Date/Time Value Ref Range Status   10/29/2024 10:56 AM Normal genitourinary velasquez  Final     Blood Culture   Date/Time Value Ref Range Status   11/07/2024 04:52 AM No growth at 2 days  Preliminary        No results found for the last 90 days.      Visit Vitals  /65   Pulse 92   Temp 36.7 °C (98.1 °F)   Resp 15        Assessment/Plan    Re-dose with 1250mg x1, to be given 11/9 @1130  The next level will be obtained on 11/10 @1000. May be obtained sooner if clinically indicated.   Will continue to monitor renal function daily while on vancomycin and order serum creatinine at least every 48 hours if not already ordered.  Follow for continued vancomycin needs, clinical response, and signs/symptoms of toxicity.       Rosario Caraballo, PharmD

## 2024-11-09 NOTE — PROCEDURES
Lumbar Puncture    Written consent was done with Jorge A (son), which was put in the physical chart.    Patient was positioned in lateral decubitus position. Area was sterilized in usual fashion and anesthesized with 1ml of 1% Lidocaine. LP needle was introduced and advanced through skin, subcutaneous tissue, muscle, perispinal ligaments until CSF space successfully reached. Opening pressure was 6 cm. ~15 cc of CSF were collected in four tubes. LP needle was removed and bandaid applied.    Rochelle Cortes MD  Neurology Resident, PGY4  Neuro pager [phone]

## 2024-11-09 NOTE — CONSULTS
"Nutrition Assessment      Reason for Assessment: Tube feeding assessment and order    Dat Mccallum is a 83 y.o. male presenting as a transfer patient from [facility], on his day 25 of admission for a now-stabilized NSTEMI and altered mentation.     NG tube in place.     Past Medical History of  T2DM, HTN, HLD, CKD3, BPH, Deafness (communicates with ASL)      Nutrition History:  Energy Intake: Poor < 50 %  Food and Nutrient History: Patient unable to provide diet history or answer questions. Per bedside RN, though deaf, patient can read lips. However, patient with altered cognition at this time. Per chart review, patient with weight loss prior to admission to OSH, poor oral intake while at OSH and pulled out 2 NG tubes while at OSH.  Vitamin/Herbal Supplement Use: Unknown  Food Allergies/Intolerances:  ELIANE  GI Symptoms: ELIANE  Oral Problems: ELIANE       Anthropometrics:  Height: 182.9 cm (6' 0.01\")   Weight: 65.2 kg (143 lb 11.8 oz)   BMI (Calculated): 19.49  IBW/kg (Dietitian Calculated): 80.9 kg  Percent of IBW: 80.6 %       Weight History:   Wt Readings from Last 5 Encounters:   11/09/24 65.2 kg (143 lb 11.8 oz)   11/07/24 65.2 kg (143 lb 11.8 oz)   02/27/24 68.9 kg (152 lb)   08/10/23 70.3 kg (155 lb)   01/03/22 76.7 kg (169 lb)       Weight Change %:  Significant Weight Loss: No (5.4% wt loss x ~ 8 months.)    Nutrition Focused Physical Exam Findings:    Subcutaneous Fat Loss:   Orbital Fat Pads: Severe (dark circles, hollowing and loose skin)  Buccal Fat Pads: Severe (hollow, sunken and narrow face)  Triceps: Severe (negligible fat tissue)  Ribs: Defer  Muscle Wasting:  Temporalis: Severe (hollowed scooping depression)  Pectoralis (Clavicular Region): Severe (protruding prominent clavicle)  Deltoid/Trapezius: Severe (squared shoulders, acromion process prominent)  Interosseous: Severe (depressed area between thumb and forefinger)  Trapezius/Infraspinatus/Supraspinatus (Scapular Region): Severe (prominent visual " scapula, depression between ribs, scapula or shoulder)  Quadriceps: Severe (depressions on inner and outer thigh)  Gastrocnemius: Defer  Edema:  Edema: none  Physical Findings:  Hair:  (Bald)  Eyes: Negative  Nails: Negative  Skin: Positive (Mepilex on buttocks/coccyx for protection.)    Nutrition Significant Labs:  CBC Trend:   Results from last 7 days   Lab Units 11/09/24  0754 11/07/24  0453 11/06/24  0458 11/05/24  0525   WBC AUTO x10*3/uL 8.4 14.6* 13.1* 10.5   RBC AUTO x10*6/uL 2.61* 2.60* 2.75* 2.67*   HEMOGLOBIN g/dL 7.5* 7.6* 8.2* 7.9*   HEMATOCRIT % 23.7* 22.7* 22.7* 22.9*   MCV fL 91 87 83 86   PLATELETS AUTO x10*3/uL 447 378 371 331    , BMP Trend:   Results from last 7 days   Lab Units 11/09/24  0754 11/08/24 0448 11/07/24 0453 11/06/24  0458   GLUCOSE mg/dL 143* 69* 104* 86   CALCIUM mg/dL 7.9* 7.6* 7.7* 7.7*   SODIUM mmol/L 135* 132* 131* 129*   POTASSIUM mmol/L 3.5 3.2* 3.9 4.0   CO2 mmol/L 24 24 26 25   CHLORIDE mmol/L 100 100 98 99   BUN mg/dL 31* 28* 28* 24*   CREATININE mg/dL 1.33* 1.22 1.16 0.82    , A1C:  Lab Results   Component Value Date    HGBA1C 5.3 10/16/2024   , BG POCT trend:   Results from last 7 days   Lab Units 11/09/24  0648 11/09/24  0156 11/08/24  2111 11/08/24  1203 11/08/24  0836   POCT GLUCOSE mg/dL 144* 130* 116* 104* 79    , Renal Lab Trend:   Results from last 7 days   Lab Units 11/09/24  0754 11/08/24  0448 11/07/24  0453 11/06/24  0458   POTASSIUM mmol/L 3.5 3.2* 3.9 4.0   PHOSPHORUS mg/dL 3.5 3.8 4.3 2.9   SODIUM mmol/L 135* 132* 131* 129*   MAGNESIUM mg/dL 2.08  --   --   --    EGFR mL/min/1.73m*2 54* 60* 63 88   BUN mg/dL 31* 28* 28* 24*   CREATININE mg/dL 1.33* 1.22 1.16 0.82    , Vit D:   Lab Results   Component Value Date    VITD25 9 (A) 07/22/2021         Nutrition Specific Medications:    Scheduled medications  acyclovir, 10 mg/kg, intravenous, q12h  ampicillin, 2 g, intravenous, q6h  aspirin, 81 mg, nasogastric tube, Daily  atorvastatin, 40 mg, nasogastric tube,  Nightly  carvedilol, 3.125 mg, nasogastric tube, BID  cefTRIAXone, 2 g, intravenous, q12h  [Held by provider] dexAMETHasone, 10 mg, intravenous, q6h  heparin, 5,000 Units, subcutaneous, q8h  lubricating eye drops, 2 drop, Both Eyes, TID  midodrine, 10 mg, oral, TID  moisturizing mouth, 15 mL, Swish & Spit, BID  pancrelipase (Lip-Prot-Amyl) 2 tablet, sodium bicarbonate 650 mg, , nasogastric tube, Once  vancomycin, 1,250 mg, intravenous, q24h      Continuous medications  dextrose 5 % and lactated Ringer's, 50 mL/hr, Last Rate: 50 mL/hr (11/09/24 0428)      PRN medications  PRN medications: acetaminophen, acetaminophen, dextrose, dextrose, glucagon, glucagon, insulin lispro, OLANZapine, vancomycin    I/O:   Last BM Date: 11/08/24; Stool Appearance: Soft (11/08/24 2034)    Dietary Orders (From admission, onward)       Start     Ordered    11/09/24 0731  NPO Diet; Effective now  Diet effective now         11/09/24 0731                     Estimated Needs:   Total Energy Estimated Needs (kCal): 1950 kCal  Method for Estimating Needs: 65.2 kg / 30 kcal  Total Protein Estimated Needs (g): 85 g  Method for Estimating Needs: 65.2 kg / 1.3 g              Nutrition Diagnosis   Malnutrition Diagnosis  Patient has Malnutrition Diagnosis: Yes  Diagnosis Status: New  Malnutrition Diagnosis: Severe malnutrition related to chronic disease or condition  As Evidenced by: severe subcutaeous fat loss, severe muscle wasting, suspect meeting < 50% of EER for > 1 month and low BMI 19.5            Nutrition Interventions/Recommendations         Nutrition Prescription:  Individualized Nutrition Prescription Provided for :   Patient is a refeeding risk.   Order 500 mg Thiamine TID x 3 days followed by 100 mgs once daily.   Renal Panel + Magnesium BID.   Start Isosource 1.5 at 10m ml/hr and increase by 10 ml every 12 hours to goal of 50ml/hr.   Do not increase rate unless Elytes wnl.   Water flushes per MD (TF at goal provides ~ 900ml  water/day)        Nutrition Interventions:   Interventions: Enteral intake  Goal: Avoid refeeding       Nutrition Education:   Not applicable.       Nutrition Monitoring and Evaluation   Food/Nutrient Related History Monitoring  Monitoring and Evaluation Plan: Enteral and parenteral nutrition intake  Enteral and Parenteral Nutrition Intake: Enteral nutrition formula/solution  Criteria: TF well tolerated    Body Composition/Growth/Weight History  Monitoring and Evaluation Plan: Weight  Criteria: Stable weight    Biochemical Data, Medical Tests and Procedures  Monitoring and Evaluation Plan: Glucose/endocrine profile, Electrolyte/renal panel  Criteria: Labs wnl              Time Spent (min): 60 minutes

## 2024-11-09 NOTE — CONSULTS
Vancomycin Dosing by Pharmacy- INITIAL    Dat Mccallum is a 83 y.o. year old male who Pharmacy has been consulted for vancomycin dosing for CNS/meningoencephalitis. Based on the patient's indication and renal status this patient will be dosed based on a goal AUC of 500-600.     Renal function is currently stable.    Visit Vitals  /54   Pulse 83   Temp 36.2 °C (97.2 °F)   Resp 18        Lab Results   Component Value Date    CREATININE 1.22 2024    CREATININE 1.16 2024    CREATININE 0.82 2024    CREATININE 0.84 2024        Patient weight is as follows: There were no vitals filed for this visit.    Cultures:  No results found for the encounter in last 14 days.        No intake/output data recorded.  I/O during current shift:  No intake/output data recorded.    Temp (24hrs), Av.1 °C (97 °F), Min:35.9 °C (96.6 °F), Max:36.2 °C (97.2 °F)         Assessment/Plan     Patient will not be given a loading dose.  Will initiate vancomycin maintenance,  1250 mg every 24 hours.    This dosing regimen is predicted by [employer] to result in the following pharmacokinetic parameters:    Regimen: 1250 mg IV every 24 hours.  Start time: 21:17 on 2024  Exposure target: AUC24 (range)500-600 mg/L.hr   [id number]: 403 mg/L.hr  AUC24,ss: 578 mg/L.hr  Probability of AUC24 > 400: 87 %  Ctrough,ss: 17.8 mg/L  Probability of Ctrough,ss > 20: 38 %    Follow-up level will be ordered on  at 1st am labs unless clinically indicated sooner.  Will continue to monitor renal function daily while on vancomycin and order serum creatinine at least every 48 hours if not already ordered.  Follow for continued vancomycin needs, clinical response, and signs/symptoms of toxicity.       Charlie Knight, RubyD

## 2024-11-09 NOTE — CARE PLAN
The patient's goals for the shift include      The clinical goals for the shift include pt will remain safe and free from injury  Pt remained safe and free from injury following arrival from outside hospital soft wrist restraints initiated to protect lines and medical equipment

## 2024-11-09 NOTE — CONSULTS
GENERAL NEUROLOGY INITIAL CONSULT    History Of Present Illness  Dat Mccallum is a 83 y.o. male presenting as a transfer patient from [facility], on his day 24 of admission for a now-stabilized NSTEMI and altered mentation. Neurology is consulted for further workup of persistent, worsening AMS with possible L-sided focal neurologic deficits. He is deaf (communicates with ASL) and has a medical hx significant for chronic HFrEF (nuclear stress test 10/21 with LVEF 44%), NID-T2DM (A1c 5.3 on 10/16), HTN, HLD well-controlled with statin, CKD (eGFR 63 on 11/7), and BPH. Currently DNR-CCA, DNI status per son/POA Jorge A Mccallum.     Cardiac HPI  He was admitted to the [facility] ICU on 10/16 after being found confused by family, found to have NSTEMI with significantly elevated troponins and TTE showing LVEF 15-20%. His NSTEMI was medically managed with heparin, no actionable stenosis was found on catheterization and 10/21 NM stress test showed improved LVEF to 44%. Cardiology signed off on 10/25 recommending continuation of ASA, coreg, and statin with SNF placement. His BP remained soft throughout admission, intermittently requiring midodrine (including today, with most recent BP 98/65. Notably, deemed unable to tolerate GDMT, He was stepped down from the ICU on 10/21.     Infectious HPI  Found to have UTI with urinary retention on 10/30, started on Zosyn, Coude catheter unable to be placed due to obstruction. External catheter placed and patient now monitored for painful distention/MARISSA. CXR 11/1 concerning for worsening suspected aspiration PNA, made NPO and Corpak placed. No fever or leukocytosis during stay, until 11/6 when WBC jumped to 13.1 (14.6 on 11/7). Zosyn course finished on 11/3 but restarted 11/6 due to repeat CXR showing non-resolution of PNA.      Neurological HPI  Since admission 10/16, Dat has demonstrated waxing and waning mentation, with true mental status difficult to ascertain due to language  "barrier (deaf, uses ASL). Repeatedly, the team at  [place] noted he seemed to be doing better (participating in PT, engaging appropriately in examination), only to repeatedly become confused/agitated and at times appearing catatonic (waxy flexibility, rigidity, and echopraxia noted 11/5, failed trial of Ativan). He pulled two Corpaks and is in soft restraints, being fed a pureed diet. This waxing/waning picture fits with the reports of his son, who has been communicating with him through ALS. At times he is lucid and makes sense, but often and more recently his signing has been nonsensical and at times repetitive (repeating \"April\" 11/4). MRI brain wo IVCON 10/19 and w/wo IVCON 11/6 revealed only moderate-marked volume loss, nonspecific white matter disease, and punctate foci within the subinsular regions and basal ganglia suggesting prominent Virchow-Nico spaces vs remote lacunar infarcts. 20min EEG showed moderate diffuse encephalopathy on 10/17, 1hr showed severe diffuse encephalopathy on 11/1.      The leading rationale for this patient's AMS has been TME/delirium, however evaluation by the IM and neurology teams on 11/6 revealed concerns for new findings including possible L hemineglect and bilateral Babinski reflex. The neurology team recommended LP for possible encephalitis, and mentioned prion disease as well.     On patient interview at  on admission: Patient is non verbal, does not follow commands. Son not at bedside to be able to elicit history    Past Medical History  Past Medical History:   Diagnosis Date    Acute frontal sinusitis, unspecified 01/06/2015    Acute frontal sinusitis    Encounter for screening for malignant neoplasm of colon 01/22/2020    Colon cancer screening    Encounter for screening for malignant neoplasm of prostate 08/14/2017    Special screening, prostate cancer    Other conditions influencing health status     Colonoscopy planned    Other conditions influencing health " status 01/19/2018    History of cough    Other skin changes 07/21/2021    Blisters of multiple sites    Other specified health status     Known health problems: none    Personal history of other diseases of the nervous system and sense organs     History of cataract    Personal history of other diseases of urinary system 08/10/2021    History of hematuria    Personal history of other specified conditions 06/29/2018    History of dizziness    Personal history of other specified conditions 09/25/2017    History of abdominal pain    Personal history of other specified conditions 08/14/2017    History of diarrhea    Personal history of other specified conditions 10/11/2017    History of urinary incontinence    Personal history of pneumonia (recurrent) 01/19/2018    History of community acquired pneumonia    Rash and other nonspecific skin eruption 12/17/2020    Rash    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    Ulcerative blepharitis of right upper eyelid    Unspecified injury of left lower leg, initial encounter 12/03/2020    Left knee injury, initial encounter     Surgical History  Past Surgical History:   Procedure Laterality Date    CARDIAC CATHETERIZATION N/A 10/23/2024    Procedure: Left Heart Cath;  Surgeon: Haja Mckeon DO;  Location: [facility] Cardiac Cath Lab;  Service: Cardiovascular;  Laterality: N/A;    OTHER SURGICAL HISTORY  12/02/2013    Proximal Subtotal Pancreatectomy (Whipple Procedure)    OTHER SURGICAL HISTORY  11/26/2018    Whipple procedure    OTHER SURGICAL HISTORY  11/26/2018    Knee surgery     Social History  Social History     Tobacco Use    Smoking status: Never     Passive exposure: Never    Smokeless tobacco: Never   Vaping Use    Vaping status: Never Used   Substance Use Topics    Alcohol use: Not Currently    Drug use: Never     Allergies  Patient has no known allergies.  Medications Prior to Admission   Medication Sig Dispense Refill Last Dose/Taking    aspirin 81 mg chewable  "tablet Chew 1 tablet (81 mg) once daily.       carvedilol (Coreg) 3.125 mg tablet Take 1 tablet (3.125 mg) by mouth 2 times a day after meals. 60 tablet 0     glimepiride (Amaryl) 4 mg tablet Take 1 tablet (4 mg) by mouth 2 times a day. 180 tablet 3     metFORMIN (Glucophage) 500 mg tablet Take 2 tablets by mouth twice daily 360 tablet 0     midodrine (Proamatine) 5 mg tablet Take 1 tablet (5 mg) by mouth 3 times daily (morning, midday, late afternoon). 90 tablet 0     omeprazole (PriLOSEC) 20 mg DR capsule Take by mouth.       OneTouch Ultra Test strip TEST 3 TIMES DAILY       pen needle, diabetic (Sure Comfort Pen Needle) 32 gauge x 5/32\" needle Use three times a day with insulin 100 each 0     pravastatin (Pravachol) 20 mg tablet Take 1 tablet (20 mg) by mouth once daily at bedtime. 30 tablet 0     pravastatin (Pravachol) 40 mg tablet Take 1 tablet (40 mg) by mouth once daily at bedtime. 30 tablet 2     tamsulosin (Flomax) 0.4 mg 24 hr capsule Take 2 capsules (0.8 mg) by mouth once daily at bedtime. 180 capsule 3        Review of Systems  Neurological Exam  Physical Exam  Last Recorded Vitals  Blood pressure 107/54, pulse 83, temperature 36.2 °C (97.2 °F), resp. rate 18, SpO2 95%.    Neurological Exam:  MENTAL STATUS:  General appearance: Patient alert and staring ahead, occasionally makes eye contact with the examiner. Laying back in bed, not in acute distress.   MSE limited as patient uses ASL and no family at bedside. When gestured thumbs up/down, patient showed a thumbs up with his right. Did not follow written commands. Was not able to write on paper.     CRANIAL NERVES:  - II:  Blinks to threat bilaterally  - III, IV, VI: pupils sluggish bl - right gaze preference  - VII: Face muscles symmetric with smile and eye closur    MOTOR:   Bl UE dorsiflexed at elbow on stimulation, same posture maintained  throughout the exam even when asked patient to straighten forearms.   Patient attempted ASL with right hand, " but kept perseverating on same letter.   No fasciculations, tremor or other abnormal movements were present.     STRENGTH: R L  ALL 4E effort against gravity, unable to perform full strength testing due to AMS    REFLEXES: Unable to assess UE reflexes as patient arms in volitional dorsiflexed posture.  2+ patellar reflexes bl  Foot moves reflexively away when tried to ellicit patellar response    SENSORY: all 4E responded to light touch        Results  Recent/Relevant Labs  Results for orders placed or performed during the hospital encounter of 11/08/24 (from the past 24 hours)   POCT GLUCOSE   Result Value Ref Range    POCT Glucose 116 (H) 74 - 99 mg/dL     11/7  Sars-Cov-2 PCR negative  Phos wnl  TSH wnl  Blood cultures NGTD    11/3  Lactate wnl    10/17  Ammonia 19    Recent/Relevant Imaging  MRI Brain w/wo IVCON 11/7/24:  There is no MRI evidence of acute infarction on the  diffusion-weighted images.  There is again evidence of moderate-to-marked brain parenchymal  volume loss.  Nonspecific white matter changes are again noted within cerebral  hemispheres bilaterally which while nonspecific, given the patient's  age, likely represent sequelae of more remote small-vessel ischemic  change. Additional punctate foci of bright signal on the T2 images  are again noted within the subinsular regions and basal ganglia  bilaterally suggesting incidental mildly prominent perivascular  spaces and/or small scattered more remote lacunar infarctions.  No abnormal intracranial mass lesion or abnormal intracranial  enhancement is identified on the postcontrast images.    CXR 11/7/24:  Normal-sized heart with mild pulmonary vascular congestion and  persistent areas of infiltrate in each lower lung zone with small  left pleural effusion.    CTH wo IVCON 10/29/24:  No acute intracranial abnormality.     Nuclear Medicine Stress Test 10/22/24:  1.  No evidence of inducible ischemia or prior infarction.  2. Left ventricle is normal in  size.  3. Mild global hypokinesis of the LV wall motion with a post-stress  LV EF estimated at 44%.    MRI Brain wo IVCON 10/19/24:  No MR evidence of acute intracranial infarct, hemorrhage, mass, or  mass effect.         EEG  20min study 10/17/24:  This routine awake and drowsy EEG is indicative of a moderate diffuse encephalopathy. No epileptiform activity or lateralizing signs seen.     1hr study 11/1/24:  This 1 hour EEG is indicative of a severe diffuse encephalopathy. No epileptiform activity or lateralizing signs seen.      Assessment/Plan   Assessment & Plan  Encephalopathy acute  Dat Mccallum is a 83 y.o. male with PMH of HFrEF, NID-T2DM, HTN, HLD, CKD presenting as a transfer patient from [facility], on his day 24 of admission for a now-stabilized NSTEMI and altered mentation. Neurology is consulted for further workup of persistent, worsening AMS with possible L-sided focal neurologic deficits. Patient neurological examination concerning for fluctuating episodic confusion, with catatonia like behavior and left hemineglect.  EEG sig for diffuse encephalopathy but no seizures. MRI Jorge A w/wo unremarkable. Will follow for workup of infectious/autoimmune encephalitis.     Recommendations:   - Agree with continuing IV Ampicillin, IV acyclovir, IV vancomycin and IV ceftriaxone for CNS coverage  -Stop IV dexamethasone  - Hold DVT ppx and plavix and order coag panel to plan for LP (unable to reach son for consent) to assess for infection/inflammation  - Please instate delirium and fall precautions  - Neurology will continue to follow      Rachel Del Rio MD  Neurology PGY-3    Patient discussed with attending Dr. Barrington Buckner

## 2024-11-10 ENCOUNTER — APPOINTMENT (OUTPATIENT)
Dept: RADIOLOGY | Facility: HOSPITAL | Age: 84
End: 2024-11-10
Payer: OTHER MISCELLANEOUS

## 2024-11-10 VITALS
HEIGHT: 72 IN | BODY MASS INDEX: 19.47 KG/M2 | OXYGEN SATURATION: 92 % | HEART RATE: 85 BPM | SYSTOLIC BLOOD PRESSURE: 112 MMHG | RESPIRATION RATE: 20 BRPM | WEIGHT: 143.74 LBS | DIASTOLIC BLOOD PRESSURE: 57 MMHG | TEMPERATURE: 98.2 F

## 2024-11-10 LAB
ALBUMIN SERPL BCP-MCNC: 1.9 G/DL (ref 3.4–5)
ALBUMIN SERPL BCP-MCNC: 2.1 G/DL (ref 3.4–5)
ANION GAP SERPL CALC-SCNC: 11 MMOL/L (ref 10–20)
ANION GAP SERPL CALC-SCNC: 14 MMOL/L (ref 10–20)
BACTERIA BLD CULT: NORMAL
BACTERIA BLD CULT: NORMAL
BACTERIA CSF CULT: NORMAL
BUN SERPL-MCNC: 25 MG/DL (ref 6–23)
BUN SERPL-MCNC: 26 MG/DL (ref 6–23)
CALCIUM SERPL-MCNC: 7.7 MG/DL (ref 8.6–10.6)
CALCIUM SERPL-MCNC: 8 MG/DL (ref 8.6–10.6)
CHLORIDE SERPL-SCNC: 103 MMOL/L (ref 98–107)
CHLORIDE SERPL-SCNC: 103 MMOL/L (ref 98–107)
CO2 SERPL-SCNC: 23 MMOL/L (ref 21–32)
CO2 SERPL-SCNC: 26 MMOL/L (ref 21–32)
CREAT SERPL-MCNC: 1.2 MG/DL (ref 0.5–1.3)
CREAT SERPL-MCNC: 1.21 MG/DL (ref 0.5–1.3)
CRYPTOC AG SPEC QL LA: NEGATIVE
EGFRCR SERPLBLD CKD-EPI 2021: 60 ML/MIN/1.73M*2
EGFRCR SERPLBLD CKD-EPI 2021: 61 ML/MIN/1.73M*2
ERYTHROCYTE [DISTWIDTH] IN BLOOD BY AUTOMATED COUNT: 14.6 % (ref 11.5–14.5)
FUNGUS SPEC FUNGUS STN: NORMAL
GLUCOSE SERPL-MCNC: 157 MG/DL (ref 74–99)
GLUCOSE SERPL-MCNC: 167 MG/DL (ref 74–99)
GRAM STN SPEC: NORMAL
GRAM STN SPEC: NORMAL
HCT VFR BLD AUTO: 22.4 % (ref 41–52)
HGB BLD-MCNC: 7.1 G/DL (ref 13.5–17.5)
MAGNESIUM SERPL-MCNC: 1.93 MG/DL (ref 1.6–2.4)
MCH RBC QN AUTO: 28.5 PG (ref 26–34)
MCHC RBC AUTO-ENTMCNC: 31.7 G/DL (ref 32–36)
MCV RBC AUTO: 90 FL (ref 80–100)
NRBC BLD-RTO: 0 /100 WBCS (ref 0–0)
PHOSPHATE SERPL-MCNC: 2.1 MG/DL (ref 2.5–4.9)
PHOSPHATE SERPL-MCNC: 2.5 MG/DL (ref 2.5–4.9)
PLATELET # BLD AUTO: 440 X10*3/UL (ref 150–450)
POTASSIUM SERPL-SCNC: 3.3 MMOL/L (ref 3.5–5.3)
POTASSIUM SERPL-SCNC: 3.8 MMOL/L (ref 3.5–5.3)
RBC # BLD AUTO: 2.49 X10*6/UL (ref 4.5–5.9)
SODIUM SERPL-SCNC: 136 MMOL/L (ref 136–145)
SODIUM SERPL-SCNC: 137 MMOL/L (ref 136–145)
VANCOMYCIN TROUGH SERPL-MCNC: 15.9 UG/ML (ref 5–20)
VDRL CSF-ACNC: NONREACTIVE
WBC # BLD AUTO: 5.8 X10*3/UL (ref 4.4–11.3)

## 2024-11-10 PROCEDURE — 2500000005 HC RX 250 GENERAL PHARMACY W/O HCPCS: Performed by: STUDENT IN AN ORGANIZED HEALTH CARE EDUCATION/TRAINING PROGRAM

## 2024-11-10 PROCEDURE — 2500000004 HC RX 250 GENERAL PHARMACY W/ HCPCS (ALT 636 FOR OP/ED): Performed by: STUDENT IN AN ORGANIZED HEALTH CARE EDUCATION/TRAINING PROGRAM

## 2024-11-10 PROCEDURE — 2550000001 HC RX 255 CONTRASTS: Performed by: STUDENT IN AN ORGANIZED HEALTH CARE EDUCATION/TRAINING PROGRAM

## 2024-11-10 PROCEDURE — 2500000001 HC RX 250 WO HCPCS SELF ADMINISTERED DRUGS (ALT 637 FOR MEDICARE OP): Performed by: STUDENT IN AN ORGANIZED HEALTH CARE EDUCATION/TRAINING PROGRAM

## 2024-11-10 PROCEDURE — 83735 ASSAY OF MAGNESIUM: CPT | Performed by: STUDENT IN AN ORGANIZED HEALTH CARE EDUCATION/TRAINING PROGRAM

## 2024-11-10 PROCEDURE — 36415 COLL VENOUS BLD VENIPUNCTURE: CPT | Performed by: STUDENT IN AN ORGANIZED HEALTH CARE EDUCATION/TRAINING PROGRAM

## 2024-11-10 PROCEDURE — 80202 ASSAY OF VANCOMYCIN: CPT

## 2024-11-10 PROCEDURE — 85027 COMPLETE CBC AUTOMATED: CPT | Performed by: STUDENT IN AN ORGANIZED HEALTH CARE EDUCATION/TRAINING PROGRAM

## 2024-11-10 PROCEDURE — 99232 SBSQ HOSP IP/OBS MODERATE 35: CPT | Performed by: STUDENT IN AN ORGANIZED HEALTH CARE EDUCATION/TRAINING PROGRAM

## 2024-11-10 PROCEDURE — 74177 CT ABD & PELVIS W/CONTRAST: CPT

## 2024-11-10 PROCEDURE — 1210000001 HC SEMI-PRIVATE ROOM DAILY

## 2024-11-10 PROCEDURE — 3E0G76Z INTRODUCTION OF NUTRITIONAL SUBSTANCE INTO UPPER GI, VIA NATURAL OR ARTIFICIAL OPENING: ICD-10-PCS | Performed by: STUDENT IN AN ORGANIZED HEALTH CARE EDUCATION/TRAINING PROGRAM

## 2024-11-10 PROCEDURE — 99233 SBSQ HOSP IP/OBS HIGH 50: CPT | Performed by: STUDENT IN AN ORGANIZED HEALTH CARE EDUCATION/TRAINING PROGRAM

## 2024-11-10 PROCEDURE — 80069 RENAL FUNCTION PANEL: CPT | Performed by: STUDENT IN AN ORGANIZED HEALTH CARE EDUCATION/TRAINING PROGRAM

## 2024-11-10 PROCEDURE — 99232 SBSQ HOSP IP/OBS MODERATE 35: CPT | Performed by: INTERNAL MEDICINE

## 2024-11-10 PROCEDURE — 74177 CT ABD & PELVIS W/CONTRAST: CPT | Performed by: RADIOLOGY

## 2024-11-10 RX ORDER — THIAMINE HYDROCHLORIDE 100 MG/ML
500 INJECTION, SOLUTION INTRAMUSCULAR; INTRAVENOUS 3 TIMES DAILY
Status: DISCONTINUED | OUTPATIENT
Start: 2024-11-10 | End: 2024-11-10

## 2024-11-10 RX ORDER — IPRATROPIUM BROMIDE AND ALBUTEROL SULFATE 2.5; .5 MG/3ML; MG/3ML
3 SOLUTION RESPIRATORY (INHALATION) EVERY 6 HOURS PRN
Status: ACTIVE | OUTPATIENT
Start: 2024-11-10

## 2024-11-10 RX ORDER — ADHESIVE BANDAGE
30 BANDAGE TOPICAL ONCE
Status: COMPLETED | OUTPATIENT
Start: 2024-11-10 | End: 2024-11-10

## 2024-11-10 RX ORDER — MIDODRINE HYDROCHLORIDE 10 MG/1
5 TABLET ORAL EVERY 8 HOURS PRN
Status: ACTIVE | OUTPATIENT
Start: 2024-11-10

## 2024-11-10 RX ORDER — POTASSIUM CHLORIDE 1.5 G/1.58G
40 POWDER, FOR SOLUTION ORAL
Status: DISPENSED | OUTPATIENT
Start: 2024-11-10 | End: 2024-11-10

## 2024-11-10 RX ORDER — POTASSIUM CHLORIDE 1.5 G/1.58G
40 POWDER, FOR SOLUTION ORAL ONCE
Status: COMPLETED | OUTPATIENT
Start: 2024-11-10 | End: 2024-11-10

## 2024-11-10 RX ORDER — AMOXICILLIN 250 MG
1 CAPSULE ORAL 2 TIMES DAILY
Status: DISPENSED | OUTPATIENT
Start: 2024-11-10

## 2024-11-10 RX ADMIN — CARBOXYMETHYLCELLULOSE SODIUM 2 DROP: 5 SOLUTION/ DROPS OPHTHALMIC at 10:21

## 2024-11-10 RX ADMIN — THIAMINE HYDROCHLORIDE 500 MG: 100 INJECTION, SOLUTION INTRAMUSCULAR; INTRAVENOUS at 17:58

## 2024-11-10 RX ADMIN — ACYCLOVIR SODIUM 650 MG: 50 INJECTION, SOLUTION INTRAVENOUS at 12:27

## 2024-11-10 RX ADMIN — THIAMINE HYDROCHLORIDE 500 MG: 100 INJECTION, SOLUTION INTRAMUSCULAR; INTRAVENOUS at 10:20

## 2024-11-10 RX ADMIN — Medication 4 L/MIN: at 00:15

## 2024-11-10 RX ADMIN — POTASSIUM PHOSPHATE, MONOBASIC POTASSIUM PHOSPHATE, DIBASIC 15 MMOL: 224; 236 INJECTION, SOLUTION, CONCENTRATE INTRAVENOUS at 21:51

## 2024-11-10 RX ADMIN — SENNOSIDES AND DOCUSATE SODIUM 1 TABLET: 8.6; 5 TABLET ORAL at 10:21

## 2024-11-10 RX ADMIN — POTASSIUM CHLORIDE 40 MEQ: 1.5 POWDER, FOR SOLUTION ORAL at 17:59

## 2024-11-10 RX ADMIN — SENNOSIDES AND DOCUSATE SODIUM 1 TABLET: 8.6; 5 TABLET ORAL at 21:51

## 2024-11-10 RX ADMIN — Medication 15 ML: at 10:21

## 2024-11-10 RX ADMIN — MIDODRINE HYDROCHLORIDE 10 MG: 10 TABLET ORAL at 10:20

## 2024-11-10 RX ADMIN — MAGNESIUM HYDROXIDE 30 ML: 400 SUSPENSION ORAL at 10:20

## 2024-11-10 RX ADMIN — ASPIRIN 81 MG CHEWABLE TABLET 81 MG: 81 TABLET CHEWABLE at 10:20

## 2024-11-10 RX ADMIN — CARVEDILOL 3.12 MG: 3.12 TABLET, FILM COATED ORAL at 10:20

## 2024-11-10 RX ADMIN — POTASSIUM CHLORIDE 40 MEQ: 1.5 POWDER, FOR SOLUTION ORAL at 10:20

## 2024-11-10 RX ADMIN — ATORVASTATIN CALCIUM 40 MG: 40 TABLET, FILM COATED ORAL at 21:51

## 2024-11-10 RX ADMIN — CARBOXYMETHYLCELLULOSE SODIUM 2 DROP: 5 SOLUTION/ DROPS OPHTHALMIC at 17:58

## 2024-11-10 RX ADMIN — HEPARIN SODIUM 5000 UNITS: 5000 INJECTION, SOLUTION INTRAVENOUS; SUBCUTANEOUS at 06:19

## 2024-11-10 RX ADMIN — IOHEXOL 80 ML: 350 INJECTION, SOLUTION INTRAVENOUS at 01:44

## 2024-11-10 RX ADMIN — THIAMINE HYDROCHLORIDE 500 MG: 100 INJECTION, SOLUTION INTRAMUSCULAR; INTRAVENOUS at 21:58

## 2024-11-10 RX ADMIN — HEPARIN SODIUM 5000 UNITS: 5000 INJECTION, SOLUTION INTRAVENOUS; SUBCUTANEOUS at 17:58

## 2024-11-10 ASSESSMENT — COGNITIVE AND FUNCTIONAL STATUS - GENERAL
DAILY ACTIVITIY SCORE: 6
DAILY ACTIVITIY SCORE: 6
WALKING IN HOSPITAL ROOM: TOTAL
DRESSING REGULAR LOWER BODY CLOTHING: TOTAL
MOBILITY SCORE: 7
CLIMB 3 TO 5 STEPS WITH RAILING: TOTAL
WALKING IN HOSPITAL ROOM: TOTAL
HELP NEEDED FOR BATHING: TOTAL
MOBILITY SCORE: 7
MOVING FROM LYING ON BACK TO SITTING ON SIDE OF FLAT BED WITH BEDRAILS: A LOT
MOVING TO AND FROM BED TO CHAIR: TOTAL
DRESSING REGULAR UPPER BODY CLOTHING: TOTAL
DRESSING REGULAR UPPER BODY CLOTHING: TOTAL
TURNING FROM BACK TO SIDE WHILE IN FLAT BAD: TOTAL
EATING MEALS: TOTAL
CLIMB 3 TO 5 STEPS WITH RAILING: TOTAL
MOVING TO AND FROM BED TO CHAIR: TOTAL
TOILETING: TOTAL
TOILETING: TOTAL
HELP NEEDED FOR BATHING: TOTAL
DRESSING REGULAR LOWER BODY CLOTHING: TOTAL
STANDING UP FROM CHAIR USING ARMS: TOTAL
EATING MEALS: TOTAL
PERSONAL GROOMING: TOTAL
PERSONAL GROOMING: TOTAL
STANDING UP FROM CHAIR USING ARMS: TOTAL
TURNING FROM BACK TO SIDE WHILE IN FLAT BAD: TOTAL
MOVING FROM LYING ON BACK TO SITTING ON SIDE OF FLAT BED WITH BEDRAILS: A LOT

## 2024-11-10 ASSESSMENT — PAIN SCALES - PAIN ASSESSMENT IN ADVANCED DEMENTIA (PAINAD)
NEGVOCALIZATION: OCCASIONAL MOAN/GROAN, LOW SPEECH, NEGATIVE/DISAPPROVING QUALITY
CONSOLABILITY: DISTRACTED OR REASSURED BY VOICE/TOUCH
TOTALSCORE: 2
BODYLANGUAGE: RELAXED
BREATHING: NORMAL
FACIALEXPRESSION: SMILING OR INEXPRESSIVE

## 2024-11-10 ASSESSMENT — PAIN SCALES - GENERAL
PAINLEVEL_OUTOF10: 0 - NO PAIN

## 2024-11-10 ASSESSMENT — PAIN SCALES - WONG BAKER: WONGBAKER_NUMERICALRESPONSE: HURTS LITTLE BIT

## 2024-11-10 NOTE — CARE PLAN
The patient's goals for the shift include  unable to express    The clinical goals for the shift include patient will remain free from falls during this shift    Over the shift, the patient made progress towards all goals.

## 2024-11-10 NOTE — PROGRESS NOTES
Dat Mccallum is a 83 y.o. male on day 2 of admission presenting with Encephalopathy acute.    Subjective   Seems a bit more alert today, moving left arm spontaneously, will make eye contact if you get in his field of vision but does not track. Remains afebrile      Objective   Range of Vitals (last 24 hours)  Heart Rate:  [85-99]   Temp:  [36.2 °C (97.2 °F)-37 °C (98.6 °F)]   Resp:  [14-17]   BP: (107-125)/(56-75)   SpO2:  [86 %-95 %]   Daily Weight  11/09/24 : 65.2 kg (143 lb 11.8 oz)    Body mass index is 19.49 kg/m².    Physical Exam  Eyes open but does not track. He did not try to verbalize anything today.  Did move left arm spontaneously and will move all extremities to stimulation  Intense right grasp reflex, less on left    Antibiotics  This patient does not have an active medication from one of the medication groupers.    Relevant Results  Labs  Results from last 72 hours   Lab Units 11/10/24  0713 11/09/24  0754   WBC AUTO x10*3/uL 5.8 8.4   HEMOGLOBIN g/dL 7.1* 7.5*   HEMATOCRIT % 22.4* 23.7*   PLATELETS AUTO x10*3/uL 440 447     Results from last 72 hours   Lab Units 11/10/24  0713 11/09/24  0754 11/08/24  0448   SODIUM mmol/L 137 135* 132*   POTASSIUM mmol/L 3.3* 3.5 3.2*   CHLORIDE mmol/L 103 100 100   CO2 mmol/L 23 24 24   BUN mg/dL 26* 31* 28*   CREATININE mg/dL 1.21 1.33* 1.22   GLUCOSE mg/dL 157* 143* 69*   CALCIUM mg/dL 8.0* 7.9* 7.6*   ANION GAP mmol/L 14 15 11   EGFR mL/min/1.73m*2 60* 54* 60*   PHOSPHORUS mg/dL 2.5 3.5 3.8     Results from last 72 hours   Lab Units 11/10/24  0713 11/09/24  0754 11/08/24  0448   ALBUMIN g/dL 2.1* 2.1* 2.1*     CSF 11/9:  Prtoein 33  Glucose 81          Component  Ref Range & Units 15:05 15:05   Tube Number, CSF     Tube 4 Tube 1   Color, CSF  Colorless Colorless Colorless   Clarity, CSF  Clear Clear Clear   Color, Supernatant CSF    Colorless Colorless   WBC, CSF  1 - 5 /uL 1 3   RBC, CSF  0 - 5 /uL 5 8 High         Estimated Creatinine Clearance: 44.2 mL/min  (by C-G formula based on SCr of 1.21 mg/dL).  C-Reactive Protein   Date Value Ref Range Status   11/09/2024 8.60 (H) <1.00 mg/dL Final     Microbiology  CSF gm st neg, cx NGTD  Crypto AG neg VDRL csf neg and serum syph AB neg  HSV PCR neg  VZV PCR pending     Assessment/Plan   82 yo man 83 y.o. male who is deaf, with autoimmune pancreatitis and DM, BPH, HTN presented initially 10/16 with confusion, found to have NSTEMI but has had waxing and waning mental status changes since.  CSF is very benign, making bacterial or a chronic meningitis such as crypto, histo or TB  unlikely.     Recommendations:  Would continue acyclovir pending negative VZV PCR (HSV is negative), though suspect this is not infectious.     Maryann Walter MD  EPIC chat

## 2024-11-10 NOTE — HOSPITAL COURSE
84 y.o. male with past medical history of HFrEF (EF improved from 20% to stress test 10/21 with LVEF 44%), DMII, HTN, HLD ,CKD, BPH, Deaf (ASL)  transferred from OSH (on Hosp Day 23) to [facility] for  continued Acute Encephalopathy workup. Pt initially presented for AMS. Found to have NSTEMI, reduced EF but no significant stenosis on cardiac cath. Pt has been  hypotensive requiring intermittant midodinre and has not tolerated GDMT. Pt treated with IV abx for UTI and pneumonia. Due to persistent encepahlopathy, he was broadened to cover for CNS infection. He was demonstrating continue encephalopathy, with agitation, waxing and waning state, making non-sensicle/repetitive phrases as well as possible L hemineglect. MRI 10/19 and 11/6 without acute infarct, showed mod to marked volume loss, nonspecific changes consistent with microvascular disease. EEG showed diffuse encephalopathy without lateralization or seizure activity. Pt transferred to [facility] on IV vanc/ceftriaxone/amp/acyclovir. Underwent LP, noninfectious, antibiotics and antivirals discontinued. Awaiting further CSF studies per neurology. ID also consulted. CSF is not concerning for infection, antibiotics and antivirals deescalated. 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 CT chest done to evaluate for malignancy concerning for mass-like obstruction which is read as malignancy until proven otherwise. Pulm consulted for possible bronch and palliative consulted for GOC discussions. Pt had further decline with tacypnea and increased work of breathing, made DNR-comfort care, started on morhpine drip with prn pushes. He is being discharged/readmitted to [facility].

## 2024-11-10 NOTE — PROGRESS NOTES
Vancomycin Dosing by Pharmacy- Cessation of Therapy    Consult to pharmacy for vancomycin dosing has been discontinued by the prescriber, pharmacy will sign off at this time.    Please call pharmacy if there are further questions or re-enter a consult if vancomycin is resumed.     Charlie Knight, RubyD

## 2024-11-10 NOTE — PROGRESS NOTES
11/10/24 1042 Transitional Care Coordinator Notes:    Transitional Care Coordination Progress Note:  Patient discussed during interdisciplinary rounds.   Team members present: MD and TCC  Plan per Medical/Surgical team: PT/OT Column updated for therapy notes on Monday to send to [facility].  Payor: [employer]  Discharge disposition: SNF  Potential Barriers: none  ADOD: 1-3 days      Assessment/Plan   Assessment & Plan  Encephalopathy acute               Mckenna Nam RN

## 2024-11-10 NOTE — PROGRESS NOTES
Assessment/Plan   Dat Mccallum is a 83 y.o. male with PMH of  HFrEF (EF improved from 20% to stress test 10/21 with LVEF 44%), DMII, HTN, HLD ,CKD, BPH, Deaf (ASL)  transferred from OSH (on Hosp Day 23) to [facility] for  continued Acute Encephalopathy workup. Pt initially presented for AMS. Found to have NSTEMI, reduced EF but no significant stenosis on cardiac cath. Pt has been  hypotensive requiring intermittant midodinre and has not tolerated GDMT. Pt treated with IV abx for UTI and pneumonia. Due to persistent encepahlopathy, he was broadened to cover for CNS infection. He was demonstrating continue encephalopathy, with agitation, waxing and waning state, making non-sensicle/repetitive phrases as well as possible L hemineglect. MRI 10/19 and 11/6 without acute infarct, showed mod to marked volume loss, nonspecific changes consistent with microvascular disease. EEG showed diffuse encephalopathy without lateralization or seizure activity. Pt transferred to [facility] on IV vanc/ceftriaxone/amp/acyclovir. Underwent LP, noninfectious, antibiotics and antivirals discontinued. Awaiting further CSF studies per neurology. ID also consulted..    Acute metabolic encephalopathy  - persistent encephalopathy, L alfred neglect.   - MRI with diffuse volume loss, no infarct or other attributable finding. Completed 10/19 and 11/6  - eeg with diffuse encephalopathy  - b12, b9, syph, hiv, tsh tested  - s/p 3 days high dose thiamine  - paraneoplastic panel, yanet pending  - pending CT C/A/P. Chest ct needs to be reordered. CT a/p showed no evidence of malignant mass, e/o consitpation with stercolal colitis. Order for CT chest replaced.   - LP completed, no infection. Pending other CSF studies per neurology  - dc vanc/ctx/amp/acyclovir.   - appreciate ID and neuro input.  Infection is less likely at this time. Dc'd abx, will continue acyclovir until VZV results.     NSTEMI  HFrEF, acute on chronic  - non-obstruction cath  - unable to tolerate  "GDMT due to hypotension, start as tolerated. Currently on coreg 3.125mg bid. Also on midodrine prn. Will monitor.   - plan to hold coreg. Make midodrine prn instead of scheduled. Will monitor bp and eval if GDMT can be started or if midodrine is needed. Likely to change to metoprolol which theoretically should have less effect on bp.     Hospital acquired pneumonia  - s/p zosyn  - CT showing trace pleural fluid and patchy consolidative opacities felt to be more than atelectasis, likley superimposed infection or aspiration.     UTI with hematuria  - s/p abx  - resolved    Acute urinary retention  - at osh, urology unable to pass trotter, plan to monitor clinically and if pt develops niurka or signs of obstruction then will need cystoscopy.     Hypotension  - continue midodrine prn    Severe protein-calorie malnutrition  Dysphagia in setting of AMS  - continue tube feeds, monitor electrolytes. Will adjust FWF based on labs. BID RFP. Started 200cc q4hr. On isosource 1.5 50cc/hr goal. Plan to increase by 10cc/hr until goal is reached.    - slp, nutrition consult    DMII  C/b low blood sugars, insulin was stopped.     Nephrolithiasis  - L 1.8 non-obstructing stone    Stercolal colitis  - start bowel regimen and enema.   - pt moved bowels overnight.        Scheduled outpatient appointments in system:   No future appointments.  ---------------------------------------------------------------------------------------------------  Subjective   No events. Son at bedside. Pt is deaf and uses sign langauage. Opening yes and more interactive. Son saying he is recognizing him and signing \"I love you\". With directions from son with sign language he is able to follow some directions now.      ---------------------------------------------------------------------------------------------------  Objective   Last Recorded Vitals  Blood pressure 111/63, pulse 99, temperature 36.2 °C (97.2 °F), resp. rate 17, height 1.829 m (6' 0.01\"), weight " 65.2 kg (143 lb 11.8 oz), SpO2 93%.  Intake/Output last 3 Shifts:  I/O last 3 completed shifts:  In: 513 (7.9 mL/kg) [IV Piggyback:513]  Out: 350 (5.4 mL/kg) [Urine:350 (0.1 mL/kg/hr)]  Weight: 65.2 kg     Physical Exam  Vitals and nursing note reviewed.   Constitutional:       General: He is not in acute distress.     Appearance: Normal appearance. He is ill-appearing.      Comments: Elderly, thin appearing. Dobhoff in place. Opening eyes more and more interactions noted.    HENT:      Head: Normocephalic and atraumatic.      Comments: Hearing impairment     Mouth/Throat:      Mouth: Mucous membranes are moist.   Eyes:      General: No scleral icterus.     Extraocular Movements: Extraocular movements intact.      Conjunctiva/sclera: Conjunctivae normal.   Cardiovascular:      Rate and Rhythm: Normal rate and regular rhythm.      Heart sounds: S1 normal and S2 normal. No murmur heard.  Pulmonary:      Effort: Pulmonary effort is normal. No respiratory distress.      Breath sounds: No wheezing, rhonchi or rales.   Abdominal:      General: Bowel sounds are normal. There is no distension.      Palpations: Abdomen is soft.      Tenderness: There is no abdominal tenderness. There is no guarding or rebound.   Musculoskeletal:         General: No swelling or deformity.      Cervical back: Neck supple.   Skin:     General: Skin is warm and dry.      Findings: No rash.   Neurological:      Mental Status: He is alert. He is disoriented.      Comments: Limited on cooperation. Confused, now opening eyes, following directions, attention span still poor, strength appears symmetric 4/4 extremities.    Psychiatric:      Comments: Poor attention/concentration         Relevant Results  Lab Results   Component Value Date    WBC 5.8 11/10/2024    HGB 7.1 (L) 11/10/2024    HCT 22.4 (L) 11/10/2024    MCV 90 11/10/2024     11/10/2024      Lab Results   Component Value Date    GLUCOSE 157 (H) 11/10/2024    CALCIUM 8.0 (L) 11/10/2024      11/10/2024    K 3.3 (L) 11/10/2024    CO2 23 11/10/2024     11/10/2024    BUN 26 (H) 11/10/2024    CREATININE 1.21 11/10/2024     Scheduled medications  aspirin, 81 mg, nasogastric tube, Daily  atorvastatin, 40 mg, nasogastric tube, Nightly  carvedilol, 3.125 mg, nasogastric tube, BID  [Held by provider] dexAMETHasone, 10 mg, intravenous, q6h  heparin, 5,000 Units, subcutaneous, q8h  lubricating eye drops, 2 drop, Both Eyes, TID  midodrine, 10 mg, oral, TID  moisturizing mouth, 15 mL, Swish & Spit, BID  pancrelipase (Lip-Prot-Amyl) 2 tablet, sodium bicarbonate 650 mg, , nasogastric tube, Once  thiamine, 500 mg, intravenous, TID      Continuous medications     PRN medications  PRN medications: acetaminophen, acetaminophen, dextrose, dextrose, glucagon, glucagon, insulin lispro, ipratropium-albuteroL, OLANZapine, oxygen    Mckay Tomlin MD

## 2024-11-10 NOTE — CONSULTS
Inpatient consult to Infectious Diseases  Consult performed by: Maryann Walter MD  Consult ordered by: Kati Mena,       Reason For Consult  Encephalopathy, rule out infectious    History Of Present Illness  Dat Mccallum is a 83 y.o. male who is deaf, with autoimmune pancreatitis and DM, BPH, HTN is presenting as transfer from [facility] where he was initially admitted 10/16/24 when found at home confused. Had last been seen by family 3-4 days prior. Found to have NSTEMI with high troponins  (8126), medically managed. TTE with EF 15-20% , cath without significant stenosis and LVEF improved on nuclear stress test to 44%.  However he has continued to have waxing and waning mental status there. Some notes say standing in PT, others say catatonic and seems to have worsened in past week. MRI of the brain 10/19 and again 11/6 showed volume loss, nonspecific white matter disease. EEG 11/1 showed severe diffuse encephalopathy, no seizure activity.  Of note,he has been afebrile there and without leukocytosis except 11/6. He has briefly been on antibiotics  (pip/tazo) for possible UTI when had some urinary retention and could not pass catheter (has known BPH) and question of aspiration PNA 10/30-11/3; resumed 11/6 when WBC 14.6 and thought PNA persisted.   Due to his worsening mental status he was started on broad specturm coverage for bacterial meningitis and HSV/VZV encephalitis yesterday and transferred to [facility] for further care including an LP.    No family was present when I saw him. He could not give a history as was generally non verbal but occasionally would try to speak in a squeak. He would not  respond to a written question either    Past Medical History  He has a past medical history of Acute frontal sinusitis, unspecified (01/06/2015), Encounter for screening for malignant neoplasm of colon (01/22/2020), Encounter for screening for malignant neoplasm of prostate (08/14/2017), Other conditions  influencing health status, Other conditions influencing health status (01/19/2018), Other skin changes (07/21/2021), Other specified health status, Personal history of other diseases of the nervous system and sense organs, Personal history of other diseases of urinary system (08/10/2021), Personal history of other specified conditions (06/29/2018), Personal history of other specified conditions (09/25/2017), Personal history of other specified conditions (08/14/2017), Personal history of other specified conditions (10/11/2017), Personal history of pneumonia (recurrent) (01/19/2018), Rash and other nonspecific skin eruption (12/17/2020), 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    Surgical History  He has a past surgical history that includes Other surgical history (12/02/2013); Other surgical history (11/26/2018); Other surgical history (11/26/2018); and Cardiac catheterization (N/A, 10/23/2024).     Social History     Occupational History    Not on file   Tobacco Use    Smoking status: Never     Passive exposure: Never    Smokeless tobacco: Never   Vaping Use    Vaping status: Never Used   Substance and Sexual Activity    Alcohol use: Not Currently    Drug use: Never    Sexual activity: Not on file     Travel History   Travel since 10/09/24    No documented travel since 10/09/24        Family History  No family history on file.  Allergies  Patient has no known allergies.     Immunization History   Administered Date(s) Administered    Flu vaccine, quadrivalent, high-dose, preservative free, age 65y+ (FLUZONE) 09/20/2021, 10/29/2022    Flu vaccine, trivalent, preservative free, HIGH-DOSE, age 65y+ (Fluzone) 09/20/2018, 12/03/2020    Influenza, Unspecified 09/21/2017, 09/25/2021    Moderna COVID-19 vaccine, bivalent, blue cap/gray label *Check age/dose* 10/29/2022    Pneumococcal conjugate vaccine, 13-valent (PREVNAR 13)  "01/19/2018    Pneumococcal polysaccharide vaccine, 23-valent, age 2 years and older (PNEUMOVAX 23) 07/21/2021     Medications  Home medications:  Medications Prior to Admission   Medication Sig Dispense Refill Last Dose/Taking    aspirin 81 mg chewable tablet Chew 1 tablet (81 mg) once daily.       carvedilol (Coreg) 3.125 mg tablet Take 1 tablet (3.125 mg) by mouth 2 times a day after meals. 60 tablet 0     glimepiride (Amaryl) 4 mg tablet Take 1 tablet (4 mg) by mouth 2 times a day. 180 tablet 3     metFORMIN (Glucophage) 500 mg tablet Take 2 tablets by mouth twice daily 360 tablet 0     midodrine (Proamatine) 5 mg tablet Take 1 tablet (5 mg) by mouth 3 times daily (morning, midday, late afternoon). 90 tablet 0     omeprazole (PriLOSEC) 20 mg DR capsule Take by mouth.       OneTouch Ultra Test strip TEST 3 TIMES DAILY       pen needle, diabetic (Sure Comfort Pen Needle) 32 gauge x 5/32\" needle Use three times a day with insulin 100 each 0     pravastatin (Pravachol) 20 mg tablet Take 1 tablet (20 mg) by mouth once daily at bedtime. 30 tablet 0     pravastatin (Pravachol) 40 mg tablet Take 1 tablet (40 mg) by mouth once daily at bedtime. 30 tablet 2     tamsulosin (Flomax) 0.4 mg 24 hr capsule Take 2 capsules (0.8 mg) by mouth once daily at bedtime. 180 capsule 3      Current medications:  Scheduled medications  acyclovir, 10 mg/kg, intravenous, q12h  aspirin, 81 mg, nasogastric tube, Daily  atorvastatin, 40 mg, nasogastric tube, Nightly  carvedilol, 3.125 mg, nasogastric tube, BID  [Held by provider] dexAMETHasone, 10 mg, intravenous, q6h  heparin, 5,000 Units, subcutaneous, q8h  lubricating eye drops, 2 drop, Both Eyes, TID  midodrine, 10 mg, oral, TID  moisturizing mouth, 15 mL, Swish & Spit, BID  pancrelipase (Lip-Prot-Amyl) 2 tablet, sodium bicarbonate 650 mg, , nasogastric tube, Once  thiamine, 500 mg, intravenous, TID      Continuous medications  dextrose 5 % and lactated Ringer's, 50 mL/hr, Last Rate: 50 " "mL/hr (11/09/24 0428)      PRN medications  PRN medications: acetaminophen, acetaminophen, dextrose, dextrose, glucagon, glucagon, insulin lispro, OLANZapine    Review of Systems   Unable to to mental status  Objective  Range of Vitals (last 24 hours)  Heart Rate:  [83-96]   Temp:  [36.2 °C (97.2 °F)-36.8 °C (98.2 °F)]   Resp:  [15-18]   BP: (107-139)/(54-83)   Height:  [182.9 cm (6' 0.01\")]   Weight:  [65.2 kg (143 lb 11.8 oz)]   SpO2:  [91 %-95 %]   Daily Weight  11/09/24 : 65.2 kg (143 lb 11.8 oz)    Body mass index is 19.49 kg/m².     Physical Exam   Lying flat in bed looking straight ahead, would look at me if I got in his line of sight  Generally stiff tone but no nuchal rigidity per se  Lungs clear anteriorly  Heart RRR  Abd soft  Ext no edema. Clenched fists R>L  Would move all extremities  Right intense hand grasp,left less so  Both feet withdraw checking babinski  Relevant Results  Labs  Results from last 72 hours   Lab Units 11/09/24 0754 11/07/24 0453   WBC AUTO x10*3/uL 8.4 14.6*   HEMOGLOBIN g/dL 7.5* 7.6*   HEMATOCRIT % 23.7* 22.7*   PLATELETS AUTO x10*3/uL 447 378     Results from last 72 hours   Lab Units 11/09/24 0754 11/08/24 0448 11/07/24 0453   SODIUM mmol/L 135* 132* 131*   POTASSIUM mmol/L 3.5 3.2* 3.9   CHLORIDE mmol/L 100 100 98   CO2 mmol/L 24 24 26   BUN mg/dL 31* 28* 28*   CREATININE mg/dL 1.33* 1.22 1.16   GLUCOSE mg/dL 143* 69* 104*   CALCIUM mg/dL 7.9* 7.6* 7.7*   ANION GAP mmol/L 15 11 11   EGFR mL/min/1.73m*2 54* 60* 63   PHOSPHORUS mg/dL 3.5 3.8 4.3     Results from last 72 hours   Lab Units 11/09/24 0754 11/08/24 0448 11/07/24 0453   ALK PHOS U/L  --   --  53   BILIRUBIN TOTAL mg/dL  --   --  0.7   PROTEIN TOTAL g/dL  --   --  6.3*   ALT U/L  --   --  30   AST U/L  --   --  40*   ALBUMIN g/dL 2.1* 2.1* 2.1*     Estimated Creatinine Clearance: 40.2 mL/min (A) (by C-G formula based on SCr of 1.33 mg/dL (H)).  Sedimentation Rate   Date Value Ref Range Status   02/12/2021 16 0 " - 20 mm/h Final     HIV 1/2 Antigen/Antibody Screen with Reflex to Confirmation   Date Value Ref Range Status   11/09/2024 Nonreactive Nonreactive Final     Hepatitis C Ab   Date Value Ref Range Status   03/02/2021 NONREACTIVE NONREACTIVE Final     Comment:      Results from patients taking biotin supplements or receiving   high-dose biotin therapy should be interpreted with caution   due to possible interference with this test. Providers may    contact their local laboratory for further information.       CSF today:  Prtoein 33  Glucose 81       Component  Ref Range & Units 15:05 15:05   Tube Number, CSF     Tube 4 Tube 1   Color, CSF  Colorless Colorless Colorless   Clarity, CSF  Clear Clear Clear   Color, Supernatant CSF    Colorless Colorless   WBC, CSF  1 - 5 /uL 1 3   RBC, CSF  0 - 5 /uL 5 8 [facility]      Gram stain: no PMNs or organisms    Microbiology  BlCx 10/16/24 x 2 NG  Urine 10/29/24 NG  BlCx 11/6 x 1 NG  BlCx 11/7 x1 NGTD  11/7 nares no SA  11/9/24 syphilis Ab negative  Imaging    MRI brain 11/7/24  MPRESSION:  There is no MRI evidence of acute infarction on the  diffusion-weighted images.      There is again evidence of moderate-to-marked brain parenchymal  volume loss.      Nonspecific white matter changes are again noted within cerebral  hemispheres bilaterally which while nonspecific, given the patient's  age, likely represent sequelae of more remote small-vessel ischemic  change. Additional punctate foci of bright signal on the T2 images  are again noted within the subinsular regions and basal ganglia  bilaterally suggesting incidental mildly prominent perivascular  spaces and/or small scattered more remote lacunar infarctions.      No abnormal intracranial mass lesion or abnormal intracranial  enhancement is identified on the postcontrast images.      Assessment/Plan   80 yo man 83 y.o. male who is deaf, with autoimmune pancreatitis and DM, BPH, HTN presented initially  10/16 with confusion, found to have NSTEMI but has had waxing and waning mental status changes since. Clinical picture not c/w bacterial meningitis and neither is the CSF. Agree with stopping antibacterials.  Would continue acyclovir while awaiting HSV and VZV studies, though course is not typical for HSV (nor is MRI or  EEG findings). CSF is very benign, making a chronic meningitis such as crypto, histo or TB also unlikely.  Appreciate neurology input on autoimmune, paraneoplastic encephalitis.    Recommend:  Continue acyclovir pending hxv, vzv pcrs      Maryann Walter MD  ID team B EPIC chat

## 2024-11-10 NOTE — PROGRESS NOTES
"  NEUROLOGY CONSULT PROGRESS NOTE    Subjective   Dat Mccallum is an 83 y.o. male on hospital day 26, now day 2 of transfer to [facility], admitted for now-stabilized NSTEMI. Neurology is following for AMS.    Interval HPI  NAEO. HDS and afebrile. Examined at bedside.  No apparent major change in abnormal mental status.  Abx discontinued for unremarkable CSF, except acyclovir with VZV CSF still pending.     Spoke with both sons, explained that no discrete cause of patient's decline has been identified on imaging, CSF, or EEG. They understand and are agreeable to neurocognitive follow-up when patient is medically stable for discharge.    Medications  Current Outpatient Medications   Medication Instructions    aspirin 81 mg, oral, Daily    carvedilol (COREG) 3.125 mg, oral, 2 times daily after meals    glimepiride (AMARYL) 4 mg, oral, 2 times daily    metFORMIN (GLUCOPHAGE) 1,000 mg, oral, 2 times daily    midodrine (PROAMATINE) 5 mg, oral, 3 times daily (morning, midday, late afternoon)    omeprazole (PriLOSEC) 20 mg DR capsule oral    OneTouch Ultra Test strip TEST 3 TIMES DAILY    pen needle, diabetic (Sure Comfort Pen Needle) 32 gauge x 5/32\" needle Use three times a day with insulin    pravastatin (PRAVACHOL) 20 mg, oral, Nightly    pravastatin (PRAVACHOL) 40 mg, oral, Nightly    tamsulosin (FLOMAX) 0.8 mg, oral, Nightly     Scheduled Meds:acyclovir, 10 mg/kg, intravenous, q12h  aspirin, 81 mg, nasogastric tube, Daily  atorvastatin, 40 mg, nasogastric tube, Nightly  [Held by provider] dexAMETHasone, 10 mg, intravenous, q6h  heparin, 5,000 Units, subcutaneous, q8h  lubricating eye drops, 2 drop, Both Eyes, TID  moisturizing mouth, 15 mL, Swish & Spit, BID  pancrelipase (Lip-Prot-Amyl) 2 tablet, sodium bicarbonate 650 mg, , nasogastric tube, Once  potassium chloride, 40 mEq, oral, q3h  sennosides-docusate sodium, 1 tablet, nasogastric tube, BID  thiamine, 500 mg, intravenous, TID      Continuous Infusions:  " "None    PRN Meds:PRN medications: acetaminophen, acetaminophen, dextrose, dextrose, glucagon, glucagon, insulin lispro, ipratropium-albuteroL, midodrine, OLANZapine, oxygen     ==========  Objective   Blood pressure 125/62, pulse 97, temperature 36.7 °C (98.1 °F), resp. rate 16, height 1.829 m (6' 0.01\"), weight 65.2 kg (143 lb 11.8 oz), SpO2 93%.    Physical Exam  General appearance:  No distress. Thin and frail but not ill-appearing.   Head: NC/AT  Resp: Normal effort of breathing    Mental Status:  Difficult to assess due to language barrier and intermittent participation in exam/responsiveness. Throughout exam, performs some commands (show me thumbs up) but not others (follow finger with eyes). Does attend to L side.    Cranial Nerves:   CN 2   Visual fields full to confrontation.   CN 3, 4, 6   Pupils with slightly irregular round shape (LASIK surgery within last two years), equally reactive to light. Lids symmetric; no ptosis. EOMs normal alignment. No nystagmus.   CN 5   Unable to assess due to limited cooperation.   CN 7   Nasolabial folds symmetric.   CN 8   Completely deaf since childhood.     Motor:  Strength exam limited due to poor participation, but has at least 4/5 strength in all extremities.   Overall muscle bulk low, patient borderline emaciated (BMI 19.5). Tone intermittently increased symmetrically but unclear if this is volitional, seemed able to relax when asked to.     Possible postural tremor intermittently noted when hands raised, though not consistently present.     When his arms were moved into the air by examiner, he kept them raised for several minutes even when eyes closed and appeared to be sleeping.     Does move and attend to L arm but less movement with it overall compared to R and often keeps it in flexed posture. Showed thumbs up with L arm when examiner pointed to that arm and gave thumbs up.    Reflexes:  RIGHT UE   LEFT UE   BR: 3        BR: 3     Biceps: 3     Biceps: 3    "   RIGHT LLE   LEFT LLE     Knee: 3        Knee: 3     Difficult to assess ankle reflexes due to withdrawal/poor relaxation when manipulated.    Babinski: toes downgoing to plantar stimulation    Recent/Relevant Results    Labs  Results for orders placed or performed during the hospital encounter of 11/08/24 (from the past 24 hours)   Vitamin B12   Result Value Ref Range    Vitamin B12 473 211 - 911 pg/mL   Folate   Result Value Ref Range    Folate, Serum 5.5 >5.0 ng/mL   HIV 1/2 Antigen/Antibody Screen with Reflex to Confirmation   Result Value Ref Range    HIV 1/2 Antigen/Antibody Screen with Reflex to Confirmation Nonreactive Nonreactive   Syphilis Screen with Reflex   Result Value Ref Range    Syphilis Total Ab Nonreactive Nonreactive   C-reactive protein   Result Value Ref Range    C-Reactive Protein 8.60 (H) <1.00 mg/dL   Glucose, CSF   Result Value Ref Range    Glucose, CSF 81 (H) 40 - 70 mg/dL   Protein, Total CSF   Result Value Ref Range    Total Protein, CSF 33 15 - 45 mg/dL   CSF Culture/Smear    Specimen: Lumbar Puncture; Cerebrospinal Fluid   Result Value Ref Range    CSF Culture/Smear No growth to date     Gram Stain No polymorphonuclear leukocytes seen     Gram Stain No organisms seen    Cryptococcal Antigen, CSF/Serum    Specimen: Lumbar Puncture; Cerebrospinal Fluid   Result Value Ref Range    Cryptococcal AG, Qual Negative Negative, Invalid   HSV PCR, CSF    Specimen: Lumbar Puncture; Cerebrospinal Fluid   Result Value Ref Range    Herpes simplex virus Type 1 PCR, Qual, CSF Not Detected Not Detected    HSV 2 PCR, QuaL, CSF Not Detected Not Detected   CSF Cell Count   Result Value Ref Range    Tube Number, CSF Tube 4       Color, CSF Colorless Colorless    Clarity, CSF Clear Clear    Color, Supernatant CSF Colorless      WBC, CSF 1 1 - 5 /uL    RBC, CSF 5 0 - 5 /uL   CSF Differential   Result Value Ref Range    Neutrophils %, Manual, CSF 0 0 - 5 %    Lymphocytes %, Manual, CSF 76 28 - 96 %     Mono/Macrophages %, Manual, CSF 24 16 - 56 %    Eosinophils %, Manual, CSF 0 Rare %    Basophils %, Manual, CSF 0 Not Established %    Immature Granulocytes %, Manual, CSF 0 Not Established %    Blasts %, Manual, CSF 0 Not Established %    Unclassified Cells %, Manual, CSF 0 Not Established %    Plasma Cells %, Manual, CSF 0 Not Established %    Total Cells Counted, CSF 21    CSF Cell Count   Result Value Ref Range    Tube Number, CSF Tube 1       Color, CSF Colorless Colorless    Clarity, CSF Clear Clear    Color, Supernatant CSF Colorless      WBC, CSF 3 1 - 5 /uL    RBC, CSF 8 (H) 0 - 5 /uL   CSF Differential   Result Value Ref Range    Neutrophils %, Manual, CSF 0 0 - 5 %    Lymphocytes %, Manual, CSF 72 28 - 96 %    Mono/Macrophages %, Manual, CSF 28 16 - 56 %    Eosinophils %, Manual, CSF 0 Rare %    Basophils %, Manual, CSF 0 Not Established %    Immature Granulocytes %, Manual, CSF 0 Not Established %    Blasts %, Manual, CSF 0 Not Established %    Unclassified Cells %, Manual, CSF 0 Not Established %    Plasma Cells %, Manual, CSF 0 Not Established %    Total Cells Counted, CSF 18    POCT GLUCOSE   Result Value Ref Range    POCT Glucose 183 (H) 74 - 99 mg/dL   CBC   Result Value Ref Range    WBC 5.8 4.4 - 11.3 x10*3/uL    nRBC 0.0 0.0 - 0.0 /100 WBCs    RBC 2.49 (L) 4.50 - 5.90 x10*6/uL    Hemoglobin 7.1 (L) 13.5 - 17.5 g/dL    Hematocrit 22.4 (L) 41.0 - 52.0 %    MCV 90 80 - 100 fL    MCH 28.5 26.0 - 34.0 pg    MCHC 31.7 (L) 32.0 - 36.0 g/dL    RDW 14.6 (H) 11.5 - 14.5 %    Platelets 440 150 - 450 x10*3/uL   Renal Function Panel   Result Value Ref Range    Glucose 157 (H) 74 - 99 mg/dL    Sodium 137 136 - 145 mmol/L    Potassium 3.3 (L) 3.5 - 5.3 mmol/L    Chloride 103 98 - 107 mmol/L    Bicarbonate 23 21 - 32 mmol/L    Anion Gap 14 10 - 20 mmol/L    Urea Nitrogen 26 (H) 6 - 23 mg/dL    Creatinine 1.21 0.50 - 1.30 mg/dL    eGFR 60 (L) >60 mL/min/1.73m*2    Calcium 8.0 (L) 8.6 - 10.6 mg/dL    Phosphorus 2.5  2.5 - 4.9 mg/dL    Albumin 2.1 (L) 3.4 - 5.0 g/dL   Magnesium   Result Value Ref Range    Magnesium 1.93 1.60 - 2.40 mg/dL     11/9  ESR elevated (60)  CRP elevated (8.6)  B12 wnl  Folate wnl  HIV negative  Syph screen negative    11/7  Sars-Cov-2 PCR negative  Phos wnl  TSH wnl  Blood cultures NGTD     11/3  Lactate wnl     10/17  Ammonia 19     Imaging  CT abd/pelv w IVCON 11/10/24:  1. No evidence of primary neoplasm or metastatic disease.  2. Stercoral colitis without evidence of pneumatosis or obstruction.  3. Trace pleural fluid with patchy dependent consolidative opacities,  more irregular and greater than expected for atelectasis, with  superimposed infection or aspiration felt to be present.  4. 1.8 cm nonobstructing calculus in the left renal pelvis.    MRI Brain w/wo IVCON 11/7/24:  There is no MRI evidence of acute infarction on the  diffusion-weighted images.  There is again evidence of moderate-to-marked brain parenchymal  volume loss.  Nonspecific white matter changes are again noted within cerebral  hemispheres bilaterally which while nonspecific, given the patient's  age, likely represent sequelae of more remote small-vessel ischemic  change. Additional punctate foci of bright signal on the T2 images  are again noted within the subinsular regions and basal ganglia  bilaterally suggesting incidental mildly prominent perivascular  spaces and/or small scattered more remote lacunar infarctions.  No abnormal intracranial mass lesion or abnormal intracranial  enhancement is identified on the postcontrast images.     CXR 11/7/24:  Normal-sized heart with mild pulmonary vascular congestion and  persistent areas of infiltrate in each lower lung zone with small  left pleural effusion.     CTH wo IVCON 10/29/24:  No acute intracranial abnormality.      Nuclear Medicine Stress Test 10/22/24:  1.  No evidence of inducible ischemia or prior infarction.  2. Left ventricle is normal in size.  3. Mild global  hypokinesis of the LV wall motion with a post-stress  LV EF estimated at 44%.     MRI Brain wo IVCON 10/19/24:  No MR evidence of acute intracranial infarct, hemorrhage, mass, or  mass effect.     EEG  20min study 10/17/24:  This routine awake and drowsy EEG is indicative of a moderate diffuse encephalopathy. No epileptiform activity or lateralizing signs seen.      1hr study 11/1/24:  This 1 hour EEG is indicative of a severe diffuse encephalopathy. No epileptiform activity or lateralizing signs seen.     ==========  Assessment/Plan   Assessment  Encephalopathy acute  Dat Mccallum is an 83 y.o. 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 Neurology is consulted for further workup of persistent, worsening AMS (baseline independent). Patient neurological examination concerning for fluctuating episodic confusion, with catatonia like behavior and concern for L hemineglect observed prior to transfer.  EEG sig for diffuse encephalopathy but no seizures. MRI Jorge A w/wo unremarkable.  CT abd/pelv w IVCON no evidence of malignancy.  No vitamin deficiency identified.  Will follow for workup of infectious/autoimmune encephalitis.    No evidence of L hemineglect this admission though patient does use L side less than R (R handed). Due to patient's deafness and AMS, it is difficult to tell if patient is truly exhibiting features of catatonia including posturing/echopraxia vs simply doing what he thinks he is being asked to do. Regardless, previously failed benzo trial. Also notable are intermittent BUE postural tremor and waxing/waning rigidity, both new this admission per son.     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  Though presentation is atypical, prolonged toxic metabolic encephalopathy/delirium are still possible explanations. Patient's deafness renders him more susceptible to delirium.     Recommendations  - CT chest w IVCON to complete malignancy screen  - Agree with discontinuing CNS coverage abx given unremarkable CSF labs thus far  - Can continue acyclovir for now pending VZV CSF result (HSV negative)  - Infectious/autoimmune/prion CSF panels pending  - Checking ANGEL w/reflex to VIDAL  - Empirically repleting B1 500mg IV TID for 3 days (ordered)  - Continue delirium and fall precautions, please see below.  - Neurology will continue to follow  - Sons agreeable to scheduling outpatient neurocognitive follow-up when patient discharged    Delirium Guidelines  Non-Pharmacologic:  - Assess visual and hearing impairments and provide aids and communication boards.  - Assess immobility and advocate for early evaluation and intervention by physical therapy, out of bed when medically indicated, and expeditious removal of tethers.  - Promote physiologic sleep and maintenance of sleep/wake cycle by ensuring blinds are open during the day, maintaining dark/quiet room at night with minimal interruptions, and minimizing daytime naps.  - Minimize room and staff changes.  - Engage the patient in cognitively stimulating activities and provide frequent reorientation.   - Minimize use of restraints to situations where necessary to keep patient and staff safe and to prevent from removing lines, tubes, medical devices, dressings, etc.     Pharmacologic:  - Minimize use of deliriogenic medications such as benzodiazepines, anticholinergic medications, and opiates (while ensuring adequate treatment of pain).  - Assess and treat disruption in bowel and bladder function.   - Assess and treat abnormalities in nutrition and hydration status.     Recommendations are not final until signed by attending physician.      David Livingston, MS4  11:35  THERESA  11/10/24    -----------------------------------------------------------------  Senior Resident Addendum:  I examined & discussed the patient above. I have reviewed and agree with the excellent note above.     Rochelle Cortes MD ([place])  PGY-4 Neurology  Gen Neuro Pager: [phone]

## 2024-11-11 LAB
ALBUMIN SERPL BCP-MCNC: 2 G/DL (ref 3.4–5)
ALBUMIN SERPL BCP-MCNC: 2 G/DL (ref 3.4–5)
ANA SER QL HEP2 SUBST: NEGATIVE
ANION GAP SERPL CALC-SCNC: 11 MMOL/L (ref 10–20)
ANION GAP SERPL CALC-SCNC: 9 MMOL/L (ref 10–20)
BACTERIA BLD CULT: NORMAL
BACTERIA BLD CULT: NORMAL
BUN SERPL-MCNC: 22 MG/DL (ref 6–23)
BUN SERPL-MCNC: 23 MG/DL (ref 6–23)
CALCIUM SERPL-MCNC: 7.7 MG/DL (ref 8.6–10.6)
CALCIUM SERPL-MCNC: 8 MG/DL (ref 8.6–10.6)
CHLORIDE SERPL-SCNC: 103 MMOL/L (ref 98–107)
CHLORIDE SERPL-SCNC: 104 MMOL/L (ref 98–107)
CO2 SERPL-SCNC: 25 MMOL/L (ref 21–32)
CO2 SERPL-SCNC: 25 MMOL/L (ref 21–32)
CREAT SERPL-MCNC: 1.16 MG/DL (ref 0.5–1.3)
CREAT SERPL-MCNC: 1.21 MG/DL (ref 0.5–1.3)
EGFRCR SERPLBLD CKD-EPI 2021: 60 ML/MIN/1.73M*2
EGFRCR SERPLBLD CKD-EPI 2021: 63 ML/MIN/1.73M*2
ERYTHROCYTE [DISTWIDTH] IN BLOOD BY AUTOMATED COUNT: 14.5 % (ref 11.5–14.5)
GLUCOSE BLD MANUAL STRIP-MCNC: 185 MG/DL (ref 74–99)
GLUCOSE BLD MANUAL STRIP-MCNC: 186 MG/DL (ref 74–99)
GLUCOSE BLD MANUAL STRIP-MCNC: 187 MG/DL (ref 74–99)
GLUCOSE BLD MANUAL STRIP-MCNC: 196 MG/DL (ref 74–99)
GLUCOSE BLD MANUAL STRIP-MCNC: 205 MG/DL (ref 74–99)
GLUCOSE BLD MANUAL STRIP-MCNC: 208 MG/DL (ref 74–99)
GLUCOSE SERPL-MCNC: 205 MG/DL (ref 74–99)
GLUCOSE SERPL-MCNC: 212 MG/DL (ref 74–99)
HCT VFR BLD AUTO: 21.8 % (ref 41–52)
HGB BLD-MCNC: 7.3 G/DL (ref 13.5–17.5)
MAGNESIUM SERPL-MCNC: 2.13 MG/DL (ref 1.6–2.4)
MAGNESIUM SERPL-MCNC: 2.13 MG/DL (ref 1.6–2.4)
MCH RBC QN AUTO: 28.9 PG (ref 26–34)
MCHC RBC AUTO-ENTMCNC: 33.5 G/DL (ref 32–36)
MCV RBC AUTO: 86 FL (ref 80–100)
NRBC BLD-RTO: 0 /100 WBCS (ref 0–0)
PHOSPHATE SERPL-MCNC: 2 MG/DL (ref 2.5–4.9)
PHOSPHATE SERPL-MCNC: 2.2 MG/DL (ref 2.5–4.9)
PLATELET # BLD AUTO: 436 X10*3/UL (ref 150–450)
POTASSIUM SERPL-SCNC: 5 MMOL/L (ref 3.5–5.3)
POTASSIUM SERPL-SCNC: 5.1 MMOL/L (ref 3.5–5.3)
RBC # BLD AUTO: 2.53 X10*6/UL (ref 4.5–5.9)
SODIUM SERPL-SCNC: 132 MMOL/L (ref 136–145)
SODIUM SERPL-SCNC: 135 MMOL/L (ref 136–145)
WBC # BLD AUTO: 5.5 X10*3/UL (ref 4.4–11.3)

## 2024-11-11 PROCEDURE — 83735 ASSAY OF MAGNESIUM: CPT | Performed by: STUDENT IN AN ORGANIZED HEALTH CARE EDUCATION/TRAINING PROGRAM

## 2024-11-11 PROCEDURE — 2500000005 HC RX 250 GENERAL PHARMACY W/O HCPCS: Performed by: STUDENT IN AN ORGANIZED HEALTH CARE EDUCATION/TRAINING PROGRAM

## 2024-11-11 PROCEDURE — 2500000001 HC RX 250 WO HCPCS SELF ADMINISTERED DRUGS (ALT 637 FOR MEDICARE OP): Performed by: STUDENT IN AN ORGANIZED HEALTH CARE EDUCATION/TRAINING PROGRAM

## 2024-11-11 PROCEDURE — 2500000004 HC RX 250 GENERAL PHARMACY W/ HCPCS (ALT 636 FOR OP/ED): Performed by: STUDENT IN AN ORGANIZED HEALTH CARE EDUCATION/TRAINING PROGRAM

## 2024-11-11 PROCEDURE — 82947 ASSAY GLUCOSE BLOOD QUANT: CPT

## 2024-11-11 PROCEDURE — 80069 RENAL FUNCTION PANEL: CPT | Performed by: STUDENT IN AN ORGANIZED HEALTH CARE EDUCATION/TRAINING PROGRAM

## 2024-11-11 PROCEDURE — 1100000001 HC PRIVATE ROOM DAILY

## 2024-11-11 PROCEDURE — 36415 COLL VENOUS BLD VENIPUNCTURE: CPT | Performed by: STUDENT IN AN ORGANIZED HEALTH CARE EDUCATION/TRAINING PROGRAM

## 2024-11-11 PROCEDURE — 99232 SBSQ HOSP IP/OBS MODERATE 35: CPT | Performed by: STUDENT IN AN ORGANIZED HEALTH CARE EDUCATION/TRAINING PROGRAM

## 2024-11-11 PROCEDURE — 85027 COMPLETE CBC AUTOMATED: CPT | Performed by: STUDENT IN AN ORGANIZED HEALTH CARE EDUCATION/TRAINING PROGRAM

## 2024-11-11 PROCEDURE — 97162 PT EVAL MOD COMPLEX 30 MIN: CPT | Mod: GP

## 2024-11-11 RX ADMIN — THIAMINE HYDROCHLORIDE 500 MG: 100 INJECTION, SOLUTION INTRAMUSCULAR; INTRAVENOUS at 15:03

## 2024-11-11 RX ADMIN — CARBOXYMETHYLCELLULOSE SODIUM 2 DROP: 5 SOLUTION/ DROPS OPHTHALMIC at 22:05

## 2024-11-11 RX ADMIN — SENNOSIDES AND DOCUSATE SODIUM 1 TABLET: 8.6; 5 TABLET ORAL at 22:04

## 2024-11-11 RX ADMIN — HEPARIN SODIUM 5000 UNITS: 5000 INJECTION, SOLUTION INTRAVENOUS; SUBCUTANEOUS at 15:03

## 2024-11-11 RX ADMIN — ACYCLOVIR SODIUM 650 MG: 50 INJECTION, SOLUTION INTRAVENOUS at 02:40

## 2024-11-11 RX ADMIN — ACYCLOVIR SODIUM 650 MG: 50 INJECTION, SOLUTION INTRAVENOUS at 11:28

## 2024-11-11 RX ADMIN — HEPARIN SODIUM 5000 UNITS: 5000 INJECTION, SOLUTION INTRAVENOUS; SUBCUTANEOUS at 08:54

## 2024-11-11 RX ADMIN — SENNOSIDES AND DOCUSATE SODIUM 1 TABLET: 8.6; 5 TABLET ORAL at 08:54

## 2024-11-11 RX ADMIN — Medication 15 ML: at 11:28

## 2024-11-11 RX ADMIN — THIAMINE HYDROCHLORIDE 500 MG: 100 INJECTION, SOLUTION INTRAMUSCULAR; INTRAVENOUS at 08:54

## 2024-11-11 RX ADMIN — CARBOXYMETHYLCELLULOSE SODIUM 2 DROP: 5 SOLUTION/ DROPS OPHTHALMIC at 01:28

## 2024-11-11 RX ADMIN — ASPIRIN 81 MG CHEWABLE TABLET 81 MG: 81 TABLET CHEWABLE at 08:54

## 2024-11-11 RX ADMIN — ATORVASTATIN CALCIUM 40 MG: 40 TABLET, FILM COATED ORAL at 22:04

## 2024-11-11 RX ADMIN — HEPARIN SODIUM 5000 UNITS: 5000 INJECTION, SOLUTION INTRAVENOUS; SUBCUTANEOUS at 00:56

## 2024-11-11 RX ADMIN — THIAMINE HYDROCHLORIDE 500 MG: 100 INJECTION, SOLUTION INTRAMUSCULAR; INTRAVENOUS at 22:04

## 2024-11-11 RX ADMIN — OLANZAPINE 5 MG: 10 INJECTION, POWDER, LYOPHILIZED, FOR SOLUTION INTRAMUSCULAR at 01:04

## 2024-11-11 RX ADMIN — Medication 15 ML: at 01:28

## 2024-11-11 RX ADMIN — HEPARIN SODIUM 5000 UNITS: 5000 INJECTION, SOLUTION INTRAVENOUS; SUBCUTANEOUS at 22:05

## 2024-11-11 RX ADMIN — Medication 15 ML: at 22:05

## 2024-11-11 ASSESSMENT — COGNITIVE AND FUNCTIONAL STATUS - GENERAL
MOBILITY SCORE: 6
STANDING UP FROM CHAIR USING ARMS: TOTAL
MOVING FROM LYING ON BACK TO SITTING ON SIDE OF FLAT BED WITH BEDRAILS: TOTAL
CLIMB 3 TO 5 STEPS WITH RAILING: TOTAL
TURNING FROM BACK TO SIDE WHILE IN FLAT BAD: TOTAL
MOVING TO AND FROM BED TO CHAIR: TOTAL
WALKING IN HOSPITAL ROOM: TOTAL

## 2024-11-11 ASSESSMENT — PAIN - FUNCTIONAL ASSESSMENT: PAIN_FUNCTIONAL_ASSESSMENT: UNABLE TO SELF-REPORT

## 2024-11-11 ASSESSMENT — ACTIVITIES OF DAILY LIVING (ADL): ADL_ASSISTANCE: INDEPENDENT

## 2024-11-11 NOTE — PROGRESS NOTES
Physical Therapy    Physical Therapy Evaluation    Patient Name: Dat Mccallum  MRN: [id number]  Department: [place] [address]  Room: [facility]/5063-A  Today's Date: 11/11/2024   Time Calculation  Start Time: 1417  Stop Time: 1428  Time Calculation (min): 11 min    Assessment/Plan   PT Assessment  PT Assessment Results: Decreased strength, Decreased range of motion, Decreased endurance, Impaired balance, Decreased coordination, Decreased mobility, Impaired judgement, Decreased safety awareness, Impaired hearing, Decreased cognition  Rehab Prognosis: Good  Barriers to Discharge: command following  Evaluation/Treatment Tolerance: Treatment limited secondary to medical complications (Comment)  End of Session Communication: Bedside nurse  Assessment Comment: 83 y.o. M admitted for further work up/treatment of encephalopathy. Pt demonstrating impaired strength, cognition, and function. Pt will benefit from continued PT in house and after discharge to facilitate activity as tolerated.  End of Session Patient Position: Bed, 4 rail up, Alarm on (bilat wrist restraints donned. RN present noting patient had pulled out tubefeeds)  IP OR SWING BED PT PLAN  Inpatient or Swing Bed: Inpatient  PT Plan  Treatment/Interventions: Bed mobility, Transfer training, Gait training, Balance training, Strengthening, Endurance training, Therapeutic exercise, Therapeutic activity  PT Plan: Ongoing PT  PT Frequency: 2 times per week  PT Discharge Recommendations: Moderate intensity level of continued care  PT Recommended Transfer Status: Total assist  PT - OK to Discharge: Yes    Subjective   General Visit Information:  General  Reason for Referral: encephalopathy  Past Medical History Relevant to Rehab: 83 y.o. male with PMH of  HFrEF, DMII, HTN, HLD ,CKD, BPH, Deaf (ASL)  transferred from OSH (on Hosp Day 23)  for continued work up/treatment of acute encephalopathy.  Missed Visit: No  Family/Caregiver Present: Yes  Caregiver Feedback: RN present at end  of visit who reports patient has not been following commands since start of her shift this morning.  Prior to Session Communication: Bedside nurse  Patient Position Received: Bed, 4 rail up, Alarm on (bilat wrist restraints donned pre/post visit. Doffed for mobility.)  Preferred Learning Style:  (Pt significantly [place]/deaf. Per reports typically reads lips.)  General Comment: Pt sleeping heavily throughout duration of visit despite max verbal and tactile cues. RN present who notes this has been pt's presentation since this AM.  Home Living:  Home Living  Type of Home: House  Lives With: Alone  Home Living Comments: Per previous reports patient at baseline has been living home alone.  Prior Level of Function:  Prior Function Per Pt/Caregiver Report  Level of [place]: Independent with ADLs and functional transfers  ADL Assistance: Independent  Ambulatory Assistance: Independent  Precautions:  Precautions  Medical Precautions: Fall precautions     Objective   Pain:  Pain Assessment  Pain Assessment: Unable to self-report  0-10 (Numeric) Pain Score:  (Pt not able to follow commands though appearing in no acute distress.)  Cognition:  Cognition  Overall Cognitive Status: Unable to assess  Orientation Level: Disoriented X4  Cognition Comments: Pt unable to follow commands due to encephalopathy.    General Assessments:     Activity Tolerance  Endurance: Decreased tolerance for upright activites    Sensation  Light Touch:  (Unable to test)    Strength  Strength Comments: Unable to formally assess. Though  Strength  Strength Comments: Unable to formally assess. Though    Coordination  Movements are Fluid and Coordinated: No    Postural Control  Postural Control: Impaired  Posture Comment: Pt requiring max/dependent assist for static posture.    Static Sitting Balance  Static Sitting-Balance Support: Bilateral upper extremity supported  Static Sitting-Level of Assistance: Dependent  Static Sitting-Comment/Number of  Minutes: EOB for approximately 3 minutes total without change in mentation. Pt appearing sleeping and unable to arouse sufficiently to follow commands despite max verbal and tactile stimulation.    Static Standing Balance  Static Standing-Comment/Number of Minutes: Pt unable to safely tolerate attempts.  Functional Assessments:     Bed Mobility  Bed Mobility: Yes  Bed Mobility 1  Bed Mobility 1: Supine to sitting, Sitting to supine  Level of Assistance 1: Dependent, Maximum verbal cues, Maximum tactile cues    Transfers  Transfer: No (Pt unable to safely tolerate attempts.)    Ambulation/Gait Training  Ambulation/Gait Training Performed: No    Outcome Measures:  [facility] Basic Mobility  Turning from your back to your side while in a flat bed without using bedrails: Total  Moving from lying on your back to sitting on the side of a flat bed without using bedrails: Total  Moving to and from bed to chair (including a wheelchair): Total  Standing up from a chair using your arms (e.g. wheelchair or bedside chair): Total  To walk in hospital room: Total  Climbing 3-5 steps with railing: Total  Basic Mobility - Total Score: 6    Encounter Problems       Encounter Problems (Active)       Mobility       STG - Patient will ambulate >10ft, wheeled walker, min assist       Start:  11/11/24    Expected End:  11/25/24               PT Transfers       STG - Patient to transfer to and from sit to supine min assist       Start:  11/11/24    Expected End:  11/25/24            STG - Patient will transfer sit to and from stand min assist       Start:  11/11/24    Expected End:  11/25/24               Pain - Adult              Education Documentation  Precautions, taught by Kip Myers PT at 11/11/2024  3:23 PM.  Learner: Patient  Readiness: Nonacceptance  Method: Explanation  Response: No Evidence of Learning, Needs Reinforcement    Mobility Training, taught by Kip Myers, PT at 11/11/2024  3:23 PM.  Learner: Patient  Readiness:  Nonacceptance  Method: Explanation  Response: No Evidence of Learning, Needs Reinforcement    Education Comments  No comments found.

## 2024-11-11 NOTE — PROGRESS NOTES
Assessment/Plan   Dat Mccallum is a 83 y.o. male with PMH of  HFrEF (EF improved from 20% to stress test 10/21 with LVEF 44%), DMII, HTN, HLD ,CKD, BPH, Deaf (ASL)  transferred [place] OSH (on Hosp Day 23) [place] [facility] [place] En[place]opathy workup. Pt initially presented for AMS. Found to have NSTEMI, reduced EF but no significant stenosis on cardiac cath. Pt has been  hypotensive requiring intermittant midodinre and has not tolerated GDMT. Pt treated with IV abx for UTI and pneumonia. Due to persistent encepahlopathy, he was broadened to cover for CNS infection. He was demonstrating continue encephalopathy, with agitation, waxing and waning state, making non-sensicle/repetitive phrases as well as possible L hemineglect. MRI 10/19 and 11/6 without acute infarct, showed mod to marked volume loss, nonspecific changes consistent with microvascular disease. EEG showed diffuse encephalopathy without lateralization or seizure activity. [place] [facility] [place]ceftriaxone/amp/acyclovir. Underwent LP, noninfectious, antibiotics and antivirals discontinued. Awaiting further CSF studies per neurology. ID also consulted. CSF is not concerning for infection, antibiotics deescalated. Pending autoimmune studies and paraneoplastic work up, could potentially related ot delirium as well from critical illness. Pt is pending ct chest to complete malignancy screen. Overall there has been modest improvements in alertness and ability to communicate with family..     Acute metabolic encephalopathy  - persistent encephalopathy, possible L alfred neglect seen during course. Hospitalized for about 3 weeks with pneumonia, uti, and nstemi/HF.   - MRI with diffuse volume loss, no infarct or other attributable finding. Completed 10/19 and 11/6  - eeg with diffuse encephalopathy  - b12, b9, syph, hiv, tsh tested  - s/p 3 days high dose thiamine completing today.   - paraneoplastic panel, yanet/VIDAL pending  - pending CT C/A/P. Chest  ct needs to be reordered. CT a/p showed no evidence of malignant mass, e/o consitpation with stercolal colitis. Order for CT chest replaced.   - LP completed, no infection. Pending other CSF studies per neurology  - dc vanc/ctx/amp/acyclovir.   - appreciate ID and neuro input.  Infection is less likely at this time. Dc'd abx, will continue acyclovir until VZV results.   -- at this time awaiting autoimmune work up, paraneoplastic eval, however this also could be delirium.   - pt to be set up with follow up with neurology for neurocog eval.     NSTEMI  HFrEF, acute on chronic  - non-obstruction cath  - unable to tolerate GDMT due to hypotension, start as tolerated. Currently on coreg 3.125mg bid. Also on midodrine prn. Will monitor.   - plan to hold coreg. Make midodrine prn instead of scheduled. Will monitor bp and eval if GDMT can be started or if midodrine is needed. Likely to change to metoprolol which theoretically should have less effect on bp. Will monitor for ability to start.     Hospital acquired pneumonia  - s/p zosyn  - OSH CT showing trace pleural fluid and patchy consolidative opacities felt to be more than atelectasis, likley superimposed infection or aspiration.   - awaiting repeat Ct chest    UTI with hematuria  - s/p abx  - resolved    Acute urinary retention  - at osh, urology unable to pass trotter, plan to monitor clinically and if pt develops niurka or signs of obstruction then will need cystoscopy.   - renal function is stable  - will check bladder scan    Hypotension  - continue midodrine prn    Severe protein-calorie malnutrition  Dysphagia in setting of AMS  - continue tube feeds, monitor electrolytes. Will adjust FWF based on labs. BID RFP. Started fwf 200cc q4hr. On isosource 1.5 50cc/hr goal. Plan to increase by 10cc/hr until goal is reached.  Will reduced fwf to 200cc q6 hrs due to developing hyponatremia.   - slp, nutrition consult  - bid RFP, mag until stable    DMII  C/b low blood sugars,  "insulin was stopped.     Nephrolithiasis  - L 1.8 non-obstructing stone    Stercolal colitis  - seen on CT  - started bowel regimen and enema prn.   - pt moved bowels .        Scheduled outpatient appointments in system:   No future appointments.  ---------------------------------------------------------------------------------------------------  Subjective   No events. No family at bedside, with sitter. Pt is unable to communicate. He is follow some directions imitating, moving all extremities.  No issues per nursing. Remains on restraints for pulling devices.       ---------------------------------------------------------------------------------------------------  Objective   Last Recorded Vitals  Blood pressure 102/58, pulse 76, temperature 36.1 °C (97 °F), resp. rate 22, height 1.829 m (6' 0.01\"), weight 65.2 kg (143 lb 11.8 oz), SpO2 94%.  Intake/Output last 3 Shifts:  I/O last 3 completed shifts:  In: 715 (11 mL/kg) [NG/GT:460; IV Piggyback:255]  Out: 150 (2.3 mL/kg) [Urine:150 (0.1 mL/kg/hr)]  Weight: 65.2 kg     Physical Exam  Vitals and nursing note reviewed.   Constitutional:       General: He is not in acute distress.     Appearance: Normal appearance. He is ill-appearing.      Comments: Elderly, thin appearing. Dobhoff in place. Opening eyes more and more interactions noted.    HENT:      Head: Normocephalic and atraumatic.      Comments: Hearing impairment     Mouth/Throat:      Mouth: Mucous membranes are moist.   Eyes:      General: No scleral icterus.     Extraocular Movements: Extraocular movements intact.      Conjunctiva/sclera: Conjunctivae normal.   Cardiovascular:      Rate and Rhythm: Normal rate and regular rhythm.      Heart sounds: S1 normal and S2 normal. No murmur heard.  Pulmonary:      Effort: Pulmonary effort is normal. No respiratory distress.      Breath sounds: No wheezing, rhonchi or rales.   Abdominal:      General: Bowel sounds are normal. There is no distension.      Palpations: " Abdomen is soft.      Tenderness: There is no abdominal tenderness. There is no guarding or rebound.   Musculoskeletal:         General: No swelling or deformity.      Cervical back: Neck supple.   Skin:     General: Skin is warm and dry.      Findings: No rash.   Neurological:      Mental Status: He is alert. He is disoriented.      Comments: Limited on cooperation. Confused, now opening eyes, following directions, attention span still poor, strength appears symmetric 4/4 extremities.    Psychiatric:      Comments: Poor attention/concentration       Relevant Results  Lab Results   Component Value Date    WBC 5.5 11/11/2024    HGB 7.3 (L) 11/11/2024    HCT 21.8 (L) 11/11/2024    MCV 86 11/11/2024     11/11/2024      Lab Results   Component Value Date    GLUCOSE 205 (H) 11/11/2024    CALCIUM 8.0 (L) 11/11/2024     (L) 11/11/2024    K 5.0 11/11/2024    CO2 25 11/11/2024     11/11/2024    BUN 23 11/11/2024    CREATININE 1.21 11/11/2024     Scheduled medications  acyclovir, 10 mg/kg, intravenous, q12h  aspirin, 81 mg, nasogastric tube, Daily  atorvastatin, 40 mg, nasogastric tube, Nightly  [Held by provider] dexAMETHasone, 10 mg, intravenous, q6h  heparin, 5,000 Units, subcutaneous, q8h  lubricating eye drops, 2 drop, Both Eyes, TID  moisturizing mouth, 15 mL, Swish & Spit, BID  pancrelipase (Lip-Prot-Amyl) 2 tablet, sodium bicarbonate 650 mg, , nasogastric tube, Once  sennosides-docusate sodium, 1 tablet, nasogastric tube, BID  thiamine, 500 mg, intravenous, TID      Continuous medications     PRN medications  PRN medications: acetaminophen, acetaminophen, dextrose, dextrose, glucagon, glucagon, insulin lispro, ipratropium-albuteroL, midodrine, OLANZapine, oxygen    Mckay Tomlin MD

## 2024-11-11 NOTE — RESEARCH NOTES
Artificial Intelligence Monitoring in Nursing (AIMS Nursing) Study    Principle Investigator - Dr. Jorge A Nathan  Research Coordinator - Cecile Varma     Patient Name - Dat Mccallum  Date - 11/11/2024 11:53 AM  Location - [place] [address]    Dat Mccallum was approached by Cecile Varma to talk about participating in the AIMS Nursing Study. The patient was not able to be approached, a research coordinator will come back at a later time. Study protocol was followed and patient was given study contact information.     Cecile Varma

## 2024-11-11 NOTE — PROGRESS NOTES
Transitional Care Coordination Progress Note:  Patient discussed during interdisciplinary rounds.  Team members present: MD, JUNIE  Plan per Medical/Surgical team: Pt presenting from home for AMS/lethargy. Per attending he is not eating (dobhoff dependent) possibly due to delirium. Neurology team is following, PT/OT consulted.  Payer: [employer]   Status: Inpatient  Discharge disposition: [facility] (pending bed availability).  Potential Barriers: none  ADOD: 11/15  Care coordinator will continue to follow for discharge planning needs.     Jose Rafael Tucker RN  Transitional Care Coordinator (TCC)  [phone] or [phone]

## 2024-11-11 NOTE — SIGNIFICANT EVENT
NEUROLOGY CONSULT SIGN OFF    Dat Mccallum is an 83 y.o. 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 Neurology was consulted for further workup of persistent AMS (baseline independent). Neurological examination concerning for fluctuating episodic confusion, with catatonia like behavior and concern for L hemineglect observed prior to transfer. EEG sig for diffuse encephalopathy but no seizures. MRI Jorge A w/wo unremarkable. CT abd/pelv w IVCON no evidence of malignancy. No vitamin deficiency identified.     No evidence of L hemineglect this admission though patient does use L side less than R (R handed). Due to patient's deafness and AMS, it is difficult to tell if patient is truly exhibiting features of catatonia including posturing/echopraxia vs simply doing what he thinks he is being asked to do. Regardless, previously failed benzo trial. Also notable are intermittent BUE postural tremor and waxing/waning rigidity, both new this admission per son.     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 Though presentation is atypical, prolonged toxic metabolic encephalopathy/delirium are still possible explanations. Patient's deafness renders him more susceptible to delirium.     Final Recommendations  - CT chest w IV CON to complete malignancy screen  - Agree with discontinuing CNS coverage abx given unremarkable CSF labs thus far  - Can continue acyclovir for pending VZV CSF result (HSV negative)  - Infectious/autoimmune/prion CSF panels pending  - B1 500mg IV TID for 3 days (last dose today)  - Continue delirium and fall precautions, please see below.  - Sons agreeable to scheduling outpatient neurocognitive follow-up  when patient discharged  - No further inpatient workup from neurological standpoint, will sign off. Feel free to reach out with any further questions/concerns.    Delirium Guidelines  Non-Pharmacologic:  - Assess visual and hearing impairments and provide aids and communication boards.  - Assess immobility and advocate for early evaluation and intervention by physical therapy, out of bed when medically indicated, and expeditious removal of tethers.  - Promote physiologic sleep and maintenance of sleep/wake cycle by ensuring blinds are open during the day, maintaining dark/quiet room at night with minimal interruptions, and minimizing daytime naps.  - Minimize room and staff changes.  - Engage the patient in cognitively stimulating activities and provide frequent reorientation.   - Minimize use of restraints to situations where necessary to keep patient and staff safe and to prevent from removing lines, tubes, medical devices, dressings, etc.     Pharmacologic:  - Minimize use of deliriogenic medications such as benzodiazepines, anticholinergic medications, and opiates (while ensuring adequate treatment of pain).  - Assess and treat disruption in bowel and bladder function.   - Assess and treat abnormalities in nutrition and hydration status.     David Livingston, MS4  8:16 AM  11/11/24    I participated and contributed to the above medical student note. I agree with the above information.    Lourdes Brown MD   PGY-3 Neurology  General [phone]

## 2024-11-11 NOTE — CARE PLAN
The patient's goals for the shift include      The clinical goals for the shift include Pt will remain free from injury/fall for duration of shift.    Over the shift, the patient did not make progress toward the following goals. Barriers to progression include ***. Recommendations to address these barriers include ***.    Problem: Safety - Medical Restraint  Goal: Free from restraint(s) (Restraint for Interference with Medical Device)  11/11/2024 0138 by Viji Garcia RN  Outcome: Progressing  11/11/2024 0007 by Viji Garcia RN  Outcome: Not Progressing  11/10/2024 2337 by Viji Garcia, RN  Outcome: Progressing

## 2024-11-11 NOTE — CARE PLAN
The patient's goals for the shift include having a sitter for safety and soft wrist restraints, monitoring skin integrity. Tube feeding increased to goal rate, patient tolerating well. Q2 turns completed. Bladder scan retaining, straight cath completed. Continue to monitor output. Bed alarm on, bed in lowest position. Will continue to monitor.

## 2024-11-12 LAB
ALBUMIN SERPL BCP-MCNC: 2 G/DL (ref 3.4–5)
ANION GAP SERPL CALC-SCNC: 9 MMOL/L (ref 10–20)
BUN SERPL-MCNC: 18 MG/DL (ref 6–23)
CALCIUM SERPL-MCNC: 7.8 MG/DL (ref 8.6–10.6)
CHLORIDE SERPL-SCNC: 100 MMOL/L (ref 98–107)
CHLORIDE UR-SCNC: 103 MMOL/L
CHLORIDE/CREATININE (MMOL/G) IN URINE: 322 MMOL/G CREAT (ref 23–275)
CO2 SERPL-SCNC: 24 MMOL/L (ref 21–32)
CREAT SERPL-MCNC: 1.08 MG/DL (ref 0.5–1.3)
CREAT UR-MCNC: 32 MG/DL (ref 20–370)
EGFRCR SERPLBLD CKD-EPI 2021: 69 ML/MIN/1.73M*2
ERYTHROCYTE [DISTWIDTH] IN BLOOD BY AUTOMATED COUNT: 14.6 % (ref 11.5–14.5)
GLUCOSE BLD MANUAL STRIP-MCNC: 232 MG/DL (ref 74–99)
GLUCOSE BLD MANUAL STRIP-MCNC: 269 MG/DL (ref 74–99)
GLUCOSE SERPL-MCNC: 227 MG/DL (ref 74–99)
HCT VFR BLD AUTO: 24.2 % (ref 41–52)
HGB BLD-MCNC: 7.6 G/DL (ref 13.5–17.5)
MAGNESIUM SERPL-MCNC: 1.87 MG/DL (ref 1.6–2.4)
MCH RBC QN AUTO: 28.4 PG (ref 26–34)
MCHC RBC AUTO-ENTMCNC: 31.4 G/DL (ref 32–36)
MCV RBC AUTO: 90 FL (ref 80–100)
NRBC BLD-RTO: 0 /100 WBCS (ref 0–0)
PHOSPHATE SERPL-MCNC: 2.1 MG/DL (ref 2.5–4.9)
PLATELET # BLD AUTO: 411 X10*3/UL (ref 150–450)
POTASSIUM SERPL-SCNC: 5.2 MMOL/L (ref 3.5–5.3)
POTASSIUM UR-SCNC: 32 MMOL/L
POTASSIUM/CREAT UR-RTO: 100 MMOL/G CREAT
RBC # BLD AUTO: 2.68 X10*6/UL (ref 4.5–5.9)
SODIUM SERPL-SCNC: 128 MMOL/L (ref 136–145)
SODIUM UR-SCNC: 107 MMOL/L
SODIUM/CREAT UR-RTO: 334 MMOL/G CREAT
VARICELLA ZOSTER VIRUS DNA PCR, QUANTITATIVE (NON-BLOOD: NOT DETECTED COPIES/ML
WBC # BLD AUTO: 6.1 X10*3/UL (ref 4.4–11.3)

## 2024-11-12 PROCEDURE — 82436 ASSAY OF URINE CHLORIDE: CPT | Performed by: STUDENT IN AN ORGANIZED HEALTH CARE EDUCATION/TRAINING PROGRAM

## 2024-11-12 PROCEDURE — 85027 COMPLETE CBC AUTOMATED: CPT | Performed by: STUDENT IN AN ORGANIZED HEALTH CARE EDUCATION/TRAINING PROGRAM

## 2024-11-12 PROCEDURE — 36415 COLL VENOUS BLD VENIPUNCTURE: CPT | Performed by: STUDENT IN AN ORGANIZED HEALTH CARE EDUCATION/TRAINING PROGRAM

## 2024-11-12 PROCEDURE — 99233 SBSQ HOSP IP/OBS HIGH 50: CPT | Performed by: STUDENT IN AN ORGANIZED HEALTH CARE EDUCATION/TRAINING PROGRAM

## 2024-11-12 PROCEDURE — 2500000004 HC RX 250 GENERAL PHARMACY W/ HCPCS (ALT 636 FOR OP/ED): Performed by: STUDENT IN AN ORGANIZED HEALTH CARE EDUCATION/TRAINING PROGRAM

## 2024-11-12 PROCEDURE — 97165 OT EVAL LOW COMPLEX 30 MIN: CPT | Mod: GO

## 2024-11-12 PROCEDURE — 2500000005 HC RX 250 GENERAL PHARMACY W/O HCPCS: Performed by: STUDENT IN AN ORGANIZED HEALTH CARE EDUCATION/TRAINING PROGRAM

## 2024-11-12 PROCEDURE — 2500000002 HC RX 250 W HCPCS SELF ADMINISTERED DRUGS (ALT 637 FOR MEDICARE OP, ALT 636 FOR OP/ED): Performed by: STUDENT IN AN ORGANIZED HEALTH CARE EDUCATION/TRAINING PROGRAM

## 2024-11-12 PROCEDURE — 82947 ASSAY GLUCOSE BLOOD QUANT: CPT

## 2024-11-12 PROCEDURE — 80069 RENAL FUNCTION PANEL: CPT | Performed by: STUDENT IN AN ORGANIZED HEALTH CARE EDUCATION/TRAINING PROGRAM

## 2024-11-12 PROCEDURE — 83735 ASSAY OF MAGNESIUM: CPT | Performed by: STUDENT IN AN ORGANIZED HEALTH CARE EDUCATION/TRAINING PROGRAM

## 2024-11-12 PROCEDURE — 2500000001 HC RX 250 WO HCPCS SELF ADMINISTERED DRUGS (ALT 637 FOR MEDICARE OP): Performed by: STUDENT IN AN ORGANIZED HEALTH CARE EDUCATION/TRAINING PROGRAM

## 2024-11-12 PROCEDURE — 94640 AIRWAY INHALATION TREATMENT: CPT

## 2024-11-12 PROCEDURE — 2550000001 HC RX 255 CONTRASTS: Performed by: STUDENT IN AN ORGANIZED HEALTH CARE EDUCATION/TRAINING PROGRAM

## 2024-11-12 PROCEDURE — 1100000001 HC PRIVATE ROOM DAILY

## 2024-11-12 PROCEDURE — 71260 CT THORAX DX C+: CPT | Performed by: RADIOLOGY

## 2024-11-12 PROCEDURE — 84133 ASSAY OF URINE POTASSIUM: CPT | Performed by: STUDENT IN AN ORGANIZED HEALTH CARE EDUCATION/TRAINING PROGRAM

## 2024-11-12 RX ORDER — GUAIFENESIN 100 MG/5ML
200 SOLUTION ORAL 3 TIMES DAILY
Status: DISCONTINUED | OUTPATIENT
Start: 2024-11-12 | End: 2024-11-19 | Stop reason: HOSPADM

## 2024-11-12 RX ORDER — SILODOSIN 4 MG/1
4 CAPSULE ORAL
Status: DISCONTINUED | OUTPATIENT
Start: 2024-11-13 | End: 2024-11-19 | Stop reason: HOSPADM

## 2024-11-12 RX ORDER — BISACODYL 10 MG/1
10 SUPPOSITORY RECTAL ONCE
Status: COMPLETED | OUTPATIENT
Start: 2024-11-12 | End: 2024-11-12

## 2024-11-12 RX ORDER — SODIUM CHLORIDE FOR INHALATION 3 %
3 VIAL, NEBULIZER (ML) INHALATION
Status: DISCONTINUED | OUTPATIENT
Start: 2024-11-12 | End: 2024-11-13

## 2024-11-12 RX ADMIN — ACYCLOVIR SODIUM 650 MG: 50 INJECTION, SOLUTION INTRAVENOUS at 02:03

## 2024-11-12 RX ADMIN — ATORVASTATIN CALCIUM 40 MG: 40 TABLET, FILM COATED ORAL at 20:17

## 2024-11-12 RX ADMIN — ACETAMINOPHEN 650 MG: 160 SOLUTION ORAL at 09:04

## 2024-11-12 RX ADMIN — Medication 15 ML: at 09:06

## 2024-11-12 RX ADMIN — BISACODYL 10 MG: 10 SUPPOSITORY RECTAL at 13:00

## 2024-11-12 RX ADMIN — CARBOXYMETHYLCELLULOSE SODIUM 2 DROP: 5 SOLUTION/ DROPS OPHTHALMIC at 09:06

## 2024-11-12 RX ADMIN — GUAIFENESIN 200 MG: 100 SOLUTION ORAL at 11:34

## 2024-11-12 RX ADMIN — HEPARIN SODIUM 5000 UNITS: 5000 INJECTION, SOLUTION INTRAVENOUS; SUBCUTANEOUS at 23:14

## 2024-11-12 RX ADMIN — SODIUM CHLORIDE SOLN NEBU 3% 3 ML: 3 NEBU SOLN at 21:55

## 2024-11-12 RX ADMIN — CARBOXYMETHYLCELLULOSE SODIUM 2 DROP: 5 SOLUTION/ DROPS OPHTHALMIC at 20:17

## 2024-11-12 RX ADMIN — CARBOXYMETHYLCELLULOSE SODIUM 2 DROP: 5 SOLUTION/ DROPS OPHTHALMIC at 15:02

## 2024-11-12 RX ADMIN — IPRATROPIUM BROMIDE AND ALBUTEROL SULFATE 3 ML: .5; 3 SOLUTION RESPIRATORY (INHALATION) at 09:00

## 2024-11-12 RX ADMIN — GUAIFENESIN 200 MG: 100 SOLUTION ORAL at 15:02

## 2024-11-12 RX ADMIN — HEPARIN SODIUM 5000 UNITS: 5000 INJECTION, SOLUTION INTRAVENOUS; SUBCUTANEOUS at 05:50

## 2024-11-12 RX ADMIN — GUAIFENESIN 200 MG: 100 SOLUTION ORAL at 20:16

## 2024-11-12 RX ADMIN — ACYCLOVIR SODIUM 650 MG: 50 INJECTION, SOLUTION INTRAVENOUS at 11:29

## 2024-11-12 RX ADMIN — IOHEXOL 75 ML: 350 INJECTION, SOLUTION INTRAVENOUS at 03:23

## 2024-11-12 RX ADMIN — HEPARIN SODIUM 5000 UNITS: 5000 INJECTION, SOLUTION INTRAVENOUS; SUBCUTANEOUS at 15:01

## 2024-11-12 RX ADMIN — Medication 15 ML: at 20:17

## 2024-11-12 RX ADMIN — ASPIRIN 81 MG CHEWABLE TABLET 81 MG: 81 TABLET CHEWABLE at 09:06

## 2024-11-12 RX ADMIN — SENNOSIDES AND DOCUSATE SODIUM 1 TABLET: 8.6; 5 TABLET ORAL at 20:17

## 2024-11-12 ASSESSMENT — PAIN SCALES - PAIN ASSESSMENT IN ADVANCED DEMENTIA (PAINAD)
BREATHING: NORMAL
FACIALEXPRESSION: SMILING OR INEXPRESSIVE
CONSOLABILITY: DISTRACTED OR REASSURED BY VOICE/TOUCH
BREATHING: NORMAL
BREATHING: OCCASIONAL LABORED BREATHING, SHORT PERIOD OF HYPERVENTILATION
BODYLANGUAGE: TENSE, DISTRESSED PACING, FIDGETING
CONSOLABILITY: DISTRACTED OR REASSURED BY VOICE/TOUCH
FACIALEXPRESSION: SMILING OR INEXPRESSIVE
TOTALSCORE: 2
FACIALEXPRESSION: SMILING OR INEXPRESSIVE
NEGVOCALIZATION: OCCASIONAL MOAN/GROAN, LOW SPEECH, NEGATIVE/DISAPPROVING QUALITY
BREATHING: NORMAL
BODYLANGUAGE: TENSE, DISTRESSED PACING, FIDGETING
FACIALEXPRESSION: SAD, FRIGHTENED, FROWN
TOTALSCORE: 2
BODYLANGUAGE: TENSE, DISTRESSED PACING, FIDGETING
BODYLANGUAGE: TENSE, DISTRESSED PACING, FIDGETING
CONSOLABILITY: NO NEED TO CONSOLE
TOTALSCORE: 5
NEGVOCALIZATION: OCCASIONAL MOAN/GROAN, LOW SPEECH, NEGATIVE/DISAPPROVING QUALITY
TOTALSCORE: 1
CONSOLABILITY: NO NEED TO CONSOLE

## 2024-11-12 ASSESSMENT — COGNITIVE AND FUNCTIONAL STATUS - GENERAL
MOVING TO AND FROM BED TO CHAIR: TOTAL
EATING MEALS: TOTAL
EATING MEALS: TOTAL
HELP NEEDED FOR BATHING: TOTAL
PERSONAL GROOMING: TOTAL
DRESSING REGULAR LOWER BODY CLOTHING: TOTAL
DAILY ACTIVITIY SCORE: 6
TURNING FROM BACK TO SIDE WHILE IN FLAT BAD: TOTAL
PERSONAL GROOMING: TOTAL
MOBILITY SCORE: 6
CLIMB 3 TO 5 STEPS WITH RAILING: TOTAL
STANDING UP FROM CHAIR USING ARMS: TOTAL
TOILETING: TOTAL
DRESSING REGULAR UPPER BODY CLOTHING: TOTAL
WALKING IN HOSPITAL ROOM: TOTAL
MOVING FROM LYING ON BACK TO SITTING ON SIDE OF FLAT BED WITH BEDRAILS: TOTAL
HELP NEEDED FOR BATHING: TOTAL
TOILETING: TOTAL
DRESSING REGULAR LOWER BODY CLOTHING: TOTAL
DAILY ACTIVITIY SCORE: 6
DRESSING REGULAR UPPER BODY CLOTHING: TOTAL

## 2024-11-12 ASSESSMENT — ACTIVITIES OF DAILY LIVING (ADL): BATHING_ASSISTANCE: TOTAL

## 2024-11-12 ASSESSMENT — PAIN - FUNCTIONAL ASSESSMENT: PAIN_FUNCTIONAL_ASSESSMENT: PAINAD (PAIN ASSESSMENT IN ADVANCED DEMENTIA SCALE)

## 2024-11-12 NOTE — PROGRESS NOTES
Occupational Therapy    Evaluation    Patient Name: Dat Mccallum  MRN: [id number]  Department: [facility]  Room: 5063/5063-A  Today's Date: 11/12/2024  Time Calculation  Start Time: 0908  Stop Time: 0922  Time Calculation (min): 14 min    Assessment  IP OT Assessment  OT Assessment: difficulty I/ADLs, safety, fxnl mob  Prognosis: Fair  Barriers to Discharge: Decreased caregiver support  Evaluation/Treatment Tolerance: Patient limited by fatigue  Medical Staff Made Aware: Yes  End of Session Communication: Bedside nurse  End of Session Patient Position: Up in chair, Alarm on (sitter present end sessino)  Plan:  Treatment Interventions: ADL retraining, Visual perceptual retraining, Functional transfer training, UE strengthening/ROM, Endurance training, Cognitive reorientation, Patient/family training, Equipment evaluation/education, Neuromuscular reeducation, Fine motor coordination activities, Compensatory technique education  OT Frequency: 3 times per week  OT Discharge Recommendations: Moderate intensity level of continued care  OT Recommended Transfer Status: Dependent, Assist of 2  OT - OK to Discharge: Yes    Subjective   Current Problem:  1. Encephalopathy acute  Referral to Neurology        General:  General  Reason for Referral: encephalopathy  Past Medical History Relevant to Rehab: HFrEF (EF improved from 20% to stress test 10/21 with LVEF 44%), DMII, HTN, HLD ,CKD, BPH, Deaf (ASL)  transferred from OSH (on Hosp Day 23) to [facility] for  continued Acute Encephalopathy workup. Pt initially presented for AMS. Found to have NSTEMI, reduced EF but no significant stenosis on cardiac cath. Pt has been  hypotensive requiring intermittant midodinre and has not tolerated GDMT. Pt treated with IV abx for UTI and pneumonia. Due to persistent encepahlopathy, he was broadened to cover for CNS infection. He was demonstrating continue encephalopathy, with agitation, waxing and waning state, making non-sensicle/repetitive  phrases as well as possible L hemineglect. MRI 10/19 and 11/6 without acute infarct, showed mod to marked volume loss, nonspecific changes consistent with microvascular disease. EEG showed diffuse encephalopathy without lateralization or seizure activity. Pt transferred [place] [facility] [place] vanc/ceftriaxone/amp/acyclovir. Underwent LP, noninfectious, antibiotics and antivirals discontinued. Awaiting further CSF studies per neurology  Family/Caregiver Present:  (RN present)  Prior to Session Communication: Bedside nurse  Patient Position Received: Up in chair, Alarm on (2 pt restraint to chair)  General Comment: pt not tracking L/R all planes, pt deaf per chart OT att handwriting large print pt unable to read or track paper in front of face. OT att call family for PLOF no answer this date  Precautions:  Medical Precautions: Fall precautions           Pain:  Pain Assessment  Pain Assessment:  (IMP cog unable to report pain, winced with LLE mob and ROM)    Objective   Cognition:  Overall Cognitive Status: Impaired  Arousal/Alertness: Delayed responses to stimuli  Orientation Level: Disoriented to place, Disoriented to time, Disoriented to situation, Disoriented to person (able to squeeze B hands on command with max tactile stimulation, max cues let go of R hand )  Following Commands:  (follows <25% simple commands OT asked RN if MARTII ASL is a possbile option)  Safety Judgment: Decreased awareness of need for assistance  Problem Solving:  (IMP)  Attention:  (IMP)  Unable to Self-Monitor and Self-Correct Consistently: Maximal  Insight: Severe  Impulsive: Moderately  Task Initiation: Delayed initiation           Home Living:  Type of Home: House  Lives With: Alone  Home Living Comments: per TCC doc: Patient lives alone in a house. Patient is  but his spouse lives with the children as she needs care. Patient is typically independent. Patient had a fall leading up to this admission. Patient does not drive, his sons  transport and assist with shopping.   Prior Function:  Level of [place]: Independent with ADLs and functional transfers  Leisure: sanjanajohn present in session OT placed in pt's hand for leisure participation  IADL History:     ADL:  Eating Assistance: Total  Grooming Assistance: Total (anticipated)  Bathing Assistance: Total  UE Dressing Assistance: Not performed  LE Dressing Assistance: Not performed  Toileting Assistance with Device: Total  Functional Deficit: Setup, Steadying, Verbal cueing, Supervision/safety, Increased time to complete  Activity Tolerance:  Endurance:  (fair)  Bed Mobility/Transfers: Bed Mobility  Bed Mobility: No    Transfers  Transfer:  (sit to stand HHA dep x2 retro lean)      Functional Mobility:  Functional Mobility  Functional Mobility Performed: No  Sitting Balance:  Dynamic Sitting Balance  Dynamic Sitting-Level of Assistance: Contact guard  Standing Balance:  Dynamic Standing Balance  Dynamic Standing-Level of Assistance:  (depx2)       Vision: Vision - Basic Assessment  Current Vision:  (pt unable to track at midline and all planes)  Sensation:  Light Touch:  (hypersensitivity light touch/ROM LLE)  Strength:  Strength Comments: B shoulder flex 2+/5, elbows 3-/5,  3/5  Perception:     Coordination:  Movements are Fluid and Coordinated: No   Hand Function:  Hand Function  Gross Grasp: Functional  Coordination:  (IMP release RUE)  Extremities: RUE   RUE :  (shoudler flex AAROM ~0-60 degrees, elbow 3/4 ROM,  WFL) and LUE   LUE:  (shoudler flex AAROM ~0-20 degrees, elbow 3/4 ROM,  WFL)      Outcome Measures: [facility] Daily Activity  Putting on and taking off regular lower body clothing: Total  Bathing (including washing, rinsing, drying): Total  Putting on and taking off regular upper body clothing: Total  Toileting, which includes using toilet, bedpan or urinal: Total  Taking care of personal grooming such as brushing teeth: Total  Eating Meals: Total  Daily Activity - Total  Score: 6         and OT Adult Other Outcome Measures  4AT: non verbal in session IMP command following  Education Documentation  Body Mechanics, taught by Ashley Murray OT at 11/12/2024 11:13 AM.  Learner: Patient  Readiness: Acceptance  Method: Explanation, Demonstration  Response: Needs Reinforcement  Comment: fabiana hadley    Precautions, taught by Ashley Murray OT at 11/12/2024 11:13 AM.  Learner: Patient  Readiness: Acceptance  Method: Explanation, Demonstration  Response: Needs Reinforcement  Comment: fabiana hadley    ADL Training, taught by Ashley Murray OT at 11/12/2024 11:13 AM.  Learner: Patient  Readiness: Acceptance  Method: Explanation, Demonstration  Response: Needs Reinforcement  Comment: fabiana hadley    Education Comments  No comments found.      Goals:   Encounter Problems       Encounter Problems (Active)       ADLs       Patient will perform UB and LB bathing  with moderate assist level of assistance and ae. (Progressing)       Start:  11/12/24    Expected End:  12/03/24            Patient with complete upper body dressing with moderate assist level of assistance donning and doffing all UE clothes with PRN adaptive equipment  (Progressing)       Start:  11/12/24    Expected End:  12/03/24            Patient with complete lower body dressing with moderate assist level of assistance donning and doffing all LE clothes  with PRN adaptive equipment  (Progressing)       Start:  11/12/24    Expected End:  12/03/24            Patient will feed self with moderate assist level of assistance  using PRN adaptive equipment. (Progressing)       Start:  11/12/24    Expected End:  12/03/24            Patient will complete daily grooming tasks  with moderate assist level of assistance (Progressing)       Start:  11/12/24    Expected End:  12/03/24            Patient will complete toileting including hygiene clothing management/hygiene with maximal assist level of assistance and lrd. (Progressing)       Start:   11/12/24    Expected End:  12/03/24               COGNITION/SAFETY       Patient will follow >50% Simple commands to allow improved ADL performance. (Progressing)       Start:  11/12/24    Expected End:  12/03/24               EXERCISE/STRENGTHENING       Patient with increase BUE to >1/2 grade  strength. (Progressing)       Start:  11/12/24    Expected End:  12/03/24               MOBILITY       Patient will perform Functional mobility min Household distances/Community Distances with maximal assist level of assistance and least restrictive device in order to improve safety and functional mobility. (Progressing)       Start:  11/12/24    Expected End:  12/03/24               TRANSFERS       Patient will perform bed mobility maximal assist level of assistance and bed rails in order to improve safety and independence with mobility (Progressing)       Start:  11/12/24    Expected End:  12/03/24            Patient will complete functional transfer least restrictive device with maximal assist level of assistance. (Progressing)       Start:  11/12/24    Expected End:  12/03/24

## 2024-11-12 NOTE — CARE PLAN
The patient's goals for the shift include  unable to state a goal    The clinical goals for the shift include patient will remain safe and free from falls entire shift and have a large bm by end of shift    Problem: Skin  Goal: Decreased wound size/increased tissue granulation at next dressing change  Outcome: Progressing  Goal: Participates in plan/prevention/treatment measures  Outcome: Progressing  Goal: Prevent/manage excess moisture  Outcome: Progressing  Flowsheets (Taken 11/12/2024 0804)  Prevent/manage excess moisture:   Cleanse incontinence/protect with barrier cream   Moisturize dry skin  Goal: Prevent/minimize sheer/friction injuries  Outcome: Progressing  Goal: Promote/optimize nutrition  Outcome: Progressing  Goal: Promote skin healing  Outcome: Progressing     Problem: Pain - Adult  Goal: Verbalizes/displays adequate comfort level or baseline comfort level  Outcome: Progressing     Problem: Safety - Adult  Goal: Free from fall injury  Outcome: Progressing     Problem: Discharge Planning  Goal: Discharge to home or other facility with appropriate resources  Outcome: Progressing     Problem: Safety - Medical Restraint  Goal: Remains free of injury from restraints (Restraint for Interference with Medical Device)  Outcome: Progressing  Goal: Free from restraint(s) (Restraint for Interference with Medical Device)  Outcome: Progressing

## 2024-11-12 NOTE — PROGRESS NOTES
Hospitalist Progress Note        Name: Dat Mccallum  :  1940(83 y.o.)  MRN:  [id number]    Date: 24     SUBJECTIVE     HPI:  This is a 83 y.o. male with past medical history of HFrEF (EF improved from 20% to stress test 10/21 with LVEF 44%), DMII, HTN, HLD ,CKD, BPH, Deaf (ASL)  transferred from OSH (on Hosp Day 23) to [facility] for  continued Acute Encephalopathy workup. Pt initially presented for AMS. Found to have NSTEMI, reduced EF but no significant stenosis on cardiac cath. Pt has been  hypotensive requiring intermittant midodinre and has not tolerated GDMT. Pt treated with IV abx for UTI and pneumonia. Due to persistent encepahlopathy, he was broadened to cover for CNS infection. He was demonstrating continue encephalopathy, with agitation, waxing and waning state, making non-sensicle/repetitive phrases as well as possible L hemineglect. MRI 10/19 and  without acute infarct, showed mod to marked volume loss, nonspecific changes consistent with microvascular disease. EEG showed diffuse encephalopathy without lateralization or seizure activity. Pt transferred to [facility] on IV vanc/ceftriaxone/amp/acyclovir. Underwent LP, noninfectious, antibiotics and antivirals discontinued. Awaiting further CSF studies per neurology. ID also consulted. CSF is not concerning for infection, antibiotics deescalated. Pending autoimmune studies and paraneoplastic work up, could potentially related ot delirium as well from critical illness.     Interval History:   Vitals and chart notes from overnight reviewed. No acute issues overnight.   Patient seen and evaluated at bedside. He remains very somnolent and only responds to pain. Dobhoff in place.     Review of Systems:   Unable to obtain    Current medications:  Scheduled Meds:acyclovir, 10 mg/kg, intravenous, q12h  aspirin, 81 mg, nasogastric tube, Daily  atorvastatin, 40 mg, nasogastric tube, Nightly  [Held by provider] dexAMETHasone, 10 mg, intravenous,  q6h  guaiFENesin, 200 mg, nasogastric tube, TID  heparin, 5,000 Units, subcutaneous, q8h  lubricating eye drops, 2 drop, Both Eyes, TID  moisturizing mouth, 15 mL, Swish & Spit, BID  sennosides-docusate sodium, 1 tablet, nasogastric tube, BID  [START ON 11/13/2024] silodosin, 4 mg, nasogastric tube, Daily with breakfast  sodium chloride, 3 mL, nebulization, TID      Continuous Infusions:   PRN Meds:PRN medications: acetaminophen, acetaminophen, dextrose, dextrose, glucagon, glucagon, insulin lispro, ipratropium-albuteroL, midodrine, OLANZapine, oxygen      OBJECTIVE     Vitals:    11/11/24 1558 11/12/24 0039 11/12/24 0750 11/12/24 1601   BP: 119/67 131/62 113/76 113/65   BP Location: Left arm      Patient Position: Lying      Pulse: 77 82 90 91   Resp: 18 18  17   Temp: 36.7 °C (98.1 °F) 36.4 °C (97.5 °F)  36.5 °C (97.7 °F)   TempSrc: Temporal      SpO2: 96% 94% 97% 92%   Weight:       Height:          Physical Exam  Vitals and nursing note reviewed.   Constitutional:       Appearance: He is ill-appearing.      Comments: lethargic   HENT:      Head: Normocephalic.      Nose:      Comments: Dobhoff in place     Mouth/Throat:      Mouth: Mucous membranes are dry.      Comments: Caked mucus secretions in oral cavity  Cardiovascular:      Rate and Rhythm: Normal rate and regular rhythm.   Pulmonary:      Effort: Pulmonary effort is normal.      Breath sounds: Rhonchi present.   Abdominal:      General: There is no distension.      Palpations: Abdomen is soft.      Tenderness: There is no abdominal tenderness.   Musculoskeletal:      Right lower leg: No edema.      Left lower leg: No edema.         Labs:   Lab Results   Component Value Date     (L) 11/12/2024    K 5.2 11/12/2024     11/12/2024    CO2 24 11/12/2024    BUN 18 11/12/2024    CREATININE 1.08 11/12/2024    GLUCOSE 227 (H) 11/12/2024    CALCIUM 7.8 (L) 11/12/2024    PROT 6.3 (L) 11/07/2024    BILITOT 0.7 11/07/2024    ALKPHOS 53 11/07/2024    AST 40  (H) 11/07/2024    ALT 30 11/07/2024    GLOB 3.4 04/18/2021       Lab Results   Component Value Date    WBC 6.1 11/12/2024    HGB 7.6 (L) 11/12/2024    HCT 24.2 (L) 11/12/2024    MCV 90 11/12/2024     11/12/2024       Imaging (within the past 24 hours):   XR abdomen 1 view    Result Date: 11/12/2024  Interpreted By:  Reno Wolff  and Romulo Kenney STUDY: XR CHEST 1 VIEW; XR ABDOMEN 1 VIEW;  11/12/2024 10:04 am; 11/12/2024 10:06 am   INDICATION: Signs/Symptoms:New cough; Signs/Symptoms:Confirm placement of Dobhoff, patient coughing when flushing.     COMPARISON: Chest x-ray 11/08/2024, CT chest 11/12/2024   ACCESSION NUMBER(S): [id number]; [id number]   ORDERING CLINICIAN: JOHANNA REYNAGA   FINDINGS: AP radiograph of the chest was provided.   Enteric tube tip projects over the gastric antrum.   CARDIOMEDIASTINAL SILHOUETTE: Cardiomediastinal silhouette is stable in size and configuration.   LUNGS: As compared to most recent chest x-ray: Increased retrocardiac opacities. Mildly increased hazy opacities within the right medial lower lung field. Right costophrenic angle is clear. Mild blunting of the left costophrenic angle, mildly increased as compared to prior. There is no evidence of a pneumothorax.   ABDOMEN: Contrast within the bilateral renal pelvises and proximal ureters. Nonobstructive bowel gas pattern. Limited evaluation of pneumoperitoneum on supine imaging, however no gross evidence of free air is noted.   BONES: No acute osseous changes.       As compared to most recent chest x-ray and when correlating with same day chest CT:   1. Increased bilateral lower lobe consolidation/atelectasis.   2. Increased small left pleural effusion.   3. Enteric tube tip within the gastric antrum.   I personally reviewed the images/study and I agree with the findings as stated by Pablito Sebastian MD (PGY-2). This study was interpreted at [facility], [place], [place].   MACRO: None    Signed by: Reno Wolff 11/12/2024 5:10 PM Dictation workstation:   [id number]    XR chest 1 view    Result Date: 11/12/2024  Interpreted By:  Reno Wolff and Omar Mahmoud STUDY: XR CHEST 1 VIEW; XR ABDOMEN 1 VIEW;  11/12/2024 10:04 am; 11/12/2024 10:06 am   INDICATION: Signs/Symptoms:New cough; Signs/Symptoms:Confirm placement of Dobhoff, patient coughing when flushing.     COMPARISON: Chest x-ray 11/08/2024, CT chest 11/12/2024   ACCESSION NUMBER(S): [id number]; [id number]   ORDERING CLINICIAN: JOHANNA REYNAGA   FINDINGS: AP radiograph of the chest was provided.   Enteric tube tip projects over the gastric antrum.   CARDIOMEDIASTINAL SILHOUETTE: Cardiomediastinal silhouette is stable in size and configuration.   LUNGS: As compared to most recent chest x-ray: Increased retrocardiac opacities. Mildly increased hazy opacities within the right medial lower lung field. Right costophrenic angle is clear. Mild blunting of the left costophrenic angle, mildly increased as compared to prior. There is no evidence of a pneumothorax.   ABDOMEN: Contrast within the bilateral renal pelvises and proximal ureters. Nonobstructive bowel gas pattern. Limited evaluation of pneumoperitoneum on supine imaging, however no gross evidence of free air is noted.   BONES: No acute osseous changes.       As compared to most recent chest x-ray and when correlating with same day chest CT:   1. Increased bilateral lower lobe consolidation/atelectasis.   2. Increased small left pleural effusion.   3. Enteric tube tip within the gastric antrum.   I personally reviewed the images/study and I agree with the findings as stated by Pablito Sebastian MD (PGY-2). This study was interpreted at [facility], [place], [place].   MACRO: None   Signed by: Reno Wolff 11/12/2024 5:10 PM Dictation workstation:   [id number]    CT chest w IV contrast    Result Date: 11/12/2024  Interpreted By:  Reno Wolff and Omar Mahmoud STUDY:  CT CHEST W IV CONTRAST;  11/12/2024 3:24 am   INDICATION: Signs/Symptoms:Eval of pneumonia, effusions, rule out malignancy for paraneoplastic syndrome.     COMPARISON: CT abdomen pelvis with IV contrast 11/10/2024.   ACCESSION NUMBER(S): [id number]   ORDERING CLINICIAN: FELTON FIGUEROA   TECHNIQUE: Helical data acquisition of the chest was obtained following intravenous administration of 75 ML Omnipaque 350.  Images were reformatted in axial, coronal, and sagittal planes.   FINDINGS: Enteric tube tip has been withdrawn to the level of thgastric antrum in comparison with previous CT examination of the abdomen 11/10/2024.   LUNGS AND AIRWAYS: There is soft tissue density throughout the left mainstem bronchus and left lower lobe bronchioles; the left upper lobe bronchioles are grossly patent. There is fluid attenuation secretion within the anterior trachea (series 4, image 31).   There is a small left pleural effusion, mildly increased as compared to prior. There is no significant right-sided pleural effusion. There is bilateral lower lobe consolidation/atelectasis; there is air bronchograms throughout the right lower lobe consolidation/atelectasis while there are only proximal proximal air bronchograms within the left lower lobe consolidation/atelectasis. Irregular nodules within the lateral left upper lobe measuring up to 1.5 cm (series 4, image 202). There is no pneumothorax.   MEDIASTINUM AND LEELA, LOWER NECK AND AXILLA: There is a heterogeneous nodule with central hypodensities within the right thyroid lobe measuring 2.7 x 2.5 cm (series 2, image 41). There are additional subcentimeter hypodense thyroid nodules within left thyroid lobe. A reference carinal node measures 12.3 mm in short axis. Esophagus appears within normal limits as seen.   HEART AND VESSELS: The thoracic aorta normal in course and caliber.There are mild scattered atherosclerosis present, including calcified and noncalcified plaques. Main  pulmonary artery and its branches are normal in caliber. Mild coronary artery calcifications are seen.Please note,the study is not optimized for evaluation of coronary arteries. The cardiac chambers are not enlarged. There is no pericardial effusion seen.   UPPER ABDOMEN: Diffuse hepatic hypoattenuation consistent with hepatic steatosis. Partially visualized 1.5 cm renal calculus within the left renal pelvis, better evaluated on comparison CT abdomen pelvis. Again seen 1.9 cm cyst within the superior pole of the left kidney.   CHEST WALL AND OSSEOUS STRUCTURES: Chest wall is within normal limits. No acute osseous pathology.There are no suspicious osseous lesions.Moderate multilevel degenerative changes within visualized spine.       1. Masslike obstruction left mainstem bronchus and inspissated secretions distally. Malignancy until proved otherwise. 2. Subsegmental atelectasis and air bronchograms within lower lobes bilaterally and small bilateral pleural effusions. Superimposed pneumonia probable 3. Mediastinal adenopathy 4. Bilateral thyroid nodules as detailed above.     I personally reviewed the images/study and I agree with the findings as stated by Pablito Sebastian MD (PGY-2). This study was interpreted at [place], [place].   Signed by: Reno Wolff 11/12/2024 5:08 PM Dictation workstation:   [id number]       ASSESSMENT & PLAN     Acute encephalopathy  - persistent encephalopathy, possible L alfred neglect seen during course. Hospitalized for about 3 weeks with pneumonia, uti, and nstemi/HF.   - MRI with diffuse volume loss, no infarct or other attributable finding. Completed 10/19 and 11/6  - eeg with diffuse encephalopathy  - b12, b9, syph, hiv, tsh tested  - s/p 3 days high dose thiamine   - paraneoplastic panel, yanet/VIDAL pending  - CT a/p showed no evidence of malignant mass, e/o constipation with stercolal colitis.   -CT chest today concerning for masslike obstruction  of the L mainstem bronchus, malignancy until proven otherwise - will discuss with family, as may need pulm involvement vs palliative care  - LP completed, no infection. Pending VZV results, continue acyclovir until negative  - dc'd vanc/ctx/amp  - pt to be set up with follow up with neurology for neurocog eval.      NSTEMI  HFrEF, acute on chronic  - non-obstruction cath  - unable to tolerate GDMT due to hypotension, start as tolerated. Currently on coreg 3.125mg bid. Also on midodrine prn. Will monitor.      Hospital acquired pneumonia - treated  - s/p zosyn  - OSH CT showing trace pleural fluid and patchy consolidative opacities felt to be more than atelectasis, likley superimposed infection or aspiration.      UTI with hematuria - treated  - s/p abx  - resolved     Acute urinary retention  - at osh, urology unable to pass trotter, plan to monitor clinically and if pt develops niurka or signs of obstruction then will need cystoscopy.   - renal function is stable  - Continues to retain urine; able to straight cath therefore will continue this PRN     Hypotension  - continue midodrine prn     Severe protein-calorie malnutrition  Dysphagia in setting of AMS  - continue tube feeds, monitor electrolytes. Will adjust FWF based on labs. BID RFP. Started fwf 200cc q4hr. On isosource 1.5 50cc/hr goal. Plan to increase by 10cc/hr until goal is reached.  Will reduced fwf to 200cc q6 hrs due to developing hyponatremia.   - slp, nutrition consult  - bid RFP, mag until stable     DMII  -C/b low blood sugars, insulin was stopped.      Nephrolithiasis  - L 1.8 non-obstructing stone     Stercolal colitis  - seen on CT  - started bowel regimen and enema prn.     Malnutrition Diagnosis Status: New  Malnutrition Diagnosis: Severe malnutrition related to chronic disease or condition  As Evidenced by: severe subcutaeous fat loss, severe muscle wasting, suspect meeting < 50% of EER for > 1 month and low BMI 19.5  I agree with the dietitian's  malnutrition diagnosis.    DVT Prophylaxis: heparin 5000 q 8 hour    Code status: DNR and No Intubation  Diet: Enteral feeding with NPO Isosource 1.5; NG (nasogastric tube); 50; 150; Water; Every 6 hours     Disposition: continue to monitor inpatient, await consultant recommendations, await test results, and await clinical improvement    Level of MDM:  High   Risk: High   Data Reviewed and/or Analyzed:   Included: Prior external notes from at least 1 unique source, Results, including laboratory findings and imaging reports, listed above, Orders and notes from all consultants involved, and Notes for this encounter.  I personally reviewed the tests referenced above.  I discussed plan of care with nurse, JUNIE/BONY.    The patient/family had opportunity to ask questions. All questions were answered to the best of my ability.    Between 7AM-7PM please message me via Epic Secure Chat.  After 7PM please page Nocturnist on call.    Electronically signed by Yudith Nguyễn DO on 11/12/24 at 6:02 PM

## 2024-11-12 NOTE — CARE PLAN
The patient's goals for the shift include  vss    The clinical goals for the shift include Patient will remain safe and free from injuy    Over the shift, the patient did not make progress toward the following goals. Barriers to progression include n/a. Recommendations to address these barriers include n/a.

## 2024-11-12 NOTE — RESEARCH NOTES
Artificial Intelligence Monitoring in Nursing (AIMS Nursing) Study    Principle Investigator - Dr. Jorge A Nathan  Research Coordinator - Cecile Varma     Patient Name - Dat Mccallum  Date - 11/12/2024 11:32 AM  Location - [place] [address]    Dat Mccallum was approached by Cecile Varma to talk about participating in the AIMS Nursing Study. The patient was not able to be approached, a research coordinator will come back at a later time. Study protocol was followed and patient was given study contact information.     Cecile Varma

## 2024-11-13 LAB
ALB CSF/SERPL: 6.5 RATIO (ref 0–9)
ALBUMIN CSF-MCNC: 11 MG/DL (ref 0–35)
ALBUMIN SERPL BCP-MCNC: 1.9 G/DL (ref 3.4–5)
ALBUMIN SERPL-MCNC: 1704 MG/DL (ref 3500–5200)
ANION GAP SERPL CALC-SCNC: 8 MMOL/L (ref 10–20)
BUN SERPL-MCNC: 21 MG/DL (ref 6–23)
CALCIUM SERPL-MCNC: 7.7 MG/DL (ref 8.6–10.6)
CHLORIDE SERPL-SCNC: 100 MMOL/L (ref 98–107)
CO2 SERPL-SCNC: 26 MMOL/L (ref 21–32)
CREAT SERPL-MCNC: 1.05 MG/DL (ref 0.5–1.3)
EGFRCR SERPLBLD CKD-EPI 2021: 71 ML/MIN/1.73M*2
ERYTHROCYTE [DISTWIDTH] IN BLOOD BY AUTOMATED COUNT: 14.8 % (ref 11.5–14.5)
FUNGUS SPEC CULT: NORMAL
FUNGUS SPEC FUNGUS STN: NORMAL
GLUCOSE BLD MANUAL STRIP-MCNC: 173 MG/DL (ref 74–99)
GLUCOSE BLD MANUAL STRIP-MCNC: 214 MG/DL (ref 74–99)
GLUCOSE BLD MANUAL STRIP-MCNC: 226 MG/DL (ref 74–99)
GLUCOSE BLD MANUAL STRIP-MCNC: 238 MG/DL (ref 74–99)
GLUCOSE BLD MANUAL STRIP-MCNC: 249 MG/DL (ref 74–99)
GLUCOSE SERPL-MCNC: 267 MG/DL (ref 74–99)
HCT VFR BLD AUTO: 22.2 % (ref 41–52)
HGB BLD-MCNC: 7.2 G/DL (ref 13.5–17.5)
IGG CSF-MCNC: 4.4 MG/DL (ref 0–6)
IGG SERPL-MCNC: 2210 MG/DL (ref 768–1632)
IGG SYNTH RATE SER+CSF CALC-MRATE: <0 MG/D
IGG/ALB CLEAR SER+CSF-RTO: 0.31 RATIO (ref 0.28–0.66)
IGG/ALB CSF: 0.4 RATIO (ref 0.09–0.25)
MAGNESIUM SERPL-MCNC: 1.9 MG/DL (ref 1.6–2.4)
MCH RBC QN AUTO: 29.1 PG (ref 26–34)
MCHC RBC AUTO-ENTMCNC: 32.4 G/DL (ref 32–36)
MCV RBC AUTO: 90 FL (ref 80–100)
NRBC BLD-RTO: 0 /100 WBCS (ref 0–0)
OLIGOCLONAL BANDS CSF ELPH-IMP: ABNORMAL
OLIGOCLONAL BANDS CSF ELPH-IMP: NEGATIVE
OLIGOCLONAL BANDS CSF IEF: 1 BANDS (ref 0–1)
PHOSPHATE SERPL-MCNC: 2.4 MG/DL (ref 2.5–4.9)
PLATELET # BLD AUTO: 360 X10*3/UL (ref 150–450)
POTASSIUM SERPL-SCNC: 4.6 MMOL/L (ref 3.5–5.3)
RBC # BLD AUTO: 2.47 X10*6/UL (ref 4.5–5.9)
SODIUM SERPL-SCNC: 129 MMOL/L (ref 136–145)
WBC # BLD AUTO: 5.9 X10*3/UL (ref 4.4–11.3)

## 2024-11-13 PROCEDURE — 80069 RENAL FUNCTION PANEL: CPT | Performed by: STUDENT IN AN ORGANIZED HEALTH CARE EDUCATION/TRAINING PROGRAM

## 2024-11-13 PROCEDURE — 99223 1ST HOSP IP/OBS HIGH 75: CPT

## 2024-11-13 PROCEDURE — 2500000002 HC RX 250 W HCPCS SELF ADMINISTERED DRUGS (ALT 637 FOR MEDICARE OP, ALT 636 FOR OP/ED): Performed by: STUDENT IN AN ORGANIZED HEALTH CARE EDUCATION/TRAINING PROGRAM

## 2024-11-13 PROCEDURE — 2500000005 HC RX 250 GENERAL PHARMACY W/O HCPCS: Performed by: STUDENT IN AN ORGANIZED HEALTH CARE EDUCATION/TRAINING PROGRAM

## 2024-11-13 PROCEDURE — 82947 ASSAY GLUCOSE BLOOD QUANT: CPT

## 2024-11-13 PROCEDURE — 2500000004 HC RX 250 GENERAL PHARMACY W/ HCPCS (ALT 636 FOR OP/ED): Performed by: STUDENT IN AN ORGANIZED HEALTH CARE EDUCATION/TRAINING PROGRAM

## 2024-11-13 PROCEDURE — 1100000001 HC PRIVATE ROOM DAILY

## 2024-11-13 PROCEDURE — 2500000001 HC RX 250 WO HCPCS SELF ADMINISTERED DRUGS (ALT 637 FOR MEDICARE OP): Performed by: STUDENT IN AN ORGANIZED HEALTH CARE EDUCATION/TRAINING PROGRAM

## 2024-11-13 PROCEDURE — 99222 1ST HOSP IP/OBS MODERATE 55: CPT | Performed by: STUDENT IN AN ORGANIZED HEALTH CARE EDUCATION/TRAINING PROGRAM

## 2024-11-13 PROCEDURE — 83735 ASSAY OF MAGNESIUM: CPT | Performed by: STUDENT IN AN ORGANIZED HEALTH CARE EDUCATION/TRAINING PROGRAM

## 2024-11-13 PROCEDURE — 36415 COLL VENOUS BLD VENIPUNCTURE: CPT | Performed by: STUDENT IN AN ORGANIZED HEALTH CARE EDUCATION/TRAINING PROGRAM

## 2024-11-13 PROCEDURE — 99497 ADVNCD CARE PLAN 30 MIN: CPT

## 2024-11-13 PROCEDURE — 51701 INSERT BLADDER CATHETER: CPT

## 2024-11-13 PROCEDURE — 85027 COMPLETE CBC AUTOMATED: CPT | Performed by: STUDENT IN AN ORGANIZED HEALTH CARE EDUCATION/TRAINING PROGRAM

## 2024-11-13 PROCEDURE — 99233 SBSQ HOSP IP/OBS HIGH 50: CPT | Performed by: STUDENT IN AN ORGANIZED HEALTH CARE EDUCATION/TRAINING PROGRAM

## 2024-11-13 RX ORDER — INSULIN LISPRO 100 [IU]/ML
0-5 INJECTION, SOLUTION INTRAVENOUS; SUBCUTANEOUS EVERY 6 HOURS
Status: DISCONTINUED | OUTPATIENT
Start: 2024-11-13 | End: 2024-11-19 | Stop reason: HOSPADM

## 2024-11-13 RX ADMIN — ACETAMINOPHEN 650 MG: 160 SOLUTION ORAL at 22:55

## 2024-11-13 RX ADMIN — SILODOSIN 4 MG: 4 CAPSULE ORAL at 10:07

## 2024-11-13 RX ADMIN — ASPIRIN 81 MG CHEWABLE TABLET 81 MG: 81 TABLET CHEWABLE at 09:01

## 2024-11-13 RX ADMIN — GUAIFENESIN 200 MG: 100 SOLUTION ORAL at 14:48

## 2024-11-13 RX ADMIN — HEPARIN SODIUM 5000 UNITS: 5000 INJECTION, SOLUTION INTRAVENOUS; SUBCUTANEOUS at 06:54

## 2024-11-13 RX ADMIN — Medication 15 ML: at 22:56

## 2024-11-13 RX ADMIN — HEPARIN SODIUM 5000 UNITS: 5000 INJECTION, SOLUTION INTRAVENOUS; SUBCUTANEOUS at 14:48

## 2024-11-13 RX ADMIN — GUAIFENESIN 200 MG: 100 SOLUTION ORAL at 22:56

## 2024-11-13 RX ADMIN — Medication 15 ML: at 09:01

## 2024-11-13 RX ADMIN — OLANZAPINE 5 MG: 10 INJECTION, POWDER, LYOPHILIZED, FOR SOLUTION INTRAMUSCULAR at 23:36

## 2024-11-13 RX ADMIN — ATORVASTATIN CALCIUM 40 MG: 40 TABLET, FILM COATED ORAL at 22:54

## 2024-11-13 RX ADMIN — CARBOXYMETHYLCELLULOSE SODIUM 2 DROP: 5 SOLUTION/ DROPS OPHTHALMIC at 09:01

## 2024-11-13 RX ADMIN — SENNOSIDES AND DOCUSATE SODIUM 1 TABLET: 8.6; 5 TABLET ORAL at 22:54

## 2024-11-13 RX ADMIN — GUAIFENESIN 200 MG: 100 SOLUTION ORAL at 09:01

## 2024-11-13 RX ADMIN — SENNOSIDES AND DOCUSATE SODIUM 1 TABLET: 8.6; 5 TABLET ORAL at 09:01

## 2024-11-13 RX ADMIN — HEPARIN SODIUM 5000 UNITS: 5000 INJECTION, SOLUTION INTRAVENOUS; SUBCUTANEOUS at 22:54

## 2024-11-13 RX ADMIN — INSULIN LISPRO 2 UNITS: 100 INJECTION, SOLUTION INTRAVENOUS; SUBCUTANEOUS at 15:08

## 2024-11-13 RX ADMIN — INSULIN LISPRO 2 UNITS: 100 INJECTION, SOLUTION INTRAVENOUS; SUBCUTANEOUS at 23:08

## 2024-11-13 ASSESSMENT — PAIN SCALES - GENERAL: PAINLEVEL_OUTOF10: 8

## 2024-11-13 ASSESSMENT — PAIN - FUNCTIONAL ASSESSMENT: PAIN_FUNCTIONAL_ASSESSMENT: 0-10

## 2024-11-13 NOTE — PROGRESS NOTES
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    Encephalitis: CSF negative. Neurology out   CATH unremarkable   Infectious: Meningitis coverage;     CT Chest w/IV contrast 11/12    1. Masslike obstruction left mainstem bronchus and inspissated  secretions distally. Malignancy until proved otherwise.  2. Subsegmental atelectasis and air bronchograms within lower lobes  bilaterally and small bilateral pleural effusions. Superimposed  pneumonia probable  3. Mediastinal adenopathy  4. Bilateral thyroid nodules as detailed above.

## 2024-11-13 NOTE — RESEARCH NOTES
Artificial Intelligence Monitoring in Nursing (AIMS Nursing) Study    Principle Investigator - Dr. Jorge A Nathan  Research Coordinator - Annabel Woodward RN     Patient Name - Dat Mccallum  Date - 11/13/2024 2:53 PM  Location - [place] [address]    Dat Mccallum was approached by Annabel Woodward RN to talk about participating in the AIMS Nursing Study. The patient was not able to be approached, a research coordinator will come back at a later time. Study protocol was followed and patient was given study contact information.     Annabel Woodward RN

## 2024-11-13 NOTE — CARE PLAN
The patient's goals for the shift include      The clinical goals for the shift include Patient will remain HDS      Problem: Skin  Goal: Decreased wound size/increased tissue granulation at next dressing change  Outcome: Progressing  Flowsheets (Taken 11/13/2024 1736)  Decreased wound size/increased tissue granulation at next dressing change: Promote sleep for wound healing  Goal: Participates in plan/prevention/treatment measures  Outcome: Progressing  Flowsheets (Taken 11/13/2024 1736)  Participates in plan/prevention/treatment measures: Elevate heels  Goal: Prevent/manage excess moisture  Outcome: Progressing  Flowsheets (Taken 11/13/2024 1736)  Prevent/manage excess moisture: Moisturize dry skin  Goal: Prevent/minimize sheer/friction injuries  Outcome: Progressing  Flowsheets (Taken 11/13/2024 1736)  Prevent/minimize sheer/friction injuries: Use pull sheet  Goal: Promote/optimize nutrition  Outcome: Progressing  Flowsheets (Taken 11/13/2024 1736)  Promote/optimize nutrition: Monitor/record intake including meals  Goal: Promote skin healing  Outcome: Progressing  Flowsheets (Taken 11/13/2024 1736)  Promote skin healing: Turn/reposition every 2 hours/use positioning/transfer devices     Problem: Pain - Adult  Goal: Verbalizes/displays adequate comfort level or baseline comfort level  Outcome: Progressing     Problem: Safety - Adult  Goal: Free from fall injury  Outcome: Progressing     Problem: Discharge Planning  Goal: Discharge to home or other facility with appropriate resources  Outcome: Progressing     Problem: Safety - Medical Restraint  Goal: Remains free of injury from restraints (Restraint for Interference with Medical Device)  Outcome: Progressing  Goal: Free from restraint(s) (Restraint for Interference with Medical Device)  Outcome: Progressing

## 2024-11-13 NOTE — PROGRESS NOTES
Transitional Care Coordination Progress Note:  Patient discussed during interdisciplinary rounds.  Team members present: MD, JUNIE, BONY  Plan per Medical/Surgical team: Plan to consult Palliative Care team for new lung mass concerning for malignancy. Per attending pt's mental status is not improving, he is still requiring a sitter, and dobhoff tube for tube feeding.  Payer: [employer]  Status: Inpatient  Discharge disposition: [facility] (pending bed availability).   Potential Barriers: none  ADOD: 11/20  Spoke with pt's son Jorge A [phone] to make him aware [facility] is still reviewing for acceptance and they will not have a bed available until next week. An electronic SNF list was text to Jorge A's cell phone [phone] via [employer] to review for more SNF preferences. Care coordinator will continue to follow for discharge planning needs.     Jose Rafael Tucker RN  Transitional Care Coordinator (TCC)  [phone] or [phone]

## 2024-11-13 NOTE — CONSULTS
"Inpatient consult to Palliative Care  Consult performed by: Kira Esteves, APRN-CNP  Consult ordered by: Yudith Nguyễn DO          Palliative Medicine Consult  Complex medical decision making, symptom management, patient/family support    History obtained from chart review including ED note, H&P, patient's daily progress notes, review of lab/test results, and discussion with primary team and bedside RN.    Subjective    History of Present Illness  Dat Mccallum is an 83y gentleman with a pmh HFrEF (EF 44%), T2DM, HTN, HLD, CKD, BPH, and deafness, communicates with ASL. He initially presented to OSH with AMS, found to have NSTEMI and was treated for PNA and UTI. Course complicated by persistent encephalopathy, [place] [facility] [place]. Extensive neuro and infectious workup unremarkable, however imagining concerning for malignancy in left lung. Pt also with ongoing dysphagia in the setting of encephalopathy, dobhoff in place.     Introduction to Palliative Medicine  Met with patient at bedside.   Patient remains altered, does not have capacity to make their own medical decisions at this time. Unable to participate in ROS or goals of care discussion. Surrogate decision maker is pt's nazia Jules and Rich.    Palliative Medicine was introduced as a specialty service for patients with serious illness to help with symptom management, improve quality of life, assist with goals of care conversations, navigate complex decision making, and provide support to patients and families. Support and empathy was provided throughout the encounter. Provided reflective listening and presence.     Symptoms  Pt encephalopathic, ROS limited at this time    Palliative Medicine Social History:  No social hx collected today    Objective    Last Recorded Vitals  /62   Pulse 88   Temp 36.6 °C (97.9 °F)   Resp 16   Ht 1.829 m (6' 0.01\")   Wt 65.2 kg (143 lb 11.8 oz)   SpO2 94%   BMI 19.49 kg/m²      Physical " Exam  Constitutional:       Appearance: He is ill-appearing.      Comments: Pro sofi mal, temporal wasting   HENT:      Head: Normocephalic.      Mouth/Throat:      Mouth: Mucous membranes are dry.   Cardiovascular:      Rate and Rhythm: Normal rate.   Pulmonary:      Effort: Pulmonary effort is normal.   Abdominal:      Palpations: Abdomen is soft.   Skin:     General: Skin is warm and dry.   Neurological:      Mental Status: He is disoriented.          Relevant Results  Results for orders placed or performed during the hospital encounter of 11/08/24 (from the past 24 hours)   Urine electrolytes   Result Value Ref Range    Sodium, Urine Random 107 mmol/L    Sodium/Creatinine Ratio 334 Not established. mmol/g Creat    Potassium, Urine Random 32 mmol/L    Potassium/Creatinine Ratio 100 Not established mmol/g Creat    Chloride, Urine Random 103 mmol/L    Chloride/Creatinine Ratio 322 (H) 23 - 275 mmol/g creat    Creatinine, Urine Random 32.0 20.0 - 370.0 mg/dL   POCT GLUCOSE   Result Value Ref Range    POCT Glucose 269 (H) 74 - 99 mg/dL   CBC   Result Value Ref Range    WBC 5.9 4.4 - 11.3 x10*3/uL    nRBC 0.0 0.0 - 0.0 /100 WBCs    RBC 2.47 (L) 4.50 - 5.90 x10*6/uL    Hemoglobin 7.2 (L) 13.5 - 17.5 g/dL    Hematocrit 22.2 (L) 41.0 - 52.0 %    MCV 90 80 - 100 fL    MCH 29.1 26.0 - 34.0 pg    MCHC 32.4 32.0 - 36.0 g/dL    RDW 14.8 (H) 11.5 - 14.5 %    Platelets 360 150 - 450 x10*3/uL      XR abdomen 1 view, XR chest 1 view  Narrative: Interpreted By:  Reno Wolff and Omar Mahmoud   STUDY:  XR CHEST 1 VIEW; XR ABDOMEN 1 VIEW;  11/12/2024 10:04 am; 11/12/2024  10:06 am      INDICATION:  Signs/Symptoms:New cough; Signs/Symptoms:Confirm placement of  Dobhoff, patient coughing when flushing.          COMPARISON:  Chest x-ray 11/08/2024, CT chest 11/12/2024      ACCESSION NUMBER(S):  [id number]; [id number]      ORDERING CLINICIAN:  JOHANNA REYNAGA      FINDINGS:  AP radiograph of the chest was provided.      Enteric tube  tip projects over the gastric antrum.      CARDIOMEDIASTINAL SILHOUETTE:  Cardiomediastinal silhouette is stable in size and configuration.      LUNGS:  As compared to most recent chest x-ray: Increased retrocardiac  opacities. Mildly increased hazy opacities within the right medial  lower lung field. Right costophrenic angle is clear. Mild blunting of  the left costophrenic angle, mildly increased as compared to prior.  There is no evidence of a pneumothorax.      ABDOMEN:  Contrast within the bilateral renal pelvises and proximal ureters.  Nonobstructive bowel gas pattern. Limited evaluation of  pneumoperitoneum on supine imaging, however no gross evidence of free  air is noted.      BONES:  No acute osseous changes.      Impression: As compared to most recent chest x-ray and when correlating with same  day chest CT:      1. Increased bilateral lower lobe consolidation/atelectasis.      2. Increased small left pleural effusion.      3. Enteric tube tip within the gastric antrum.      I personally reviewed the images/study and I agree with the findings  as stated by Pablito Sebastian MD (PGY-2). This study was interpreted at  [place], [place].      MACRO:  None      Signed by: Reno Wolff 11/12/2024 5:10 PM  Dictation workstation:   [id number]  CT chest w IV contrast  Narrative: Interpreted By:  Reno Wolff,  and Romulo Kenney   STUDY:  CT CHEST W IV CONTRAST;  11/12/2024 3:24 am      INDICATION:  Signs/Symptoms:Eval of pneumonia, effusions, rule out malignancy for  paraneoplastic syndrome.          COMPARISON:  CT abdomen pelvis with IV contrast 11/10/2024.      ACCESSION NUMBER(S):  [id number]      ORDERING CLINICIAN:  FELTON FIGUEROA      TECHNIQUE:  Helical data acquisition of the chest was obtained following  intravenous administration of 75 ML Omnipaque 350.  Images were  reformatted in axial, coronal, and sagittal planes.      FINDINGS:  Enteric tube tip has  been withdrawn to the level of thgastric antrum  in comparison with previous CT examination of the abdomen 11/10/2024.      LUNGS AND AIRWAYS:  There is soft tissue density throughout the left mainstem bronchus  and left lower lobe bronchioles; the left upper lobe bronchioles are  grossly patent. There is fluid attenuation secretion within the  anterior trachea (series 4, image 31).      There is a small left pleural effusion, mildly increased as compared  to prior. There is no significant right-sided pleural effusion. There  is bilateral lower lobe consolidation/atelectasis; there is air  bronchograms throughout the right lower lobe  consolidation/atelectasis while there are only proximal proximal air  bronchograms within the left lower lobe consolidation/atelectasis.  Irregular nodules within the lateral left upper lobe measuring up to  1.5 cm (series 4, image 202). There is no pneumothorax.      MEDIASTINUM AND LEELA, LOWER NECK AND AXILLA:  There is a heterogeneous nodule with central hypodensities within the  right thyroid lobe measuring 2.7 x 2.5 cm (series 2, image 41). There  are additional subcentimeter hypodense thyroid nodules within left  thyroid lobe. A reference carinal node measures 12.3 mm in short axis.  Esophagus appears within normal limits as seen.      HEART AND VESSELS:  The thoracic aorta normal in course and caliber.There are mild  scattered atherosclerosis present, including calcified and  noncalcified plaques. Main pulmonary artery and its branches are  normal in caliber. Mild coronary artery calcifications are  seen.Please note,the study is not optimized for evaluation of  coronary arteries. The cardiac chambers are not enlarged.  There is no pericardial effusion seen.      UPPER ABDOMEN:  Diffuse hepatic hypoattenuation consistent with hepatic steatosis.  Partially visualized 1.5 cm renal calculus within the left renal  pelvis, better evaluated on comparison CT abdomen pelvis. Again  seen  1.9 cm cyst within the superior pole of the left kidney.      CHEST WALL AND OSSEOUS STRUCTURES:  Chest wall is within normal limits.  No acute osseous pathology.There are no suspicious osseous  lesions.Moderate multilevel degenerative changes within visualized  spine.      Impression: 1. Masslike obstruction left mainstem bronchus and inspissated  secretions distally. Malignancy until proved otherwise.  2. Subsegmental atelectasis and air bronchograms within lower lobes  bilaterally and small bilateral pleural effusions. Superimposed  pneumonia probable  3. Mediastinal adenopathy  4. Bilateral thyroid nodules as detailed above.          I personally reviewed the images/study and I agree with the findings  as stated by Pablito Sebastian MD (PGY-2). This study was interpreted at  [facility], [place], [place].      Signed by: Reno Wolff 11/12/2024 5:08 PM  Dictation workstation:   [id number]     Encounter Date: 10/16/24   Electrocardiogram, 12-lead PRN ACS symptoms   Result Value    Ventricular Rate 201    Atrial Rate 201    RI Interval 90    QRS Duration 16    QT Interval 216    QTC Calculation(Bazett) 395    R Axis 0    T Axis 135    QRS Count 32    Q Onset 241    P Onset 196    P Offset 239    T Offset 349    QTC Fredericia 323    Narrative    Poor data quality in current ECG precludes serial comparison  Sinus tachycardia with short RI with Premature supraventricular complexes and with frequent Premature ventricular complexes  Indeterminate axis  Pulmonary disease pattern  Nonspecific ST and T wave abnormality  Abnormal ECG  When compared with ECG of 16-OCT-2024 20:40, (unconfirmed)  Previous ECG has undetermined rhythm, needs review  Questionable change in QRS duration  Confirmed by Fer Vazquez ([id number]) on 10/22/2024 12:52:29 PM        Allergies  Patient has no known allergies.    Scheduled medications  aspirin, 81 mg, nasogastric tube, Daily  atorvastatin, 40 mg, nasogastric  tube, Nightly  [Held by provider] dexAMETHasone, 10 mg, intravenous, q6h  guaiFENesin, 200 mg, nasogastric tube, TID  heparin, 5,000 Units, subcutaneous, q8h  lubricating eye drops, 2 drop, Both Eyes, TID  moisturizing mouth, 15 mL, Swish & Spit, BID  sennosides-docusate sodium, 1 tablet, nasogastric tube, BID  silodosin, 4 mg, nasogastric tube, Daily with breakfast  sodium chloride, 3 mL, nebulization, TID      Continuous medications     PRN medications  PRN medications: acetaminophen, acetaminophen, dextrose, dextrose, glucagon, glucagon, insulin lispro, ipratropium-albuteroL, midodrine, OLANZapine, oxygen     Assessment/Plan    Dat Mccallum is an 83y gentleman with a pmh HFrEF (EF 44%), T2DM, HTN, HLD, CKD, BPH, and deafness, communicates with ASL. He initially presented to OSH with AMS, found to have NSTEMI and was treated for PNA and UTI. Course complicated by persistent encephalopathy, [place] [facility] [place]. Extensive neuro and infectious workup unremarkable, however imagining concerning for malignancy in left lung. Pt also with ongoing dysphagia in the setting of encephalopathy, dobhoff in place.     Voicemail left with family today to discuss goals of care.   Pt not responsive to painful stimuli.     #Complex Medical Decision Making  #Goals of Care  #Advanced Care Planning  - Code status: DNR/DNI  - Surrogate decision maker: heike Jules, [facility], [phone]  - Goals of care discussion needed    #Constipation  -last BM documented 11/12  -discontinue claudia-colace, studies show this is very ineffective  -start senna alone 2 tabs BID routine  -can add dulcolax suppository PRN    #Deafness  -pt speaks ASL, please use MIKE  when assessing patient. MIKE's can be found on the nursing units. Please contact palliative care for assistance with using  services.  -Attempted to communicate with pt on 11/13 ( ID #[id number]) however patient did not arouse to painful stimuli  due to encephalopathy    #Dysphagia   -related to encephalopathy, DHT/TF in place at this time  -given overall poor prognosis (advanced age, pro sofi mal, likely malignancy, prolonged hospital course), would not recommend PEG tube at this point in time  -goc needed    #Psychosocial Support  - Music Therapy  - Spiritual Care Support  - Art Therapy    Plan of Care discussed with: Updated MD Dr. Nguyễn and bedside RN on goals of care decision, medication adjustments, and code status     Medical Decision Making was high level due to high complexity of problems, extensive data review, and high risk of management/treatment.       Thank you for allowing us to participate in the care of this patient. Palliative will continue to follow as needed. Palliative medicine is available Monday-Friday, 8a-6p. Please contact team with any questions or concerns.  Team pager [phone] (weekdays)  Kira Esteves DNP, APRN-CNP

## 2024-11-13 NOTE — CONSULTS
Reason For Consult  Lung Mass     History Of Present Illness  This is a 83 y.o. male with past medical history of HFrEF (EF improved from 20% to stress test 10/21 with LVEF 44%), DMII, HTN, HLD ,CKD, BPH, Deaf (ASL)  transferred from OSH (on Hosp Day 23) to [facility] [place] for  continued Acute Encephalopathy workup. Pt initially presented for AMS. Found to have NSTEMI, reduced EF but no significant stenosis on cardiac cath. Pt has been  hypotensive requiring intermittant midodinre and has not tolerated GDMT. Pt treated with IV abx for UTI and pneumonia. Due to persistent encepahlopathy, he was broadened to cover for CNS infection. He was demonstrating continue encephalopathy, with agitation, waxing and waning state, making non-sensicle/repetitive phrases as well as possible L hemineglect. MRI 10/19 and 11/6 without acute infarct, showed mod to marked volume loss, nonspecific changes consistent with microvascular disease. EEG showed diffuse encephalopathy without lateralization or seizure activity. Pt transferred to [facility] [place] on IV vanc/ceftriaxone/amp/acyclovir. Underwent LP, noninfectious, antibiotics and antivirals discontinued. Awaiting further CSF studies per neurology. ID also consulted. CSF is not concerning for infection, antibiotics deescalated. Pending autoimmune studies and paraneoplastic work up, could potentially related ot delirium as well from critical illness.      Past Medical History  He has a past medical history of Acute frontal sinusitis, unspecified (01/06/2015), Encounter for screening for malignant neoplasm of colon (01/22/2020), Encounter for screening for malignant neoplasm of prostate (08/14/2017), Other conditions influencing health status, Other conditions influencing health status (01/19/2018), Other skin changes (07/21/2021), Other specified health status, Personal history of other diseases of the nervous system and sense organs, Personal history of other diseases of urinary system (08/10/2021), Personal  "history of other specified conditions (06/29/2018), Personal history of other specified conditions (09/25/2017), Personal history of other specified conditions (08/14/2017), Personal history of other specified conditions (10/11/2017), Personal history of pneumonia (recurrent) (01/19/2018), Rash and other nonspecific skin eruption (12/17/2020), 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    Surgical History  He has a past surgical history that includes Other surgical history (12/02/2013); Other surgical history (11/26/2018); Other surgical history (11/26/2018); and Cardiac catheterization (N/A, 10/23/2024).     Social History  He reports that he has never smoked. He has never been exposed to tobacco smoke. He has never used smokeless tobacco. He reports that he does not currently use alcohol. He reports that he does not use drugs.    Family History  No family history on file.     Allergies  Patient has no known allergies.    Review of Systems  Unable to obtain      Physical Exam  Ill-appearing  Lethargic  NG   RRR   Rhonchi   No edema      Last Recorded Vitals  Blood pressure 127/70, pulse 84, temperature 36.3 °C (97.3 °F), resp. rate 16, height 1.829 m (6' 0.01\"), weight 65.2 kg (143 lb 11.8 oz), SpO2 90%.    Relevant Results  [id number]   IMPRESSION:  1. Masslike obstruction left mainstem bronchus and inspissated  secretions distally. Malignancy until proved otherwise.  2. Subsegmental atelectasis and air bronchograms within lower lobes  bilaterally and small bilateral pleural effusions. Superimposed  pneumonia probable  3. Mediastinal adenopathy  4. Bilateral thyroid nodules as detailed above.    Assessment/Plan     83yoM initially presented for NSTEMI c/b persistent encephalopathy without etiology despite extensive evaluation. Pulmonary consulted for bronchoscopic tissue sampling for incidentally found lung mass.     At this " juncture, concern for malignancy is high.  Bronchoscopic tissue sampling, however, appears to portend increased risk for mortality and morbidity.     As such, patient's son and health care agent was contacted on 11/13/2024 between time, approximately, [phone] [see below]       In brief, the following points were discussed:   Clinical course of patient  Description of bronchoscopy   Benefit of tissue diagnosis   Significant risks of procedural sedation, bronchoscopy with biopsy risks and complications, and possibility of weaning failure requiring tracheostomy    Discussion of utility of tissue biopsy iso patient's clinical condition, which may and likely preclude curative options. Nevertheless, patient informed that specific decision to determine therapy for malignancy, if indeed mass were malignancy, would require expert consultation and not be within scope of my practice.      Patient's son and healthcare agent, Jorge A, ensured full understanding and awareness of aforementioned points of discussion. Questions were fully addressed.     Going forward, patient's son and healthcare agent, Jorge A, would like to proceed to a multidisciplinary Goals of Care discussion. As such, I will ask primary to coordinate. Lastly, please reach out to pulmonary for participation as we would be more than willing and able to participate.     Thank you for involving pulmonary medicine in the care of Mr. Mccallum   Pulmonary medicine to continue to follow   Case discussed with Dr. Inna Patrick MD

## 2024-11-13 NOTE — PROGRESS NOTES
Hospitalist Progress Note        Name: Dat Mccallum  :  1940(83 y.o.)  MRN:  [id number]    Date: 24     SUBJECTIVE     HPI:  This is a 83 y.o. male with past medical history of HFrEF (EF improved from 20% to stress test 10/21 with LVEF 44%), DMII, HTN, HLD ,CKD, BPH, Deaf (ASL)  transferred from OSH (on Hosp Day 23) [place] [facility] [place]  continued Acute Encephalopathy workup. Pt initially presented for AMS. Found to have NSTEMI, reduced EF but no significant stenosis on cardiac cath. Pt has been  hypotensive requiring intermittant midodinre and has not tolerated GDMT. Pt treated with IV abx for UTI and pneumonia. Due to persistent encepahlopathy, he was broadened to cover for CNS infection. He was demonstrating continue encephalopathy, with agitation, waxing and waning state, making non-sensicle/repetitive phrases as well as possible L hemineglect. MRI 10/19 and  without acute infarct, showed mod to marked volume loss, nonspecific changes consistent with microvascular disease. EEG showed diffuse encephalopathy without lateralization or seizure activity. Pt [place] [facility] [place] vanc/ceftriaxone/amp/acyclovir. Underwent LP, noninfectious, antibiotics and antivirals discontinued. Awaiting further CSF studies per neurology. ID also consulted. CSF is not concerning for infection, antibiotics deescalated. Pending autoimmune studies and paraneoplastic work up, could potentially related ot delirium as well from critical illness.     Interval History:   Vitals and chart notes from overnight reviewed. No acute issues overnight.   Patient seen and evaluated at bedside. Patient picking at his mouth but not interactive and does not open his eyes to touch. Per sitter, no agitation or pulling at lines.  Called and spoke with son, Jorge A, this morning. Discussed findings of CT and concern for malignancy; discussed overall state and continued AMS with poor to little functional activity. Discussed options for biopsy vs  watchful monitoring of status; he would like to pursue biopsy and diagnosis, although may be leaning towards no active treatment if he does not recover meaningful function.    Review of Systems:   Unable to obtain    Current medications:  Scheduled Meds:aspirin, 81 mg, nasogastric tube, Daily  atorvastatin, 40 mg, nasogastric tube, Nightly  [Held by provider] dexAMETHasone, 10 mg, intravenous, q6h  guaiFENesin, 200 mg, nasogastric tube, TID  heparin, 5,000 Units, subcutaneous, q8h  lubricating eye drops, 2 drop, Both Eyes, TID  moisturizing mouth, 15 mL, Swish & Spit, BID  sennosides-docusate sodium, 1 tablet, nasogastric tube, BID  silodosin, 4 mg, nasogastric tube, Daily with breakfast      Continuous Infusions:   PRN Meds:PRN medications: acetaminophen, acetaminophen, dextrose, dextrose, glucagon, glucagon, insulin lispro, ipratropium-albuteroL, midodrine, OLANZapine, oxygen      OBJECTIVE     Vitals:    11/12/24 1601 11/12/24 2156 11/13/24 0034 11/13/24 0826   BP: 113/65  115/60 117/62   Pulse: 91 93 96 88   Resp: 17 18 16    Temp: 36.5 °C (97.7 °F)   36.6 °C (97.9 °F)   TempSrc:       SpO2: 92% 94% 93% 94%   Weight:       Height:          Physical Exam  Vitals and nursing note reviewed.   Constitutional:       Appearance: He is ill-appearing.      Comments: lethargic   HENT:      Head: Normocephalic.      Nose:      Comments: Dobhoff in place     Mouth/Throat:      Mouth: Mucous membranes are dry.      Comments: Caked mucus secretions in oral cavity  Cardiovascular:      Rate and Rhythm: Normal rate and regular rhythm.   Pulmonary:      Effort: Pulmonary effort is normal.      Breath sounds: Rhonchi present.   Abdominal:      General: There is no distension.      Palpations: Abdomen is soft.      Tenderness: There is no abdominal tenderness.   Musculoskeletal:      Right lower leg: No edema.      Left lower leg: No edema.         Labs:   Lab Results   Component Value Date     (L) 11/13/2024    K 4.6  11/13/2024     11/13/2024    CO2 26 11/13/2024    BUN 21 11/13/2024    CREATININE 1.05 11/13/2024    GLUCOSE 267 (H) 11/13/2024    CALCIUM 7.7 (L) 11/13/2024    PROT 6.3 (L) 11/07/2024    BILITOT 0.7 11/07/2024    ALKPHOS 53 11/07/2024    AST 40 (H) 11/07/2024    ALT 30 11/07/2024    GLOB 3.4 04/18/2021       Lab Results   Component Value Date    WBC 5.9 11/13/2024    HGB 7.2 (L) 11/13/2024    HCT 22.2 (L) 11/13/2024    MCV 90 11/13/2024     11/13/2024       Imaging (within the past 24 hours):   XR abdomen 1 view    Result Date: 11/12/2024  Interpreted By:  Reno Wolff and Omar Mahmoud STUDY: XR CHEST 1 VIEW; XR ABDOMEN 1 VIEW;  11/12/2024 10:04 am; 11/12/2024 10:06 am   INDICATION: Signs/Symptoms:New cough; Signs/Symptoms:Confirm placement of Dobhoff, patient coughing when flushing.     COMPARISON: Chest x-ray 11/08/2024, CT chest 11/12/2024   ACCESSION NUMBER(S): [id number]; [id number]   ORDERING CLINICIAN: JOHANNA REYNAGA   FINDINGS: AP radiograph of the chest was provided.   Enteric tube tip projects over the gastric antrum.   CARDIOMEDIASTINAL SILHOUETTE: Cardiomediastinal silhouette is stable in size and configuration.   LUNGS: As compared to most recent chest x-ray: Increased retrocardiac opacities. Mildly increased hazy opacities within the right medial lower lung field. Right costophrenic angle is clear. Mild blunting of the left costophrenic angle, mildly increased as compared to prior. There is no evidence of a pneumothorax.   ABDOMEN: Contrast within the bilateral renal pelvises and proximal ureters. Nonobstructive bowel gas pattern. Limited evaluation of pneumoperitoneum on supine imaging, however no gross evidence of free air is noted.   BONES: No acute osseous changes.       As compared to most recent chest x-ray and when correlating with same day chest CT:   1. Increased bilateral lower lobe consolidation/atelectasis.   2. Increased small left pleural effusion.   3. Enteric tube tip  within the gastric antrum.   I personally reviewed the images/study and I agree with the findings as stated by Pablito Sebastian MD (PGY-2). This study was interpreted at [facility], [place], [place].   MACRO: None   Signed by: Reno Wolff 11/12/2024 5:10 PM Dictation workstation:   [id number]    XR chest 1 view    Result Date: 11/12/2024  Interpreted By:  Reno Wolff and Omar Mahmoud STUDY: XR CHEST 1 VIEW; XR ABDOMEN 1 VIEW;  11/12/2024 10:04 am; 11/12/2024 10:06 am   INDICATION: Signs/Symptoms:New cough; Signs/Symptoms:Confirm placement of Dobhoff, patient coughing when flushing.     COMPARISON: Chest x-ray 11/08/2024, CT chest 11/12/2024   ACCESSION NUMBER(S): [id number]; [id number]   ORDERING CLINICIAN: JOHANNA REYNAGA   FINDINGS: AP radiograph of the chest was provided.   Enteric tube tip projects over the gastric antrum.   CARDIOMEDIASTINAL SILHOUETTE: Cardiomediastinal silhouette is stable in size and configuration.   LUNGS: As compared to most recent chest x-ray: Increased retrocardiac opacities. Mildly increased hazy opacities within the right medial lower lung field. Right costophrenic angle is clear. Mild blunting of the left costophrenic angle, mildly increased as compared to prior. There is no evidence of a pneumothorax.   ABDOMEN: Contrast within the bilateral renal pelvises and proximal ureters. Nonobstructive bowel gas pattern. Limited evaluation of pneumoperitoneum on supine imaging, however no gross evidence of free air is noted.   BONES: No acute osseous changes.       As compared to most recent chest x-ray and when correlating with same day chest CT:   1. Increased bilateral lower lobe consolidation/atelectasis.   2. Increased small left pleural effusion.   3. Enteric tube tip within the gastric antrum.   I personally reviewed the images/study and I agree with the findings as stated by Pablito Sebastian MD (PGY-2). This study was interpreted at [facility]  [place], [place].   MACRO: None   Signed by: Reno Wolff 11/12/2024 5:10 PM Dictation workstation:   [id number]    CT chest w IV contrast    Result Date: 11/12/2024  Interpreted By:  Reno Wolff and Omar Mahmoud STUDY: CT CHEST W IV CONTRAST;  11/12/2024 3:24 am   INDICATION: Signs/Symptoms:Eval of pneumonia, effusions, rule out malignancy for paraneoplastic syndrome.     COMPARISON: CT abdomen pelvis with IV contrast 11/10/2024.   ACCESSION NUMBER(S): [id number]   ORDERING CLINICIAN: FELTON FIGUEROA   TECHNIQUE: Helical data acquisition of the chest was obtained following intravenous administration of 75 ML Omnipaque 350.  Images were reformatted in axial, coronal, and sagittal planes.   FINDINGS: Enteric tube tip has been withdrawn to the level of thgastric antrum in comparison with previous CT examination of the abdomen 11/10/2024.   LUNGS AND AIRWAYS: There is soft tissue density throughout the left mainstem bronchus and left lower lobe bronchioles; the left upper lobe bronchioles are grossly patent. There is fluid attenuation secretion within the anterior trachea (series 4, image 31).   There is a small left pleural effusion, mildly increased as compared to prior. There is no significant right-sided pleural effusion. There is bilateral lower lobe consolidation/atelectasis; there is air bronchograms throughout the right lower lobe consolidation/atelectasis while there are only proximal proximal air bronchograms within the left lower lobe consolidation/atelectasis. Irregular nodules within the lateral left upper lobe measuring up to 1.5 cm (series 4, image 202). There is no pneumothorax.   MEDIASTINUM AND LEELA, LOWER NECK AND AXILLA: There is a heterogeneous nodule with central hypodensities within the right thyroid lobe measuring 2.7 x 2.5 cm (series 2, image 41). There are additional subcentimeter hypodense thyroid nodules within left thyroid lobe. A reference carinal node  measures 12.3 mm in short axis. Esophagus appears within normal limits as seen.   HEART AND VESSELS: The thoracic aorta normal in course and caliber.There are mild scattered atherosclerosis present, including calcified and noncalcified plaques. Main pulmonary artery and its branches are normal in caliber. Mild coronary artery calcifications are seen.Please note,the study is not optimized for evaluation of coronary arteries. The cardiac chambers are not enlarged. There is no pericardial effusion seen.   UPPER ABDOMEN: Diffuse hepatic hypoattenuation consistent with hepatic steatosis. Partially visualized 1.5 cm renal calculus within the left renal pelvis, better evaluated on comparison CT abdomen pelvis. Again seen 1.9 cm cyst within the superior pole of the left kidney.   CHEST WALL AND OSSEOUS STRUCTURES: Chest wall is within normal limits. No acute osseous pathology.There are no suspicious osseous lesions.Moderate multilevel degenerative changes within visualized spine.       1. Masslike obstruction left mainstem bronchus and inspissated secretions distally. Malignancy until proved otherwise. 2. Subsegmental atelectasis and air bronchograms within lower lobes bilaterally and small bilateral pleural effusions. Superimposed pneumonia probable 3. Mediastinal adenopathy 4. Bilateral thyroid nodules as detailed above.     I personally reviewed the images/study and I agree with the findings as stated by Pablito Sebastian MD (PGY-2). This study was interpreted at [facility], [place], [place].   Signed by: Reno Wolff 11/12/2024 5:08 PM Dictation workstation:   [id number]       ASSESSMENT & PLAN     Acute encephalopathy  - persistent encephalopathy, possible L alfred neglect seen during course. Hospitalized for about 3 weeks with pneumonia, uti, and nstemi/HF.   - MRI with diffuse volume loss, no infarct or other attributable finding. Completed 10/19 and 11/6  - eeg with diffuse  encephalopathy  - b12, b9, syph, hiv, tsh tested  - s/p 3 days high dose thiamine   - paraneoplastic panel, yanet/VIDAL pending  - CT a/p showed no evidence of malignant mass, e/o constipation with stercolal colitis.   -CT chest 11/12 concerning for masslike obstruction of the L mainstem bronchus, malignancy until proven otherwise - discussed with son, who would like to pursue diagnostic testing -- consult pulmonology for possible EBUS with biopsy  -Consult palliative care for GOC discussions and symptom management; will need to consider PEG tube placement if AMS continues and unable to work with SLP  - LP completed, no infection. dc'd vanc/ctx/amp/acyclovir  - pt to be set up with follow up with neurology for neurocog eval.      NSTEMI  HFrEF, acute on chronic  - non-obstruction cath  - unable to tolerate GDMT due to hypotension, start as tolerated. Coreg  discontinued 11/8 due to ongoing hypotension. Will monitor.   - Currently normotensive; midodrine ordered PRN but has not required since 11/8     Hospital acquired pneumonia - treated  - s/p zosyn     UTI with hematuria - treated  - s/p abx     Acute urinary retention  - at [facility], urology unable to pass trotter, plan to monitor clinically and if pt develops niurka or signs of obstruction then will need cystoscopy.   - renal function is stable  - Continues to retain urine in large levels; able to straight cath therefore will continue this PRN for bladder scan >300  -On silodosin to aid with BPH     Hypotension  - continue midodrine prn     Severe protein-calorie malnutrition  Dysphagia in setting of AMS  - continue tube feeds, monitor electrolytes. Will adjust FWF based on labs. BID RFP. Started fwf 200cc q4hr. On isosource 1.5 50cc/hr goal. Plan to increase by 10cc/hr until goal is reached.  Will reduced fwf to 200cc q6 hrs due to developing hyponatremia.   - slp, nutrition consulted  - bid RFP, mag until stable     DMII  -BG consistently >180, will start sliding scale  insulin q6h     Nephrolithiasis  - L 1.8 non-obstructing stone     Stercolal colitis  - seen on CT  - On bowel regimen and suppository/enema    Malnutrition Diagnosis Status: New  Malnutrition Diagnosis: Severe malnutrition related to chronic disease or condition  As Evidenced by: severe subcutaeous fat loss, severe muscle wasting, suspect meeting < 50% of EER for > 1 month and low BMI 19.5  I agree with the dietitian's malnutrition diagnosis.    DVT Prophylaxis: heparin 5000 q 8 hour    Code status: DNR and No Intubation  Diet: Enteral feeding with NPO Isosource 1.5; NG (nasogastric tube); 50; 150; Water; Every 6 hours     Disposition: continue to monitor inpatient, await consultant recommendations, await test results, and await clinical improvement    Level of MDM:  High   Risk: High   Data Reviewed and/or Analyzed:   Included: Prior external notes from at least 1 unique source, Results, including laboratory findings and imaging reports, listed above, Orders and notes from all consultants involved, and Notes for this encounter.  I personally reviewed the tests referenced above.  I discussed plan of care with patient's son, palliative care, sitter, nurse, TCC/SW.    The patient/family had opportunity to ask questions. All questions were answered to the best of my ability.    Between 7AM-7PM please message me via Epic Secure Chat.  After 7PM please page Nocturnist on call.    Electronically signed by Yudith Nguyễn DO on 11/13/24 at 12:46 PM

## 2024-11-14 ENCOUNTER — DOCUMENTATION (OUTPATIENT)
Dept: CASE MANAGEMENT | Facility: HOSPITAL | Age: 84
End: 2024-11-14
Payer: MEDICARE

## 2024-11-14 LAB
ALBUMIN SERPL BCP-MCNC: 2.1 G/DL (ref 3.4–5)
ANION GAP SERPL CALC-SCNC: 10 MMOL/L (ref 10–20)
BUN SERPL-MCNC: 21 MG/DL (ref 6–23)
CALCIUM SERPL-MCNC: 8.4 MG/DL (ref 8.6–10.6)
CHLORIDE SERPL-SCNC: 103 MMOL/L (ref 98–107)
CO2 SERPL-SCNC: 24 MMOL/L (ref 21–32)
CREAT SERPL-MCNC: 0.86 MG/DL (ref 0.5–1.3)
EGFRCR SERPLBLD CKD-EPI 2021: 87 ML/MIN/1.73M*2
ERYTHROCYTE [DISTWIDTH] IN BLOOD BY AUTOMATED COUNT: 15 % (ref 11.5–14.5)
GLUCOSE BLD MANUAL STRIP-MCNC: 191 MG/DL (ref 74–99)
GLUCOSE BLD MANUAL STRIP-MCNC: 211 MG/DL (ref 74–99)
GLUCOSE BLD MANUAL STRIP-MCNC: 219 MG/DL (ref 74–99)
GLUCOSE SERPL-MCNC: 202 MG/DL (ref 74–99)
HCT VFR BLD AUTO: 26.4 % (ref 41–52)
HGB BLD-MCNC: 8 G/DL (ref 13.5–17.5)
MAGNESIUM SERPL-MCNC: 2 MG/DL (ref 1.6–2.4)
MCH RBC QN AUTO: 28.8 PG (ref 26–34)
MCHC RBC AUTO-ENTMCNC: 30.3 G/DL (ref 32–36)
MCV RBC AUTO: 95 FL (ref 80–100)
NRBC BLD-RTO: 0 /100 WBCS (ref 0–0)
PHOSPHATE SERPL-MCNC: 2.7 MG/DL (ref 2.5–4.9)
PLATELET # BLD AUTO: 372 X10*3/UL (ref 150–450)
POTASSIUM SERPL-SCNC: 4.4 MMOL/L (ref 3.5–5.3)
RBC # BLD AUTO: 2.78 X10*6/UL (ref 4.5–5.9)
SODIUM SERPL-SCNC: 133 MMOL/L (ref 136–145)
WBC # BLD AUTO: 4.6 X10*3/UL (ref 4.4–11.3)

## 2024-11-14 PROCEDURE — 99233 SBSQ HOSP IP/OBS HIGH 50: CPT | Performed by: STUDENT IN AN ORGANIZED HEALTH CARE EDUCATION/TRAINING PROGRAM

## 2024-11-14 PROCEDURE — 2500000002 HC RX 250 W HCPCS SELF ADMINISTERED DRUGS (ALT 637 FOR MEDICARE OP, ALT 636 FOR OP/ED): Performed by: STUDENT IN AN ORGANIZED HEALTH CARE EDUCATION/TRAINING PROGRAM

## 2024-11-14 PROCEDURE — 2500000001 HC RX 250 WO HCPCS SELF ADMINISTERED DRUGS (ALT 637 FOR MEDICARE OP): Performed by: STUDENT IN AN ORGANIZED HEALTH CARE EDUCATION/TRAINING PROGRAM

## 2024-11-14 PROCEDURE — 80069 RENAL FUNCTION PANEL: CPT | Performed by: STUDENT IN AN ORGANIZED HEALTH CARE EDUCATION/TRAINING PROGRAM

## 2024-11-14 PROCEDURE — 2500000004 HC RX 250 GENERAL PHARMACY W/ HCPCS (ALT 636 FOR OP/ED): Performed by: STUDENT IN AN ORGANIZED HEALTH CARE EDUCATION/TRAINING PROGRAM

## 2024-11-14 PROCEDURE — 99497 ADVNCD CARE PLAN 30 MIN: CPT

## 2024-11-14 PROCEDURE — 83735 ASSAY OF MAGNESIUM: CPT | Performed by: STUDENT IN AN ORGANIZED HEALTH CARE EDUCATION/TRAINING PROGRAM

## 2024-11-14 PROCEDURE — 36415 COLL VENOUS BLD VENIPUNCTURE: CPT | Performed by: STUDENT IN AN ORGANIZED HEALTH CARE EDUCATION/TRAINING PROGRAM

## 2024-11-14 PROCEDURE — 99233 SBSQ HOSP IP/OBS HIGH 50: CPT

## 2024-11-14 PROCEDURE — 2500000005 HC RX 250 GENERAL PHARMACY W/O HCPCS: Performed by: STUDENT IN AN ORGANIZED HEALTH CARE EDUCATION/TRAINING PROGRAM

## 2024-11-14 PROCEDURE — 99498 ADVNCD CARE PLAN ADDL 30 MIN: CPT

## 2024-11-14 PROCEDURE — 51701 INSERT BLADDER CATHETER: CPT

## 2024-11-14 PROCEDURE — 85027 COMPLETE CBC AUTOMATED: CPT | Performed by: STUDENT IN AN ORGANIZED HEALTH CARE EDUCATION/TRAINING PROGRAM

## 2024-11-14 PROCEDURE — 1100000001 HC PRIVATE ROOM DAILY

## 2024-11-14 PROCEDURE — 82947 ASSAY GLUCOSE BLOOD QUANT: CPT

## 2024-11-14 PROCEDURE — 84100 ASSAY OF PHOSPHORUS: CPT | Performed by: STUDENT IN AN ORGANIZED HEALTH CARE EDUCATION/TRAINING PROGRAM

## 2024-11-14 RX ADMIN — CARBOXYMETHYLCELLULOSE SODIUM 2 DROP: 5 SOLUTION/ DROPS OPHTHALMIC at 09:32

## 2024-11-14 RX ADMIN — ASPIRIN 81 MG CHEWABLE TABLET 81 MG: 81 TABLET CHEWABLE at 09:32

## 2024-11-14 RX ADMIN — INSULIN LISPRO 2 UNITS: 100 INJECTION, SOLUTION INTRAVENOUS; SUBCUTANEOUS at 05:57

## 2024-11-14 RX ADMIN — OLANZAPINE 5 MG: 10 INJECTION, POWDER, LYOPHILIZED, FOR SOLUTION INTRAMUSCULAR at 21:34

## 2024-11-14 RX ADMIN — Medication 15 ML: at 21:36

## 2024-11-14 RX ADMIN — INSULIN LISPRO 2 UNITS: 100 INJECTION, SOLUTION INTRAVENOUS; SUBCUTANEOUS at 14:34

## 2024-11-14 RX ADMIN — HEPARIN SODIUM 5000 UNITS: 5000 INJECTION, SOLUTION INTRAVENOUS; SUBCUTANEOUS at 05:59

## 2024-11-14 RX ADMIN — SILODOSIN 4 MG: 4 CAPSULE ORAL at 09:32

## 2024-11-14 RX ADMIN — GUAIFENESIN 200 MG: 100 SOLUTION ORAL at 21:35

## 2024-11-14 RX ADMIN — SENNOSIDES AND DOCUSATE SODIUM 1 TABLET: 8.6; 5 TABLET ORAL at 09:32

## 2024-11-14 RX ADMIN — Medication 15 ML: at 09:42

## 2024-11-14 RX ADMIN — SENNOSIDES AND DOCUSATE SODIUM 1 TABLET: 8.6; 5 TABLET ORAL at 21:35

## 2024-11-14 RX ADMIN — CARBOXYMETHYLCELLULOSE SODIUM 2 DROP: 5 SOLUTION/ DROPS OPHTHALMIC at 01:12

## 2024-11-14 RX ADMIN — INSULIN LISPRO 1 UNITS: 100 INJECTION, SOLUTION INTRAVENOUS; SUBCUTANEOUS at 19:13

## 2024-11-14 RX ADMIN — HEPARIN SODIUM 5000 UNITS: 5000 INJECTION, SOLUTION INTRAVENOUS; SUBCUTANEOUS at 21:35

## 2024-11-14 RX ADMIN — HEPARIN SODIUM 5000 UNITS: 5000 INJECTION, SOLUTION INTRAVENOUS; SUBCUTANEOUS at 14:34

## 2024-11-14 RX ADMIN — ATORVASTATIN CALCIUM 40 MG: 40 TABLET, FILM COATED ORAL at 21:35

## 2024-11-14 RX ADMIN — GUAIFENESIN 200 MG: 100 SOLUTION ORAL at 09:32

## 2024-11-14 RX ADMIN — GUAIFENESIN 200 MG: 100 SOLUTION ORAL at 14:34

## 2024-11-14 ASSESSMENT — COGNITIVE AND FUNCTIONAL STATUS - GENERAL
HELP NEEDED FOR BATHING: TOTAL
PERSONAL GROOMING: TOTAL
MOVING FROM LYING ON BACK TO SITTING ON SIDE OF FLAT BED WITH BEDRAILS: TOTAL
TOILETING: TOTAL
EATING MEALS: TOTAL
MOVING FROM LYING ON BACK TO SITTING ON SIDE OF FLAT BED WITH BEDRAILS: TOTAL
CLIMB 3 TO 5 STEPS WITH RAILING: TOTAL
STANDING UP FROM CHAIR USING ARMS: TOTAL
CLIMB 3 TO 5 STEPS WITH RAILING: TOTAL
DRESSING REGULAR LOWER BODY CLOTHING: TOTAL
MOBILITY SCORE: 6
DRESSING REGULAR LOWER BODY CLOTHING: TOTAL
MOVING TO AND FROM BED TO CHAIR: TOTAL
MOBILITY SCORE: 6
DAILY ACTIVITIY SCORE: 6
DRESSING REGULAR UPPER BODY CLOTHING: TOTAL
EATING MEALS: TOTAL
DRESSING REGULAR UPPER BODY CLOTHING: TOTAL
TOILETING: TOTAL
WALKING IN HOSPITAL ROOM: TOTAL
STANDING UP FROM CHAIR USING ARMS: TOTAL
MOVING TO AND FROM BED TO CHAIR: TOTAL
TURNING FROM BACK TO SIDE WHILE IN FLAT BAD: TOTAL
TURNING FROM BACK TO SIDE WHILE IN FLAT BAD: TOTAL
WALKING IN HOSPITAL ROOM: TOTAL
PERSONAL GROOMING: TOTAL
HELP NEEDED FOR BATHING: TOTAL
DAILY ACTIVITIY SCORE: 6

## 2024-11-14 ASSESSMENT — PAIN - FUNCTIONAL ASSESSMENT: PAIN_FUNCTIONAL_ASSESSMENT: 0-10

## 2024-11-14 ASSESSMENT — PAIN SCALES - PAIN ASSESSMENT IN ADVANCED DEMENTIA (PAINAD)
TOTALSCORE: 0
BREATHING: NORMAL
FACIALEXPRESSION: SMILING OR INEXPRESSIVE
BODYLANGUAGE: RELAXED
CONSOLABILITY: NO NEED TO CONSOLE
TOTALSCORE: MEDICATION (SEE MAR)

## 2024-11-14 ASSESSMENT — PAIN SCALES - GENERAL
PAINLEVEL_OUTOF10: 0 - NO PAIN
PAINLEVEL_OUTOF10: 0 - NO PAIN

## 2024-11-14 NOTE — PROGRESS NOTES
Spiritual Care Visit    Clinical Encounter Type  Visited With: Patient not available (pt was sleeping at time of this visit. No family members present at time of this visit.)  Routine Visit: Introduction  Continue Visiting: Yes

## 2024-11-14 NOTE — CARE PLAN
The patient's goals for the shift include        Problem: Skin  Goal: Decreased wound size/increased tissue granulation at next dressing change  Outcome: Progressing  Flowsheets (Taken 11/13/2024 1736)  Decreased wound size/increased tissue granulation at next dressing change: Promote sleep for wound healing  Goal: Participates in plan/prevention/treatment measures  Outcome: Progressing  Flowsheets (Taken 11/14/2024 1537)  Participates in plan/prevention/treatment measures: Elevate heels  Goal: Prevent/manage excess moisture  Outcome: Progressing  Flowsheets (Taken 11/14/2024 1537)  Prevent/manage excess moisture: Moisturize dry skin  Goal: Prevent/minimize sheer/friction injuries  Outcome: Progressing  Flowsheets (Taken 11/14/2024 1537)  Prevent/minimize sheer/friction injuries: Use pull sheet  Goal: Promote/optimize nutrition  Outcome: Progressing  Flowsheets (Taken 11/14/2024 1537)  Promote/optimize nutrition: Monitor/record intake including meals  Goal: Promote skin healing  Outcome: Progressing  Flowsheets (Taken 11/14/2024 1537)  Promote skin healing: Turn/reposition every 2 hours/use positioning/transfer devices     Problem: Pain - Adult  Goal: Verbalizes/displays adequate comfort level or baseline comfort level  Outcome: Progressing     Problem: Safety - Adult  Goal: Free from fall injury  Outcome: Progressing     Problem: Discharge Planning  Goal: Discharge to home or other facility with appropriate resources  Outcome: Progressing     Problem: Safety - Medical Restraint  Goal: Remains free of injury from restraints (Restraint for Interference with Medical Device)  Outcome: Progressing  Goal: Free from restraint(s) (Restraint for Interference with Medical Device)  Outcome: Progressing

## 2024-11-14 NOTE — PROGRESS NOTES
"Hospitalist Progress Note        Name: Dat Mccallum  :  1940(83 y.o.)  MRN:  [id number]    Date: 24     SUBJECTIVE     HPI:  This is a 83 y.o. male with past medical history of HFrEF (EF improved from 20% to stress test 10/21 with LVEF 44%), DMII, HTN, HLD ,CKD, BPH, Deaf (ASL)  transferred from OSH (on Hosp Day 23) to [facility] for  continued Acute Encephalopathy workup. Pt initially presented for AMS. Found to have NSTEMI, reduced EF but no significant stenosis on cardiac cath. Pt has been  hypotensive requiring intermittant midodinre and has not tolerated GDMT. Pt treated with IV abx for UTI and pneumonia. Due to persistent encepahlopathy, he was broadened to cover for CNS infection. He was demonstrating continue encephalopathy, with agitation, waxing and waning state, making non-sensicle/repetitive phrases as well as possible L hemineglect. MRI 10/19 and  without acute infarct, showed mod to marked volume loss, nonspecific changes consistent with microvascular disease. EEG showed diffuse encephalopathy without lateralization or seizure activity. Pt transferred to [facility] on [place] vanc/ceftriaxone/amp/acyclovir. Underwent LP, noninfectious, antibiotics and antivirals discontinued. Awaiting further CSF studies per neurology. ID also consulted. CSF is not concerning for infection, antibiotics deescalated. Pending autoimmune studies and paraneoplastic work up, could potentially related ot delirium as well from critical illness.     Interval History:   Vitals and chart notes from overnight reviewed. No acute issues overnight.   Patient seen and evaluated at bedside. Palliative care present at bedside as well. Patient awake and more interactive today. ASL interpreters present via Martii. Patient able to communicate that he understands he is in the hospital, understands that he may have cancer, and very clear that he is \"finished\" with further medications, interventions, or procedures. He does want our team to " speak with his sons to make further decisions.  Called and spoke with son, Jorge A. He states he does agree to meet with the team but likely will have to be done tomorrow as he wants his brother present for discussions and he is traveling today. He will call back to set up a time.    Review of Systems:   Unable to obtain    Current medications:  Scheduled Meds:aspirin, 81 mg, nasogastric tube, Daily  atorvastatin, 40 mg, nasogastric tube, Nightly  guaiFENesin, 200 mg, nasogastric tube, TID  heparin, 5,000 Units, subcutaneous, q8h  insulin lispro, 0-5 Units, subcutaneous, q6h  lubricating eye drops, 2 drop, Both Eyes, TID  moisturizing mouth, 15 mL, Swish & Spit, BID  sennosides-docusate sodium, 1 tablet, nasogastric tube, BID  silodosin, 4 mg, nasogastric tube, Daily with breakfast      Continuous Infusions:   PRN Meds:PRN medications: acetaminophen, acetaminophen, dextrose, dextrose, glucagon, glucagon, ipratropium-albuteroL, midodrine, OLANZapine, oxygen      OBJECTIVE     Vitals:    11/13/24 0826 11/13/24 1615 11/13/24 2250 11/14/24 0755   BP: 117/62 127/70 141/72 119/66   Pulse: 88 84 91 84   Resp:  16 16 16   Temp: 36.6 °C (97.9 °F) 36.3 °C (97.3 °F) 36.3 °C (97.3 °F) 36.6 °C (97.9 °F)   TempSrc:       SpO2: 94% 90% 98% 98%   Weight:       Height:          Physical Exam  Vitals and nursing note reviewed.   Constitutional:       Appearance: He is ill-appearing.      Comments: lethargic   HENT:      Head: Normocephalic.      Nose:      Comments: Dobhoff in place     Mouth/Throat:      Mouth: Mucous membranes are dry.      Comments: Caked mucus secretions in oral cavity  Cardiovascular:      Rate and Rhythm: Normal rate and regular rhythm.   Pulmonary:      Effort: Pulmonary effort is normal.      Breath sounds: Rhonchi present.   Abdominal:      General: There is no distension.      Palpations: Abdomen is soft.      Tenderness: There is no abdominal tenderness.   Musculoskeletal:      Right lower leg: No edema.       Left lower leg: No edema.         Labs:   Lab Results   Component Value Date     (L) 11/14/2024    K 4.4 11/14/2024     11/14/2024    CO2 24 11/14/2024    BUN 21 11/14/2024    CREATININE 0.86 11/14/2024    GLUCOSE 202 (H) 11/14/2024    CALCIUM 8.4 (L) 11/14/2024    PROT 6.3 (L) 11/07/2024    BILITOT 0.7 11/07/2024    ALKPHOS 53 11/07/2024    AST 40 (H) 11/07/2024    ALT 30 11/07/2024    GLOB 3.4 04/18/2021       Lab Results   Component Value Date    WBC 4.6 11/14/2024    HGB 8.0 (L) 11/14/2024    HCT 26.4 (L) 11/14/2024    MCV 95 11/14/2024     11/14/2024       Imaging (within the past 24 hours):   No results found.      ASSESSMENT & PLAN     Acute persistent encephalopathy - paraneoplastic syndrome?  L lung mass-like obstruction, concern for malignancy  Hyponatremia  Severe protein calorie malnutrition  - persistent encephalopathy, possible L alfred neglect seen during course. Hospitalized for about 3 weeks with pneumonia, uti, and nstemi/HF.   - MRI with diffuse volume loss, no infarct or other attributable finding. Completed 10/19 and 11/6  - eeg with diffuse encephalopathy  - b12, b9, syph, hiv, tsh tested  - s/p 3 days high dose thiamine   - paraneoplastic panel, yanet/VIDAL pending  - CT a/p showed no evidence of malignant mass, e/o constipation with stercolal colitis.   -CT chest 11/12 concerning for masslike obstruction of the L mainstem bronchus, malignancy until proven otherwise - pulmonology consulted who state risks outweigh benefits of bronchoscopy given such weakened state; recommend GOC discussions with family  -Consulted palliative care for GOC discussions and symptom management; will need to consider PEG tube placement soon (now 14 days on Dobhoff placement)  -As per HPI, patient's son (LARA Jules), agreeable to family meeting likely tomorrow (11/15) for further GOC discussions  - LP completed, no infection. dc'd vanc/ctx/amp/acyclovir  - pt to be set up with follow up with neurology  for neurocog eval.      NSTEMI  HFrEF, acute on chronic  - non-obstruction cath  - unable to tolerate GDMT due to hypotension, start as tolerated. Coreg  discontinued 11/8 due to ongoing hypotension. Will monitor.   - Currently normotensive; midodrine ordered PRN but has not required since 11/8     Hospital acquired pneumonia - treated  - s/p zosyn     UTI with hematuria - treated  - s/p abx     Acute urinary retention  - at osh, urology unable to pass trotter, plan to monitor clinically and if pt develops niurka or signs of obstruction then will need cystoscopy.   - renal function is stable  - Continues to retain urine in large levels; able to straight cath therefore will continue this PRN for bladder scan >300  -On silodosin to aid with BPH     Hypotension  - continue midodrine prn     Severe protein-calorie malnutrition  Dysphagia in setting of AMS  - Continue tube feeds, monitor electrolytes. Will adjust FWF based on labs. On isosource 1.5 at goal of 50cc/hr via Dobhoff  -Dobhoff placed 10/31/24  - slp, nutrition consulted  - bid RFP, mag until stable     DMII  -BG consistently >180, will start sliding scale insulin q6h     Nephrolithiasis  - L 1.8 non-obstructing stone     Stercolal colitis  - seen on CT  - On bowel regimen and suppository/enema    Malnutrition Diagnosis Status: New  Malnutrition Diagnosis: Severe malnutrition related to chronic disease or condition  As Evidenced by: severe subcutaeous fat loss, severe muscle wasting, suspect meeting < 50% of EER for > 1 month and low BMI 19.5  I agree with the dietitian's malnutrition diagnosis.    DVT Prophylaxis: heparin 5000 q 8 hour    Code status: DNR and No Intubation  Diet: Enteral feeding with NPO Isosource 1.5; NG (nasogastric tube); 50; 150; Water; Every 6 hours     Disposition: continue to monitor inpatient, await consultant recommendations, await test results, and await clinical improvement    Level of MDM:  High   Risk: High   Data Reviewed and/or  Analyzed:   Included: Prior external notes from at least 1 unique source, Results, including laboratory findings and imaging reports, listed above, Orders and notes from all consultants involved, and Notes for this encounter.  I personally reviewed the tests referenced above.  I discussed plan of care with patient (via ) patient's son, palliative care NP, , sitter, nurse, TCC/SW.    The patient/family had opportunity to ask questions. All questions were answered to the best of my ability.    Between 7AM-7PM please message me via Epic Secure Chat.  After 7PM please page Nocturnist on call.    Electronically signed by Yudith Nguyễn DO on 11/14/24 at 12:34 PM

## 2024-11-14 NOTE — PROGRESS NOTES
"Palliative Medicine following for:  Complex medical decision making, symptom management, patient/family support    History obtained from chart review including ED note, H&P, patient's daily progress notes, review of lab/test results, and discussion with primary team and bedside RN.    Subjective    Patient seen at bedside with  in use  Appears drowsy and fatigued, ROS limited    Objective    Last Recorded Vitals  /66   Pulse 84   Temp 36.6 °C (97.9 °F)   Resp 16   Ht 1.829 m (6' 0.01\")   Wt 65.2 kg (143 lb 11.8 oz)   SpO2 98%   BMI 19.49 kg/m²      Physical Exam  Constitutional:       Appearance: He is ill-appearing.      Comments: Pro sofi mal   HENT:      Head: Normocephalic.      Nose:      Comments: DHT     Mouth/Throat:      Mouth: Mucous membranes are dry.   Pulmonary:      Effort: Pulmonary effort is normal.      Comments: Deep cough  Abdominal:      Palpations: Abdomen is soft.   Skin:     General: Skin is warm.          Relevant Results   Results for orders placed or performed during the hospital encounter of 11/08/24 (from the past 24 hours)   POCT GLUCOSE   Result Value Ref Range    POCT Glucose 214 (H) 74 - 99 mg/dL   POCT GLUCOSE   Result Value Ref Range    POCT Glucose 173 (H) 74 - 99 mg/dL   POCT GLUCOSE   Result Value Ref Range    POCT Glucose 249 (H) 74 - 99 mg/dL   POCT GLUCOSE   Result Value Ref Range    POCT Glucose 219 (H) 74 - 99 mg/dL   CBC   Result Value Ref Range    WBC 4.6 4.4 - 11.3 x10*3/uL    nRBC 0.0 0.0 - 0.0 /100 WBCs    RBC 2.78 (L) 4.50 - 5.90 x10*6/uL    Hemoglobin 8.0 (L) 13.5 - 17.5 g/dL    Hematocrit 26.4 (L) 41.0 - 52.0 %    MCV 95 80 - 100 fL    MCH 28.8 26.0 - 34.0 pg    MCHC 30.3 (L) 32.0 - 36.0 g/dL    RDW 15.0 (H) 11.5 - 14.5 %    Platelets 372 150 - 450 x10*3/uL   Renal Function Panel   Result Value Ref Range    Glucose 202 (H) 74 - 99 mg/dL    Sodium 133 (L) 136 - 145 mmol/L    Potassium 4.4 3.5 - 5.3 mmol/L    Chloride 103 98 - 107 mmol/L    " Bicarbonate 24 21 - 32 mmol/L    Anion Gap 10 10 - 20 mmol/L    Urea Nitrogen 21 6 - 23 mg/dL    Creatinine 0.86 0.50 - 1.30 mg/dL    eGFR 87 >60 mL/min/1.73m*2    Calcium 8.4 (L) 8.6 - 10.6 mg/dL    Phosphorus 2.7 2.5 - 4.9 mg/dL    Albumin 2.1 (L) 3.4 - 5.0 g/dL   Magnesium   Result Value Ref Range    Magnesium 2.00 1.60 - 2.40 mg/dL   POCT GLUCOSE   Result Value Ref Range    POCT Glucose 211 (H) 74 - 99 mg/dL        Allergies  Patient has no known allergies.  Medications  Scheduled medications  aspirin, 81 mg, nasogastric tube, Daily  atorvastatin, 40 mg, nasogastric tube, Nightly  guaiFENesin, 200 mg, nasogastric tube, TID  heparin, 5,000 Units, subcutaneous, q8h  insulin lispro, 0-5 Units, subcutaneous, q6h  lubricating eye drops, 2 drop, Both Eyes, TID  moisturizing mouth, 15 mL, Swish & Spit, BID  sennosides-docusate sodium, 1 tablet, nasogastric tube, BID  silodosin, 4 mg, nasogastric tube, Daily with breakfast      Continuous medications     PRN medications  PRN medications: acetaminophen, acetaminophen, dextrose, dextrose, glucagon, glucagon, ipratropium-albuteroL, midodrine, OLANZapine, oxygen     Assessment/Plan    Dat Mccallum is an 83y gentleman with a pmh HFrEF (EF 44%), T2DM, HTN, HLD, CKD, BPH, and deafness, communicates with ASL. He initially presented to OSH with AMS, found to have NSTEMI and was treated for PNA and UTI. Course complicated by persistent encephalopathy, transferred to [facility] for further workup. Extensive neuro and infectious workup unremarkable, however imagining concerning for malignancy in left lung. Pt also with ongoing dysphagia in the setting of encephalopathy, dobhoff in place.    11/14 - patient seen at bedside this morning, sitting up in bed, alert but drowsy.    Palliative Performance Scale (PPS):  "10%    ----------------------------------------------------------------------------------------------------------------------------------------------------------------------------------Voicemail left with both sons to discuss goals of care  Met with pt at bedside, along with Praful BLUNT-student, and Joann- services. Pt arousable to stimuli and conversing via sign language.    Had conversation with pt via [employer] translators #[id number], #[id number].    Dr. Nguyễn provided information regarding concern for cancer. Discussed that due to pt's weakness, he would unlikely be able to tolerate any form of treatment, and diagnosis itself is very risk.  Pt states that he is \"done\" and \"finished with medical care\" and agreed to hospice care via [employer] interpreters. He states he is agreeable to palliative team  speaking with his sons about the plans. Goals of care will move towards comfort focused.    I spent 60 minutes in providing separately identifiable ACP services with the patient and/or surrogate decision maker in a voluntary conversation discussing the patient's wishes and goals as detailed in the above note.   ----------------------------------------------------------------------------------------------------------------------------------------------------------------------------------    #Complex Medical Decision Making   #Goals of Care  #Advance Care Planning   - Code status: DNR, DNI. Will further discuss comfort measures with sons  - Surrogate decision maker: Cristian Jules, [facility], [phone]  - Goals are comfort and quality of life based: will place consult to hospice services pending discussion with family  - Advanced Directives on file     #Constipation  -Last documented BM 11/14  - continue Senna 2 tabs BID  -consider dulcolax suppository PRN    #Deafness  -Pt speaks ASL, Mounika was used today to discuss GOC with interpreters #[id number] and #[id number]  -Pt effectively communicated via ASL with Mounika  -Pt had to be " aroused via stimuli multiple times    #Dysphagia  -related to encephalopathy  -[place] feeding tube placed for tube feeding/ medications  -very weak cough, continue NPO for aspiration precaution. Will educate pt and family on comfort feeds when discussing hospice care    #Psychosocial Support  - Music Therapy  - Spiritual Care Support  - Art Therapy  - Palliative SW Support      Plan of Care discussed with: Updated MD Dr. Nguyễn and bedside RN on goals of care decision, medication adjustments, and code status     Medical Decision Making was high level due to high complexity of problems, extensive data review, and high risk of management/treatment.       Thank you for allowing us to care for this patient. Palliative Team will continue to follow as needed. Please contact team with any questions or concerns.   Team pager [phone] (weekdays)  Kira Esteves DNP, APRN-CNP

## 2024-11-14 NOTE — RESEARCH NOTES
Artificial Intelligence Monitoring in Nursing (AIMS Nursing) Study    Principle Investigator - Dr. Jorge A Nathan  Research Coordinator - Annabel Woodward RN     Patient Name - Dat Mccallum  Date - 11/14/2024 12:25 PM  Location - [place] [address]    Dat Mccallum was approached by Annabel Woodward RN to talk about participating in the AIMS Nursing Study. The patient was not able to be approached, a research coordinator will come back at a later time. Study protocol was followed and patient was given study contact information.     Annabel Woodward RN

## 2024-11-14 NOTE — PROGRESS NOTES
Spiritual Care Visit    Clinical Encounter Type  Visited With: Patient  Routine Visit: Follow-up  Continue Visiting: Yes     Met with patient and Palliative CNP and Attending to discuss patient's condition with him via ASL interpreters on the [employer] device. Patient was updated on his condition and was asked about his wishes concerning further medical care. Patient asked that we contact his sons for discharge information. Patient tired before I was able to ask about his desire to have a visit from the . Provided non-anxious, presence and support. Will continue to follow.    Also left voicemails from the Palliative team for patient's sons.

## 2024-11-14 NOTE — PROGRESS NOTES
[place] was [place] [place] [facility] [place] for higher level of care,   Encephalopathy acute   I am closing this case at this time d/t to transfer. Please reach out to me for any questions regarding HF Navigator program.    Dominique WEAVERN RN  [facility] Clinical Nurse Navigator, CHF  [phone]

## 2024-11-15 LAB
ALBUMIN SERPL BCP-MCNC: 2.2 G/DL (ref 3.4–5)
ANION GAP SERPL CALC-SCNC: 9 MMOL/L (ref 10–20)
BUN SERPL-MCNC: 21 MG/DL (ref 6–23)
CALCIUM SERPL-MCNC: 8.2 MG/DL (ref 8.6–10.6)
CHLORIDE SERPL-SCNC: 99 MMOL/L (ref 98–107)
CO2 SERPL-SCNC: 25 MMOL/L (ref 21–32)
CREAT SERPL-MCNC: 0.86 MG/DL (ref 0.5–1.3)
EGFRCR SERPLBLD CKD-EPI 2021: 87 ML/MIN/1.73M*2
ERYTHROCYTE [DISTWIDTH] IN BLOOD BY AUTOMATED COUNT: 15.2 % (ref 11.5–14.5)
GLUCOSE BLD MANUAL STRIP-MCNC: 196 MG/DL (ref 74–99)
GLUCOSE BLD MANUAL STRIP-MCNC: 219 MG/DL (ref 74–99)
GLUCOSE BLD MANUAL STRIP-MCNC: 228 MG/DL (ref 74–99)
GLUCOSE BLD MANUAL STRIP-MCNC: 242 MG/DL (ref 74–99)
GLUCOSE SERPL-MCNC: 253 MG/DL (ref 74–99)
HCT VFR BLD AUTO: 26 % (ref 41–52)
HGB BLD-MCNC: 8.4 G/DL (ref 13.5–17.5)
MAGNESIUM SERPL-MCNC: 1.8 MG/DL (ref 1.6–2.4)
MCH RBC QN AUTO: 29.9 PG (ref 26–34)
MCHC RBC AUTO-ENTMCNC: 32.3 G/DL (ref 32–36)
MCV RBC AUTO: 93 FL (ref 80–100)
METHYLMALONATE SERPL-SCNC: 0.16 UMOL/L (ref 0–0.4)
NRBC BLD-RTO: 0 /100 WBCS (ref 0–0)
PHOSPHATE SERPL-MCNC: 2.3 MG/DL (ref 2.5–4.9)
PLATELET # BLD AUTO: 360 X10*3/UL (ref 150–450)
POTASSIUM SERPL-SCNC: 5 MMOL/L (ref 3.5–5.3)
RBC # BLD AUTO: 2.81 X10*6/UL (ref 4.5–5.9)
SODIUM SERPL-SCNC: 128 MMOL/L (ref 136–145)
WBC # BLD AUTO: 5.4 X10*3/UL (ref 4.4–11.3)

## 2024-11-15 PROCEDURE — 36415 COLL VENOUS BLD VENIPUNCTURE: CPT | Performed by: STUDENT IN AN ORGANIZED HEALTH CARE EDUCATION/TRAINING PROGRAM

## 2024-11-15 PROCEDURE — 83735 ASSAY OF MAGNESIUM: CPT | Performed by: STUDENT IN AN ORGANIZED HEALTH CARE EDUCATION/TRAINING PROGRAM

## 2024-11-15 PROCEDURE — 2500000002 HC RX 250 W HCPCS SELF ADMINISTERED DRUGS (ALT 637 FOR MEDICARE OP, ALT 636 FOR OP/ED): Performed by: STUDENT IN AN ORGANIZED HEALTH CARE EDUCATION/TRAINING PROGRAM

## 2024-11-15 PROCEDURE — 82947 ASSAY GLUCOSE BLOOD QUANT: CPT

## 2024-11-15 PROCEDURE — 1100000001 HC PRIVATE ROOM DAILY

## 2024-11-15 PROCEDURE — 80069 RENAL FUNCTION PANEL: CPT | Performed by: STUDENT IN AN ORGANIZED HEALTH CARE EDUCATION/TRAINING PROGRAM

## 2024-11-15 PROCEDURE — 84100 ASSAY OF PHOSPHORUS: CPT | Performed by: STUDENT IN AN ORGANIZED HEALTH CARE EDUCATION/TRAINING PROGRAM

## 2024-11-15 PROCEDURE — 2500000005 HC RX 250 GENERAL PHARMACY W/O HCPCS: Performed by: STUDENT IN AN ORGANIZED HEALTH CARE EDUCATION/TRAINING PROGRAM

## 2024-11-15 PROCEDURE — 2500000001 HC RX 250 WO HCPCS SELF ADMINISTERED DRUGS (ALT 637 FOR MEDICARE OP): Performed by: STUDENT IN AN ORGANIZED HEALTH CARE EDUCATION/TRAINING PROGRAM

## 2024-11-15 PROCEDURE — 99233 SBSQ HOSP IP/OBS HIGH 50: CPT | Performed by: STUDENT IN AN ORGANIZED HEALTH CARE EDUCATION/TRAINING PROGRAM

## 2024-11-15 PROCEDURE — 99497 ADVNCD CARE PLAN 30 MIN: CPT

## 2024-11-15 PROCEDURE — 2500000004 HC RX 250 GENERAL PHARMACY W/ HCPCS (ALT 636 FOR OP/ED): Performed by: STUDENT IN AN ORGANIZED HEALTH CARE EDUCATION/TRAINING PROGRAM

## 2024-11-15 PROCEDURE — 85027 COMPLETE CBC AUTOMATED: CPT | Performed by: STUDENT IN AN ORGANIZED HEALTH CARE EDUCATION/TRAINING PROGRAM

## 2024-11-15 PROCEDURE — 99233 SBSQ HOSP IP/OBS HIGH 50: CPT

## 2024-11-15 PROCEDURE — 99498 ADVNCD CARE PLAN ADDL 30 MIN: CPT

## 2024-11-15 RX ORDER — GLYCOPYRROLATE 0.2 MG/ML
0.2 INJECTION INTRAMUSCULAR; INTRAVENOUS EVERY 4 HOURS PRN
Status: DISCONTINUED | OUTPATIENT
Start: 2024-11-15 | End: 2024-11-18

## 2024-11-15 RX ORDER — LORAZEPAM 2 MG/ML
0.5 INJECTION INTRAMUSCULAR EVERY 4 HOURS PRN
Status: DISCONTINUED | OUTPATIENT
Start: 2024-11-15 | End: 2024-11-19

## 2024-11-15 RX ORDER — MORPHINE SULFATE 4 MG/ML
4 INJECTION INTRAVENOUS EVERY 4 HOURS PRN
Status: DISCONTINUED | OUTPATIENT
Start: 2024-11-15 | End: 2024-11-19

## 2024-11-15 RX ORDER — MORPHINE SULFATE 4 MG/ML
2 INJECTION INTRAVENOUS EVERY 4 HOURS PRN
Status: DISCONTINUED | OUTPATIENT
Start: 2024-11-15 | End: 2024-11-19

## 2024-11-15 RX ADMIN — Medication 15 ML: at 09:08

## 2024-11-15 RX ADMIN — HEPARIN SODIUM 5000 UNITS: 5000 INJECTION, SOLUTION INTRAVENOUS; SUBCUTANEOUS at 22:11

## 2024-11-15 RX ADMIN — GUAIFENESIN 200 MG: 100 SOLUTION ORAL at 15:45

## 2024-11-15 RX ADMIN — CARBOXYMETHYLCELLULOSE SODIUM 2 DROP: 5 SOLUTION/ DROPS OPHTHALMIC at 09:08

## 2024-11-15 RX ADMIN — SENNOSIDES AND DOCUSATE SODIUM 1 TABLET: 8.6; 5 TABLET ORAL at 09:08

## 2024-11-15 RX ADMIN — ATORVASTATIN CALCIUM 40 MG: 40 TABLET, FILM COATED ORAL at 22:12

## 2024-11-15 RX ADMIN — CARBOXYMETHYLCELLULOSE SODIUM 2 DROP: 5 SOLUTION/ DROPS OPHTHALMIC at 15:46

## 2024-11-15 RX ADMIN — INSULIN LISPRO 1 UNITS: 100 INJECTION, SOLUTION INTRAVENOUS; SUBCUTANEOUS at 18:07

## 2024-11-15 RX ADMIN — SENNOSIDES AND DOCUSATE SODIUM 1 TABLET: 8.6; 5 TABLET ORAL at 22:12

## 2024-11-15 RX ADMIN — INSULIN LISPRO 2 UNITS: 100 INJECTION, SOLUTION INTRAVENOUS; SUBCUTANEOUS at 12:49

## 2024-11-15 RX ADMIN — OLANZAPINE 5 MG: 10 INJECTION, POWDER, LYOPHILIZED, FOR SOLUTION INTRAMUSCULAR at 06:43

## 2024-11-15 RX ADMIN — HEPARIN SODIUM 5000 UNITS: 5000 INJECTION, SOLUTION INTRAVENOUS; SUBCUTANEOUS at 06:30

## 2024-11-15 RX ADMIN — HEPARIN SODIUM 5000 UNITS: 5000 INJECTION, SOLUTION INTRAVENOUS; SUBCUTANEOUS at 15:45

## 2024-11-15 RX ADMIN — SILODOSIN 4 MG: 4 CAPSULE ORAL at 09:08

## 2024-11-15 RX ADMIN — INSULIN LISPRO 2 UNITS: 100 INJECTION, SOLUTION INTRAVENOUS; SUBCUTANEOUS at 01:22

## 2024-11-15 RX ADMIN — INSULIN LISPRO 2 UNITS: 100 INJECTION, SOLUTION INTRAVENOUS; SUBCUTANEOUS at 06:44

## 2024-11-15 RX ADMIN — GUAIFENESIN 200 MG: 100 SOLUTION ORAL at 22:11

## 2024-11-15 RX ADMIN — GUAIFENESIN 200 MG: 100 SOLUTION ORAL at 09:08

## 2024-11-15 RX ADMIN — ASPIRIN 81 MG CHEWABLE TABLET 81 MG: 81 TABLET CHEWABLE at 09:08

## 2024-11-15 RX ADMIN — LORAZEPAM 0.5 MG: 2 INJECTION INTRAMUSCULAR; INTRAVENOUS at 18:06

## 2024-11-15 ASSESSMENT — COGNITIVE AND FUNCTIONAL STATUS - GENERAL
TOILETING: TOTAL
MOVING FROM LYING ON BACK TO SITTING ON SIDE OF FLAT BED WITH BEDRAILS: TOTAL
MOVING FROM LYING ON BACK TO SITTING ON SIDE OF FLAT BED WITH BEDRAILS: TOTAL
MOBILITY SCORE: 6
STANDING UP FROM CHAIR USING ARMS: TOTAL
MOVING TO AND FROM BED TO CHAIR: TOTAL
TOILETING: TOTAL
WALKING IN HOSPITAL ROOM: TOTAL
DRESSING REGULAR UPPER BODY CLOTHING: TOTAL
TURNING FROM BACK TO SIDE WHILE IN FLAT BAD: TOTAL
DRESSING REGULAR LOWER BODY CLOTHING: TOTAL
TURNING FROM BACK TO SIDE WHILE IN FLAT BAD: TOTAL
HELP NEEDED FOR BATHING: TOTAL
MOBILITY SCORE: 6
STANDING UP FROM CHAIR USING ARMS: TOTAL
MOVING TO AND FROM BED TO CHAIR: TOTAL
PERSONAL GROOMING: TOTAL
HELP NEEDED FOR BATHING: TOTAL
DAILY ACTIVITIY SCORE: 6
TURNING FROM BACK TO SIDE WHILE IN FLAT BAD: TOTAL
STANDING UP FROM CHAIR USING ARMS: TOTAL
MOVING FROM LYING ON BACK TO SITTING ON SIDE OF FLAT BED WITH BEDRAILS: TOTAL
DRESSING REGULAR UPPER BODY CLOTHING: TOTAL
EATING MEALS: TOTAL
DRESSING REGULAR LOWER BODY CLOTHING: TOTAL
CLIMB 3 TO 5 STEPS WITH RAILING: TOTAL
PERSONAL GROOMING: TOTAL
DAILY ACTIVITIY SCORE: 6
TOILETING: TOTAL
EATING MEALS: TOTAL
DAILY ACTIVITIY SCORE: 6
DRESSING REGULAR LOWER BODY CLOTHING: TOTAL
WALKING IN HOSPITAL ROOM: TOTAL
MOBILITY SCORE: 6
EATING MEALS: TOTAL
WALKING IN HOSPITAL ROOM: TOTAL
DRESSING REGULAR UPPER BODY CLOTHING: TOTAL
HELP NEEDED FOR BATHING: TOTAL
MOVING TO AND FROM BED TO CHAIR: TOTAL
CLIMB 3 TO 5 STEPS WITH RAILING: TOTAL
PERSONAL GROOMING: TOTAL
CLIMB 3 TO 5 STEPS WITH RAILING: TOTAL

## 2024-11-15 ASSESSMENT — PAIN SCALES - PAIN ASSESSMENT IN ADVANCED DEMENTIA (PAINAD)
TOTALSCORE: 1
TOTALSCORE: 1
CONSOLABILITY: NO NEED TO CONSOLE
BREATHING: NORMAL
CONSOLABILITY: NO NEED TO CONSOLE
FACIALEXPRESSION: SMILING OR INEXPRESSIVE
BODYLANGUAGE: RELAXED
TOTALSCORE: 0
BREATHING: NORMAL
FACIALEXPRESSION: SMILING OR INEXPRESSIVE
BODYLANGUAGE: RELAXED
BODYLANGUAGE: RELAXED
TOTALSCORE: MEDICATION (SEE MAR)
CONSOLABILITY: NO NEED TO CONSOLE
NEGVOCALIZATION: OCCASIONAL MOAN/GROAN, LOW SPEECH, NEGATIVE/DISAPPROVING QUALITY
NEGVOCALIZATION: OCCASIONAL MOAN/GROAN, LOW SPEECH, NEGATIVE/DISAPPROVING QUALITY
FACIALEXPRESSION: SMILING OR INEXPRESSIVE
BREATHING: NORMAL

## 2024-11-15 ASSESSMENT — PAIN - FUNCTIONAL ASSESSMENT
PAIN_FUNCTIONAL_ASSESSMENT: PAINAD (PAIN ASSESSMENT IN ADVANCED DEMENTIA SCALE)

## 2024-11-15 ASSESSMENT — PAIN SCALES - GENERAL
PAINLEVEL_OUTOF10: 1
PAINLEVEL_OUTOF10: 1
PAINLEVEL_OUTOF10: 0 - NO PAIN

## 2024-11-15 ASSESSMENT — PAIN SCALES - WONG BAKER: WONGBAKER_NUMERICALRESPONSE: HURTS LITTLE BIT

## 2024-11-15 NOTE — PROGRESS NOTES
Transitional Care Coordination Progress Note:  Patient discussed during interdisciplinary rounds.  Team members present: MD, JUNIE, SW  Plan per Medical/Surgical team: Plan for family meeting today with Palliative Care team.  Payer: [facility] Medicare  Status: Inpatient  Discharge disposition: Home with [facility] vs inpatient hospice pending family meeting with [facility].  Potential Barriers: none  ADOD: 11/19  Palliative Care and primary SW is following for hospice consult. Care coordinator will continue to follow for discharge planning needs.     Jose Rafael Tucker RN  Transitional Care Coordinator (TCC)  [phone] or [phone]

## 2024-11-15 NOTE — PROGRESS NOTES
Participated in a family meeting with Dr. Nguyễn from the primary team and Kira Esteves DNP and Rev. Josephine Stokes from the palliative care team with pt, his wife Kati, and sons Jorge A and Rich.  Family felt that they had received a lot of differing information while pt was at [facility], and wanted better understanding of pt's whole hospital course.  Team provided what has been treated, and what our concerns are now.  Pt with weak, wet cough, and very lethargic.  Pt had been able to be more interactive with family yesterday, but still able to communicate with them.  After discussing pt's slow decline, palliative care team recommended hospice care.  Family does not feel they can take care of him at home, and would be very interested in pt going to [facility].  Referral placed.  Family processing all information provided during the meeting today, and will schedule a meeting with hospice when convenient for them and family.  Explained how pt needs to have symptoms that need to be managed in order for pt to qualify for in hospice, but team members feel that he qualifies at this time.  Will continue to follow.    1615 [facility] to meet with family and pt on Monday at 1;30 pm      YEYO Almeida

## 2024-11-15 NOTE — CARE PLAN
Brief Plan of Care     83yoM initially presented for NSTEMI c/b persistent encephalopathy without etiology despite extensive evaluation. Pulmonary consulted for bronchoscopic tissue sampling for incidentally found lung mass.     Bronchoscopic tissue sampling appears to portend significantly increased morbidity and mortality and could confer more harm than benefit as communicated with family [please see pulmonary consult note 11/13 1815].     Goals of care discussion performed today [Please see Palliative Medicine Note 11/15 by Lourdes ORONA].     At this time, no achievement of indication for inpatient pulmonary evaluation.     Thank you for involving pulmonary medicine in the care of Dat Mccallum.   Pulmonary medicine to sign-off   Please re-engage if further inquiry   Case discussed with Dr. Inna Patrick MD

## 2024-11-15 NOTE — PROGRESS NOTES
Occupational Therapy                 Therapy Communication Note    Patient Name: Dat Mccallum  MRN: [id number]  Department: [place] [address]  Room: 5063/5063-A  Today's Date: 11/15/2024     Discipline: Occupational Therapy    Missed Visit Reason: Missed Visit Reason:  (GOC moving towards comfort care per EMR, d/c order please re-in state as debbie)    Missed Time: Attempt    Comment:

## 2024-11-15 NOTE — PROGRESS NOTES
Hospitalist Progress Note        Name: Dat Mccallum  :  1940(83 y.o.)  MRN:  [id number]    Date: 11/15/24     SUBJECTIVE     HPI:  This is a 83 y.o. male with past medical history of HFrEF (EF improved from 20% to stress test 10/21 with LVEF 44%), DMII, HTN, HLD ,CKD, BPH, Deaf (ASL)  transferred from OSH (on Hosp Day 23) to [facility] for  continued Acute Encephalopathy workup. Pt initially presented for AMS. Found to have NSTEMI, reduced EF but no significant stenosis on cardiac cath. Pt has been  hypotensive requiring intermittant midodinre and has not tolerated GDMT. Pt treated with IV abx for UTI and pneumonia. Due to persistent encepahlopathy, he was broadened to cover for CNS infection. He was demonstrating continue encephalopathy, with agitation, waxing and waning state, making non-sensicle/repetitive phrases as well as possible L hemineglect. MRI 10/19 and  without acute infarct, showed mod to marked volume loss, nonspecific changes consistent with microvascular disease. EEG showed diffuse encephalopathy without lateralization or seizure activity. Pt transferred to [facility] on IV vanc/ceftriaxone/amp/acyclovir. Underwent LP, noninfectious, antibiotics and antivirals discontinued. Awaiting further CSF studies per neurology. ID also consulted. CSF is not concerning for infection, antibiotics and antivirals deescalated. 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 CT chest done to evaluate for malignancy concerning for mass-like obstruction which is read as malignancy until proven otherwise. Pulm consulted for possible bronch and palliative consulted for GOC discussions.    Interval History:   Vitals and chart notes from overnight reviewed. No acute issues overnight.   Patient seen and evaluated at bedside. He is awake and signing but very tired and difficult to stay awake at times. Coughing with inability to clear airways.  Family meeting held with sons, Jorge A and Rich, as well as patient's wife Shakira.  Palliative care present. Ultimately, decided to consult hospice for further discussions. If hospice appropriate, patient's family leaning towards inpatient hospice at hospice house if possible.    Review of Systems:   Unable to obtain    Current medications:  Scheduled Meds:aspirin, 81 mg, nasogastric tube, Daily  atorvastatin, 40 mg, nasogastric tube, Nightly  guaiFENesin, 200 mg, nasogastric tube, TID  heparin, 5,000 Units, subcutaneous, q8h  insulin lispro, 0-5 Units, subcutaneous, q6h  lubricating eye drops, 2 drop, Both Eyes, TID  moisturizing mouth, 15 mL, Swish & Spit, BID  sennosides-docusate sodium, 1 tablet, nasogastric tube, BID  silodosin, 4 mg, nasogastric tube, Daily with breakfast      Continuous Infusions:   PRN Meds:PRN medications: acetaminophen, acetaminophen, dextrose, dextrose, glucagon, glucagon, ipratropium-albuteroL, midodrine, OLANZapine, oxygen      OBJECTIVE     Vitals:    11/14/24 1643 11/14/24 2355 11/15/24 0815 11/15/24 1544   BP: 139/75 117/68 123/57 128/69   Pulse: 94 95 84 95   Resp: 19 18 15 18   Temp: 36.7 °C (98.1 °F) 37.1 °C (98.8 °F) 36.4 °C (97.5 °F) 36.5 °C (97.7 °F)   TempSrc:   Temporal Temporal   SpO2: 96% 96% 98% 91%   Weight:       Height:          Physical Exam  Vitals and nursing note reviewed.   Constitutional:       Appearance: He is ill-appearing.      Comments: lethargic   HENT:      Head: Normocephalic.      Nose:      Comments: Dobhoff in place     Mouth/Throat:      Mouth: Mucous membranes are dry.      Comments: Caked mucus secretions in oral cavity  Cardiovascular:      Rate and Rhythm: Normal rate and regular rhythm.   Pulmonary:      Effort: Pulmonary effort is normal.      Breath sounds: Rhonchi present.   Abdominal:      General: There is no distension.      Palpations: Abdomen is soft.      Tenderness: There is no abdominal tenderness.   Musculoskeletal:      Right lower leg: No edema.      Left lower leg: No edema.         Labs:   Lab Results   Component  Value Date     (L) 11/15/2024    K 5.0 11/15/2024    CL 99 11/15/2024    CO2 25 11/15/2024    BUN 21 11/15/2024    CREATININE 0.86 11/15/2024    GLUCOSE 253 (H) 11/15/2024    CALCIUM 8.2 (L) 11/15/2024    PROT 6.3 (L) 11/07/2024    BILITOT 0.7 11/07/2024    ALKPHOS 53 11/07/2024    AST 40 (H) 11/07/2024    ALT 30 11/07/2024    GLOB 3.4 04/18/2021       Lab Results   Component Value Date    WBC 5.4 11/15/2024    HGB 8.4 (L) 11/15/2024    HCT 26.0 (L) 11/15/2024    MCV 93 11/15/2024     11/15/2024       Imaging (within the past 24 hours):   No results found.      ASSESSMENT & PLAN     Acute persistent encephalopathy - paraneoplastic syndrome?  L lung mass-like obstruction, concern for malignancy  Hyponatremia  Severe protein calorie malnutrition  - persistent encephalopathy, possible L alfred neglect seen during course. Hospitalized for about 3 weeks with pneumonia, uti, and nstemi/HF.   - MRI with diffuse volume loss, no infarct or other attributable finding. Completed 10/19 and 11/6  - eeg with diffuse encephalopathy  - b12, b9, syph, hiv, tsh tested  - s/p 3 days high dose thiamine   - paraneoplastic panel, yanet/VIDAL pending  - CT a/p showed no evidence of malignant mass, e/o constipation with stercolal colitis.   -CT chest 11/12 concerning for masslike obstruction of the L mainstem bronchus, malignancy until proven otherwise - pulmonology consulted who state risks outweigh benefits of bronchoscopy given such weakened state; recommend GOC discussions with family  -Consulted palliative care for GOC discussions and symptom management; will need to consider PEG tube placement soon if not going with comfort  -As per HPI, patient's son (LARA Jules) and rest of family open to hospice discussions - hospice consulted and planning for meeting Monday  - LP completed, no infection. 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 Coreg  discontinued 11/8 due to ongoing hypotension. Will monitor.   - Currently normotensive; midodrine ordered PRN but has not required since 11/8     Hospital acquired pneumonia - treated  - s/p zosyn     UTI with hematuria - treated  - s/p abx     Acute urinary retention  - at osh, urology unable to pass trotter, plan to monitor clinically and if pt develops niurka or signs of obstruction then will need cystoscopy.   - renal function is stable  - Continues to retain urine in large levels; able to straight cath therefore will continue this PRN for bladder scan >300  -On silodosin to aid with BPH     Hypotension  - continue midodrine prn     Severe protein-calorie malnutrition  Dysphagia in setting of AMS  - Continue tube feeds, monitor electrolytes. Will adjust FWF based on labs. On isosource 1.5 at goal of 50cc/hr via Dobhoff  -Dobhoff placed 10/31/24  - slp, nutrition consulted  - bid RFP, mag until stable     DMII  -BG consistently >180, will start sliding scale insulin q6h     Nephrolithiasis  - L 1.8 non-obstructing stone     Stercolal colitis  - seen on CT  - On bowel regimen and suppository/enema    Malnutrition Diagnosis Status: New  Malnutrition Diagnosis: Severe malnutrition related to chronic disease or condition  As Evidenced by: severe subcutaeous fat loss, severe muscle wasting, suspect meeting < 50% of EER for > 1 month and low BMI 19.5  I agree with the dietitian's malnutrition diagnosis.    DVT Prophylaxis: heparin 5000 q 8 hour    Code status: DNR and No Intubation  Diet: Enteral feeding with NPO Isosource 1.5; NG (nasogastric tube); 50; 150; Water; Every 6 hours     Disposition: continue to monitor inpatient, await consultant recommendations, await test results, and await clinical improvement    Level of MDM:  High   Risk: High   Data Reviewed  and/or Analyzed:   Included: Prior external notes from at least 1 unique source, Results, including laboratory findings and imaging reports, listed above, Orders and notes from all consultants involved, and Notes for this encounter.  I personally reviewed the tests referenced above.  I discussed plan of care with patient's family, palliative care team, , sitter, nurse, TCC/SW.    The patient/family had opportunity to ask questions. All questions were answered to the best of my ability.    Between 7AM-7PM please message me via Epic Secure Chat.  After 7PM please page Nocturnist on call.    Electronically signed by Yudith Nguyễn DO on 11/15/24 at 5:09 PM

## 2024-11-15 NOTE — PROGRESS NOTES
Spiritual Care Visit    Clinical Encounter Type  Visited With: Patient and family together  Routine Visit: Follow-up  Continue Visiting: Yes    Met with patient's wife, Shakira, sons Jorge A and  Rich along with Palliative CNP, SW and Primary Attending to discuss patient's condition, care at other facilities prior to [facility] and patient's active/independent life outside of the hospital. Discuss medical plans moving forward. Provided non-anxious, supportive presence, reflective listening, affirmation and normalization of experience. Will continue to follow.

## 2024-11-16 LAB
ALBUMIN SERPL BCP-MCNC: 2.3 G/DL (ref 3.4–5)
ANION GAP SERPL CALC-SCNC: 10 MMOL/L (ref 10–20)
BACTERIA CSF CULT: NORMAL
BUN SERPL-MCNC: 23 MG/DL (ref 6–23)
CALCIUM SERPL-MCNC: 8.9 MG/DL (ref 8.6–10.6)
CHLORIDE SERPL-SCNC: 99 MMOL/L (ref 98–107)
CO2 SERPL-SCNC: 27 MMOL/L (ref 21–32)
CREAT SERPL-MCNC: 0.93 MG/DL (ref 0.5–1.3)
EGFRCR SERPLBLD CKD-EPI 2021: 82 ML/MIN/1.73M*2
ERYTHROCYTE [DISTWIDTH] IN BLOOD BY AUTOMATED COUNT: 15.6 % (ref 11.5–14.5)
GLUCOSE BLD MANUAL STRIP-MCNC: 193 MG/DL (ref 74–99)
GLUCOSE BLD MANUAL STRIP-MCNC: 194 MG/DL (ref 74–99)
GLUCOSE BLD MANUAL STRIP-MCNC: 204 MG/DL (ref 74–99)
GLUCOSE BLD MANUAL STRIP-MCNC: 283 MG/DL (ref 74–99)
GLUCOSE BLD MANUAL STRIP-MCNC: 288 MG/DL (ref 74–99)
GLUCOSE SERPL-MCNC: 282 MG/DL (ref 74–99)
GRAM STN SPEC: NORMAL
GRAM STN SPEC: NORMAL
HCT VFR BLD AUTO: 27 % (ref 41–52)
HGB BLD-MCNC: 8.7 G/DL (ref 13.5–17.5)
MAGNESIUM SERPL-MCNC: 1.82 MG/DL (ref 1.6–2.4)
MCH RBC QN AUTO: 29.3 PG (ref 26–34)
MCHC RBC AUTO-ENTMCNC: 32.2 G/DL (ref 32–36)
MCV RBC AUTO: 91 FL (ref 80–100)
NRBC BLD-RTO: 0 /100 WBCS (ref 0–0)
PHOSPHATE SERPL-MCNC: 2.7 MG/DL (ref 2.5–4.9)
PLATELET # BLD AUTO: 342 X10*3/UL (ref 150–450)
POTASSIUM SERPL-SCNC: 4.5 MMOL/L (ref 3.5–5.3)
RBC # BLD AUTO: 2.97 X10*6/UL (ref 4.5–5.9)
SODIUM SERPL-SCNC: 131 MMOL/L (ref 136–145)
WBC # BLD AUTO: 5.3 X10*3/UL (ref 4.4–11.3)

## 2024-11-16 PROCEDURE — 2500000005 HC RX 250 GENERAL PHARMACY W/O HCPCS: Performed by: STUDENT IN AN ORGANIZED HEALTH CARE EDUCATION/TRAINING PROGRAM

## 2024-11-16 PROCEDURE — 2500000002 HC RX 250 W HCPCS SELF ADMINISTERED DRUGS (ALT 637 FOR MEDICARE OP, ALT 636 FOR OP/ED): Performed by: STUDENT IN AN ORGANIZED HEALTH CARE EDUCATION/TRAINING PROGRAM

## 2024-11-16 PROCEDURE — 1100000001 HC PRIVATE ROOM DAILY

## 2024-11-16 PROCEDURE — 2500000001 HC RX 250 WO HCPCS SELF ADMINISTERED DRUGS (ALT 637 FOR MEDICARE OP): Performed by: STUDENT IN AN ORGANIZED HEALTH CARE EDUCATION/TRAINING PROGRAM

## 2024-11-16 PROCEDURE — 2500000004 HC RX 250 GENERAL PHARMACY W/ HCPCS (ALT 636 FOR OP/ED): Performed by: STUDENT IN AN ORGANIZED HEALTH CARE EDUCATION/TRAINING PROGRAM

## 2024-11-16 PROCEDURE — 80069 RENAL FUNCTION PANEL: CPT | Performed by: STUDENT IN AN ORGANIZED HEALTH CARE EDUCATION/TRAINING PROGRAM

## 2024-11-16 PROCEDURE — 82947 ASSAY GLUCOSE BLOOD QUANT: CPT

## 2024-11-16 PROCEDURE — 85027 COMPLETE CBC AUTOMATED: CPT | Performed by: STUDENT IN AN ORGANIZED HEALTH CARE EDUCATION/TRAINING PROGRAM

## 2024-11-16 PROCEDURE — 36415 COLL VENOUS BLD VENIPUNCTURE: CPT | Performed by: STUDENT IN AN ORGANIZED HEALTH CARE EDUCATION/TRAINING PROGRAM

## 2024-11-16 PROCEDURE — 99233 SBSQ HOSP IP/OBS HIGH 50: CPT | Performed by: STUDENT IN AN ORGANIZED HEALTH CARE EDUCATION/TRAINING PROGRAM

## 2024-11-16 PROCEDURE — 83735 ASSAY OF MAGNESIUM: CPT | Performed by: STUDENT IN AN ORGANIZED HEALTH CARE EDUCATION/TRAINING PROGRAM

## 2024-11-16 RX ADMIN — GUAIFENESIN 200 MG: 100 SOLUTION ORAL at 09:15

## 2024-11-16 RX ADMIN — HEPARIN SODIUM 5000 UNITS: 5000 INJECTION, SOLUTION INTRAVENOUS; SUBCUTANEOUS at 22:39

## 2024-11-16 RX ADMIN — ACETAMINOPHEN 650 MG: 160 SOLUTION ORAL at 09:15

## 2024-11-16 RX ADMIN — HEPARIN SODIUM 5000 UNITS: 5000 INJECTION, SOLUTION INTRAVENOUS; SUBCUTANEOUS at 06:15

## 2024-11-16 RX ADMIN — INSULIN LISPRO 1 UNITS: 100 INJECTION, SOLUTION INTRAVENOUS; SUBCUTANEOUS at 00:43

## 2024-11-16 RX ADMIN — CARBOXYMETHYLCELLULOSE SODIUM 2 DROP: 5 SOLUTION/ DROPS OPHTHALMIC at 22:39

## 2024-11-16 RX ADMIN — LORAZEPAM 0.5 MG: 2 INJECTION INTRAMUSCULAR; INTRAVENOUS at 15:54

## 2024-11-16 RX ADMIN — HEPARIN SODIUM 5000 UNITS: 5000 INJECTION, SOLUTION INTRAVENOUS; SUBCUTANEOUS at 15:55

## 2024-11-16 RX ADMIN — MORPHINE SULFATE 2 MG: 4 INJECTION INTRAVENOUS at 17:42

## 2024-11-16 RX ADMIN — INSULIN LISPRO 1 UNITS: 100 INJECTION, SOLUTION INTRAVENOUS; SUBCUTANEOUS at 18:37

## 2024-11-16 RX ADMIN — INSULIN LISPRO 3 UNITS: 100 INJECTION, SOLUTION INTRAVENOUS; SUBCUTANEOUS at 12:26

## 2024-11-16 RX ADMIN — GUAIFENESIN 200 MG: 100 SOLUTION ORAL at 15:54

## 2024-11-16 RX ADMIN — CARBOXYMETHYLCELLULOSE SODIUM 2 DROP: 5 SOLUTION/ DROPS OPHTHALMIC at 15:55

## 2024-11-16 RX ADMIN — SENNOSIDES AND DOCUSATE SODIUM 1 TABLET: 8.6; 5 TABLET ORAL at 09:15

## 2024-11-16 RX ADMIN — ATORVASTATIN CALCIUM 40 MG: 40 TABLET, FILM COATED ORAL at 21:11

## 2024-11-16 RX ADMIN — INSULIN LISPRO 3 UNITS: 100 INJECTION, SOLUTION INTRAVENOUS; SUBCUTANEOUS at 06:19

## 2024-11-16 RX ADMIN — ASPIRIN 81 MG CHEWABLE TABLET 81 MG: 81 TABLET CHEWABLE at 09:15

## 2024-11-16 RX ADMIN — CARBOXYMETHYLCELLULOSE SODIUM 2 DROP: 5 SOLUTION/ DROPS OPHTHALMIC at 09:15

## 2024-11-16 RX ADMIN — Medication 15 ML: at 09:15

## 2024-11-16 RX ADMIN — GUAIFENESIN 200 MG: 100 SOLUTION ORAL at 21:11

## 2024-11-16 RX ADMIN — SENNOSIDES AND DOCUSATE SODIUM 1 TABLET: 8.6; 5 TABLET ORAL at 21:11

## 2024-11-16 RX ADMIN — SILODOSIN 4 MG: 4 CAPSULE ORAL at 12:26

## 2024-11-16 ASSESSMENT — COGNITIVE AND FUNCTIONAL STATUS - GENERAL
DRESSING REGULAR LOWER BODY CLOTHING: TOTAL
PERSONAL GROOMING: TOTAL
TOILETING: TOTAL
MOVING TO AND FROM BED TO CHAIR: TOTAL
EATING MEALS: TOTAL
PERSONAL GROOMING: TOTAL
WALKING IN HOSPITAL ROOM: TOTAL
MOVING TO AND FROM BED TO CHAIR: TOTAL
HELP NEEDED FOR BATHING: TOTAL
DAILY ACTIVITIY SCORE: 6
EATING MEALS: TOTAL
WALKING IN HOSPITAL ROOM: TOTAL
MOVING FROM LYING ON BACK TO SITTING ON SIDE OF FLAT BED WITH BEDRAILS: TOTAL
STANDING UP FROM CHAIR USING ARMS: TOTAL
MOBILITY SCORE: 6
STANDING UP FROM CHAIR USING ARMS: TOTAL
TURNING FROM BACK TO SIDE WHILE IN FLAT BAD: TOTAL
CLIMB 3 TO 5 STEPS WITH RAILING: TOTAL
DRESSING REGULAR UPPER BODY CLOTHING: TOTAL
DAILY ACTIVITIY SCORE: 6
TURNING FROM BACK TO SIDE WHILE IN FLAT BAD: TOTAL
MOVING FROM LYING ON BACK TO SITTING ON SIDE OF FLAT BED WITH BEDRAILS: TOTAL
MOBILITY SCORE: 6
DRESSING REGULAR LOWER BODY CLOTHING: TOTAL
DRESSING REGULAR UPPER BODY CLOTHING: TOTAL
HELP NEEDED FOR BATHING: TOTAL
TOILETING: TOTAL
CLIMB 3 TO 5 STEPS WITH RAILING: TOTAL

## 2024-11-16 ASSESSMENT — PAIN SCALES - PAIN ASSESSMENT IN ADVANCED DEMENTIA (PAINAD)
TOTALSCORE: 0
BREATHING: NORMAL
CONSOLABILITY: NO NEED TO CONSOLE
FACIALEXPRESSION: SMILING OR INEXPRESSIVE
BREATHING: NORMAL
CONSOLABILITY: NO NEED TO CONSOLE
TOTALSCORE: 1
FACIALEXPRESSION: SMILING OR INEXPRESSIVE
BODYLANGUAGE: RELAXED
BODYLANGUAGE: RELAXED
BREATHING: NORMAL
FACIALEXPRESSION: SMILING OR INEXPRESSIVE
NEGVOCALIZATION: OCCASIONAL MOAN/GROAN, LOW SPEECH, NEGATIVE/DISAPPROVING QUALITY
NEGVOCALIZATION: OCCASIONAL MOAN/GROAN, LOW SPEECH, NEGATIVE/DISAPPROVING QUALITY
BODYLANGUAGE: RELAXED
CONSOLABILITY: NO NEED TO CONSOLE
TOTALSCORE: 1

## 2024-11-16 ASSESSMENT — PAIN - FUNCTIONAL ASSESSMENT
PAIN_FUNCTIONAL_ASSESSMENT: PAINAD (PAIN ASSESSMENT IN ADVANCED DEMENTIA SCALE)
PAIN_FUNCTIONAL_ASSESSMENT: PAINAD (PAIN ASSESSMENT IN ADVANCED DEMENTIA SCALE)

## 2024-11-16 ASSESSMENT — PAIN SCALES - GENERAL
PAINLEVEL_OUTOF10: 1
PAINLEVEL_OUTOF10: 1

## 2024-11-16 NOTE — PROGRESS NOTES
Hospitalist Progress Note        Name: Dat Mcclalum  :  1940(83 y.o.)  MRN:  [id number]    Date: 24     SUBJECTIVE     HPI:  This is a 83 y.o. male with past medical history of HFrEF (EF improved from 20% to stress test 10/21 with LVEF 44%), DMII, HTN, HLD ,CKD, BPH, Deaf (ASL)  transferred from OSH (on Hosp Day 23) to [facility] for  continued Acute Encephalopathy workup. Pt initially presented for AMS. Found to have NSTEMI, reduced EF but no significant stenosis on cardiac cath. Pt has been  hypotensive requiring intermittant midodinre and has not tolerated GDMT. Pt treated with IV abx for UTI and pneumonia. Due to persistent encepahlopathy, he was broadened to cover for CNS infection. He was demonstrating continue encephalopathy, with agitation, waxing and waning state, making non-sensicle/repetitive phrases as well as possible L hemineglect. MRI 10/19 and  without acute infarct, showed mod to marked volume loss, nonspecific changes consistent with microvascular disease. EEG showed diffuse encephalopathy without lateralization or seizure activity. Pt transferred to [facility] on IV vanc/ceftriaxone/amp/acyclovir. Underwent LP, noninfectious, antibiotics and antivirals discontinued. Awaiting further CSF studies per neurology. ID also consulted. CSF is not concerning for infection, antibiotics and antivirals deescalated. 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 CT chest done to evaluate for malignancy concerning for mass-like obstruction which is read as malignancy until proven otherwise. Pulm consulted for possible bronch and palliative consulted for GOC discussions.    Interval History:   Vitals and chart notes from overnight reviewed. No acute issues overnight.   Patient seen and evaluated at bedside. Son, Rich, and wife, Shakira, at bedside. Sitter present. No change in patient's mentation or cough.    Review of Systems:   Unable to obtain    Current medications:  Scheduled Meds:aspirin, 81 mg, nasogastric  tube, Daily  atorvastatin, 40 mg, nasogastric tube, Nightly  guaiFENesin, 200 mg, nasogastric tube, TID  heparin, 5,000 Units, subcutaneous, q8h  insulin lispro, 0-5 Units, subcutaneous, q6h  lubricating eye drops, 2 drop, Both Eyes, TID  moisturizing mouth, 15 mL, Swish & Spit, BID  sennosides-docusate sodium, 1 tablet, nasogastric tube, BID  silodosin, 4 mg, nasogastric tube, Daily with breakfast      Continuous Infusions:   PRN Meds:PRN medications: acetaminophen, acetaminophen, dextrose, dextrose, glucagon, glucagon, glycopyrrolate, ipratropium-albuteroL, LORazepam, midodrine, morphine **OR** morphine, oxygen      OBJECTIVE     Vitals:    11/15/24 0815 11/15/24 1544 11/16/24 0010 11/16/24 0745   BP: 123/57 128/69 121/60 113/65   Pulse: 84 95 103 96   Resp: 15 18 17 16   Temp: 36.4 °C (97.5 °F) 36.5 °C (97.7 °F) 36.5 °C (97.7 °F) 35.3 °C (95.5 °F)   TempSrc: Temporal Temporal  Temporal   SpO2: 98% 91% 93% 93%   Weight:       Height:          Physical Exam  Vitals and nursing note reviewed.   Constitutional:       Appearance: He is ill-appearing.      Comments: lethargic   HENT:      Head: Normocephalic.      Nose:      Comments: Dobhoff in place     Mouth/Throat:      Mouth: Mucous membranes are dry.      Comments: Caked mucus secretions in oral cavity  Cardiovascular:      Rate and Rhythm: Normal rate and regular rhythm.   Pulmonary:      Effort: Pulmonary effort is normal.      Breath sounds: Rhonchi present.   Abdominal:      General: There is no distension.      Palpations: Abdomen is soft.      Tenderness: There is no abdominal tenderness.   Musculoskeletal:      Right lower leg: No edema.      Left lower leg: No edema.         Labs:   Lab Results   Component Value Date     (L) 11/16/2024    K 4.5 11/16/2024    CL 99 11/16/2024    CO2 27 11/16/2024    BUN 23 11/16/2024    CREATININE 0.93 11/16/2024    GLUCOSE 282 (H) 11/16/2024    CALCIUM 8.9 11/16/2024    PROT 6.3 (L) 11/07/2024    BILITOT 0.7  11/07/2024    ALKPHOS 53 11/07/2024    AST 40 (H) 11/07/2024    ALT 30 11/07/2024    GLOB 3.4 04/18/2021       Lab Results   Component Value Date    WBC 5.3 11/16/2024    HGB 8.7 (L) 11/16/2024    HCT 27.0 (L) 11/16/2024    MCV 91 11/16/2024     11/16/2024       Imaging (within the past 24 hours):   No results found.      ASSESSMENT & PLAN     Acute persistent encephalopathy - paraneoplastic syndrome?  L lung mass-like obstruction, concern for malignancy  Hyponatremia  Severe protein calorie malnutrition  - persistent encephalopathy, possible L alfred neglect seen during course. Hospitalized for about 3 weeks with pneumonia, uti, and nstemi/HF.   - MRI with diffuse volume loss, no infarct or other attributable finding. Completed 10/19 and 11/6  - eeg with diffuse encephalopathy  - b12, b9, syph, hiv, tsh tested  - s/p 3 days high dose thiamine   - paraneoplastic panel, yanet/VIDAL pending  - CT a/p showed no evidence of malignant mass, e/o constipation with stercolal colitis.   -CT chest 11/12 concerning for masslike obstruction of the L mainstem bronchus, malignancy until proven otherwise - pulmonology consulted who state risks outweigh benefits of bronchoscopy given such weakened state; recommend GOC discussions with family  -Consulted palliative care for GOC discussions and symptom management; will need to consider PEG tube placement soon if not going with comfort  -As per HPI, patient's son (LARA Jules) and rest of family open to hospice discussions - hospice consulted and planning for meeting Monday  - LP completed, no infection. 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 Coreg  discontinued 11/8 due to ongoing hypotension. Will monitor.   - Currently  normotensive; midodrine ordered PRN but has not required since 11/8     Hospital acquired pneumonia - treated  - s/p zosyn     UTI with hematuria - treated  - s/p abx     Acute urinary retention  - at osh, urology unable to pass trotter, plan to monitor clinically and if pt develops niurka or signs of obstruction then will need cystoscopy.   - renal function is stable  - Continues to retain urine in large levels; able to straight cath therefore will continue this PRN for bladder scan >300  -On silodosin to aid with BPH     Hypotension  - continue midodrine prn     Severe protein-calorie malnutrition  Dysphagia in setting of AMS  - Continue tube feeds, monitor electrolytes. Will adjust FWF based on labs. On isosource 1.5 at goal of 50cc/hr via Dobhoff  -Dobhoff placed 10/31/24  - slp, nutrition consulted  - bid RFP, mag until stable     DMII  -BG consistently >180, will start sliding scale insulin q6h     Nephrolithiasis  - L 1.8 non-obstructing stone     Stercolal colitis  - seen on CT  - On bowel regimen and suppository/enema    Malnutrition Diagnosis Status: New  Malnutrition Diagnosis: Severe malnutrition related to chronic disease or condition  As Evidenced by: severe subcutaeous fat loss, severe muscle wasting, suspect meeting < 50% of EER for > 1 month and low BMI 19.5  I agree with the dietitian's malnutrition diagnosis.    DVT Prophylaxis: heparin 5000 q 8 hour    Code status: DNR and No Intubation  Diet: Enteral feeding with NPO Isosource 1.5; NG (nasogastric tube); 50; 150; Water; Every 6 hours     Disposition: continue to monitor inpatient, await consultant recommendations, await test results, and await clinical improvement    Level of MDM:  High   Risk: High   Data Reviewed and/or Analyzed:   Included: Prior external notes from at least 1 unique source, Results, including laboratory findings and imaging reports, listed above, Orders and notes from all consultants involved, and Notes for this encounter.  I  personally reviewed the tests referenced above.  I discussed plan of care with patient's family, palliative care team, , sitter, nurse, TCC/SW.    The patient/family had opportunity to ask questions. All questions were answered to the best of my ability.    Between 7AM-7PM please message me via Epic Secure Chat.  After 7PM please page Nocturnist on call.    Electronically signed by Yudith Nguyễn DO on 11/16/24 at 1:37 PM

## 2024-11-16 NOTE — PROGRESS NOTES
"Palliative Medicine following for:  Complex medical decision making, symptom management, patient/family support    History obtained from chart review including ED note, H&P, patient's daily progress notes, review of lab/test results, and discussion with primary team and bedside RN.    Subjective    Supportive visit at bedside with patient two sons, Jorge A and Rich, and patient's wife.   Patient is very drowsy and unable to participate in conversation    Symptoms  Appears very drowsy and fatigued, ROS limited    Objective    Last Recorded Vitals  /69   Pulse 95   Temp 36.5 °C (97.7 °F) (Temporal)   Resp 18   Ht 1.829 m (6' 0.01\")   Wt 65.2 kg (143 lb 11.8 oz)   SpO2 91%   BMI 19.49 kg/m²      Physical Exam  Constitutional:       Appearance: He is ill-appearing.      Comments: Severe protein calorie malnutrition, temporal wasting   HENT:      Head: Normocephalic.      Mouth/Throat:      Mouth: Mucous membranes are dry.   Pulmonary:      Comments: Productive cough  Abdominal:      Palpations: Abdomen is soft.   Skin:     General: Skin is dry.      Coloration: Skin is pale.          Relevant Results   Results for orders placed or performed during the hospital encounter of 11/08/24 (from the past 24 hours)   POCT GLUCOSE   Result Value Ref Range    POCT Glucose 228 (H) 74 - 99 mg/dL   CBC   Result Value Ref Range    WBC 5.4 4.4 - 11.3 x10*3/uL    nRBC 0.0 0.0 - 0.0 /100 WBCs    RBC 2.81 (L) 4.50 - 5.90 x10*6/uL    Hemoglobin 8.4 (L) 13.5 - 17.5 g/dL    Hematocrit 26.0 (L) 41.0 - 52.0 %    MCV 93 80 - 100 fL    MCH 29.9 26.0 - 34.0 pg    MCHC 32.3 32.0 - 36.0 g/dL    RDW 15.2 (H) 11.5 - 14.5 %    Platelets 360 150 - 450 x10*3/uL   Renal Function Panel   Result Value Ref Range    Glucose 253 (H) 74 - 99 mg/dL    Sodium 128 (L) 136 - 145 mmol/L    Potassium 5.0 3.5 - 5.3 mmol/L    Chloride 99 98 - 107 mmol/L    Bicarbonate 25 21 - 32 mmol/L    Anion Gap 9 (L) 10 - 20 mmol/L    Urea Nitrogen 21 6 - 23 mg/dL    " Creatinine 0.86 0.50 - 1.30 mg/dL    eGFR 87 >60 mL/min/1.73m*2    Calcium 8.2 (L) 8.6 - 10.6 mg/dL    Phosphorus 2.3 (L) 2.5 - 4.9 mg/dL    Albumin 2.2 (L) 3.4 - 5.0 g/dL   Magnesium   Result Value Ref Range    Magnesium 1.80 1.60 - 2.40 mg/dL   POCT GLUCOSE   Result Value Ref Range    POCT Glucose 242 (H) 74 - 99 mg/dL   POCT GLUCOSE   Result Value Ref Range    POCT Glucose 219 (H) 74 - 99 mg/dL        Allergies  Patient has no known allergies.  Medications  Scheduled medications  aspirin, 81 mg, nasogastric tube, Daily  atorvastatin, 40 mg, nasogastric tube, Nightly  guaiFENesin, 200 mg, nasogastric tube, TID  heparin, 5,000 Units, subcutaneous, q8h  insulin lispro, 0-5 Units, subcutaneous, q6h  lubricating eye drops, 2 drop, Both Eyes, TID  moisturizing mouth, 15 mL, Swish & Spit, BID  sennosides-docusate sodium, 1 tablet, nasogastric tube, BID  silodosin, 4 mg, nasogastric tube, Daily with breakfast      Continuous medications     PRN medications  PRN medications: acetaminophen, acetaminophen, dextrose, dextrose, glucagon, glucagon, glycopyrrolate, ipratropium-albuteroL, LORazepam, midodrine, morphine **OR** morphine, oxygen     Assessment/Plan    Dat Mccallum is an 83y gentleman with a pmh HFrEF (EF 44%), T2DM, HTN, HLD, CKD, BPH, and deafness, communicates with ASL. He initially presented to OSH with AMS, found to have NSTEMI and was treated for PNA and UTI. Course complicated by persistent encephalopathy, transferred to [facility] for further workup. Extensive neuro and infectious workup unremarkable, however imagining concerning for malignancy in left lung. Pt also with ongoing dysphagia in the setting of encephalopathy, dobhoff in place.  11/15 pt seen at bedside this morning, had difficulty arousing with stimulation for conversation, very drowsy and fatigued.       Palliative Performance Scale (PPS):  "    ----------------------------------------------------------------------------------------------------------------------------------------------------------------------------------  Goals of care discussion at bedside with Dr. Braksdale, palliative care team, patient's wife, two sons Jorge A and Rich. Patient remains altered, does not have capacity to make their own medical decisions at this time. Unable to participate in ROS or goals of care discussion.  Patient's current clinical condition, including diagnosis, prognosis, and management plan were discussed.  Counseling provided on overall poor prognosis.  Discussed that patient is a poor candidate for bronch biopsy due to mentation and overall malnutrition/ illness state.  Discussed that if malignancy is confirmed, patient is unlikely to be able to tolerate any kind of cancer treatment due to weakness and poor state of health.  Discussed poor prognosis, discussed with family that time is likely short and that tube feeds may be causing more harm than benefit at this point.  Discussed unlikelihood that tube feeds would help patient to become stronger and improve.  Family expressing clear understanding of the severity of illness.    Counseling provided on the benefit of Hospice Services in the setting of patient's overall health status to keep patient out of the hospital while supporting patient and family by providing counseling, aggressive symptom management,  prioritizing comfort and quality of life, alleviation of suffering, and allowing patient to pass with comfort and dignity. Patient is hospice-eligible.     Wife is verbalizing to \"let him go if he cannot get better.\"  Family agreeable to hospice consult at this time.  Emotional support provided      I spent 46 minutes in providing separately identifiable ACP services with the patient and/or surrogate decision maker in a voluntary conversation discussing the patient's wishes and goals as detailed in the above " note.   ---------------------------------------------------------------------------------------------------------------------------------------------------------------------------------------------------------------------------------------------------------------------------    #Complex Medical Decision Making   #Goals of Care  #Advance Care Planning   - Code status: DNR/DNI.  Can transition patient to DNR CC once hospice plan is set  - Surrogate decision maker: Patient's wife is ANCELMO, however she is very hard of hearing and communicates via sign language.  Patient sons Jorge A and Rich are assisting with communication and are involved in decision making.  - Goals are comfort and quality of life based: consult placed to hospice services.  Hospice [facility] to meet with family and patient on Monday at 1:30 PM  - Advanced Directives on file     # Comfort plan   -Discussed stopping tube feeding and allowing comfort feeds once hospice plan is set  -Consider starting morphine IV as needed for pain and dyspnea  -Can start glycopyrrolate 0.4 mg IV Q6 as needed for secretion management  -Start lorazepam 0.5 mg IV every 4 hours as needed for anxiety and restlessness  -Would change acetaminophen to rectal suppository as needed for fever    #Psychosocial Support  - Music Therapy  - Spiritual Care Support  - Art Therapy  - Palliative SW Support      Plan of Care discussed with: Updated Dr. Nguyễn and bedside RN on goals of care decision, medication adjustments, and code status     Medical Decision Making was high level due to high complexity of problems, extensive data review, and high risk of management/treatment.     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 Palliative Team will continue to follow as needed. Please contact team with any questions or concerns.    Team pager [phone] (weekdays)  Kira Esteves DNP, APRN-CNP

## 2024-11-17 VITALS
HEIGHT: 72 IN | OXYGEN SATURATION: 92 % | WEIGHT: 143.74 LBS | BODY MASS INDEX: 19.47 KG/M2 | DIASTOLIC BLOOD PRESSURE: 63 MMHG | HEART RATE: 110 BPM | SYSTOLIC BLOOD PRESSURE: 127 MMHG | TEMPERATURE: 99.3 F | RESPIRATION RATE: 16 BRPM

## 2024-11-17 LAB
ALBUMIN SERPL BCP-MCNC: 2 G/DL (ref 3.4–5)
AMPHIPHYSIN IGG SER QL IA: NEGATIVE
ANION GAP SERPL CALC-SCNC: 10 MMOL/L (ref 10–20)
ANNOTATION COMMENT IMP: NORMAL
BUN SERPL-MCNC: 29 MG/DL (ref 6–23)
CALCIUM SERPL-MCNC: 8.2 MG/DL (ref 8.6–10.6)
CHLORIDE SERPL-SCNC: 101 MMOL/L (ref 98–107)
CO2 SERPL-SCNC: 25 MMOL/L (ref 21–32)
CREAT SERPL-MCNC: 1.04 MG/DL (ref 0.5–1.3)
CV2 AB SERPL QL IF: NEGATIVE
EGFRCR SERPLBLD CKD-EPI 2021: 72 ML/MIN/1.73M*2
ERYTHROCYTE [DISTWIDTH] IN BLOOD BY AUTOMATED COUNT: 15.6 % (ref 11.5–14.5)
GLIAL NUC TYPE 1 AB SER QL IF: NEGATIVE
GLUCOSE BLD MANUAL STRIP-MCNC: 150 MG/DL (ref 74–99)
GLUCOSE BLD MANUAL STRIP-MCNC: 209 MG/DL (ref 74–99)
GLUCOSE BLD MANUAL STRIP-MCNC: 235 MG/DL (ref 74–99)
GLUCOSE BLD MANUAL STRIP-MCNC: 238 MG/DL (ref 74–99)
GLUCOSE SERPL-MCNC: 221 MG/DL (ref 74–99)
HCT VFR BLD AUTO: 24.2 % (ref 41–52)
HGB BLD-MCNC: 7.5 G/DL (ref 13.5–17.5)
HU1 AB SER QL: NEGATIVE
HU2 AB SER QL IF: NEGATIVE
HU3 AB SER QL: NEGATIVE
MAGNESIUM SERPL-MCNC: 1.79 MG/DL (ref 1.6–2.4)
MCH RBC QN AUTO: 28.4 PG (ref 26–34)
MCHC RBC AUTO-ENTMCNC: 31 G/DL (ref 32–36)
MCV RBC AUTO: 92 FL (ref 80–100)
NRBC BLD-RTO: 0 /100 WBCS (ref 0–0)
PARANEOPLASTIC AB SER-IMP: NORMAL
PCA-1 AB SER QL IF: NEGATIVE
PCA-2 AB SER QL IF: NEGATIVE
PCA-TR AB SER QL IF: NEGATIVE
PHOSPHATE SERPL-MCNC: 2.9 MG/DL (ref 2.5–4.9)
PLATELET # BLD AUTO: 268 X10*3/UL (ref 150–450)
POTASSIUM SERPL-SCNC: 5 MMOL/L (ref 3.5–5.3)
RBC # BLD AUTO: 2.64 X10*6/UL (ref 4.5–5.9)
SODIUM SERPL-SCNC: 131 MMOL/L (ref 136–145)
VGCC-P/Q BIND IGG+IGM SER IA-SCNC: 0 NMOL/L
VGKC IGG+IGM SER IA-SCNC: 0 NMOL/L
WBC # BLD AUTO: 6 X10*3/UL (ref 4.4–11.3)

## 2024-11-17 PROCEDURE — 80069 RENAL FUNCTION PANEL: CPT | Performed by: STUDENT IN AN ORGANIZED HEALTH CARE EDUCATION/TRAINING PROGRAM

## 2024-11-17 PROCEDURE — 1100000001 HC PRIVATE ROOM DAILY

## 2024-11-17 PROCEDURE — 83735 ASSAY OF MAGNESIUM: CPT | Performed by: STUDENT IN AN ORGANIZED HEALTH CARE EDUCATION/TRAINING PROGRAM

## 2024-11-17 PROCEDURE — 85027 COMPLETE CBC AUTOMATED: CPT | Performed by: STUDENT IN AN ORGANIZED HEALTH CARE EDUCATION/TRAINING PROGRAM

## 2024-11-17 PROCEDURE — 2500000001 HC RX 250 WO HCPCS SELF ADMINISTERED DRUGS (ALT 637 FOR MEDICARE OP): Performed by: STUDENT IN AN ORGANIZED HEALTH CARE EDUCATION/TRAINING PROGRAM

## 2024-11-17 PROCEDURE — 2500000002 HC RX 250 W HCPCS SELF ADMINISTERED DRUGS (ALT 637 FOR MEDICARE OP, ALT 636 FOR OP/ED): Performed by: STUDENT IN AN ORGANIZED HEALTH CARE EDUCATION/TRAINING PROGRAM

## 2024-11-17 PROCEDURE — 82947 ASSAY GLUCOSE BLOOD QUANT: CPT

## 2024-11-17 PROCEDURE — 36415 COLL VENOUS BLD VENIPUNCTURE: CPT | Performed by: STUDENT IN AN ORGANIZED HEALTH CARE EDUCATION/TRAINING PROGRAM

## 2024-11-17 PROCEDURE — 2500000004 HC RX 250 GENERAL PHARMACY W/ HCPCS (ALT 636 FOR OP/ED): Performed by: STUDENT IN AN ORGANIZED HEALTH CARE EDUCATION/TRAINING PROGRAM

## 2024-11-17 PROCEDURE — 99233 SBSQ HOSP IP/OBS HIGH 50: CPT | Performed by: STUDENT IN AN ORGANIZED HEALTH CARE EDUCATION/TRAINING PROGRAM

## 2024-11-17 PROCEDURE — 2500000005 HC RX 250 GENERAL PHARMACY W/O HCPCS: Performed by: STUDENT IN AN ORGANIZED HEALTH CARE EDUCATION/TRAINING PROGRAM

## 2024-11-17 RX ADMIN — GLYCOPYRROLATE 0.2 MG: 0.2 INJECTION, SOLUTION INTRAMUSCULAR; INTRAVENOUS at 14:14

## 2024-11-17 RX ADMIN — GUAIFENESIN 200 MG: 100 SOLUTION ORAL at 14:13

## 2024-11-17 RX ADMIN — SENNOSIDES AND DOCUSATE SODIUM 1 TABLET: 8.6; 5 TABLET ORAL at 08:53

## 2024-11-17 RX ADMIN — ASPIRIN 81 MG CHEWABLE TABLET 81 MG: 81 TABLET CHEWABLE at 08:55

## 2024-11-17 RX ADMIN — GUAIFENESIN 200 MG: 100 SOLUTION ORAL at 08:53

## 2024-11-17 RX ADMIN — Medication 15 ML: at 08:54

## 2024-11-17 RX ADMIN — INSULIN LISPRO 2 UNITS: 100 INJECTION, SOLUTION INTRAVENOUS; SUBCUTANEOUS at 06:04

## 2024-11-17 RX ADMIN — HEPARIN SODIUM 5000 UNITS: 5000 INJECTION, SOLUTION INTRAVENOUS; SUBCUTANEOUS at 22:39

## 2024-11-17 RX ADMIN — HEPARIN SODIUM 5000 UNITS: 5000 INJECTION, SOLUTION INTRAVENOUS; SUBCUTANEOUS at 06:04

## 2024-11-17 RX ADMIN — INSULIN LISPRO 2 UNITS: 100 INJECTION, SOLUTION INTRAVENOUS; SUBCUTANEOUS at 11:50

## 2024-11-17 RX ADMIN — CARBOXYMETHYLCELLULOSE SODIUM 2 DROP: 5 SOLUTION/ DROPS OPHTHALMIC at 20:48

## 2024-11-17 RX ADMIN — ATORVASTATIN CALCIUM 40 MG: 40 TABLET, FILM COATED ORAL at 20:48

## 2024-11-17 RX ADMIN — CARBOXYMETHYLCELLULOSE SODIUM 2 DROP: 5 SOLUTION/ DROPS OPHTHALMIC at 14:14

## 2024-11-17 RX ADMIN — SENNOSIDES AND DOCUSATE SODIUM 1 TABLET: 8.6; 5 TABLET ORAL at 20:48

## 2024-11-17 RX ADMIN — Medication 15 ML: at 20:48

## 2024-11-17 RX ADMIN — CARBOXYMETHYLCELLULOSE SODIUM 2 DROP: 5 SOLUTION/ DROPS OPHTHALMIC at 08:56

## 2024-11-17 RX ADMIN — MORPHINE SULFATE 2 MG: 4 INJECTION INTRAVENOUS at 03:13

## 2024-11-17 RX ADMIN — HEPARIN SODIUM 5000 UNITS: 5000 INJECTION, SOLUTION INTRAVENOUS; SUBCUTANEOUS at 14:13

## 2024-11-17 RX ADMIN — SILODOSIN 4 MG: 4 CAPSULE ORAL at 08:53

## 2024-11-17 RX ADMIN — GUAIFENESIN 200 MG: 100 SOLUTION ORAL at 20:48

## 2024-11-17 RX ADMIN — INSULIN LISPRO 2 UNITS: 100 INJECTION, SOLUTION INTRAVENOUS; SUBCUTANEOUS at 00:11

## 2024-11-17 ASSESSMENT — PAIN SCALES - PAIN ASSESSMENT IN ADVANCED DEMENTIA (PAINAD)
BREATHING: NORMAL
CONSOLABILITY: NO NEED TO CONSOLE
FACIALEXPRESSION: SMILING OR INEXPRESSIVE
FACIALEXPRESSION: SMILING OR INEXPRESSIVE
BODYLANGUAGE: RELAXED
FACIALEXPRESSION: SAD, FRIGHTENED, FROWN
TOTALSCORE: 0
BREATHING: NORMAL
TOTALSCORE: 0
TOTALSCORE: 3
CONSOLABILITY: NO NEED TO CONSOLE
BREATHING: OCCASIONAL LABORED BREATHING, SHORT PERIOD OF HYPERVENTILATION
CONSOLABILITY: NO NEED TO CONSOLE
BODYLANGUAGE: RELAXED
TOTALSCORE: MEDICATION (SEE MAR)
BODYLANGUAGE: TENSE, DISTRESSED PACING, FIDGETING

## 2024-11-17 ASSESSMENT — PAIN SCALES - GENERAL
PAINLEVEL_OUTOF10: 0 - NO PAIN
PAINLEVEL_OUTOF10: 0 - NO PAIN

## 2024-11-17 NOTE — PROGRESS NOTES
Hospitalist Progress Note        Name: Dat Mccallum  :  1940(84 y.o.)  MRN:  [id number]    Date: 24     SUBJECTIVE     HPI:  This is a 84 y.o. male with past medical history of HFrEF (EF improved from 20% to stress test 10/21 with LVEF 44%), DMII, HTN, HLD ,CKD, BPH, Deaf (ASL)  transferred from OSH (on Hosp Day 23) to [facility] for  continued Acute Encephalopathy workup. Pt initially presented for AMS. Found to have NSTEMI, reduced EF but no significant stenosis on cardiac cath. Pt has been  hypotensive requiring intermittant midodinre and has not tolerated GDMT. Pt treated with IV abx for UTI and pneumonia. Due to persistent encepahlopathy, he was broadened to cover for CNS infection. He was demonstrating continue encephalopathy, with agitation, waxing and waning state, making non-sensicle/repetitive phrases as well as possible L hemineglect. MRI 10/19 and  without acute infarct, showed mod to marked volume loss, nonspecific changes consistent with microvascular disease. EEG showed diffuse encephalopathy without lateralization or seizure activity. Pt transferred to [facility] on IV vanc/ceftriaxone/amp/acyclovir. Underwent LP, noninfectious, antibiotics and antivirals discontinued. Awaiting further CSF studies per neurology. ID also consulted. CSF is not concerning for infection, antibiotics and antivirals deescalated. 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 CT chest done to evaluate for malignancy concerning for mass-like obstruction which is read as malignancy until proven otherwise. Pulm consulted for possible bronch and palliative consulted for GOC discussions.    Interval History:   Vitals and chart notes from overnight reviewed. No acute issues overnight.   Patient seen and evaluated at bedside. Sitter present. Patient sleeping. Wet cough with inability to clear airway.    Review of Systems:   Unable to obtain    Current medications:  Scheduled Meds:aspirin, 81 mg, nasogastric tube, Daily  atorvastatin,  40 mg, nasogastric tube, Nightly  guaiFENesin, 200 mg, nasogastric tube, TID  heparin, 5,000 Units, subcutaneous, q8h  insulin lispro, 0-5 Units, subcutaneous, q6h  lubricating eye drops, 2 drop, Both Eyes, TID  moisturizing mouth, 15 mL, Swish & Spit, BID  sennosides-docusate sodium, 1 tablet, nasogastric tube, BID  silodosin, 4 mg, nasogastric tube, Daily with breakfast      Continuous Infusions:   PRN Meds:PRN medications: acetaminophen, acetaminophen, dextrose, dextrose, glucagon, glucagon, glycopyrrolate, ipratropium-albuteroL, LORazepam, midodrine, morphine **OR** morphine, oxygen      OBJECTIVE     Vitals:    11/16/24 1513 11/16/24 2342 11/17/24 0818 11/17/24 1150   BP: 109/56 127/65 112/59 113/61   BP Location:  Left arm     Pulse: 86 97 92 90   Resp: 12 14 18 20   Temp: 36.6 °C (97.9 °F) 36.6 °C (97.9 °F) 36.1 °C (97 °F) 36.7 °C (98.1 °F)   TempSrc: Temporal Temporal     SpO2: 93% 93% 91% 92%   Weight:       Height:          Physical Exam  Vitals and nursing note reviewed.   Constitutional:       Appearance: He is ill-appearing.      Comments: lethargic   HENT:      Head: Normocephalic.      Nose:      Comments: Dobhoff in place     Mouth/Throat:      Mouth: Mucous membranes are dry.      Comments: Caked mucus secretions in oral cavity  Cardiovascular:      Rate and Rhythm: Normal rate and regular rhythm.   Pulmonary:      Effort: Pulmonary effort is normal.      Breath sounds: Rhonchi present.   Abdominal:      General: There is no distension.      Palpations: Abdomen is soft.      Tenderness: There is no abdominal tenderness.   Musculoskeletal:      Right lower leg: No edema.      Left lower leg: No edema.         Labs:   Lab Results   Component Value Date     (L) 11/17/2024    K 5.0 11/17/2024     11/17/2024    CO2 25 11/17/2024    BUN 29 (H) 11/17/2024    CREATININE 1.04 11/17/2024    GLUCOSE 221 (H) 11/17/2024    CALCIUM 8.2 (L) 11/17/2024    PROT 6.3 (L) 11/07/2024    BILITOT 0.7  11/07/2024    ALKPHOS 53 11/07/2024    AST 40 (H) 11/07/2024    ALT 30 11/07/2024    GLOB 3.4 04/18/2021       Lab Results   Component Value Date    WBC 6.0 11/17/2024    HGB 7.5 (L) 11/17/2024    HCT 24.2 (L) 11/17/2024    MCV 92 11/17/2024     11/17/2024       Imaging (within the past 24 hours):   No results found.      ASSESSMENT & PLAN     Acute persistent encephalopathy - paraneoplastic syndrome?  L lung mass-like obstruction, concern for malignancy  Hyponatremia  Severe protein calorie malnutrition  - persistent encephalopathy, possible L alfred neglect seen during course. Hospitalized for about 3 weeks with pneumonia, uti, and nstemi/HF.   - MRI with diffuse volume loss, no infarct or other attributable finding. Completed 10/19 and 11/6  - eeg with diffuse encephalopathy  - b12, b9, syph, hiv, tsh tested  - s/p 3 days high dose thiamine   - paraneoplastic panel, yanet/VIDAL pending  - CT a/p showed no evidence of malignant mass, e/o constipation with stercolal colitis.   -CT chest 11/12 concerning for masslike obstruction of the L mainstem bronchus, malignancy until proven otherwise - pulmonology consulted who state risks outweigh benefits of bronchoscopy given such weakened state; recommend GOC discussions with family  -Consulted palliative care for GOC discussions and symptom management; will need to consider PEG tube placement soon if not going with comfort  -As per HPI, patient's son (LARA Jules) and rest of family open to hospice discussions - hospice consulted and planning for meeting Monday  - Patient now with glycopyrrolate PRN  - LP completed, no infection. 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 Coreg  discontinued 11/8 due to ongoing  hypotension. Will monitor.   - Currently normotensive; midodrine ordered PRN but has not required since 11/8     Hospital acquired pneumonia - treated  - s/p zosyn     UTI with hematuria - treated  - s/p abx     Acute urinary retention  - at osh, urology unable to pass trotter; multiple attempts here as well for Trotter  - Continues to retain urine in large levels intemittently; able to straight cath therefore will continue this BID PRN for bladder scan >300  -On silodosin to aid with BPH     Hypotension  - continue midodrine prn     Severe protein-calorie malnutrition  Dysphagia in setting of AMS  - Continue tube feeds, monitor electrolytes. Will adjust FWF based on labs. On isosource 1.5 at goal of 50cc/hr via Dobhoff  -Dobhoff placed 10/31/24  - slp, nutrition consulted  - bid RFP, mag until stable     DMII  -BG consistently >180, will start sliding scale insulin q6h     Nephrolithiasis  - L 1.8 non-obstructing stone     Stercolal colitis  - seen on CT  - On bowel regimen and suppository/enema    Malnutrition Diagnosis Status: New  Malnutrition Diagnosis: Severe malnutrition related to chronic disease or condition  As Evidenced by: severe subcutaeous fat loss, severe muscle wasting, suspect meeting < 50% of EER for > 1 month and low BMI 19.5  I agree with the dietitian's malnutrition diagnosis.    DVT Prophylaxis: heparin 5000 q 8 hour    Code status: DNR and No Intubation  Diet: Enteral feeding with NPO Isosource 1.5; NG (nasogastric tube); 50; 150; Water; Every 6 hours     Disposition: continue to monitor inpatient, await consultant recommendations, await test results, and await clinical improvement    Level of MDM:  High   Risk: High   Data Reviewed and/or Analyzed:   Included: Prior external notes from at least 1 unique source, Results, including laboratory findings and imaging reports, listed above, Orders and notes from all consultants involved, and Notes for this encounter.  I personally reviewed the tests  referenced above.  I discussed plan of care with nurse, mac, JUNIE/BONY.    The patient/family had opportunity to ask questions. All questions were answered to the best of my ability.    Between 7AM-7PM please message me via Epic Secure Chat.  After 7PM please page Nocturnist on call.    Electronically signed by Yudith Nguyễn DO on 11/17/24 at 2:12 PM

## 2024-11-17 NOTE — CARE PLAN
Problem: Discharge Planning  Goal: Discharge to home or other facility with appropriate resources  11/17/2024 1305 by Bee Veloz RN  Outcome: Progressing  11/17/2024 1305 by Bee Veloz RN  Outcome: Progressing     Problem: Diabetes  Goal: Achieve decreasing blood glucose levels by end of shift  Outcome: Progressing     Problem: Skin  Goal: Promote skin healing  Outcome: Progressing  Flowsheets (Taken 11/17/2024 1305)  Promote skin healing:   Turn/reposition every 2 hours/use positioning/transfer devices   Assess skin/pad under line(s)/device(s)   Protective dressings over bony prominences   The patient's goals for the shift include      The clinical goals for the shift include Patient will show no signs or symptoms of pain during this shift

## 2024-11-18 LAB
FUNGUS SPEC CULT: NORMAL
FUNGUS SPEC FUNGUS STN: NORMAL
GLUCOSE BLD MANUAL STRIP-MCNC: 168 MG/DL (ref 74–99)
GLUCOSE BLD MANUAL STRIP-MCNC: 173 MG/DL (ref 74–99)
GLUCOSE BLD MANUAL STRIP-MCNC: 242 MG/DL (ref 74–99)
GLUCOSE BLD MANUAL STRIP-MCNC: 250 MG/DL (ref 74–99)
SCAN RESULT: ABNORMAL

## 2024-11-18 PROCEDURE — 82947 ASSAY GLUCOSE BLOOD QUANT: CPT

## 2024-11-18 PROCEDURE — 2500000002 HC RX 250 W HCPCS SELF ADMINISTERED DRUGS (ALT 637 FOR MEDICARE OP, ALT 636 FOR OP/ED): Performed by: STUDENT IN AN ORGANIZED HEALTH CARE EDUCATION/TRAINING PROGRAM

## 2024-11-18 PROCEDURE — 2500000004 HC RX 250 GENERAL PHARMACY W/ HCPCS (ALT 636 FOR OP/ED): Performed by: STUDENT IN AN ORGANIZED HEALTH CARE EDUCATION/TRAINING PROGRAM

## 2024-11-18 PROCEDURE — 1100000001 HC PRIVATE ROOM DAILY

## 2024-11-18 PROCEDURE — 99233 SBSQ HOSP IP/OBS HIGH 50: CPT | Performed by: STUDENT IN AN ORGANIZED HEALTH CARE EDUCATION/TRAINING PROGRAM

## 2024-11-18 PROCEDURE — 99233 SBSQ HOSP IP/OBS HIGH 50: CPT | Performed by: NURSE PRACTITIONER

## 2024-11-18 PROCEDURE — 2500000001 HC RX 250 WO HCPCS SELF ADMINISTERED DRUGS (ALT 637 FOR MEDICARE OP): Performed by: STUDENT IN AN ORGANIZED HEALTH CARE EDUCATION/TRAINING PROGRAM

## 2024-11-18 PROCEDURE — 2500000005 HC RX 250 GENERAL PHARMACY W/O HCPCS: Performed by: STUDENT IN AN ORGANIZED HEALTH CARE EDUCATION/TRAINING PROGRAM

## 2024-11-18 RX ORDER — GLYCOPYRROLATE 0.2 MG/ML
0.2 INJECTION INTRAMUSCULAR; INTRAVENOUS 3 TIMES DAILY
Status: DISCONTINUED | OUTPATIENT
Start: 2024-11-18 | End: 2024-11-19

## 2024-11-18 RX ADMIN — MORPHINE SULFATE 2 MG: 4 INJECTION, SOLUTION INTRAMUSCULAR; INTRAVENOUS at 01:20

## 2024-11-18 RX ADMIN — CARBOXYMETHYLCELLULOSE SODIUM 2 DROP: 5 SOLUTION/ DROPS OPHTHALMIC at 21:32

## 2024-11-18 RX ADMIN — ASPIRIN 81 MG CHEWABLE TABLET 81 MG: 81 TABLET CHEWABLE at 09:29

## 2024-11-18 RX ADMIN — GUAIFENESIN 200 MG: 100 SOLUTION ORAL at 09:28

## 2024-11-18 RX ADMIN — MORPHINE SULFATE 2 MG: 4 INJECTION, SOLUTION INTRAMUSCULAR; INTRAVENOUS at 21:31

## 2024-11-18 RX ADMIN — GUAIFENESIN 200 MG: 100 SOLUTION ORAL at 21:30

## 2024-11-18 RX ADMIN — SENNOSIDES AND DOCUSATE SODIUM 1 TABLET: 8.6; 5 TABLET ORAL at 09:29

## 2024-11-18 RX ADMIN — SILODOSIN 4 MG: 4 CAPSULE ORAL at 09:29

## 2024-11-18 RX ADMIN — MORPHINE SULFATE 2 MG: 4 INJECTION, SOLUTION INTRAMUSCULAR; INTRAVENOUS at 16:28

## 2024-11-18 RX ADMIN — INSULIN LISPRO 1 UNITS: 100 INJECTION, SOLUTION INTRAVENOUS; SUBCUTANEOUS at 05:55

## 2024-11-18 RX ADMIN — Medication 15 ML: at 21:32

## 2024-11-18 RX ADMIN — HEPARIN SODIUM 5000 UNITS: 5000 INJECTION, SOLUTION INTRAVENOUS; SUBCUTANEOUS at 21:31

## 2024-11-18 RX ADMIN — Medication 15 ML: at 09:00

## 2024-11-18 RX ADMIN — GLYCOPYRROLATE 0.2 MG: 0.2 INJECTION, SOLUTION INTRAMUSCULAR; INTRAVENOUS at 21:31

## 2024-11-18 RX ADMIN — HEPARIN SODIUM 5000 UNITS: 5000 INJECTION, SOLUTION INTRAVENOUS; SUBCUTANEOUS at 15:19

## 2024-11-18 RX ADMIN — HEPARIN SODIUM 5000 UNITS: 5000 INJECTION, SOLUTION INTRAVENOUS; SUBCUTANEOUS at 05:10

## 2024-11-18 RX ADMIN — GUAIFENESIN 200 MG: 100 SOLUTION ORAL at 15:19

## 2024-11-18 RX ADMIN — SENNOSIDES AND DOCUSATE SODIUM 1 TABLET: 8.6; 5 TABLET ORAL at 21:31

## 2024-11-18 RX ADMIN — ATORVASTATIN CALCIUM 40 MG: 40 TABLET, FILM COATED ORAL at 21:31

## 2024-11-18 RX ADMIN — INSULIN LISPRO 2 UNITS: 100 INJECTION, SOLUTION INTRAVENOUS; SUBCUTANEOUS at 13:28

## 2024-11-18 RX ADMIN — GLYCOPYRROLATE 0.2 MG: 0.2 INJECTION, SOLUTION INTRAMUSCULAR; INTRAVENOUS at 16:28

## 2024-11-18 RX ADMIN — GLYCOPYRROLATE 0.2 MG: 0.2 INJECTION, SOLUTION INTRAMUSCULAR; INTRAVENOUS at 01:18

## 2024-11-18 RX ADMIN — INSULIN LISPRO 2 UNITS: 100 INJECTION, SOLUTION INTRAVENOUS; SUBCUTANEOUS at 00:59

## 2024-11-18 ASSESSMENT — PAIN SCALES - PAIN ASSESSMENT IN ADVANCED DEMENTIA (PAINAD)
CONSOLABILITY: NO NEED TO CONSOLE
TOTALSCORE: 3
TOTALSCORE: MEDICATION (SEE MAR)
BREATHING: OCCASIONAL LABORED BREATHING, SHORT PERIOD OF HYPERVENTILATION
FACIALEXPRESSION: SAD, FRIGHTENED, FROWN
TOTALSCORE: 0
BREATHING: OCCASIONAL LABORED BREATHING, SHORT PERIOD OF HYPERVENTILATION
BODYLANGUAGE: RELAXED
FACIALEXPRESSION: SMILING OR INEXPRESSIVE
BREATHING: NORMAL
CONSOLABILITY: NO NEED TO CONSOLE
BODYLANGUAGE: TENSE, DISTRESSED PACING, FIDGETING
CONSOLABILITY: NO NEED TO CONSOLE
BODYLANGUAGE: TENSE, DISTRESSED PACING, FIDGETING

## 2024-11-18 ASSESSMENT — COGNITIVE AND FUNCTIONAL STATUS - GENERAL
TURNING FROM BACK TO SIDE WHILE IN FLAT BAD: TOTAL
STANDING UP FROM CHAIR USING ARMS: TOTAL
EATING MEALS: TOTAL
PERSONAL GROOMING: TOTAL
DAILY ACTIVITIY SCORE: 6
WALKING IN HOSPITAL ROOM: TOTAL
HELP NEEDED FOR BATHING: TOTAL
MOBILITY SCORE: 6
CLIMB 3 TO 5 STEPS WITH RAILING: TOTAL
MOVING TO AND FROM BED TO CHAIR: TOTAL
DRESSING REGULAR UPPER BODY CLOTHING: TOTAL
MOVING FROM LYING ON BACK TO SITTING ON SIDE OF FLAT BED WITH BEDRAILS: TOTAL
TOILETING: TOTAL
DRESSING REGULAR LOWER BODY CLOTHING: TOTAL

## 2024-11-18 ASSESSMENT — PAIN SCALES - GENERAL: PAINLEVEL_OUTOF10: 0 - NO PAIN

## 2024-11-18 NOTE — PROGRESS NOTES
Transitional Care Coordination Progress Note:  Patient discussed during interdisciplinary rounds.  Team members present: JUNIE GARCIA  Plan per Medical/Surgical team: Plan for family meeting with [facility] tomorrow at 2pm.  Payer: [employer]  Status: Inpatient  Discharge disposition: Hospice House vs home with [facility] pending outcome of family meeting.  Potential Barriers: none  ADOD: 11/19  Palliative Care and primary SW is following for hospice consult. Care coordinator will continue to follow for discharge planning needs.     Jose Rafael Tucker RN  Transitional Care Coordinator (TCC)  [phone] or [phone]

## 2024-11-18 NOTE — PROGRESS NOTES
Made aware that family has rescheduled the meeting with hospice for tomorrow at 2 pm stating that the whole family wanted to be present and they were not able to make that happen today.  Updated team members        YEYO Almeida

## 2024-11-18 NOTE — CARE PLAN
The patient's goals for the shift include        Problem: Skin  Goal: Decreased wound size/increased tissue granulation at next dressing change  Recent Flowsheet Documentation  Taken 11/18/2024 0943 by Farhan Ingram RN  Decreased wound size/increased tissue granulation at next dressing change: Promote sleep for wound healing  Goal: Participates in plan/prevention/treatment measures  Recent Flowsheet Documentation  Taken 11/18/2024 0943 by Farhan Ingram RN  Participates in plan/prevention/treatment measures: Elevate heels  Goal: Prevent/manage excess moisture  Recent Flowsheet Documentation  Taken 11/18/2024 0943 by Farhan Ingram RN  Prevent/manage excess moisture: Moisturize dry skin  Goal: Prevent/minimize sheer/friction injuries  Recent Flowsheet Documentation  Taken 11/18/2024 0943 by Farhan Ingram RN  Prevent/minimize sheer/friction injuries: Use pull sheet  Goal: Promote/optimize nutrition  Recent Flowsheet Documentation  Taken 11/18/2024 0943 by Farhan Ingram RN  Promote/optimize nutrition: Monitor/record intake including meals  Goal: Promote skin healing  Recent Flowsheet Documentation  Taken 11/18/2024 0943 by Farhan Ingram RN  Promote skin healing: Turn/reposition every 2 hours/use positioning/transfer devices     Problem: Skin  Goal: Decreased wound size/increased tissue granulation at next dressing change  Outcome: Progressing  Flowsheets (Taken 11/18/2024 0943)  Decreased wound size/increased tissue granulation at next dressing change: Promote sleep for wound healing  Goal: Participates in plan/prevention/treatment measures  Outcome: Progressing  Flowsheets (Taken 11/18/2024 0943)  Participates in plan/prevention/treatment measures: Elevate heels  Goal: Prevent/manage excess moisture  Outcome: Progressing  Flowsheets (Taken 11/18/2024 0943)  Prevent/manage excess moisture: Moisturize dry skin  Goal: Prevent/minimize sheer/friction injuries  Outcome: Progressing  Flowsheets (Taken 11/18/2024  0943)  Prevent/minimize sheer/friction injuries: Use pull sheet  Goal: Promote/optimize nutrition  Outcome: Progressing  Flowsheets (Taken 11/18/2024 0943)  Promote/optimize nutrition: Monitor/record intake including meals  Goal: Promote skin healing  Outcome: Progressing  Flowsheets (Taken 11/18/2024 0943)  Promote skin healing: Turn/reposition every 2 hours/use positioning/transfer devices     Problem: Discharge Planning  Goal: Discharge to home or other facility with appropriate resources  Outcome: Progressing     Problem: Diabetes  Goal: Achieve decreasing blood glucose levels by end of shift  Outcome: Progressing  Goal: Increase stability of blood glucose readings by end of shift  Outcome: Progressing  Goal: Decrease in ketones present in urine by end of shift  Outcome: Progressing  Goal: Maintain electrolyte levels within acceptable range throughout shift  Outcome: Progressing  Goal: Maintain glucose levels >70mg/dl to <250mg/dl throughout shift  Outcome: Progressing  Goal: No changes in neurological exam by end of shift  Outcome: Progressing  Goal: Learn about and adhere to nutrition recommendations by end of shift  Outcome: Progressing  Goal: Vital signs within normal range for age by end of shift  Outcome: Progressing  Goal: Increase self care and/or family involovement by end of shift  Outcome: Progressing  Goal: Receive DSME education by end of shift  Outcome: Progressing

## 2024-11-18 NOTE — PROGRESS NOTES
"Follow-up Palliative Medicine Progress Note  Palliative Medicine following for:  Complex medical decision making, symptom management, patient/family support    History obtained from chart review including ED note, H&P, patient's daily progress notes, review of lab/test results, and discussion with primary team and bedside RN.    Subjective    History of Present Illness  Dat Mccallum is a 84 y.o. male on day 10 of admission presenting with Encephalopathy acute. Pt's son at bedside. Patient is very drowsy and unable to participate in conversation. Pt seen today for a follow-up Palliative Medicine visit for pain and symptom management. Support and empathy was provided throughout the encounter. Provided reflective listening and presence.      Symptoms Pt unable to make needs known. Pt's son at bedside reports pt coughing and clenching hands at times since he has been visiting. Appears very drowsy and fatigued, ROS limited.   [place] Symptom Assessment System  0-10 (Numeric) Pain Score: 0 - No pain  Pain: Pt appears tense and clenching fists. FLACC 5  Fatigue: Positive  Drowsiness: Positive    Objective    Last Recorded Vitals  /80 (BP Location: Left arm)   Pulse 110   Temp 37 °C (98.6 °F)   Resp 17   Ht 1.829 m (6' 0.01\")   Wt 65.2 kg (143 lb 11.8 oz)   SpO2 92%   BMI 19.49 kg/m²    Intake/Output last 3 Shifts:  I/O last 3 completed shifts:  In: 2775 (42.6 mL/kg) [NG/GT:2775]  Out: 1550 (23.8 mL/kg) [Urine:1550 (0.7 mL/kg/hr)]  Weight: 65.2 kg     Physical Exam  Vitals and nursing note reviewed.   Constitutional:       General: He is not in acute distress.     Appearance: He is ill-appearing.      Interventions: Nasal cannula in place.      Comments: Severe protein calorie malnutrition, temporal wasting. Chronically ill-appearing, thin, frail-appearing.    HENT:      Head: Normocephalic.      Mouth/Throat:      Mouth: Mucous membranes are dry.   Pulmonary:      Effort: No respiratory distress.      " Comments: Productive cough  Abdominal:      Palpations: Abdomen is soft.   Skin:     General: Skin is dry.      Coloration: Skin is pale.   Neurological:      Mental Status: He is lethargic.     Relevant Results   Results for orders placed or performed during the hospital encounter of 11/08/24 (from the past 24 hours)   POCT GLUCOSE   Result Value Ref Range    POCT Glucose 235 (H) 74 - 99 mg/dL   POCT GLUCOSE   Result Value Ref Range    POCT Glucose 250 (H) 74 - 99 mg/dL   POCT GLUCOSE   Result Value Ref Range    POCT Glucose 173 (H) 74 - 99 mg/dL   POCT GLUCOSE   Result Value Ref Range    POCT Glucose 168 (H) 74 - 99 mg/dL   POCT GLUCOSE   Result Value Ref Range    POCT Glucose 242 (H) 74 - 99 mg/dL      Allergies  Patient has no known allergies.    Medications  Scheduled medications  aspirin, 81 mg, nasogastric tube, Daily  atorvastatin, 40 mg, nasogastric tube, Nightly  guaiFENesin, 200 mg, nasogastric tube, TID  heparin, 5,000 Units, subcutaneous, q8h  insulin lispro, 0-5 Units, subcutaneous, q6h  lubricating eye drops, 2 drop, Both Eyes, TID  moisturizing mouth, 15 mL, Swish & Spit, BID  sennosides-docusate sodium, 1 tablet, nasogastric tube, BID  silodosin, 4 mg, nasogastric tube, Daily with breakfast    PRN medications  PRN medications: acetaminophen, acetaminophen, dextrose, dextrose, glucagon, glucagon, glycopyrrolate, ipratropium-albuteroL, LORazepam, midodrine, morphine **OR** morphine, oxygen     Assessment/Plan    Dat Mccallum is an 84 yr old gentleman with a PMHx HFrEF (EF 44%), T2DM, HTN, HLD, CKD, BPH, and deafness, communicates with ASL. He initially presented to OSH with AMS, found to have NSTEMI and was treated for PNA and UTI. Course complicated by persistent encephalopathy, [place] [facility] [place]. Extensive neuro and infectious workup unremarkable, however imagining concerning for malignancy in left lung. Pt also with ongoing dysphagia in the setting of encephalopathy, dobhoff in  place.  11/15/2024 pt seen at bedside this morning, had difficulty arousing with stimulation for conversation, very drowsy and fatigued.  11/18/2024 pt seen at bedside this evening for follow-up for any pain or symptoms. Pt's son at bedside and concerned pt appears uncomfortable clenching fists and coughing due to secretions. Support and empathy was provided throughout the encounter. Provided reflective listening and presence. Pt's family rescheduled hospice meeting with [facility] from today at 1:30pm to tomorrow Tuesday, 11/19/2024 at 2pm so all family members can be present.     Palliative Performance Scale (PPS): 10%    #Complex Medical Decision Making   #Goals of Care  #Advance Care Planning   - Code status: DNR/DNI.  Can transition patient to DNR CC once hospice plan is set  - Surrogate decision maker: Patient's wife is ANCELMO, however she is very hard of hearing and communicates via sign language.  Patient sons Jorge A and Rich are assisting with communication and are involved in decision making.  - Goals are comfort and quality of life based: consult placed to hospice services. [facility] to meet with family and patient on Tuesday at 2 PM (tomorrow).  - Advanced Directives on file     #Comfort Plan    *Palliative Medicine Recommendations:*  1) Asked pt's nurse Farhan to give PRN dose of Glycopyrrolate 0.2mg IV for secretions and coughing and PRN Morphine 2mg IV for clenching and signs and symptoms of pain per pt's son at bedside for comfort.  2) Consider Glycopyrrolate 0.2mg IV [id number] ATC for secretions.    3) [facility] is meeting with pt's family tomorrow 11/19/2024 at 2pm as scheduled.   4) Previously discussed stopping tube feeding and allowing comfort feeds once hospice plan is set. Consider discontinuation of Dobbhoff tube. Consider discontinuation of aspirin, atorvastatin, heparin, silodosin, guaifensin.   5) Continue Morphine IV 2-4mg [id number] as needed for pain and  dyspnea.  6) Continue Glycopyrrolate 0.2 mg IV [id number] as needed for secretion management.  7) Continue Lorazepam 0.5 mg IV every 4 hours as needed for anxiety and restlessness.  8) Continue Acetaminophen to rectal suppository as needed for fever.  9) Palliative Medicine will continue to follow pt for ongoing pain and symptom management and family support.     #Psychosocial Support  - Music Therapy  - Spiritual Care Support  - Art Therapy  - Palliative SW Support    Plan of Care discussed with: Updated MD Dr. Yudith Nguyễn and bedside RN Farhan Ingram on goals of care decision, medication adjustments, and code status.     Medical Decision Making was high level due to high complexity of problems, extensive data review, and high risk of management/treatment.     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 Palliative will continue to follow as needed. Palliative Medicine is available Monday-Friday, 8a-6p. Please contact team with any questions or concerns.  Team pager [phone]  Gala Zendejas CNP (on EPIC secure chat)  Palliative Medicine Nurse Practitioner   [email or web address]     MERLENE Rodriges-CORRINA

## 2024-11-18 NOTE — CONSULTS
"Nutrition Follow Up Assessment:   Nutrition Assessment    Reason for Assessment:  (Follow up)    Patient is a 84 y.o. male presenting with   Acute persistent encephalopathy - paraneoplastic syndrome?  L lung mass-like obstruction, concern for malignancy  Hyponatremia  Severe protein calorie malnutrition    Nutrition History:  Food and Nutrient History: SLP rec'd pureed diet with thin liquids on 11/6/24.  Patient is NPO at present with tube feed infusing via DHT @ 50m/hr = 1800kcal, 82g pro, 917ml H20/d.  Water flushes ordered @ 150ml every 6hrs = 600ml H20/d. Noted plan for Hospice meeting today.    PMH notable for  HFrEF (EF improved from 20% to stress test 10/21 with LVEF 44%), DMII, HTN, HLD ,CKD, BPH, Deaf (ASL)  transferred from OSH (on Hosp Day 23)     Anthropometrics:  Height: 182.9 cm (6' 0.01\")   Weight: 65.2 kg (143 lb 11.8 oz)   BMI (Calculated): 19.49  IBW/kg (Dietitian Calculated): 80.9 kg  Percent of IBW: 80.6 %       Weight History:   Wt Readings from Last 15 Encounters:   11/09/24 65.2 kg (143 lb 11.8 oz)   11/07/24 65.2 kg (143 lb 11.8 oz)   02/27/24 68.9 kg (152 lb)   08/10/23 70.3 kg (155 lb)   01/03/22 76.7 kg (169 lb)   10/21/21 77.6 kg (171 lb)   07/21/21 76.3 kg (168 lb 4 oz)   02/26/21 68.9 kg (152 lb)   12/29/20 68.6 kg (151 lb 3.2 oz)   12/03/20 62.6 kg (138 lb)       Weight Change %:  Weight History / % Weight Change: 11/9/24) 65.2kg, 11/18/24) No new weight    Nutrition Focused Physical Exam Findings:    Subcutaneous Fat Loss:   Orbital Fat Pads: Severe (dark circles, hollowing and loose skin)  Buccal Fat Pads: Severe (hollow, sunken and narrow face)  Triceps: Severe (negligible fat tissue)  Muscle Wasting:  Temporalis: Severe (hollowed scooping depression)  Pectoralis (Clavicular Region): Severe (protruding prominent clavicle)  Deltoid/Trapezius: Severe (squared shoulders, acromion process prominent)  Interosseous: Severe (depressed area between thumb and forefinger)  Quadriceps: Severe " (depressions on inner and outer thigh)    Physical Findings:  Skin: Positive (R buttocks and arm wounds)    Nutrition Significant Labs:  CBC Trend:   Results from last 7 days   Lab Units 11/17/24  0743 11/16/24  0701 11/15/24  0630 11/14/24  0726   WBC AUTO x10*3/uL 6.0 5.3 5.4 4.6   RBC AUTO x10*6/uL 2.64* 2.97* 2.81* 2.78*   HEMOGLOBIN g/dL 7.5* 8.7* 8.4* 8.0*   HEMATOCRIT % 24.2* 27.0* 26.0* 26.4*   MCV fL 92 91 93 95   PLATELETS AUTO x10*3/uL 268 342 360 372    , A1C:  Lab Results   Component Value Date    HGBA1C 5.3 10/16/2024   , Renal Lab Trend:   Results from last 7 days   Lab Units 11/17/24  0743 11/16/24  0701 11/15/24  0630 11/14/24  0726   POTASSIUM mmol/L 5.0 4.5 5.0 4.4   PHOSPHORUS mg/dL 2.9 2.7 2.3* 2.7   SODIUM mmol/L 131* 131* 128* 133*   MAGNESIUM mg/dL 1.79 1.82 1.80 2.00   EGFR mL/min/1.73m*2 72 82 87 87   BUN mg/dL 29* 23 21 21   CREATININE mg/dL 1.04 0.93 0.86 0.86    , Vit B12:   Lab Results   Component Value Date    NVFRQXOP60 473 11/09/2024    , Folate:   Lab Results   Component Value Date    FOLATE 5.5 11/09/2024    , CRP:   Lab Results   Component Value Date    CRP 8.60 (H) 11/09/2024       Nutrition Specific Medications:  Scheduled medications  aspirin, 81 mg, nasogastric tube, Daily  atorvastatin, 40 mg, nasogastric tube, Nightly  guaiFENesin, 200 mg, nasogastric tube, TID  heparin, 5,000 Units, subcutaneous, q8h  insulin lispro, 0-5 Units, subcutaneous, q6h  lubricating eye drops, 2 drop, Both Eyes, TID  moisturizing mouth, 15 mL, Swish & Spit, BID  sennosides-docusate sodium, 1 tablet, nasogastric tube, BID  silodosin, 4 mg, nasogastric tube, Daily with breakfast      I/O:   Last BM Date: 11/17/24; Stool Appearance: Soft (11/17/24 0750)    Dietary Orders (From admission, onward)       Start     Ordered    11/12/24 0948  Enteral feeding with NPO Isosource 1.5; NG (nasogastric tube); 50; 150; Water; Every 6 hours  Diet effective now        Comments: Start at 10cc/hr, increase by 10cc  every 12 hours to goal of 50cc/hr   Question Answer Comment   Tube feeding formula: Isosource 1.5    Feeding route: NG (nasogastric tube)    Tube feeding continuous rate (mL/hr): 50    Tube feeding flush (mL): 150    Flush type: Water    Flush frequency: Every 6 hours        11/12/24 0902                     Estimated Needs:   Total Energy Estimated Needs (kCal): 1950 kCal  Method for Estimating Needs: 65.2 kg / 30 kcal  Total Protein Estimated Needs (g): 85 g  Method for Estimating Needs: 65.2 kg / 1.3 g              Nutrition Diagnosis   Malnutrition Diagnosis  Patient has Malnutrition Diagnosis: Yes  Diagnosis Status: Ongoing  Malnutrition Diagnosis: Severe malnutrition related to chronic disease or condition  As Evidenced by: severe subcutaeous fat loss, severe muscle wasting, suspect meeting < 50% of EER for > 1 month and low BMI 19.5            Nutrition Interventions/Recommendations         Nutrition Prescription:  Individualized Nutrition Prescription Provided for :     Recommend   1) No new nutrition recommendations at this time.    Nutrition plan based on POC/GOC pending Hospice meeting.         Nutrition Education:      N/A    Nutrition Monitoring and Evaluation             Biochemical Data, Medical Tests and Procedures  Monitoring and Evaluation Plan: Electrolyte/renal panel, Glucose/endocrine profile  Criteria: WNL              Time Spent (min): 45 minutes

## 2024-11-18 NOTE — PROGRESS NOTES
Hospitalist Progress Note        Name: Dat Mccallum  :  1940(84 y.o.)  MRN:  [id number]    Date: 24     SUBJECTIVE     HPI:  This is a 84 y.o. male with past medical history of HFrEF (EF improved from 20% to stress test 10/21 with LVEF 44%), DMII, HTN, HLD ,CKD, BPH, Deaf (ASL)  transferred from OSH (on Hosp Day 23) to [facility] [place] for  continued Acute Encephalopathy workup. Pt initially presented for AMS. Found to have NSTEMI, reduced EF but no significant stenosis on cardiac cath. Pt has been  hypotensive requiring intermittant midodinre and has not tolerated GDMT. Pt treated with IV abx for UTI and pneumonia. Due to persistent encepahlopathy, he was broadened to cover for CNS infection. He was demonstrating continue encephalopathy, with agitation, waxing and waning state, making non-sensicle/repetitive phrases as well as possible L hemineglect. MRI 10/19 and  without acute infarct, showed mod to marked volume loss, nonspecific changes consistent with microvascular disease. EEG showed diffuse encephalopathy without lateralization or seizure activity. Pt transferred to [facility] [place] on IV vanc/ceftriaxone/amp/acyclovir. Underwent LP, noninfectious, antibiotics and antivirals discontinued. Awaiting further CSF studies per neurology. ID also consulted. CSF is not concerning for infection, antibiotics and antivirals deescalated. 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 CT chest done to evaluate for malignancy concerning for mass-like obstruction which is read as malignancy until proven otherwise. Pulm consulted for possible bronch and palliative consulted for GOC discussions.    Interval History:   Vitals and chart notes from overnight reviewed. No acute issues overnight.   Patient seen and evaluated at bedside. Sitter present. Patient sleeping. Wet cough with inability to clear airway.    Review of Systems:   Unable to obtain    Current medications:  Scheduled Meds:aspirin, 81 mg, nasogastric tube, Daily  atorvastatin,  40 mg, nasogastric tube, Nightly  guaiFENesin, 200 mg, nasogastric tube, TID  heparin, 5,000 Units, subcutaneous, q8h  insulin lispro, 0-5 Units, subcutaneous, q6h  lubricating eye drops, 2 drop, Both Eyes, TID  moisturizing mouth, 15 mL, Swish & Spit, BID  sennosides-docusate sodium, 1 tablet, nasogastric tube, BID  silodosin, 4 mg, nasogastric tube, Daily with breakfast      Continuous Infusions:   PRN Meds:PRN medications: acetaminophen, acetaminophen, dextrose, dextrose, glucagon, glucagon, glycopyrrolate, ipratropium-albuteroL, LORazepam, midodrine, morphine **OR** morphine, oxygen      OBJECTIVE     Vitals:    11/17/24 1936 11/17/24 2300 11/18/24 0341 11/18/24 0740   BP: 125/68 127/63 111/61 152/85   BP Location:  Right arm     Patient Position:  Lying     Pulse: 107 110 95 101   Resp: 16 16  22   Temp: 37.8 °C (100 °F) 37.4 °C (99.3 °F) 37.3 °C (99.1 °F) 36.7 °C (98.1 °F)   TempSrc:  Temporal     SpO2: 92% 92% 92% 96%   Weight:       Height:          Physical Exam  Vitals and nursing note reviewed.   Constitutional:       Appearance: He is ill-appearing.      Comments: lethargic   HENT:      Head: Normocephalic.      Nose:      Comments: Dobhoff in place     Mouth/Throat:      Mouth: Mucous membranes are dry.      Comments: Caked mucus secretions in oral cavity  Cardiovascular:      Rate and Rhythm: Normal rate and regular rhythm.   Pulmonary:      Effort: Pulmonary effort is normal.      Breath sounds: Rhonchi present.   Abdominal:      General: There is no distension.      Palpations: Abdomen is soft.      Tenderness: There is no abdominal tenderness.   Musculoskeletal:      Right lower leg: No edema.      Left lower leg: No edema.         Labs:   Lab Results   Component Value Date     (L) 11/17/2024    K 5.0 11/17/2024     11/17/2024    CO2 25 11/17/2024    BUN 29 (H) 11/17/2024    CREATININE 1.04 11/17/2024    GLUCOSE 221 (H) 11/17/2024    CALCIUM 8.2 (L) 11/17/2024    PROT 6.3 (L) 11/07/2024     BILITOT 0.7 11/07/2024    ALKPHOS 53 11/07/2024    AST 40 (H) 11/07/2024    ALT 30 11/07/2024    GLOB 3.4 04/18/2021       Lab Results   Component Value Date    WBC 6.0 11/17/2024    HGB 7.5 (L) 11/17/2024    HCT 24.2 (L) 11/17/2024    MCV 92 11/17/2024     11/17/2024       Imaging (within the past 24 hours):   No results found.      ASSESSMENT & PLAN     Acute persistent encephalopathy - paraneoplastic syndrome?  L lung mass-like obstruction, concern for malignancy  Hyponatremia  Severe protein calorie malnutrition  - persistent encephalopathy, possible L alfred neglect seen during course. Hospitalized for about 3 weeks with pneumonia, uti, and nstemi/HF.   - MRI with diffuse volume loss, no infarct or other attributable finding. Completed 10/19 and 11/6  - eeg with diffuse encephalopathy  - b12, b9, syph, hiv, tsh tested  - s/p 3 days high dose thiamine   - paraneoplastic panel, yanet/VIDAL pending  - CT a/p showed no evidence of malignant mass, e/o constipation with stercolal colitis.   -CT chest 11/12 concerning for masslike obstruction of the L mainstem bronchus, malignancy until proven otherwise - pulmonology consulted who state risks outweigh benefits of bronchoscopy given such weakened state; recommend GOC discussions with family  -Consulted palliative care for GOC discussions and symptom management; will need to consider PEG tube placement soon if not going with comfort  -As per HPI, patient's son (LARA Jules) and rest of family open to hospice discussions - hospice consulted and planning for meeting- meeting postponed for 11/19 for patient schedule conflict  - Patient now with glycopyrrolate PRN  - LP completed, no infection. 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 Coreg  discontinued 11/8 due to ongoing hypotension. Will monitor.   - Currently normotensive; midodrine ordered PRN but has not required since 11/8     Hospital acquired pneumonia - treated  - s/p zosyn     UTI with hematuria - treated  - s/p abx     Acute urinary retention  - at osh, urology unable to pass trotter; multiple attempts here as well for Trotter  - Continues to retain urine in large levels intemittently; able to straight cath therefore will continue this BID PRN for bladder scan >300  -On silodosin to aid with BPH     Hypotension  - continue midodrine prn     Severe protein-calorie malnutrition  Dysphagia in setting of AMS  - Continue tube feeds, monitor electrolytes. Will adjust FWF based on labs. On isosource 1.5 at goal of 50cc/hr via Dobhoff  -Dobhoff placed 10/31/24  - slp, nutrition consulted  - bid RFP, mag until stable     DMII  -BG consistently >180, will start sliding scale insulin q6h     Nephrolithiasis  - L 1.8 non-obstructing stone     Stercolal colitis  - seen on CT  - On bowel regimen and suppository/enema    Malnutrition Diagnosis Status: Ongoing  Malnutrition Diagnosis: Severe malnutrition related to chronic disease or condition  As Evidenced by: severe subcutaeous fat loss, severe muscle wasting, suspect meeting < 50% of EER for > 1 month and low BMI 19.5  I agree with the dietitian's malnutrition diagnosis.    DVT Prophylaxis: heparin 5000 q 8 hour    Code status: DNR and No Intubation  Diet: Enteral feeding with NPO Isosource 1.5; NG (nasogastric tube); 50; 150; Water; Every 6 hours     Disposition: pending hospice discussions tomorrow 11/19    Level of MDM:  High   Risk: High   Data Reviewed and/or Analyzed:   Included: Prior external notes from at least 1 unique source, Results, including laboratory findings and imaging reports, listed above, Orders and notes  from all consultants involved, and Notes for this encounter.  I personally reviewed the tests referenced above.  I discussed plan of care with nurse, mac, JUNIE/BONY.    The patient/family had opportunity to ask questions. All questions were answered to the best of my ability.    Between 7AM-7PM please message me via Epic Secure Chat.  After 7PM please page Nocturnist on call.    Electronically signed by Yudith Nguyễn DO on 11/18/24 at 1:42 PM

## 2024-11-19 VITALS
WEIGHT: 143.74 LBS | HEART RATE: 126 BPM | DIASTOLIC BLOOD PRESSURE: 72 MMHG | TEMPERATURE: 102.7 F | BODY MASS INDEX: 19.47 KG/M2 | OXYGEN SATURATION: 96 % | SYSTOLIC BLOOD PRESSURE: 151 MMHG | RESPIRATION RATE: 44 BRPM | HEIGHT: 72 IN

## 2024-11-19 LAB
AMPAR2 IGG CSF QL CBA IFA: NEGATIVE
AMPHIPHYSIN AB CSF QL IF: NEGATIVE
ANNOTATION COMMENT IMP: NORMAL
CASPR2 IGG CSF QL CBA IFA: NEGATIVE
CV2 AB CSF QL IF: NEGATIVE
DPPX IGG CSF QL CBA IFA: NEGATIVE
GABABR IGG CSF QL CBA IFA: NEGATIVE
GAD65 IGG+IGM CSF IA-SCNC: 0 NMOL/L
GFAP ALPHA IGG CSF QL IF: NEGATIVE
GLIAL NUC TYPE 1 AB CSF QL IF: NEGATIVE
GLUCOSE BLD MANUAL STRIP-MCNC: 159 MG/DL (ref 74–99)
GLUCOSE BLD MANUAL STRIP-MCNC: 264 MG/DL (ref 74–99)
GLUCOSE BLD MANUAL STRIP-MCNC: 267 MG/DL (ref 74–99)
HU1 AB CSF QL IF: NEGATIVE
HU2 AB CSF QL IF: NEGATIVE
HU3 AB CSF QL IF: NEGATIVE
IGLON5 IGG CSF QL CBA IFA: NEGATIVE
IMMUNOLOGIST REVIEW: NORMAL
LGI1 IGG CSF QL CBA IFA: NEGATIVE
MGLUR1 IGG CSF QL IF: NEGATIVE
NEUROCHONDRIN AB CSF QL IF: NEGATIVE
NIF IGG CSF QL IF: NEGATIVE
NMDAR1 IGG CSF QL CBA IFA: NEGATIVE
PCA-1 AB CSF QL IF: NEGATIVE
PCA-2 AB CSF QL IF: NEGATIVE
PCA-TR AB CSF QL IF: NEGATIVE
PDE10A AB IFA, CSF: NEGATIVE
SEPTIN-7 IGG CSF QL IF: NEGATIVE
TRIM46 AB IFA, CSF: NEGATIVE

## 2024-11-19 PROCEDURE — 2500000001 HC RX 250 WO HCPCS SELF ADMINISTERED DRUGS (ALT 637 FOR MEDICARE OP): Performed by: STUDENT IN AN ORGANIZED HEALTH CARE EDUCATION/TRAINING PROGRAM

## 2024-11-19 PROCEDURE — 2500000004 HC RX 250 GENERAL PHARMACY W/ HCPCS (ALT 636 FOR OP/ED): Performed by: STUDENT IN AN ORGANIZED HEALTH CARE EDUCATION/TRAINING PROGRAM

## 2024-11-19 PROCEDURE — 99239 HOSP IP/OBS DSCHRG MGMT >30: CPT | Performed by: STUDENT IN AN ORGANIZED HEALTH CARE EDUCATION/TRAINING PROGRAM

## 2024-11-19 PROCEDURE — 2500000005 HC RX 250 GENERAL PHARMACY W/O HCPCS: Performed by: STUDENT IN AN ORGANIZED HEALTH CARE EDUCATION/TRAINING PROGRAM

## 2024-11-19 PROCEDURE — 82947 ASSAY GLUCOSE BLOOD QUANT: CPT

## 2024-11-19 PROCEDURE — 2500000002 HC RX 250 W HCPCS SELF ADMINISTERED DRUGS (ALT 637 FOR MEDICARE OP, ALT 636 FOR OP/ED): Performed by: STUDENT IN AN ORGANIZED HEALTH CARE EDUCATION/TRAINING PROGRAM

## 2024-11-19 RX ORDER — LORAZEPAM 2 MG/ML
1 INJECTION INTRAMUSCULAR EVERY 4 HOURS PRN
Status: DISCONTINUED | OUTPATIENT
Start: 2024-11-19 | End: 2024-11-19 | Stop reason: HOSPADM

## 2024-11-19 RX ORDER — GLYCOPYRROLATE 0.2 MG/ML
0.2 INJECTION INTRAMUSCULAR; INTRAVENOUS EVERY 4 HOURS PRN
Status: CANCELLED | OUTPATIENT
Start: 2024-11-19

## 2024-11-19 RX ORDER — ACETAMINOPHEN 650 MG/1
650 SUPPOSITORY RECTAL EVERY 6 HOURS PRN
Status: CANCELLED | OUTPATIENT
Start: 2024-11-19

## 2024-11-19 RX ORDER — MORPHINE SULFATE IN 0.9 % NACL 30 MG/30ML
PATIENT CONTROLLED ANALGESIA SYRINGE INTRAVENOUS CONTINUOUS
Status: CANCELLED | OUTPATIENT
Start: 2024-11-19

## 2024-11-19 RX ORDER — GLYCOPYRROLATE 0.2 MG/ML
0.2 INJECTION INTRAMUSCULAR; INTRAVENOUS EVERY 4 HOURS PRN
Status: DISCONTINUED | OUTPATIENT
Start: 2024-11-19 | End: 2024-11-19 | Stop reason: HOSPADM

## 2024-11-19 RX ORDER — MORPHINE SULFATE 4 MG/ML
2 INJECTION INTRAVENOUS EVERY 30 MIN PRN
Status: DISCONTINUED | OUTPATIENT
Start: 2024-11-19 | End: 2024-11-19

## 2024-11-19 RX ORDER — MORPHINE SULFATE 4 MG/ML
2 INJECTION INTRAVENOUS
Status: DISCONTINUED | OUTPATIENT
Start: 2024-11-19 | End: 2024-11-19

## 2024-11-19 RX ORDER — IPRATROPIUM BROMIDE AND ALBUTEROL SULFATE 2.5; .5 MG/3ML; MG/3ML
3 SOLUTION RESPIRATORY (INHALATION) EVERY 6 HOURS PRN
Status: CANCELLED | OUTPATIENT
Start: 2024-11-19

## 2024-11-19 RX ORDER — MORPHINE SULFATE IN 0.9 % NACL 30 MG/30ML
PATIENT CONTROLLED ANALGESIA SYRINGE INTRAVENOUS CONTINUOUS
Status: DISCONTINUED | OUTPATIENT
Start: 2024-11-19 | End: 2024-11-19 | Stop reason: HOSPADM

## 2024-11-19 RX ORDER — LORAZEPAM 2 MG/ML
1 INJECTION INTRAMUSCULAR EVERY 4 HOURS PRN
Status: CANCELLED | OUTPATIENT
Start: 2024-11-19

## 2024-11-19 RX ADMIN — ASPIRIN 81 MG CHEWABLE TABLET 81 MG: 81 TABLET CHEWABLE at 09:05

## 2024-11-19 RX ADMIN — HEPARIN SODIUM 5000 UNITS: 5000 INJECTION, SOLUTION INTRAVENOUS; SUBCUTANEOUS at 05:16

## 2024-11-19 RX ADMIN — MORPHINE SULFATE 2 MG: 4 INJECTION, SOLUTION INTRAMUSCULAR; INTRAVENOUS at 06:41

## 2024-11-19 RX ADMIN — MORPHINE SULFATE 4 MG: 4 INJECTION INTRAVENOUS at 10:58

## 2024-11-19 RX ADMIN — INSULIN LISPRO 3 UNITS: 100 INJECTION, SOLUTION INTRAVENOUS; SUBCUTANEOUS at 05:16

## 2024-11-19 RX ADMIN — GUAIFENESIN 200 MG: 100 SOLUTION ORAL at 09:06

## 2024-11-19 RX ADMIN — INSULIN LISPRO 3 UNITS: 100 INJECTION, SOLUTION INTRAVENOUS; SUBCUTANEOUS at 00:41

## 2024-11-19 RX ADMIN — MORPHINE SULFATE 2 MG: 4 INJECTION INTRAVENOUS at 12:58

## 2024-11-19 RX ADMIN — LORAZEPAM 0.5 MG: 2 INJECTION INTRAMUSCULAR; INTRAVENOUS at 12:23

## 2024-11-19 RX ADMIN — Medication: at 13:51

## 2024-11-19 RX ADMIN — SILODOSIN 4 MG: 4 CAPSULE ORAL at 09:19

## 2024-11-19 RX ADMIN — GLYCOPYRROLATE 0.2 MG: 0.2 INJECTION, SOLUTION INTRAMUSCULAR; INTRAVENOUS at 09:05

## 2024-11-19 RX ADMIN — LORAZEPAM 1 MG: 2 INJECTION INTRAMUSCULAR; INTRAVENOUS at 14:08

## 2024-11-19 ASSESSMENT — COGNITIVE AND FUNCTIONAL STATUS - GENERAL
STANDING UP FROM CHAIR USING ARMS: TOTAL
DRESSING REGULAR LOWER BODY CLOTHING: TOTAL
TURNING FROM BACK TO SIDE WHILE IN FLAT BAD: TOTAL
EATING MEALS: TOTAL
MOVING TO AND FROM BED TO CHAIR: TOTAL
TOILETING: TOTAL
CLIMB 3 TO 5 STEPS WITH RAILING: TOTAL
DAILY ACTIVITIY SCORE: 6
WALKING IN HOSPITAL ROOM: TOTAL
PERSONAL GROOMING: TOTAL
MOBILITY SCORE: 6
DRESSING REGULAR UPPER BODY CLOTHING: TOTAL
HELP NEEDED FOR BATHING: TOTAL
MOVING FROM LYING ON BACK TO SITTING ON SIDE OF FLAT BED WITH BEDRAILS: TOTAL

## 2024-11-19 ASSESSMENT — PAIN SCALES - GENERAL: PAINLEVEL_OUTOF10: 0 - NO PAIN

## 2024-11-19 ASSESSMENT — PAIN SCALES - PAIN ASSESSMENT IN ADVANCED DEMENTIA (PAINAD)
BREATHING: OCCASIONAL LABORED BREATHING, SHORT PERIOD OF HYPERVENTILATION
FACIALEXPRESSION: SAD, FRIGHTENED, FROWN
CONSOLABILITY: NO NEED TO CONSOLE
TOTALSCORE: 4
TOTALSCORE: MEDICATION (SEE MAR)
BODYLANGUAGE: TENSE, DISTRESSED PACING, FIDGETING
NEGVOCALIZATION: OCCASIONAL MOAN/GROAN, LOW SPEECH, NEGATIVE/DISAPPROVING QUALITY

## 2024-11-19 NOTE — H&P
Chief complaint: resp failure    History Of Present Illness  84 y.o. male with past medical history of HFrEF (EF improved from 20% to stress test 10/21 with LVEF 44%), DMII, HTN, HLD ,CKD, BPH, Deaf (ASL)  transferred from OSH (on Hosp Day 23) to [facility] [place] for  continued Acute Encephalopathy workup. Pt initially presented for AMS. Found to have NSTEMI, reduced EF but no significant stenosis on cardiac cath. Pt has been  hypotensive requiring intermittant midodinre and has not tolerated GDMT. Pt treated with IV abx for UTI and pneumonia. Due to persistent encepahlopathy, he was broadened to cover for CNS infection. He was demonstrating continue encephalopathy, with agitation, waxing and waning state, making non-sensicle/repetitive phrases as well as possible L hemineglect. MRI 10/19 and 11/6 without acute infarct, showed mod to marked volume loss, nonspecific changes consistent with microvascular disease. EEG showed diffuse encephalopathy without lateralization or seizure activity. Pt transferred to [facility] [place] on IV vanc/ceftriaxone/amp/acyclovir. Underwent LP, noninfectious, antibiotics and antivirals discontinued. Awaiting further CSF studies per neurology. ID also consulted. CSF is not concerning for infection, antibiotics and antivirals deescalated. 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 CT chest done to evaluate for malignancy concerning for mass-like obstruction which is read as malignancy until proven otherwise. Pulm consulted for possible bronch and palliative consulted for GOC discussions. Pt had further decline with tacypnea and increased work of breathing, made DNR-comfort care, started on morhpine drip with prn pushes. He is being discharged and readmitted to [facility], continuing comfort care under [facility] admission inpatient.      Past Medical History  He has a past medical history of Acute frontal sinusitis, unspecified (01/06/2015), Encounter for screening for malignant neoplasm of colon (01/22/2020), Encounter for screening  for malignant neoplasm of prostate (08/14/2017), Other conditions influencing health status, Other conditions influencing health status (01/19/2018), Other skin changes (07/21/2021), Other specified health status, Personal history of other diseases of the nervous system and sense organs, Personal history of other diseases of urinary system (08/10/2021), Personal history of other specified conditions (06/29/2018), Personal history of other specified conditions (09/25/2017), Personal history of other specified conditions (08/14/2017), Personal history of other specified conditions (10/11/2017), Personal history of pneumonia (recurrent) (01/19/2018), Rash and other nonspecific skin eruption (12/17/2020), 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    Surgical History  He has a past surgical history that includes Other surgical history (12/02/2013); Other surgical history (11/26/2018); Other surgical history (11/26/2018); and Cardiac catheterization (N/A, 10/23/2024).     Social History  He reports that he has never smoked. He has never been exposed to tobacco smoke. He has never used smokeless tobacco. He reports that he does not currently use alcohol. He reports that he does not use drugs.    Family History  Reviewed with patient, not significant to presentation     Allergies  Patient has no known allergies.    Review of systems  Unable to obtain 2/2 neurological status     Physical Exam:   General: Lying in bed, appears uncomfortable, diaphoretic, not interacting with environment, eyes closed  HEAD: NC, AT  Eyes: Anicteric, no pallor  ENT: dry mm, OP clear, dobhoff in place  Cardiovascular: tachycardic   Respiratory: coarse bilaterally, decreased air movement, increased work of breathing  Gastrointestinal: Soft, NT, ND   Musculoskeletal: no deformities  Neuro: disoriented, encephalopathic  Integumentary: no rash, no edema, cool  extremities     Last Recorded Vitals  There were no vitals taken for this visit.    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       Mckay Tomlin MD

## 2024-11-19 NOTE — NURSING NOTE
.Rapid Response Nurse Note: RADAR alert: 7    Pager time: [phone]  Arrival time: 1215  Event end time: 1245  Location: [facility]      Rapid response initiated by:  [] Rapid response RN [] Family [] Nursing Supervisor [] Physician   [x] RADAR auto page [] Sepsis auto-page [] RN [] RT   [] NP/PA [] Other:     Primary reason for call:   [] BAT [] New CPAP/BiPAP [] Bleeding [] Change in mental status   [] Chest pain [] Code blue [] FiO2 >/= 50% [] HR </= 40 bpm   [] HR >/= 130 bpm [] Hyperglycemia [] Hypoglycemia [x] RADAR    [] RR </= 8 bpm [] RR >/= 30 bpm [] SBP </= 90 mmHg [] SpO2 < 90%   [] Seizure [] Sepsis [] Shortness of breath  [] Staff concern: see comments     Interventions:  [] None [] ABG/VBG [] Assist w/ICU transfer [] BAT paged    [] Bag mask [] Blood [] Cardioversion [] Code Blue   [] Code blue for intubation [] Code status changed [] Chest x-ray [] EKG   [] IV fluid/bolus [] KUB x-ray [] Labs/cultures [x] Medication   [] Nebulizer treatment [] NIPPV (CPAP/BiPAP) [x] Oxygen [] Oral airway   [] Peripheral IV [] Palliative care consult [] CT/MRI [] Sepsis protocol    [] Suctioned [x] Other:     Outcome:  [] Coded and  [] Code blue for intubation [] Coded and transferred to ICU []  on division   [x] Remained on division (no change) [] Remained on division + additional monitoring [] Remained in ED [] Transferred to ED   [] Transferred to ICU [] Transferred to inpatient status [] Transferred for interventions (procedure) [] Transferred to ICU stepdown    [] Transferred to surgery [] Transferred to telemetry [] Sepsis protocol [] STEMI protocol   [] Stroke protocol       Additional Comments:   RADAR page received.  Notified nurse of ERIN:  37.2 125 44 133/73 93%.      Patient unresponsive with increased WOB.  Patient febrile, tachycardic,RR 40's using accessory muscles.  Patient given prn ativan.  TF held per Dr. Tomlin. Increased 02 to 55% VM and then NRB.  Dr. Tomlin and palliative care  team at bedside. Patient now comfort care per provider.  Palliative care adjusting medication plan to increase patients comfort.  Bedside nurse aware of plan.

## 2024-11-19 NOTE — SIGNIFICANT EVENT
No signs of respiratory effort.  No response to verbal stimuli, no response to painful stimuli, no pupillary response to light, no central pulse, no heart or lung sounds on ascultation  Patient pronounced  at 14:41 on 24.

## 2024-11-19 NOTE — DISCHARGE SUMMARY
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 male with past medical history of HFrEF (EF improved from 20% to stress test 10/21 with LVEF 44%), DMII, HTN, HLD ,CKD, BPH, Deaf (ASL)  transferred from OSH (on Hosp Day 23) to [facility] for  continued Acute Encephalopathy workup. Pt initially presented for AMS. Found to have NSTEMI, reduced EF but no significant stenosis on cardiac cath. Pt has been  hypotensive requiring intermittant midodinre and has not tolerated GDMT. Pt treated with IV abx for UTI and pneumonia. Due to persistent encepahlopathy, he was broadened to cover for CNS infection. He was demonstrating continue encephalopathy, with agitation, waxing and waning state, making non-sensicle/repetitive phrases as well as possible L hemineglect. MRI 10/19 and 11/6 without acute infarct, showed mod to marked volume loss, nonspecific changes consistent with microvascular disease. EEG showed diffuse encephalopathy without lateralization or seizure activity. Pt transferred to [facility] on IV vanc/ceftriaxone/amp/acyclovir. Underwent LP, noninfectious, antibiotics and antivirals discontinued. Awaiting further CSF studies per neurology. ID also consulted. CSF is not concerning for infection, antibiotics and antivirals deescalated. 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 CT chest done to evaluate for malignancy concerning for mass-like obstruction which is read as malignancy until proven otherwise.  Pulm consulted for possible bronch and palliative consulted for GOC discussions. Pt had further decline with tacypnea and increased work of breathing, made DNR-comfort care, started on morhpine drip with prn pushes. He was discharged/readmitted to [facility] and continued on comfort measures. Family was at bedside. Pt  on 24 at 1441.     Pertinent Physical Exam At Time of Discharge  No signs of respiratory effort.  No response to verbal stimuli, no response to painful stimuli, no pupillary response to light, no central pulse, no heart or lung sounds on ascultation

## 2024-11-19 NOTE — DISCHARGE SUMMARY
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  Severe protein calorie malnutrition  NSTEMI  Heart failure with reduced ejection fraction  Hosptial acquired pneumonia  Acute cystitis with hematuria  Acute urinary retention  Stercoral colitis       Discharge Meds     Your medication list        CONTINUE taking these medications        Instructions Last Dose Given Next Dose Due   pen needle, diabetic 32 gauge x 5/32\" needle  Commonly known as: Sure Comfort Pen Needle      Use three times a day with insulin              ASK your doctor about these medications        Instructions Last Dose Given Next Dose Due   aspirin 81 mg chewable tablet      Chew 1 tablet (81 mg) once daily.       carvedilol 3.125 mg tablet  Commonly known as: Coreg      Take 1 tablet (3.125 mg) by mouth 2 times a day after meals.       glimepiride 4 mg tablet  Commonly known as: Amaryl      Take 1 tablet (4 mg) by mouth 2 times a day.       metFORMIN 500 mg tablet  Commonly known as: Glucophage      Take 2 tablets by mouth twice daily       midodrine 5 mg tablet  Commonly known as: Proamatine      Take 1 tablet (5 mg) by mouth 3 times daily (morning, midday, late afternoon).       omeprazole 20 mg DR capsule  Commonly known as: PriLOSEC           OneTouch Ultra Test strip  Generic drug: blood sugar diagnostic           pravastatin 20 mg tablet  Commonly known as: Pravachol      Take 1 tablet (20 mg) by mouth once daily at bedtime.       pravastatin 40 mg tablet  Commonly known as: Pravachol      Take 1 tablet (40 mg) by mouth once daily at bedtime.       tamsulosin 0.4 mg 24 hr capsule  Commonly known as: Flomax      Take 2 capsules (0.8 mg) by mouth once daily at bedtime.                Test Results Pending At Discharge  Pending Labs       Order Current Status    Fungal Culture/Smear Preliminary " result            Hospital Course  84 y.o. male with past medical history of HFrEF (EF improved from 20% to stress test 10/21 with LVEF 44%), DMII, HTN, HLD ,CKD, BPH, Deaf (ASL)  transferred from OSH (on Hosp Day 23) to [facility] [place] for  continued Acute Encephalopathy workup. Pt initially presented for AMS. Found to have NSTEMI, reduced EF but no significant stenosis on cardiac cath. Pt has been  hypotensive requiring intermittant midodinre and has not tolerated GDMT. Pt treated with IV abx for UTI and pneumonia. Due to persistent encepahlopathy, he was broadened to cover for CNS infection. He was demonstrating continue encephalopathy, with agitation, waxing and waning state, making non-sensicle/repetitive phrases as well as possible L hemineglect. MRI 10/19 and 11/6 without acute infarct, showed mod to marked volume loss, nonspecific changes consistent with microvascular disease. EEG showed diffuse encephalopathy without lateralization or seizure activity. Pt transferred to [facility] [place] on IV vanc/ceftriaxone/amp/acyclovir. Underwent LP, noninfectious, antibiotics and antivirals discontinued. Awaiting further CSF studies per neurology. ID also consulted. CSF is not concerning for infection, antibiotics and antivirals deescalated. 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 CT chest done to evaluate for malignancy concerning for mass-like obstruction which is read as malignancy until proven otherwise. Pulm consulted for possible bronch and palliative consulted for GOC discussions. Pt had further decline with tacypnea and increased work of breathing, made DNR-comfort care, started on morhpine drip with prn pushes. He is being discharged/readmitted to [facility]..     Pertinent Physical Exam At Time of Discharge  On the day of discharge, tachypnea with increased work of breathing, nonresponsive to verbal or tactile stimuli but known hearing impairment, eyes closed, not interactive with environment, tachycardic, air movement reduced and coarse, abd  soft, no edema.     Outpatient Follow-Up  No future appointments.    Time spent >30 minutes on discharge management.    Mckay Tomlin MD

## 2024-11-19 NOTE — RESEARCH NOTES
Artificial Intelligence Monitoring in Nursing (AIMS Nursing) Study    Principle Investigator - Dr. Jorge A Nathan  Research Coordinator - Cecile Varma     Patient Name - Dat Mccallum  Date - 11/19/2024 11:37 AM  Location - [place] [address]    Dat Mccallum was approached by Cecile Varma to talk about participating in the AIMS Nursing Study. The patient was not able to be approached, a research coordinator will come back at a later time. Study protocol was followed and patient was given study contact information.     Cecile Varma

## 2024-11-19 NOTE — PROGRESS NOTES
Pt became acutely symptomatic and rapid response was called.  Pall care was contacted as hospice meeting was scheduled for 2 pm today.  Family called and they arrived early.  Plan will be to continue to provide comfort level of support, and possible transition pt to GIP here in the hospital  Hospice RN meeting with family now.      YEYO Almeida

## 2024-11-19 NOTE — CONSULTS
Reason For Consult  hospice    History Of Present Illness  .     Past Medical History  He has a past medical history of Acute frontal sinusitis, unspecified (01/06/2015), Encounter for screening for malignant neoplasm of colon (01/22/2020), Encounter for screening for malignant neoplasm of prostate (08/14/2017), Other conditions influencing health status, Other conditions influencing health status (01/19/2018), Other skin changes (07/21/2021), Other specified health status, Personal history of other diseases of the nervous system and sense organs, Personal history of other diseases of urinary system (08/10/2021), Personal history of other specified conditions (06/29/2018), Personal history of other specified conditions (09/25/2017), Personal history of other specified conditions (08/14/2017), Personal history of other specified conditions (10/11/2017), Personal history of pneumonia (recurrent) (01/19/2018), Rash and other nonspecific skin eruption (12/17/2020), 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    Surgical History  He has a past surgical history that includes Other surgical history (12/02/2013); Other surgical history (11/26/2018); Other surgical history (11/26/2018); and Cardiac catheterization (N/A, 10/23/2024).     Social History  He reports that he has never smoked. He has never been exposed to tobacco smoke. He has never used smokeless tobacco. He reports that he does not currently use alcohol. He reports that he does not use drugs.    Family History  No family history on file.     Allergies  Patient has no known allergies.    Review of Systems       Physical Exam       Last Recorded Vitals  There were no vitals taken for this visit.    Relevant Results       Assessment/Plan     This RN met with pt. And family at bedside.  Pt. Had just had a rapid response called and placed on a NRB with agonal breathing.  Pt. 2 sons  Alicia with spouse Shakira arrived and aware that pt. Is not stable to DC and state agreement with GIP admit in hospital with hospice support in place.  Pt. Placed in GIP and comfort meds started.  Pt. Passed with family at bedside with hospice support in place.  Family aware that hospice bereavement will FU with family for early bereavement needs.         Ai Quiros RN

## 2024-11-19 NOTE — CARE PLAN
The patient's goals for the shift include        Problem: Skin  Goal: Decreased wound size/increased tissue granulation at next dressing change  Recent Flowsheet Documentation  Taken 11/19/2024 1006 by Farhan Ingram RN  Decreased wound size/increased tissue granulation at next dressing change: Protective dressings over bony prominences  Goal: Participates in plan/prevention/treatment measures  Recent Flowsheet Documentation  Taken 11/19/2024 1006 by Farhan Ingram RN  Participates in plan/prevention/treatment measures: Elevate heels  Goal: Prevent/manage excess moisture  Recent Flowsheet Documentation  Taken 11/19/2024 1006 by Farhan Ingram RN  Prevent/manage excess moisture: Moisturize dry skin  Goal: Prevent/minimize sheer/friction injuries  Recent Flowsheet Documentation  Taken 11/19/2024 1006 by Farhan Ingram RN  Prevent/minimize sheer/friction injuries: Use pull sheet  Goal: Promote/optimize nutrition  Recent Flowsheet Documentation  Taken 11/19/2024 1006 by Farhan Ingram RN  Promote/optimize nutrition: Monitor/record intake including meals  Goal: Promote skin healing  Recent Flowsheet Documentation  Taken 11/19/2024 1006 by Farhan Ingram RN  Promote skin healing: Assess skin/pad under line(s)/device(s)     Problem: Skin  Goal: Decreased wound size/increased tissue granulation at next dressing change  Flowsheets (Taken 11/19/2024 1006)  Decreased wound size/increased tissue granulation at next dressing change: Protective dressings over bony prominences  Goal: Participates in plan/prevention/treatment measures  Flowsheets (Taken 11/19/2024 1006)  Participates in plan/prevention/treatment measures: Elevate heels  Goal: Prevent/manage excess moisture  Flowsheets (Taken 11/19/2024 1006)  Prevent/manage excess moisture: Moisturize dry skin  Goal: Prevent/minimize sheer/friction injuries  Flowsheets (Taken 11/19/2024 1006)  Prevent/minimize sheer/friction injuries: Use pull sheet  Goal: Promote/optimize  nutrition  Flowsheets (Taken 11/19/2024 1006)  Promote/optimize nutrition: Monitor/record intake including meals  Goal: Promote skin healing  Flowsheets (Taken 11/19/2024 1006)  Promote skin healing: Assess skin/pad under line(s)/device(s)

## 2024-11-20 NOTE — PROGRESS NOTES
Spiritual Care Visit    Clinical Encounter Type  Visited With: Health care provider  Routine Visit: Follow-up  Crisis Visit: Patient actively dying

## 2024-11-25 LAB
FUNGUS SPEC CULT: NORMAL
FUNGUS SPEC FUNGUS STN: NORMAL

## 2024-11-26 LAB
GLUCOSE BLD MANUAL STRIP-MCNC: 121 MG/DL (ref 74–99)
GLUCOSE BLD MANUAL STRIP-MCNC: 145 MG/DL (ref 74–99)
GLUCOSE BLD MANUAL STRIP-MCNC: 145 MG/DL (ref 74–99)
GLUCOSE BLD MANUAL STRIP-MCNC: 160 MG/DL (ref 74–99)
GLUCOSE BLD MANUAL STRIP-MCNC: 190 MG/DL (ref 74–99)
GLUCOSE BLD MANUAL STRIP-MCNC: 197 MG/DL (ref 74–99)
GLUCOSE BLD MANUAL STRIP-MCNC: 199 MG/DL (ref 74–99)
GLUCOSE BLD MANUAL STRIP-MCNC: 215 MG/DL (ref 74–99)
GLUCOSE BLD MANUAL STRIP-MCNC: 360 MG/DL (ref 74–99)
GLUCOSE BLD MANUAL STRIP-MCNC: 366 MG/DL (ref 74–99)
GLUCOSE BLD MANUAL STRIP-MCNC: 86 MG/DL (ref 74–99)
